# Patient Record
Sex: MALE | Race: WHITE | NOT HISPANIC OR LATINO | Employment: OTHER | URBAN - METROPOLITAN AREA
[De-identification: names, ages, dates, MRNs, and addresses within clinical notes are randomized per-mention and may not be internally consistent; named-entity substitution may affect disease eponyms.]

---

## 2017-02-08 DIAGNOSIS — I10 ESSENTIAL HYPERTENSION: ICD-10-CM

## 2017-02-08 RX ORDER — LISINOPRIL 40 MG/1
40 TABLET ORAL DAILY
Qty: 90 TABLET | Refills: 3 | Status: SHIPPED | OUTPATIENT
Start: 2017-02-08 | End: 2018-02-12 | Stop reason: SDUPTHER

## 2017-02-08 NOTE — TELEPHONE ENCOUNTER
----- Message from Mileyluizangelia Kitchen sent at 2/8/2017  8:50 AM CST -----  Contact: Self/[phone] ext:[phone] work  Type: Rx    Name of medication(s): lisinopril (PRINIVIL,ZESTRIL) 40 MG tablet    Is this a refill? New rx?refill    Who prescribed medication?    Pharmacy Name, Phone, & Location[facility]    Comments:Patient is calling to request a refill on medication above. Please call and advise.    Thank you!

## 2017-03-10 ENCOUNTER — OFFICE VISIT (OUTPATIENT)
Dept: DERMATOLOGY | Facility: CLINIC | Age: 72
End: 2017-03-10
Payer: MEDICARE

## 2017-03-10 DIAGNOSIS — L82.1 SK (SEBORRHEIC KERATOSIS): ICD-10-CM

## 2017-03-10 DIAGNOSIS — L21.9 ACUTE SEBORRHEIC DERMATITIS: ICD-10-CM

## 2017-03-10 DIAGNOSIS — L57.0 AK (ACTINIC KERATOSIS): Primary | ICD-10-CM

## 2017-03-10 DIAGNOSIS — Z12.83 SKIN CANCER SCREENING: ICD-10-CM

## 2017-03-10 PROCEDURE — 99999 PR PBB SHADOW E&M-EST. PATIENT-LVL II: CPT | Mod: PBBFAC,,, | Performed by: DERMATOLOGY

## 2017-03-10 PROCEDURE — 99213 OFFICE O/P EST LOW 20 MIN: CPT | Mod: 25,S$GLB,, | Performed by: DERMATOLOGY

## 2017-03-10 PROCEDURE — 1159F MED LIST DOCD IN RCRD: CPT | Mod: S$GLB,,, | Performed by: DERMATOLOGY

## 2017-03-10 PROCEDURE — 1160F RVW MEDS BY RX/DR IN RCRD: CPT | Mod: S$GLB,,, | Performed by: DERMATOLOGY

## 2017-03-10 PROCEDURE — 1157F ADVNC CARE PLAN IN RCRD: CPT | Mod: S$GLB,,, | Performed by: DERMATOLOGY

## 2017-03-10 PROCEDURE — 17000 DESTRUCT PREMALG LESION: CPT | Mod: S$GLB,,, | Performed by: DERMATOLOGY

## 2017-03-10 PROCEDURE — 17003 DESTRUCT PREMALG LES 2-14: CPT | Mod: S$GLB,,, | Performed by: DERMATOLOGY

## 2017-03-10 RX ORDER — KETOCONAZOLE 20 MG/ML
SHAMPOO, SUSPENSION TOPICAL
Qty: 120 ML | Refills: 5 | Status: SHIPPED | OUTPATIENT
Start: 2017-03-10 | End: 2018-02-07 | Stop reason: SDUPTHER

## 2017-03-10 NOTE — PROGRESS NOTES
Subjective:       Patient ID:  Jamison Mayes is a 72 y.o. male who presents for   Chief Complaint   Patient presents with    Skin Check     UBSE     HPI  Patient has a history of non-melanoma skin cancer and is here today for routine skin check. Has noticed some more scaly spots on scalp since last visit. Asymptomatic and no current treatment.    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   Has done PDT on scalp in 2015.    Also has a hx of seb derm and would like refills of ketoconazole shampoo.    Review of Systems   Constitutional: Negative for fever, chills, weight loss, weight gain, fatigue, night sweats and malaise.   Skin: Positive for activity-related sunscreen use. Negative for daily sunscreen use and recent sunburn.   Hematologic/Lymphatic: Bruises/bleeds easily.        Objective:    Physical Exam   Constitutional: He appears well-developed and well-nourished. No distress.   Neurological: He is alert and oriented to person, place, and time. He is not disoriented.   Psychiatric: He has a normal mood and affect.   Skin:   Areas Examined (abnormalities noted in diagram):   Scalp / Hair Palpated and Inspected  Head / Face Inspection Performed  Neck Inspection Performed  Chest / Axilla Inspection Performed  Abdomen Inspection Performed  Back Inspection Performed  RUE Inspected  LUE Inspection Performed  Nails and Digits Inspection Performed                       Diagram Legend     Erythematous scaling macule/papule c/w actinic keratosis       Vascular papule c/w angioma      Pigmented verrucoid papule/plaque c/w seborrheic keratosis      Yellow umbilicated papule c/w sebaceous hyperplasia      Irregularly shaped tan macule c/w lentigo     1-2 mm smooth white papules consistent with Milia      Movable subcutaneous cyst with punctum c/w epidermal inclusion cyst      Subcutaneous movable cyst c/w pilar cyst       Firm pink to brown papule c/w dermatofibroma      Pedunculated fleshy papule(s) c/w skin tag(s)      Evenly pigmented macule c/w junctional nevus     Mildly variegated pigmented, slightly irregular-bordered macule c/w mildly atypical nevus      Flesh colored to evenly pigmented papule c/w intradermal nevus       Pink pearly papule/plaque c/w basal cell carcinoma      Erythematous hyperkeratotic cursted plaque c/w SCC      Surgical scar with no sign of skin cancer recurrence      Open and closed comedones      Inflammatory papules and pustules      Verrucoid papule consistent consistent with wart     Erythematous eczematous patches and plaques     Dystrophic onycholytic nail with subungual debris c/w onychomycosis     Umbilicated papule    Erythematous-base heme-crusted tan verrucoid plaque consistent with inflamed seborrheic keratosis     Erythematous Silvery Scaling Plaque c/w Psoriasis     See annotation      Assessment / Plan:        AK (actinic keratosis)  Cryosurgery Procedure Note    Verbal consent from the patient is obtained and the patient is aware of the precancerous quality and need for treatment of these lesions. Liquid nitrogen cryosurgery is applied to the 11 actinic keratoses, as detailed in the physical exam, to produce a freeze injury. The patient is aware that blisters may form and is instructed on wound care with gentle cleansing and use of vaseline ointment to keep moist until healed. The patient is supplied a handout on cryosurgery and is instructed to call if lesions do not completely resolve.    Acute seborrheic dermatitis  -     ketoconazole (NIZORAL) 2 % shampoo; Wash hair with medicated shampoo at least 2x/week - let sit on scalp at least 5 minutes prior to rinsing  Dispense: 120 mL; Refill: 5    SK (seborrheic keratosis)  These are benign inherited growths without a malignant potential. Reassurance given to patient. No treatment is necessary.   Treatment of benign, asymptomatic  lesions may be considered cosmetic.    Skin cancer screening  Upper body skin examination performed today including at least 6 points as noted in physical examination. No lesions suspicious for malignancy noted.  Patient instructed in importance of daily broad spectrum sunscreen use with spf at least 30. Sun avoidance and topical protection/protective clothing discussed.      Return in about 6 months (around 9/10/2017) for skin check or sooner for any concerns.

## 2017-03-10 NOTE — MR AVS SNAPSHOT
[facility]  [address]  [facility] [address]  Phone: [phone]  Fax: [phone]                  Jamison Gruberiot   3/10/2017 1:30 PM   Office Visit    Description:  Male : 1945   Provider:  Cristin Joseph MD   Department:  [facility]           Reason for Visit     Skin Check           Diagnoses this Visit        Comments    AK (actinic keratosis)    -  Primary     Acute seborrheic dermatitis         SK (seborrheic keratosis)         Skin cancer screening                To Do List           Future Appointments        Provider Department Dept Phone    2017 8:00 AM LAB, APPOINTMENT [facility] [phone]    2017 10:00 AM MD [facility] - Internal Medicine [phone]      Goals (5 Years of Data)     None      Follow-Up and Disposition     Return in about 6 months (around 9/10/2017) for skin check or sooner for any concerns.    Follow-up and Disposition History       These Medications        Disp Refills Start End    ketoconazole (NIZORAL) 2 % shampoo 120 mL 5 3/10/2017     Wash hair with medicated shampoo at least 2x/week - let sit on scalp at least 5 minutes prior to rinsing    Pharmacy: [facility]/pharmacy #[id number] [address] AT [facility] Ph #: [phone]         [facility]     [facility] Nurse Care Line -  Assistance  Registered nurses in the [facility] provide clinical advisement, health education, appointment booking, and other advisory services.  Call for this free service at [phone].             Medications           Message regarding Medications     Verify the changes and/or additions to your medication regime listed below are the same as discussed with your clinician today.  If any of these changes or additions are incorrect, please notify your healthcare provider.             Verify that the below list of medications is an  accurate representation of the medications you are currently taking.  If none reported, the list may be blank. If incorrect, please contact your healthcare provider. Carry this list with you in case of emergency.           Current Medications     amlodipine (NORVASC) 10 MG tablet Take 1 tablet (10 mg total) by mouth once daily.    aspirin (ASPIRIN LOW DOSE) 81 MG EC tablet Take 81 mg by mouth. 1 Tablet, Delayed Release (E.C.) Oral Every day    calcium carbonate (OS-RUSLAN) 500 mg calcium (1,250 mg) chewable tablet Take 1 tablet by mouth once daily.    cyanocobalamin (VITAMIN B-12) 250 MCG tablet Take 1 tablet (250 mcg total) by mouth once daily.    fluorouracil (EFUDEX) 5 % cream AAA on scalp, forehead, and temples BID x 2-3 weeks. Stop if blistered, oozing, or bleeding. Use daily sun protection.    imiquimod (ALDARA) 5 % cream AAA 5 nights/week x 4 - 6 weeks. Wash off in am. Stop if blistering, bleeding, oozing, etc. Do not use >1 packet per night.    ketoconazole (NIZORAL) 2 % shampoo Wash hair with medicated shampoo at least 2x/week - let sit on scalp at least 5 minutes prior to rinsing    lisinopril (PRINIVIL,ZESTRIL) 40 MG tablet Take 1 tablet (40 mg total) by mouth once daily.    lovastatin (MEVACOR) 20 MG tablet TAKE 1 TABLET IN THE EVENING    lovastatin (MEVACOR) 20 MG tablet Take 1 tablet (20 mg total) by mouth every evening.    MULTIVITAMIN WITH MINERALS (ONE-A-DAY 50 PLUS) Tab Take by mouth. 1 Tablet Oral Every morning    oxycodone-acetaminophen (PERCOCET) 5-325 mg per tablet Take 1 tablet by mouth every 4 to 6 hours as needed for Pain.    phenytoin (DILANTIN EXTENDED) 100 MG ER capsule Take 2 capsules (200 mg total) by mouth 3 (three) times daily.    potassium chloride SA (K-DUR,KLOR-CON) 20 MEQ tablet Take by mouth every other day. 595 mg           Clinical Reference Information           Allergies as of 3/10/2017     No Known Allergies      Immunizations Administered on Date of Encounter - 3/10/2017      None      Instructions    CRYOSURGERY      Your doctor has used a method called cryosurgery to treat your skin condition. Cryosurgery refers to the use of very cold substances to treat a variety of skin conditions such as warts, pre-skin cancers, molluscum contagiosum, sun spots, and several benign growths. The substance we use in cryosurgery is liquid nitrogen and is so cold (-195 degrees Celsius) that is burns when administered.     Following treatment in the office, the skin may immediately burn and become red. You may find the area around the lesion is affected as well. It is sometimes necessary to treat not only the lesion, but a small area of the surrounding normal skin to achieve a good response.     A blister, and even a blood filled blister, may form after treatment.   This is a normal response. If the blister is painful, it is acceptable to sterilize a needle and with rubbing alcohol and gently pop the blister. It is important that you gently wash the area with soap and warm water as the blister fluid may contain wart virus if a wart was treated. Do no remove the roof of the blister.     The area treated can take anywhere from 1-3 weeks to heal. Healing time depends on the kind skin lesion treated, the location, and how aggressively the lesion was treated. It is recommended that the areas treated are covered with Vaseline or bacitracin ointment and a band-aid. If a band-aid is not practical, just ointment applied several times per day will do. Keeping these areas moist will speed the healing time.    Treatment with liquid nitrogen can leave a scar. In dark skin, it may be a light or dark scar, in light skin it may be a white or pink scar. These will generally fade with time, but may never go away completely.     If you have any concerns after your treatment, please feel free to call the office.       [facility] [place] [address]/ [phone] FAX/ [email or web address]    Summer  Sun Protection      The [facility] would like to remind you of the importance of sun protection all year round and particularly during the summer when the suns rays are the strongest. It has been proven that both acute and chronic sun exposure damages our cells and leads to skin cancer. Beyond skin cancer, the sun causes 90% of the symptoms of pre-mature skin aging, including wrinkles, lentigines (brown spots), and thin, easily bruised skin. Proper sun protection can help prevent these unwanted conditions.    Many patients report that the dont go in the sun. It has been shown that the average person receives 18 hours of incidental sun exposure per week during activities such as walking through parking lots, driving, or sitting next to windows. This accumulates to several bad sunburns per year!    In choosing sunscreen, you want one that protects against both UVA and UVB rays. It is recommended that you use one of SPF 30 or higher. It is important to apply the sunscreen about 20 minutes prior to sun exposure. Most sunscreens are chemical sunscreens and a reaction must take place in the skin so that they are effective. If they are applied and then you are immediately exposed to the sun or start sweating, this reaction has not had time to take place and you are therefore unprotected. Sunscreen needs to be reapplied every 2 hours if you are participating in water sports or sweating. We recommend Elta MD or Neutrogena Ultra Sheer Dry Touch SPF 55 for daily use; however there are many options and it is most important for you to find one that you will use on a consistent basis.    If you have sensitive skin, you may do best with a sunscreen that contains only physical blockers such as titanium dioxide or zinc oxide. These are typically thicker and harder to apply, however they afford very good protection. Neutrogena Sensitive Skin, Blue Lizard Sensitive Skin (pink top) or Neutrogena Pure and Free  are popular ones.     Aside from sunscreen, clothes with UV protection, wide brimmed hats, and sunglasses are other means of sun protection that we recommend.                        [facility]  [facility]  [address]  [place] [address]  Dept: [phone]  Dept Fax: [phone]                                                                                      Language Assistance Services     ATTENTION: Language assistance services are available, free of charge. Please call [phone].      ATENCIÓN: Si habla michael, tiene a landin disposición servicios gratuitos de asistencia lingüística. Llame al [phone].     CHÚ Ý: N?u b?n nói Ti?ng Vi?t, có các d?ch v? h? tr? ngôn ng? mi?n phí dành cho b?n. G?i s? [phone].         [facility] complies with applicable Federal civil rights laws and does not discriminate on the basis of race, color, national origin, age, disability, or sex.

## 2017-03-10 NOTE — PATIENT INSTRUCTIONS
CRYOSURGERY      Your doctor has used a method called cryosurgery to treat your skin condition. Cryosurgery refers to the use of very cold substances to treat a variety of skin conditions such as warts, pre-skin cancers, molluscum contagiosum, sun spots, and several benign growths. The substance we use in cryosurgery is liquid nitrogen and is so cold (-195 degrees Celsius) that is burns when administered.     Following treatment in the office, the skin may immediately burn and become red. You may find the area around the lesion is affected as well. It is sometimes necessary to treat not only the lesion, but a small area of the surrounding normal skin to achieve a good response.     A blister, and even a blood filled blister, may form after treatment.   This is a normal response. If the blister is painful, it is acceptable to sterilize a needle and with rubbing alcohol and gently pop the blister. It is important that you gently wash the area with soap and warm water as the blister fluid may contain wart virus if a wart was treated. Do no remove the roof of the blister.     The area treated can take anywhere from 1-3 weeks to heal. Healing time depends on the kind skin lesion treated, the location, and how aggressively the lesion was treated. It is recommended that the areas treated are covered with Vaseline or bacitracin ointment and a band-aid. If a band-aid is not practical, just ointment applied several times per day will do. Keeping these areas moist will speed the healing time.    Treatment with liquid nitrogen can leave a scar. In dark skin, it may be a light or dark scar, in light skin it may be a white or pink scar. These will generally fade with time, but may never go away completely.     If you have any concerns after your treatment, please feel free to call the office.       [address] [place] [address]/ [phone] FAX/ [email or web address]    Summer Sun Protection      The  [facility] would like to remind you of the importance of sun protection all year round and particularly during the summer when the suns rays are the strongest. It has been proven that both acute and chronic sun exposure damages our cells and leads to skin cancer. Beyond skin cancer, the sun causes 90% of the symptoms of pre-mature skin aging, including wrinkles, lentigines (brown spots), and thin, easily bruised skin. Proper sun protection can help prevent these unwanted conditions.    Many patients report that the dont go in the sun. It has been shown that the average person receives 18 hours of incidental sun exposure per week during activities such as walking through parking lots, driving, or sitting next to windows. This accumulates to several bad sunburns per year!    In choosing sunscreen, you want one that protects against both UVA and UVB rays. It is recommended that you use one of SPF 30 or higher. It is important to apply the sunscreen about 20 minutes prior to sun exposure. Most sunscreens are chemical sunscreens and a reaction must take place in the skin so that they are effective. If they are applied and then you are immediately exposed to the sun or start sweating, this reaction has not had time to take place and you are therefore unprotected. Sunscreen needs to be reapplied every 2 hours if you are participating in water sports or sweating. We recommend Elta MD or Neutrogena Ultra Sheer Dry Touch SPF 55 for daily use; however there are many options and it is most important for you to find one that you will use on a consistent basis.    If you have sensitive skin, you may do best with a sunscreen that contains only physical blockers such as titanium dioxide or zinc oxide. These are typically thicker and harder to apply, however they afford very good protection. Neutrogena Sensitive Skin, Blue Lizard Sensitive Skin (pink top) or Neutrogena Pure and Free are popular ones.      Aside from sunscreen, clothes with UV protection, wide brimmed hats, and sunglasses are other means of sun protection that we recommend.                        [facility]  [address]  [facility] [address]  Dept: [phone]  Dept Fax: [phone]

## 2017-04-17 ENCOUNTER — TELEPHONE (OUTPATIENT)
Dept: INTERNAL MEDICINE | Facility: CLINIC | Age: 72
End: 2017-04-17

## 2017-04-17 DIAGNOSIS — Z13.9 ENCOUNTER FOR SCREENING: Primary | ICD-10-CM

## 2017-04-17 NOTE — TELEPHONE ENCOUNTER
----- Message from Estrella Villegas sent at 4/17/2017 11:26 AM CDT -----  Contact: self/[phone] ext [phone]  Pt called in regards to getting psa and dilantin level added to his lab appointment on 4-20-17.      Please advise

## 2017-04-20 ENCOUNTER — TELEPHONE (OUTPATIENT)
Dept: NEUROLOGY | Facility: CLINIC | Age: 72
End: 2017-04-20

## 2017-04-20 ENCOUNTER — LAB VISIT (OUTPATIENT)
Dept: LAB | Facility: HOSPITAL | Age: 72
End: 2017-04-20
Attending: FAMILY MEDICINE
Payer: MEDICARE

## 2017-04-20 ENCOUNTER — TELEPHONE (OUTPATIENT)
Dept: INTERNAL MEDICINE | Facility: CLINIC | Age: 72
End: 2017-04-20

## 2017-04-20 DIAGNOSIS — E11.9 TYPE 2 DIABETES MELLITUS WITHOUT COMPLICATION: ICD-10-CM

## 2017-04-20 DIAGNOSIS — Z13.9 ENCOUNTER FOR SCREENING: ICD-10-CM

## 2017-04-20 DIAGNOSIS — Z11.59 NEED FOR HEPATITIS C SCREENING TEST: ICD-10-CM

## 2017-04-20 LAB
COMPLEXED PSA SERPL-MCNC: 1.2 NG/ML
HCV AB SERPL QL IA: NEGATIVE

## 2017-04-20 PROCEDURE — 36415 COLL VENOUS BLD VENIPUNCTURE: CPT

## 2017-04-20 PROCEDURE — 86803 HEPATITIS C AB TEST: CPT

## 2017-04-20 PROCEDURE — 83036 HEMOGLOBIN GLYCOSYLATED A1C: CPT

## 2017-04-20 PROCEDURE — 84153 ASSAY OF PSA TOTAL: CPT

## 2017-04-20 NOTE — TELEPHONE ENCOUNTER
I called Mr. Mayes back. He said a dilantin level was supposed to be ordered to be drawn w/his labs. But per Dr. Lo's note, he should be off this and was supposed to f/u one year ago. He then said 'thanks, bye'.

## 2017-04-20 NOTE — TELEPHONE ENCOUNTER
----- Message from Liban Bailey sent at 4/20/2017  8:43 AM CDT -----  Contact: Shelli in Internal Medicine Lab ext  [phone]  Caller is requesting a return call, corinne call

## 2017-04-21 LAB
ESTIMATED AVG GLUCOSE: 151 MG/DL
HBA1C MFR BLD HPLC: 6.9 %

## 2017-04-24 DIAGNOSIS — I10 ESSENTIAL HYPERTENSION: ICD-10-CM

## 2017-04-24 RX ORDER — AMLODIPINE BESYLATE 10 MG/1
10 TABLET ORAL DAILY
Qty: 90 TABLET | Refills: 3 | Status: SHIPPED | OUTPATIENT
Start: 2017-04-24 | End: 2018-04-15 | Stop reason: SDUPTHER

## 2017-04-24 RX ORDER — LOVASTATIN 20 MG/1
20 TABLET ORAL NIGHTLY
Qty: 90 TABLET | Refills: 3 | Status: ON HOLD | OUTPATIENT
Start: 2017-04-24 | End: 2018-03-08 | Stop reason: HOSPADM

## 2017-04-25 ENCOUNTER — OFFICE VISIT (OUTPATIENT)
Dept: INTERNAL MEDICINE | Facility: CLINIC | Age: 72
End: 2017-04-25
Payer: MEDICARE

## 2017-04-25 VITALS — WEIGHT: 172.19 LBS | BODY MASS INDEX: 27.02 KG/M2 | HEIGHT: 67 IN

## 2017-04-25 DIAGNOSIS — G40.909 SEIZURE DISORDER: ICD-10-CM

## 2017-04-25 DIAGNOSIS — E11.9 TYPE 2 DIABETES MELLITUS WITHOUT COMPLICATION, WITHOUT LONG-TERM CURRENT USE OF INSULIN: ICD-10-CM

## 2017-04-25 DIAGNOSIS — I10 ESSENTIAL HYPERTENSION: Primary | ICD-10-CM

## 2017-04-25 LAB
CREAT UR-MCNC: 67 MG/DL
PROT UR-MCNC: 13 MG/DL
PROT/CREAT RATIO, UR: 0.19

## 2017-04-25 PROCEDURE — 99397 PER PM REEVAL EST PAT 65+ YR: CPT | Mod: S$GLB,,, | Performed by: FAMILY MEDICINE

## 2017-04-25 PROCEDURE — 99499 UNLISTED E&M SERVICE: CPT | Mod: S$GLB,,, | Performed by: FAMILY MEDICINE

## 2017-04-25 PROCEDURE — 82570 ASSAY OF URINE CREATININE: CPT

## 2017-04-25 PROCEDURE — 99999 PR PBB SHADOW E&M-EST. PATIENT-LVL III: CPT | Mod: PBBFAC,,, | Performed by: FAMILY MEDICINE

## 2017-04-25 RX ORDER — HYDROCHLOROTHIAZIDE 25 MG/1
25 TABLET ORAL DAILY
Qty: 30 TABLET | Refills: 0 | Status: SHIPPED | OUTPATIENT
Start: 2017-04-25 | End: 2017-05-23 | Stop reason: SDUPTHER

## 2017-04-25 NOTE — MR AVS SNAPSHOT
[facility]  [address]  [place] [address]  Phone: [phone]  Fax: [phone]                  Jamison Mayes   2017 10:00 AM   Office Visit    Description:  Male : 1945   Provider:  Blaise Jorge MD   Department:  [facility]           Reason for Visit     Annual Exam           Diagnoses this Visit        Comments    Essential hypertension    -  Primary     Type 2 diabetes mellitus without complication, without long-term current use of insulin         Seizure disorder                To Do List           Goals (5 Years of Data)     None      Follow-Up and Disposition     Return in about 1 week (around 2017).       These Medications        Disp Refills Start End    hydrochlorothiazide (HYDRODIURIL) 25 MG tablet 30 tablet 0 2017    Take 1 tablet (25 mg total) by mouth once daily. - Oral    Pharmacy: [facility]/pharmacy #[id number] - [address] AT [facility] Ph #: [phone]         [facility]     [facility] On Call Nurse Care Line -  Assistance  Unless otherwise directed by your provider, please contact [facility], our nurse care line that is available for  assistance.     Registered nurses in the [facility] provide: appointment scheduling, clinical advisement, health education, and other advisory services.  Call: [phone] (toll free)               Medications           Message regarding Medications     Verify the changes and/or additions to your medication regime listed below are the same as discussed with your clinician today.  If any of these changes or additions are incorrect, please notify your healthcare provider.        START taking these NEW medications        Refills    hydrochlorothiazide (HYDRODIURIL) 25 MG tablet 0    Sig: Take 1 tablet (25 mg total) by mouth once daily.    Class: Normal    Route: Oral           Verify that the below list of  medications is an accurate representation of the medications you are currently taking.  If none reported, the list may be blank. If incorrect, please contact your healthcare provider. Carry this list with you in case of emergency.           Current Medications     amlodipine (NORVASC) 10 MG tablet Take 1 tablet (10 mg total) by mouth once daily.    aspirin (ASPIRIN LOW DOSE) 81 MG EC tablet Take 81 mg by mouth. 1 Tablet, Delayed Release (E.C.) Oral Every day    calcium carbonate (OS-RUSLAN) 500 mg calcium (1,250 mg) chewable tablet Take 1 tablet by mouth once daily.    cyanocobalamin (VITAMIN B-12) 250 MCG tablet Take 1 tablet (250 mcg total) by mouth once daily.    fluorouracil (EFUDEX) 5 % cream AAA on scalp, forehead, and temples BID x 2-3 weeks. Stop if blistered, oozing, or bleeding. Use daily sun protection.    imiquimod (ALDARA) 5 % cream AAA 5 nights/week x 4 - 6 weeks. Wash off in am. Stop if blistering, bleeding, oozing, etc. Do not use >1 packet per night.    ketoconazole (NIZORAL) 2 % shampoo Wash hair with medicated shampoo at least 2x/week - let sit on scalp at least 5 minutes prior to rinsing    lisinopril (PRINIVIL,ZESTRIL) 40 MG tablet Take 1 tablet (40 mg total) by mouth once daily.    lovastatin (MEVACOR) 20 MG tablet TAKE 1 TABLET IN THE EVENING    lovastatin (MEVACOR) 20 MG tablet Take 1 tablet (20 mg total) by mouth every evening.    MULTIVITAMIN WITH MINERALS (ONE-A-DAY 50 PLUS) Tab Take by mouth. 1 Tablet Oral Every morning    oxycodone-acetaminophen (PERCOCET) 5-325 mg per tablet Take 1 tablet by mouth every 4 to 6 hours as needed for Pain.    phenytoin (DILANTIN EXTENDED) 100 MG ER capsule Take 2 capsules (200 mg total) by mouth 3 (three) times daily.    potassium chloride SA (K-DUR,KLOR-CON) 20 MEQ tablet Take by mouth every other day. 595 mg    hydrochlorothiazide (HYDRODIURIL) 25 MG tablet Take 1 tablet (25 mg total) by mouth once daily.           Clinical Reference Information          "  Your Vitals Were     Height Weight BMI          5' 7" (1.702 m) 78.1 kg (172 lb 2.9 oz) 26.97 kg/m2        Allergies as of 4/25/2017     No Known Allergies      Immunizations Administered on Date of Encounter - 4/25/2017     None      Orders Placed During Today's Visit     Future Labs/Procedures Expected by Expires    Protein / creatinine ratio, urine  4/25/2017 7/24/2017    Diabetic Eye Screening Photo  As directed 4/25/2018      Language Assistance Services     ATTENTION: Language assistance services are available, free of charge. Please call [phone].      ATENCIÓN: Si habla español, tiene a landin disposición servicios gratuitos de asistencia lingüística. Llame al [phone].     CHÚ Ý: N?u b?n nói Ti?ng Vi?t, có các d?ch v? h? tr? ngôn ng? mi?n phí dành cho b?n. G?i s? [phone].         [facility] complies with applicable Federal civil rights laws and does not discriminate on the basis of race, color, national origin, age, disability, or sex.        "

## 2017-04-25 NOTE — PROGRESS NOTES
Subjective:       Patient ID: Jamison Mayes is a 72 y.o. male.    Chief Complaint: Annual Exam  Jamison Mayes 72 y.o. male is here for office visit to review care and physical exam, here for usual care.  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  No Systemic complaints, very active.      HPI  Review of Systems   Constitutional: Negative for activity change, appetite change, fatigue, fever and unexpected weight change.   HENT: Negative for congestion, hearing loss, postnasal drip and rhinorrhea.    Eyes: Negative for pain, discharge and visual disturbance.   Respiratory: Negative for cough, choking and shortness of breath.    Cardiovascular: Negative for chest pain, palpitations and leg swelling.   Gastrointestinal: Negative for abdominal pain, diarrhea and vomiting.   Genitourinary: Negative for dysuria, flank pain, hematuria and urgency.   Musculoskeletal: Negative for arthralgias, back pain, joint swelling and neck pain.   Skin: Negative for color change and rash.   Neurological: Negative for dizziness, tremors, syncope, weakness, numbness and headaches.   Psychiatric/Behavioral: Negative for agitation and confusion. The patient is not hyperactive.        Objective:      Physical Exam   Constitutional: He is oriented to person, place, and time. He appears well-developed and well-nourished.   HENT:   Head: Normocephalic.   Eyes: EOM are normal. Pupils are equal, round, and reactive to light.   Neck: Normal range of motion. Neck supple. No thyromegaly present.   Cardiovascular: Normal rate.  Exam reveals no gallop and no friction rub.    No murmur heard.  Pulmonary/Chest: Effort normal. No respiratory distress. He has no wheezes.   Abdominal: Soft. Bowel sounds are normal. He exhibits no mass. There is no tenderness.   Musculoskeletal: He exhibits no edema or tenderness.   Lymphadenopathy:     He has no cervical adenopathy.   Neurological: He is alert and oriented to  person, place, and time. He has normal reflexes. No cranial nerve deficit.   Skin: Skin is warm. No rash noted.   Psychiatric: He has a normal mood and affect. His behavior is normal.       Assessment:       No diagnosis found.    Plan:       Jamison was seen today for annual exam.    Diagnoses and all orders for this visit:    Essential hypertension  - rtc one week for check, BMP review, discussed getting home BP cuff  Type 2 diabetes mellitus without complication, without long-term current use of insulin  -     Protein / creatinine ratio, urine; Future  -     Diabetic Eye Screening Photo; Future    Seizure disorder    - no issues

## 2017-05-02 ENCOUNTER — LAB VISIT (OUTPATIENT)
Dept: LAB | Facility: HOSPITAL | Age: 72
End: 2017-05-02
Attending: FAMILY MEDICINE
Payer: MEDICARE

## 2017-05-02 ENCOUNTER — OFFICE VISIT (OUTPATIENT)
Dept: INTERNAL MEDICINE | Facility: CLINIC | Age: 72
End: 2017-05-02
Payer: MEDICARE

## 2017-05-02 ENCOUNTER — PROCEDURE VISIT (OUTPATIENT)
Dept: OPTOMETRY | Facility: CLINIC | Age: 72
End: 2017-05-02
Attending: FAMILY MEDICINE
Payer: MEDICARE

## 2017-05-02 VITALS
WEIGHT: 172.19 LBS | HEART RATE: 74 BPM | SYSTOLIC BLOOD PRESSURE: 136 MMHG | HEIGHT: 67 IN | DIASTOLIC BLOOD PRESSURE: 62 MMHG | BODY MASS INDEX: 27.02 KG/M2

## 2017-05-02 DIAGNOSIS — H91.90 DECREASED HEARING, UNSPECIFIED LATERALITY: Primary | ICD-10-CM

## 2017-05-02 DIAGNOSIS — I10 ESSENTIAL HYPERTENSION: ICD-10-CM

## 2017-05-02 DIAGNOSIS — H40.003 GLAUCOMA SUSPECT, BILATERAL: Primary | ICD-10-CM

## 2017-05-02 DIAGNOSIS — E11.8 DM (DIABETES MELLITUS) WITH COMPLICATIONS: ICD-10-CM

## 2017-05-02 DIAGNOSIS — E11.8 DM (DIABETES MELLITUS) WITH COMPLICATIONS: Primary | ICD-10-CM

## 2017-05-02 DIAGNOSIS — G40.909 SEIZURE DISORDER: ICD-10-CM

## 2017-05-02 LAB
CHOLEST/HDLC SERPL: 3.2 {RATIO}
HDL/CHOLESTEROL RATIO: 31.4 %
HDLC SERPL-MCNC: 140 MG/DL
HDLC SERPL-MCNC: 44 MG/DL
LDLC SERPL CALC-MCNC: 71.2 MG/DL
NONHDLC SERPL-MCNC: 96 MG/DL
TRIGL SERPL-MCNC: 124 MG/DL

## 2017-05-02 PROCEDURE — 99999 PR PBB SHADOW E&M-EST. PATIENT-LVL IV: CPT | Mod: PBBFAC,,, | Performed by: FAMILY MEDICINE

## 2017-05-02 PROCEDURE — 3075F SYST BP GE 130 - 139MM HG: CPT | Mod: S$GLB,,, | Performed by: FAMILY MEDICINE

## 2017-05-02 PROCEDURE — 99215 OFFICE O/P EST HI 40 MIN: CPT | Mod: S$GLB,,, | Performed by: FAMILY MEDICINE

## 2017-05-02 PROCEDURE — 92227 IMG RTA DETCJ/MNTR DS STAFF: CPT | Mod: S$GLB,,, | Performed by: OPHTHALMOLOGY

## 2017-05-02 PROCEDURE — 4010F ACE/ARB THERAPY RXD/TAKEN: CPT | Mod: S$GLB,,, | Performed by: FAMILY MEDICINE

## 2017-05-02 PROCEDURE — 99499 UNLISTED E&M SERVICE: CPT | Mod: S$GLB,,, | Performed by: FAMILY MEDICINE

## 2017-05-02 PROCEDURE — 1159F MED LIST DOCD IN RCRD: CPT | Mod: S$GLB,,, | Performed by: FAMILY MEDICINE

## 2017-05-02 PROCEDURE — 3044F HG A1C LEVEL LT 7.0%: CPT | Mod: S$GLB,,, | Performed by: FAMILY MEDICINE

## 2017-05-02 PROCEDURE — 3078F DIAST BP <80 MM HG: CPT | Mod: S$GLB,,, | Performed by: FAMILY MEDICINE

## 2017-05-02 PROCEDURE — 92250 FUNDUS PHOTOGRAPHY W/I&R: CPT | Mod: S$GLB,,, | Performed by: OPHTHALMOLOGY

## 2017-05-02 PROCEDURE — 1160F RVW MEDS BY RX/DR IN RCRD: CPT | Mod: S$GLB,,, | Performed by: FAMILY MEDICINE

## 2017-05-02 PROCEDURE — 1126F AMNT PAIN NOTED NONE PRSNT: CPT | Mod: S$GLB,,, | Performed by: FAMILY MEDICINE

## 2017-05-02 NOTE — PROGRESS NOTES
"Subjective:       Patient ID: Jamison Mayes is a 72 y.o. male.    Chief Complaint: Follow-up  Jamison Mayes 72 y.o. male is here for office visit to review care and physical exam, noting 4/20 Neurology RN note:  Speaking of Dilantin and Neuro f/u "per Dr. Lo's note, he should be off this and was supposed to f/u one year ago. He then said 'thanks, bye'. Note still uses Dilantin according to pt.  Last Neuro note 5/15.  Wants hearing checked, otherwise ROS unremarkable.  HPI  Review of Systems   Constitutional: Negative for activity change, appetite change, fatigue, fever and unexpected weight change.   HENT: Negative for congestion, hearing loss, postnasal drip and rhinorrhea.    Eyes: Negative for pain, discharge and visual disturbance.   Respiratory: Negative for cough, choking and shortness of breath.    Cardiovascular: Negative for chest pain, palpitations and leg swelling.   Gastrointestinal: Negative for abdominal pain, diarrhea and vomiting.   Genitourinary: Negative for dysuria, flank pain, hematuria and urgency.   Musculoskeletal: Negative for arthralgias, back pain, joint swelling and neck pain.   Skin: Negative for color change and rash.   Neurological: Negative for dizziness, tremors, syncope, weakness, numbness and headaches.   Psychiatric/Behavioral: Negative for agitation and confusion. The patient is not hyperactive.        Objective:      Physical Exam   Constitutional: He is oriented to person, place, and time. He appears well-developed and well-nourished.   HENT:   Head: Normocephalic.   Eyes: EOM are normal. Pupils are equal, round, and reactive to light.   Neck: Normal range of motion. Neck supple. No thyromegaly present.   Cardiovascular: Normal rate.  Exam reveals no gallop and no friction rub.    No murmur heard.  Pulmonary/Chest: Effort normal. No respiratory distress. He has no wheezes.   Abdominal: Soft. Bowel sounds are normal. He exhibits no mass. There is no tenderness. "   Musculoskeletal: He exhibits no edema or tenderness.   Lymphadenopathy:     He has no cervical adenopathy.   Neurological: He is alert and oriented to person, place, and time. He has normal reflexes. No cranial nerve deficit.   Skin: Skin is warm. No rash noted.   Psychiatric: He has a normal mood and affect. His behavior is normal.       Assessment:       No diagnosis found.    Plan:       Jamison was seen today for follow-up.    Diagnoses and all orders for this visit:    DM (diabetes mellitus) with complications  -     Diabetic Eye Screening Photo; Future  -     Lipid panel; Future   - has had Aqc (ggod0 and micro/cr ratio  Essential hypertension  - controled  Seizure disorder  -     Ambulatory Referral to Neurology

## 2017-05-02 NOTE — MR AVS SNAPSHOT
[facility]  [address]  [facility] [address]  Phone: [phone]  Fax: [phone]                  Jamison Mayes   2017 10:20 AM   Office Visit    Description:  Male : 1945   Provider:  Blaise Jorge MD   Department:  [facility]           Reason for Visit     Follow-up           Diagnoses this Visit        Comments    DM (diabetes mellitus) with complications    -  Primary     Essential hypertension         Seizure disorder                To Do List           Goals (5 Years of Data)     None      Follow-Up and Disposition     Return in about 6 months (around 2017), or if symptoms worsen or fail to improve.    Follow-up and Disposition History      [facility]     [facility] Nurse Care Line -  Assistance  Unless otherwise directed by your provider, please contact [facility], our nurse care line that is available for  assistance.     Registered nurses in the [facility] provide: appointment scheduling, clinical advisement, health education, and other advisory services.  Call: [phone] (toll free)               Medications           Message regarding Medications     Verify the changes and/or additions to your medication regime listed below are the same as discussed with your clinician today.  If any of these changes or additions are incorrect, please notify your healthcare provider.             Verify that the below list of medications is an accurate representation of the medications you are currently taking.  If none reported, the list may be blank. If incorrect, please contact your healthcare provider. Carry this list with you in case of emergency.           Current Medications     amlodipine (NORVASC) 10 MG tablet Take 1 tablet (10 mg total) by mouth once daily.    aspirin (ASPIRIN LOW DOSE) 81 MG EC tablet Take 81 mg by mouth. 1 Tablet, Delayed Release (E.C.) Oral Every day    calcium carbonate  "(OS-RUSLAN) 500 mg calcium (1,250 mg) chewable tablet Take 1 tablet by mouth once daily.    cyanocobalamin (VITAMIN B-12) 250 MCG tablet Take 1 tablet (250 mcg total) by mouth once daily.    fluorouracil (EFUDEX) 5 % cream AAA on scalp, forehead, and temples BID x 2-3 weeks. Stop if blistered, oozing, or bleeding. Use daily sun protection.    hydrochlorothiazide (HYDRODIURIL) 25 MG tablet Take 1 tablet (25 mg total) by mouth once daily.    imiquimod (ALDARA) 5 % cream AAA 5 nights/week x 4 - 6 weeks. Wash off in am. Stop if blistering, bleeding, oozing, etc. Do not use >1 packet per night.    ketoconazole (NIZORAL) 2 % shampoo Wash hair with medicated shampoo at least 2x/week - let sit on scalp at least 5 minutes prior to rinsing    lisinopril (PRINIVIL,ZESTRIL) 40 MG tablet Take 1 tablet (40 mg total) by mouth once daily.    lovastatin (MEVACOR) 20 MG tablet TAKE 1 TABLET IN THE EVENING    lovastatin (MEVACOR) 20 MG tablet Take 1 tablet (20 mg total) by mouth every evening.    MULTIVITAMIN WITH MINERALS (ONE-A-DAY 50 PLUS) Tab Take by mouth. 1 Tablet Oral Every morning    oxycodone-acetaminophen (PERCOCET) 5-325 mg per tablet Take 1 tablet by mouth every 4 to 6 hours as needed for Pain.    phenytoin (DILANTIN EXTENDED) 100 MG ER capsule Take 2 capsules (200 mg total) by mouth 3 (three) times daily.    potassium chloride SA (K-DUR,KLOR-CON) 20 MEQ tablet Take by mouth every other day. 595 mg           Clinical Reference Information           Your Vitals Were     BP Pulse Height Weight BMI    136/62 74 5' 7" (1.702 m) 78.1 kg (172 lb 2.9 oz) 26.97 kg/m2      Blood Pressure          Most Recent Value    BP  136/62      Allergies as of 5/2/2017     No Known Allergies      Immunizations Administered on Date of Encounter - 5/2/2017     None      Orders Placed During Today's Visit      Normal Orders This Visit    Ambulatory Referral to Neurology     Future Labs/Procedures Expected by Expires    Lipid panel  5/2/2017 5/2/2018 "    Diabetic Eye Screening Photo  As directed 5/2/2018      Language Assistance Services     ATTENTION: Language assistance services are available, free of charge. Please call [phone].      ATENCIÓN: Si marina rodriguez, tiene a landin disposición servicios gratuitos de asistencia lingüística. Llame al [phone].     CHÚ Ý: N?u b?n nói Ti?ng Vi?t, có các d?ch v? h? tr? ngôn ng? mi?n phí dành cho b?n. G?i s? [phone].         [facility] complies with applicable Federal civil rights laws and does not discriminate on the basis of race, color, national origin, age, disability, or sex.

## 2017-05-04 ENCOUNTER — CLINICAL SUPPORT (OUTPATIENT)
Dept: AUDIOLOGY | Facility: CLINIC | Age: 72
End: 2017-05-04
Payer: MEDICARE

## 2017-05-04 DIAGNOSIS — H90.3 SENSORINEURAL HEARING LOSS, BILATERAL: Primary | ICD-10-CM

## 2017-05-04 PROCEDURE — 92567 TYMPANOMETRY: CPT | Mod: S$GLB,,, | Performed by: PHYSICIAN ASSISTANT

## 2017-05-04 PROCEDURE — 92557 COMPREHENSIVE HEARING TEST: CPT | Mod: S$GLB,,, | Performed by: PHYSICIAN ASSISTANT

## 2017-05-04 NOTE — PROGRESS NOTES
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  Normal tympanograms bilaterally.  Recommend:  1.  Otologic evaluation  2.  Annual hearing evaluations  3.  Noise protection  4.  HAC

## 2017-05-04 NOTE — PROGRESS NOTES
HPI     Diabetic Eye Exam    Additional comments: photo            Comments   72 y.o. y/o here for screening for Diabetic Renopathy with non-dilated   fundus photos per Blaise Jorge MD         Last edited by Teetee Cho on 5/4/2017  8:52 AM. (History)            Assessment /Plan     For exam results, see Encounter Report.    DM (diabetes mellitus) with complications  -     Diabetic Eye Screening Photo

## 2017-05-08 ENCOUNTER — TELEPHONE (OUTPATIENT)
Dept: OPTOMETRY | Facility: CLINIC | Age: 72
End: 2017-05-08

## 2017-05-23 RX ORDER — HYDROCHLOROTHIAZIDE 25 MG/1
25 TABLET ORAL DAILY
Qty: 30 TABLET | Refills: 0 | Status: SHIPPED | OUTPATIENT
Start: 2017-05-23 | End: 2017-06-19 | Stop reason: SDUPTHER

## 2017-06-19 RX ORDER — HYDROCHLOROTHIAZIDE 25 MG/1
25 TABLET ORAL DAILY
Qty: 30 TABLET | Refills: 0 | Status: SHIPPED | OUTPATIENT
Start: 2017-06-19 | End: 2017-07-21 | Stop reason: SDUPTHER

## 2017-07-21 RX ORDER — HYDROCHLOROTHIAZIDE 25 MG/1
25 TABLET ORAL DAILY
Qty: 30 TABLET | Refills: 0 | Status: SHIPPED | OUTPATIENT
Start: 2017-07-21 | End: 2017-08-22 | Stop reason: SDUPTHER

## 2017-08-22 RX ORDER — HYDROCHLOROTHIAZIDE 25 MG/1
25 TABLET ORAL DAILY
Qty: 30 TABLET | Refills: 0 | Status: SHIPPED | OUTPATIENT
Start: 2017-08-22 | End: 2017-09-19 | Stop reason: SDUPTHER

## 2017-09-19 RX ORDER — HYDROCHLOROTHIAZIDE 25 MG/1
25 TABLET ORAL DAILY
Qty: 30 TABLET | Refills: 0 | Status: SHIPPED | OUTPATIENT
Start: 2017-09-19 | End: 2017-10-20 | Stop reason: SDUPTHER

## 2017-10-20 RX ORDER — HYDROCHLOROTHIAZIDE 25 MG/1
25 TABLET ORAL DAILY
Qty: 90 TABLET | Refills: 0 | Status: SHIPPED | OUTPATIENT
Start: 2017-10-20 | End: 2018-01-16 | Stop reason: SDUPTHER

## 2017-11-07 ENCOUNTER — OFFICE VISIT (OUTPATIENT)
Dept: INTERNAL MEDICINE | Facility: CLINIC | Age: 72
End: 2017-11-07
Payer: MEDICARE

## 2017-11-07 ENCOUNTER — LAB VISIT (OUTPATIENT)
Dept: LAB | Facility: HOSPITAL | Age: 72
End: 2017-11-07
Attending: FAMILY MEDICINE
Payer: MEDICARE

## 2017-11-07 VITALS
SYSTOLIC BLOOD PRESSURE: 138 MMHG | OXYGEN SATURATION: 98 % | BODY MASS INDEX: 28.07 KG/M2 | HEART RATE: 72 BPM | DIASTOLIC BLOOD PRESSURE: 62 MMHG | WEIGHT: 178.81 LBS | HEIGHT: 67 IN

## 2017-11-07 DIAGNOSIS — E11.8 DM (DIABETES MELLITUS) WITH COMPLICATIONS: ICD-10-CM

## 2017-11-07 DIAGNOSIS — E11.8 DM (DIABETES MELLITUS) WITH COMPLICATIONS: Primary | ICD-10-CM

## 2017-11-07 DIAGNOSIS — I10 ESSENTIAL HYPERTENSION: ICD-10-CM

## 2017-11-07 LAB
ESTIMATED AVG GLUCOSE: 157 MG/DL
HBA1C MFR BLD HPLC: 7.1 %

## 2017-11-07 PROCEDURE — 99499 UNLISTED E&M SERVICE: CPT | Mod: S$GLB,,, | Performed by: FAMILY MEDICINE

## 2017-11-07 PROCEDURE — 99999 PR PBB SHADOW E&M-EST. PATIENT-LVL IV: CPT | Mod: PBBFAC,,, | Performed by: FAMILY MEDICINE

## 2017-11-07 PROCEDURE — 83036 HEMOGLOBIN GLYCOSYLATED A1C: CPT

## 2017-11-07 PROCEDURE — 99397 PER PM REEVAL EST PAT 65+ YR: CPT | Mod: S$GLB,,, | Performed by: FAMILY MEDICINE

## 2017-11-07 PROCEDURE — 36415 COLL VENOUS BLD VENIPUNCTURE: CPT

## 2017-11-07 NOTE — PROGRESS NOTES
Subjective:       Patient ID: Jamison Mayes is a 72 y.o. male.    Chief Complaint: Diabetes  Jamison Mayes 72 y.o. male is here for office visit to review care and physical exam, here for appt discuss care.  Feels well in general, very active, working.  Wants lab in [facility].  Notes weaning off Dilantin, had good EEG not long ago, sees Neuro.      HPI  Review of Systems   Constitutional: Negative for activity change, appetite change, fatigue, fever and unexpected weight change.   HENT: Negative for congestion, hearing loss, postnasal drip and rhinorrhea.    Eyes: Negative for pain, discharge and visual disturbance.   Respiratory: Negative for cough, choking and shortness of breath.    Cardiovascular: Negative for chest pain, palpitations and leg swelling.   Gastrointestinal: Negative for abdominal pain, diarrhea and vomiting.   Genitourinary: Negative for dysuria, flank pain, hematuria and urgency.   Musculoskeletal: Negative for arthralgias, back pain, joint swelling and neck pain.   Skin: Negative for color change and rash.   Neurological: Negative for dizziness, tremors, syncope, weakness, numbness and headaches.   Psychiatric/Behavioral: Negative for agitation and confusion. The patient is not hyperactive.        Objective:      Physical Exam   Constitutional: He is oriented to person, place, and time. He appears well-developed and well-nourished.   HENT:   Head: Normocephalic.   Eyes: EOM are normal. Pupils are equal, round, and reactive to light.   Neck: Normal range of motion. Neck supple. No thyromegaly present.   Cardiovascular: Normal rate.  Exam reveals no gallop and no friction rub.    No murmur heard.  Pulmonary/Chest: Effort normal. No respiratory distress. He has no wheezes.   Abdominal: Soft. Bowel sounds are normal. He exhibits no mass. There is no tenderness.   Musculoskeletal: He exhibits no edema or tenderness.   Lymphadenopathy:     He has no cervical adenopathy.   Neurological: He  is alert and oriented to person, place, and time. He has normal reflexes. No cranial nerve deficit.   Skin: Skin is warm. No rash noted.   Psychiatric: He has a normal mood and affect. His behavior is normal.       Assessment:       No diagnosis found.    Plan:       Jamison was seen today for diabetes.    Diagnoses and all orders for this visit:    DM (diabetes mellitus) with complications  -     Hemoglobin A1c; Future    Essential hypertension    - Follow

## 2017-11-10 DIAGNOSIS — E11.9 DIABETES MELLITUS WITHOUT COMPLICATION: ICD-10-CM

## 2018-01-17 RX ORDER — HYDROCHLOROTHIAZIDE 25 MG/1
25 TABLET ORAL DAILY
Qty: 90 TABLET | Refills: 0 | Status: SHIPPED | OUTPATIENT
Start: 2018-01-17 | End: 2018-05-15 | Stop reason: SDUPTHER

## 2018-02-07 ENCOUNTER — OFFICE VISIT (OUTPATIENT)
Dept: DERMATOLOGY | Facility: CLINIC | Age: 73
End: 2018-02-07
Payer: MEDICARE

## 2018-02-07 DIAGNOSIS — L08.9 SKIN PUSTULE: Primary | ICD-10-CM

## 2018-02-07 DIAGNOSIS — L81.4 LENTIGINES: ICD-10-CM

## 2018-02-07 DIAGNOSIS — L82.1 SK (SEBORRHEIC KERATOSIS): ICD-10-CM

## 2018-02-07 DIAGNOSIS — L21.9 ACUTE SEBORRHEIC DERMATITIS: ICD-10-CM

## 2018-02-07 DIAGNOSIS — Z12.83 SKIN CANCER SCREENING: ICD-10-CM

## 2018-02-07 DIAGNOSIS — L57.0 AK (ACTINIC KERATOSIS): ICD-10-CM

## 2018-02-07 PROCEDURE — 99213 OFFICE O/P EST LOW 20 MIN: CPT | Mod: 25,S$GLB,, | Performed by: DERMATOLOGY

## 2018-02-07 PROCEDURE — 1126F AMNT PAIN NOTED NONE PRSNT: CPT | Mod: S$GLB,,, | Performed by: DERMATOLOGY

## 2018-02-07 PROCEDURE — 99999 PR PBB SHADOW E&M-EST. PATIENT-LVL II: CPT | Mod: PBBFAC,,, | Performed by: DERMATOLOGY

## 2018-02-07 PROCEDURE — 1159F MED LIST DOCD IN RCRD: CPT | Mod: S$GLB,,, | Performed by: DERMATOLOGY

## 2018-02-07 PROCEDURE — 17000 DESTRUCT PREMALG LESION: CPT | Mod: S$GLB,,, | Performed by: DERMATOLOGY

## 2018-02-07 PROCEDURE — 17003 DESTRUCT PREMALG LES 2-14: CPT | Mod: S$GLB,,, | Performed by: DERMATOLOGY

## 2018-02-07 PROCEDURE — 3008F BODY MASS INDEX DOCD: CPT | Mod: S$GLB,,, | Performed by: DERMATOLOGY

## 2018-02-07 RX ORDER — KETOCONAZOLE 20 MG/ML
SHAMPOO, SUSPENSION TOPICAL
Qty: 120 ML | Refills: 5 | Status: SHIPPED | OUTPATIENT
Start: 2018-02-07 | End: 2021-10-13 | Stop reason: SDUPTHER

## 2018-02-07 RX ORDER — MUPIROCIN 20 MG/G
OINTMENT TOPICAL
Qty: 30 G | Refills: 2 | Status: SHIPPED | OUTPATIENT
Start: 2018-02-07 | End: 2022-12-14

## 2018-02-07 NOTE — PATIENT INSTRUCTIONS
CRYOSURGERY      Your doctor has used a method called cryosurgery to treat your skin condition. Cryosurgery refers to the use of very cold substances to treat a variety of skin conditions such as warts, pre-skin cancers, molluscum contagiosum, sun spots, and several benign growths. The substance we use in cryosurgery is liquid nitrogen and is so cold (-195 degrees Celsius) that is burns when administered.     Following treatment in the office, the skin may immediately burn and become red. You may find the area around the lesion is affected as well. It is sometimes necessary to treat not only the lesion, but a small area of the surrounding normal skin to achieve a good response.     A blister, and even a blood filled blister, may form after treatment.   This is a normal response. If the blister is painful, it is acceptable to sterilize a needle and with rubbing alcohol and gently pop the blister. It is important that you gently wash the area with soap and warm water as the blister fluid may contain wart virus if a wart was treated. Do no remove the roof of the blister.     The area treated can take anywhere from 1-3 weeks to heal. Healing time depends on the kind skin lesion treated, the location, and how aggressively the lesion was treated. It is recommended that the areas treated are covered with Vaseline or bacitracin ointment and a band-aid. If a band-aid is not practical, just ointment applied several times per day will do. Keeping these areas moist will speed the healing time.    Treatment with liquid nitrogen can leave a scar. In dark skin, it may be a light or dark scar, in light skin it may be a white or pink scar. These will generally fade with time, but may never go away completely.     If you have any concerns after your treatment, please feel free to call the office.       [address] [place] [address]/ [phone] FAX/ [email or web address]    Summer Sun Protection      The  [facility] would like to remind you of the importance of sun protection all year round and particularly during the summer when the suns rays are the strongest. It has been proven that both acute and chronic sun exposure damages our cells and leads to skin cancer. Beyond skin cancer, the sun causes 90% of the symptoms of pre-mature skin aging, including wrinkles, lentigines (brown spots), and thin, easily bruised skin. Proper sun protection can help prevent these unwanted conditions.    Many patients report that the dont go in the sun. It has been shown that the average person receives 18 hours of incidental sun exposure per week during activities such as walking through parking lots, driving, or sitting next to windows. This accumulates to several bad sunburns per year!    In choosing sunscreen, you want one that protects against both UVA and UVB rays. It is recommended that you use one of SPF 30 or higher. It is important to apply the sunscreen about 20 minutes prior to sun exposure. Most sunscreens are chemical sunscreens and a reaction must take place in the skin so that they are effective. If they are applied and then you are immediately exposed to the sun or start sweating, this reaction has not had time to take place and you are therefore unprotected. Sunscreen needs to be reapplied every 2 hours if you are participating in water sports or sweating. We recommend Elta MD or Neutrogena Ultra Sheer Dry Touch SPF 55 for daily use; however there are many options and it is most important for you to find one that you will use on a consistent basis.    If you have sensitive skin, you may do best with a sunscreen that contains only physical blockers such as titanium dioxide or zinc oxide. These are typically thicker and harder to apply, however they afford very good protection. Neutrogena Sensitive Skin, Blue Lizard Sensitive Skin (pink top) or Neutrogena Pure and Free are popular ones.      Aside from sunscreen, clothes with UV protection, wide brimmed hats, and sunglasses are other means of sun protection that we recommend.                        [facility] - [facility]ER[facility]  [address]  [place] [address]  Dept: [phone]  Dept Fax: [phone]                                                                               SEBORRHEIC KERATOSES        What causes seborrheic keratoses?    Seborrheic keratoses are harmless, common skin growths that first appear during adult life.  As time goes by, more growths appear.  Some persons have a very large number of them.  Seborrheic keratoses appear on both covered and uncovered parts of the body; they are not caused by sunlight.  The tendency to develop seborrheic keratoses is inherited.    Seborrheic keratoses are harmless and never become malignant.  They begin as slightly raised, light brown spots.  Gradually they thicken and take on a rough wartlike surface.  They slowly darken and may turn black.  These color changes are harmless.  Seborrheic keratoses are superficial and look as if they were stuck on the skin.  Persons who have had several seborrheic keratoses can usually recognize this type of benign growth.  However, if you are concerned or unsure about any growth, consult me.    Treatment    Seborrheic keratoses can easily be removed in the office.  The only reason for removing a seborrheic keratosis is your wish to get rid of it.

## 2018-02-07 NOTE — PROGRESS NOTES
Subjective:       Patient ID:  Jamison Mayes is a 73 y.o. male who presents for   Chief Complaint   Patient presents with    Skin Check     UBSE     HPI  Patient has a history of non-melanoma skin cancer and is here today for routine skin check. Has noticed some more scaly spots on scalp since last visit. Asymptomatic and no current treatment.     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  Has completed several courses of Efudex in the past.   Has done PDT on scalp in 2015.     Also has a hx of seb derm and would like refill of ketoconazole shampoo.    Review of Systems   Constitutional: Negative for fever, chills, weight loss, weight gain, fatigue, night sweats and malaise.   Skin: Negative for daily sunscreen use and recent sunburn.   Hematologic/Lymphatic: Bruises/bleeds easily.        Objective:    Physical Exam   Constitutional: He appears well-developed and well-nourished. No distress.   Neurological: He is alert and oriented to person, place, and time. He is not disoriented.   Psychiatric: He has a normal mood and affect.   Skin:   Areas Examined (abnormalities noted in diagram):   Head / Face Inspection Performed  Neck Inspection Performed  Chest / Axilla Inspection Performed  Back Inspection Performed  RUE Inspected  LUE Inspection Performed                   Diagram Legend     Erythematous scaling macule/papule c/w actinic keratosis       Vascular papule c/w angioma      Pigmented verrucoid papule/plaque c/w seborrheic keratosis      Yellow umbilicated papule c/w sebaceous hyperplasia      Irregularly shaped tan macule c/w lentigo     1-2 mm smooth white papules consistent with Milia      Movable subcutaneous cyst with punctum c/w epidermal inclusion cyst      Subcutaneous movable cyst c/w pilar cyst      Firm pink to brown papule c/w dermatofibroma      Pedunculated fleshy papule(s) c/w skin tag(s)      Evenly pigmented macule c/w junctional nevus      Mildly variegated pigmented, slightly irregular-bordered macule c/w mildly atypical nevus      Flesh colored to evenly pigmented papule c/w intradermal nevus       Pink pearly papule/plaque c/w basal cell carcinoma      Erythematous hyperkeratotic cursted plaque c/w SCC      Surgical scar with no sign of skin cancer recurrence      Open and closed comedones      Inflammatory papules and pustules      Verrucoid papule consistent consistent with wart     Erythematous eczematous patches and plaques     Dystrophic onycholytic nail with subungual debris c/w onychomycosis     Umbilicated papule    Erythematous-base heme-crusted tan verrucoid plaque consistent with inflamed seborrheic keratosis     Erythematous Silvery Scaling Plaque c/w Psoriasis     See annotation      Assessment / Plan:        Skin pustule  -     mupirocin (BACTROBAN) 2 % ointment; AAA on scalp bid  Dispense: 30 g; Refill: 2    Acute seborrheic dermatitis  -     ketoconazole (NIZORAL) 2 % shampoo; Wash hair with medicated shampoo at least 2x/week - let sit on scalp at least 5 minutes prior to rinsing  Dispense: 120 mL; Refill: 5    AK (actinic keratosis)  Cryosurgery Procedure Note    Verbal consent from the patient is obtained and the patient is aware of the precancerous quality and need for treatment of these lesions. Liquid nitrogen cryosurgery is applied to the 13 actinic keratoses, as detailed in the physical exam, to produce a freeze injury. The patient is aware that blisters may form and is instructed on wound care with gentle cleansing and use of vaseline ointment to keep moist until healed. The patient is supplied a handout on cryosurgery and is instructed to call if lesions do not completely resolve.    SK (seborrheic keratosis)  These are benign inherited growths without a malignant potential. Reassurance given to patient. No treatment is necessary.   Treatment of benign, asymptomatic lesions may be considered cosmetic.  Warned about risk of  hypo- or hyperpigmentation with treatment and risk of recurrence.    Lentigines  These are benign sun spots which should be monitored for changes. Patient instructed in importance of daily broad spectrum sunscreen use with spf at least 30. Sun avoidance and topical protection/protective clothing discussed.    Skin cancer screening  Upper body skin examination performed today including at least 6 points as noted in physical examination. No lesions suspicious for malignancy noted.  Patient instructed in importance of daily broad spectrum sunscreen use with spf at least 30. Sun avoidance and topical protection/protective clothing discussed.      Follow-up in about 3 months (around 5/7/2018) for skin check or sooner for any concerns.

## 2018-02-12 DIAGNOSIS — I10 ESSENTIAL HYPERTENSION: ICD-10-CM

## 2018-02-12 RX ORDER — LISINOPRIL 40 MG/1
40 TABLET ORAL DAILY
Qty: 90 TABLET | Refills: 3 | Status: SHIPPED | OUTPATIENT
Start: 2018-02-12 | End: 2019-02-25 | Stop reason: SDUPTHER

## 2018-02-20 ENCOUNTER — PES CALL (OUTPATIENT)
Dept: ADMINISTRATIVE | Facility: CLINIC | Age: 73
End: 2018-02-20

## 2018-03-01 ENCOUNTER — HOSPITAL ENCOUNTER (INPATIENT)
Facility: HOSPITAL | Age: 73
LOS: 8 days | Discharge: HOME | DRG: 405 | End: 2018-03-09
Attending: EMERGENCY MEDICINE | Admitting: SURGERY
Payer: MEDICARE

## 2018-03-01 ENCOUNTER — HOSPITAL ENCOUNTER (EMERGENCY)
Facility: HOSPITAL | Age: 73
Discharge: ANOTHER HOSPITAL | End: 2018-03-01
Attending: EMERGENCY MEDICINE
Payer: MEDICARE

## 2018-03-01 VITALS
DIASTOLIC BLOOD PRESSURE: 82 MMHG | RESPIRATION RATE: 23 BRPM | OXYGEN SATURATION: 98 % | HEART RATE: 97 BPM | HEIGHT: 67 IN | WEIGHT: 178 LBS | SYSTOLIC BLOOD PRESSURE: 120 MMHG | BODY MASS INDEX: 27.94 KG/M2 | TEMPERATURE: 98 F

## 2018-03-01 DIAGNOSIS — Z01.810 PRE-OPERATIVE CARDIOVASCULAR EXAMINATION: ICD-10-CM

## 2018-03-01 DIAGNOSIS — K66.1 INTRAPERITONEAL HEMORRHAGE: ICD-10-CM

## 2018-03-01 DIAGNOSIS — N17.9 ACUTE RENAL FAILURE, UNSPECIFIED ACUTE RENAL FAILURE TYPE: ICD-10-CM

## 2018-03-01 DIAGNOSIS — R16.0 LIVER MASS, LEFT LOBE: ICD-10-CM

## 2018-03-01 DIAGNOSIS — R10.9 ABDOMINAL PAIN, UNSPECIFIED ABDOMINAL LOCATION: ICD-10-CM

## 2018-03-01 DIAGNOSIS — R16.0 LIVER MASS, LEFT LOBE: Primary | ICD-10-CM

## 2018-03-01 DIAGNOSIS — I51.7 CARDIOMEGALY: ICD-10-CM

## 2018-03-01 DIAGNOSIS — D64.9 NORMOCYTIC ANEMIA: ICD-10-CM

## 2018-03-01 DIAGNOSIS — K66.1 HEMOPERITONEUM: Primary | ICD-10-CM

## 2018-03-01 DIAGNOSIS — R55 NEAR SYNCOPE: ICD-10-CM

## 2018-03-01 DIAGNOSIS — R10.9 ACUTE ABDOMINAL PAIN: ICD-10-CM

## 2018-03-01 DIAGNOSIS — R00.0 TACHYCARDIA: ICD-10-CM

## 2018-03-01 DIAGNOSIS — E11.9 TYPE 2 DIABETES MELLITUS WITHOUT COMPLICATION, WITHOUT LONG-TERM CURRENT USE OF INSULIN: ICD-10-CM

## 2018-03-01 LAB
ABO + RH BLD: NORMAL
ALBUMIN SERPL BCP-MCNC: 3.8 G/DL
ALP SERPL-CCNC: 67 U/L
ALT SERPL W/O P-5'-P-CCNC: 29 U/L
AMYLASE SERPL-CCNC: 47 U/L
ANION GAP SERPL CALC-SCNC: 17 MMOL/L
AST SERPL-CCNC: 38 U/L
BACTERIA #/AREA URNS AUTO: ABNORMAL /HPF
BASOPHILS # BLD AUTO: 0.01 K/UL
BASOPHILS # BLD AUTO: 0.03 K/UL
BASOPHILS NFR BLD: 0.1 %
BASOPHILS NFR BLD: 0.2 %
BILIRUB SERPL-MCNC: 0.4 MG/DL
BILIRUB UR QL STRIP: NEGATIVE
BLD GP AB SCN CELLS X3 SERPL QL: NORMAL
BUN SERPL-MCNC: 28 MG/DL
CALCIUM SERPL-MCNC: 9.3 MG/DL
CHLORIDE SERPL-SCNC: 103 MMOL/L
CLARITY UR REFRACT.AUTO: CLEAR
CO2 SERPL-SCNC: 19 MMOL/L
COLOR UR AUTO: YELLOW
CREAT SERPL-MCNC: 1.72 MG/DL
DIFFERENTIAL METHOD: ABNORMAL
DIFFERENTIAL METHOD: ABNORMAL
EOSINOPHIL # BLD AUTO: 0 K/UL
EOSINOPHIL # BLD AUTO: 0 K/UL
EOSINOPHIL NFR BLD: 0 %
EOSINOPHIL NFR BLD: 0.2 %
ERYTHROCYTE [DISTWIDTH] IN BLOOD BY AUTOMATED COUNT: 12.7 %
ERYTHROCYTE [DISTWIDTH] IN BLOOD BY AUTOMATED COUNT: 12.8 %
EST. GFR  (AFRICAN AMERICAN): 44.9 ML/MIN/1.73 M^2
EST. GFR  (NON AFRICAN AMERICAN): 38.9 ML/MIN/1.73 M^2
GLUCOSE SERPL-MCNC: 322 MG/DL
GLUCOSE UR QL STRIP: ABNORMAL
HCT VFR BLD AUTO: 24.5 %
HCT VFR BLD AUTO: 29.4 %
HGB BLD-MCNC: 10 G/DL
HGB BLD-MCNC: 8.6 G/DL
HGB UR QL STRIP: NEGATIVE
HYALINE CASTS UR QL AUTO: 10 /LPF
IMM GRANULOCYTES # BLD AUTO: 0.02 K/UL
IMM GRANULOCYTES NFR BLD AUTO: 0.2 %
KETONES UR QL STRIP: ABNORMAL
LEUKOCYTE ESTERASE UR QL STRIP: NEGATIVE
LIPASE SERPL-CCNC: 35 U/L
LYMPHOCYTES # BLD AUTO: 0.8 K/UL
LYMPHOCYTES # BLD AUTO: 2.5 K/UL
LYMPHOCYTES NFR BLD: 18.7 %
LYMPHOCYTES NFR BLD: 8.5 %
MCH RBC QN AUTO: 30.1 PG
MCH RBC QN AUTO: 30.3 PG
MCHC RBC AUTO-ENTMCNC: 34 G/DL
MCHC RBC AUTO-ENTMCNC: 35.1 G/DL
MCV RBC AUTO: 86 FL
MCV RBC AUTO: 89 FL
MICROSCOPIC COMMENT: ABNORMAL
MONOCYTES # BLD AUTO: 0.3 K/UL
MONOCYTES # BLD AUTO: 0.9 K/UL
MONOCYTES NFR BLD: 2.7 %
MONOCYTES NFR BLD: 6.5 %
NEUTROPHILS # BLD AUTO: 8.6 K/UL
NEUTROPHILS # BLD AUTO: 9.7 K/UL
NEUTROPHILS NFR BLD: 74.2 %
NEUTROPHILS NFR BLD: 88.5 %
NITRITE UR QL STRIP: NEGATIVE
NRBC BLD-RTO: 0 /100 WBC
PH UR STRIP: 7 [PH] (ref 5–8)
PLATELET # BLD AUTO: 174 K/UL
PLATELET # BLD AUTO: 225 K/UL
PMV BLD AUTO: 10.2 FL
PMV BLD AUTO: 10.3 FL
POCT GLUCOSE: 288 MG/DL (ref 70–110)
POCT GLUCOSE: 295 MG/DL (ref 70–110)
POTASSIUM SERPL-SCNC: 3.8 MMOL/L
PROT SERPL-MCNC: 6.3 G/DL
PROT UR QL STRIP: ABNORMAL
RBC # BLD AUTO: 2.84 M/UL
RBC # BLD AUTO: 3.32 M/UL
RBC #/AREA URNS AUTO: 0 /HPF (ref 0–4)
SODIUM SERPL-SCNC: 139 MMOL/L
SP GR UR STRIP: 1.01 (ref 1–1.03)
TROPONIN I SERPL DL<=0.01 NG/ML-MCNC: 0.01 NG/ML
URN SPEC COLLECT METH UR: ABNORMAL
UROBILINOGEN UR STRIP-ACNC: NEGATIVE EU/DL
WBC # BLD AUTO: 13.13 K/UL
WBC # BLD AUTO: 9.73 K/UL
WBC #/AREA URNS AUTO: 1 /HPF (ref 0–5)
YEAST UR QL AUTO: ABNORMAL

## 2018-03-01 PROCEDURE — 84484 ASSAY OF TROPONIN QUANT: CPT

## 2018-03-01 PROCEDURE — 81000 URINALYSIS NONAUTO W/SCOPE: CPT

## 2018-03-01 PROCEDURE — 25500020 PHARM REV CODE 255: Performed by: EMERGENCY MEDICINE

## 2018-03-01 PROCEDURE — 86901 BLOOD TYPING SEROLOGIC RH(D): CPT

## 2018-03-01 PROCEDURE — 96374 THER/PROPH/DIAG INJ IV PUSH: CPT

## 2018-03-01 PROCEDURE — 93010 ELECTROCARDIOGRAM REPORT: CPT | Mod: ,,, | Performed by: INTERNAL MEDICINE

## 2018-03-01 PROCEDURE — 82150 ASSAY OF AMYLASE: CPT

## 2018-03-01 PROCEDURE — 86920 COMPATIBILITY TEST SPIN: CPT

## 2018-03-01 PROCEDURE — 82962 GLUCOSE BLOOD TEST: CPT

## 2018-03-01 PROCEDURE — 25000003 PHARM REV CODE 250: Performed by: EMERGENCY MEDICINE

## 2018-03-01 PROCEDURE — 99285 EMERGENCY DEPT VISIT HI MDM: CPT | Mod: 25

## 2018-03-01 PROCEDURE — 96375 TX/PRO/DX INJ NEW DRUG ADDON: CPT

## 2018-03-01 PROCEDURE — 96372 THER/PROPH/DIAG INJ SC/IM: CPT

## 2018-03-01 PROCEDURE — 96361 HYDRATE IV INFUSION ADD-ON: CPT

## 2018-03-01 PROCEDURE — 85025 COMPLETE CBC W/AUTO DIFF WBC: CPT | Mod: 91

## 2018-03-01 PROCEDURE — 63600175 PHARM REV CODE 636 W HCPCS: Performed by: EMERGENCY MEDICINE

## 2018-03-01 PROCEDURE — 80053 COMPREHEN METABOLIC PANEL: CPT

## 2018-03-01 PROCEDURE — 83690 ASSAY OF LIPASE: CPT

## 2018-03-01 PROCEDURE — 93005 ELECTROCARDIOGRAM TRACING: CPT

## 2018-03-01 PROCEDURE — 99285 EMERGENCY DEPT VISIT HI MDM: CPT | Mod: ,,, | Performed by: EMERGENCY MEDICINE

## 2018-03-01 PROCEDURE — 85025 COMPLETE CBC W/AUTO DIFF WBC: CPT

## 2018-03-01 PROCEDURE — 96372 THER/PROPH/DIAG INJ SC/IM: CPT | Mod: 59

## 2018-03-01 PROCEDURE — 12000002 HC ACUTE/MED SURGE SEMI-PRIVATE ROOM

## 2018-03-01 PROCEDURE — 83036 HEMOGLOBIN GLYCOSYLATED A1C: CPT

## 2018-03-01 PROCEDURE — 99285 EMERGENCY DEPT VISIT HI MDM: CPT | Mod: 25,27

## 2018-03-01 RX ORDER — PHENYTOIN SODIUM 100 MG/1
200 CAPSULE, EXTENDED RELEASE ORAL 3 TIMES DAILY
Status: DISCONTINUED | OUTPATIENT
Start: 2018-03-02 | End: 2018-03-02

## 2018-03-01 RX ORDER — MEPERIDINE HYDROCHLORIDE 50 MG/ML
25 INJECTION INTRAMUSCULAR; INTRAVENOUS; SUBCUTANEOUS
Status: COMPLETED | OUTPATIENT
Start: 2018-03-01 | End: 2018-03-01

## 2018-03-01 RX ORDER — ONDANSETRON 2 MG/ML
4 INJECTION INTRAMUSCULAR; INTRAVENOUS
Status: COMPLETED | OUTPATIENT
Start: 2018-03-01 | End: 2018-03-01

## 2018-03-01 RX ORDER — GLUCAGON 1 MG
1 KIT INJECTION
Status: DISCONTINUED | OUTPATIENT
Start: 2018-03-01 | End: 2018-03-07

## 2018-03-01 RX ORDER — NALOXONE HCL 0.4 MG/ML
0.02 VIAL (ML) INJECTION
Status: DISCONTINUED | OUTPATIENT
Start: 2018-03-01 | End: 2018-03-05

## 2018-03-01 RX ORDER — SODIUM CHLORIDE, SODIUM LACTATE, POTASSIUM CHLORIDE, CALCIUM CHLORIDE 600; 310; 30; 20 MG/100ML; MG/100ML; MG/100ML; MG/100ML
1000 INJECTION, SOLUTION INTRAVENOUS
Status: COMPLETED | OUTPATIENT
Start: 2018-03-01 | End: 2018-03-01

## 2018-03-01 RX ORDER — SODIUM CHLORIDE 9 MG/ML
1000 INJECTION, SOLUTION INTRAVENOUS
Status: COMPLETED | OUTPATIENT
Start: 2018-03-01 | End: 2018-03-01

## 2018-03-01 RX ORDER — INSULIN ASPART 100 [IU]/ML
0-5 INJECTION, SOLUTION INTRAVENOUS; SUBCUTANEOUS EVERY 6 HOURS PRN
Status: DISCONTINUED | OUTPATIENT
Start: 2018-03-01 | End: 2018-03-09 | Stop reason: HOSPADM

## 2018-03-01 RX ORDER — ONDANSETRON 2 MG/ML
4 INJECTION INTRAMUSCULAR; INTRAVENOUS
Status: DISCONTINUED | OUTPATIENT
Start: 2018-03-01 | End: 2018-03-01

## 2018-03-01 RX ORDER — ONDANSETRON 2 MG/ML
4 INJECTION INTRAMUSCULAR; INTRAVENOUS EVERY 8 HOURS PRN
Status: DISCONTINUED | OUTPATIENT
Start: 2018-03-01 | End: 2018-03-09 | Stop reason: HOSPADM

## 2018-03-01 RX ORDER — SODIUM CHLORIDE, SODIUM LACTATE, POTASSIUM CHLORIDE, CALCIUM CHLORIDE 600; 310; 30; 20 MG/100ML; MG/100ML; MG/100ML; MG/100ML
INJECTION, SOLUTION INTRAVENOUS CONTINUOUS
Status: DISCONTINUED | OUTPATIENT
Start: 2018-03-01 | End: 2018-03-03

## 2018-03-01 RX ORDER — FENTANYL CITRATE 50 UG/ML
25 INJECTION, SOLUTION INTRAMUSCULAR; INTRAVENOUS
Status: COMPLETED | OUTPATIENT
Start: 2018-03-01 | End: 2018-03-01

## 2018-03-01 RX ORDER — MORPHINE SULFATE 4 MG/ML
4 INJECTION, SOLUTION INTRAMUSCULAR; INTRAVENOUS
Status: COMPLETED | OUTPATIENT
Start: 2018-03-01 | End: 2018-03-01

## 2018-03-01 RX ADMIN — INSULIN HUMAN 6 UNITS: 100 INJECTION, SOLUTION PARENTERAL at 07:03

## 2018-03-01 RX ADMIN — SODIUM CHLORIDE, SODIUM LACTATE, POTASSIUM CHLORIDE, AND CALCIUM CHLORIDE 1000 ML: .6; .31; .03; .02 INJECTION, SOLUTION INTRAVENOUS at 05:03

## 2018-03-01 RX ADMIN — ONDANSETRON 4 MG: 2 INJECTION, SOLUTION INTRAMUSCULAR; INTRAVENOUS at 08:03

## 2018-03-01 RX ADMIN — IOHEXOL 100 ML: 350 INJECTION, SOLUTION INTRAVENOUS at 10:03

## 2018-03-01 RX ADMIN — LIDOCAINE HYDROCHLORIDE: 20 SOLUTION ORAL; TOPICAL at 03:03

## 2018-03-01 RX ADMIN — SODIUM CHLORIDE 1000 ML: 0.9 INJECTION, SOLUTION INTRAVENOUS at 09:03

## 2018-03-01 RX ADMIN — FENTANYL CITRATE 25 MCG: 50 INJECTION, SOLUTION INTRAMUSCULAR; INTRAVENOUS at 06:03

## 2018-03-01 RX ADMIN — SODIUM CHLORIDE 1000 ML: 0.9 INJECTION, SOLUTION INTRAVENOUS at 03:03

## 2018-03-01 RX ADMIN — MEPERIDINE HYDROCHLORIDE 25 MG: 50 INJECTION INTRAMUSCULAR; INTRAVENOUS; SUBCUTANEOUS at 11:03

## 2018-03-01 RX ADMIN — IOHEXOL 30 ML: 350 INJECTION, SOLUTION INTRAVENOUS at 07:03

## 2018-03-01 RX ADMIN — MORPHINE SULFATE 4 MG: 4 INJECTION INTRAVENOUS at 08:03

## 2018-03-01 NOTE — ED TRIAGE NOTES
PT reports abdominal pain 45 mins ago. PT denies N/V/D. Pt reports he was on the toilet havin a bowel movement when the pain started. PT denies chest pain or SOB

## 2018-03-01 NOTE — ED PROVIDER NOTES
Encounter Date: 3/1/2018       History     Chief Complaint   Patient presents with    Abdominal Pain     PT reports abdominal pain 45 mins ago. PT denies N/V/D. Pt reports he was on the toilet havin a bowel movement when the pain started. PT denies chest pain or SOB     This patient is a 73-year-old male.  He was feeling fine this morning, but went to the bathroom to defecate, and while he was on the toilet had the sudden onset of severe epigastric abdominal pain, associated with nausea but no vomiting.  Did not go up into his chest.  He became very lightheaded, and felt like he was about to pass out, but did not lose consciousness.  The lightheadedness has resolved, but the abdominal pain has been constant since onset about 45 minutes before coming to the emergency department.  He did become diaphoretic during the episode.    He has no previous history of coronary artery disease, CHF, has never had chest pain.  No history of ulcers, liver disease, pancreatitis, but he said he did have a similar episode of abdominal pain with near syncope a couple of years ago, was evaluated in an emergency department and diagnosed with a vasovagal episode.    Denies vomiting or diarrhea, no blood in the stool.  He said that he did defecate, it was solid, no blood.  Has not had any urinary symptoms.    No history of malignancy, DVT, PE, seizures          Review of patient's allergies indicates:  No Known Allergies  Past Medical History:   Diagnosis Date    AK (actinic keratosis) PDT scalp 2/2015 and 3/2015    s/p efudex on scalp and forehead, PDT scalp    Arthritis     Diabetes mellitus type II     Fever blister     Hypertension     Joint pain     SCC (squamous cell carcinoma) 3/2013    L scalp vertex    SCC (squamous cell carcinoma) excised     left helix, in-situ    Seizures     SQUAMOUS CELL CARCINOMA 3/2013    occipital scalp    SQUAMOUS CELL CARCINOMA 3/2013    l vertex scalp     Past Surgical History:    Procedure Laterality Date    EYE SURGERY      skin carcinoma removal       Family History   Problem Relation Age of Onset    Diabetes Father     Vision loss Sister     Melanoma Neg Hx      Social History   Substance Use Topics    Smoking status: Former Smoker     Years: 5.00     Quit date: 6/4/1966    Smokeless tobacco: Never Used    Alcohol use No     Review of Systems   Constitutional: Positive for diaphoresis. Negative for chills and fever.   HENT: Negative.         He had a URI about 2-3 weeks ago, all symptoms resolved   Respiratory: Positive for cough.         Has had a chronic cough, but has been seen by his primary care doctor, occurs mostly at night, has been attributed to ACE inhibitor cough   Cardiovascular: Negative for chest pain, palpitations and leg swelling.   Gastrointestinal: Positive for abdominal pain and nausea. Negative for blood in stool, diarrhea and vomiting.   Endocrine:        Diabetes mellitus   Genitourinary: Negative for difficulty urinating, dysuria and hematuria.   Musculoskeletal: Negative for arthralgias and myalgias.   Skin: Negative for rash.   Allergic/Immunologic: Positive for immunocompromised state (diabetic).   Neurological: Positive for weakness (Generalized) and light-headedness. Negative for syncope and numbness.   Hematological: Does not bruise/bleed easily.        No history of malignancy DVT or PE   Psychiatric/Behavioral: Negative for confusion.       Physical Exam     Initial Vitals [03/01/18 1435]   BP Pulse Resp Temp SpO2   (!) 90/55 91 20 98.6 °F (37 °C) 100 %      MAP       66.67         Physical Exam    Nursing note and vitals reviewed.  Constitutional: He appears well-developed and well-nourished.   Anxious, pale elderly male   HENT:   Head: Normocephalic.   Right Ear: External ear normal.   Left Ear: External ear normal.   Nose: Nose normal.   Mouth/Throat: Oropharynx is clear and moist.   Eyes: Conjunctivae are normal. Pupils are equal, round, and  reactive to light. No scleral icterus.   Neck: No JVD present.   Cardiovascular: Normal rate, regular rhythm, normal heart sounds and intact distal pulses.   No murmur heard.  Pulmonary/Chest: Breath sounds normal. No stridor. No respiratory distress. He has no wheezes.   Abdominal: Soft. Bowel sounds are normal. He exhibits no distension. There is tenderness (Tenderness in the midepigastrium, reproduces his pain, remainder of abdomen nontender).   Musculoskeletal: He exhibits no edema or tenderness.   Neurological: He is alert. He has normal strength. No sensory deficit.   Skin: Skin is warm and dry. No rash noted.   Psychiatric:   Anxious         ED Course   Procedures  Labs Reviewed   CBC W/ AUTO DIFFERENTIAL - Abnormal; Notable for the following:        Result Value    WBC 13.13 (*)     RBC 3.32 (*)     Hemoglobin 10.0 (*)     Hematocrit 29.4 (*)     Gran # (ANC) 9.7 (*)     Gran% 74.2 (*)     All other components within normal limits   COMPREHENSIVE METABOLIC PANEL - Abnormal; Notable for the following:     CO2 19 (*)     Glucose 322 (*)     BUN, Bld 28 (*)     Creatinine 1.72 (*)     Anion Gap 17 (*)     eGFR if  44.9 (*)     eGFR if non  38.9 (*)     All other components within normal limits   AMYLASE   LIPASE   URINALYSIS     EKG Readings: (Independently Interpreted)   I independently reviewed the EKG tracing.  It shows normal sinus rhythm, heart rate 99.  There is a incomplete right bundle branch block pattern.  Note, the computerized reading says that the axis is -50°, but it looks closer to 0°.      Results for orders placed or performed during the hospital encounter of 03/01/18   CBC auto differential   Result Value Ref Range    WBC 13.13 (H) 3.90 - 12.70 K/uL    RBC 3.32 (L) 4.60 - 6.20 M/uL    Hemoglobin 10.0 (L) 14.0 - 18.0 g/dL    Hematocrit 29.4 (L) 40.0 - 54.0 %    MCV 89 82 - 98 fL    MCH 30.1 27.0 - 31.0 pg    MCHC 34.0 32.0 - 36.0 g/dL    RDW 12.8 11.5 - 14.5 %     Platelets 225 150 - 350 K/uL    MPV 10.2 9.2 - 12.9 fL    Gran # (ANC) 9.7 (H) 1.8 - 7.7 K/uL    Lymph # 2.5 1.0 - 4.8 K/uL    Mono # 0.9 0.3 - 1.0 K/uL    Eos # 0.0 0.0 - 0.5 K/uL    Baso # 0.03 0.00 - 0.20 K/uL    Gran% 74.2 (H) 38.0 - 73.0 %    Lymph% 18.7 18.0 - 48.0 %    Mono% 6.5 4.0 - 15.0 %    Eosinophil% 0.2 0.0 - 8.0 %    Basophil% 0.2 0.0 - 1.9 %    Differential Method Automated    Comprehensive metabolic panel   Result Value Ref Range    Sodium 139 136 - 145 mmol/L    Potassium 3.8 3.5 - 5.1 mmol/L    Chloride 103 95 - 110 mmol/L    CO2 19 (L) 23 - 29 mmol/L    Glucose 322 (H) 70 - 110 mg/dL    BUN, Bld 28 (H) 2 - 20 mg/dL    Creatinine 1.72 (H) 0.50 - 1.40 mg/dL    Calcium 9.3 8.7 - 10.5 mg/dL    Total Protein 6.3 6.0 - 8.4 g/dL    Albumin 3.8 3.5 - 5.2 g/dL    Total Bilirubin 0.4 0.1 - 1.0 mg/dL    Alkaline Phosphatase 67 38 - 126 U/L    AST 38 15 - 46 U/L    ALT 29 10 - 44 U/L    Anion Gap 17 (H) 8 - 16 mmol/L    eGFR if African American 44.9 (A) >60 mL/min/1.73 m^2    eGFR if non  38.9 (A) >60 mL/min/1.73 m^2   Amylase   Result Value Ref Range    Amylase 47 30 - 110 U/L   Lipase   Result Value Ref Range    Lipase 35 23 - 300 U/L       Imaging Results    None            Medical Decision Making:   Initial Assessment:   This patient presents with acute abdominal pain, associated with near syncope.  He also had borderline hypotension, systolic blood pressure on arrival was 90.  Has responded to IV fluid bolus.  EKG shows a right bundle branch block pattern.  My suspicion for cardiac etiology is low, because the patient is tender in his abdomen, and it appears to be an abdominal problem, rather than thoracic.    His renal function is impaired, probably acute, it is been a little over a year since his last values that we have on record.  Today's creatinine is 1.7, and GFR 39, previously his GFR was greater than 60, but the last values were in September 2016.  He is also moderately anemic,  hematocrit is 29, normocytic.  This is also unchanged from his baseline values, previously his hematocrit was 38    A CT scan of the abdomen is pending.  He is receiving oral contrast, but because of low GFR, he cannot receive IV contrast.  Initially he was given a GI cocktail, did not relieve his pain.  His blood pressure remained around 90 systolic after the initial IV fluid bolus, so opiates were held until his blood pressure came up, and now he is being given fentanyl as needed.    Care is being handed over to Dr. Lancaster at shift change, for final disposition after CT                      Clinical Impression:   The primary encounter diagnosis was Near syncope. Diagnoses of Acute renal failure, unspecified acute renal failure type, Acute abdominal pain, and Normocytic anemia were also pertinent to this visit.    Disposition:   Disposition: Other  Condition: Stable                        Bradley Bustamante MD  03/01/18 7047

## 2018-03-02 ENCOUNTER — ANESTHESIA EVENT (OUTPATIENT)
Dept: SURGERY | Facility: HOSPITAL | Age: 73
DRG: 405 | End: 2018-03-02
Payer: MEDICARE

## 2018-03-02 LAB
AFP SERPL-MCNC: 2 NG/ML
ALBUMIN SERPL BCP-MCNC: 3.3 G/DL
ALP SERPL-CCNC: 56 U/L
ALT SERPL W/O P-5'-P-CCNC: 20 U/L
ANION GAP SERPL CALC-SCNC: 10 MMOL/L
AST SERPL-CCNC: 45 U/L
BASOPHILS # BLD AUTO: 0 K/UL
BASOPHILS # BLD AUTO: 0.01 K/UL
BASOPHILS # BLD AUTO: 0.02 K/UL
BASOPHILS # BLD AUTO: 0.02 K/UL
BASOPHILS NFR BLD: 0 %
BASOPHILS NFR BLD: 0.1 %
BASOPHILS NFR BLD: 0.2 %
BASOPHILS NFR BLD: 0.2 %
BILIRUB SERPL-MCNC: 0.4 MG/DL
BUN SERPL-MCNC: 25 MG/DL
BUN SERPL-MCNC: 25 MG/DL (ref 6–30)
CALCIUM SERPL-MCNC: 8.8 MG/DL
CANCER AG19-9 SERPL-ACNC: 8 U/ML
CEA SERPL-MCNC: 1.1 NG/ML
CHLORIDE SERPL-SCNC: 103 MMOL/L (ref 95–110)
CHLORIDE SERPL-SCNC: 107 MMOL/L
CO2 SERPL-SCNC: 21 MMOL/L
CREAT SERPL-MCNC: 1.3 MG/DL
CREAT SERPL-MCNC: 1.4 MG/DL (ref 0.5–1.4)
DIFFERENTIAL METHOD: ABNORMAL
EOSINOPHIL # BLD AUTO: 0 K/UL
EOSINOPHIL NFR BLD: 0 %
EOSINOPHIL NFR BLD: 0.1 %
EOSINOPHIL NFR BLD: 0.3 %
EOSINOPHIL NFR BLD: 0.4 %
EOSINOPHIL NFR BLD: 0.5 %
ERYTHROCYTE [DISTWIDTH] IN BLOOD BY AUTOMATED COUNT: 12.6 %
ERYTHROCYTE [DISTWIDTH] IN BLOOD BY AUTOMATED COUNT: 12.7 %
ERYTHROCYTE [DISTWIDTH] IN BLOOD BY AUTOMATED COUNT: 12.8 %
ERYTHROCYTE [DISTWIDTH] IN BLOOD BY AUTOMATED COUNT: 12.8 %
ERYTHROCYTE [DISTWIDTH] IN BLOOD BY AUTOMATED COUNT: 12.9 %
EST. GFR  (AFRICAN AMERICAN): >60 ML/MIN/1.73 M^2
EST. GFR  (NON AFRICAN AMERICAN): 54.5 ML/MIN/1.73 M^2
ESTIMATED AVG GLUCOSE: 177 MG/DL
GLUCOSE SERPL-MCNC: 172 MG/DL
GLUCOSE SERPL-MCNC: 307 MG/DL (ref 70–110)
HBA1C MFR BLD HPLC: 7.8 %
HCT VFR BLD AUTO: 21.7 %
HCT VFR BLD AUTO: 21.8 %
HCT VFR BLD AUTO: 22.1 %
HCT VFR BLD AUTO: 22.2 %
HCT VFR BLD AUTO: 22.2 %
HCT VFR BLD AUTO: 22.5 %
HCT VFR BLD AUTO: 22.5 %
HCT VFR BLD CALC: 24 %PCV (ref 36–54)
HGB BLD-MCNC: 7.6 G/DL
HGB BLD-MCNC: 7.7 G/DL
HGB BLD-MCNC: 7.9 G/DL
IMM GRANULOCYTES # BLD AUTO: 0.02 K/UL
IMM GRANULOCYTES # BLD AUTO: 0.03 K/UL
IMM GRANULOCYTES # BLD AUTO: 0.04 K/UL
IMM GRANULOCYTES NFR BLD AUTO: 0.2 %
IMM GRANULOCYTES NFR BLD AUTO: 0.3 %
IMM GRANULOCYTES NFR BLD AUTO: 0.3 %
IMM GRANULOCYTES NFR BLD AUTO: 0.4 %
IMM GRANULOCYTES NFR BLD AUTO: 0.4 %
INR PPP: 1
LYMPHOCYTES # BLD AUTO: 1 K/UL
LYMPHOCYTES # BLD AUTO: 1.1 K/UL
LYMPHOCYTES # BLD AUTO: 1.4 K/UL
LYMPHOCYTES # BLD AUTO: 1.5 K/UL
LYMPHOCYTES # BLD AUTO: 1.7 K/UL
LYMPHOCYTES # BLD AUTO: 1.8 K/UL
LYMPHOCYTES # BLD AUTO: 2 K/UL
LYMPHOCYTES NFR BLD: 11.5 %
LYMPHOCYTES NFR BLD: 12.2 %
LYMPHOCYTES NFR BLD: 15.7 %
LYMPHOCYTES NFR BLD: 17.1 %
LYMPHOCYTES NFR BLD: 17.8 %
LYMPHOCYTES NFR BLD: 21.1 %
LYMPHOCYTES NFR BLD: 22.2 %
MAGNESIUM SERPL-MCNC: 1.8 MG/DL
MCH RBC QN AUTO: 28.6 PG
MCH RBC QN AUTO: 29 PG
MCH RBC QN AUTO: 29.5 PG
MCH RBC QN AUTO: 29.8 PG
MCH RBC QN AUTO: 30.2 PG
MCH RBC QN AUTO: 30.4 PG
MCH RBC QN AUTO: 30.5 PG
MCHC RBC AUTO-ENTMCNC: 33.8 G/DL
MCHC RBC AUTO-ENTMCNC: 34.2 G/DL
MCHC RBC AUTO-ENTMCNC: 34.4 G/DL
MCHC RBC AUTO-ENTMCNC: 34.7 G/DL
MCHC RBC AUTO-ENTMCNC: 34.9 G/DL
MCHC RBC AUTO-ENTMCNC: 35 G/DL
MCHC RBC AUTO-ENTMCNC: 35.1 G/DL
MCV RBC AUTO: 85 FL
MCV RBC AUTO: 86 FL
MCV RBC AUTO: 87 FL
MCV RBC AUTO: 87 FL
MCV RBC AUTO: 88 FL
MONOCYTES # BLD AUTO: 0.4 K/UL
MONOCYTES # BLD AUTO: 0.4 K/UL
MONOCYTES # BLD AUTO: 0.6 K/UL
MONOCYTES # BLD AUTO: 0.6 K/UL
MONOCYTES # BLD AUTO: 0.7 K/UL
MONOCYTES NFR BLD: 4.9 %
MONOCYTES NFR BLD: 5.1 %
MONOCYTES NFR BLD: 6.5 %
MONOCYTES NFR BLD: 7.4 %
MONOCYTES NFR BLD: 7.5 %
MONOCYTES NFR BLD: 7.8 %
MONOCYTES NFR BLD: 8.2 %
NEUTROPHILS # BLD AUTO: 6.1 K/UL
NEUTROPHILS # BLD AUTO: 6.2 K/UL
NEUTROPHILS # BLD AUTO: 6.2 K/UL
NEUTROPHILS # BLD AUTO: 7.1 K/UL
NEUTROPHILS # BLD AUTO: 7.1 K/UL
NEUTROPHILS # BLD AUTO: 7.2 K/UL
NEUTROPHILS # BLD AUTO: 7.3 K/UL
NEUTROPHILS NFR BLD: 68.7 %
NEUTROPHILS NFR BLD: 70.5 %
NEUTROPHILS NFR BLD: 74.7 %
NEUTROPHILS NFR BLD: 74.9 %
NEUTROPHILS NFR BLD: 76.4 %
NEUTROPHILS NFR BLD: 82.4 %
NEUTROPHILS NFR BLD: 83.3 %
NRBC BLD-RTO: 0 /100 WBC
PHOSPHATE SERPL-MCNC: 3.4 MG/DL
PLATELET # BLD AUTO: 143 K/UL
PLATELET # BLD AUTO: 149 K/UL
PLATELET # BLD AUTO: 166 K/UL
PLATELET # BLD AUTO: 168 K/UL
PLATELET # BLD AUTO: 169 K/UL
PLATELET # BLD AUTO: 172 K/UL
PLATELET # BLD AUTO: 173 K/UL
PMV BLD AUTO: 10 FL
PMV BLD AUTO: 10.1 FL
PMV BLD AUTO: 10.4 FL
PMV BLD AUTO: 10.6 FL
PMV BLD AUTO: 9.8 FL
POC IONIZED CALCIUM: 1.08 MMOL/L (ref 1.06–1.42)
POC TCO2 (MEASURED): 19 MMOL/L (ref 23–29)
POCT GLUCOSE: 158 MG/DL (ref 70–110)
POCT GLUCOSE: 266 MG/DL (ref 70–110)
POCT GLUCOSE: 269 MG/DL (ref 70–110)
POTASSIUM BLD-SCNC: 4.3 MMOL/L (ref 3.5–5.1)
POTASSIUM SERPL-SCNC: 4.3 MMOL/L
PROT SERPL-MCNC: 6.2 G/DL
PROTHROMBIN TIME: 10.4 SEC
RBC # BLD AUTO: 2.49 M/UL
RBC # BLD AUTO: 2.52 M/UL
RBC # BLD AUTO: 2.53 M/UL
RBC # BLD AUTO: 2.58 M/UL
RBC # BLD AUTO: 2.62 M/UL
RBC # BLD AUTO: 2.65 M/UL
RBC # BLD AUTO: 2.66 M/UL
SAMPLE: ABNORMAL
SODIUM BLD-SCNC: 136 MMOL/L (ref 136–145)
SODIUM SERPL-SCNC: 138 MMOL/L
WBC # BLD AUTO: 8.31 K/UL
WBC # BLD AUTO: 8.61 K/UL
WBC # BLD AUTO: 8.74 K/UL
WBC # BLD AUTO: 8.79 K/UL
WBC # BLD AUTO: 8.81 K/UL
WBC # BLD AUTO: 9.47 K/UL
WBC # BLD AUTO: 9.54 K/UL

## 2018-03-02 PROCEDURE — 82378 CARCINOEMBRYONIC ANTIGEN: CPT

## 2018-03-02 PROCEDURE — 93306 TTE W/DOPPLER COMPLETE: CPT | Mod: 26,,, | Performed by: INTERNAL MEDICINE

## 2018-03-02 PROCEDURE — 86301 IMMUNOASSAY TUMOR CA 19-9: CPT

## 2018-03-02 PROCEDURE — 99223 1ST HOSP IP/OBS HIGH 75: CPT | Mod: GC,,, | Performed by: SURGERY

## 2018-03-02 PROCEDURE — 25000003 PHARM REV CODE 250: Performed by: STUDENT IN AN ORGANIZED HEALTH CARE EDUCATION/TRAINING PROGRAM

## 2018-03-02 PROCEDURE — 20000000 HC ICU ROOM

## 2018-03-02 PROCEDURE — 84100 ASSAY OF PHOSPHORUS: CPT

## 2018-03-02 PROCEDURE — 85025 COMPLETE CBC W/AUTO DIFF WBC: CPT | Mod: 91

## 2018-03-02 PROCEDURE — 83735 ASSAY OF MAGNESIUM: CPT

## 2018-03-02 PROCEDURE — 63600175 PHARM REV CODE 636 W HCPCS: Performed by: SURGERY

## 2018-03-02 PROCEDURE — 99223 1ST HOSP IP/OBS HIGH 75: CPT | Mod: 24,,, | Performed by: SURGERY

## 2018-03-02 PROCEDURE — 93306 TTE W/DOPPLER COMPLETE: CPT

## 2018-03-02 PROCEDURE — 25000003 PHARM REV CODE 250: Performed by: SURGERY

## 2018-03-02 PROCEDURE — 85610 PROTHROMBIN TIME: CPT

## 2018-03-02 PROCEDURE — 80053 COMPREHEN METABOLIC PANEL: CPT

## 2018-03-02 PROCEDURE — 82105 ALPHA-FETOPROTEIN SERUM: CPT

## 2018-03-02 RX ORDER — SODIUM,POTASSIUM PHOSPHATES 280-250MG
2 POWDER IN PACKET (EA) ORAL
Status: DISCONTINUED | OUTPATIENT
Start: 2018-03-02 | End: 2018-03-03

## 2018-03-02 RX ORDER — LANOLIN ALCOHOL/MO/W.PET/CERES
800 CREAM (GRAM) TOPICAL
Status: DISCONTINUED | OUTPATIENT
Start: 2018-03-02 | End: 2018-03-03

## 2018-03-02 RX ORDER — LOVASTATIN 20 MG/1
20 TABLET ORAL NIGHTLY
Status: DISCONTINUED | OUTPATIENT
Start: 2018-03-02 | End: 2018-03-09 | Stop reason: HOSPADM

## 2018-03-02 RX ORDER — POTASSIUM CHLORIDE 20 MEQ/15ML
60 SOLUTION ORAL
Status: DISCONTINUED | OUTPATIENT
Start: 2018-03-02 | End: 2018-03-03

## 2018-03-02 RX ORDER — POTASSIUM CHLORIDE 20 MEQ/15ML
40 SOLUTION ORAL
Status: DISCONTINUED | OUTPATIENT
Start: 2018-03-02 | End: 2018-03-03

## 2018-03-02 RX ADMIN — INSULIN ASPART 1 UNITS: 100 INJECTION, SOLUTION INTRAVENOUS; SUBCUTANEOUS at 12:03

## 2018-03-02 RX ADMIN — INSULIN ASPART 3 UNITS: 100 INJECTION, SOLUTION INTRAVENOUS; SUBCUTANEOUS at 07:03

## 2018-03-02 RX ADMIN — SODIUM CHLORIDE, SODIUM LACTATE, POTASSIUM CHLORIDE, AND CALCIUM CHLORIDE: .6; .31; .03; .02 INJECTION, SOLUTION INTRAVENOUS at 12:03

## 2018-03-02 RX ADMIN — LOVASTATIN 20 MG: 20 TABLET ORAL at 08:03

## 2018-03-02 NOTE — ED NOTES
Patient identifiers verified and correct for Jamison MINDY Mayes.    LOC: The patient is awake, alert and aware of environment with an appropriate affect, the patient is oriented x 3 and speaking appropriately.  APPEARANCE: Patient appears in pain and in no acute distress, patient is clean and well groomed, patient's clothing is properly fastened.  SKIN: The skin is warm and dry, color consistent with ethnicity, patient has normal skin turgor and moist mucus membranes, skin intact, no breakdown or bruising noted.  MUSCULOSKELETAL: Patient moving all extremities spontaneously, no obvious swelling or deformities noted.  RESPIRATORY: Airway is open and patent, respirations are spontaneous, patient has a normal effort and rate, no accessory muscle use noted  CARDIAC: Patient has a normal rate and regular rhythm, no periphreal edema noted, capillary refill < 3 seconds.  ABDOMEN: Soft and non tender to palpation, normoactive bowel sounds present in all four quadrants. Distention in epigastric area noted.   NEUROLOGIC: PERRL, 3mm bilaterally, eyes open spontaneously, behavior appropriate to situation, follows commands, facial expression symmetrical, bilateral hand grasp equal and even, purposeful motor response noted, normal sensation in all extremities when touched with a finger.

## 2018-03-02 NOTE — PROGRESS NOTES
Discussed with patient, patient states that he has been off his phenytoin since 11/2017.  Verified with pharmacist, that was when he last picked up a prescription of phenytoin.  He has discussed this with his PCP Dr. Jorge part of the [facility] system and a recent note on 11/7/2017 by Dr. Jorge mentions being weaned off phenytoin.  Patient sees neurology outside of [facility].  Discussed with patient, he would like to be kept off phenytoin.  Discontinued phenytoin.    Alex Thorpe MD PGY2/CA1  Anesthesiology  [facility]  Pager: [phone]

## 2018-03-02 NOTE — PLAN OF CARE
Problem: Patient Care Overview  Goal: Plan of Care Review  Outcome: Ongoing (interventions implemented as appropriate)  Patient AAOx4. VS stable on room air. Continuous gtt of LR at 125mL. Condom cath placed on pt due to voiding frequency. q4 CBCs monitored. Pt and wife verbalized understanding of NPO status. Skin assessed, no injuries noted. Wife remained at bedside, questions answered. Will continue to monitor.

## 2018-03-02 NOTE — ANESTHESIA PREPROCEDURE EVALUATION
[facility]  Anesthesia Pre-Operative Evaluation         Patient Name: Jamison Mayes  YOB: 1945  MRN: [id number]    SUBJECTIVE:     Pre-operative evaluation for Procedure(s) (LRB):  HEPATECTOMY (N/A)     03/02/2018    Jamison Mayes is a 73 y.o. male w/ a significant PMHx of HTN, DM (diet controlled) and seizure disorder (last seizure ~40 years ago) who presented from [facility] with newly diagnosed left lobe liver mass that ruptured resulting in hemoperitoneum who presents for the above procedure.    H/H has been stable since admission and has not required blood products.    LDA:   18G R AC  18 G L AC    Prev airway: None documented.     Drips:    lactated ringers 125 mL/hr at 03/02/18 1400         Patient Active Problem List   Diagnosis    HTN (hypertension)    Seizure disorder    DM (diabetes mellitus)    DM (diabetes mellitus) with complications    Localization-related (focal) (partial) epilepsy and epileptic syndromes with complex partial seizures, with intractable epilepsy    Fecal urgency    Hemoperitoneum       Review of patient's allergies indicates:  No Known Allergies    Current Inpatient Medications:      No current facility-administered medications on file prior to encounter.      Current Outpatient Prescriptions on File Prior to Encounter   Medication Sig Dispense Refill    aspirin (ASPIRIN LOW DOSE) 81 MG EC tablet Take 81 mg by mouth. 1 Tablet, Delayed Release (E.C.) Oral Every day      amlodipine (NORVASC) 10 MG tablet Take 1 tablet (10 mg total) by mouth once daily. 90 tablet 3    calcium carbonate (OS-RUSLAN) 500 mg calcium (1,250 mg) chewable tablet Take 1 tablet by mouth once daily.      cyanocobalamin (VITAMIN B-12) 250 MCG tablet Take 1 tablet (250 mcg total) by mouth once daily.      fluorouracil (EFUDEX) 5 % cream AAA on scalp, forehead, and temples BID x 2-3 weeks. Stop if blistered, oozing, or bleeding. Use daily sun protection. 40 g 1     hydroCHLOROthiazide (HYDRODIURIL) 25 MG tablet TAKE 1 TABLET (25 MG TOTAL) BY MOUTH ONCE DAILY. 90 tablet 0    imiquimod (ALDARA) 5 % cream AAA 5 nights/week x 4 - 6 weeks. Wash off in am. Stop if blistering, bleeding, oozing, etc. Do not use >1 packet per night. 24 packet 1    ketoconazole (NIZORAL) 2 % shampoo Wash hair with medicated shampoo at least 2x/week - let sit on scalp at least 5 minutes prior to rinsing 120 mL 5    lisinopril (PRINIVIL,ZESTRIL) 40 MG tablet TAKE 1 TABLET (40 MG TOTAL) BY MOUTH ONCE DAILY. 90 tablet 3    lovastatin (MEVACOR) 20 MG tablet TAKE 1 TABLET IN THE EVENING 90 tablet 3    lovastatin (MEVACOR) 20 MG tablet Take 1 tablet (20 mg total) by mouth every evening. 90 tablet 3    MULTIVITAMIN WITH MINERALS (ONE-A-DAY 50 PLUS) Tab Take by mouth. 1 Tablet Oral Every morning      mupirocin (BACTROBAN) 2 % ointment AAA on scalp bid 30 g 2    oxycodone-acetaminophen (PERCOCET) 5-325 mg per tablet Take 1 tablet by mouth every 4 to 6 hours as needed for Pain. 20 tablet 0    potassium chloride SA (K-DUR,KLOR-CON) 20 MEQ tablet Take by mouth every other day. 595 mg         Past Surgical History:   Procedure Laterality Date    EYE SURGERY      skin carcinoma removal         Social History     Social History    Marital status:      Spouse name: N/A    Number of children: N/A    Years of education: N/A     Occupational History    REtired       Social History Main Topics    Smoking status: Former Smoker     Years: 5.00     Quit date: 6/4/1966    Smokeless tobacco: Never Used    Alcohol use No    Drug use: No    Sexual activity: Not on file     Other Topics Concern    Not on file     Social History Narrative    No narrative on file       OBJECTIVE:     Vital Signs Range (Last 24H):  Temp:  [36.6 °C (97.9 °F)-37.5 °C (99.5 °F)]   Pulse:  []   Resp:  [7-40]   BP: (106-170)/(55-82)   SpO2:  [91 %-100 %]       CBC:   Recent Labs      03/02/18   0814  03/02/18   1242   WBC   9.47  8.74   RBC  2.62*  2.58*   HGB  7.6*  7.6*   HCT  22.2*  22.1*   PLT  172  173   MCV  85  86   MCH  29.0  29.5   MCHC  34.2  34.4       CMP:   Recent Labs      03/01/18   1455  03/02/18   0413   NA  139  138   K  3.8  4.3   CL  103  107   CO2  19*  21*   BUN  28*  25*   CREATININE  1.72*  1.3   GLU  322*  172*   MG   --   1.8   PHOS   --   3.4   CALCIUM  9.3  8.8   ALBUMIN  3.8  3.3*   PROT  6.3  6.2   ALKPHOS  67  56   ALT  29  20   AST  38  45*   BILITOT  0.4  0.4       INR:  Recent Labs      03/02/18   0413   INR  1.0       Diagnostic Studies: No relevant studies.    EKG:   Normal sinus rhythm  Incomplete right bundle branch block  Left anterior fascicular block  Minimal voltage criteria for LVH, may be normal variant  Septal infarct ,age undetermined  Prolonged QT  Abnormal ECG  When compared with ECG of 20-SEP-2016 13:42,  Vent. rate has increased BY  37 BPM  Septal infarct is now Present  T wave inversion no longer evident in Inferior leads  QT has lengthened    2D ECHO:  No results found for this or any previous visit.      ASSESSMENT/PLAN:         Anesthesia Evaluation    I have reviewed the Patient Summary Reports.     I have reviewed the Medications.     Review of Systems  Anesthesia Hx:  History of prior surgery of interest to airway management or planning: Denies Family Hx of Anesthesia complications.   Denies Personal Hx of Anesthesia complications.   Hematology/Oncology:         -- Anemia:   EENT/Dental:EENT/Dental Normal   Cardiovascular:   Hypertension    Pulmonary:  Pulmonary Normal    Hepatic/GI:   Hepatic mass   Musculoskeletal:  Musculoskeletal Normal    Neurological:   Seizures    Endocrine:   Diabetes        Physical Exam  General:  Well nourished    Airway/Jaw/Neck:  Airway Findings: Mouth Opening: Normal Mallampati: III     Eyes/Ears/Nose:  EYES/EARS/NOSE FINDINGS: Normal   Dental:  Dental Findings: In tact    Chest/Lungs:  Chest/Lungs Clear     Abdomen:  Abdomen Findings: Normal       Mental Status:  Mental Status Findings:  Cooperative, Alert and Oriented         Anesthesia Plan  Type of Anesthesia, risks & benefits discussed:  Anesthesia Type:  general  Patient's Preference:   Intra-op Monitoring Plan: arterial line, central line and standard ASA monitors  Intra-op Monitoring Plan Comments:   Post Op Pain Control Plan: multimodal analgesia, IV/PO Opioids PRN and per primary service following discharge from PACU  Post Op Pain Control Plan Comments:   Induction:   IV  Beta Blocker:  Patient is not currently on a Beta-Blocker (No further documentation required).       Informed Consent: Patient understands risks and agrees with Anesthesia plan.  Questions answered. Anesthesia consent signed with patient.  ASA Score: 4     Day of Surgery Review of History & Physical:  There are no significant changes.      Anesthesia Plan Notes: 2D echo ordered preop        Ready For Surgery From Anesthesia Perspective.

## 2018-03-02 NOTE — CONSULTS
[facility]  General Surgery  Consult Note    Patient Name: Jamison Mayes  MRN: [id number]  Code Status: Full Code  Admission Date: 3/1/2018  Hospital Length of Stay: 0 days  Attending Physician: Celestino Polanco MD  Primary Care Provider: Blaise Jorge MD    Patient information was obtained from patient, relative(s), past medical records and ER records.     Inpatient consult to General surgery  Consult performed by: SARAH GLOVER  Consult ordered by: LACI HOLLOWAY        Subjective:     Principal Problem: <principal problem not specified>    History of Present Illness: 74 yo M with h/o HTN, DM (not on insulin last A1c 11/17 7.1), seizure disorder presented to [facility] with pre-syncopal episode, epigastric abdominal pain. Symptoms started suddenly around 1 pm today. Had been active earlier in the day hanging drywall, denies trauma. Onset of symptoms associated with cold sweats. No nausea. CT noncon at OSH showed left lobe liver mass with associated rupture and hemoperitoneum. No contrast given 2/2 HEATHER (Cr 1.7). HDS at OSH. Denies known liver or kidney disease.     No previous abdominal surgeries.     Current Facility-Administered Medications on File Prior to Encounter   Medication    [COMPLETED] (pyxis) gi cocktail (mylanta 30 mL, lidocaine 2 % viscous 10 mL, dicyclomine 10 mL) 50 mL    [COMPLETED] 0.9%  NaCl infusion    [COMPLETED] fentaNYL injection 25 mcg    [COMPLETED] insulin regular injection 6 Units    [COMPLETED] lactated ringers infusion    [COMPLETED] morphine injection 4 mg    [COMPLETED] omnipaque 350 iohexol 30 mL    [COMPLETED] ondansetron injection 4 mg    [DISCONTINUED] ondansetron injection 4 mg     Current Outpatient Prescriptions on File Prior to Encounter   Medication Sig    aspirin (ASPIRIN LOW DOSE) 81 MG EC tablet Take 81 mg by mouth. 1 Tablet, Delayed Release (E.C.) Oral Every day    amlodipine (NORVASC) 10 MG tablet Take 1 tablet (10 mg total) by  mouth once daily.    calcium carbonate (OS-RUSLAN) 500 mg calcium (1,250 mg) chewable tablet Take 1 tablet by mouth once daily.    cyanocobalamin (VITAMIN B-12) 250 MCG tablet Take 1 tablet (250 mcg total) by mouth once daily.    fluorouracil (EFUDEX) 5 % cream AAA on scalp, forehead, and temples BID x 2-3 weeks. Stop if blistered, oozing, or bleeding. Use daily sun protection.    hydroCHLOROthiazide (HYDRODIURIL) 25 MG tablet TAKE 1 TABLET (25 MG TOTAL) BY MOUTH ONCE DAILY.    imiquimod (ALDARA) 5 % cream AAA 5 nights/week x 4 - 6 weeks. Wash off in am. Stop if blistering, bleeding, oozing, etc. Do not use >1 packet per night.    ketoconazole (NIZORAL) 2 % shampoo Wash hair with medicated shampoo at least 2x/week - let sit on scalp at least 5 minutes prior to rinsing    lisinopril (PRINIVIL,ZESTRIL) 40 MG tablet TAKE 1 TABLET (40 MG TOTAL) BY MOUTH ONCE DAILY.    lovastatin (MEVACOR) 20 MG tablet TAKE 1 TABLET IN THE EVENING    lovastatin (MEVACOR) 20 MG tablet Take 1 tablet (20 mg total) by mouth every evening.    MULTIVITAMIN WITH MINERALS (ONE-A-DAY 50 PLUS) Tab Take by mouth. 1 Tablet Oral Every morning    mupirocin (BACTROBAN) 2 % ointment AAA on scalp bid    oxycodone-acetaminophen (PERCOCET) 5-325 mg per tablet Take 1 tablet by mouth every 4 to 6 hours as needed for Pain.    phenytoin (DILANTIN EXTENDED) 100 MG ER capsule Take 2 capsules (200 mg total) by mouth 3 (three) times daily.    potassium chloride SA (K-DUR,KLOR-CON) 20 MEQ tablet Take by mouth every other day. 595 mg       Review of patient's allergies indicates:  No Known Allergies    Past Medical History:   Diagnosis Date    AK (actinic keratosis) PDT scalp 2/2015 and 3/2015    s/p efudex on scalp and forehead, PDT scalp    Arthritis     Diabetes mellitus type II     Fever blister     Hypertension     Joint pain     SCC (squamous cell carcinoma) 3/2013    L scalp vertex    SCC (squamous cell carcinoma) excised     left  helix, in-situ    Seizures     SQUAMOUS CELL CARCINOMA 3/2013    occipital scalp    SQUAMOUS CELL CARCINOMA 3/2013    l vertex scalp     Past Surgical History:   Procedure Laterality Date    EYE SURGERY      skin carcinoma removal       Family History     Problem Relation (Age of Onset)    Diabetes Father    Vision loss Sister        Social History Main Topics    Smoking status: Former Smoker     Years: 5.00     Quit date: 6/4/1966    Smokeless tobacco: Never Used    Alcohol use No    Drug use: No    Sexual activity: Not on file     Review of Systems   Constitutional: Positive for diaphoresis. Negative for fever.   Respiratory: Positive for cough and shortness of breath.    Cardiovascular: Negative for chest pain and palpitations.   Gastrointestinal: Positive for abdominal distention and abdominal pain. Negative for nausea and vomiting.   Genitourinary: Negative for dysuria.   Skin: Negative for color change and wound.   Neurological: Positive for dizziness and weakness.   Hematological: Negative for adenopathy. Does not bruise/bleed easily.     Objective:     Vital Signs (Most Recent):  Temp: 99.5 °F (37.5 °C) (03/01/18 2128)  Pulse: (!) 113 (03/01/18 2142)  Resp: 20 (03/01/18 2128)  BP: (!) 110/57 (03/01/18 2142)  SpO2: 98 % (03/01/18 2142) Vital Signs (24h Range):  Temp:  [98.2 °F (36.8 °C)-99.5 °F (37.5 °C)] 99.5 °F (37.5 °C)  Pulse:  [] 113  Resp:  [16-36] 20  SpO2:  [95 %-100 %] 98 %  BP: ()/(51-82) 110/57        There is no height or weight on file to calculate BMI.    Physical Exam   Constitutional: He is oriented to person, place, and time. No distress.   HENT:   Head: Normocephalic and atraumatic.   Cardiovascular: Regular rhythm.    Tachy to low 100s   Pulmonary/Chest: Effort normal. No respiratory distress.   Abdominal:   Epigastric abdominal pain with guarding, no lower abdominal pain, mild distention   Musculoskeletal: Normal range of motion. He exhibits no edema.   Neurological:  He is alert and oriented to person, place, and time.   Skin: Skin is warm and dry. He is not diaphoretic.   Psychiatric: He has a normal mood and affect. His behavior is normal.       Significant Labs:  CBC:   Recent Labs  Lab 03/01/18  2156   WBC 9.73   RBC 2.84*   HGB 8.6*   HCT 24.5*      MCV 86   MCH 30.3   MCHC 35.1     CMP:   Recent Labs  Lab 03/01/18  1455   *   CALCIUM 9.3   ALBUMIN 3.8   PROT 6.3      K 3.8   CO2 19*      BUN 28*   CREATININE 1.72*   ALKPHOS 67   ALT 29   AST 38   BILITOT 0.4       Significant Diagnostics:  CT Abd/pelvis 6:30 pm: There is a large 8.7 x 7.8 cm heterogeneous mass in the lateral segment left hepatic lobe.  Along the anterior superior aspect of this mass of the left hepatic lobe there is an area of what appears to be discontinuity along the margin of the liver.  There is hemorrhage within the anterior aspect of this mass and there is a moderate degree of hemoperitoneum with most of the blood around the liver and under surface of the left hepatic lobe with some hemoperitoneum extending down into the right paracolic gutter and dependent pelvis.      Assessment/Plan:     Hemoperitoneum    74 yo M with rupture of left lobe liver mass    -admit to ICU  -bolused on arrival to [facility] ED with improvement in SBP (120s on exam)  -istat Cr 1.4  -d/w radiology, plan for CTA now to eval for active bleed, if bleed will plan for IR tonight  -repeat hgb 8.6 from 10 at OSH, will cont to trend  -type and screen  -npo for now  -ivf  -am labs  -cont phenytoin for seizure disorder  -dm- gluc >300 on OSH labs, will repeat gluc, SSI  -d/w staff          VTE Risk Mitigation         Ordered     Place sequential compression device  Until discontinued      03/01/18 2211     Medium Risk of VTE  Once      03/01/18 2211          Thank you for your consult. I will follow-up with patient. Please contact us if you have any additional questions.    Mame Negro MD  General  Surgery  [facility]

## 2018-03-02 NOTE — H&P
[facility]  Critical Care - Surgery  History & Physical    Patient Name: Jamison Mayes  MRN: [id number]  Admission Date: 3/1/2018  Code Status: Full Code  Attending Physician: Celestino Polanco MD   Primary Care Provider: Blaise Jorge MD   Principal Problem: <principal problem not specified>    Subjective:     HPI:  Jamison Mayes is a 74 yo male with a PMHx of HTN, DM (diet controlled) and seizure disorder (last seizure ~40 years ago) who presented from OSH with newly diagnosed left lobe liver mass that ruptured resulting in hemoperitoneum.  Pt reports feeling abdominal pain with associated diaphoresis and lightheadedness which prompted his presentation to the emergency department.  A CT was performed which revealed the ruptured liver mass.    Hospital/ICU Course:  No notes on file         Past Medical History:   Diagnosis Date    AK (actinic keratosis) PDT scalp 2/2015 and 3/2015    s/p efudex on scalp and forehead, PDT scalp    Arthritis     Diabetes mellitus type II     Fever blister     Hypertension     Joint pain     SCC (squamous cell carcinoma) 3/2013    L scalp vertex    SCC (squamous cell carcinoma) excised     left helix, in-situ    Seizures     SQUAMOUS CELL CARCINOMA 3/2013    occipital scalp    SQUAMOUS CELL CARCINOMA 3/2013    l vertex scalp       Past Surgical History:   Procedure Laterality Date    EYE SURGERY      skin carcinoma removal         Review of patient's allergies indicates:  No Known Allergies    Family History     Problem Relation (Age of Onset)    Diabetes Father    Vision loss Sister        Social History Main Topics    Smoking status: Former Smoker     Years: 5.00     Quit date: 6/4/1966    Smokeless tobacco: Never Used    Alcohol use No    Drug use: No    Sexual activity: Not on file      Review of Systems   Constitutional: Positive for diaphoresis.   HENT: Negative.    Respiratory: Negative.    Cardiovascular: Negative.     Gastrointestinal: Positive for abdominal pain. Negative for diarrhea and nausea.   Genitourinary: Negative.    Musculoskeletal: Negative.    Skin: Negative.    Neurological: Positive for light-headedness.   Psychiatric/Behavioral: Negative.      Objective:     Vital Signs (Most Recent):  Temp: 99.5 °F (37.5 °C) (03/01/18 2128)  Pulse: 96 (03/01/18 2302)  Resp: 20 (03/01/18 2233)  BP: (!) 113/57 (03/01/18 2302)  SpO2: 95 % (03/01/18 2302) Vital Signs (24h Range):  Temp:  [98.2 °F (36.8 °C)-99.5 °F (37.5 °C)] 99.5 °F (37.5 °C)  Pulse:  [] 96  Resp:  [16-36] 20  SpO2:  [95 %-100 %] 95 %  BP: ()/(51-82) 113/57        There is no height or weight on file to calculate BMI.    No intake or output data in the 24 hours ending 03/01/18 2347    Physical Exam   Constitutional: He is oriented to person, place, and time. He appears well-developed and well-nourished. No distress.   HENT:   Head: Normocephalic and atraumatic.   Eyes: Pupils are equal, round, and reactive to light.   Neck: Normal range of motion.   Cardiovascular: Normal rate and regular rhythm.    Pulmonary/Chest: Effort normal and breath sounds normal.   Abdominal: Soft. Bowel sounds are normal. There is tenderness.   Musculoskeletal: Normal range of motion.   Neurological: He is alert and oriented to person, place, and time.   Skin: Skin is warm and dry. He is not diaphoretic.   Psychiatric: He has a normal mood and affect. His behavior is normal. Thought content normal.       Vents:       Lines/Drains/Airways     Peripheral Intravenous Line                 Peripheral IV - Single Lumen 03/01/18 1438 Left Antecubital less than 1 day         Peripheral IV - Single Lumen 03/01/18 2000 Antecubital less than 1 day                Significant Labs:    CBC/Anemia Profile:    Recent Labs  Lab 03/01/18  1455 03/01/18  2156   WBC 13.13* 9.73   HGB 10.0* 8.6*   HCT 29.4* 24.5*    174   MCV 89 86   RDW 12.8 12.7        Chemistries:    Recent Labs  Lab  03/01/18  1455      K 3.8      CO2 19*   BUN 28*   CREATININE 1.72*   CALCIUM 9.3   ALBUMIN 3.8   PROT 6.3   BILITOT 0.4   ALKPHOS 67   ALT 29   AST 38       All pertinent labs within the past 24 hours have been reviewed.    Significant Imaging: I have reviewed all pertinent imaging results/findings within the past 24 hours.    Assessment/Plan:     Hemoperitoneum    Plan    Neuro:   AAO x 3  Seizure disorder well controlled on dilantin, continue home medications    Pulmonary:   Saturating appropriately on RA    Cardiac:  HDS following 1L bolus NS  Maintenance fluids with LR 125cc/hr    Renal:   No issue currently    Infectious Disease:   No issues currently    Hematology/Oncology:  Continue to monitor h/h q4 hrs    Endocrine:  DM, normally diet controlled   on admission, will start SSI    Fluids/Electrolytes/Nutrition/GI:   Replace lytes PRN  NPO for now    Dispo:  Continue monitoring in ICU              Critical care was time spent personally by me on the following activities: development of treatment plan with patient or surrogate and bedside caregivers, discussions with consultants, evaluation of patient's response to treatment, examination of patient, ordering and performing treatments and interventions, ordering and review of laboratory studies, ordering and review of radiographic studies, pulse oximetry, re-evaluation of patient's condition.  This critical care time did not overlap with that of any other provider or involve time for any procedures.     Humza Bolaños MD  Critical Care - Surgery  [facility]

## 2018-03-02 NOTE — ASSESSMENT & PLAN NOTE
72 yo M with rupture of left lobe liver mass    -admit to ICU  -bolused on arrival to [facility] ED with improvement in SBP (120s on exam)  -istat Cr 1.4  -d/w radiology, plan for CTA now to eval for active bleed, if bleed will plan for IR tonight  -repeat hgb 8.6 from 10 at OSH, will cont to trend  -type and screen  -npo for now  -ivf  -am labs  -cont phenytoin for seizure disorder  -dm- gluc >300 on OSH labs, will repeat gluc, SSI  -d/w staff

## 2018-03-02 NOTE — SUBJECTIVE & OBJECTIVE
Past Medical History:   Diagnosis Date    AK (actinic keratosis) PDT scalp 2/2015 and 3/2015    s/p efudex on scalp and forehead, PDT scalp    Arthritis     Diabetes mellitus type II     Fever blister     Hypertension     Joint pain     SCC (squamous cell carcinoma) 3/2013    L scalp vertex    SCC (squamous cell carcinoma) excised     left helix, in-situ    Seizures     SQUAMOUS CELL CARCINOMA 3/2013    occipital scalp    SQUAMOUS CELL CARCINOMA 3/2013    l vertex scalp       Past Surgical History:   Procedure Laterality Date    EYE SURGERY      skin carcinoma removal         Review of patient's allergies indicates:  No Known Allergies    Family History     Problem Relation (Age of Onset)    Diabetes Father    Vision loss Sister        Social History Main Topics    Smoking status: Former Smoker     Years: 5.00     Quit date: 6/4/1966    Smokeless tobacco: Never Used    Alcohol use No    Drug use: No    Sexual activity: Not on file      Review of Systems   Constitutional: Positive for diaphoresis.   HENT: Negative.    Respiratory: Negative.    Cardiovascular: Negative.    Gastrointestinal: Positive for abdominal pain. Negative for diarrhea and nausea.   Genitourinary: Negative.    Musculoskeletal: Negative.    Skin: Negative.    Neurological: Positive for light-headedness.   Psychiatric/Behavioral: Negative.      Objective:     Vital Signs (Most Recent):  Temp: 99.5 °F (37.5 °C) (03/01/18 2128)  Pulse: 96 (03/01/18 2302)  Resp: 20 (03/01/18 2233)  BP: (!) 113/57 (03/01/18 2302)  SpO2: 95 % (03/01/18 2302) Vital Signs (24h Range):  Temp:  [98.2 °F (36.8 °C)-99.5 °F (37.5 °C)] 99.5 °F (37.5 °C)  Pulse:  [] 96  Resp:  [16-36] 20  SpO2:  [95 %-100 %] 95 %  BP: ()/(51-82) 113/57        There is no height or weight on file to calculate BMI.    No intake or output data in the 24 hours ending 03/01/18 2347    Physical Exam   Constitutional: He is oriented to person, place, and time.  He appears well-developed and well-nourished. No distress.   HENT:   Head: Normocephalic and atraumatic.   Eyes: Pupils are equal, round, and reactive to light.   Neck: Normal range of motion.   Cardiovascular: Normal rate and regular rhythm.    Pulmonary/Chest: Effort normal and breath sounds normal.   Abdominal: Soft. Bowel sounds are normal. There is tenderness.   Musculoskeletal: Normal range of motion.   Neurological: He is alert and oriented to person, place, and time.   Skin: Skin is warm and dry. He is not diaphoretic.   Psychiatric: He has a normal mood and affect. His behavior is normal. Thought content normal.       Vents:       Lines/Drains/Airways     Peripheral Intravenous Line                 Peripheral IV - Single Lumen 03/01/18 1438 Left Antecubital less than 1 day         Peripheral IV - Single Lumen 03/01/18 2000 Antecubital less than 1 day                Significant Labs:    CBC/Anemia Profile:    Recent Labs  Lab 03/01/18  1455 03/01/18  2156   WBC 13.13* 9.73   HGB 10.0* 8.6*   HCT 29.4* 24.5*    174   MCV 89 86   RDW 12.8 12.7        Chemistries:    Recent Labs  Lab 03/01/18  1455      K 3.8      CO2 19*   BUN 28*   CREATININE 1.72*   CALCIUM 9.3   ALBUMIN 3.8   PROT 6.3   BILITOT 0.4   ALKPHOS 67   ALT 29   AST 38       All pertinent labs within the past 24 hours have been reviewed.    Significant Imaging: I have reviewed all pertinent imaging results/findings within the past 24 hours.

## 2018-03-02 NOTE — PLAN OF CARE
Patient lives in a 1 story house w/his spouse. Spouse, daughter, & sister-in-law at . Not medically stable for discharge;Currently in ICU w/DX: ruptured L Lobe liver. Undergoing work-up. No needs determined.     [facility] given to patient after informed about it;patient verbalized their understanding.        03/02/18 1355   Discharge Assessment   Assessment Type Discharge Planning Assessment   Confirmed/corrected address and phone number on facesheet? Yes   Assessment information obtained from? Patient;Medical Record   Expected Length of Stay (days) (TBD)   Communicated expected length of stay with patient/caregiver no  (Per MD)   Prior to hospitilization cognitive status: Alert/Oriented;No Deficits   Prior to hospitalization functional status: Independent   Current cognitive status: Alert/Oriented;No Deficits   Current Functional Status: Independent;Needs Assistance   Facility Arrived From: ([facility])   Lives With spouse   Able to Return to Prior Arrangements yes   Is patient able to care for self after discharge? Yes   Who are your caregiver(s) and their phone number(s)? (GerberTricia Spouse [phone], MistiWhitney Daughter [phone]   )   Patient's perception of discharge disposition home or selfcare   Readmission Within The Last 30 Days unable to assess   Patient currently being followed by outpatient case management? No   Patient currently receives any other outside agency services? No   Equipment Currently Used at Home none   Do you have any problems affording any of your prescribed medications? No   Is the patient taking medications as prescribed? yes   Does the patient have transportation home? Yes   Transportation Available car;family or friend will provide   Dialysis Name and Scheduled days (N/A)   Does the patient receive services at the Coumadin Clinic? No   Discharge Plan A Home with family   Discharge Plan B Home with family    Patient/Family In Agreement With Plan yes

## 2018-03-02 NOTE — ASSESSMENT & PLAN NOTE
Plan    Neuro:   AAO x 3  Seizure disorder well controlled on dilantin, continue home medications    Pulmonary:   Saturating appropriately on RA    Cardiac:  HDS following 1L bolus NS  Maintenance fluids with LR 125cc/hr    Renal:   No issue currently    Infectious Disease:   No issues currently    Hematology/Oncology:  Continue to monitor h/h q4 hrs    Endocrine:  DM, normally diet controlled   on admission, will start SSI    Fluids/Electrolytes/Nutrition/GI:   Replace lytes PRN  NPO for now    Dispo:  Continue monitoring in ICU

## 2018-03-02 NOTE — ED PROVIDER NOTES
Encounter Date: 3/1/2018    SCRIBE #1 NOTE: I, Bucky Brasher, am scribing for, and in the presence of,  Dr. Polanco. I have scribed the following portions of the note - the Resident attestation and the EKG reading.       History     Chief Complaint   Patient presents with    liver mass     pt transferred from [facility] for liver mass. complains of abdomen pain 8/10     74yo M w/ PMHx s/o HTN transferred from outside facility 2/2 concern for liver mass. Pt reports having increased abdominal pain and noticing a mass on yesterday. He denies any N/V/D and states his pain was sharp and rated 10/10, but currently 8/10. He currently has no other complaints.          Review of patient's allergies indicates:  No Known Allergies  Past Medical History:   Diagnosis Date    AK (actinic keratosis) PDT scalp 2/2015 and 3/2015    s/p efudex on scalp and forehead, PDT scalp    Arthritis     Diabetes mellitus type II     Fever blister     Hypertension     Joint pain     SCC (squamous cell carcinoma) 3/2013    L scalp vertex    SCC (squamous cell carcinoma) excised     left helix, in-situ    Seizures     SQUAMOUS CELL CARCINOMA 3/2013    occipital scalp    SQUAMOUS CELL CARCINOMA 3/2013    l vertex scalp     Past Surgical History:   Procedure Laterality Date    EYE SURGERY      skin carcinoma removal       Family History   Problem Relation Age of Onset    Diabetes Father     Vision loss Sister     Melanoma Neg Hx      Social History   Substance Use Topics    Smoking status: Former Smoker     Years: 5.00     Quit date: 6/4/1966    Smokeless tobacco: Never Used    Alcohol use No     Review of Systems   Constitutional: Negative for fever.   HENT: Negative for sore throat.    Respiratory: Negative for shortness of breath.    Cardiovascular: Negative for chest pain.   Gastrointestinal: Positive for abdominal distention and abdominal pain. Negative for nausea.   Genitourinary: Negative for dysuria.    Musculoskeletal: Negative for back pain.   Skin: Negative for rash.   Neurological: Negative for weakness.   Hematological: Does not bruise/bleed easily.   All other systems reviewed and are negative.      Physical Exam     Initial Vitals [03/01/18 2128]   BP Pulse Resp Temp SpO2   (!) 106/58 105 20 99.5 °F (37.5 °C) 95 %      MAP       74         Physical Exam    Constitutional: He appears well-developed and well-nourished. He is not diaphoretic. No distress.   HENT:   Head: Normocephalic and atraumatic.   Mouth/Throat: Oropharynx is clear and moist.   Eyes: Conjunctivae and EOM are normal. Pupils are equal, round, and reactive to light.   Neck: Normal range of motion. Neck supple. No thyromegaly present. No JVD present.   Cardiovascular: Regular rhythm and normal heart sounds. Tachycardia present.  Exam reveals no gallop and no friction rub.    No murmur heard.  Pulmonary/Chest: Breath sounds normal. No respiratory distress. He has no wheezes. He exhibits no tenderness.   Abdominal: Soft. Bowel sounds are normal. He exhibits distension. There is tenderness. There is no rebound and no guarding.   Musculoskeletal: Normal range of motion. He exhibits no edema or tenderness.   Neurological: He is alert and oriented to person, place, and time. He has normal strength. No cranial nerve deficit or sensory deficit.   Skin: Skin is warm and dry. Capillary refill takes less than 2 seconds. No rash noted. No erythema.   Psychiatric: He has a normal mood and affect.         ED Course   Procedures  Labs Reviewed   CBC W/ AUTO DIFFERENTIAL - Abnormal; Notable for the following:        Result Value    RBC 2.84 (*)     Hemoglobin 8.6 (*)     Hematocrit 24.5 (*)     Gran # (ANC) 8.6 (*)     Lymph # 0.8 (*)     Gran% 88.5 (*)     Lymph% 8.5 (*)     Mono% 2.7 (*)     All other components within normal limits   POCT GLUCOSE - Abnormal; Notable for the following:     POCT Glucose 288 (*)     All other components within normal limits    POCT GLUCOSE - Abnormal; Notable for the following:     POCT Glucose 295 (*)     All other components within normal limits   HEMOGLOBIN A1C   CBC W/ AUTO DIFFERENTIAL   HEMOGLOBIN A1C   TYPE & SCREEN   ISTAT CHEM8   POCT GLUCOSE MONITORING CONTINUOUS     EKG Readings: (Independently Interpreted)   Sinus tachycardia, rate of 106, left axis deviation, bifascicular block, t wave inversion in AVL, ST segments are normal           Medical Decision Making:   Independently Interpreted Test(s):   I have ordered and independently interpreted EKG Reading(s) - see prior notes  Clinical Tests:   Medical Tests: Ordered and Reviewed       APC / Resident Notes:   HOIII MDM:  72yo M w/ PMHx s/o HTN transferred from outside facility 2/2 concern for liver mass.    Plan of Care/DDx:  Liver mass, gallstones, abscess rupture, sepsis, peritonitis, SBP are on our differential and plan to r/o with labs, images and clinical assessment. Pt noted to have mass that is concerning for rupture within the L liver lobe. Pt has become slightly hypotensive and tachycardic since his arrival, but no acute changes noted upon assessment. He is A&O x4 and in no obvious acute distress. Pt has been consulted to Surgery and CT triple liver has been ordered for better assessment. Pt also given IVF's and we will recheck his Cr function. Anticipate pt gong to IR vs OR for repair and hospital admission. Dispo pending results.    Humza Harman M.D.  Emergency Medicine PGY 3  10:00 PM 3/1/2018    [facility] MDM:  Pt has been reassessed and consulted to Gen Surgery who will admit pt. He will receive re-imaging and plan will be made once resulted. He was given IVF's and pain control and has remained stable while in the ED. Pt is aware of the plan and in agreement.    Humza Harman M.D.  Emergency Medicine PGY 3  11:09 PM 3/1/2018         Scribe Attestation:   Scribe #1: I performed the above scribed service and the documentation accurately describes the services  I performed. I attest to the accuracy of the note.    Attending Attestation:   Physician Attestation Statement for Resident:  As the supervising MD   Physician Attestation Statement: I have personally seen and examined this patient.   I agree with the above history. -: 73 y.o. male transferred for ruptured hepatic mass. Pt's blood pressure stabilized and he is mentating well. He is not in shock. General surgery consulted. We will obtain further imaging for IR and admit to surgical ICU.    As the supervising MD I agree with the above PE.    As the supervising MD I agree with the above treatment, course, plan, and disposition.          Physician Attestation for Scribe:      Comments: I, Dr. Celestino Polanco, personally performed the services described in this documentation. All medical record entries made by the scribe were at my direction and in my presence.  I have reviewed the chart and agree that the record reflects my personal performance and is accurate and complete. Celestino Polanco MD.  11:57 PM 03/01/2018                 Clinical Impression:   The primary encounter diagnosis was Hemoperitoneum. Diagnoses of Abdominal pain, unspecified abdominal location, Tachycardia, Liver mass, left lobe, and Type 2 diabetes mellitus without complication, without long-term current use of insulin were also pertinent to this visit.                           Humza Harman MD  Resident  03/01/18 7403       Celestino Polanco MD  03/01/18 3332     No complaints

## 2018-03-02 NOTE — SUBJECTIVE & OBJECTIVE
Current Facility-Administered Medications on File Prior to Encounter   Medication    [COMPLETED] (pyxis) gi cocktail (mylanta 30 mL, lidocaine 2 % viscous 10 mL, dicyclomine 10 mL) 50 mL    [COMPLETED] 0.9%  NaCl infusion    [COMPLETED] fentaNYL injection 25 mcg    [COMPLETED] insulin regular injection 6 Units    [COMPLETED] lactated ringers infusion    [COMPLETED] morphine injection 4 mg    [COMPLETED] omnipaque 350 iohexol 30 mL    [COMPLETED] ondansetron injection 4 mg    [DISCONTINUED] ondansetron injection 4 mg     Current Outpatient Prescriptions on File Prior to Encounter   Medication Sig    aspirin (ASPIRIN LOW DOSE) 81 MG EC tablet Take 81 mg by mouth. 1 Tablet, Delayed Release (E.C.) Oral Every day    amlodipine (NORVASC) 10 MG tablet Take 1 tablet (10 mg total) by mouth once daily.    calcium carbonate (OS-RUSLAN) 500 mg calcium (1,250 mg) chewable tablet Take 1 tablet by mouth once daily.    cyanocobalamin (VITAMIN B-12) 250 MCG tablet Take 1 tablet (250 mcg total) by mouth once daily.    fluorouracil (EFUDEX) 5 % cream AAA on scalp, forehead, and temples BID x 2-3 weeks. Stop if blistered, oozing, or bleeding. Use daily sun protection.    hydroCHLOROthiazide (HYDRODIURIL) 25 MG tablet TAKE 1 TABLET (25 MG TOTAL) BY MOUTH ONCE DAILY.    imiquimod (ALDARA) 5 % cream AAA 5 nights/week x 4 - 6 weeks. Wash off in am. Stop if blistering, bleeding, oozing, etc. Do not use >1 packet per night.    ketoconazole (NIZORAL) 2 % shampoo Wash hair with medicated shampoo at least 2x/week - let sit on scalp at least 5 minutes prior to rinsing    lisinopril (PRINIVIL,ZESTRIL) 40 MG tablet TAKE 1 TABLET (40 MG TOTAL) BY MOUTH ONCE DAILY.    lovastatin (MEVACOR) 20 MG tablet TAKE 1 TABLET IN THE EVENING    lovastatin (MEVACOR) 20 MG tablet Take 1 tablet (20 mg total) by mouth every evening.    MULTIVITAMIN WITH MINERALS (ONE-A-DAY 50 PLUS) Tab Take by mouth. 1 Tablet Oral Every morning    mupirocin  (BACTROBAN) 2 % ointment AAA on scalp bid    oxycodone-acetaminophen (PERCOCET) 5-325 mg per tablet Take 1 tablet by mouth every 4 to 6 hours as needed for Pain.    phenytoin (DILANTIN EXTENDED) 100 MG ER capsule Take 2 capsules (200 mg total) by mouth 3 (three) times daily.    potassium chloride SA (K-DUR,KLOR-CON) 20 MEQ tablet Take by mouth every other day. 595 mg       Review of patient's allergies indicates:  No Known Allergies    Past Medical History:   Diagnosis Date    AK (actinic keratosis) PDT scalp 2/2015 and 3/2015    s/p efudex on scalp and forehead, PDT scalp    Arthritis     Diabetes mellitus type II     Fever blister     Hypertension     Joint pain     SCC (squamous cell carcinoma) 3/2013    L scalp vertex    SCC (squamous cell carcinoma) excised     left helix, in-situ    Seizures     SQUAMOUS CELL CARCINOMA 3/2013    occipital scalp    SQUAMOUS CELL CARCINOMA 3/2013    l vertex scalp     Past Surgical History:   Procedure Laterality Date    EYE SURGERY      skin carcinoma removal       Family History     Problem Relation (Age of Onset)    Diabetes Father    Vision loss Sister        Social History Main Topics    Smoking status: Former Smoker     Years: 5.00     Quit date: 6/4/1966    Smokeless tobacco: Never Used    Alcohol use No    Drug use: No    Sexual activity: Not on file     Review of Systems   Constitutional: Positive for diaphoresis. Negative for fever.   Respiratory: Positive for cough and shortness of breath.    Cardiovascular: Negative for chest pain and palpitations.   Gastrointestinal: Positive for abdominal distention and abdominal pain. Negative for nausea and vomiting.   Genitourinary: Negative for dysuria.   Skin: Negative for color change and wound.   Neurological: Positive for dizziness and weakness.   Hematological: Negative for adenopathy. Does not bruise/bleed easily.     Objective:     Vital Signs (Most Recent):  Temp: 99.5 °F (37.5 °C) (03/01/18  2128)  Pulse: (!) 113 (03/01/18 2142)  Resp: 20 (03/01/18 2128)  BP: (!) 110/57 (03/01/18 2142)  SpO2: 98 % (03/01/18 2142) Vital Signs (24h Range):  Temp:  [98.2 °F (36.8 °C)-99.5 °F (37.5 °C)] 99.5 °F (37.5 °C)  Pulse:  [] 113  Resp:  [16-36] 20  SpO2:  [95 %-100 %] 98 %  BP: ()/(51-82) 110/57        There is no height or weight on file to calculate BMI.    Physical Exam   Constitutional: He is oriented to person, place, and time. No distress.   HENT:   Head: Normocephalic and atraumatic.   Cardiovascular: Regular rhythm.    Tachy to low 100s   Pulmonary/Chest: Effort normal. No respiratory distress.   Abdominal:   Epigastric abdominal pain with guarding, no lower abdominal pain, mild distention   Musculoskeletal: Normal range of motion. He exhibits no edema.   Neurological: He is alert and oriented to person, place, and time.   Skin: Skin is warm and dry. He is not diaphoretic.   Psychiatric: He has a normal mood and affect. His behavior is normal.       Significant Labs:  CBC:   Recent Labs  Lab 03/01/18  2156   WBC 9.73   RBC 2.84*   HGB 8.6*   HCT 24.5*      MCV 86   MCH 30.3   MCHC 35.1     CMP:   Recent Labs  Lab 03/01/18  1455   *   CALCIUM 9.3   ALBUMIN 3.8   PROT 6.3      K 3.8   CO2 19*      BUN 28*   CREATININE 1.72*   ALKPHOS 67   ALT 29   AST 38   BILITOT 0.4       Significant Diagnostics:  CT Abd/pelvis 6:30 pm: There is a large 8.7 x 7.8 cm heterogeneous mass in the lateral segment left hepatic lobe.  Along the anterior superior aspect of this mass of the left hepatic lobe there is an area of what appears to be discontinuity along the margin of the liver.  There is hemorrhage within the anterior aspect of this mass and there is a moderate degree of hemoperitoneum with most of the blood around the liver and under surface of the left hepatic lobe with some hemoperitoneum extending down into the right paracolic gutter and dependent pelvis.

## 2018-03-02 NOTE — HPI
Jamison Mayes is a 72 yo male with a PMHx of HTN, DM (diet controlled) and seizure disorder (last seizure ~40 years ago) who presented from OSH with newly diagnosed left lobe liver mass that ruptured resulting in hemoperitoneum.  Pt reports feeling abdominal pain with associated diaphoresis and lightheadedness which prompted his presentation to the emergency department.  A CT was performed which revealed the ruptured liver mass.

## 2018-03-02 NOTE — NURSING
Patient arrived to SICU 6074. PT AAOx4. VS stable. MD at bedside. Patient connected to monitor. Urinal placed at bedside. LR gtt at 125mL started. Family updated. Will continue to monitor.

## 2018-03-02 NOTE — HPI
74 yo M with h/o HTN, DM (not on insulin last A1c 11/17 7.1), seizure disorder presented to [facility] with pre-syncopal episode, epigastric abdominal pain. Symptoms started suddenly around 1 pm today. Had been active earlier in the day hanging drywall, denies trauma. Onset of symptoms associated with cold sweats. No nausea. CT noncon at OSH showed left lobe liver mass with associated rupture and hemoperitoneum. No contrast given 2/2 HEATHER (Cr 1.7). HDS at OSH. Denies known liver or kidney disease.     No previous abdominal surgeries.

## 2018-03-03 LAB
ALBUMIN SERPL BCP-MCNC: 3 G/DL
ALP SERPL-CCNC: 55 U/L
ALT SERPL W/O P-5'-P-CCNC: 25 U/L
ANION GAP SERPL CALC-SCNC: 8 MMOL/L
APTT BLDCRRT: <21 SEC
AST SERPL-CCNC: 64 U/L
BASOPHILS # BLD AUTO: 0.01 K/UL
BASOPHILS # BLD AUTO: 0.02 K/UL
BASOPHILS NFR BLD: 0.1 %
BASOPHILS NFR BLD: 0.2 %
BILIRUB SERPL-MCNC: 0.5 MG/DL
BUN SERPL-MCNC: 17 MG/DL
CALCIUM SERPL-MCNC: 8.7 MG/DL
CHLORIDE SERPL-SCNC: 105 MMOL/L
CO2 SERPL-SCNC: 25 MMOL/L
CREAT SERPL-MCNC: 1 MG/DL
DIFFERENTIAL METHOD: ABNORMAL
EOSINOPHIL # BLD AUTO: 0 K/UL
EOSINOPHIL # BLD AUTO: 0.1 K/UL
EOSINOPHIL # BLD AUTO: 0.1 K/UL
EOSINOPHIL NFR BLD: 0.3 %
EOSINOPHIL NFR BLD: 0.4 %
EOSINOPHIL NFR BLD: 0.6 %
EOSINOPHIL NFR BLD: 1 %
ERYTHROCYTE [DISTWIDTH] IN BLOOD BY AUTOMATED COUNT: 12.6 %
ERYTHROCYTE [DISTWIDTH] IN BLOOD BY AUTOMATED COUNT: 12.6 %
ERYTHROCYTE [DISTWIDTH] IN BLOOD BY AUTOMATED COUNT: 12.7 %
ERYTHROCYTE [DISTWIDTH] IN BLOOD BY AUTOMATED COUNT: 12.8 %
EST. GFR  (AFRICAN AMERICAN): >60 ML/MIN/1.73 M^2
EST. GFR  (NON AFRICAN AMERICAN): >60 ML/MIN/1.73 M^2
ESTIMATED PA SYSTOLIC PRESSURE: 37.81
GLUCOSE SERPL-MCNC: 168 MG/DL
HCT VFR BLD AUTO: 21 %
HCT VFR BLD AUTO: 21.9 %
HCT VFR BLD AUTO: 22.3 %
HCT VFR BLD AUTO: 22.7 %
HCT VFR BLD AUTO: 22.8 %
HCT VFR BLD AUTO: 23 %
HGB BLD-MCNC: 7.4 G/DL
HGB BLD-MCNC: 7.4 G/DL
HGB BLD-MCNC: 7.7 G/DL
HGB BLD-MCNC: 7.8 G/DL
HGB BLD-MCNC: 8 G/DL
HGB BLD-MCNC: 8 G/DL
IMM GRANULOCYTES # BLD AUTO: 0.02 K/UL
IMM GRANULOCYTES # BLD AUTO: 0.03 K/UL
IMM GRANULOCYTES # BLD AUTO: 0.05 K/UL
IMM GRANULOCYTES # BLD AUTO: 0.05 K/UL
IMM GRANULOCYTES NFR BLD AUTO: 0.2 %
IMM GRANULOCYTES NFR BLD AUTO: 0.3 %
IMM GRANULOCYTES NFR BLD AUTO: 0.3 %
IMM GRANULOCYTES NFR BLD AUTO: 0.4 %
INR PPP: 1
LYMPHOCYTES # BLD AUTO: 1.2 K/UL
LYMPHOCYTES # BLD AUTO: 1.6 K/UL
LYMPHOCYTES # BLD AUTO: 1.6 K/UL
LYMPHOCYTES # BLD AUTO: 1.7 K/UL
LYMPHOCYTES # BLD AUTO: 1.9 K/UL
LYMPHOCYTES # BLD AUTO: 2 K/UL
LYMPHOCYTES NFR BLD: 14.9 %
LYMPHOCYTES NFR BLD: 15 %
LYMPHOCYTES NFR BLD: 15.5 %
LYMPHOCYTES NFR BLD: 16.5 %
LYMPHOCYTES NFR BLD: 17.3 %
LYMPHOCYTES NFR BLD: 20.6 %
MAGNESIUM SERPL-MCNC: 1.5 MG/DL
MCH RBC QN AUTO: 29.4 PG
MCH RBC QN AUTO: 30 PG
MCH RBC QN AUTO: 30.1 PG
MCH RBC QN AUTO: 30.4 PG
MCH RBC QN AUTO: 30.4 PG
MCH RBC QN AUTO: 30.5 PG
MCHC RBC AUTO-ENTMCNC: 33.8 G/DL
MCHC RBC AUTO-ENTMCNC: 34.2 G/DL
MCHC RBC AUTO-ENTMCNC: 34.5 G/DL
MCHC RBC AUTO-ENTMCNC: 34.8 G/DL
MCHC RBC AUTO-ENTMCNC: 35.2 G/DL
MCHC RBC AUTO-ENTMCNC: 35.2 G/DL
MCV RBC AUTO: 86 FL
MCV RBC AUTO: 86 FL
MCV RBC AUTO: 87 FL
MCV RBC AUTO: 87 FL
MCV RBC AUTO: 88 FL
MCV RBC AUTO: 88 FL
MITRAL VALVE MOBILITY: NORMAL
MITRAL VALVE REGURGITATION: NORMAL
MONOCYTES # BLD AUTO: 0.6 K/UL
MONOCYTES # BLD AUTO: 0.7 K/UL
MONOCYTES # BLD AUTO: 0.8 K/UL
MONOCYTES # BLD AUTO: 0.9 K/UL
MONOCYTES # BLD AUTO: 1.1 K/UL
MONOCYTES # BLD AUTO: 1.2 K/UL
MONOCYTES NFR BLD: 10.3 %
MONOCYTES NFR BLD: 7.3 %
MONOCYTES NFR BLD: 8.1 %
MONOCYTES NFR BLD: 8.5 %
MONOCYTES NFR BLD: 8.8 %
MONOCYTES NFR BLD: 9.4 %
NEUTROPHILS # BLD AUTO: 6.3 K/UL
NEUTROPHILS # BLD AUTO: 6.4 K/UL
NEUTROPHILS # BLD AUTO: 7.3 K/UL
NEUTROPHILS # BLD AUTO: 7.5 K/UL
NEUTROPHILS # BLD AUTO: 8.3 K/UL
NEUTROPHILS # BLD AUTO: 8.4 K/UL
NEUTROPHILS NFR BLD: 70 %
NEUTROPHILS NFR BLD: 72.4 %
NEUTROPHILS NFR BLD: 73.7 %
NEUTROPHILS NFR BLD: 74.2 %
NEUTROPHILS NFR BLD: 75 %
NEUTROPHILS NFR BLD: 76.7 %
NRBC BLD-RTO: 0 /100 WBC
PHOSPHATE SERPL-MCNC: 1.9 MG/DL
PLATELET # BLD AUTO: 159 K/UL
PLATELET # BLD AUTO: 161 K/UL
PLATELET # BLD AUTO: 169 K/UL
PLATELET # BLD AUTO: 170 K/UL
PLATELET # BLD AUTO: 171 K/UL
PLATELET # BLD AUTO: 180 K/UL
PMV BLD AUTO: 10.2 FL
PMV BLD AUTO: 10.5 FL
PMV BLD AUTO: 9.5 FL
PMV BLD AUTO: 9.8 FL
PMV BLD AUTO: 9.9 FL
PMV BLD AUTO: 9.9 FL
POCT GLUCOSE: 162 MG/DL (ref 70–110)
POCT GLUCOSE: 183 MG/DL (ref 70–110)
POCT GLUCOSE: 197 MG/DL (ref 70–110)
POCT GLUCOSE: 215 MG/DL (ref 70–110)
POCT GLUCOSE: 301 MG/DL (ref 70–110)
POTASSIUM SERPL-SCNC: 4.1 MMOL/L
PROT SERPL-MCNC: 5.9 G/DL
PROTHROMBIN TIME: 10.2 SEC
RBC # BLD AUTO: 2.43 M/UL
RBC # BLD AUTO: 2.52 M/UL
RBC # BLD AUTO: 2.56 M/UL
RBC # BLD AUTO: 2.6 M/UL
RBC # BLD AUTO: 2.63 M/UL
RBC # BLD AUTO: 2.63 M/UL
RETIRED EF AND QEF - SEE NOTES: 55 (ref 55–65)
SODIUM SERPL-SCNC: 138 MMOL/L
TRICUSPID VALVE REGURGITATION: NORMAL
WBC # BLD AUTO: 10.05 K/UL
WBC # BLD AUTO: 11.31 K/UL
WBC # BLD AUTO: 11.56 K/UL
WBC # BLD AUTO: 8.2 K/UL
WBC # BLD AUTO: 9.08 K/UL
WBC # BLD AUTO: 9.89 K/UL

## 2018-03-03 PROCEDURE — 85610 PROTHROMBIN TIME: CPT

## 2018-03-03 PROCEDURE — 85025 COMPLETE CBC W/AUTO DIFF WBC: CPT | Mod: 91

## 2018-03-03 PROCEDURE — 99233 SBSQ HOSP IP/OBS HIGH 50: CPT | Mod: 24,,, | Performed by: SURGERY

## 2018-03-03 PROCEDURE — 25500020 PHARM REV CODE 255: Performed by: SURGERY

## 2018-03-03 PROCEDURE — 85730 THROMBOPLASTIN TIME PARTIAL: CPT

## 2018-03-03 PROCEDURE — 99232 SBSQ HOSP IP/OBS MODERATE 35: CPT | Mod: GC,,, | Performed by: SURGERY

## 2018-03-03 PROCEDURE — 25000003 PHARM REV CODE 250: Performed by: SURGERY

## 2018-03-03 PROCEDURE — 36415 COLL VENOUS BLD VENIPUNCTURE: CPT

## 2018-03-03 PROCEDURE — 83735 ASSAY OF MAGNESIUM: CPT

## 2018-03-03 PROCEDURE — 94761 N-INVAS EAR/PLS OXIMETRY MLT: CPT

## 2018-03-03 PROCEDURE — 20600001 HC STEP DOWN PRIVATE ROOM

## 2018-03-03 PROCEDURE — 25000003 PHARM REV CODE 250: Performed by: STUDENT IN AN ORGANIZED HEALTH CARE EDUCATION/TRAINING PROGRAM

## 2018-03-03 PROCEDURE — 84100 ASSAY OF PHOSPHORUS: CPT

## 2018-03-03 PROCEDURE — 80053 COMPREHEN METABOLIC PANEL: CPT

## 2018-03-03 RX ORDER — AMLODIPINE BESYLATE 10 MG/1
10 TABLET ORAL DAILY
Status: DISCONTINUED | OUTPATIENT
Start: 2018-03-03 | End: 2018-03-09 | Stop reason: HOSPADM

## 2018-03-03 RX ADMIN — INSULIN ASPART 4 UNITS: 100 INJECTION, SOLUTION INTRAVENOUS; SUBCUTANEOUS at 12:03

## 2018-03-03 RX ADMIN — INSULIN ASPART 1 UNITS: 100 INJECTION, SOLUTION INTRAVENOUS; SUBCUTANEOUS at 11:03

## 2018-03-03 RX ADMIN — IOHEXOL 75 ML: 350 INJECTION, SOLUTION INTRAVENOUS at 02:03

## 2018-03-03 RX ADMIN — AMLODIPINE BESYLATE 10 MG: 10 TABLET ORAL at 01:03

## 2018-03-03 RX ADMIN — LOVASTATIN 20 MG: 20 TABLET ORAL at 09:03

## 2018-03-03 RX ADMIN — POTASSIUM & SODIUM PHOSPHATES POWDER PACK 280-160-250 MG 2 PACKET: 280-160-250 PACK at 05:03

## 2018-03-03 RX ADMIN — SODIUM CHLORIDE 1000 ML: 0.9 INJECTION, SOLUTION INTRAVENOUS at 11:03

## 2018-03-03 RX ADMIN — MAGNESIUM OXIDE TAB 400 MG (241.3 MG ELEMENTAL MG) 800 MG: 400 (241.3 MG) TAB at 05:03

## 2018-03-03 NOTE — SUBJECTIVE & OBJECTIVE
Interval History/Significant Events: NAEON.  Afebrile, vital signs stable.  Patient doing well.  Denies headache, fever/chills, chest pain, palpitations, cough, SOB, nausea, vomiting, diarrhea, constipation.    CBC monitored and stable q4H, diabetic diet until midnight before surgery Monday, CT chest per surgical oncology primary team for staging.  0.3cm SABINO nodule, small R pleural effusion from hepatic mass.      Follow-up For: Procedure(s) (LRB):  HEPATECTOMY (N/A)    Post-Operative Day:      Objective:     Vital Signs (Most Recent):  Temp: 99.7 °F (37.6 °C) (03/02/18 2300)  Pulse: 102 (03/03/18 0400)  Resp: (!) 30 (03/03/18 0400)  BP: 134/63 (03/03/18 0400)  SpO2: 95 % (03/03/18 0400) Vital Signs (24h Range):  Temp:  [98.7 °F (37.1 °C)-99.7 °F (37.6 °C)] 99.7 °F (37.6 °C)  Pulse:  [] 102  Resp:  [9-30] 30  SpO2:  [91 %-97 %] 95 %  BP: (115-170)/(55-75) 134/63     Weight: 86.7 kg (191 lb 2.2 oz)  Body mass index is 29.94 kg/m².      Intake/Output Summary (Last 24 hours) at 03/03/18 0502  Last data filed at 03/03/18 0200   Gross per 24 hour   Intake              725 ml   Output             2425 ml   Net            -1700 ml       Physical Exam   Constitutional: He is oriented to person, place, and time. He appears well-developed and well-nourished. No distress.   HENT:   Head: Normocephalic and atraumatic.   Eyes: Pupils are equal, round, and reactive to light.   Neck: Normal range of motion.   Cardiovascular: Normal rate and regular rhythm.    Pulmonary/Chest: Effort normal and breath sounds normal.   Abdominal: Soft. Bowel sounds are normal. He exhibits no distension. There is tenderness.   Musculoskeletal: Normal range of motion.   Neurological: He is alert and oriented to person, place, and time.   Skin: Skin is warm and dry. He is not diaphoretic.   Psychiatric: He has a normal mood and affect. His behavior is normal. Thought content normal.       Vents:       Lines/Drains/Airways     Peripheral  Intravenous Line                 Peripheral IV - Single Lumen 03/01/18 1438 Left Antecubital 1 day         Peripheral IV - Single Lumen 03/01/18 2356 Right Antecubital 1 day                Significant Labs:    CBC/Anemia Profile:    Recent Labs  Lab 03/02/18  1934 03/03/18  0030 03/03/18  0422   WBC 9.54 9.08 8.20   HGB 7.7* 8.0* 7.4*   HCT 22.2* 23.0* 21.9*    170 159   MCV 88 88 87   RDW 12.8 12.8 12.7        Chemistries:    Recent Labs  Lab 03/01/18  1455 03/02/18  0413    138   K 3.8 4.3    107   CO2 19* 21*   BUN 28* 25*   CREATININE 1.72* 1.3   CALCIUM 9.3 8.8   ALBUMIN 3.8 3.3*   PROT 6.3 6.2   BILITOT 0.4 0.4   ALKPHOS 67 56   ALT 29 20   AST 38 45*   MG  --  1.8   PHOS  --  3.4       All pertinent labs within the past 24 hours have been reviewed.    Significant Imaging:  I have reviewed and interpreted all pertinent imaging results/findings within the past 24 hours.

## 2018-03-03 NOTE — PROGRESS NOTES
[facility]  Critical Care - Surgery  Progress Note    Patient Name: Jamison Mayes  MRN: [id number]  Admission Date: 3/1/2018  Hospital Length of Stay: 2 days  Code Status: Full Code  Attending Provider: Brown Cortez MD  Primary Care Provider: Blaise Jorge MD   Principal Problem: <principal problem not specified>    Subjective:     Hospital/ICU Course:  3/2 - admitted to SICU, stable, plan for hepatectomy Monday    Interval History/Significant Events: NAEON.  Afebrile, vital signs stable.  Patient doing well.  Denies headache, fever/chills, chest pain, palpitations, cough, SOB, nausea, vomiting, diarrhea, constipation.    CBC monitored and stable q4H, diabetic diet until midnight before surgery Monday, CT chest per surgical oncology primary team for staging.  0.3cm SABINO nodule, small R pleural effusion from hepatic mass.      Follow-up For: Procedure(s) (LRB):  HEPATECTOMY (N/A)    Post-Operative Day:      Objective:     Vital Signs (Most Recent):  Temp: 99.7 °F (37.6 °C) (03/02/18 2300)  Pulse: 102 (03/03/18 0400)  Resp: (!) 30 (03/03/18 0400)  BP: 134/63 (03/03/18 0400)  SpO2: 95 % (03/03/18 0400) Vital Signs (24h Range):  Temp:  [98.7 °F (37.1 °C)-99.7 °F (37.6 °C)] 99.7 °F (37.6 °C)  Pulse:  [] 102  Resp:  [9-30] 30  SpO2:  [91 %-97 %] 95 %  BP: (115-170)/(55-75) 134/63     Weight: 86.7 kg (191 lb 2.2 oz)  Body mass index is 29.94 kg/m².      Intake/Output Summary (Last 24 hours) at 03/03/18 0502  Last data filed at 03/03/18 0200   Gross per 24 hour   Intake              725 ml   Output             2425 ml   Net            -1700 ml       Physical Exam   Constitutional: He is oriented to person, place, and time. He appears well-developed and well-nourished. No distress.   HENT:   Head: Normocephalic and atraumatic.   Eyes: Pupils are equal, round, and reactive to light.   Neck: Normal range of motion.   Cardiovascular: Normal rate and regular rhythm.    Pulmonary/Chest: Effort  normal and breath sounds normal.   Abdominal: Soft. Bowel sounds are normal. He exhibits no distension. There is tenderness.   Musculoskeletal: Normal range of motion.   Neurological: He is alert and oriented to person, place, and time.   Skin: Skin is warm and dry. He is not diaphoretic.   Psychiatric: He has a normal mood and affect. His behavior is normal. Thought content normal.       Vents:       Lines/Drains/Airways     Peripheral Intravenous Line                 Peripheral IV - Single Lumen 03/01/18 1438 Left Antecubital 1 day         Peripheral IV - Single Lumen 03/01/18 2356 Right Antecubital 1 day                Significant Labs:    CBC/Anemia Profile:    Recent Labs  Lab 03/02/18  1934 03/03/18  0030 03/03/18  0422   WBC 9.54 9.08 8.20   HGB 7.7* 8.0* 7.4*   HCT 22.2* 23.0* 21.9*    170 159   MCV 88 88 87   RDW 12.8 12.8 12.7        Chemistries:    Recent Labs  Lab 03/01/18  1455 03/02/18  0413    138   K 3.8 4.3    107   CO2 19* 21*   BUN 28* 25*   CREATININE 1.72* 1.3   CALCIUM 9.3 8.8   ALBUMIN 3.8 3.3*   PROT 6.3 6.2   BILITOT 0.4 0.4   ALKPHOS 67 56   ALT 29 20   AST 38 45*   MG  --  1.8   PHOS  --  3.4       All pertinent labs within the past 24 hours have been reviewed.    Significant Imaging:  I have reviewed and interpreted all pertinent imaging results/findings within the past 24 hours.    Assessment/Plan:     Hemoperitoneum    Plan    Neuro:   -AAO x 3  -Seizure disorder, pt reports no seizure in decades, weaned off phenytoin in 11/2017, pt would like to stay off phenytoin, monitor closely for seizure activity    Pulmonary:   -Saturating appropriately on RA  -CT chest w contrast for staging performed 3/3, small R pleural effusion and 0.3cm SABINO nodule    Cardiac:  -hemodynamically stable    Renal:   -HEATHER on admit, Cr 1.7, baseline around 0.9, likely due to hypotension on presentation treated with fluids.   -Pt had CT chest with contrast on 3/3/2018 0001  -Cr 1.0 on 3/3 0400,  stop maintenance fluids, pt eating.    Infectious Disease:   -No issues currently    Hematology/Oncology:  -Continue to monitor h/h q4 hrs, stable    Endocrine:  -DM, normally diet controlled, A1C 7.8 on admit  - on admission, continue SSI    Fluids/Electrolytes/Nutrition/GI:   -Replace lytes PRN  -Diabetic diet, pt eating/drinking well, stop maintenance fluids.  -Mild transaminitis upward trend, continue to monitor    Dispo:  -Possible stepdown from SICU, plan for hepatectomy on Monday           Critical care was time spent personally by me on the following activities: development of treatment plan with patient or surrogate and bedside caregivers, discussions with consultants, evaluation of patient's response to treatment, examination of patient, ordering and performing treatments and interventions, ordering and review of laboratory studies, ordering and review of radiographic studies, pulse oximetry, re-evaluation of patient's condition.  This critical care time did not overlap with that of any other provider or involve time for any procedures.     Alex Thorpe MD  Critical Care - Surgery  [facility]

## 2018-03-03 NOTE — ASSESSMENT & PLAN NOTE
72 yo M with rupture of left lobe liver mass, now stable    Will step down from ICU  CT of chest clear  Echo pending  Tumor markers pending  Regular diet for now  NPO at midnight on Sunday  Plan for resection of liver mass on Monday  Trend H/H  Encourage out of bed and ambulation

## 2018-03-03 NOTE — SUBJECTIVE & OBJECTIVE
Interval History: no further episodes of bleeding, h/h stable, feeling well, tolerating diet    Medications:  Continuous Infusions:  Scheduled Meds:   amLODIPine  10 mg Oral Daily    lovastatin  20 mg Oral QHS     PRN Meds:dextrose 50%, glucagon (human recombinant), insulin aspart U-100, magnesium oxide, magnesium oxide, naloxone, ondansetron, potassium chloride 10%, potassium chloride 10%, potassium chloride 10%, potassium, sodium phosphates, potassium, sodium phosphates, potassium, sodium phosphates     Review of patient's allergies indicates:  No Known Allergies  Objective:     Vital Signs (Most Recent):  Temp: 99 °F (37.2 °C) (03/03/18 0400)  Pulse: 100 (03/03/18 1030)  Resp: 20 (03/03/18 1030)  BP: 132/60 (03/03/18 0600)  SpO2: 98 % (03/03/18 1030) Vital Signs (24h Range):  Temp:  [98.7 °F (37.1 °C)-99.7 °F (37.6 °C)] 99 °F (37.2 °C)  Pulse:  [] 100  Resp:  [11-30] 20  SpO2:  [91 %-98 %] 98 %  BP: (121-169)/(55-72) 132/60     Weight: 86.5 kg (190 lb 11.2 oz)  Body mass index is 29.87 kg/m².    Intake/Output - Last 3 Shifts       03/01 0700 - 03/02 0659 03/02 0700 - 03/03 0659 03/03 0700 - 03/04 0659    P.O.  120     I.V. (mL/kg) 605 (7) 2694 (31.1)     Total Intake(mL/kg) 605 (7) 2814 (32.5)     Urine (mL/kg/hr) 200 2675 (1.3) 250 (0.5)    Stool   0 (0)    Total Output 200 2675 250    Net +405 +139 -250           Urine Occurrence  2 x     Stool Occurrence   1 x          Physical Exam   Constitutional: He is oriented to person, place, and time. He appears well-developed and well-nourished. No distress.   HENT:   Head: Normocephalic and atraumatic.   Eyes: Conjunctivae are normal. Right eye exhibits no discharge. Left eye exhibits no discharge. No scleral icterus.   Neck: Normal range of motion. Neck supple. No JVD present. No tracheal deviation present.   Cardiovascular: Normal rate and regular rhythm.    Pulmonary/Chest: Effort normal. No respiratory distress.   Abdominal: Soft. He exhibits no  distension. There is no tenderness.   Neurological: He is alert and oriented to person, place, and time.   Skin: Skin is warm and dry. No rash noted. He is not diaphoretic. No erythema.       Significant Labs:  CBC:   Recent Labs  Lab 03/03/18  0821   WBC 9.89   RBC 2.60*   HGB 7.8*   HCT 22.8*      MCV 88   MCH 30.0   MCHC 34.2     BMP:   Recent Labs  Lab 03/03/18  0422   *      K 4.1      CO2 25   BUN 17   CREATININE 1.0   CALCIUM 8.7   MG 1.5*       Significant Diagnostics:  I have reviewed and interpreted all pertinent imaging results/findings within the past 24 hours.

## 2018-03-03 NOTE — PROGRESS NOTES
[facility]  General Surgery  Progress Note    Subjective:     History of Present Illness:  72 yo M with h/o HTN, DM (not on insulin last A1c 11/17 7.1), seizure disorder presented to [facility] with pre-syncopal episode, epigastric abdominal pain. Symptoms started suddenly around 1 pm today. Had been active earlier in the day hanging drywall, denies trauma. Onset of symptoms associated with cold sweats. No nausea. CT noncon at OSH showed left lobe liver mass with associated rupture and hemoperitoneum. No contrast given 2/2 HEATHER (Cr 1.7). HDS at OSH. Denies known liver or kidney disease.     No previous abdominal surgeries.     Post-Op Info:  Procedure(s) (LRB):  HEPATECTOMY (N/A)         Interval History: no further episodes of bleeding, h/h stable, feeling well, tolerating diet    Medications:  Continuous Infusions:  Scheduled Meds:   amLODIPine  10 mg Oral Daily    lovastatin  20 mg Oral QHS     PRN Meds:dextrose 50%, glucagon (human recombinant), insulin aspart U-100, magnesium oxide, magnesium oxide, naloxone, ondansetron, potassium chloride 10%, potassium chloride 10%, potassium chloride 10%, potassium, sodium phosphates, potassium, sodium phosphates, potassium, sodium phosphates     Review of patient's allergies indicates:  No Known Allergies  Objective:     Vital Signs (Most Recent):  Temp: 99 °F (37.2 °C) (03/03/18 0400)  Pulse: 100 (03/03/18 1030)  Resp: 20 (03/03/18 1030)  BP: 132/60 (03/03/18 0600)  SpO2: 98 % (03/03/18 1030) Vital Signs (24h Range):  Temp:  [98.7 °F (37.1 °C)-99.7 °F (37.6 °C)] 99 °F (37.2 °C)  Pulse:  [] 100  Resp:  [11-30] 20  SpO2:  [91 %-98 %] 98 %  BP: (121-169)/(55-72) 132/60     Weight: 86.5 kg (190 lb 11.2 oz)  Body mass index is 29.87 kg/m².    Intake/Output - Last 3 Shifts       03/01 0700 - 03/02 0659 03/02 0700 - 03/03 0659 03/03 0700 - 03/04 0659    P.O.  120     I.V. (mL/kg) 605 (7) 2694 (31.1)     Total Intake(mL/kg) 605 (7) 2814 (32.5)      Urine (mL/kg/hr) 200 2675 (1.3) 250 (0.5)    Stool   0 (0)    Total Output 200 2675 250    Net +405 +139 -250           Urine Occurrence  2 x     Stool Occurrence   1 x          Physical Exam   Constitutional: He is oriented to person, place, and time. He appears well-developed and well-nourished. No distress.   HENT:   Head: Normocephalic and atraumatic.   Eyes: Conjunctivae are normal. Right eye exhibits no discharge. Left eye exhibits no discharge. No scleral icterus.   Neck: Normal range of motion. Neck supple. No JVD present. No tracheal deviation present.   Cardiovascular: Normal rate and regular rhythm.    Pulmonary/Chest: Effort normal. No respiratory distress.   Abdominal: Soft. He exhibits no distension. There is no tenderness.   Neurological: He is alert and oriented to person, place, and time.   Skin: Skin is warm and dry. No rash noted. He is not diaphoretic. No erythema.       Significant Labs:  CBC:   Recent Labs  Lab 03/03/18  0821   WBC 9.89   RBC 2.60*   HGB 7.8*   HCT 22.8*      MCV 88   MCH 30.0   MCHC 34.2     BMP:   Recent Labs  Lab 03/03/18  0422   *      K 4.1      CO2 25   BUN 17   CREATININE 1.0   CALCIUM 8.7   MG 1.5*       Significant Diagnostics:  I have reviewed and interpreted all pertinent imaging results/findings within the past 24 hours.    Assessment/Plan:     * Hemoperitoneum    72 yo M with rupture of left lobe liver mass, now stable    Will step down from ICU  CT of chest clear  Echo pending  Tumor markers pending  Regular diet for now  NPO at midnight on Sunday  Plan for resection of liver mass on Monday  Trend H/H  Encourage out of bed and ambulation              Ricardo Harris MD  General Surgery  [facility] [address] [facility]

## 2018-03-03 NOTE — ASSESSMENT & PLAN NOTE
Plan    Neuro:   -AAO x 3  -Seizure disorder, pt reports no seizure in decades, weaned off phenytoin in 11/2017, pt would like to stay off phenytoin, monitor closely for seizure activity    Pulmonary:   -Saturating appropriately on RA  -CT chest w contrast for staging performed 3/3, small R pleural effusion and 0.3cm SABINO nodule    Cardiac:  -hemodynamically stable    Renal:   -HEATHER on admit, Cr 1.7, baseline around 0.9, likely due to hypotension on presentation treated with fluids.   -Pt had CT chest with contrast on 3/3/2018 0001  -Cr 1.0 on 3/3 0400, stop maintenance fluids, pt eating.    Infectious Disease:   -No issues currently    Hematology/Oncology:  -Continue to monitor h/h q4 hrs, stable    Endocrine:  -DM, normally diet controlled, A1C 7.8 on admit  - on admission, continue SSI    Fluids/Electrolytes/Nutrition/GI:   -Replace lytes PRN  -Diabetic diet, pt eating/drinking well, stop maintenance fluids.  -Mild transaminitis upward trend, continue to monitor    Dispo:  -Possible stepdown from SICU, plan for hepatectomy on Monday

## 2018-03-04 PROBLEM — R16.0 LIVER MASS, LEFT LOBE: Status: ACTIVE | Noted: 2018-03-04

## 2018-03-04 LAB
ALBUMIN SERPL BCP-MCNC: 2.8 G/DL
ALP SERPL-CCNC: 55 U/L
ALT SERPL W/O P-5'-P-CCNC: 22 U/L
ANION GAP SERPL CALC-SCNC: 5 MMOL/L
APTT BLDCRRT: <21 SEC
AST SERPL-CCNC: 49 U/L
BASOPHILS # BLD AUTO: 0.01 K/UL
BASOPHILS # BLD AUTO: 0.02 K/UL
BASOPHILS # BLD AUTO: 0.04 K/UL
BASOPHILS NFR BLD: 0.1 %
BASOPHILS NFR BLD: 0.2 %
BASOPHILS NFR BLD: 0.3 %
BILIRUB SERPL-MCNC: 0.6 MG/DL
BLD PROD TYP BPU: NORMAL
BLD PROD TYP BPU: NORMAL
BLOOD UNIT EXPIRATION DATE: NORMAL
BLOOD UNIT EXPIRATION DATE: NORMAL
BLOOD UNIT TYPE CODE: 6200
BLOOD UNIT TYPE CODE: 6200
BLOOD UNIT TYPE: NORMAL
BLOOD UNIT TYPE: NORMAL
BUN SERPL-MCNC: 15 MG/DL
CALCIUM SERPL-MCNC: 8.4 MG/DL
CHLORIDE SERPL-SCNC: 107 MMOL/L
CO2 SERPL-SCNC: 27 MMOL/L
CODING SYSTEM: NORMAL
CODING SYSTEM: NORMAL
CREAT SERPL-MCNC: 1 MG/DL
DIFFERENTIAL METHOD: ABNORMAL
DISPENSE STATUS: NORMAL
DISPENSE STATUS: NORMAL
EOSINOPHIL # BLD AUTO: 0.1 K/UL
EOSINOPHIL NFR BLD: 0.5 %
EOSINOPHIL NFR BLD: 0.8 %
EOSINOPHIL NFR BLD: 0.8 %
ERYTHROCYTE [DISTWIDTH] IN BLOOD BY AUTOMATED COUNT: 12.5 %
ERYTHROCYTE [DISTWIDTH] IN BLOOD BY AUTOMATED COUNT: 12.7 %
ERYTHROCYTE [DISTWIDTH] IN BLOOD BY AUTOMATED COUNT: 12.7 %
EST. GFR  (AFRICAN AMERICAN): >60 ML/MIN/1.73 M^2
EST. GFR  (NON AFRICAN AMERICAN): >60 ML/MIN/1.73 M^2
GLUCOSE SERPL-MCNC: 174 MG/DL
HCT VFR BLD AUTO: 20.3 %
HCT VFR BLD AUTO: 21 %
HCT VFR BLD AUTO: 28.9 %
HGB BLD-MCNC: 10.4 G/DL
HGB BLD-MCNC: 7 G/DL
HGB BLD-MCNC: 7.2 G/DL
IMM GRANULOCYTES # BLD AUTO: 0.03 K/UL
IMM GRANULOCYTES # BLD AUTO: 0.05 K/UL
IMM GRANULOCYTES # BLD AUTO: 0.05 K/UL
IMM GRANULOCYTES NFR BLD AUTO: 0.3 %
IMM GRANULOCYTES NFR BLD AUTO: 0.4 %
IMM GRANULOCYTES NFR BLD AUTO: 0.5 %
INR PPP: 0.9
LYMPHOCYTES # BLD AUTO: 2 K/UL
LYMPHOCYTES # BLD AUTO: 2 K/UL
LYMPHOCYTES # BLD AUTO: 2.3 K/UL
LYMPHOCYTES NFR BLD: 15 %
LYMPHOCYTES NFR BLD: 20.2 %
LYMPHOCYTES NFR BLD: 23.3 %
MAGNESIUM SERPL-MCNC: 1.6 MG/DL
MCH RBC QN AUTO: 29.9 PG
MCH RBC QN AUTO: 30.1 PG
MCH RBC QN AUTO: 30.3 PG
MCHC RBC AUTO-ENTMCNC: 34.3 G/DL
MCHC RBC AUTO-ENTMCNC: 34.5 G/DL
MCHC RBC AUTO-ENTMCNC: 36 G/DL
MCV RBC AUTO: 84 FL
MCV RBC AUTO: 87 FL
MCV RBC AUTO: 88 FL
MONOCYTES # BLD AUTO: 0.9 K/UL
MONOCYTES # BLD AUTO: 1 K/UL
MONOCYTES # BLD AUTO: 1 K/UL
MONOCYTES NFR BLD: 7.6 %
MONOCYTES NFR BLD: 8.6 %
MONOCYTES NFR BLD: 9.5 %
NEUTROPHILS # BLD AUTO: 10.1 K/UL
NEUTROPHILS # BLD AUTO: 6.6 K/UL
NEUTROPHILS # BLD AUTO: 7 K/UL
NEUTROPHILS NFR BLD: 66.7 %
NEUTROPHILS NFR BLD: 69.3 %
NEUTROPHILS NFR BLD: 75.9 %
NRBC BLD-RTO: 0 /100 WBC
PHOSPHATE SERPL-MCNC: 2.5 MG/DL
PLATELET # BLD AUTO: 156 K/UL
PLATELET # BLD AUTO: 159 K/UL
PLATELET # BLD AUTO: 159 K/UL
PMV BLD AUTO: 10 FL
PMV BLD AUTO: 10.2 FL
PMV BLD AUTO: 10.4 FL
POCT GLUCOSE: 197 MG/DL (ref 70–110)
POCT GLUCOSE: 204 MG/DL (ref 70–110)
POCT GLUCOSE: 204 MG/DL (ref 70–110)
POCT GLUCOSE: 230 MG/DL (ref 70–110)
POTASSIUM SERPL-SCNC: 4.1 MMOL/L
PROT SERPL-MCNC: 5.8 G/DL
PROTHROMBIN TIME: 9.9 SEC
RBC # BLD AUTO: 2.34 M/UL
RBC # BLD AUTO: 2.38 M/UL
RBC # BLD AUTO: 3.46 M/UL
SODIUM SERPL-SCNC: 139 MMOL/L
TRANS ERYTHROCYTES VOL PATIENT: NORMAL ML
TRANS ERYTHROCYTES VOL PATIENT: NORMAL ML
WBC # BLD AUTO: 10.09 K/UL
WBC # BLD AUTO: 13.23 K/UL
WBC # BLD AUTO: 9.86 K/UL

## 2018-03-04 PROCEDURE — 99231 SBSQ HOSP IP/OBS SF/LOW 25: CPT | Mod: GC,,, | Performed by: SURGERY

## 2018-03-04 PROCEDURE — 25000003 PHARM REV CODE 250: Performed by: SURGERY

## 2018-03-04 PROCEDURE — 36430 TRANSFUSION BLD/BLD COMPNT: CPT

## 2018-03-04 PROCEDURE — 85025 COMPLETE CBC W/AUTO DIFF WBC: CPT | Mod: 91

## 2018-03-04 PROCEDURE — 85730 THROMBOPLASTIN TIME PARTIAL: CPT

## 2018-03-04 PROCEDURE — P9021 RED BLOOD CELLS UNIT: HCPCS

## 2018-03-04 PROCEDURE — 80053 COMPREHEN METABOLIC PANEL: CPT

## 2018-03-04 PROCEDURE — 25000003 PHARM REV CODE 250: Performed by: STUDENT IN AN ORGANIZED HEALTH CARE EDUCATION/TRAINING PROGRAM

## 2018-03-04 PROCEDURE — 63600175 PHARM REV CODE 636 W HCPCS: Performed by: SURGERY

## 2018-03-04 PROCEDURE — 84100 ASSAY OF PHOSPHORUS: CPT

## 2018-03-04 PROCEDURE — 36415 COLL VENOUS BLD VENIPUNCTURE: CPT

## 2018-03-04 PROCEDURE — 83735 ASSAY OF MAGNESIUM: CPT

## 2018-03-04 PROCEDURE — 20600001 HC STEP DOWN PRIVATE ROOM

## 2018-03-04 PROCEDURE — 85610 PROTHROMBIN TIME: CPT

## 2018-03-04 RX ORDER — HYDROCODONE BITARTRATE AND ACETAMINOPHEN 500; 5 MG/1; MG/1
TABLET ORAL
Status: DISCONTINUED | OUTPATIENT
Start: 2018-03-04 | End: 2018-03-05

## 2018-03-04 RX ORDER — FUROSEMIDE 10 MG/ML
40 INJECTION INTRAMUSCULAR; INTRAVENOUS ONCE
Status: COMPLETED | OUTPATIENT
Start: 2018-03-04 | End: 2018-03-04

## 2018-03-04 RX ORDER — CEFOXITIN 2 G/1
2 INJECTION, POWDER, FOR SOLUTION INTRAVENOUS
Status: DISCONTINUED | OUTPATIENT
Start: 2018-03-04 | End: 2018-03-05

## 2018-03-04 RX ORDER — ENOXAPARIN SODIUM 100 MG/ML
40 INJECTION SUBCUTANEOUS EVERY 24 HOURS
Status: DISCONTINUED | OUTPATIENT
Start: 2018-03-05 | End: 2018-03-05

## 2018-03-04 RX ADMIN — FUROSEMIDE 40 MG: 10 INJECTION, SOLUTION INTRAMUSCULAR; INTRAVENOUS at 07:03

## 2018-03-04 RX ADMIN — INSULIN ASPART 2 UNITS: 100 INJECTION, SOLUTION INTRAVENOUS; SUBCUTANEOUS at 01:03

## 2018-03-04 RX ADMIN — INSULIN ASPART 2 UNITS: 100 INJECTION, SOLUTION INTRAVENOUS; SUBCUTANEOUS at 08:03

## 2018-03-04 RX ADMIN — INSULIN ASPART 2 UNITS: 100 INJECTION, SOLUTION INTRAVENOUS; SUBCUTANEOUS at 06:03

## 2018-03-04 RX ADMIN — LOVASTATIN 20 MG: 20 TABLET ORAL at 09:03

## 2018-03-04 RX ADMIN — AMLODIPINE BESYLATE 10 MG: 10 TABLET ORAL at 08:03

## 2018-03-04 NOTE — PLAN OF CARE
Problem: Patient Care Overview  Goal: Plan of Care Review  Outcome: Ongoing (interventions implemented as appropriate)  1 liter normal saline bolus given overnight for hypotension. Tmax 100.6. Experiences dyspnea on exertion. SpO2 >95% on room air, lung sounds clear bilaterally. Telemetry monitoring, normal sinus rhythm. H&H continues to trend downward, CBC being collected every 4 hours. Discussed results with Dr. Cain. Denies pain/nausea. Abdomen slightly distended. Tolerating diabetic diet, eating 100% of meals. Blood sugar checked every 6 hours, SSI coverage given. Patient rested well overnight. Understands and agrees with plan of care. Instructed to call for assistance as needed, call light in reach and spouse at bedside. Bed in lowest position and brake set, frequent rounds made for patient's safety. See flowsheets for detailed assessment. White board in patient's room updated accordingly. Continuing to monitor.

## 2018-03-04 NOTE — ASSESSMENT & PLAN NOTE
74 yo M with rupture of left lobe liver mass, now stable      CT of chest clear  Echo no major issues  Tumor markers negative  Regular diet for now  NPO at midnight on Sunday  Plan for resection of liver mass on Monday  Trend H/H, will transfuse 2 units now, prepare 4 for OR,  Lasix after blood, HLIV tonight for CVP control  Encourage out of bed and ambulation

## 2018-03-04 NOTE — SUBJECTIVE & OBJECTIVE
Interval History: h/h stable, no pain, feeling well, tolerating diet    Medications:  Continuous Infusions:  Scheduled Meds:   amLODIPine  10 mg Oral Daily    furosemide  40 mg Intravenous Once    lovastatin  20 mg Oral QHS     PRN Meds:sodium chloride, dextrose 50%, glucagon (human recombinant), insulin aspart U-100, naloxone, ondansetron     Review of patient's allergies indicates:  No Known Allergies  Objective:     Vital Signs (Most Recent):  Temp: 98.9 °F (37.2 °C) (03/04/18 1158)  Pulse: 95 (03/04/18 1158)  Resp: 16 (03/04/18 1158)  BP: 131/60 (03/04/18 1158)  SpO2: 95 % (03/04/18 1158) Vital Signs (24h Range):  Temp:  [97 °F (36.1 °C)-100.5 °F (38.1 °C)] 98.9 °F (37.2 °C)  Pulse:  [] 95  Resp:  [16-26] 16  SpO2:  [95 %-99 %] 95 %  BP: (106-138)/(53-67) 131/60     Weight: 86.5 kg (190 lb 11.2 oz)  Body mass index is 29.87 kg/m².    Intake/Output - Last 3 Shifts       03/02 0700 - 03/03 0659 03/03 0700 - 03/04 0659 03/04 0700 - 03/05 0659    P.O. 120 150 360    I.V. (mL/kg) 2694 (31.1)      IV Piggyback  1000     Total Intake(mL/kg) 2814 (32.5) 1150 (13.3) 360 (4.2)    Urine (mL/kg/hr) 2675 (1.3) 1600 (0.8) 250 (0.5)    Stool  0 (0)     Total Output 2675 1600 250    Net +139 -450 +110           Urine Occurrence 2 x 1 x     Stool Occurrence  2 x           Physical Exam   Constitutional: He is oriented to person, place, and time. He appears well-developed and well-nourished. No distress.   HENT:   Head: Normocephalic and atraumatic.   Eyes: Conjunctivae are normal. Right eye exhibits no discharge. Left eye exhibits no discharge. No scleral icterus.   Neck: Normal range of motion. Neck supple. No JVD present. No tracheal deviation present.   Cardiovascular: Normal rate and regular rhythm.    Pulmonary/Chest: Effort normal. No respiratory distress.   Abdominal: Soft. He exhibits no distension.   Neurological: He is alert and oriented to person, place, and time.   Skin: Skin is warm and dry. No rash noted.  He is not diaphoretic. No erythema.       Significant Labs:  CBC:   Recent Labs  Lab 03/04/18  0444   WBC 9.86   RBC 2.38*   HGB 7.2*   HCT 21.0*      MCV 88   MCH 30.3   MCHC 34.3     BMP:   Recent Labs  Lab 03/04/18  0444   *      K 4.1      CO2 27   BUN 15   CREATININE 1.0   CALCIUM 8.4*   MG 1.6       Significant Diagnostics:  I have reviewed and interpreted all pertinent imaging results/findings within the past 24 hours.

## 2018-03-04 NOTE — PROGRESS NOTES
[facility]  General Surgery  Progress Note    Subjective:     History of Present Illness:  74 yo M with h/o HTN, DM (not on insulin last A1c 11/17 7.1), seizure disorder presented to [facility] with pre-syncopal episode, epigastric abdominal pain. Symptoms started suddenly around 1 pm today. Had been active earlier in the day hanging drywall, denies trauma. Onset of symptoms associated with cold sweats. No nausea. CT noncon at OSH showed left lobe liver mass with associated rupture and hemoperitoneum. No contrast given 2/2 HEATHER (Cr 1.7). HDS at OSH. Denies known liver or kidney disease.     No previous abdominal surgeries.     Post-Op Info:  Procedure(s) (LRB):  HEPATECTOMY (N/A)         Interval History: h/h stable, no pain, feeling well, tolerating diet    Medications:  Continuous Infusions:  Scheduled Meds:   amLODIPine  10 mg Oral Daily    furosemide  40 mg Intravenous Once    lovastatin  20 mg Oral QHS     PRN Meds:sodium chloride, dextrose 50%, glucagon (human recombinant), insulin aspart U-100, naloxone, ondansetron     Review of patient's allergies indicates:  No Known Allergies  Objective:     Vital Signs (Most Recent):  Temp: 98.9 °F (37.2 °C) (03/04/18 1158)  Pulse: 95 (03/04/18 1158)  Resp: 16 (03/04/18 1158)  BP: 131/60 (03/04/18 1158)  SpO2: 95 % (03/04/18 1158) Vital Signs (24h Range):  Temp:  [97 °F (36.1 °C)-100.5 °F (38.1 °C)] 98.9 °F (37.2 °C)  Pulse:  [] 95  Resp:  [16-26] 16  SpO2:  [95 %-99 %] 95 %  BP: (106-138)/(53-67) 131/60     Weight: 86.5 kg (190 lb 11.2 oz)  Body mass index is 29.87 kg/m².    Intake/Output - Last 3 Shifts       03/02 0700 - 03/03 0659 03/03 0700 - 03/04 0659 03/04 0700 - 03/05 0659    P.O. 120 150 360    I.V. (mL/kg) 2694 (31.1)      IV Piggyback  1000     Total Intake(mL/kg) 2814 (32.5) 1150 (13.3) 360 (4.2)    Urine (mL/kg/hr) 2675 (1.3) 1600 (0.8) 250 (0.5)    Stool  0 (0)     Total Output 2675 1600 250    Net +139 -450 +110            Urine Occurrence 2 x 1 x     Stool Occurrence  2 x           Physical Exam   Constitutional: He is oriented to person, place, and time. He appears well-developed and well-nourished. No distress.   HENT:   Head: Normocephalic and atraumatic.   Eyes: Conjunctivae are normal. Right eye exhibits no discharge. Left eye exhibits no discharge. No scleral icterus.   Neck: Normal range of motion. Neck supple. No JVD present. No tracheal deviation present.   Cardiovascular: Normal rate and regular rhythm.    Pulmonary/Chest: Effort normal. No respiratory distress.   Abdominal: Soft. He exhibits no distension.   Neurological: He is alert and oriented to person, place, and time.   Skin: Skin is warm and dry. No rash noted. He is not diaphoretic. No erythema.       Significant Labs:  CBC:   Recent Labs  Lab 03/04/18  0444   WBC 9.86   RBC 2.38*   HGB 7.2*   HCT 21.0*      MCV 88   MCH 30.3   MCHC 34.3     BMP:   Recent Labs  Lab 03/04/18  0444   *      K 4.1      CO2 27   BUN 15   CREATININE 1.0   CALCIUM 8.4*   MG 1.6       Significant Diagnostics:  I have reviewed and interpreted all pertinent imaging results/findings within the past 24 hours.    Assessment/Plan:     * Hemoperitoneum    74 yo M with rupture of left lobe liver mass, now stable      CT of chest clear  Echo no major issues  Tumor markers negative  Regular diet for now  NPO at midnight on Sunday  Plan for resection of liver mass on Monday  Trend H/H, will transfuse 2 units now, prepare 4 for OR,  Lasix after blood, HLIV tonight for CVP control  Encourage out of bed and ambulation              Ricardo Harris MD  General Surgery  [facility]

## 2018-03-04 NOTE — NURSING TRANSFER
Nursing Transfer Note      3/3/2018   CINTHIA Saravia    Transfer To: 640    Transfer via wheelchair    Transfer with cardiac monitoring    Transported by KIM Quinteros RN    Medicines sent: Insulin    Chart send with patient: Yes    Notified: spouse    Patient reassessed at: 03/03/18, 1550    Upon arrival to floor: cardiac monitor applied, patient oriented to room, call bell in reach and bed in lowest position

## 2018-03-05 ENCOUNTER — SURGERY (OUTPATIENT)
Age: 73
End: 2018-03-05

## 2018-03-05 ENCOUNTER — ANESTHESIA (OUTPATIENT)
Dept: SURGERY | Facility: HOSPITAL | Age: 73
DRG: 405 | End: 2018-03-05
Payer: MEDICARE

## 2018-03-05 LAB
ABO + RH BLD: NORMAL
ALBUMIN SERPL BCP-MCNC: 2.5 G/DL
ALBUMIN SERPL BCP-MCNC: 3.2 G/DL
ALP SERPL-CCNC: 51 U/L
ALP SERPL-CCNC: 63 U/L
ALT SERPL W/O P-5'-P-CCNC: 123 U/L
ALT SERPL W/O P-5'-P-CCNC: 23 U/L
ANION GAP SERPL CALC-SCNC: 11 MMOL/L
ANION GAP SERPL CALC-SCNC: 9 MMOL/L
APTT BLDCRRT: 21.6 SEC
AST SERPL-CCNC: 274 U/L
AST SERPL-CCNC: 43 U/L
BASOPHILS # BLD AUTO: 0.03 K/UL
BASOPHILS NFR BLD: 0.2 %
BASOPHILS NFR BLD: 0.3 %
BASOPHILS NFR BLD: 0.3 %
BILIRUB SERPL-MCNC: 0.9 MG/DL
BILIRUB SERPL-MCNC: 1 MG/DL
BLD GP AB SCN CELLS X3 SERPL QL: NORMAL
BUN SERPL-MCNC: 17 MG/DL
BUN SERPL-MCNC: 20 MG/DL
CALCIUM SERPL-MCNC: 7.4 MG/DL
CALCIUM SERPL-MCNC: 9.1 MG/DL
CHLORIDE SERPL-SCNC: 103 MMOL/L
CHLORIDE SERPL-SCNC: 107 MMOL/L
CO2 SERPL-SCNC: 21 MMOL/L
CO2 SERPL-SCNC: 26 MMOL/L
CREAT SERPL-MCNC: 1.1 MG/DL
CREAT SERPL-MCNC: 1.4 MG/DL
DIFFERENTIAL METHOD: ABNORMAL
EOSINOPHIL # BLD AUTO: 0 K/UL
EOSINOPHIL # BLD AUTO: 0.2 K/UL
EOSINOPHIL # BLD AUTO: 0.2 K/UL
EOSINOPHIL NFR BLD: 0.1 %
EOSINOPHIL NFR BLD: 1.5 %
EOSINOPHIL NFR BLD: 1.7 %
ERYTHROCYTE [DISTWIDTH] IN BLOOD BY AUTOMATED COUNT: 12.8 %
ERYTHROCYTE [DISTWIDTH] IN BLOOD BY AUTOMATED COUNT: 13 %
ERYTHROCYTE [DISTWIDTH] IN BLOOD BY AUTOMATED COUNT: 13.1 %
EST. GFR  (AFRICAN AMERICAN): 57.6 ML/MIN/1.73 M^2
EST. GFR  (AFRICAN AMERICAN): >60 ML/MIN/1.73 M^2
EST. GFR  (NON AFRICAN AMERICAN): 49.8 ML/MIN/1.73 M^2
EST. GFR  (NON AFRICAN AMERICAN): >60 ML/MIN/1.73 M^2
GLUCOSE SERPL-MCNC: 175 MG/DL
GLUCOSE SERPL-MCNC: 249 MG/DL
GLUCOSE SERPL-MCNC: 277 MG/DL (ref 70–110)
HCO3 UR-SCNC: 20.2 MMOL/L (ref 24–28)
HCT VFR BLD AUTO: 28.4 %
HCT VFR BLD AUTO: 29.4 %
HCT VFR BLD AUTO: 29.6 %
HCT VFR BLD CALC: 22 %PCV (ref 36–54)
HGB BLD-MCNC: 10.1 G/DL
HGB BLD-MCNC: 10.4 G/DL
HGB BLD-MCNC: 10.5 G/DL
IMM GRANULOCYTES # BLD AUTO: 0.04 K/UL
IMM GRANULOCYTES # BLD AUTO: 0.04 K/UL
IMM GRANULOCYTES # BLD AUTO: 0.1 K/UL
IMM GRANULOCYTES NFR BLD AUTO: 0.4 %
IMM GRANULOCYTES NFR BLD AUTO: 0.4 %
IMM GRANULOCYTES NFR BLD AUTO: 0.6 %
INR PPP: 0.9
INR PPP: 1
LYMPHOCYTES # BLD AUTO: 1.2 K/UL
LYMPHOCYTES # BLD AUTO: 1.9 K/UL
LYMPHOCYTES # BLD AUTO: 1.9 K/UL
LYMPHOCYTES NFR BLD: 18.6 %
LYMPHOCYTES NFR BLD: 19.2 %
LYMPHOCYTES NFR BLD: 7.1 %
MAGNESIUM SERPL-MCNC: 1.8 MG/DL
MAGNESIUM SERPL-MCNC: 1.8 MG/DL
MAGNESIUM SERPL-MCNC: 1.9 MG/DL
MCH RBC QN AUTO: 29.9 PG
MCH RBC QN AUTO: 30.1 PG
MCH RBC QN AUTO: 30.3 PG
MCHC RBC AUTO-ENTMCNC: 35.4 G/DL
MCHC RBC AUTO-ENTMCNC: 35.5 G/DL
MCHC RBC AUTO-ENTMCNC: 35.6 G/DL
MCV RBC AUTO: 84 FL
MCV RBC AUTO: 85 FL
MCV RBC AUTO: 85 FL
MONOCYTES # BLD AUTO: 0.8 K/UL
MONOCYTES # BLD AUTO: 0.8 K/UL
MONOCYTES # BLD AUTO: 1.2 K/UL
MONOCYTES NFR BLD: 7.2 %
MONOCYTES NFR BLD: 7.8 %
MONOCYTES NFR BLD: 8.2 %
NEUTROPHILS # BLD AUTO: 14.4 K/UL
NEUTROPHILS # BLD AUTO: 7.1 K/UL
NEUTROPHILS # BLD AUTO: 7.3 K/UL
NEUTROPHILS NFR BLD: 70.8 %
NEUTROPHILS NFR BLD: 70.8 %
NEUTROPHILS NFR BLD: 84.8 %
NRBC BLD-RTO: 0 /100 WBC
PCO2 BLDA: 33.2 MMHG (ref 35–45)
PH SMN: 7.39 [PH] (ref 7.35–7.45)
PHOSPHATE SERPL-MCNC: 2.6 MG/DL
PHOSPHATE SERPL-MCNC: 2.6 MG/DL
PHOSPHATE SERPL-MCNC: 3.3 MG/DL
PLATELET # BLD AUTO: 174 K/UL
PLATELET # BLD AUTO: 174 K/UL
PLATELET # BLD AUTO: 218 K/UL
PMV BLD AUTO: 10 FL
PMV BLD AUTO: 10.3 FL
PMV BLD AUTO: 9.9 FL
PO2 BLDA: 343 MMHG (ref 80–100)
POC BE: -5 MMOL/L
POC IONIZED CALCIUM: 0.95 MMOL/L (ref 1.06–1.42)
POC SATURATED O2: 100 % (ref 95–100)
POC TCO2: 21 MMOL/L (ref 23–27)
POCT GLUCOSE: 181 MG/DL (ref 70–110)
POCT GLUCOSE: 200 MG/DL (ref 70–110)
POCT GLUCOSE: 311 MG/DL (ref 70–110)
POTASSIUM BLD-SCNC: 4 MMOL/L (ref 3.5–5.1)
POTASSIUM SERPL-SCNC: 4 MMOL/L
POTASSIUM SERPL-SCNC: 4.3 MMOL/L
PROT SERPL-MCNC: 5.1 G/DL
PROT SERPL-MCNC: 6.6 G/DL
PROTHROMBIN TIME: 10.9 SEC
PROTHROMBIN TIME: 9.6 SEC
RBC # BLD AUTO: 3.33 M/UL
RBC # BLD AUTO: 3.46 M/UL
RBC # BLD AUTO: 3.51 M/UL
SAMPLE: ABNORMAL
SODIUM BLD-SCNC: 139 MMOL/L (ref 136–145)
SODIUM SERPL-SCNC: 138 MMOL/L
SODIUM SERPL-SCNC: 139 MMOL/L
WBC # BLD AUTO: 10 K/UL
WBC # BLD AUTO: 10.25 K/UL
WBC # BLD AUTO: 17.01 K/UL

## 2018-03-05 PROCEDURE — 63600175 PHARM REV CODE 636 W HCPCS: Performed by: SURGERY

## 2018-03-05 PROCEDURE — 86920 COMPATIBILITY TEST SPIN: CPT

## 2018-03-05 PROCEDURE — 80053 COMPREHEN METABOLIC PANEL: CPT

## 2018-03-05 PROCEDURE — 85025 COMPLETE CBC W/AUTO DIFF WBC: CPT | Mod: 91

## 2018-03-05 PROCEDURE — 94761 N-INVAS EAR/PLS OXIMETRY MLT: CPT

## 2018-03-05 PROCEDURE — 12000002 HC ACUTE/MED SURGE SEMI-PRIVATE ROOM

## 2018-03-05 PROCEDURE — 36000709 HC OR TIME LEV III EA ADD 15 MIN: Performed by: SURGERY

## 2018-03-05 PROCEDURE — 25000003 PHARM REV CODE 250: Performed by: SURGERY

## 2018-03-05 PROCEDURE — 84100 ASSAY OF PHOSPHORUS: CPT | Mod: 91

## 2018-03-05 PROCEDURE — 36620 INSERTION CATHETER ARTERY: CPT | Mod: 59,,, | Performed by: ANESTHESIOLOGY

## 2018-03-05 PROCEDURE — 88304 TISSUE EXAM BY PATHOLOGIST: CPT | Mod: 26,,, | Performed by: PATHOLOGY

## 2018-03-05 PROCEDURE — 0FB20ZZ EXCISION OF LEFT LOBE LIVER, OPEN APPROACH: ICD-10-PCS | Performed by: SURGERY

## 2018-03-05 PROCEDURE — 25000003 PHARM REV CODE 250: Performed by: STUDENT IN AN ORGANIZED HEALTH CARE EDUCATION/TRAINING PROGRAM

## 2018-03-05 PROCEDURE — 85610 PROTHROMBIN TIME: CPT

## 2018-03-05 PROCEDURE — 88331 PATH CONSLTJ SURG 1 BLK 1SPC: CPT | Mod: 26,,, | Performed by: PATHOLOGY

## 2018-03-05 PROCEDURE — 82962 GLUCOSE BLOOD TEST: CPT | Performed by: SURGERY

## 2018-03-05 PROCEDURE — 83735 ASSAY OF MAGNESIUM: CPT | Mod: 91

## 2018-03-05 PROCEDURE — 37000009 HC ANESTHESIA EA ADD 15 MINS: Performed by: SURGERY

## 2018-03-05 PROCEDURE — 84100 ASSAY OF PHOSPHORUS: CPT

## 2018-03-05 PROCEDURE — 27201423 OPTIME MED/SURG SUP & DEVICES STERILE SUPPLY: Performed by: SURGERY

## 2018-03-05 PROCEDURE — 86850 RBC ANTIBODY SCREEN: CPT

## 2018-03-05 PROCEDURE — 36000708 HC OR TIME LEV III 1ST 15 MIN: Performed by: SURGERY

## 2018-03-05 PROCEDURE — 63600175 PHARM REV CODE 636 W HCPCS: Performed by: NURSE ANESTHETIST, CERTIFIED REGISTERED

## 2018-03-05 PROCEDURE — D9220A PRA ANESTHESIA: Mod: ANES,,, | Performed by: ANESTHESIOLOGY

## 2018-03-05 PROCEDURE — 80053 COMPREHEN METABOLIC PANEL: CPT | Mod: 91

## 2018-03-05 PROCEDURE — 37000008 HC ANESTHESIA 1ST 15 MINUTES: Performed by: SURGERY

## 2018-03-05 PROCEDURE — 85730 THROMBOPLASTIN TIME PARTIAL: CPT

## 2018-03-05 PROCEDURE — 88307 TISSUE EXAM BY PATHOLOGIST: CPT | Mod: 26,,, | Performed by: PATHOLOGY

## 2018-03-05 PROCEDURE — 88307 TISSUE EXAM BY PATHOLOGIST: CPT | Performed by: PATHOLOGY

## 2018-03-05 PROCEDURE — 88313 SPECIAL STAINS GROUP 2: CPT | Mod: 26,,, | Performed by: PATHOLOGY

## 2018-03-05 PROCEDURE — 83735 ASSAY OF MAGNESIUM: CPT

## 2018-03-05 PROCEDURE — C9290 INJ, BUPIVACAINE LIPOSOME: HCPCS | Performed by: SURGERY

## 2018-03-05 PROCEDURE — 47125 PARTIAL REMOVAL OF LIVER: CPT | Mod: GC,,, | Performed by: SURGERY

## 2018-03-05 PROCEDURE — 71000033 HC RECOVERY, INTIAL HOUR: Performed by: SURGERY

## 2018-03-05 PROCEDURE — D9220A PRA ANESTHESIA: Mod: CRNA,,, | Performed by: NURSE ANESTHETIST, CERTIFIED REGISTERED

## 2018-03-05 PROCEDURE — 36415 COLL VENOUS BLD VENIPUNCTURE: CPT

## 2018-03-05 PROCEDURE — 76998 US GUIDE INTRAOP: CPT | Mod: 26,GC,, | Performed by: SURGERY

## 2018-03-05 PROCEDURE — 71000039 HC RECOVERY, EACH ADD'L HOUR: Performed by: SURGERY

## 2018-03-05 PROCEDURE — 85610 PROTHROMBIN TIME: CPT | Mod: 91

## 2018-03-05 PROCEDURE — 25000003 PHARM REV CODE 250: Performed by: NURSE ANESTHETIST, CERTIFIED REGISTERED

## 2018-03-05 PROCEDURE — 0FT40ZZ RESECTION OF GALLBLADDER, OPEN APPROACH: ICD-10-PCS | Performed by: SURGERY

## 2018-03-05 PROCEDURE — 36556 INSERT NON-TUNNEL CV CATH: CPT | Mod: 59,,, | Performed by: ANESTHESIOLOGY

## 2018-03-05 RX ORDER — PANTOPRAZOLE SODIUM 40 MG/10ML
40 INJECTION, POWDER, LYOPHILIZED, FOR SOLUTION INTRAVENOUS DAILY
Status: DISCONTINUED | OUTPATIENT
Start: 2018-03-06 | End: 2018-03-07

## 2018-03-05 RX ORDER — FENTANYL CITRATE 50 UG/ML
INJECTION, SOLUTION INTRAMUSCULAR; INTRAVENOUS
Status: DISCONTINUED | OUTPATIENT
Start: 2018-03-05 | End: 2018-03-05

## 2018-03-05 RX ORDER — MIDAZOLAM HYDROCHLORIDE 1 MG/ML
INJECTION INTRAMUSCULAR; INTRAVENOUS
Status: DISCONTINUED | OUTPATIENT
Start: 2018-03-05 | End: 2018-03-05

## 2018-03-05 RX ORDER — ONDANSETRON 2 MG/ML
INJECTION INTRAMUSCULAR; INTRAVENOUS
Status: DISCONTINUED | OUTPATIENT
Start: 2018-03-05 | End: 2018-03-05

## 2018-03-05 RX ORDER — PROPOFOL 10 MG/ML
VIAL (ML) INTRAVENOUS
Status: DISCONTINUED | OUTPATIENT
Start: 2018-03-05 | End: 2018-03-05

## 2018-03-05 RX ORDER — NALOXONE HCL 0.4 MG/ML
0.02 VIAL (ML) INJECTION
Status: DISCONTINUED | OUTPATIENT
Start: 2018-03-05 | End: 2018-03-06

## 2018-03-05 RX ORDER — SODIUM CHLORIDE 9 MG/ML
INJECTION, SOLUTION INTRAVENOUS CONTINUOUS
Status: DISCONTINUED | OUTPATIENT
Start: 2018-03-05 | End: 2018-03-06

## 2018-03-05 RX ORDER — LEVALBUTEROL INHALATION SOLUTION 0.63 MG/3ML
0.63 SOLUTION RESPIRATORY (INHALATION)
Status: DISPENSED | OUTPATIENT
Start: 2018-03-06 | End: 2018-03-08

## 2018-03-05 RX ORDER — AMOXICILLIN 250 MG
2 CAPSULE ORAL NIGHTLY
Status: DISCONTINUED | OUTPATIENT
Start: 2018-03-05 | End: 2018-03-06

## 2018-03-05 RX ORDER — BUPIVACAINE HYDROCHLORIDE 2.5 MG/ML
INJECTION, SOLUTION EPIDURAL; INFILTRATION; INTRACAUDAL
Status: DISCONTINUED | OUTPATIENT
Start: 2018-03-05 | End: 2018-03-05

## 2018-03-05 RX ORDER — LIDOCAINE HCL/PF 100 MG/5ML
SYRINGE (ML) INTRAVENOUS
Status: DISCONTINUED | OUTPATIENT
Start: 2018-03-05 | End: 2018-03-05

## 2018-03-05 RX ORDER — MORPHINE SULFATE 1 MG/ML
INJECTION INTRAVENOUS CONTINUOUS
Status: DISCONTINUED | OUTPATIENT
Start: 2018-03-05 | End: 2018-03-06

## 2018-03-05 RX ORDER — SODIUM CHLORIDE 9 MG/ML
INJECTION, SOLUTION INTRAVENOUS CONTINUOUS
Status: DISCONTINUED | OUTPATIENT
Start: 2018-03-05 | End: 2018-03-05

## 2018-03-05 RX ORDER — DIPHENHYDRAMINE HCL 25 MG
25 CAPSULE ORAL EVERY 4 HOURS PRN
Status: DISCONTINUED | OUTPATIENT
Start: 2018-03-05 | End: 2018-03-06

## 2018-03-05 RX ORDER — ENOXAPARIN SODIUM 100 MG/ML
40 INJECTION SUBCUTANEOUS EVERY 24 HOURS
Status: DISCONTINUED | OUTPATIENT
Start: 2018-03-06 | End: 2018-03-09 | Stop reason: HOSPADM

## 2018-03-05 RX ORDER — PHENYLEPHRINE HYDROCHLORIDE 10 MG/ML
INJECTION INTRAVENOUS
Status: DISCONTINUED | OUTPATIENT
Start: 2018-03-05 | End: 2018-03-05

## 2018-03-05 RX ORDER — DEXAMETHASONE SODIUM PHOSPHATE 4 MG/ML
INJECTION, SOLUTION INTRA-ARTICULAR; INTRALESIONAL; INTRAMUSCULAR; INTRAVENOUS; SOFT TISSUE
Status: DISCONTINUED | OUTPATIENT
Start: 2018-03-05 | End: 2018-03-05

## 2018-03-05 RX ORDER — KETAMINE HCL IN 0.9 % NACL 50 MG/5 ML
SYRINGE (ML) INTRAVENOUS
Status: DISCONTINUED | OUTPATIENT
Start: 2018-03-05 | End: 2018-03-05

## 2018-03-05 RX ORDER — ROCURONIUM BROMIDE 10 MG/ML
INJECTION, SOLUTION INTRAVENOUS
Status: DISCONTINUED | OUTPATIENT
Start: 2018-03-05 | End: 2018-03-05

## 2018-03-05 RX ORDER — ESMOLOL HYDROCHLORIDE 10 MG/ML
INJECTION INTRAVENOUS
Status: DISCONTINUED | OUTPATIENT
Start: 2018-03-05 | End: 2018-03-05

## 2018-03-05 RX ORDER — GLYCOPYRROLATE 0.2 MG/ML
INJECTION INTRAMUSCULAR; INTRAVENOUS
Status: DISCONTINUED | OUTPATIENT
Start: 2018-03-05 | End: 2018-03-05

## 2018-03-05 RX ORDER — NEOSTIGMINE METHYLSULFATE 1 MG/ML
INJECTION, SOLUTION INTRAVENOUS
Status: DISCONTINUED | OUTPATIENT
Start: 2018-03-05 | End: 2018-03-05

## 2018-03-05 RX ADMIN — ROCURONIUM BROMIDE 10 MG: 10 INJECTION, SOLUTION INTRAVENOUS at 11:03

## 2018-03-05 RX ADMIN — CEFOXITIN 2 G: 2 INJECTION, POWDER, FOR SOLUTION INTRAVENOUS at 02:03

## 2018-03-05 RX ADMIN — FENTANYL CITRATE 50 MCG: 50 INJECTION, SOLUTION INTRAMUSCULAR; INTRAVENOUS at 03:03

## 2018-03-05 RX ADMIN — FENTANYL CITRATE 50 MCG: 50 INJECTION, SOLUTION INTRAMUSCULAR; INTRAVENOUS at 12:03

## 2018-03-05 RX ADMIN — FENTANYL CITRATE 50 MCG: 50 INJECTION, SOLUTION INTRAMUSCULAR; INTRAVENOUS at 11:03

## 2018-03-05 RX ADMIN — DEXAMETHASONE SODIUM PHOSPHATE 4 MG: 4 INJECTION, SOLUTION INTRAMUSCULAR; INTRAVENOUS at 02:03

## 2018-03-05 RX ADMIN — PROPOFOL 150 MG: 10 INJECTION, EMULSION INTRAVENOUS at 10:03

## 2018-03-05 RX ADMIN — SODIUM CHLORIDE, SODIUM GLUCONATE, SODIUM ACETATE, POTASSIUM CHLORIDE, MAGNESIUM CHLORIDE, SODIUM PHOSPHATE, DIBASIC, AND POTASSIUM PHOSPHATE: .53; .5; .37; .037; .03; .012; .00082 INJECTION, SOLUTION INTRAVENOUS at 11:03

## 2018-03-05 RX ADMIN — GLYCOPYRROLATE 0.6 MG: 0.2 INJECTION, SOLUTION INTRAMUSCULAR; INTRAVENOUS at 03:03

## 2018-03-05 RX ADMIN — SODIUM CHLORIDE: 0.9 INJECTION, SOLUTION INTRAVENOUS at 04:03

## 2018-03-05 RX ADMIN — FENTANYL CITRATE 25 MCG: 50 INJECTION, SOLUTION INTRAMUSCULAR; INTRAVENOUS at 03:03

## 2018-03-05 RX ADMIN — FENTANYL CITRATE 100 MCG: 50 INJECTION, SOLUTION INTRAMUSCULAR; INTRAVENOUS at 10:03

## 2018-03-05 RX ADMIN — PHENYLEPHRINE HYDROCHLORIDE 100 MCG: 10 INJECTION INTRAVENOUS at 01:03

## 2018-03-05 RX ADMIN — ROCURONIUM BROMIDE 10 MG: 10 INJECTION, SOLUTION INTRAVENOUS at 02:03

## 2018-03-05 RX ADMIN — SODIUM CHLORIDE, SODIUM GLUCONATE, SODIUM ACETATE, POTASSIUM CHLORIDE, MAGNESIUM CHLORIDE, SODIUM PHOSPHATE, DIBASIC, AND POTASSIUM PHOSPHATE: .53; .5; .37; .037; .03; .012; .00082 INJECTION, SOLUTION INTRAVENOUS at 03:03

## 2018-03-05 RX ADMIN — Medication: at 04:03

## 2018-03-05 RX ADMIN — PHENYLEPHRINE HYDROCHLORIDE 100 MCG: 10 INJECTION INTRAVENOUS at 02:03

## 2018-03-05 RX ADMIN — ESMOLOL HYDROCHLORIDE 10 MG: 10 INJECTION INTRAVENOUS at 01:03

## 2018-03-05 RX ADMIN — ONDANSETRON 4 MG: 2 INJECTION INTRAMUSCULAR; INTRAVENOUS at 02:03

## 2018-03-05 RX ADMIN — SODIUM CHLORIDE: 0.9 INJECTION, SOLUTION INTRAVENOUS at 10:03

## 2018-03-05 RX ADMIN — INSULIN ASPART 2 UNITS: 100 INJECTION, SOLUTION INTRAVENOUS; SUBCUTANEOUS at 10:03

## 2018-03-05 RX ADMIN — BUPIVACAINE HYDROCHLORIDE 30 ML: 2.5 INJECTION, SOLUTION EPIDURAL; INFILTRATION; INTRACAUDAL; PERINEURAL at 02:03

## 2018-03-05 RX ADMIN — ESMOLOL HYDROCHLORIDE 10 MG: 10 INJECTION INTRAVENOUS at 12:03

## 2018-03-05 RX ADMIN — PHENYLEPHRINE HYDROCHLORIDE 100 MCG: 10 INJECTION INTRAVENOUS at 12:03

## 2018-03-05 RX ADMIN — Medication 20 MG: at 12:03

## 2018-03-05 RX ADMIN — LIDOCAINE HYDROCHLORIDE 80 MG: 20 INJECTION, SOLUTION INTRAVENOUS at 10:03

## 2018-03-05 RX ADMIN — STANDARDIZED SENNA CONCENTRATE AND DOCUSATE SODIUM 2 TABLET: 8.6; 5 TABLET, FILM COATED ORAL at 10:03

## 2018-03-05 RX ADMIN — CEFOXITIN 2 G: 2 INJECTION, POWDER, FOR SOLUTION INTRAVENOUS at 11:03

## 2018-03-05 RX ADMIN — INSULIN HUMAN 3 UNITS: 100 INJECTION, SOLUTION PARENTERAL at 03:03

## 2018-03-05 RX ADMIN — MIDAZOLAM HYDROCHLORIDE 2 MG: 1 INJECTION, SOLUTION INTRAMUSCULAR; INTRAVENOUS at 10:03

## 2018-03-05 RX ADMIN — ROCURONIUM BROMIDE 10 MG: 10 INJECTION, SOLUTION INTRAVENOUS at 12:03

## 2018-03-05 RX ADMIN — ROCURONIUM BROMIDE 40 MG: 10 INJECTION, SOLUTION INTRAVENOUS at 10:03

## 2018-03-05 RX ADMIN — ESMOLOL HYDROCHLORIDE 20 MG: 10 INJECTION INTRAVENOUS at 03:03

## 2018-03-05 RX ADMIN — NEOSTIGMINE METHYLSULFATE 5 MG: 1 INJECTION INTRAVENOUS at 03:03

## 2018-03-05 RX ADMIN — FENTANYL CITRATE 100 MCG: 50 INJECTION, SOLUTION INTRAMUSCULAR; INTRAVENOUS at 01:03

## 2018-03-05 RX ADMIN — Medication 10 MG: at 01:03

## 2018-03-05 RX ADMIN — ROCURONIUM BROMIDE 10 MG: 10 INJECTION, SOLUTION INTRAVENOUS at 01:03

## 2018-03-05 RX ADMIN — LOVASTATIN 20 MG: 20 TABLET ORAL at 10:03

## 2018-03-05 RX ADMIN — BUPIVACAINE 20 ML: 13.3 INJECTION, SUSPENSION, LIPOSOMAL INFILTRATION at 02:03

## 2018-03-05 RX ADMIN — SODIUM CHLORIDE, SODIUM GLUCONATE, SODIUM ACETATE, POTASSIUM CHLORIDE, MAGNESIUM CHLORIDE, SODIUM PHOSPHATE, DIBASIC, AND POTASSIUM PHOSPHATE: .53; .5; .37; .037; .03; .012; .00082 INJECTION, SOLUTION INTRAVENOUS at 02:03

## 2018-03-05 NOTE — ANESTHESIA PROCEDURE NOTES
Arterial    Diagnosis: left lobe liver mass  Doctor requesting consult: rachel    Patient location during procedure: done in OR  Procedure start time: 3/5/2018 11:08 AM  Timeout: 3/5/2018 11:05 AM  Procedure end time: 3/5/2018 11:14 AM  Staffing  Anesthesiologist: LOGAN MORE  Resident/CRNA: RAYMOND LAY  Performed: resident/CRNA   Anesthesiologist was present at the time of the procedure.  Preanesthetic Checklist  Completed: patient identified, site marked, surgical consent, pre-op evaluation, timeout performed, IV checked, risks and benefits discussed, monitors and equipment checked and anesthesia consent givenArterial  Skin Prep: chlorhexidine gluconate  Local Infiltration: none  Orientation: left  Location: radial  Catheter Size: 20 G  Catheter placement by Anatomical landmarks. Heme positive aspiration all ports.Insertion Attempts: 1  Assessment  Dressing: secured with tape and tegaderm  Patient: Tolerated well

## 2018-03-05 NOTE — ANESTHESIA PROCEDURE NOTES
Central Line    Diagnosis: Liver mass  Patient location during procedure: done in OR  Procedure start time: 3/5/2018 11:06 AM  Timeout: 3/5/2018 11:05 AM  Procedure end time: 3/5/2018 11:15 AM  Staffing  Anesthesiologist: LOGAN MORE  Resident/CRNA: AYAZ GILLIAM  Performed: resident/CRNA   Anesthesiologist was present at the time of the procedure.  Preanesthetic Checklist  Completed: patient identified, site marked, surgical consent, pre-op evaluation, timeout performed, IV checked, risks and benefits discussed, monitors and equipment checked and anesthesia consent given  Indication  Indication: hemodynamic monitoring, vascular access, med administration     Anesthesia   general anesthesia    Central Line  Skin Prep: skin prepped with ChloraPrep, skin prep agent completely dried prior to procedure  maximum sterile barriers used during central venous catheter insertion  hand hygiene performed prior to central venous catheter insertion  Location: right internal jugular,   Catheter type: triple lumen  Catheter Size: 7 Fr  Inserted Catheter Length: 15 cm  Ultrasound: vascular probe with ultrasound  Vessel Caliber: large, patent  Vascular Doppler:  not done, compressibility normal  Needle advanced into vessel with real time Ultrasound guidance.  Guidewire confirmed in vessel.  Sterile sheath used.   Manometry: Venous cannualation confirmed by visual estimation of blood vessel pressure using manometry.  Insertion Attempts: 1   Securement:line sutured, chlorhexidine patch, blood return through all ports and sterile dressing applied     Post-Procedure  Adverse Events:none

## 2018-03-05 NOTE — PHYSICIAN QUERY
"PT Name: Jamison Mayes  MR #: [id number]    Physician Query Form - Hematology Clarification      CDS/: Kamilla Macario RN, CCDS               Contact information:  [email or web address]          This form is a permanent document in the medical record.      Query Date: March 5, 2018    By submitting this query, we are merely seeking further clarification of documentation. Please utilize your independent clinical judgment when addressing the question(s) below.    The Medical record contains the following:   Indicators  Supporting Clinical Findings Location in Medical Record    "Anemia" documented     X H & H = Lab 03/01/18  1455 03/01/18  2156   WBC 13.13* 9.73   HGB 10.0* 8.6*   HCT 29.4* 24.5*     Lab 03/02/18  1934 03/03/18  0030 03/03/18  0422   WBC 9.54 9.08 8.20   HGB 7.7* 8.0* 7.4*   HCT 22.2* 23.0* 21.9*     Lab 03/04/18  0444   WBC 9.86   RBC 2.38*   HGB 7.2*   HCT 21.0*      Critical Care Surgery H & P 03/02            Critical Care Surgery progress note 03/05 7:59 AM            General Surgery progress note 03/04      BP =                     HR=      "GI bleeding" documented      Acute bleeding (Non GI site)     X Transfusion(s) will transfuse 2 units now, General Surgery progress note 03/04    Treatment:     X Other:  Bleeding liver mass, left lobe.    Tumor markers negative  Plan for resection of liver mass on Monday  Trend H/H, will transfuse 2 units now, prepare 4 for OR General Surgery consult 03/02    General Surgery progress note 03/04       Provider, please specify diagnosis or diagnoses associated with above clinical findings.    [ x ] Acute blood loss anemia    [  ] Other Hematological Diagnosis (please specify): _________________________________    [  ] Clinically Undetermined       Please document in your progress notes daily for the duration of treatment, until resolved, and include in your discharge summary.                                                                                     "

## 2018-03-05 NOTE — ASSESSMENT & PLAN NOTE
72 yo M with rupture of left lobe liver mass, now stable H&H    - NPO for OR today  - 4u RBCs prepared for OR  - Encourage out of bed and ambulation

## 2018-03-05 NOTE — PHYSICIAN QUERY
PT Name: Jamison Mayes  MR #: [id number]  Physician Query Form - Renal Clarification     CDS/: Kamilla Macario RN, CCDS               Contact information:  [email or web address]      This form is a permanent document in the medical record.     QueryDate: March 5, 2018    By submitting this query, we are merely seeking further clarification of documentation. Please utilize your independent clinical judgment when addressing the question(s) below.    The Medical record contains the following:   Indicator Supporting Clinical Findings Location in Medical Record    Kidney (Renal) Insufficiency      Kidney (Renal) Failure / Injury      Nephrotoxic Agents     X BUN/Creatinine GFR No contrast given 2/2 HEATHER (Cr 1.7).     -HEATHER on admit, Cr 1.7, baseline around 0.9,     -Cr 1.0 on 3/3 0400    BUN  15 - 28 [range]  Creatinine  1.0 - 1.72 [range] General Surgery consult 03/02    Critical Care Surgery progress note 03/05 7:59 AM    Critical Care Surgery progress note 03/05 7:59 AM    Lab 03/01/18 - 03/05/18    Urine: Casts         Eosinophils      Dehydration      Nausea/Vomiting      Dialysis/CRRT     X Treatment: -HEATHER on admit, Cr 1.7, baseline around 0.9, likely due to hypotension on presentation treated with fluids.  Critical Care Surgery progress note 03/05 7:59 AM   X Other:  No contrast given 2/2 HEATHER (Cr 1.7).    Renal:   No issue currently    Renal:   -HEATHER on admit, Cr 1.7, baseline around 0.9, likely due to hypotension on presentation treated with fluids.   -Pt had CT chest with contrast on 3/3/2018 0001  -Cr 1.0 on 3/3 0400, stop maintenance fluids, pt eating. General Surgery consult 03/02    Critical Care Surgery H & P 03/02      Critical Care Surgery progress note 03/05 7:59 AM       Provider, please specify the diagnosis or diagnoses associated with above clinical findings.    [ ] Other Acute Kidney Failure/Injury (please specify): ____________  [ ] Unspecified Acute Kidney Failure/Injury  [ x] Acute Renal  Insufficiency, POA  [ ] Other (please specify): _______________________________  [ ] Clinically Undetermined    Please document in your progress notes daily for the duration of treatment, until resolved, and include in your discharge summary.

## 2018-03-05 NOTE — TRANSFER OF CARE
"Anesthesia Transfer of Care Note    Patient: Jamison Mayes    Procedure(s) Performed: Procedure(s) (LRB):  HEPATECTOMY (Left)  CHOLECYSTECTOMY (N/A)  EVACUATION-HEMATOMA (N/A)  ULTRASOUND (N/A)    Patient location: PACU    Anesthesia Type: general    Transport from OR: Transported from OR on 6-10 L/min O2 by face mask with adequate spontaneous ventilation    Post pain: adequate analgesia    Post assessment: no apparent anesthetic complications and tolerated procedure well    Post vital signs: stable    Level of consciousness: awake, alert and oriented    Nausea/Vomiting: no nausea/vomiting    Complications: none    Transfer of care protocol was followed      Last vitals:   Visit Vitals  /63   Pulse 90   Temp 36.9 °C (98.4 °F) (Axillary)   Resp 18   Ht 5' 7" (1.702 m)   Wt 81.6 kg (180 lb)   SpO2 98%   BMI 28.19 kg/m²     "

## 2018-03-05 NOTE — SUBJECTIVE & OBJECTIVE
Interval History: No acute events overnight. NPO since midnight in anticipation of going to OR today for left hepatectomy. Received two units pRBCs yesterday. Voiding.     Medications:  Continuous Infusions:  Scheduled Meds:   amLODIPine  10 mg Oral Daily    enoxaparin  40 mg Subcutaneous Daily    lovastatin  20 mg Oral QHS     PRN Meds:sodium chloride, ceFOXItin (MEFOXIN) IVPB, dextrose 50%, glucagon (human recombinant), insulin aspart U-100, naloxone, ondansetron     Review of patient's allergies indicates:  No Known Allergies  Objective:     Vital Signs (Most Recent):  Temp: 97.5 °F (36.4 °C) (03/05/18 0520)  Pulse: 84 (03/05/18 0520)  Resp: 18 (03/05/18 0520)  BP: 124/60 (03/05/18 0520)  SpO2: 96 % (03/05/18 0520) Vital Signs (24h Range):  Temp:  [97.5 °F (36.4 °C)-100.6 °F (38.1 °C)] 97.5 °F (36.4 °C)  Pulse:  [] 84  Resp:  [16-18] 18  SpO2:  [92 %-98 %] 96 %  BP: (121-140)/(56-66) 124/60     Weight: 86.5 kg (190 lb 11.2 oz)  Body mass index is 29.87 kg/m².    Intake/Output - Last 3 Shifts       03/03 0700 - 03/04 0659 03/04 0700 - 03/05 0659    P.O. 150 720    Blood  745.4    IV Piggyback 1000     Total Intake(mL/kg) 1150 (13.3) 1465.4 (16.9)    Urine (mL/kg/hr) 1600 (0.8) 775 (0.4)    Stool 0 (0)     Total Output 1600 775    Net -450 +690.4          Urine Occurrence 1 x     Stool Occurrence 2 x           Physical Exam   Constitutional: He is oriented to person, place, and time. He appears well-developed and well-nourished. No distress.   HENT:   Head: Normocephalic and atraumatic.   Eyes: Conjunctivae are normal. Right eye exhibits no discharge. Left eye exhibits no discharge. No scleral icterus.   Neck: Normal range of motion. Neck supple. No JVD present. No tracheal deviation present.   Cardiovascular: Normal rate and regular rhythm.    Pulmonary/Chest: Effort normal. No respiratory distress.   Abdominal: Soft. He exhibits no distension. There is tenderness (mild epigastric tenderness).    Neurological: He is alert and oriented to person, place, and time.   Skin: Skin is warm and dry. No rash noted. He is not diaphoretic. No erythema.   Nursing note and vitals reviewed.      Significant Labs:  CBC:     Recent Labs  Lab 03/04/18  2204   WBC 13.23*   RBC 3.46*   HGB 10.4*   HCT 28.9*      MCV 84   MCH 30.1   MCHC 36.0     BMP:     Recent Labs  Lab 03/04/18  0444   *      K 4.1      CO2 27   BUN 15   CREATININE 1.0   CALCIUM 8.4*   MG 1.6       Significant Diagnostics:  I have reviewed and interpreted all pertinent imaging results/findings within the past 24 hours.

## 2018-03-05 NOTE — PROGRESS NOTES
[facility]  General Surgery  Progress Note    Subjective:     History of Present Illness:  74 yo M with h/o HTN, DM (not on insulin last A1c 11/17 7.1), seizure disorder presented to [facility] with pre-syncopal episode, epigastric abdominal pain. Symptoms started suddenly around 1 pm today. Had been active earlier in the day hanging drywall, denies trauma. Onset of symptoms associated with cold sweats. No nausea. CT noncon at OSH showed left lobe liver mass with associated rupture and hemoperitoneum. No contrast given 2/2 HEATHER (Cr 1.7). HDS at OSH. Denies known liver or kidney disease.     No previous abdominal surgeries.     Post-Op Info:  Procedure(s) (LRB):  HEPATECTOMY (N/A)   Day of Surgery     Interval History: No acute events overnight. NPO since midnight in anticipation of going to OR today for left hepatectomy. Received two units pRBCs yesterday. Voiding.     Medications:  Continuous Infusions:  Scheduled Meds:   amLODIPine  10 mg Oral Daily    enoxaparin  40 mg Subcutaneous Daily    lovastatin  20 mg Oral QHS     PRN Meds:sodium chloride, ceFOXItin (MEFOXIN) IVPB, dextrose 50%, glucagon (human recombinant), insulin aspart U-100, naloxone, ondansetron     Review of patient's allergies indicates:  No Known Allergies  Objective:     Vital Signs (Most Recent):  Temp: 97.5 °F (36.4 °C) (03/05/18 0520)  Pulse: 84 (03/05/18 0520)  Resp: 18 (03/05/18 0520)  BP: 124/60 (03/05/18 0520)  SpO2: 96 % (03/05/18 0520) Vital Signs (24h Range):  Temp:  [97.5 °F (36.4 °C)-100.6 °F (38.1 °C)] 97.5 °F (36.4 °C)  Pulse:  [] 84  Resp:  [16-18] 18  SpO2:  [92 %-98 %] 96 %  BP: (121-140)/(56-66) 124/60     Weight: 86.5 kg (190 lb 11.2 oz)  Body mass index is 29.87 kg/m².    Intake/Output - Last 3 Shifts       03/03 0700 - 03/04 0659 03/04 0700 - 03/05 0659    P.O. 150 720    Blood  745.4    IV Piggyback 1000     Total Intake(mL/kg) 1150 (13.3) 1465.4 (16.9)    Urine (mL/kg/hr) 1600 (0.8) 775 (0.4)     Stool 0 (0)     Total Output 1600 775    Net -450 +690.4          Urine Occurrence 1 x     Stool Occurrence 2 x           Physical Exam   Constitutional: He is oriented to person, place, and time. He appears well-developed and well-nourished. No distress.   HENT:   Head: Normocephalic and atraumatic.   Eyes: Conjunctivae are normal. Right eye exhibits no discharge. Left eye exhibits no discharge. No scleral icterus.   Neck: Normal range of motion. Neck supple. No JVD present. No tracheal deviation present.   Cardiovascular: Normal rate and regular rhythm.    Pulmonary/Chest: Effort normal. No respiratory distress.   Abdominal: Soft. He exhibits no distension. There is tenderness (mild epigastric tenderness).   Neurological: He is alert and oriented to person, place, and time.   Skin: Skin is warm and dry. No rash noted. He is not diaphoretic. No erythema.   Nursing note and vitals reviewed.      Significant Labs:  CBC:     Recent Labs  Lab 03/04/18  2204   WBC 13.23*   RBC 3.46*   HGB 10.4*   HCT 28.9*      MCV 84   MCH 30.1   MCHC 36.0     BMP:     Recent Labs  Lab 03/04/18  0444   *      K 4.1      CO2 27   BUN 15   CREATININE 1.0   CALCIUM 8.4*   MG 1.6       Significant Diagnostics:  I have reviewed and interpreted all pertinent imaging results/findings within the past 24 hours.    Assessment/Plan:     * Hemoperitoneum    74 yo M with rupture of left lobe liver mass, now stable H&H    - NPO for OR today  - 4u RBCs prepared for OR  - Encourage out of bed and ambulation            Jonathan Schoen, MD  General Surgery  [facility]

## 2018-03-05 NOTE — BRIEF OP NOTE
[facility]  Brief Operative Note    SUMMARY     Surgery Date: 3/5/2018     Surgeon(s) and Role:     * GIOVANNA Ny Jr., MD - Resident - Assisting     * Brown Cortez MD - Primary     * Brown Cortez MD - Primary     * Jonathan Schoen, MD - Resident - Assisting        Pre-op Diagnosis:  Liver mass, left lobe [R16.0]  Abdominal pain, unspecified abdominal location [R10.9]    Post-op Diagnosis:  Post-Op Diagnosis Codes:     * Liver mass, left lobe [R16.0]     * Abdominal pain, unspecified abdominal location [R10.9]    Procedure(s) (LRB):  HEPATECTOMY (Left)  CHOLECYSTECTOMY (N/A)  EVACUATION-HEMATOMA (N/A)  ULTRASOUND (N/A)    Anesthesia: General    Description of Procedure: hemoperitoneum on entry to abdomen, segment 2 and 3 resection for a bleeding tumor apparent on the dome of the left lateral segment; open cholecystectomy    Description of the findings of the procedure: as above    Estimated Blood Loss: 600 mL         Specimens:   Specimen (12h ago through future)    Start     Ordered    03/05/18 1449  Specimen to Pathology - Surgery  Once     Comments:  1. GALLBLADDER --- PERMANENT2. LEFT LOBE LIVER -- FROZEN      03/05/18 8171

## 2018-03-05 NOTE — PLAN OF CARE
Patient transferred from [facility] 3/1/2018 with Hemoperitoneum.  Patient transfused 2 units PRBC yesterday and has 4 units on standby for OR today undergoing Left Hepatectomy, Cholecystectomy and Hematoma Evacuation.  Patient is expected to discharge home with needs to be determined +/- 3/9/2018.  Will continue to follow for needs.       03/05/18 1313   Discharge Reassessment   Assessment Type Discharge Planning Reassessment   Discharge Plan A Home with family   Discharge Plan B Home with family;Home Health

## 2018-03-06 LAB
ALBUMIN SERPL BCP-MCNC: 2.7 G/DL
ALP SERPL-CCNC: 58 U/L
ALT SERPL W/O P-5'-P-CCNC: 233 U/L
ANION GAP SERPL CALC-SCNC: 11 MMOL/L
AST SERPL-CCNC: 370 U/L
BASOPHILS # BLD AUTO: 0.02 K/UL
BASOPHILS NFR BLD: 0.1 %
BILIRUB SERPL-MCNC: 0.8 MG/DL
BUN SERPL-MCNC: 27 MG/DL
CA-I BLDV-SCNC: 1.04 MMOL/L
CALCIUM SERPL-MCNC: 7.9 MG/DL
CHLORIDE SERPL-SCNC: 109 MMOL/L
CO2 SERPL-SCNC: 19 MMOL/L
CREAT SERPL-MCNC: 1.2 MG/DL
DIFFERENTIAL METHOD: ABNORMAL
EOSINOPHIL # BLD AUTO: 0 K/UL
EOSINOPHIL NFR BLD: 0 %
ERYTHROCYTE [DISTWIDTH] IN BLOOD BY AUTOMATED COUNT: 13.2 %
EST. GFR  (AFRICAN AMERICAN): >60 ML/MIN/1.73 M^2
EST. GFR  (NON AFRICAN AMERICAN): >60 ML/MIN/1.73 M^2
GLUCOSE SERPL-MCNC: 335 MG/DL
HCT VFR BLD AUTO: 30.8 %
HGB BLD-MCNC: 10.9 G/DL
IMM GRANULOCYTES # BLD AUTO: 0.04 K/UL
IMM GRANULOCYTES NFR BLD AUTO: 0.3 %
INR PPP: 1
LYMPHOCYTES # BLD AUTO: 0.9 K/UL
LYMPHOCYTES NFR BLD: 6.1 %
MAGNESIUM SERPL-MCNC: 2 MG/DL
MCH RBC QN AUTO: 29.6 PG
MCHC RBC AUTO-ENTMCNC: 35.4 G/DL
MCV RBC AUTO: 84 FL
MONOCYTES # BLD AUTO: 1.1 K/UL
MONOCYTES NFR BLD: 7.7 %
NEUTROPHILS # BLD AUTO: 12 K/UL
NEUTROPHILS NFR BLD: 85.8 %
NRBC BLD-RTO: 0 /100 WBC
PHOSPHATE SERPL-MCNC: 4.3 MG/DL
PLATELET # BLD AUTO: 262 K/UL
PMV BLD AUTO: 9.6 FL
POCT GLUCOSE: 191 MG/DL (ref 70–110)
POCT GLUCOSE: 219 MG/DL (ref 70–110)
POCT GLUCOSE: 261 MG/DL (ref 70–110)
POCT GLUCOSE: 308 MG/DL (ref 70–110)
POCT GLUCOSE: 309 MG/DL (ref 70–110)
POCT GLUCOSE: 355 MG/DL (ref 70–110)
POCT GLUCOSE: 359 MG/DL (ref 70–110)
POCT GLUCOSE: 362 MG/DL (ref 70–110)
POCT GLUCOSE: 368 MG/DL (ref 70–110)
POCT GLUCOSE: 381 MG/DL (ref 70–110)
POCT GLUCOSE: 382 MG/DL (ref 70–110)
POTASSIUM SERPL-SCNC: 4.9 MMOL/L
PROT SERPL-MCNC: 5.8 G/DL
PROTHROMBIN TIME: 10.3 SEC
RBC # BLD AUTO: 3.68 M/UL
SODIUM SERPL-SCNC: 139 MMOL/L
WBC # BLD AUTO: 14 K/UL

## 2018-03-06 PROCEDURE — 85610 PROTHROMBIN TIME: CPT

## 2018-03-06 PROCEDURE — 84100 ASSAY OF PHOSPHORUS: CPT

## 2018-03-06 PROCEDURE — G8979 MOBILITY GOAL STATUS: HCPCS | Mod: CJ

## 2018-03-06 PROCEDURE — 99222 1ST HOSP IP/OBS MODERATE 55: CPT | Mod: ,,, | Performed by: INTERNAL MEDICINE

## 2018-03-06 PROCEDURE — 83735 ASSAY OF MAGNESIUM: CPT

## 2018-03-06 PROCEDURE — 25000003 PHARM REV CODE 250: Performed by: SURGERY

## 2018-03-06 PROCEDURE — 25000003 PHARM REV CODE 250: Performed by: INTERNAL MEDICINE

## 2018-03-06 PROCEDURE — 25000003 PHARM REV CODE 250: Performed by: STUDENT IN AN ORGANIZED HEALTH CARE EDUCATION/TRAINING PROGRAM

## 2018-03-06 PROCEDURE — 63600175 PHARM REV CODE 636 W HCPCS: Performed by: NURSE PRACTITIONER

## 2018-03-06 PROCEDURE — 20600001 HC STEP DOWN PRIVATE ROOM

## 2018-03-06 PROCEDURE — 25000242 PHARM REV CODE 250 ALT 637 W/ HCPCS: Performed by: NURSE PRACTITIONER

## 2018-03-06 PROCEDURE — 63600175 PHARM REV CODE 636 W HCPCS: Performed by: SURGERY

## 2018-03-06 PROCEDURE — 85025 COMPLETE CBC W/AUTO DIFF WBC: CPT

## 2018-03-06 PROCEDURE — 25000003 PHARM REV CODE 250

## 2018-03-06 PROCEDURE — 36415 COLL VENOUS BLD VENIPUNCTURE: CPT

## 2018-03-06 PROCEDURE — 93005 ELECTROCARDIOGRAM TRACING: CPT

## 2018-03-06 PROCEDURE — G8987 SELF CARE CURRENT STATUS: HCPCS | Mod: CK

## 2018-03-06 PROCEDURE — 94640 AIRWAY INHALATION TREATMENT: CPT

## 2018-03-06 PROCEDURE — 93010 ELECTROCARDIOGRAM REPORT: CPT | Mod: ,,, | Performed by: INTERNAL MEDICINE

## 2018-03-06 PROCEDURE — 63600175 PHARM REV CODE 636 W HCPCS: Performed by: INTERNAL MEDICINE

## 2018-03-06 PROCEDURE — 82330 ASSAY OF CALCIUM: CPT

## 2018-03-06 PROCEDURE — C9113 INJ PANTOPRAZOLE SODIUM, VIA: HCPCS | Performed by: NURSE PRACTITIONER

## 2018-03-06 PROCEDURE — G8978 MOBILITY CURRENT STATUS: HCPCS | Mod: CK

## 2018-03-06 PROCEDURE — 97161 PT EVAL LOW COMPLEX 20 MIN: CPT

## 2018-03-06 PROCEDURE — 25000003 PHARM REV CODE 250: Performed by: NURSE PRACTITIONER

## 2018-03-06 PROCEDURE — 97165 OT EVAL LOW COMPLEX 30 MIN: CPT

## 2018-03-06 PROCEDURE — 80053 COMPREHEN METABOLIC PANEL: CPT

## 2018-03-06 PROCEDURE — G8988 SELF CARE GOAL STATUS: HCPCS | Mod: CI

## 2018-03-06 RX ORDER — NALOXONE HCL 0.4 MG/ML
0.02 VIAL (ML) INJECTION
Status: DISCONTINUED | OUTPATIENT
Start: 2018-03-06 | End: 2018-03-09 | Stop reason: HOSPADM

## 2018-03-06 RX ORDER — HYDRALAZINE HYDROCHLORIDE 20 MG/ML
10 INJECTION INTRAMUSCULAR; INTRAVENOUS EVERY 8 HOURS PRN
Status: DISCONTINUED | OUTPATIENT
Start: 2018-03-06 | End: 2018-03-09 | Stop reason: HOSPADM

## 2018-03-06 RX ORDER — OXYCODONE HYDROCHLORIDE 5 MG/1
10 TABLET ORAL EVERY 4 HOURS PRN
Status: DISCONTINUED | OUTPATIENT
Start: 2018-03-06 | End: 2018-03-06

## 2018-03-06 RX ORDER — DEXTROSE MONOHYDRATE, SODIUM CHLORIDE, AND POTASSIUM CHLORIDE 50; 1.49; 4.5 G/1000ML; G/1000ML; G/1000ML
INJECTION, SOLUTION INTRAVENOUS CONTINUOUS
Status: DISCONTINUED | OUTPATIENT
Start: 2018-03-06 | End: 2018-03-08

## 2018-03-06 RX ORDER — DEXTROSE MONOHYDRATE, SODIUM CHLORIDE, AND POTASSIUM CHLORIDE 50; 1.49; 4.5 G/1000ML; G/1000ML; G/1000ML
INJECTION, SOLUTION INTRAVENOUS
Status: COMPLETED
Start: 2018-03-06 | End: 2018-03-06

## 2018-03-06 RX ORDER — OXYCODONE HYDROCHLORIDE 5 MG/1
5 TABLET ORAL EVERY 4 HOURS PRN
Status: DISCONTINUED | OUTPATIENT
Start: 2018-03-06 | End: 2018-03-06

## 2018-03-06 RX ORDER — HYDROMORPHONE HCL IN 0.9% NACL 6 MG/30 ML
PATIENT CONTROLLED ANALGESIA SYRINGE INTRAVENOUS CONTINUOUS
Status: DISCONTINUED | OUTPATIENT
Start: 2018-03-06 | End: 2018-03-08

## 2018-03-06 RX ORDER — METOPROLOL TARTRATE 25 MG/1
25 TABLET, FILM COATED ORAL 2 TIMES DAILY
Status: DISCONTINUED | OUTPATIENT
Start: 2018-03-06 | End: 2018-03-08

## 2018-03-06 RX ADMIN — SODIUM CHLORIDE 6.2 UNITS/HR: 9 INJECTION, SOLUTION INTRAVENOUS at 10:03

## 2018-03-06 RX ADMIN — INSULIN ASPART 5 UNITS: 100 INJECTION, SOLUTION INTRAVENOUS; SUBCUTANEOUS at 12:03

## 2018-03-06 RX ADMIN — DEXTROSE MONOHYDRATE, SODIUM CHLORIDE, AND POTASSIUM CHLORIDE: 50; 4.5; 1.49 INJECTION, SOLUTION INTRAVENOUS at 11:03

## 2018-03-06 RX ADMIN — SODIUM CHLORIDE 3.1 UNITS/HR: 9 INJECTION, SOLUTION INTRAVENOUS at 11:03

## 2018-03-06 RX ADMIN — DEXTROSE MONOHYDRATE, SODIUM CHLORIDE, AND POTASSIUM CHLORIDE: 50; 4.5; 1.49 INJECTION, SOLUTION INTRAVENOUS at 07:03

## 2018-03-06 RX ADMIN — LEVALBUTEROL HYDROCHLORIDE 0.63 MG: 0.63 SOLUTION RESPIRATORY (INHALATION) at 04:03

## 2018-03-06 RX ADMIN — METOPROLOL TARTRATE 25 MG: 25 TABLET ORAL at 09:03

## 2018-03-06 RX ADMIN — SODIUM CHLORIDE 2 UNITS/HR: 9 INJECTION, SOLUTION INTRAVENOUS at 02:03

## 2018-03-06 RX ADMIN — Medication: at 10:03

## 2018-03-06 RX ADMIN — LOVASTATIN 20 MG: 20 TABLET ORAL at 09:03

## 2018-03-06 RX ADMIN — DEXTROSE MONOHYDRATE, SODIUM CHLORIDE, AND POTASSIUM CHLORIDE 1000 ML: 50; 4.5; 1.49 INJECTION, SOLUTION INTRAVENOUS at 09:03

## 2018-03-06 RX ADMIN — PANTOPRAZOLE SODIUM 40 MG: 40 INJECTION, POWDER, FOR SOLUTION INTRAVENOUS at 10:03

## 2018-03-06 RX ADMIN — DEXTROSE MONOHYDRATE, SODIUM CHLORIDE, AND POTASSIUM CHLORIDE: 50; 4.5; 1.49 INJECTION, SOLUTION INTRAVENOUS at 03:03

## 2018-03-06 RX ADMIN — INSULIN ASPART 3 UNITS: 100 INJECTION, SOLUTION INTRAVENOUS; SUBCUTANEOUS at 04:03

## 2018-03-06 RX ADMIN — AMLODIPINE BESYLATE 10 MG: 10 TABLET ORAL at 10:03

## 2018-03-06 RX ADMIN — SODIUM CHLORIDE 10.2 UNITS/HR: 9 INJECTION, SOLUTION INTRAVENOUS at 07:03

## 2018-03-06 RX ADMIN — SODIUM CHLORIDE 2.6 UNITS/HR: 9 INJECTION, SOLUTION INTRAVENOUS at 03:03

## 2018-03-06 RX ADMIN — ENOXAPARIN SODIUM 40 MG: 100 INJECTION SUBCUTANEOUS at 06:03

## 2018-03-06 RX ADMIN — METOPROLOL TARTRATE 25 MG: 25 TABLET ORAL at 10:03

## 2018-03-06 NOTE — NURSING TRANSFER
Nursing Transfer Note      3/6/2018     Transfer To: [id number]    Transfer via bed    Transfer with cardiac monitoring,o2    Transported by rn and rn    Medicines sent: ivf, pca, o2    Chart send with patient: Yes    Notified: spouse has gone to the room    Patient reassessed at: 3/6/18

## 2018-03-06 NOTE — SUBJECTIVE & OBJECTIVE
Interval History: Tachycardia, hypertension.  Afebrile. James intact.  + ambulation with PT in cadet this am. Sitting in chair. Pain controlled with PCA. No c/o chest pain, shortness of breath, nausea or vomiting. Tolerating clear liquids.     Medications:  Continuous Infusions:   dextrose 5 % and 0.45 % NaCl with KCl 20 mEq 120 mL/hr at 03/06/18 0734    hydromorphone in 0.9 % NaCl 6 mg/30 ml       Scheduled Meds:   amLODIPine  10 mg Oral Daily    enoxaparin  40 mg Subcutaneous Daily    levalbuterol  0.63 mg Nebulization Q8H WA    lovastatin  20 mg Oral QHS    metoprolol tartrate  25 mg Oral BID    pantoprazole  40 mg Intravenous Daily     PRN Meds:dextrose 50%, glucagon (human recombinant), hydrALAZINE, insulin aspart U-100, naloxone, ondansetron     Review of patient's allergies indicates:  No Known Allergies  Objective:     Vital Signs (Most Recent):  Temp: 98.1 °F (36.7 °C) (03/06/18 0730)  Pulse: (!) 114 (03/06/18 1041)  Resp: 11 (03/06/18 0700)  BP: (!) 150/66 (03/06/18 1059)  SpO2: 97 % (03/06/18 0700) Vital Signs (24h Range):  Temp:  [98.1 °F (36.7 °C)-98.6 °F (37 °C)] 98.1 °F (36.7 °C)  Pulse:  [] 114  Resp:  [11-30] 11  SpO2:  [91 %-98 %] 97 %  BP: (122-174)/(59-82) 150/66  Arterial Line BP: ()/(49-78) 120/75     Weight: 81.6 kg (180 lb)  Body mass index is 28.19 kg/m².    Intake/Output - Last 3 Shifts       03/04 0700 - 03/05 0659 03/05 0700 - 03/06 0659 03/06 0700 - 03/07 0659    P.O. 720 0     I.V. (mL/kg)  5585.9 (68.5)     Blood 745.4      IV Piggyback       Total Intake(mL/kg) 1465.4 (16.9) 5585.9 (68.5)     Urine (mL/kg/hr) 3025 (1.5) 1265 (0.6) 75 (0.2)    Stool       Blood  600 (0.3)     Total Output 3025 1865 75    Net -1559.6 +3720.9 -75                 Physical Exam   Constitutional: He is oriented to person, place, and time. He appears well-developed and well-nourished.   HENT:   Head: Normocephalic.   Eyes: Pupils are equal, round, and reactive to light.   Neck: Normal  range of motion.   Cardiovascular: Normal rate, regular rhythm and normal heart sounds.    Pulmonary/Chest: Effort normal and breath sounds normal.   Decreased at bases   Abdominal: Soft. He exhibits no distension. There is tenderness.   Hypoactive bowel sounds,  Incision c/d/i   Musculoskeletal: Normal range of motion.   Neurological: He is alert and oriented to person, place, and time.   Skin: Skin is warm and dry.   Psychiatric: He has a normal mood and affect. His behavior is normal. Judgment and thought content normal.   Nursing note and vitals reviewed.      Significant Labs:  CBC:   Recent Labs  Lab 03/06/18  0432   WBC 14.00*   RBC 3.68*   HGB 10.9*   HCT 30.8*      MCV 84   MCH 29.6   MCHC 35.4     CMP:   Recent Labs  Lab 03/06/18  0432   *   CALCIUM 7.9*   ALBUMIN 2.7*   PROT 5.8*      K 4.9   CO2 19*      BUN 27*   CREATININE 1.2   ALKPHOS 58   *   *   BILITOT 0.8       Significant Diagnostics:  I have reviewed and interpreted all pertinent imaging results/findings within the past 24 hours.

## 2018-03-06 NOTE — SUBJECTIVE & OBJECTIVE
Interval HPI:   Overnight events:  AFVSS.  Patient has some dry mouth and abdominal soreness, but denies any other complaints.  He is otherwise well.    Awaiting pathology for resection.    BG elevated post resection.  Started on intensive insulin gtt.    PMH, PSH, FH, SH updated and reviewed     ROS:  Review of Systems   Constitutional: Negative for unexpected weight change.   HENT: Negative for trouble swallowing.    Eyes: Negative for visual disturbance.   Respiratory: Negative for shortness of breath.    Cardiovascular: Negative for chest pain.   Gastrointestinal: Positive for abdominal pain and constipation. Negative for diarrhea and nausea.   Genitourinary: Negative for urgency.   Musculoskeletal: Negative for arthralgias.   Skin: Negative for wound.   Neurological: Negative for headaches.   Hematological: Does not bruise/bleed easily.   Psychiatric/Behavioral: Negative for sleep disturbance.       Current Medications and/or Treatments Impacting Glycemic Control  Immunotherapy:    Immunosuppressants     None        Steroids:   Hormones     None        Pressors:    Autonomic Drugs     None        Hyperglycemia/Diabetes Medications:   Antihyperglycemics     Start     Stop Route Frequency Ordered    03/06/18 1345  insulin regular (Humulin R) 100 Units in sodium chloride 0.9% 100 mL infusion      -- IV Continuous 03/06/18 1234    03/01/18 2313  insulin aspart U-100 pen 0-5 Units      -- SubQ Every 6 hours PRN 03/01/18 2214             PHYSICAL EXAMINATION:  Vitals:    03/06/18 1626   BP:    Pulse: 82   Resp: 12   Temp:      Body mass index is 28.19 kg/m².    Physical Exam   Constitutional: He appears well-developed.   HENT:   Right Ear: External ear normal.   Left Ear: External ear normal.   Nose: Nose normal.   Hearing normal  Dentition good   Neck: No tracheal deviation present. No thyromegaly present.   Cardiovascular: Normal rate, normal heart sounds and intact distal pulses.    No murmur  heard.  Pulmonary/Chest: Effort normal and breath sounds normal.   Abdominal: Soft. There is tenderness. There is guarding (minimal). No hernia.   Musculoskeletal: He exhibits no edema.   Feet no cuts or  scratches  Shoes appropriate   Neurological: He has normal reflexes. No cranial nerve deficit.   sensation intact to vibration and monofilament   Skin: No rash noted.   No nodules   Psychiatric: He has a normal mood and affect. Judgment normal.   Vitals reviewed.

## 2018-03-06 NOTE — ASSESSMENT & PLAN NOTE
Home regimen includes ACEi.  Held on admission secondary to HEATHER, which has since resolved.  Restart per primary team.

## 2018-03-06 NOTE — PROGRESS NOTES
[facility]  General Surgery  Progress Note    Subjective:     History of Present Illness:  74 yo M with h/o HTN, DM (not on insulin last A1c 11/17 7.1), seizure disorder presented to [facility] with pre-syncopal episode, epigastric abdominal pain. Symptoms started suddenly around 1 pm today. Had been active earlier in the day hanging drywall, denies trauma. Onset of symptoms associated with cold sweats. No nausea. CT noncon at OSH showed left lobe liver mass with associated rupture and hemoperitoneum. No contrast given 2/2 HEATHER (Cr 1.7). HDS at OSH. Denies known liver or kidney disease.     No previous abdominal surgeries.     Post-Op Info:  Procedure(s) (LRB):  HEPATECTOMY (Left)  CHOLECYSTECTOMY (N/A)  EVACUATION-HEMATOMA (N/A)  ULTRASOUND (N/A)   1 Day Post-Op     Interval History: Tachycardia, hypertension.  Afebrile. James intact.  + ambulation with PT in cadet this am. Sitting in chair. Pain controlled with PCA. No c/o chest pain, shortness of breath, nausea or vomiting. Tolerating clear liquids.     Medications:  Continuous Infusions:   dextrose 5 % and 0.45 % NaCl with KCl 20 mEq 120 mL/hr at 03/06/18 0734    hydromorphone in 0.9 % NaCl 6 mg/30 ml       Scheduled Meds:   amLODIPine  10 mg Oral Daily    enoxaparin  40 mg Subcutaneous Daily    levalbuterol  0.63 mg Nebulization Q8H WA    lovastatin  20 mg Oral QHS    metoprolol tartrate  25 mg Oral BID    pantoprazole  40 mg Intravenous Daily     PRN Meds:dextrose 50%, glucagon (human recombinant), hydrALAZINE, insulin aspart U-100, naloxone, ondansetron     Review of patient's allergies indicates:  No Known Allergies  Objective:     Vital Signs (Most Recent):  Temp: 98.1 °F (36.7 °C) (03/06/18 0730)  Pulse: (!) 114 (03/06/18 1041)  Resp: 11 (03/06/18 0700)  BP: (!) 150/66 (03/06/18 1059)  SpO2: 97 % (03/06/18 0700) Vital Signs (24h Range):  Temp:  [98.1 °F (36.7 °C)-98.6 °F (37 °C)] 98.1 °F (36.7 °C)  Pulse:  [] 114  Resp:   [11-30] 11  SpO2:  [91 %-98 %] 97 %  BP: (122-174)/(59-82) 150/66  Arterial Line BP: ()/(49-78) 120/75     Weight: 81.6 kg (180 lb)  Body mass index is 28.19 kg/m².    Intake/Output - Last 3 Shifts       03/04 0700 - 03/05 0659 03/05 0700 - 03/06 0659 03/06 0700 - 03/07 0659    P.O. 720 0     I.V. (mL/kg)  5585.9 (68.5)     Blood 745.4      IV Piggyback       Total Intake(mL/kg) 1465.4 (16.9) 5585.9 (68.5)     Urine (mL/kg/hr) 3025 (1.5) 1265 (0.6) 75 (0.2)    Stool       Blood  600 (0.3)     Total Output 3025 1865 75    Net -1559.6 +3720.9 -75                 Physical Exam   Constitutional: He is oriented to person, place, and time. He appears well-developed and well-nourished.   HENT:   Head: Normocephalic.   Eyes: Pupils are equal, round, and reactive to light.   Neck: Normal range of motion.   Cardiovascular: Normal rate, regular rhythm and normal heart sounds.    Pulmonary/Chest: Effort normal and breath sounds normal.   Decreased at bases   Abdominal: Soft. He exhibits no distension. There is tenderness.   Hypoactive bowel sounds,  Incision c/d/i   Musculoskeletal: Normal range of motion.   Neurological: He is alert and oriented to person, place, and time.   Skin: Skin is warm and dry.   Psychiatric: He has a normal mood and affect. His behavior is normal. Judgment and thought content normal.   Nursing note and vitals reviewed.      Significant Labs:  CBC:   Recent Labs  Lab 03/06/18  0432   WBC 14.00*   RBC 3.68*   HGB 10.9*   HCT 30.8*      MCV 84   MCH 29.6   MCHC 35.4     CMP:   Recent Labs  Lab 03/06/18  0432   *   CALCIUM 7.9*   ALBUMIN 2.7*   PROT 5.8*      K 4.9   CO2 19*      BUN 27*   CREATININE 1.2   ALKPHOS 58   *   *   BILITOT 0.8       Significant Diagnostics:  I have reviewed and interpreted all pertinent imaging results/findings within the past 24 hours.    Assessment/Plan:     * Hemoperitoneum    74 yo M with rupture of left lobe liver mass, now  stable H&H    - NPO for OR today  - 4u RBCs prepared for OR  - Encourage out of bed and ambulation        Liver mass, left lobe    S/p Left Hepatic Lobectomy (Seg 2, 3, most of 4a/b) and cholecystectomy on 3/5/2018  Sugar free clear liquids  DVT/GI prophylaxis  Home medications  PT/OT  Pathway  Leukocytosis  Acute blood loss anemia  LFT's elevated.  .  Daily labs            DM (diabetes mellitus)    Home medications        HTN (hypertension)    Home medications            Wendy Whalen NP  General Surgery  [facility]

## 2018-03-06 NOTE — PLAN OF CARE
Problem: Physical Therapy Goal  Goal: Physical Therapy Goal  Goals to be met by: 3/20/2018     Patient will increase functional independence with mobility by performin. Supine to sit with Modified [place].  2. Rolling to Left and Right with Modified [place].  3. Sit to stand transfer with Supervision.  4. Gait  x 250 feet with Supervision without AD.  5. Ascend/descend 1 stair with no Handrail Supervision without AD.   6. Lower extremity exercise program x 20 reps per handout, with supervision.    Outcome: Ongoing (interventions implemented as appropriate)  Goals set today.

## 2018-03-06 NOTE — HPI
"Reason for Consult: Management of type 2 diabetes, Hyperglycemia     Surgical Procedure and Date: Liver resection 3/5/18     Diabetes diagnosis year:   "a few years ago"    Home Diabetes Medications:    No medications    How often checking glucose at home? n/a     Diabetes Complications include:     Hyperglycemia    Complicating diabetes co morbidities:   none      HPI:   Patient is a 73 y.o. male with a diagnosis of HTN, T2DM, and seizure disorder who presented from OSH with newly diagnosed left lobe liver mass that ruptured resulting in hemoperitoneum.  S/p liver resection.  Endocrinology consulted for elevated BG.    Patient notes that he has never required medication for DM.  A1c 7.8%.  Prior to procedure, patient was managed with low correction insulin.  Recent BG over 300.         "

## 2018-03-06 NOTE — ANESTHESIA RELEASE NOTE
"Anesthesia Release from PACU Note    Patient: Jamison Mayes    Procedure(s) Performed: Procedure(s) (LRB):  HEPATECTOMY (Left)  CHOLECYSTECTOMY (N/A)  EVACUATION-HEMATOMA (N/A)  ULTRASOUND (N/A)    Anesthesia type: general    Post pain: Adequate analgesia    Post assessment: no apparent anesthetic complications, tolerated procedure well and no evidence of recall    Last Vitals:   Visit Vitals  /64   Pulse 101   Temp 37 °C (98.6 °F) (Oral)   Resp 18   Ht 5' 7" (1.702 m)   Wt 81.6 kg (180 lb)   SpO2 97%   BMI 28.19 kg/m²       Post vital signs: stable    Level of consciousness: awake, alert  and oriented    Nausea/Vomiting: no nausea/no vomiting    Complications: none    Airway Patency: patent    Respiratory: unassisted, spontaneous ventilation, nasal cannula    Cardiovascular: stable and blood pressure at baseline    Hydration: euvolemic  "

## 2018-03-06 NOTE — ANESTHESIA POSTPROCEDURE EVALUATION
"Anesthesia Post Evaluation    Patient: Jamison Mayes    Procedure(s) Performed: Procedure(s) (LRB):  HEPATECTOMY (Left)  CHOLECYSTECTOMY (N/A)  EVACUATION-HEMATOMA (N/A)  ULTRASOUND (N/A)    Final Anesthesia Type: general  Patient location during evaluation: PACU  Patient participation: Yes- Able to Participate  Level of consciousness: awake and alert and oriented  Post-procedure vital signs: reviewed and stable  Pain management: adequate  Airway patency: patent  PONV status at discharge: No PONV  Anesthetic complications: no      Cardiovascular status: blood pressure returned to baseline and hemodynamically stable  Respiratory status: unassisted, spontaneous ventilation and nasal cannula  Hydration status: euvolemic  Follow-up not needed.        Visit Vitals  /64   Pulse 101   Temp 37 °C (98.6 °F) (Oral)   Resp 18   Ht 5' 7" (1.702 m)   Wt 81.6 kg (180 lb)   SpO2 97%   BMI 28.19 kg/m²       Pain/Esmer Score: Pain Assessment Performed: Yes (3/5/2018  4:00 PM)  Presence of Pain: non-verbal indicators absent (3/5/2018  4:00 PM)  Pain Rating Prior to Med Admin: 5 (3/5/2018  4:02 PM)  Esmer Score: 8 (3/5/2018  4:00 PM)      "

## 2018-03-06 NOTE — PROGRESS NOTES
Dr. BAILEE Camacho notified of CXR in [facility]. MD read CXR stated central line needs to be pulled back slightly.  MD stated he will come to bedside shortly.  Supplies at bedside for MD.

## 2018-03-06 NOTE — ASSESSMENT & PLAN NOTE
S/p Left Hepatic Lobectomy (Seg 2, 3, most of 4a/b) and cholecystectomy on 3/5/2018  Sugar free clear liquids  DVT/GI prophylaxis  Home medications  PT/OT  Pathway  Leukocytosis  Acute blood loss anemia  LFT's elevated.  .  Daily labs

## 2018-03-06 NOTE — PLAN OF CARE
Problem: Occupational Therapy Goal  Goal: Occupational Therapy Goal  Goals to be met by: 3/20/18     Patient will increase functional independence with ADLs by performing:    UE Dressing with Modified Ho[place].  Grooming while standing at sink with Modified [place].  Toileting from toilet with Modified Ho[place] for hygiene and clothing management.   Toilet transfer to toilet with Modified Ho[place].    Outcome: Ongoing (interventions implemented as appropriate)  Eval completed; goals established    Comments: Initiate OT DANAY Dasilva OT  3/6/2018

## 2018-03-06 NOTE — ASSESSMENT & PLAN NOTE
BG goal 140-180.   A1c 7.8%.    Patient on intensive insulin gtt.  POC q1h.  Once BG more stable, okay to space to q2h.    Patient to transition to IV transition insulin once rate and BG more stable.  Patient understands plan.    DISCHARGE PLANNING:  Regimen TBD.  Will base recommendations on inpatient requirements.  GFR will allow orals.  On statin and ACEi in outpatient setting.  Continue per primary.

## 2018-03-06 NOTE — CONSULTS
"[facility]  Endocrinology  Diabetes Consult Note    Consult Requested by: Brown Cortez MD   Reason for admit: Hemoperitoneum    HISTORY OF PRESENT ILLNESS:  Reason for Consult: Management of type 2 diabetes, Hyperglycemia     Surgical Procedure and Date: Liver resection 3/5/18     Diabetes diagnosis year:   "a few years ago"    Home Diabetes Medications:    No medications    How often checking glucose at home? n/a     Diabetes Complications include:     Hyperglycemia    Complicating diabetes co morbidities:   none      HPI:   Patient is a 73 y.o. male with a diagnosis of HTN, T2DM, and seizure disorder who presented from OSH with newly diagnosed left lobe liver mass that ruptured resulting in hemoperitoneum.  S/p liver resection.  Endocrinology consulted for elevated BG.    Patient notes that he has never required medication for DM.  A1c 7.8%.  Prior to procedure, patient was managed with low correction insulin.  Recent BG over 300.           Interval HPI:   Overnight events:  AFVSS.  Patient has some dry mouth and abdominal soreness, but denies any other complaints.  He is otherwise well.    Awaiting pathology for resection.    BG elevated post resection.  Started on intensive insulin gtt.    PMH, PSH, FH, SH updated and reviewed     ROS:  Review of Systems   Constitutional: Negative for unexpected weight change.   HENT: Negative for trouble swallowing.    Eyes: Negative for visual disturbance.   Respiratory: Negative for shortness of breath.    Cardiovascular: Negative for chest pain.   Gastrointestinal: Positive for abdominal pain and constipation. Negative for diarrhea and nausea.   Genitourinary: Negative for urgency.   Musculoskeletal: Negative for arthralgias.   Skin: Negative for wound.   Neurological: Negative for headaches.   Hematological: Does not bruise/bleed easily.   Psychiatric/Behavioral: Negative for sleep disturbance.       Current Medications and/or Treatments " Impacting Glycemic Control  Immunotherapy:    Immunosuppressants     None        Steroids:   Hormones     None        Pressors:    Autonomic Drugs     None        Hyperglycemia/Diabetes Medications:   Antihyperglycemics     Start     Stop Route Frequency Ordered    03/06/18 1345  insulin regular (Humulin R) 100 Units in sodium chloride 0.9% 100 mL infusion      -- IV Continuous 03/06/18 1234    03/01/18 2313  insulin aspart U-100 pen 0-5 Units      -- SubQ Every 6 hours PRN 03/01/18 2214             PHYSICAL EXAMINATION:  Vitals:    03/06/18 1626   BP:    Pulse: 82   Resp: 12   Temp:      Body mass index is 28.19 kg/m².    Physical Exam   Constitutional: He appears well-developed.   HENT:   Right Ear: External ear normal.   Left Ear: External ear normal.   Nose: Nose normal.   Hearing normal  Dentition good   Neck: No tracheal deviation present. No thyromegaly present.   Cardiovascular: Normal rate, normal heart sounds and intact distal pulses.    No murmur heard.  Pulmonary/Chest: Effort normal and breath sounds normal.   Abdominal: Soft. There is tenderness. There is guarding (minimal). No hernia.   Musculoskeletal: He exhibits no edema.   Feet no cuts or  scratches  Shoes appropriate   Neurological: He has normal reflexes. No cranial nerve deficit.   sensation intact to vibration and monofilament   Skin: No rash noted.   No nodules   Psychiatric: He has a normal mood and affect. Judgment normal.   Vitals reviewed.        Labs Reviewed and Include     Recent Labs  Lab 03/06/18  0432   *   CALCIUM 7.9*   ALBUMIN 2.7*   PROT 5.8*      K 4.9   CO2 19*      BUN 27*   CREATININE 1.2   ALKPHOS 58   *   *   BILITOT 0.8     Lab Results   Component Value Date    WBC 14.00 (H) 03/06/2018    HGB 10.9 (L) 03/06/2018    HCT 30.8 (L) 03/06/2018    MCV 84 03/06/2018     03/06/2018     No results for input(s): TSH, FREET4 in the last 168 hours.  Lab Results   Component Value Date    HGBA1C  7.8 (H) 03/01/2018       Nutritional status:   Body mass index is 28.19 kg/m².  Lab Results   Component Value Date    ALBUMIN 2.7 (L) 03/06/2018    ALBUMIN 2.5 (L) 03/05/2018    ALBUMIN 3.2 (L) 03/05/2018     No results found for: PREALBUMIN    Estimated Creatinine Clearance: 56.9 mL/min (based on SCr of 1.2 mg/dL).    Accu-Checks  Recent Labs      03/04/18   1346  03/04/18   1840  03/05/18   1007  03/05/18   1600  03/05/18   2233  03/06/18   0425  03/06/18   1228  03/06/18   1416  03/06/18   1508  03/06/18   1634   POCTGLUCOSE  230*  204*  181*  200*  311*  359*  362*  355*  381*  368*        ASSESSMENT and PLAN    Liver mass, left lobe    Management per primary.  S/P resection.  Surgery/Stressors can increase insulin requirement.          DM (diabetes mellitus)    BG goal 140-180.   A1c 7.8%.    Patient on intensive insulin gtt.  POC q1h.  Once BG more stable, okay to space to q2h.    Patient to transition to IV transition insulin once rate and BG more stable.  Patient understands plan.    DISCHARGE PLANNING:  Regimen TBD.  Will base recommendations on inpatient requirements.  GFR will allow orals.  On statin and ACEi in outpatient setting.  Continue per primary.        HTN (hypertension)    Home regimen includes ACEi.  Held on admission secondary to HEATHER, which has since resolved.  Restart per primary team.              Plan discussed with patient, family, and RN at bedside.     Clarice Sam MD  Endocrinology  [facility], Tova Gupta MD,  have personally taken the history and examined the patient and agree with the resident's note as stated above.

## 2018-03-06 NOTE — PT/OT/SLP EVAL
Occupational Therapy   Evaluation    Name: Jamison Mayes  MRN: [id number]  Admitting Diagnosis:  Hemoperitoneum 1 Day Post-Op    Recommendations:     Discharge Recommendations: home  Discharge Equipment Recommendations:   (TBD pending progress)  Barriers to discharge:  None    History:     Occupational Profile:  Living Environment: Pt lives with wife in [place] with 1STE and 0HR. Home has walk-in shower with bath bench and grab bars.  Previous level of function: PTA, pt reports independence with all ADL and some IADL, including driving. Pt reports use of BBQ grill and microwave for meals.   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 enjoys building bird houses  Equipment Owned:  none  Assistance upon Discharge: Wife and son-in-law able to assist as needed following d/c     Past Medical History:   Diagnosis Date    AK (actinic keratosis) PDT scalp 2/2015 and 3/2015    s/p efudex on scalp and forehead, PDT scalp    Arthritis     Diabetes mellitus type II     Fever blister     Hypertension     Joint pain     SCC (squamous cell carcinoma) 3/2013    L scalp vertex    SCC (squamous cell carcinoma) excised     left helix, in-situ    Seizures     SQUAMOUS CELL CARCINOMA 3/2013    occipital scalp    SQUAMOUS CELL CARCINOMA 3/2013    l vertex scalp       Past Surgical History:   Procedure Laterality Date    EYE SURGERY      skin carcinoma removal         Subjective     Chief Complaint: wanting to get out of bed  Patient/Family stated goals: go home   Communicated with: RN prior to session.  Pain/Comfort:  · Pain Rating 1: 1/10  · Location 1: abdomen  · Pain Addressed 1: Reposition, Distraction  · Pain Rating Post-Intervention 1: 0/10    Patients cultural, spiritual, Roman Catholic conflicts given the current situation: none reported    Objective:     Patient found with: arterial line, blood pressure cuff, PCA, castro catheter, central line, oxygen, telemetry,  pulse ox (continuous)    General Precautions: Standard, fall   Orthopedic Precautions:N/A   Braces: N/A     Occupational Performance:    Bed Mobility:    · Patient completed Scooting/Bridging to EOB with supervision  · Patient completed Supine to Sit with supervision    Functional Mobility/Transfers:  · Patient completed Sit <> Stand Transfer with contact guard assistance  with  no assistive device   · Patient completed Bed <> Chair Transfer using Step Transfer technique with contact guard assistance with rolling walker  · Functional Mobility: Pt ambulate 125' with CGA using no AD, no LOB    Activities of Daily Living:  · LB Dressing: independence to manage B socks while seated UIC    Cognitive/Visual Perceptual:  Cognitive/Psychosocial Skills:     -       Oriented to: Person, Place, Time and Situation   -       Follows Commands/attention:Follows multistep  commands  -       Communication: clear/fluent  -       Memory: No Deficits noted  -       Safety awareness/insight to disability: intact   -       Mood/Affect/Coping skills/emotional control: Appropriate to situation, Happy and Pleasant  Visual/Perceptual:      -Pt wears glasses and B hearing aids    Physical Exam:  Balance:    -       good sitting balance; good static standing balance; CGA for dynamic standing balance  Postural examination/scapula alignment:    -       No postural abnormalities identified  Skin integrity: Visible skin intact  Edema:  None noted  Sensation:    -       Intact  Dominant hand:    -       R hand  Upper Extremity Range of Motion:     -       Right Upper Extremity: WFL  -       Left Upper Extremity: WFL  Upper Extremity Strength:    -       Right Upper Extremity: WFL  -       Left Upper Extremity: WFL   Strength:    -       Right Upper Extremity: WFL  -       Left Upper Extremity: WFL  Fine Motor Coordination:    -       Intact  Gross motor coordination:   WFL    Patient left up in chair with all lines intact, call button in reach  "and wife present    [facility] 6 Click:  [facility] Total Score: 18    Treatment & Education:  Pt educated on role of OT/POC, encouraged to ambulate with supervision throughout the day  White board/communication board updated  Education:    Assessment:     Jamison Mayes is a 73 y.o. male with a medical diagnosis of Hemoperitoneum.  He presents with good motivation and participation.  Performance deficits affecting function are weakness, impaired endurance, impaired self care skills, impaired functional mobilty, gait instability, impaired balance, pain.      Rehab Prognosis:  Good; patient would benefit from acute skilled OT services to address these deficits and reach maximum level of function.         Clinical Decision Makin.  OT Low:  "Pt evaluation falls under low complexity for evaluation coding due to performance deficits noted in 1-3 areas as stated above and 0 co-morbities affecting current functional status. Data obtained from problem focused assessments. No modifications or assistance was required for completion of evaluation. Only brief occupational profile and history review completed."     Plan:     Patient to be seen 3 x/week to address the above listed problems via self-care/home management, therapeutic activities, therapeutic exercises  · Plan of Care Expires: 18  · Plan of Care Reviewed with: patient, spouse    This Plan of care has been discussed with the patient who was involved in its development and understands and is in agreement with the identified goals and treatment plan    GOALS:    Occupational Therapy Goals        Problem: Occupational Therapy Goal    Goal Priority Disciplines Outcome Interventions   Occupational Therapy Goal     OT, PT/OT Ongoing (interventions implemented as appropriate)    Description:  Goals to be met by: 3/20/18     Patient will increase functional independence with ADLs by performing:    UE Dressing with Modified [place].  Grooming while standing at sink " with Modified [place].  Toileting from toilet with Modified [place] for hygiene and clothing management.   Toilet transfer to toilet with Modified [place].                      Time Tracking:     OT Date of Treatment: 03/06/18  OT Start Time: 1007  OT Stop Time: 1030  OT Total Time (min): 23 min    Billable Minutes:Evaluation 23    Darcie Dasilva OT  3/6/2018

## 2018-03-06 NOTE — PROGRESS NOTES
2nd xray done for central line placement, Dr. Camacho reviewed and Okay to use central line. Patient visiting with wife and daughter, no complaints of pain, no nausea, has PCA to control, VSS on room air. Wctm

## 2018-03-06 NOTE — PT/OT/SLP EVAL
"Physical Therapy Evaluation    Patient Name:  Jamison Mayes   MRN:  [id number]    Recommendations:     Discharge Recommendations:  home (TBD (home with no needs pending progress))   Discharge Equipment Recommendations:  (TBD)   Barriers to discharge: None    Assessment:     Jamison Mayes is a 73 y.o. male admitted with a medical diagnosis of Hemoperitoneum.  He presents with the following impairments/functional limitations:  weakness, impaired endurance, gait instability, impaired balance, decreased safety awareness. Pt was cooperative and agreeable to treatment. Pt tolerated treatment well with no c/o of increased pain or SOB. Pt exhibited mildly decreased safety awareness and needed CGA during gait due to unsteadiness.    Rehab Prognosis:  good; patient would benefit from acute skilled PT services to address these deficits and reach maximum level of function.      Recent Surgery: Procedure(s) (LRB):  HEPATECTOMY (Left)  CHOLECYSTECTOMY (N/A)  EVACUATION-HEMATOMA (N/A)  ULTRASOUND (N/A) 1 Day Post-Op    Plan:     During this hospitalization, patient to be seen 4 x/week to address the above listed problems via gait training, therapeutic activities, therapeutic exercises  · Plan of Care Expires:  04/05/18   Plan of Care Reviewed with: patient, spouse    Subjective     Communicated with nursing prior to session.  Patient found supine in bed, with RN and wife in room, upon PT entry to room, agreeable to evaluation.      Chief Complaint: sick of being in bed (called this "bedtosis")  Patient comments/goals: to get moving and get back home  Pain/Comfort:  Pain Rating 1: 1/10 (abdominal)  Pain Addressed 1: Reposition, Distraction  Pain Rating Post-Intervention 1: 0/10    Patients cultural, spiritual, Lutheran conflicts given the current situation: none    Living Environment:  Pt lives with his wife in [facility] with 1 JAYLEEN and no handrails. Pt works part-time at [employer] as a /.  Prior to " admission, patients level of function was independent.  Patient has the following equipment: none.  DME owned (not currently used): rolling walker, single point cane and crutches.  Upon discharge, patient will have assistance from wife, son-in-law, and friends/neighbors as needed.    Objective:     Patient found with: arterial line, central line, PCA, peripheral IV, castro catheter, oxygen     General Precautions: Standard, fall   Orthopedic Precautions:N/A   Braces: N/A     Exams:  · Cognitive Exam:  Patient is oriented to Person, Place, Time and Situation and follows 100% of all commands   · RUE ROM: WFL  · RUE Strength: WFL  · LUE ROM: WFL  · LUE Strength: WFL  · RLE ROM: WFL  · RLE Strength: WFL  · LLE ROM: WFL  · LLE Strength: WFL    Functional Mobility:  · Bed Mobility:     · Supine to Sit: contact guard assistance  · Transfers:     · Sit to Stand:  contact guard assistance with no AD  · Gait: ~125' with CGA and no AD, decreased diane and shortened step length, no LOB but definite unsteadiness during gait  · Balance: good static sitting balance during line management in preparation for gait; good dynamic standing balance during gait activity    AM-PAC 6 CLICK MOBILITY  Total Score:18       Therapeutic Activities and Exercises:   PT educated pt on importance of mobility, walking with aid of nursing, LE therex, and PT POC.    Patient left up in chair with all lines intact, call button in reach and wife present.    GOALS:    Physical Therapy Goals        Problem: Physical Therapy Goal    Goal Priority Disciplines Outcome Goal Variances Interventions   Physical Therapy Goal     PT/OT, PT Ongoing (interventions implemented as appropriate)     Description:  Goals to be met by: 3/20/2018     Patient will increase functional independence with mobility by performin. Supine to sit with Modified [place].  2. Rolling to Left and Right with Modified [place].  3. Sit to stand transfer with  Supervision.  4. Gait  x 250 feet with Supervision without AD.  5. Ascend/descend 1 stair with no Handrail Supervision without AD.   6. Lower extremity exercise program x 20 reps per handout, with supervision.                      History:     Past Medical History:   Diagnosis Date    AK (actinic keratosis) PDT scalp 2/2015 and 3/2015    s/p efudex on scalp and forehead, PDT scalp    Arthritis     Diabetes mellitus type II     Fever blister     Hypertension     Joint pain     SCC (squamous cell carcinoma) 3/2013    L scalp vertex    SCC (squamous cell carcinoma) excised     left helix, in-situ    Seizures     SQUAMOUS CELL CARCINOMA 3/2013    occipital scalp    SQUAMOUS CELL CARCINOMA 3/2013    l vertex scalp       Past Surgical History:   Procedure Laterality Date    EYE SURGERY      skin carcinoma removal         Clinical Decision Making:     History  Co-morbidities and personal factors that may impact the plan of care Examination  Body Structures and Functions, activity limitations and participation restrictions that may impact the plan of care Clinical Presentation   Decision Making/ Complexity Score   Co-morbidities:   [] Time since onset of injury / illness / exacerbation  [] Status of current condition  []Patient's cognitive status and safety concerns    [] Multiple Medical Problems (see med hx)  Personal Factors:   [] Patient's age  [] Prior Level of function   [] Patient's home situation (environment and family support)  [] Patient's level of motivation  [] Expected progression of patient      HISTORY:(criteria)    [] 80269 - no personal factors/history    [] 12548 - has 1-2 personal factor/comorbidity     [] 02225 - has >3 personal factor/comorbidity     Body Regions:  [] Objective examination findings  [] Head     []  Neck  [] Trunk   [] Upper Extremity  [] Lower Extremity    Body Systems:  [] For communication ability, affect, cognition, language, and learning style: the assessment of  the ability to make needs known, consciousness, orientation (person, place, and time), expected emotional /behavioral responses, and learning preferences (eg, learning barriers, education  needs)  [] For the neuromuscular system: a general assessment of gross coordinated movement (eg, balance, gait, locomotion, transfers, and transitions) and motor function  (motor control and motor learning)  [] For the musculoskeletal system: the assessment of gross symmetry, gross range of motion, gross strength, height, and weight  [] For the integumentary system: the assessment of pliability(texture), presence of scar formation, skin color, and skin integrity  [] For cardiovascular/pulmonary system: the assessment of heart rate, respiratory rate, blood pressure, and edema     Activity limitations:    [] Patient's cognitive status and saf ety concerns          [] Status of current condition      [] Weight bearing restriction  [] Cardiopulmunary Restriction    Participation Restrictions:   [] Goals and goal agreement with the patient     [] Rehab potential (prognosis) and probable outcome      Examination of Body System: (criteria)    [] 29027 - addressing 1-2 elements    [] 24703 - addressing a total of 3 or more elements     [] 29428 -  Addressing a total of 4 or more elements         Clinical Presentation: (criteria)  Choose one     On examination of body system using standardized tests and measures patient presents with (CHOOSE ONE) elements from any of the following: body structures and functions, activity limitations, and/or participation restrictions.  Leading to a clinical presentation that is considered (CHOOSE ONE)                              Clinical Decision Making  (Eval Complexity):  Choose One     Time Tracking:     PT Received On: 03/06/18  PT Start Time: 1008     PT Stop Time: 1031  PT Total Time (min): 23 min     Billable Minutes: Evaluation 23 mins      GUSTAVO Lord  03/06/2018

## 2018-03-06 NOTE — OP NOTE
[facility]  Surgery Department  Operative Note       Date of Procedure: 3/5/2018     Surgeon(s):  Surgeon(s) and Role:     * GIOVANNA Ny Jr., MD - Resident - Assisting     * Brown Cortez MD - Primary     * Brown Cortez MD - Primary     * Jonathan Schoen, MD - Resident - Assisting      Pre-Operative Diagnosis:   1. Liver mass, left lobe [R16.0]  2. Hemoperitoneum from ruptured liver mass    Post-Operative Diagnosis:   1. same     Anesthesia: General    Operative Findings:   1. No additional gross extrahepatic or contralateral hepatic disease    Procedures:  1. Left Hepatic Lobectomy (Seg 2, 3, most of 4a/b)  2. Cholecystectomy  3. Intraoperative ultrasound    Estimated Blood Loss (EBL):  600 mL      Indications:  Jamison Mayes presents for the above procedures.  Risks and benefits were reviewed including bleeding, infection, damage to local structures, biliary leak, liver insufficiency, need for additional procedures, death, and imponderables.  He understands and gave informed consent to proceed.    Details:  The patient was transported to the operating room and satisfactory anesthesia established.  Preoperative antibiotics were administered and low CVP anesthesia was performed.  Arms were placed on armboards, and extremities were padded and protected throughout.  A castro catheter was placed.  Anesthesia placed a central line.    The abdomen was prepped and draped in sterile fashion.  A timeout was performed.  An upper midline incision was made.  The abdomen was explored.  There was a moderate volume of old blood and a ruptured mass in the left lobe of the liver.  The blood was all evacuated.  The bowel was eviscerated and run.  There was no palpable mass in the stomach, small bowel or intraperitoneal colon.  The liver contour was smooth.  The hepatoduodenal ligament was palpated and without gross disease.    The ligamentum teres was divided between ties and the faciform  divided up to the suprahepatic vena cava.  A Abreu retractor was used for exposure.      The gallbladder was identified and the peritoneum on either side of the gallbladder neck was scored with electrocautery,  the gallbladder neck from the cystic plate.  The cystic duct and cystic artery were skeletonized.  A critical view of safety was obtained, with only two structures between the gallbladder and the cystic plate.  The cystic duct and artery were then divided between ties.  A prolene suture ligature was placed on the cystic duct stump.    The ultrasound probe was used to survey the entirety of the liver.  The parenchymal echotexture was normal.  The hepatic venous confluence was of normal configuration without clot or compression.  The portal and hepatic artery inflow was of normal configuration and flow pattern.  The intrahepatic bile ducts were normal caliber.  Segments I-VIII were examined sequentially, revealing a mass involving most of 2/3.  It abutted the umbilical fissure.    I personally interpreted the images and representative images were printed/saved.    Portal dissection was performed.  The hilar plate was lowered.  The left hepatic artery was encircled and divided, confirming flow in the right hepatic artery prior.  The portal vein was exposed, but the angle of the left portal vein made extrahepatic exposure and division tough.  I decided to divide it intraparenchymally with the hilar plate later.    The liver was then mobilized from its peritoneal attachments with cautery and Ligasure.  The left triangular and coronary ligaments were divided.  The left hepatic vein was encircled.    The tumor in segment 2/3 was targeted for resection, scored out with cautery.  This required a division line well into segment IV.  The parenchyma was divided sequentially with cautery, ligasure and Aquamantys.  The hilar plate was divided intraparenchymally just to the right of the umbilical fissure.  The  left hepatic vein was also divided intraparenchymally.  The cut surface of the liver was packed and made hemostatic.    The specimen was delivered and examined by pathology, who confirmed >1 cm gross margins, favoring an epithelial tumor.    The cut surface was reexamined for hemostasis, no biliary leakage was seen.  Chris was placed.  Exparel was instilled as a TAP block.  The wound was closed over a fish retractor using looped 1-0 PDS sutures with internal retention sutures of 0 Vicryl.  Skin was closed with Monocryl.    All needle, lap, and sponge counts were reported as correct.  I communicated the intraoperative findings with the family following the procedure.     Condition: Stable    Disposition: PACU - hemodynamically stable.    Attestation: I was present and scrubbed for the key portions of the procedure.

## 2018-03-07 LAB
ALBUMIN SERPL BCP-MCNC: 2.4 G/DL
ALP SERPL-CCNC: 60 U/L
ALT SERPL W/O P-5'-P-CCNC: 139 U/L
ANION GAP SERPL CALC-SCNC: 7 MMOL/L
AST SERPL-CCNC: 110 U/L
BASOPHILS # BLD AUTO: 0.02 K/UL
BASOPHILS # BLD AUTO: 0.03 K/UL
BASOPHILS NFR BLD: 0.1 %
BASOPHILS NFR BLD: 0.1 %
BILIRUB SERPL-MCNC: 0.6 MG/DL
BUN SERPL-MCNC: 24 MG/DL
CA-I BLDV-SCNC: 1.08 MMOL/L
CALCIUM SERPL-MCNC: 8.1 MG/DL
CHLORIDE SERPL-SCNC: 107 MMOL/L
CO2 SERPL-SCNC: 22 MMOL/L
CREAT SERPL-MCNC: 1 MG/DL
DIFFERENTIAL METHOD: ABNORMAL
DIFFERENTIAL METHOD: ABNORMAL
EOSINOPHIL # BLD AUTO: 0 K/UL
EOSINOPHIL # BLD AUTO: 0 K/UL
EOSINOPHIL NFR BLD: 0.1 %
EOSINOPHIL NFR BLD: 0.1 %
ERYTHROCYTE [DISTWIDTH] IN BLOOD BY AUTOMATED COUNT: 13.3 %
ERYTHROCYTE [DISTWIDTH] IN BLOOD BY AUTOMATED COUNT: 13.3 %
EST. GFR  (AFRICAN AMERICAN): >60 ML/MIN/1.73 M^2
EST. GFR  (NON AFRICAN AMERICAN): >60 ML/MIN/1.73 M^2
GLUCOSE SERPL-MCNC: 170 MG/DL
HCT VFR BLD AUTO: 26 %
HCT VFR BLD AUTO: 28.9 %
HGB BLD-MCNC: 8.8 G/DL
HGB BLD-MCNC: 9.7 G/DL
IMM GRANULOCYTES # BLD AUTO: 0.1 K/UL
IMM GRANULOCYTES # BLD AUTO: 0.14 K/UL
IMM GRANULOCYTES NFR BLD AUTO: 0.5 %
IMM GRANULOCYTES NFR BLD AUTO: 0.6 %
INR PPP: 1
LYMPHOCYTES # BLD AUTO: 1.5 K/UL
LYMPHOCYTES # BLD AUTO: 1.7 K/UL
LYMPHOCYTES NFR BLD: 7.6 %
LYMPHOCYTES NFR BLD: 7.8 %
MAGNESIUM SERPL-MCNC: 2.1 MG/DL
MCH RBC QN AUTO: 29 PG
MCH RBC QN AUTO: 30.3 PG
MCHC RBC AUTO-ENTMCNC: 33.6 G/DL
MCHC RBC AUTO-ENTMCNC: 33.8 G/DL
MCV RBC AUTO: 86 FL
MCV RBC AUTO: 90 FL
MONOCYTES # BLD AUTO: 1.3 K/UL
MONOCYTES # BLD AUTO: 1.8 K/UL
MONOCYTES NFR BLD: 7.1 %
MONOCYTES NFR BLD: 8.2 %
NEUTROPHILS # BLD AUTO: 15.7 K/UL
NEUTROPHILS # BLD AUTO: 18.5 K/UL
NEUTROPHILS NFR BLD: 83.4 %
NEUTROPHILS NFR BLD: 84.4 %
NRBC BLD-RTO: 0 /100 WBC
NRBC BLD-RTO: 0 /100 WBC
PHOSPHATE SERPL-MCNC: 1.8 MG/DL
PLATELET # BLD AUTO: 231 K/UL
PLATELET # BLD AUTO: 241 K/UL
PMV BLD AUTO: 9.7 FL
PMV BLD AUTO: 9.8 FL
POCT GLUCOSE: 117 MG/DL (ref 70–110)
POCT GLUCOSE: 135 MG/DL (ref 70–110)
POCT GLUCOSE: 144 MG/DL (ref 70–110)
POCT GLUCOSE: 147 MG/DL (ref 70–110)
POCT GLUCOSE: 149 MG/DL (ref 70–110)
POCT GLUCOSE: 161 MG/DL (ref 70–110)
POCT GLUCOSE: 165 MG/DL (ref 70–110)
POCT GLUCOSE: 170 MG/DL (ref 70–110)
POCT GLUCOSE: 172 MG/DL (ref 70–110)
POCT GLUCOSE: 181 MG/DL (ref 70–110)
POCT GLUCOSE: 182 MG/DL (ref 70–110)
POCT GLUCOSE: 183 MG/DL (ref 70–110)
POCT GLUCOSE: 183 MG/DL (ref 70–110)
POCT GLUCOSE: 190 MG/DL (ref 70–110)
POCT GLUCOSE: 195 MG/DL (ref 70–110)
POCT GLUCOSE: 229 MG/DL (ref 70–110)
POTASSIUM SERPL-SCNC: 4.3 MMOL/L
PROT SERPL-MCNC: 5.4 G/DL
PROTHROMBIN TIME: 10.3 SEC
RBC # BLD AUTO: 3.03 M/UL
RBC # BLD AUTO: 3.2 M/UL
SODIUM SERPL-SCNC: 136 MMOL/L
WBC # BLD AUTO: 18.66 K/UL
WBC # BLD AUTO: 22.18 K/UL

## 2018-03-07 PROCEDURE — 80053 COMPREHEN METABOLIC PANEL: CPT

## 2018-03-07 PROCEDURE — 25000003 PHARM REV CODE 250: Performed by: STUDENT IN AN ORGANIZED HEALTH CARE EDUCATION/TRAINING PROGRAM

## 2018-03-07 PROCEDURE — 99232 SBSQ HOSP IP/OBS MODERATE 35: CPT | Mod: ,,, | Performed by: INTERNAL MEDICINE

## 2018-03-07 PROCEDURE — 63600175 PHARM REV CODE 636 W HCPCS: Performed by: SURGERY

## 2018-03-07 PROCEDURE — 25000242 PHARM REV CODE 250 ALT 637 W/ HCPCS: Performed by: NURSE PRACTITIONER

## 2018-03-07 PROCEDURE — C9113 INJ PANTOPRAZOLE SODIUM, VIA: HCPCS | Performed by: NURSE PRACTITIONER

## 2018-03-07 PROCEDURE — 25000003 PHARM REV CODE 250: Performed by: SURGERY

## 2018-03-07 PROCEDURE — 25000003 PHARM REV CODE 250: Performed by: INTERNAL MEDICINE

## 2018-03-07 PROCEDURE — 20600001 HC STEP DOWN PRIVATE ROOM

## 2018-03-07 PROCEDURE — 84100 ASSAY OF PHOSPHORUS: CPT

## 2018-03-07 PROCEDURE — 63600175 PHARM REV CODE 636 W HCPCS: Performed by: NURSE PRACTITIONER

## 2018-03-07 PROCEDURE — 63600175 PHARM REV CODE 636 W HCPCS: Performed by: INTERNAL MEDICINE

## 2018-03-07 PROCEDURE — 83735 ASSAY OF MAGNESIUM: CPT

## 2018-03-07 PROCEDURE — 94799 UNLISTED PULMONARY SVC/PX: CPT

## 2018-03-07 PROCEDURE — 94640 AIRWAY INHALATION TREATMENT: CPT

## 2018-03-07 PROCEDURE — 36415 COLL VENOUS BLD VENIPUNCTURE: CPT

## 2018-03-07 PROCEDURE — 82330 ASSAY OF CALCIUM: CPT

## 2018-03-07 PROCEDURE — 85610 PROTHROMBIN TIME: CPT

## 2018-03-07 PROCEDURE — 85025 COMPLETE CBC W/AUTO DIFF WBC: CPT | Mod: 91

## 2018-03-07 PROCEDURE — 25000003 PHARM REV CODE 250: Performed by: NURSE PRACTITIONER

## 2018-03-07 RX ORDER — PANTOPRAZOLE SODIUM 40 MG/1
40 TABLET, DELAYED RELEASE ORAL DAILY
Status: DISCONTINUED | OUTPATIENT
Start: 2018-03-08 | End: 2018-03-09 | Stop reason: HOSPADM

## 2018-03-07 RX ORDER — HYDROCHLOROTHIAZIDE 25 MG/1
25 TABLET ORAL DAILY
Status: DISCONTINUED | OUTPATIENT
Start: 2018-03-07 | End: 2018-03-09 | Stop reason: HOSPADM

## 2018-03-07 RX ORDER — TAMSULOSIN HYDROCHLORIDE 0.4 MG/1
0.4 CAPSULE ORAL DAILY
Status: DISCONTINUED | OUTPATIENT
Start: 2018-03-07 | End: 2018-03-09 | Stop reason: HOSPADM

## 2018-03-07 RX ORDER — IBUPROFEN 200 MG
24 TABLET ORAL
Status: DISCONTINUED | OUTPATIENT
Start: 2018-03-07 | End: 2018-03-09 | Stop reason: HOSPADM

## 2018-03-07 RX ORDER — TAMSULOSIN HYDROCHLORIDE 0.4 MG/1
0.4 CAPSULE ORAL DAILY
Status: DISCONTINUED | OUTPATIENT
Start: 2018-03-07 | End: 2018-03-07

## 2018-03-07 RX ORDER — CALCIUM CARBONATE 500(1250)
500 TABLET ORAL ONCE
Status: COMPLETED | OUTPATIENT
Start: 2018-03-07 | End: 2018-03-07

## 2018-03-07 RX ORDER — IBUPROFEN 200 MG
16 TABLET ORAL
Status: DISCONTINUED | OUTPATIENT
Start: 2018-03-07 | End: 2018-03-09 | Stop reason: HOSPADM

## 2018-03-07 RX ORDER — GLUCAGON 1 MG
1 KIT INJECTION
Status: DISCONTINUED | OUTPATIENT
Start: 2018-03-07 | End: 2018-03-09 | Stop reason: HOSPADM

## 2018-03-07 RX ORDER — INSULIN ASPART 100 [IU]/ML
3-5 INJECTION, SOLUTION INTRAVENOUS; SUBCUTANEOUS
Status: DISCONTINUED | OUTPATIENT
Start: 2018-03-07 | End: 2018-03-08

## 2018-03-07 RX ORDER — SODIUM,POTASSIUM PHOSPHATES 280-250MG
2 POWDER IN PACKET (EA) ORAL ONCE
Status: COMPLETED | OUTPATIENT
Start: 2018-03-07 | End: 2018-03-07

## 2018-03-07 RX ADMIN — CALCIUM 500 MG: 500 TABLET ORAL at 12:03

## 2018-03-07 RX ADMIN — AMLODIPINE BESYLATE 10 MG: 10 TABLET ORAL at 08:03

## 2018-03-07 RX ADMIN — POTASSIUM & SODIUM PHOSPHATES POWDER PACK 280-160-250 MG 2 PACKET: 280-160-250 PACK at 11:03

## 2018-03-07 RX ADMIN — LOVASTATIN 20 MG: 20 TABLET ORAL at 09:03

## 2018-03-07 RX ADMIN — SODIUM CHLORIDE 2.1 UNITS/HR: 9 INJECTION, SOLUTION INTRAVENOUS at 01:03

## 2018-03-07 RX ADMIN — SODIUM CHLORIDE 2.4 UNITS/HR: 9 INJECTION, SOLUTION INTRAVENOUS at 07:03

## 2018-03-07 RX ADMIN — INSULIN ASPART 5 UNITS: 100 INJECTION, SOLUTION INTRAVENOUS; SUBCUTANEOUS at 05:03

## 2018-03-07 RX ADMIN — METOPROLOL TARTRATE 25 MG: 25 TABLET ORAL at 09:03

## 2018-03-07 RX ADMIN — SODIUM CHLORIDE 3 UNITS/HR: 9 INJECTION, SOLUTION INTRAVENOUS at 08:03

## 2018-03-07 RX ADMIN — SODIUM CHLORIDE 6.5 UNITS/HR: 9 INJECTION, SOLUTION INTRAVENOUS at 11:03

## 2018-03-07 RX ADMIN — DEXTROSE MONOHYDRATE, SODIUM CHLORIDE, AND POTASSIUM CHLORIDE: 50; 4.5; 1.49 INJECTION, SOLUTION INTRAVENOUS at 09:03

## 2018-03-07 RX ADMIN — TAMSULOSIN HYDROCHLORIDE 0.4 MG: 0.4 CAPSULE ORAL at 11:03

## 2018-03-07 RX ADMIN — DEXTROSE MONOHYDRATE, SODIUM CHLORIDE, AND POTASSIUM CHLORIDE: 50; 4.5; 1.49 INJECTION, SOLUTION INTRAVENOUS at 08:03

## 2018-03-07 RX ADMIN — ENOXAPARIN SODIUM 40 MG: 100 INJECTION SUBCUTANEOUS at 05:03

## 2018-03-07 RX ADMIN — SODIUM CHLORIDE 3.5 UNITS/HR: 9 INJECTION, SOLUTION INTRAVENOUS at 09:03

## 2018-03-07 RX ADMIN — SODIUM CHLORIDE 3.3 UNITS/HR: 9 INJECTION, SOLUTION INTRAVENOUS at 12:03

## 2018-03-07 RX ADMIN — HYDROCHLOROTHIAZIDE 25 MG: 25 TABLET ORAL at 05:03

## 2018-03-07 RX ADMIN — SODIUM CHLORIDE 2.4 UNITS/HR: 9 INJECTION, SOLUTION INTRAVENOUS at 02:03

## 2018-03-07 RX ADMIN — METOPROLOL TARTRATE 25 MG: 25 TABLET ORAL at 08:03

## 2018-03-07 RX ADMIN — SODIUM CHLORIDE 1.8 UNITS/HR: 9 INJECTION, SOLUTION INTRAVENOUS at 03:03

## 2018-03-07 RX ADMIN — SODIUM CHLORIDE 5.5 UNITS/HR: 9 INJECTION, SOLUTION INTRAVENOUS at 10:03

## 2018-03-07 RX ADMIN — INSULIN ASPART 1 UNITS: 100 INJECTION, SOLUTION INTRAVENOUS; SUBCUTANEOUS at 09:03

## 2018-03-07 RX ADMIN — LEVALBUTEROL HYDROCHLORIDE 0.63 MG: 0.63 SOLUTION RESPIRATORY (INHALATION) at 07:03

## 2018-03-07 RX ADMIN — PANTOPRAZOLE SODIUM 40 MG: 40 INJECTION, POWDER, FOR SOLUTION INTRAVENOUS at 08:03

## 2018-03-07 RX ADMIN — SODIUM CHLORIDE 0.6 UNITS/HR: 9 INJECTION, SOLUTION INTRAVENOUS at 05:03

## 2018-03-07 NOTE — ASSESSMENT & PLAN NOTE
72 yo M with rupture of left lobe liver mass, now stable H&H    - NPO   - Encourage out of bed and ambulation

## 2018-03-07 NOTE — PROGRESS NOTES
[facility]  General Surgery  Progress Note    Subjective:     History of Present Illness:  74 yo M with h/o HTN, DM (not on insulin last A1c 11/17 7.1), seizure disorder presented to [facility] with pre-syncopal episode, epigastric abdominal pain. Symptoms started suddenly around 1 pm today. Had been active earlier in the day hanging drywall, denies trauma. Onset of symptoms associated with cold sweats. No nausea. CT noncon at OSH showed left lobe liver mass with associated rupture and hemoperitoneum. No contrast given 2/2 HEATHER (Cr 1.7). HDS at OSH. Denies known liver or kidney disease.     No previous abdominal surgeries.     Post-Op Info:  Procedure(s) (LRB):  HEPATECTOMY (Left)  CHOLECYSTECTOMY (N/A)  EVACUATION-HEMATOMA (N/A)  ULTRASOUND (N/A)   2 Days Post-Op     Interval History: No acute events overnight. Insulin gtt for hyperglycemia. Tolerating clear liquid diet, advance as tolerated, no nausea/vomiting. H/H dropped from 10.9/30.8 to 8.8/26.0. WBC elevated to 18.66 from 14. Pain well controlled on PCA, no nausea/vomiting. James removed this morning. On the pathway.    Medications:  Continuous Infusions:   dextrose 5 % and 0.45 % NaCl with KCl 20 mEq 120 mL/hr at 03/06/18 2307    hydromorphone in 0.9 % NaCl 6 mg/30 ml      insulin (HUMAN R) infusion (adults) 1.8 Units/hr (03/07/18 0305)     Scheduled Meds:   amLODIPine  10 mg Oral Daily    enoxaparin  40 mg Subcutaneous Daily    levalbuterol  0.63 mg Nebulization Q8H WA    lovastatin  20 mg Oral QHS    metoprolol tartrate  25 mg Oral BID    pantoprazole  40 mg Intravenous Daily     PRN Meds:dextrose 50%, dextrose 50%, glucagon (human recombinant), hydrALAZINE, insulin aspart U-100, naloxone, ondansetron     Review of patient's allergies indicates:  No Known Allergies  Objective:     Vital Signs (Most Recent):  Temp: 98.7 °F (37.1 °C) (03/07/18 0406)  Pulse: 82 (03/07/18 0406)  Resp: 16 (03/07/18 0406)  BP: (!) 118/57 (03/07/18  0406)  SpO2: 95 % (03/07/18 0406) Vital Signs (24h Range):  Temp:  [97.9 °F (36.6 °C)-98.7 °F (37.1 °C)] 98.7 °F (37.1 °C)  Pulse:  [] 82  Resp:  [11-20] 16  SpO2:  [3 %-97 %] 95 %  BP: (118-158)/(56-72) 118/57  Arterial Line BP: ()/(75) 120/75     Weight: 81.6 kg (180 lb)  Body mass index is 28.19 kg/m².    Intake/Output - Last 3 Shifts       03/05 0700 - 03/06 0659 03/06 0700 - 03/07 0659    P.O. 0 1500    I.V. (mL/kg) 5585.9 (68.5) 2644 (32.4)    Total Intake(mL/kg) 5585.9 (68.5) 4144 (50.8)    Urine (mL/kg/hr) 1265 (0.6) 1475 (0.8)    Blood 600 (0.3)     Total Output 1865 1475    Net +3720.9 +2669                Physical Exam   Constitutional: He is oriented to person, place, and time. He appears well-developed and well-nourished.   HENT:   Head: Normocephalic.   Eyes: Pupils are equal, round, and reactive to light.   Neck: Normal range of motion.   Cardiovascular: Normal rate, regular rhythm and normal heart sounds.    Pulmonary/Chest: Effort normal and breath sounds normal.   Decreased at bases   Abdominal: Soft. He exhibits no distension. There is tenderness.   Hypoactive bowel sounds,  Midline incision with dermabond, Incision c/d/i   Musculoskeletal: Normal range of motion.   Neurological: He is alert and oriented to person, place, and time.   Skin: Skin is warm and dry.   Psychiatric: He has a normal mood and affect. His behavior is normal. Judgment and thought content normal.   Nursing note and vitals reviewed.      Significant Labs:  CBC:     Recent Labs  Lab 03/06/18  0432   WBC 14.00*   RBC 3.68*   HGB 10.9*   HCT 30.8*      MCV 84   MCH 29.6   MCHC 35.4     CMP:     Recent Labs  Lab 03/06/18  0432   *   CALCIUM 7.9*   ALBUMIN 2.7*   PROT 5.8*      K 4.9   CO2 19*      BUN 27*   CREATININE 1.2   ALKPHOS 58   *   *   BILITOT 0.8       Significant Diagnostics:  I have reviewed and interpreted all pertinent imaging results/findings within the past 24  hours.    Assessment/Plan:     * Hemoperitoneum    72 yo M with rupture of left lobe liver mass, now POD#2 left hepatic lobectomy    - trend daily labs, H/H dropped this morning, continue to monitor  - pain well controlled on PCA pump  - continue home medications  - castro overnight with adequate UOP- d/c this morning, patient had urinary retention requiring straight cath, flomax ordered. Plan to place castro if patient is unable to void by later today  - replace electrolytes PRN  - ambulate twice in halls today, PT/OT on board  - insulin gtt for hyperglycemia, continue per endocrine  - tolerated clear liquid diet overnight, advance to diabetic diet today  - decrease IVF to 50ml/hr  - strict Is and Os  - DVT prophylaxis- on enoxaparin, PPI  - pulmonary toilet  - start D/C planning        Liver mass, left lobe    S/p Left Hepatic Lobectomy (Seg 2, 3, most of 4a/b) and cholecystectomy on 3/5/2018    Sugar free clear liquids  DVT/GI prophylaxis  PCA, transition to PO pain meds today  Home medications  PT/OT  On the Pathway  Daily labs  D/c castro today  Will discuss with staff            DM (diabetes mellitus)    Insulin gtt        HTN (hypertension)    Home medications            Jacqueline Edouard MD  General Surgery  [facility]

## 2018-03-07 NOTE — ASSESSMENT & PLAN NOTE
72 yo M with rupture of left lobe liver mass, now POD#2 left hepatic lobectomy    - trend daily labs, H/H dropped this morning, continue to monitor  - pain well controlled on PCA pump  - continue home medications  - castro overnight with adequate UOP- d/c this morning, patient had urinary retention requiring straight cath, flomax ordered. Plan to place castro if patient is unable to void by later today  - replace electrolytes PRN  - ambulate twice in halls today, PT/OT on board  - insulin gtt for hyperglycemia, continue per endocrine  - tolerated clear liquid diet overnight, advance to diabetic diet today  - decrease IVF to 50ml/hr  - strict Is and Os  - DVT prophylaxis- on enoxaparin, PPI  - pulmonary toilet  - start D/C planning

## 2018-03-07 NOTE — SUBJECTIVE & OBJECTIVE
Interval History: No acute events overnight. Insulin gtt for hyperglycemia. Tolerating clear liquid diet, advance as tolerated, no nausea/vomiting. H/H dropped from 10.9/30.8 to 8.8/26.0. WBC elevated to 18.66 from 14. Pain well controlled on PCA, no nausea/vomiting. James removed this morning. On the pathway.    Medications:  Continuous Infusions:   dextrose 5 % and 0.45 % NaCl with KCl 20 mEq 120 mL/hr at 03/06/18 2307    hydromorphone in 0.9 % NaCl 6 mg/30 ml      insulin (HUMAN R) infusion (adults) 1.8 Units/hr (03/07/18 0305)     Scheduled Meds:   amLODIPine  10 mg Oral Daily    enoxaparin  40 mg Subcutaneous Daily    levalbuterol  0.63 mg Nebulization Q8H WA    lovastatin  20 mg Oral QHS    metoprolol tartrate  25 mg Oral BID    pantoprazole  40 mg Intravenous Daily     PRN Meds:dextrose 50%, dextrose 50%, glucagon (human recombinant), hydrALAZINE, insulin aspart U-100, naloxone, ondansetron     Review of patient's allergies indicates:  No Known Allergies  Objective:     Vital Signs (Most Recent):  Temp: 98.7 °F (37.1 °C) (03/07/18 0406)  Pulse: 82 (03/07/18 0406)  Resp: 16 (03/07/18 0406)  BP: (!) 118/57 (03/07/18 0406)  SpO2: 95 % (03/07/18 0406) Vital Signs (24h Range):  Temp:  [97.9 °F (36.6 °C)-98.7 °F (37.1 °C)] 98.7 °F (37.1 °C)  Pulse:  [] 82  Resp:  [11-20] 16  SpO2:  [3 %-97 %] 95 %  BP: (118-158)/(56-72) 118/57  Arterial Line BP: ()/(75) 120/75     Weight: 81.6 kg (180 lb)  Body mass index is 28.19 kg/m².    Intake/Output - Last 3 Shifts       03/05 0700 - 03/06 0659 03/06 0700 - 03/07 0659    P.O. 0 1500    I.V. (mL/kg) 5585.9 (68.5) 2644 (32.4)    Total Intake(mL/kg) 5585.9 (68.5) 4144 (50.8)    Urine (mL/kg/hr) 1265 (0.6) 1475 (0.8)    Blood 600 (0.3)     Total Output 1865 1475    Net +3720.9 +2669                Physical Exam   Constitutional: He is oriented to person, place, and time. He appears well-developed and well-nourished.   HENT:   Head: Normocephalic.   Eyes:  Pupils are equal, round, and reactive to light.   Neck: Normal range of motion.   Cardiovascular: Normal rate, regular rhythm and normal heart sounds.    Pulmonary/Chest: Effort normal and breath sounds normal.   Decreased at bases   Abdominal: Soft. He exhibits no distension. There is tenderness.   Hypoactive bowel sounds,  Midline incision with dermabond, Incision c/d/i   Musculoskeletal: Normal range of motion.   Neurological: He is alert and oriented to person, place, and time.   Skin: Skin is warm and dry.   Psychiatric: He has a normal mood and affect. His behavior is normal. Judgment and thought content normal.   Nursing note and vitals reviewed.      Significant Labs:  CBC:     Recent Labs  Lab 03/06/18  0432   WBC 14.00*   RBC 3.68*   HGB 10.9*   HCT 30.8*      MCV 84   MCH 29.6   MCHC 35.4     CMP:     Recent Labs  Lab 03/06/18  0432   *   CALCIUM 7.9*   ALBUMIN 2.7*   PROT 5.8*      K 4.9   CO2 19*      BUN 27*   CREATININE 1.2   ALKPHOS 58   *   *   BILITOT 0.8       Significant Diagnostics:  I have reviewed and interpreted all pertinent imaging results/findings within the past 24 hours.

## 2018-03-07 NOTE — PLAN OF CARE
Problem: Patient Care Overview  Goal: Plan of Care Review  Outcome: Ongoing (interventions implemented as appropriate)  Pt and his spouse verbalize understanding to the POC with no further questions at this time. AAO x4 with VSS on RA. ML abdominal incision LILIAN with dermabond. No drains. IVFs infusing as ordered with Dilaudid PCA in use for pain. Pt verbalizes minimal PCA use needed due to mild pain at this time. Advanced and tolerating bariatric clear liquid diet with no c/o nausea. Q1 hr glucose monitoring with IV insulin gtt per protocol. Pt up to chair most of shift. Pt also ambulated halls and room with PT standby assist. Remains free of falls and injury throughout shift. James intact with adequate UOP noted. Continuous tele and pulse ox maintained with NSR to sinus tach with MD aware. Wife at bedside. Bed in lowest position with call light within reach. WCTM

## 2018-03-07 NOTE — PLAN OF CARE
Problem: Patient Care Overview  Goal: Plan of Care Review  Outcome: Ongoing (interventions implemented as appropriate)  No acute events overnight. Q1 accuchecks continued. Insulin drip titrating per algorithm. Pain controlled with PCA pump. James care and education complete, good urine output noted. Abdominal incision clean and intact. Tolerating sugar free clear liquids with no nausea reported. Repositions in bed independently. SCDs on while in bed. Vitals stable and within baseline since admission on room air. Normal sinus rhythm on telemetry. Deep breathing and incentive spirometer use encouraged. Ambulation in hallways during daytime hours encouraged as well. Patient and spouse understand and agree with plan of care. Alert and oriented when awake, slept well between care. Instructed to call for assistance as needed. Bed in lowest position and brake set. Frequent rounds made for patient's safety. See flowsheets for detailed assessment. White board in patient's room updated accordingly. Continuing to monitor.

## 2018-03-07 NOTE — PROGRESS NOTES
"[facility]  Endocrinology  Progress Note    Admit Date: 3/1/2018     Reason for Consult: Management of type 2 diabetes, Hyperglycemia     Surgical Procedure and Date: Liver resection 3/5/18     Diabetes diagnosis year:   "a few years ago"    Home Diabetes Medications:    No medications    How often checking glucose at home? n/a     Diabetes Complications include:     Hyperglycemia    Complicating diabetes co morbidities:   none      HPI:   Patient is a 73 y.o. male with a diagnosis of HTN, T2DM, and seizure disorder who presented from OSH with newly diagnosed left lobe liver mass that ruptured resulting in hemoperitoneum.  S/p liver resection.  Endocrinology consulted for elevated BG.    Patient notes that he has never required medication for DM.  A1c 7.8%.  Prior to procedure, patient was managed with low correction insulin.  Recent BG over 300.           Interval HPI:   Overnight events:  AFVSS  No complaints this morning.  Tolerating clear liquid diet.   No hypoglycemia or nausea.     On intensive insulin gtt, with advancement of diet.    BP (!) 157/67 (BP Location: Left arm, Patient Position: Sitting)   Pulse 79   Temp 98.3 °F (36.8 °C) (Oral)   Resp 18   Ht 5' 7" (1.702 m)   Wt 81.6 kg (180 lb)   SpO2 96%   BMI 28.19 kg/m²       Labs Reviewed and Include      Recent Labs  Lab 03/07/18  0656   *   CALCIUM 8.1*   ALBUMIN 2.4*   PROT 5.4*      K 4.3   CO2 22*      BUN 24*   CREATININE 1.0   ALKPHOS 60   *   *   BILITOT 0.6     Lab Results   Component Value Date    WBC 22.18 (H) 03/07/2018    HGB 9.7 (L) 03/07/2018    HCT 28.9 (L) 03/07/2018    MCV 90 03/07/2018     03/07/2018     No results for input(s): TSH, FREET4 in the last 168 hours.  Lab Results   Component Value Date    HGBA1C 7.8 (H) 03/01/2018       Nutritional status:   Body mass index is 28.19 kg/m².  Lab Results   Component Value Date    ALBUMIN 2.4 (L) 03/07/2018    ALBUMIN 2.7 (L) " 03/06/2018    ALBUMIN 2.5 (L) 03/05/2018     No results found for: PREALBUMIN    Estimated Creatinine Clearance: 68.3 mL/min (based on SCr of 1 mg/dL).    Accu-Checks  Recent Labs      03/07/18   0405  03/07/18   0511  03/07/18   0611  03/07/18   0714  03/07/18   0814  03/07/18   0910  03/07/18   1023  03/07/18   1130  03/07/18   1252  03/07/18   1549   POCTGLUCOSE  144*  165*  170*  183*  190*  182*  195*  183*  117*  161*       Current Medications and/or Treatments Impacting Glycemic Control  Immunotherapy:  Immunosuppressants     None        Steroids:   Hormones     None        Pressors:    Autonomic Drugs     None        Hyperglycemia/Diabetes Medications: Antihyperglycemics     Start     Stop Route Frequency Ordered    03/01/18 2313  insulin aspart U-100 pen 0-5 Units      -- SubQ Every 6 hours PRN 03/01/18 2214          ASSESSMENT and PLAN    Liver mass, left lobe    Management per primary.  S/P resection.  Surgery/Stressors can increase insulin requirement.          DM (diabetes mellitus)    BG goal 140-180.   A1c 7.8%.    Patient on intensive insulin gtt.  Diet advanced to clear liquid, with plans for continued advancement as patient tolerates.    Advance to transition based on previous total insulin requirements.  Recevied 26u total yesterday.    Rate changed to 0.6u/h with stepdown parameters of 0.4u/h and 0.2u/h  Novolog 5u AC with advancement to DM diet.    Patient understands plan.    DISCHARGE PLANNING:  Regimen TBD.  Will base recommendations on inpatient requirements.  GFR will allow orals.  On statin and ACEi in outpatient setting.  Continue per primary.        HTN (hypertension)    Home regimen includes ACEi.  Held on admission secondary to HEATHER, which has since resolved.  Restart per primary team.              Clarice Sam MD  Endocrinology  [facility], Tova Gupta MD,  have personally taken the history and examined the patient and agree with the resident's  note as stated above.

## 2018-03-07 NOTE — ASSESSMENT & PLAN NOTE
S/p Left Hepatic Lobectomy (Seg 2, 3, most of 4a/b) and cholecystectomy on 3/5/2018    Sugar free clear liquids  DVT/GI prophylaxis  PCA, transition to PO pain meds today  Home medications  PT/OT  On the Pathway  Daily labs  D/c matthew today  Will discuss with staff

## 2018-03-07 NOTE — ASSESSMENT & PLAN NOTE
BG goal 140-180.   A1c 7.8%.    Patient on intensive insulin gtt.  Diet advanced to clear liquid, with plans for continued advancement as patient tolerates.    Advance to transition based on previous total insulin requirements.  Recevied 26u total yesterday.    Rate changed to 0.6u/h with stepdown parameters of 0.4u/h and 0.2u/h  Novolog 5u AC with advancement to DM diet.    Patient understands plan.    DISCHARGE PLANNING:  Regimen TBD.  Will base recommendations on inpatient requirements.  GFR will allow orals.  On statin and ACEi in outpatient setting.  Continue per primary.

## 2018-03-07 NOTE — SUBJECTIVE & OBJECTIVE
"Interval HPI:   Overnight events:  AFVSS  No complaints this morning.  Tolerating clear liquid diet.   No hypoglycemia or nausea.     On intensive insulin gtt, with advancement of diet.    BP (!) 157/67 (BP Location: Left arm, Patient Position: Sitting)   Pulse 79   Temp 98.3 °F (36.8 °C) (Oral)   Resp 18   Ht 5' 7" (1.702 m)   Wt 81.6 kg (180 lb)   SpO2 96%   BMI 28.19 kg/m²     Labs Reviewed and Include      Recent Labs  Lab 03/07/18  0656   *   CALCIUM 8.1*   ALBUMIN 2.4*   PROT 5.4*      K 4.3   CO2 22*      BUN 24*   CREATININE 1.0   ALKPHOS 60   *   *   BILITOT 0.6     Lab Results   Component Value Date    WBC 22.18 (H) 03/07/2018    HGB 9.7 (L) 03/07/2018    HCT 28.9 (L) 03/07/2018    MCV 90 03/07/2018     03/07/2018     No results for input(s): TSH, FREET4 in the last 168 hours.  Lab Results   Component Value Date    HGBA1C 7.8 (H) 03/01/2018       Nutritional status:   Body mass index is 28.19 kg/m².  Lab Results   Component Value Date    ALBUMIN 2.4 (L) 03/07/2018    ALBUMIN 2.7 (L) 03/06/2018    ALBUMIN 2.5 (L) 03/05/2018     No results found for: PREALBUMIN    Estimated Creatinine Clearance: 68.3 mL/min (based on SCr of 1 mg/dL).    Accu-Checks  Recent Labs      03/07/18   0405  03/07/18   0511  03/07/18   0611  03/07/18   0714  03/07/18   0814  03/07/18   0910  03/07/18   1023  03/07/18   1130  03/07/18   1252  03/07/18   1549   POCTGLUCOSE  144*  165*  170*  183*  190*  182*  195*  183*  117*  161*       Current Medications and/or Treatments Impacting Glycemic Control  Immunotherapy:  Immunosuppressants     None        Steroids:   Hormones     None        Pressors:    Autonomic Drugs     None        Hyperglycemia/Diabetes Medications: Antihyperglycemics     Start     Stop Route Frequency Ordered    03/01/18 2313  insulin aspart U-100 pen 0-5 Units      -- SubQ Every 6 hours PRN 03/01/18 2214        "

## 2018-03-08 LAB
ALBUMIN SERPL BCP-MCNC: 2.4 G/DL
ALP SERPL-CCNC: 78 U/L
ALT SERPL W/O P-5'-P-CCNC: 97 U/L
ANION GAP SERPL CALC-SCNC: 7 MMOL/L
AST SERPL-CCNC: 58 U/L
BASOPHILS # BLD AUTO: 0.03 K/UL
BASOPHILS NFR BLD: 0.2 %
BILIRUB SERPL-MCNC: 0.7 MG/DL
BUN SERPL-MCNC: 18 MG/DL
CA-I BLDV-SCNC: 1.14 MMOL/L
CALCIUM SERPL-MCNC: 8.5 MG/DL
CHLORIDE SERPL-SCNC: 103 MMOL/L
CO2 SERPL-SCNC: 24 MMOL/L
CREAT SERPL-MCNC: 1 MG/DL
DIFFERENTIAL METHOD: ABNORMAL
EOSINOPHIL # BLD AUTO: 0.1 K/UL
EOSINOPHIL NFR BLD: 0.5 %
ERYTHROCYTE [DISTWIDTH] IN BLOOD BY AUTOMATED COUNT: 12.7 %
EST. GFR  (AFRICAN AMERICAN): >60 ML/MIN/1.73 M^2
EST. GFR  (NON AFRICAN AMERICAN): >60 ML/MIN/1.73 M^2
GLUCOSE SERPL-MCNC: 177 MG/DL
HCT VFR BLD AUTO: 25.4 %
HGB BLD-MCNC: 8.9 G/DL
IMM GRANULOCYTES # BLD AUTO: 0.17 K/UL
IMM GRANULOCYTES NFR BLD AUTO: 1.2 %
INR PPP: 0.9
LYMPHOCYTES # BLD AUTO: 1.5 K/UL
LYMPHOCYTES NFR BLD: 10.3 %
MAGNESIUM SERPL-MCNC: 2 MG/DL
MCH RBC QN AUTO: 30.3 PG
MCHC RBC AUTO-ENTMCNC: 35 G/DL
MCV RBC AUTO: 86 FL
MONOCYTES # BLD AUTO: 1 K/UL
MONOCYTES NFR BLD: 6.9 %
NEUTROPHILS # BLD AUTO: 11.9 K/UL
NEUTROPHILS NFR BLD: 80.9 %
NRBC BLD-RTO: 0 /100 WBC
PHOSPHATE SERPL-MCNC: 1.8 MG/DL
PLATELET # BLD AUTO: 224 K/UL
PMV BLD AUTO: 9.9 FL
POCT GLUCOSE: 200 MG/DL (ref 70–110)
POCT GLUCOSE: 201 MG/DL (ref 70–110)
POCT GLUCOSE: 202 MG/DL (ref 70–110)
POCT GLUCOSE: 216 MG/DL (ref 70–110)
POTASSIUM SERPL-SCNC: 4.1 MMOL/L
PROT SERPL-MCNC: 5.8 G/DL
PROTHROMBIN TIME: 10.1 SEC
RBC # BLD AUTO: 2.94 M/UL
SODIUM SERPL-SCNC: 134 MMOL/L
WBC # BLD AUTO: 14.71 K/UL

## 2018-03-08 PROCEDURE — 63600175 PHARM REV CODE 636 W HCPCS: Performed by: INTERNAL MEDICINE

## 2018-03-08 PROCEDURE — 82330 ASSAY OF CALCIUM: CPT

## 2018-03-08 PROCEDURE — 36415 COLL VENOUS BLD VENIPUNCTURE: CPT

## 2018-03-08 PROCEDURE — 20600001 HC STEP DOWN PRIVATE ROOM

## 2018-03-08 PROCEDURE — 25000003 PHARM REV CODE 250: Performed by: STUDENT IN AN ORGANIZED HEALTH CARE EDUCATION/TRAINING PROGRAM

## 2018-03-08 PROCEDURE — 85610 PROTHROMBIN TIME: CPT

## 2018-03-08 PROCEDURE — 63600175 PHARM REV CODE 636 W HCPCS: Performed by: SURGERY

## 2018-03-08 PROCEDURE — 25000003 PHARM REV CODE 250: Performed by: NURSE PRACTITIONER

## 2018-03-08 PROCEDURE — G8987 SELF CARE CURRENT STATUS: HCPCS | Mod: CK

## 2018-03-08 PROCEDURE — 97110 THERAPEUTIC EXERCISES: CPT

## 2018-03-08 PROCEDURE — 85025 COMPLETE CBC W/AUTO DIFF WBC: CPT

## 2018-03-08 PROCEDURE — 97535 SELF CARE MNGMENT TRAINING: CPT

## 2018-03-08 PROCEDURE — 97116 GAIT TRAINING THERAPY: CPT

## 2018-03-08 PROCEDURE — G8988 SELF CARE GOAL STATUS: HCPCS | Mod: CI

## 2018-03-08 PROCEDURE — 80053 COMPREHEN METABOLIC PANEL: CPT

## 2018-03-08 PROCEDURE — 25000003 PHARM REV CODE 250: Performed by: SURGERY

## 2018-03-08 PROCEDURE — 83735 ASSAY OF MAGNESIUM: CPT

## 2018-03-08 PROCEDURE — 84100 ASSAY OF PHOSPHORUS: CPT

## 2018-03-08 RX ORDER — TAMSULOSIN HYDROCHLORIDE 0.4 MG/1
0.4 CAPSULE ORAL DAILY
Qty: 7 CAPSULE | Refills: 0 | Status: SHIPPED | OUTPATIENT
Start: 2018-03-09 | End: 2018-07-26

## 2018-03-08 RX ORDER — OXYCODONE HYDROCHLORIDE 5 MG/1
10 TABLET ORAL EVERY 6 HOURS PRN
Status: DISCONTINUED | OUTPATIENT
Start: 2018-03-08 | End: 2018-03-09 | Stop reason: HOSPADM

## 2018-03-08 RX ORDER — LANCETS
EACH MISCELLANEOUS
Qty: 200 EACH | Refills: 5 | Status: SHIPPED | OUTPATIENT
Start: 2018-03-08 | End: 2018-05-08

## 2018-03-08 RX ORDER — ENOXAPARIN SODIUM 100 MG/ML
40 INJECTION SUBCUTANEOUS DAILY
Qty: 5.6 ML | Refills: 0 | Status: SHIPPED | OUTPATIENT
Start: 2018-03-08 | End: 2018-03-22

## 2018-03-08 RX ORDER — SODIUM,POTASSIUM PHOSPHATES 280-250MG
2 POWDER IN PACKET (EA) ORAL ONCE
Status: COMPLETED | OUTPATIENT
Start: 2018-03-08 | End: 2018-03-08

## 2018-03-08 RX ORDER — METOPROLOL TARTRATE 25 MG/1
25 TABLET, FILM COATED ORAL DAILY
Qty: 30 TABLET | Refills: 1 | Status: SHIPPED | OUTPATIENT
Start: 2018-03-08 | End: 2018-03-09 | Stop reason: HOSPADM

## 2018-03-08 RX ORDER — METOPROLOL TARTRATE 25 MG/1
25 TABLET, FILM COATED ORAL DAILY
Status: DISCONTINUED | OUTPATIENT
Start: 2018-03-09 | End: 2018-03-09 | Stop reason: HOSPADM

## 2018-03-08 RX ORDER — INSULIN ASPART 100 [IU]/ML
5 INJECTION, SOLUTION INTRAVENOUS; SUBCUTANEOUS
Status: DISCONTINUED | OUTPATIENT
Start: 2018-03-08 | End: 2018-03-09 | Stop reason: HOSPADM

## 2018-03-08 RX ORDER — OXYCODONE HYDROCHLORIDE 5 MG/1
5 TABLET ORAL EVERY 6 HOURS PRN
Status: DISCONTINUED | OUTPATIENT
Start: 2018-03-08 | End: 2018-03-09 | Stop reason: HOSPADM

## 2018-03-08 RX ORDER — SODIUM,POTASSIUM PHOSPHATES 280-250MG
POWDER IN PACKET (EA) ORAL
Status: DISPENSED
Start: 2018-03-08 | End: 2018-03-09

## 2018-03-08 RX ORDER — INSULIN PUMP SYRINGE, 3 ML
EACH MISCELLANEOUS
Qty: 1 EACH | Refills: 0 | Status: SHIPPED | OUTPATIENT
Start: 2018-03-08 | End: 2019-03-08

## 2018-03-08 RX ORDER — INSULIN ASPART 100 [IU]/ML
5 INJECTION, SOLUTION INTRAVENOUS; SUBCUTANEOUS
Qty: 5 SYRINGE | Refills: 6 | Status: SHIPPED | OUTPATIENT
Start: 2018-03-08 | End: 2018-03-14 | Stop reason: SDUPTHER

## 2018-03-08 RX ADMIN — INSULIN ASPART 2 UNITS: 100 INJECTION, SOLUTION INTRAVENOUS; SUBCUTANEOUS at 06:03

## 2018-03-08 RX ADMIN — INSULIN ASPART 5 UNITS: 100 INJECTION, SOLUTION INTRAVENOUS; SUBCUTANEOUS at 02:03

## 2018-03-08 RX ADMIN — INSULIN ASPART 5 UNITS: 100 INJECTION, SOLUTION INTRAVENOUS; SUBCUTANEOUS at 10:03

## 2018-03-08 RX ADMIN — HYDROCHLOROTHIAZIDE 25 MG: 25 TABLET ORAL at 08:03

## 2018-03-08 RX ADMIN — AMLODIPINE BESYLATE 10 MG: 10 TABLET ORAL at 08:03

## 2018-03-08 RX ADMIN — POTASSIUM & SODIUM PHOSPHATES POWDER PACK 280-160-250 MG 2 PACKET: 280-160-250 PACK at 12:03

## 2018-03-08 RX ADMIN — INSULIN ASPART 2 UNITS: 100 INJECTION, SOLUTION INTRAVENOUS; SUBCUTANEOUS at 12:03

## 2018-03-08 RX ADMIN — METOPROLOL TARTRATE 25 MG: 25 TABLET ORAL at 08:03

## 2018-03-08 RX ADMIN — INSULIN DETEMIR 15 UNITS: 100 INJECTION, SOLUTION SUBCUTANEOUS at 09:03

## 2018-03-08 RX ADMIN — PANTOPRAZOLE SODIUM 40 MG: 40 TABLET, DELAYED RELEASE ORAL at 08:03

## 2018-03-08 RX ADMIN — TAMSULOSIN HYDROCHLORIDE 0.4 MG: 0.4 CAPSULE ORAL at 08:03

## 2018-03-08 RX ADMIN — LOVASTATIN 20 MG: 20 TABLET ORAL at 09:03

## 2018-03-08 RX ADMIN — ENOXAPARIN SODIUM 40 MG: 100 INJECTION SUBCUTANEOUS at 05:03

## 2018-03-08 NOTE — PROGRESS NOTES
[facility]  General Surgery  Progress Note    Subjective:     History of Present Illness:  74 yo M with h/o HTN, DM (not on insulin last A1c 11/17 7.1), seizure disorder presented to [facility] with pre-syncopal episode, epigastric abdominal pain. Symptoms started suddenly around 1 pm today. Had been active earlier in the day hanging drywall, denies trauma. Onset of symptoms associated with cold sweats. No nausea. CT noncon at OSH showed left lobe liver mass with associated rupture and hemoperitoneum. No contrast given 2/2 HEATHER (Cr 1.7). HDS at OSH. Denies known liver or kidney disease.     No previous abdominal surgeries.     Post-Op Info:  Procedure(s) (LRB):  HEPATECTOMY (Left)  CHOLECYSTECTOMY (N/A)  EVACUATION-HEMATOMA (N/A)  ULTRASOUND (N/A)   3 Days Post-Op     Interval History: No acute events overnight. Insulin gtt for hyperglycemia. Tolerating diabetic diet, no nausea/vomiting. H/H low but stable. WBC within normal limits. Pain well controlled on PCA. James in place, UOP adequate. On the pathway.    Medications:  Continuous Infusions:   insulin (HUMAN R) infusion (adults) 0.6 Units/hr (03/07/18 1713)     Scheduled Meds:   amLODIPine  10 mg Oral Daily    enoxaparin  40 mg Subcutaneous Daily    hydroCHLOROthiazide  25 mg Oral Daily    insulin aspart U-100  3-5 Units Subcutaneous TIDWM    levalbuterol  0.63 mg Nebulization Q8H WA    lovastatin  20 mg Oral QHS    metoprolol tartrate  25 mg Oral BID    pantoprazole  40 mg Oral Daily    potassium, sodium phosphates  2 packet Oral Once    tamsulosin  0.4 mg Oral Daily     PRN Meds:dextrose 50%, dextrose 50%, glucagon (human recombinant), glucose, glucose, hydrALAZINE, insulin aspart U-100, naloxone, ondansetron, oxyCODONE, oxyCODONE     Review of patient's allergies indicates:  No Known Allergies  Objective:     Vital Signs (Most Recent):  Temp: 99.2 °F (37.3 °C) (03/08/18 0447)  Pulse: 91 (03/08/18 0700)  Resp: 18 (03/08/18  0447)  BP: 135/61 (03/08/18 0447)  SpO2: (!) 94 % (03/08/18 0447) Vital Signs (24h Range):  Temp:  [98.3 °F (36.8 °C)-99.3 °F (37.4 °C)] 99.2 °F (37.3 °C)  Pulse:  [] 91  Resp:  [18-20] 18  SpO2:  [94 %-97 %] 94 %  BP: (111-157)/(54-94) 135/61     Weight: 81.6 kg (180 lb)  Body mass index is 28.19 kg/m².    Intake/Output - Last 3 Shifts       03/06 0700 - 03/07 0659 03/07 0700 - 03/08 0659 03/08 0700 - 03/09 0659    P.O. 1600 1080     I.V. (mL/kg) 2873.4 (35.2) 656.8 (8)     Total Intake(mL/kg) 4473.4 (54.8) 1736.8 (21.3)     Urine (mL/kg/hr) 1475 (0.8) 1250 (0.6)     Blood       Total Output 1475 1250      Net +2998.4 +486.8                   Physical Exam   Constitutional: He is oriented to person, place, and time. He appears well-developed and well-nourished.   HENT:   Head: Normocephalic.   Eyes: Pupils are equal, round, and reactive to light.   Neck: Normal range of motion.   Cardiovascular: Normal rate, regular rhythm and normal heart sounds.    Pulmonary/Chest: Effort normal and breath sounds normal.   Decreased at bases   Abdominal: Soft. He exhibits no distension. There is tenderness.   Midline incision with dermabond, Incision c/d/i. MARY drain in place with serosanguinous output.   Musculoskeletal: Normal range of motion.   Neurological: He is alert and oriented to person, place, and time.   Skin: Skin is warm and dry.   Psychiatric: He has a normal mood and affect. His behavior is normal. Judgment and thought content normal.   Nursing note and vitals reviewed.      Significant Labs:  CBC:     Recent Labs  Lab 03/08/18  0525   WBC 14.71*   RBC 2.94*   HGB 8.9*   HCT 25.4*      MCV 86   MCH 30.3   MCHC 35.0     CMP:     Recent Labs  Lab 03/08/18  0524   *   CALCIUM 8.5*   ALBUMIN 2.4*   PROT 5.8*   *   K 4.1   CO2 24      BUN 18   CREATININE 1.0   ALKPHOS 78   ALT 97*   AST 58*   BILITOT 0.7       Significant Diagnostics:  I have reviewed and interpreted all pertinent imaging  results/findings within the past 24 hours.    Assessment/Plan:     * Hemoperitoneum    74 yo M with rupture of left lobe liver mass, now POD#3 left hepatic lobectomy    - trend labs every other day, H/H stable, WBC within normal limits  - d/c PCA, start PO PRN pain meds  - continue home medications  - castro replaced due to urinary retention, on flomax, UOP adequate  - replace electrolytes PRN  - ambulate three times in halls today, PT/OT on board  - insulin gtt for hyperglycemia, continue per endocrine  - tolerating diabetic diet  - d/c mIVF  - strict Is and Os  - DVT prophylaxis- on enoxaparin, PPI  - pulmonary toilet  - on the pathway  - continue D/C planning        Liver mass, left lobe                DM (diabetes mellitus)    Insulin gtt        HTN (hypertension)    Home medications            Jacqueline Edouard MD  General Surgery  [facility]

## 2018-03-08 NOTE — PT/OT/SLP PROGRESS
Occupational Therapy   Treatment    Name: Jamison Mayes  MRN: [id number]  Admitting Diagnosis:  Hemoperitoneum  3 Days Post-Op    Recommendations:     Discharge Recommendations: home  Discharge Equipment Recommendations:   (TBD)  Barriers to discharge:  None    Subjective     Communicated with: RN prior to session.  Pain/Comfort:  · Pain Rating 1: 0/10  · Pain Addressed 1: Reposition, Distraction  · Pain Rating Post-Intervention 1: 0/10    Patients cultural, spiritual, Uatsdin conflicts given the current situation: none reported    Objective:     Patient found with: castro catheter, telemetry, pulse ox (continuous), central line    General Precautions: Standard, fall   Orthopedic Precautions:N/A   Braces: N/A     Occupational Performance:    Bed Mobility:    · Patient completed Scooting/Bridging with stand by assistance  · Patient completed Supine to Sit with stand by assistance and with side rail     Functional Mobility/Transfers:  · Patient completed Sit <> Stand Transfer with stand by assistance  with  no assistive device   · Patient completed Bed <> Chair Transfer using Step Transfer technique with stand by assistance with no assistive device  · Patient completed Toilet Transfer Step Transfer technique with stand by assistance with  no AD  · Functional Mobility: Pt ambulate from bed to bathroom, bathroom to chair with SBA using no AD    Activities of Daily Living:  · Grooming: setup assistance to brush teeth and wash face while standing at sink  · Toileting: supervision for clothing management and melissa cleaning    Patient left up in chair with all lines intact, call button in reach, RN notified and wife present    [facility] 6 Click:  [facility] Total Score: 19    Treatment & Education:  *IV dislodged while pt completing toileting; IV pumps paused and pressure applied. RN notified and able to remove IV until end of OT session. RN notified when OT session complete to reconnect and restart Iv.  *Pt educated on role of  OT/POC  *Pt and wife educated on pt ability to ambulate in hallway with RN assist  *White board/communication board updated  Education:    Assessment:     Jamison Mayes is a 73 y.o. male with a medical diagnosis of Hemoperitoneum.  He presents with weakness as limiting factor in safe functional mobility and self care.  Performance deficits affecting function are weakness, impaired endurance, impaired self care skills, impaired functional mobilty, gait instability, impaired balance, pain.      Rehab Prognosis:  Good; patient would benefit from acute skilled OT services to address these deficits and reach maximum level of function.       Plan:     Patient to be seen 3 x/week to address the above listed problems via self-care/home management, therapeutic activities, therapeutic exercises  · Plan of Care Expires: 04/05/18  · Plan of Care Reviewed with: patient, spouse    This Plan of care has been discussed with the patient who was involved in its development and understands and is in agreement with the identified goals and treatment plan    GOALS:    Occupational Therapy Goals        Problem: Occupational Therapy Goal    Goal Priority Disciplines Outcome Interventions   Occupational Therapy Goal     OT, PT/OT Ongoing (interventions implemented as appropriate)    Description:  Goals to be met by: 3/20/18     Patient will increase functional independence with ADLs by performing:    UE Dressing with Modified [place].  Grooming while standing at sink with Modified [place].  Toileting from toilet with Modified [place] for hygiene and clothing management.   Toilet transfer to toilet with Modified [place].                      Time Tracking:     OT Date of Treatment: 03/08/18  OT Start Time: 0903  OT Stop Time: 0929  OT Total Time (min): 26 min    Billable Minutes:Self Care/Home Management 26    Darcie Dasilva OT  3/8/2018

## 2018-03-08 NOTE — SUBJECTIVE & OBJECTIVE
Interval History: No acute events overnight. Insulin gtt for hyperglycemia. Tolerating diabetic diet, no nausea/vomiting. H/H low but stable. WBC within normal limits. Pain well controlled on PCA. James in place, UOP adequate. On the pathway.    Medications:  Continuous Infusions:   insulin (HUMAN R) infusion (adults) 0.6 Units/hr (03/07/18 1713)     Scheduled Meds:   amLODIPine  10 mg Oral Daily    enoxaparin  40 mg Subcutaneous Daily    hydroCHLOROthiazide  25 mg Oral Daily    insulin aspart U-100  3-5 Units Subcutaneous TIDWM    levalbuterol  0.63 mg Nebulization Q8H WA    lovastatin  20 mg Oral QHS    metoprolol tartrate  25 mg Oral BID    pantoprazole  40 mg Oral Daily    potassium, sodium phosphates  2 packet Oral Once    tamsulosin  0.4 mg Oral Daily     PRN Meds:dextrose 50%, dextrose 50%, glucagon (human recombinant), glucose, glucose, hydrALAZINE, insulin aspart U-100, naloxone, ondansetron, oxyCODONE, oxyCODONE     Review of patient's allergies indicates:  No Known Allergies  Objective:     Vital Signs (Most Recent):  Temp: 99.2 °F (37.3 °C) (03/08/18 0447)  Pulse: 91 (03/08/18 0700)  Resp: 18 (03/08/18 0447)  BP: 135/61 (03/08/18 0447)  SpO2: (!) 94 % (03/08/18 0447) Vital Signs (24h Range):  Temp:  [98.3 °F (36.8 °C)-99.3 °F (37.4 °C)] 99.2 °F (37.3 °C)  Pulse:  [] 91  Resp:  [18-20] 18  SpO2:  [94 %-97 %] 94 %  BP: (111-157)/(54-94) 135/61     Weight: 81.6 kg (180 lb)  Body mass index is 28.19 kg/m².    Intake/Output - Last 3 Shifts       03/06 0700 - 03/07 0659 03/07 0700 - 03/08 0659 03/08 0700 - 03/09 0659    P.O. 1600 1080     I.V. (mL/kg) 2873.4 (35.2) 656.8 (8)     Total Intake(mL/kg) 4473.4 (54.8) 1736.8 (21.3)     Urine (mL/kg/hr) 1475 (0.8) 1250 (0.6)     Blood       Total Output 1475 1250      Net +2998.4 +486.8                   Physical Exam   Constitutional: He is oriented to person, place, and time. He appears well-developed and well-nourished.   HENT:   Head:  Normocephalic.   Eyes: Pupils are equal, round, and reactive to light.   Neck: Normal range of motion.   Cardiovascular: Normal rate, regular rhythm and normal heart sounds.    Pulmonary/Chest: Effort normal and breath sounds normal.   Decreased at bases   Abdominal: Soft. He exhibits no distension. There is tenderness.   Midline incision with dermabond, Incision c/d/i. MARY drain in place with serosanguinous output.   Musculoskeletal: Normal range of motion.   Neurological: He is alert and oriented to person, place, and time.   Skin: Skin is warm and dry.   Psychiatric: He has a normal mood and affect. His behavior is normal. Judgment and thought content normal.   Nursing note and vitals reviewed.      Significant Labs:  CBC:     Recent Labs  Lab 03/08/18  0525   WBC 14.71*   RBC 2.94*   HGB 8.9*   HCT 25.4*      MCV 86   MCH 30.3   MCHC 35.0     CMP:     Recent Labs  Lab 03/08/18  0524   *   CALCIUM 8.5*   ALBUMIN 2.4*   PROT 5.8*   *   K 4.1   CO2 24      BUN 18   CREATININE 1.0   ALKPHOS 78   ALT 97*   AST 58*   BILITOT 0.7       Significant Diagnostics:  I have reviewed and interpreted all pertinent imaging results/findings within the past 24 hours.

## 2018-03-08 NOTE — PLAN OF CARE
Problem: Patient Care Overview  Goal: Plan of Care Review  Outcome: Ongoing (interventions implemented as appropriate)  Patient A/Ox4. Care plan reviewed with patient-verbalizes understanding. VSS. Tolerating diet well. No reports of pain or nausea. Incision clean, dry, and intact. James intact and patent- Output good. James to be d/c'd at 0200 on 3/9. Patient ambulates/up with assist- up in chair throughout shift, walked in hallway today. Patient remains free of falls. Spouse at bedside. Patient will be taught how to use Lovenox as well as insulin (Levemir and Novolog) prior to discharge. Patient states no other needs at this time. WCTM.

## 2018-03-08 NOTE — PLAN OF CARE
Problem: Occupational Therapy Goal  Goal: Occupational Therapy Goal  Goals to be met by: 3/20/18     Patient will increase functional independence with ADLs by performing:    UE Dressing with Modified [place].  Grooming while standing at sink with Modified [place].  Toileting from toilet with Modified [place] for hygiene and clothing management.   Toilet transfer to toilet with Modified [place].     Outcome: Ongoing (interventions implemented as appropriate)  Goals remain appropriate    Comments: Continue OT POC    Darcie Dasilva OT  3/8/2018

## 2018-03-08 NOTE — PT/OT/SLP PROGRESS
Physical Therapy Treatment    Patient Name:  Jamison Mayes   MRN:  [id number]    Recommendations:     Discharge Recommendations:  home   Discharge Equipment Recommendations:  (TBD)   Barriers to discharge: None    Assessment:     Jamison Mayes is a 73 y.o. male admitted with a medical diagnosis of Hemoperitoneum.  He presents with the following impairments/functional limitations:  weakness, impaired endurance, impaired self care skills, gait instability, impaired functional mobilty, impaired balance, pain . Pt Progressing with PT Intervention. Pt Progressing with improving gait distance. Pt would continue to benefit from skilled PT to address overall functional mobility and goals. Goals remain appropriate.      Rehab Prognosis:  good; patient would benefit from acute skilled PT services to address these deficits and reach maximum level of function.      Recent Surgery: Procedure(s) (LRB):  HEPATECTOMY (Left)  CHOLECYSTECTOMY (N/A)  EVACUATION-HEMATOMA (N/A)  ULTRASOUND (N/A) 3 Days Post-Op    Plan:     During this hospitalization, patient to be seen 4 x/week to address the above listed problems via gait training, therapeutic activities, therapeutic exercises  · Plan of Care Expires:  04/05/18   Plan of Care Reviewed with: patient, spouse    Subjective     Communicated with RN prior to session.  Patient found Seated upon PT entry to room, agreeable to treatment.      Chief Complaint: I may inocencia to use the bathroom first    Pain/Comfort:  · Pain Rating 1: 2/10  · Location 1: abdomen  · Pain Addressed 1: Reposition, Distraction  · Pain Rating Post-Intervention 1: 0/10    Patients cultural, spiritual, Islam conflicts given the current situation: none noted    Objective:     Patient found with: castro catheter, telemetry, pulse ox (continuous), central line     General Precautions: Standard, fall   Orthopedic Precautions:N/A   Braces: N/A     Functional Mobility:  · Transfers:     · Sit to Stand:  stand by  assistance with no AD  · Toilet Transfer: stand by assistance with  no AD  using  Step Transfer  Gait: 300 ft with SBA/CGA and no AD, decreased diane and  step length, 2 episode of  LOB, pt was able to self correct  AM-PAC 6 CLICK MOBILITY  Turning over in bed (including adjusting bedclothes, sheets and blankets)?: 4  Sitting down on and standing up from a chair with arms (e.g., wheelchair, bedside commode, etc.): 3  Moving from lying on back to sitting on the side of the bed?: 3  Moving to and from a bed to a chair (including a wheelchair)?: 3  Need to walk in hospital room?: 3  Climbing 3-5 steps with a railing?: 2  Total Score: 18       Therapeutic Activities and Exercises:   Discussed/educated patient on progress, safety,  d/c, PT POC   Patient performed therex seated in bedside chair B LE AROM AP, LAQ, Hip Flexion, Hip Abd/Add   White board updated   Donned an extra gown    Pt encouraged to ambulate in hallways 3x/day with nursing or family assistance to improve endurance.   Pt safe to ambulate in hallway with RN or PCT assistance       Patient left up in chair with all lines intact, call button in reach, nsg notified and spouse present..    GOALS:    Physical Therapy Goals        Problem: Physical Therapy Goal    Goal Priority Disciplines Outcome Goal Variances Interventions   Physical Therapy Goal     PT/OT, PT Ongoing (interventions implemented as appropriate)     Description:  Goals to be met by: 3/20/2018     Patient will increase functional independence with mobility by performin. Supine to sit with Modified [place].  2. Rolling to Left and Right with Modified [place].  3. Sit to stand transfer with Supervision.  4. Gait  x 250 feet with Supervision without AD.  5. Ascend/descend 1 stair with no Handrail Supervision without AD.   6. Lower extremity exercise program x 20 reps per handout, with supervision.                      Time Tracking:     PT Received On: 18  PT Total Time  (min):   24 min  Billable Minutes: Gait Training 12 and Therapeutic Activity 12    Treatment Type: Treatment  PT/PTA: PTA     PTA Visit Number: 1     Moise Garcia PTA  03/08/2018

## 2018-03-08 NOTE — ASSESSMENT & PLAN NOTE
72 yo M with rupture of left lobe liver mass, now POD#3 left hepatic lobectomy    - trend labs every other day, H/H stable, WBC within normal limits  - d/c PCA, start PO PRN pain meds  - continue home medications  - castro replaced due to urinary retention, on flomax, UOP adequate  - replace electrolytes PRN  - ambulate three times in halls today, PT/OT on board  - insulin gtt for hyperglycemia, continue per endocrine  - tolerating diabetic diet  - d/c mIVF  - strict Is and Os  - DVT prophylaxis- on enoxaparin, PPI  - pulmonary toilet  - on the pathway  - continue D/C planning

## 2018-03-08 NOTE — PLAN OF CARE
Problem: Patient Care Overview  Goal: Plan of Care Review  Outcome: Ongoing (interventions implemented as appropriate)  Plan of care reviewed with patient. Verbal understanding received. Patient AOX4, RA VSS.  ML LILIAN with DB. clean dry and intact. Tolerating ADA 2000 darion diet with no reports of nausea. Insulin drip infusing at 0.06 units per hr. PRN pain meds controlling reports of pain, administered as per MAR. Call light in reach. Bed in low locked position. Frequent rounding made to ensure patient safety and comfort. RN will continue to monitor

## 2018-03-08 NOTE — PLAN OF CARE
Problem: Physical Therapy Goal  Goal: Physical Therapy Goal  Goals to be met by: 3/20/2018     Patient will increase functional independence with mobility by performin. Supine to sit with Modified [place].  2. Rolling to Left and Right with Modified [place].  3. Sit to stand transfer with Supervision.  4. Gait  x 250 feet with Supervision without AD.  5. Ascend/descend 1 stair with no Handrail Supervision without AD.   6. Lower extremity exercise program x 20 reps per handout, with supervision.     Pt progressing towards goals. continue with PT POC.Goals remain appropriate.  Moise Garcia PTA  3/8/2018

## 2018-03-08 NOTE — PLAN OF CARE
Problem: Patient Care Overview  Goal: Plan of Care Review  Outcome: Ongoing (interventions implemented as appropriate)  Pt and his spouse verbalize understanding to the POC with no further questions at this time. AAO x4 with VSS on RA. ML abdominal incision LILIAN with dermabond. IVFs infusing as ordered with Dilaudid PCA in use for pain. Pt verbalizes minimal PCA use needed due to mild pain at this time. Advanced and tolerating 2000 ADA diet with no c/o nausea. Glucose monitoring on Continuous IV insulin gtt @ 0.6 units/hr, scheduled dose with meals, and SS coverage AC/HS. Pt up to chair most of shift. Pt also ambulated halls and room with standby assist. Remains free of falls and injury throughout shift. Pt unable to void after d/c castro in am requiring an in-and-out straight cath. Pt still unable to void spontaneously requiring order and insertion for maintaining castro until further discontinued. Continuous tele and pulse ox maintained with NSR to sinus tach with MD aware. Wife at bedside. Bed in lowest position with call light within reach. WCTM

## 2018-03-09 VITALS
RESPIRATION RATE: 18 BRPM | TEMPERATURE: 98 F | HEIGHT: 67 IN | SYSTOLIC BLOOD PRESSURE: 130 MMHG | BODY MASS INDEX: 28.25 KG/M2 | DIASTOLIC BLOOD PRESSURE: 62 MMHG | HEART RATE: 78 BPM | OXYGEN SATURATION: 97 % | WEIGHT: 180 LBS

## 2018-03-09 LAB
BLD PROD TYP BPU: NORMAL
BLOOD UNIT EXPIRATION DATE: NORMAL
BLOOD UNIT TYPE CODE: 6200
BLOOD UNIT TYPE: NORMAL
CODING SYSTEM: NORMAL
DISPENSE STATUS: NORMAL
POCT GLUCOSE: 172 MG/DL (ref 70–110)
POCT GLUCOSE: 218 MG/DL (ref 70–110)
TRANS ERYTHROCYTES VOL PATIENT: NORMAL ML

## 2018-03-09 PROCEDURE — G8988 SELF CARE GOAL STATUS: HCPCS | Mod: CK

## 2018-03-09 PROCEDURE — G8987 SELF CARE CURRENT STATUS: HCPCS | Mod: CK

## 2018-03-09 PROCEDURE — G8989 SELF CARE D/C STATUS: HCPCS | Mod: CK

## 2018-03-09 PROCEDURE — 25000003 PHARM REV CODE 250: Performed by: STUDENT IN AN ORGANIZED HEALTH CARE EDUCATION/TRAINING PROGRAM

## 2018-03-09 PROCEDURE — 25000003 PHARM REV CODE 250: Performed by: NURSE PRACTITIONER

## 2018-03-09 PROCEDURE — 25000003 PHARM REV CODE 250: Performed by: SURGERY

## 2018-03-09 RX ORDER — OXYCODONE HYDROCHLORIDE 5 MG/1
5-10 TABLET ORAL
Qty: 45 TABLET | Refills: 0 | Status: SHIPPED | OUTPATIENT
Start: 2018-03-09 | End: 2018-03-09

## 2018-03-09 RX ORDER — OXYCODONE HYDROCHLORIDE 5 MG/1
5-10 TABLET ORAL
Qty: 45 TABLET | Refills: 0 | Status: SHIPPED | OUTPATIENT
Start: 2018-03-09 | End: 2018-07-05

## 2018-03-09 RX ADMIN — HYDROCHLOROTHIAZIDE 25 MG: 25 TABLET ORAL at 08:03

## 2018-03-09 RX ADMIN — INSULIN ASPART 5 UNITS: 100 INJECTION, SOLUTION INTRAVENOUS; SUBCUTANEOUS at 01:03

## 2018-03-09 RX ADMIN — PANTOPRAZOLE SODIUM 40 MG: 40 TABLET, DELAYED RELEASE ORAL at 08:03

## 2018-03-09 RX ADMIN — AMLODIPINE BESYLATE 10 MG: 10 TABLET ORAL at 08:03

## 2018-03-09 RX ADMIN — INSULIN ASPART 5 UNITS: 100 INJECTION, SOLUTION INTRAVENOUS; SUBCUTANEOUS at 09:03

## 2018-03-09 RX ADMIN — TAMSULOSIN HYDROCHLORIDE 0.4 MG: 0.4 CAPSULE ORAL at 08:03

## 2018-03-09 RX ADMIN — METOPROLOL TARTRATE 25 MG: 25 TABLET ORAL at 08:03

## 2018-03-09 NOTE — ASSESSMENT & PLAN NOTE
74 yo M with rupture of left lobe liver mass, now POD#4 left hepatic lobectomy    - trend labs every other day  - pain well controlled on PO PRN pain meds  - continue home medications  - castro removed yesterday, voiding  - replace electrolytes PRN  - ambulate three times in halls today, PT/OT on board  - DM control per endocrine  - tolerating diabetic diet  - strict Is and Os  - DVT prophylaxis- on enoxaparin, PPI  - pulmonary toilet  - on the pathway  - discharge today pending discussion with staff

## 2018-03-09 NOTE — PT/OT/SLP PROGRESS
Physical Therapy      Patient Name:  Jamison Mayes   MRN:  [id number]    Patient not seen today secondary to pt fatigue on first attempt and on second attempt pt awaiting hospital discharge  . Will follow-up next scheduled treatment per PT POC    Moise Garcia, PTA   3/9/2018

## 2018-03-09 NOTE — PROGRESS NOTES
[facility]  General Surgery  Progress Note    Subjective:     History of Present Illness:  72 yo M with h/o HTN, DM (not on insulin last A1c 11/17 7.1), seizure disorder presented to [facility] with pre-syncopal episode, epigastric abdominal pain. Symptoms started suddenly around 1 pm today. Had been active earlier in the day hanging drywall, denies trauma. Onset of symptoms associated with cold sweats. No nausea. CT noncon at OSH showed left lobe liver mass with associated rupture and hemoperitoneum. No contrast given 2/2 HEATHER (Cr 1.7). HDS at OSH. Denies known liver or kidney disease.     No previous abdominal surgeries.     Post-Op Info:  Procedure(s) (LRB):  HEPATECTOMY (Left)  CHOLECYSTECTOMY (N/A)  EVACUATION-HEMATOMA (N/A)  ULTRASOUND (N/A)   4 Days Post-Op     Interval History: No acute events overnight. Tolerating diabetic diet, no nausea/vomiting. Pain well controlled on PO pain meds. Voiding. On the pathway.    Medications:  Continuous Infusions:   insulin (HUMAN R) infusion (adults) Stopped (03/08/18 2254)     Scheduled Meds:   amLODIPine  10 mg Oral Daily    enoxaparin  40 mg Subcutaneous Daily    hydroCHLOROthiazide  25 mg Oral Daily    insulin aspart U-100  5 Units Subcutaneous TIDWM    insulin detemir U-100  15 Units Subcutaneous QHS    lovastatin  20 mg Oral QHS    metoprolol tartrate  25 mg Oral Daily    pantoprazole  40 mg Oral Daily    tamsulosin  0.4 mg Oral Daily     PRN Meds:dextrose 50%, dextrose 50%, glucagon (human recombinant), glucose, glucose, hydrALAZINE, insulin aspart U-100, naloxone, ondansetron, oxyCODONE, oxyCODONE     Review of patient's allergies indicates:  No Known Allergies  Objective:     Vital Signs (Most Recent):  Temp: 99.5 °F (37.5 °C) (03/09/18 0429)  Pulse: 103 (03/09/18 0455)  Resp: 18 (03/09/18 0455)  BP: (!) 130/59 (03/09/18 0429)  SpO2: (!) 94 % (03/09/18 0455) Vital Signs (24h Range):  Temp:  [97.9 °F (36.6 °C)-99.9 °F (37.7 °C)] 99.5  °F (37.5 °C)  Pulse:  [] 103  Resp:  [16-22] 18  SpO2:  [90 %-98 %] 94 %  BP: (114-136)/(55-64) 130/59     Weight: 81.6 kg (180 lb)  Body mass index is 28.19 kg/m².    Intake/Output - Last 3 Shifts       03/07 0700 - 03/08 0659 03/08 0700 - 03/09 0659    P.O. 1080 700    I.V. (mL/kg) 656.8 (8) 5 (0.1)    Total Intake(mL/kg) 1736.8 (21.3) 705 (8.6)    Urine (mL/kg/hr) 1250 (0.6) 3100 (1.6)    Total Output 1250 3100    Net +486.8 -2395                Physical Exam   Constitutional: He is oriented to person, place, and time. He appears well-developed and well-nourished.   HENT:   Head: Normocephalic.   Eyes: Pupils are equal, round, and reactive to light.   Neck: Normal range of motion.   Cardiovascular: Normal rate, regular rhythm and normal heart sounds.    Pulmonary/Chest: Effort normal and breath sounds normal.   Decreased at bases   Abdominal: Soft. He exhibits no distension. There is tenderness.   Midline incision with dermabond, Incision c/d/i. MARY drain in place with serosanguinous output.   Musculoskeletal: Normal range of motion.   Neurological: He is alert and oriented to person, place, and time.   Skin: Skin is warm and dry.   Psychiatric: He has a normal mood and affect. His behavior is normal. Judgment and thought content normal.   Nursing note and vitals reviewed.      Significant Labs:  CBC:     Recent Labs  Lab 03/08/18  0525   WBC 14.71*   RBC 2.94*   HGB 8.9*   HCT 25.4*      MCV 86   MCH 30.3   MCHC 35.0     CMP:     Recent Labs  Lab 03/08/18  0524   *   CALCIUM 8.5*   ALBUMIN 2.4*   PROT 5.8*   *   K 4.1   CO2 24      BUN 18   CREATININE 1.0   ALKPHOS 78   ALT 97*   AST 58*   BILITOT 0.7       Significant Diagnostics:  I have reviewed and interpreted all pertinent imaging results/findings within the past 24 hours.    Assessment/Plan:     * Hemoperitoneum    74 yo M with rupture of left lobe liver mass, now POD#4 left hepatic lobectomy    - trend labs every other  day  - pain well controlled on PO PRN pain meds  - continue home medications  - castro removed yesterday, voiding  - replace electrolytes PRN  - ambulate three times in halls today, PT/OT on board  - DM control per endocrine  - tolerating diabetic diet  - strict Is and Os  - DVT prophylaxis- on enoxaparin, PPI  - pulmonary toilet  - on the pathway  - discharge today pending discussion with staff        Liver mass, left lobe                DM (diabetes mellitus)    Insulin gtt        HTN (hypertension)    Home medications            Jacqueline Edouard MD  General Surgery  [facility]

## 2018-03-09 NOTE — DISCHARGE SUMMARY
[facility]  General Surgery  Discharge Summary      Patient Name: Jamison Mayes  MRN: [id number]  Admission Date: 3/1/2018  Hospital Length of Stay: 8 days  Discharge Date and Time:  03/09/2018 12:52 PM  Attending Physician: Brown Cortez MD   Discharging Provider: Wendy Whalen NP  Primary Care Provider: Blaise Jorge MD     HPI: 72 yo M with h/o HTN, DM (not on insulin last A1c 11/17 7.1), seizure disorder presented to [facility] with pre-syncopal episode, epigastric abdominal pain. Symptoms started suddenly around 1 pm today. Had been active earlier in the day hanging drywall, denies trauma. Onset of symptoms associated with cold sweats. No nausea. CT noncon at OSH showed left lobe liver mass with associated rupture and hemoperitoneum. No contrast given 2/2 HEATHER (Cr 1.7). HDS at OSH. Denies known liver or kidney disease.      No previous abdominal surgeries    Procedure(s) (LRB):  HEPATECTOMY (Left)  CHOLECYSTECTOMY (N/A)  EVACUATION-HEMATOMA (N/A)  ULTRASOUND (N/A)     Surgery Date: 3/5/2018      Surgeon(s) and Role:     * GIOVANNA Ny Jr., MD - Resident - Assisting     * Brown Cortez MD - Primary     * Brown Cortez MD - Primary     * Jonathan Schoen, MD - Resident - Assisting        Pre-op Diagnosis:  Liver mass, left lobe [R16.0]  Abdominal pain, unspecified abdominal location [R10.9]     Post-op Diagnosis:  Post-Op Diagnosis Codes:     * Liver mass, left lobe [R16.0]     * Abdominal pain, unspecified abdominal location [R10.9]     Procedure(s) (LRB):  HEPATECTOMY (Left)  CHOLECYSTECTOMY (N/A)  EVACUATION-HEMATOMA (N/A)  ULTRASOUND (N/A)     Anesthesia: General     Description of Procedure: hemoperitoneum on entry to abdomen, segment 2 and 3 resection for a bleeding tumor apparent on the dome of the left lateral segment; open cholecystectomy    Hospital Course: The patient is tolerating a diabetic diet.  He is voiding and ambulating without difficulty.   Patient with no complaints of nausea, vomiting, chest pain or shortness of breath.  His vital signs stable. He is afebrile. He is positive for flatus and positive for bowel sounds.  CV: RRR  Lungs: CTA bilaterally  Abdomen:  Soft, non-tender, non-distended, positive for bowel sounds, incision clean, dry and intact.    Physical Exam   Constitutional: He is oriented to person, place, and time. He appears well-developed and well-nourished.   HENT:   Head: Normocephalic.   Eyes: Pupils are equal, round, and reactive to light.   Neck: Normal range of motion.   Cardiovascular: Normal rate, regular rhythm and normal heart sounds.    Pulmonary/Chest: Effort normal and breath sounds normal.   Decreased at bases   Abdominal: Soft. He exhibits no distension. There is tenderness.   Midline incision with dermabond, Incision c/d/i. MARY drain in place with serosanguinous output.   Musculoskeletal: Normal range of motion.   Neurological: He is alert and oriented to person, place, and time.   Skin: Skin is warm and dry.   Psychiatric: He has a normal mood and affect. His behavior is normal. Judgment and thought content normal.     Consults:   Consults         Status Ordering Provider     Inpatient consult to Endocrinology  Once     Provider:  (Not yet assigned)    Completed HUGO JOINER     Inpatient consult to General surgery  Once     Provider:  (Not yet assigned)    Completed LACI HOLLOWAY          Significant Diagnostic Studies: Labs:   CMP   Recent Labs  Lab 03/08/18  0524   *   K 4.1      CO2 24   *   BUN 18   CREATININE 1.0   CALCIUM 8.5*   PROT 5.8*   ALBUMIN 2.4*   BILITOT 0.7   ALKPHOS 78   AST 58*   ALT 97*   ANIONGAP 7*   ESTGFRAFRICA >60.0   EGFRNONAA >60.0    and CBC   Recent Labs  Lab 03/07/18  1350 03/08/18  0525   WBC 22.18* 14.71*   HGB 9.7* 8.9*   HCT 28.9* 25.4*    224       Pending Diagnostic Studies:     None        Final Active Diagnoses:    Diagnosis Date Noted POA     PRINCIPAL PROBLEM:  Hemoperitoneum [K66.1] 03/01/2018 Yes    Liver mass, left lobe [R16.0] 03/04/2018 Yes    DM (diabetes mellitus) [E11.9] 03/07/2013 Yes    HTN (hypertension) [I10] 10/29/2012 Yes    Seizure disorder [G40.909] 10/29/2012 Yes      Problems Resolved During this Admission:    Diagnosis Date Noted Date Resolved POA      Discharged Condition: good    Disposition: Home or Self Care    Follow Up:  Follow-up Information     Brown Cortez MD In 2 weeks.    Specialties:  General Surgery, Surgical Oncology  Why:  For wound re-check  Contact information:  [address]  [facility] [address]  [phone]                 Patient Instructions:     Activity as tolerated     Lifting restrictions   Order Comments: Do not lift anything greater than 10-15 lbs for 6 weeks     Notify your health care provider if you experience any of the following:  increased confusion or weakness     Notify your health care provider if you experience any of the following:  persistent dizziness, light-headedness, or visual disturbances     Notify your health care provider if you experience any of the following:  worsening rash     Notify your health care provider if you experience any of the following:  severe persistent headache     Notify your health care provider if you experience any of the following:  difficulty breathing or increased cough     Notify your health care provider if you experience any of the following:  redness, tenderness, or signs of infection (pain, swelling, redness, odor or green/yellow discharge around incision site)     Notify your health care provider if you experience any of the following:  severe uncontrolled pain     Notify your health care provider if you experience any of the following:  persistent nausea and vomiting or diarrhea     Notify your health care provider if you experience any of the following:  temperature >100.4     No dressing needed       Medications:  Reconciled Home  Medications:   Current Discharge Medication List      START taking these medications    Details   blood sugar diagnostic Strp To check BG 4 times daily, to use with insurance preferred meter  Qty: 200 each, Refills: 4      blood-glucose meter kit To check BG 4 times daily, to use with insurance preferred meter  Qty: 1 each, Refills: 0      enoxaparin (LOVENOX) 40 mg/0.4 mL Syrg Inject 0.4 mLs (40 mg total) into the skin once daily.  Qty: 5.6 mL, Refills: 0    Comments: Please deliver to room 605a      insulin aspart U-100 (NOVOLOG) 100 unit/mL InPn pen Inject 5 Units into the skin 3 (three) times daily with meals.  Qty: 5 Syringe, Refills: 6      insulin detemir U-100 (LEVEMIR FLEXTOUCH) 100 unit/mL (3 mL) SubQ InPn pen Inject 15 Units into the skin every evening.  Qty: 1 Box, Refills: 0      lancets Misc To check BG 4 times daily, to use with insurance preferred meter  Qty: 200 each, Refills: 5      oxyCODONE (ROXICODONE) 5 MG immediate release tablet Take 1-2 tablets (5-10 mg total) by mouth every 4 to 6 hours as needed.  Qty: 45 tablet, Refills: 0      tamsulosin (FLOMAX) 0.4 mg Cp24 Take 1 capsule (0.4 mg total) by mouth once daily.  Qty: 7 capsule, Refills: 0    Comments: Please deliver to room 605a         CONTINUE these medications which have NOT CHANGED    Details   aspirin (ASPIRIN LOW DOSE) 81 MG EC tablet Take 81 mg by mouth. 1 Tablet, Delayed Release (E.C.) Oral Every day      amlodipine (NORVASC) 10 MG tablet Take 1 tablet (10 mg total) by mouth once daily.  Qty: 90 tablet, Refills: 3    Associated Diagnoses: Essential hypertension      calcium carbonate (OS-RUSLAN) 500 mg calcium (1,250 mg) chewable tablet Take 1 tablet by mouth once daily.      cyanocobalamin (VITAMIN B-12) 250 MCG tablet Take 1 tablet (250 mcg total) by mouth once daily.    Associated Diagnoses: DM (diabetes mellitus) with complications      fluorouracil (EFUDEX) 5 % cream AAA on scalp, forehead, and temples BID x 2-3 weeks. Stop if  blistered, oozing, or bleeding. Use daily sun protection.  Qty: 40 g, Refills: 1    Associated Diagnoses: Multiple actinic keratoses      hydroCHLOROthiazide (HYDRODIURIL) 25 MG tablet TAKE 1 TABLET (25 MG TOTAL) BY MOUTH ONCE DAILY.  Qty: 90 tablet, Refills: 0      imiquimod (ALDARA) 5 % cream AAA 5 nights/week x 4 - 6 weeks. Wash off in am. Stop if blistering, bleeding, oozing, etc. Do not use >1 packet per night.  Qty: 24 packet, Refills: 1    Associated Diagnoses: Multiple actinic keratoses      ketoconazole (NIZORAL) 2 % shampoo Wash hair with medicated shampoo at least 2x/week - let sit on scalp at least 5 minutes prior to rinsing  Qty: 120 mL, Refills: 5    Associated Diagnoses: Acute seborrheic dermatitis      lisinopril (PRINIVIL,ZESTRIL) 40 MG tablet TAKE 1 TABLET (40 MG TOTAL) BY MOUTH ONCE DAILY.  Qty: 90 tablet, Refills: 3    Associated Diagnoses: Essential hypertension      lovastatin (MEVACOR) 20 MG tablet TAKE 1 TABLET IN THE EVENING  Qty: 90 tablet, Refills: 3      MULTIVITAMIN WITH MINERALS (ONE-A-DAY 50 PLUS) Tab Take by mouth. 1 Tablet Oral Every morning      mupirocin (BACTROBAN) 2 % ointment AAA on scalp bid  Qty: 30 g, Refills: 2    Associated Diagnoses: Skin pustule      potassium chloride SA (K-DUR,KLOR-CON) 20 MEQ tablet Take by mouth every other day. 595 mg         STOP taking these medications       oxycodone-acetaminophen (PERCOCET) 5-325 mg per tablet Comments:   Reason for Stopping:               Wendy Whalen NP  General Surgery  [facility]

## 2018-03-09 NOTE — PLAN OF CARE
Problem: Patient Care Overview  Goal: Plan of Care Review  Outcome: Ongoing (interventions implemented as appropriate)  Discharge instructions discussed with patient. Verbalizes understanding. Paperwork and prescriptions given to patient-Lovenox and sharps container given to patient. Patient and spouse educated on Lovenox, blood glucose monitoring,and insulin administration-verbalize understanding. Medications reconciled. IV d/c'd, catheter intact. Tolerated well. Paperwork on low residue diet provided. Instructed against strenuous activity,no lifting >15lbs for 6 wks. VSS on RA. No acute distress noted. Awaiting transport.

## 2018-03-09 NOTE — SUBJECTIVE & OBJECTIVE
Interval History: No acute events overnight. Tolerating diabetic diet, no nausea/vomiting. Pain well controlled on PO pain meds. Voiding. On the pathway.    Medications:  Continuous Infusions:   insulin (HUMAN R) infusion (adults) Stopped (03/08/18 2254)     Scheduled Meds:   amLODIPine  10 mg Oral Daily    enoxaparin  40 mg Subcutaneous Daily    hydroCHLOROthiazide  25 mg Oral Daily    insulin aspart U-100  5 Units Subcutaneous TIDWM    insulin detemir U-100  15 Units Subcutaneous QHS    lovastatin  20 mg Oral QHS    metoprolol tartrate  25 mg Oral Daily    pantoprazole  40 mg Oral Daily    tamsulosin  0.4 mg Oral Daily     PRN Meds:dextrose 50%, dextrose 50%, glucagon (human recombinant), glucose, glucose, hydrALAZINE, insulin aspart U-100, naloxone, ondansetron, oxyCODONE, oxyCODONE     Review of patient's allergies indicates:  No Known Allergies  Objective:     Vital Signs (Most Recent):  Temp: 99.5 °F (37.5 °C) (03/09/18 0429)  Pulse: 103 (03/09/18 0455)  Resp: 18 (03/09/18 0455)  BP: (!) 130/59 (03/09/18 0429)  SpO2: (!) 94 % (03/09/18 0455) Vital Signs (24h Range):  Temp:  [97.9 °F (36.6 °C)-99.9 °F (37.7 °C)] 99.5 °F (37.5 °C)  Pulse:  [] 103  Resp:  [16-22] 18  SpO2:  [90 %-98 %] 94 %  BP: (114-136)/(55-64) 130/59     Weight: 81.6 kg (180 lb)  Body mass index is 28.19 kg/m².    Intake/Output - Last 3 Shifts       03/07 0700 - 03/08 0659 03/08 0700 - 03/09 0659    P.O. 1080 700    I.V. (mL/kg) 656.8 (8) 5 (0.1)    Total Intake(mL/kg) 1736.8 (21.3) 705 (8.6)    Urine (mL/kg/hr) 1250 (0.6) 3100 (1.6)    Total Output 1250 3100    Net +486.8 -2395                Physical Exam   Constitutional: He is oriented to person, place, and time. He appears well-developed and well-nourished.   HENT:   Head: Normocephalic.   Eyes: Pupils are equal, round, and reactive to light.   Neck: Normal range of motion.   Cardiovascular: Normal rate, regular rhythm and normal heart sounds.    Pulmonary/Chest: Effort  normal and breath sounds normal.   Decreased at bases   Abdominal: Soft. He exhibits no distension. There is tenderness.   Midline incision with dermabond, Incision c/d/i. MARY drain in place with serosanguinous output.   Musculoskeletal: Normal range of motion.   Neurological: He is alert and oriented to person, place, and time.   Skin: Skin is warm and dry.   Psychiatric: He has a normal mood and affect. His behavior is normal. Judgment and thought content normal.   Nursing note and vitals reviewed.      Significant Labs:  CBC:     Recent Labs  Lab 03/08/18  0525   WBC 14.71*   RBC 2.94*   HGB 8.9*   HCT 25.4*      MCV 86   MCH 30.3   MCHC 35.0     CMP:     Recent Labs  Lab 03/08/18  0524   *   CALCIUM 8.5*   ALBUMIN 2.4*   PROT 5.8*   *   K 4.1   CO2 24      BUN 18   CREATININE 1.0   ALKPHOS 78   ALT 97*   AST 58*   BILITOT 0.7       Significant Diagnostics:  I have reviewed and interpreted all pertinent imaging results/findings within the past 24 hours.

## 2018-03-09 NOTE — PLAN OF CARE
Patient discharging home today with Lovenox and Endocrinology follow.    Future Appointments  Date Time Provider Department Center   3/14/2018 11:00 AM Nahomi Russo NP [facility] ENDOCRN Trevor Rodriguez   3/15/2018 10:00 AM Brown Cortez MD [facility]   4/26/2018 1:00 PM Blaise Jorge MD [facility] Trevor Rodriguez PCW          03/09/18 1313   Final Note   Assessment Type Final Discharge Note   Discharge Disposition Home   Hospital Follow Up  Appt(s) scheduled? Yes   Right Care Referral Info   Post Acute Recommendation No Care

## 2018-03-09 NOTE — PLAN OF CARE
Problem: Patient Care Overview  Goal: Plan of Care Review  Outcome: Ongoing (interventions implemented as appropriate)  Plan of care reviewed with patient. Verbal understanding received. Patient AOX4, RA VSS.  ML LILIAN with DB. clean dry and intact. Tolerating ADA 2000 darion diet with no reports of nausea. Insulin drip discontinued at 2224 after receiving levemir. Patient has no reports of pain. James discontinued at 2:20 this am. Patient DTV at 8:20. Call light in reach. Bed in low locked position. Frequent rounding made to ensure patient safety and comfort. RN will continue to monitor

## 2018-03-12 ENCOUNTER — PATIENT OUTREACH (OUTPATIENT)
Dept: ADMINISTRATIVE | Facility: CLINIC | Age: 73
End: 2018-03-12

## 2018-03-12 NOTE — PT/OT/SLP DISCHARGE
Occupational Therapy Discharge Summary    Jamison Mayes  MRN: [id number]   Principal Problem: Hemoperitoneum      Patient Discharged from acute Occupational Therapy on 3/12/18.  Please refer to prior OT note dated 3/8/18 for functional status.    Assessment:      Patient has not met goals.    Objective:     GOALS:    Occupational Therapy Goals     Not on file          Multidisciplinary Problems (Resolved)        Problem: Occupational Therapy Goal    Goal Priority Disciplines Outcome Interventions   Occupational Therapy Goal   (Resolved)     OT, PT/OT Outcome(s) achieved    Description:  Goals to be met by: 3/20/18     Patient will increase functional independence with ADLs by performing:    UE Dressing with Modified [place]. NOT MET   Grooming while standing at sink with Modified [place]. NOT MET   Toileting from toilet with Modified [place] for hygiene and clothing management. NOT MET   Toilet transfer to toilet with Modified [place]. NOT MET                        Reasons for Discontinuation of Therapy Services  Transfer to alternate level of care.      Plan:     Patient Discharged to: Home no OT services needed    Darcie Dasilva, OT  3/12/2018

## 2018-03-12 NOTE — PLAN OF CARE
Problem: Occupational Therapy Goal  Goal: Occupational Therapy Goal  Goals to be met by: 3/20/18     Patient will increase functional independence with ADLs by performing:    UE Dressing with Modified [place]. NOT MET   Grooming while standing at sink with Modified [place]. NOT MET   Toileting from toilet with Modified [place] for hygiene and clothing management. NOT MET   Toilet transfer to toilet with Modified [place]. NOT MET      Outcome: Outcome(s) achieved Date Met: 03/12/18  Goals not met; pt d/c from hospital

## 2018-03-12 NOTE — PROGRESS NOTES
Subjective:      Patient ID: Jamison Mayes is a 73 y.o. male.    Chief Complaint:  Follow-up    History of Present Illness  Jamison Mayes presents today for evaluation & management of type 2 DM. Last seen in the hospital by Dr. Gupta. This is his first visit with me.     Per Dr. Gupta's note:  Patient is a 73 y.o. male with a diagnosis of HTN, T2DM, and seizure disorder who presented from OSH with newly diagnosed left lobe liver mass that ruptured resulting in hemoperitoneum.  S/p liver resection.  Endocrinology consulted for elevated BG. Had a Procedure(s) (LRB):  HEPATECTOMY (Left)  CHOLECYSTECTOMY (N/A)  EVACUATION-HEMATOMA (N/A)  ULTRASOUND (N/A)      Patient notes that he has never required medication for DM. A1c 7.8%.  Prior to procedure, patient was not on any diabetes medications.    Discharged on: Novolog 5 units TID AC, Levemir 15 units HS,   Results for JAMISON MAYES (MRN [id number]) as of 3/14/2018 11:06   Ref. Range 3/1/2018 21:56   Hemoglobin A1C Latest Ref Range: 4.0 - 5.6 % 7.8 (H)   Estimated Avg Glucose Latest Ref Range: 68 - 131 mg/dL 177 (H)     With regards to the diabetes:    Current regimen:  Novolog 5 units TID AC  Levemir 15 units HS    Missed doses?     4 times a day testing  Log reviewed: yes       Eats 3 meals a day.   Snacks- rarely on sugar free jello and cookies  Drinks- oj with less sugar, water, green tea diet    Exercise - tried to stay active     Hypoglycemic event? None   Knows how to correct with 15 grams of carbs- juice, coke, or a peppermint.     Last eye exam: No  03/2017.  Last podiatry exam: No  Denies numbness & tingling in the feet     Education - last visit: None    With regards to hypertension:  Tolerating ACEI     With regards to dyslipidemia:  Tolerating statin     Review of Systems   Constitutional: Negative for unexpected weight change.   Eyes: Negative for visual disturbance.   Respiratory: Negative for shortness of breath.    Cardiovascular: Negative  for chest pain.   Gastrointestinal: Negative for abdominal pain.   Endocrine: Negative for cold intolerance, heat intolerance, polydipsia, polyphagia and polyuria.   Musculoskeletal: Negative for arthralgias.   Skin: Negative for wound.   Neurological: Negative for headaches.   Hematological: Does not bruise/bleed easily.   Psychiatric/Behavioral: Negative for sleep disturbance.     Objective:   Physical Exam   Neck: No thyromegaly present.   Cardiovascular: Normal rate.    No edema present   Pulmonary/Chest: Effort normal.   Abdominal: Soft.   Vitals reviewed.  Appropriate footwear, Foot exam deferred per patient.  injection sites are ok. No lipo hypertropthy or atrophy    Body mass index is 27.62 kg/m².    Lab Review:   Lab Results   Component Value Date    HGBA1C 7.8 (H) 03/01/2018     Lab Results   Component Value Date    CHOL 140 05/02/2017    HDL 44 05/02/2017    LDLCALC 71.2 05/02/2017    TRIG 124 05/02/2017    CHOLHDL 31.4 05/02/2017     Lab Results   Component Value Date     (L) 03/08/2018    K 4.1 03/08/2018     03/08/2018    CO2 24 03/08/2018     (H) 03/08/2018    BUN 18 03/08/2018    CREATININE 1.0 03/08/2018    CALCIUM 8.5 (L) 03/08/2018    PROT 5.8 (L) 03/08/2018    ALBUMIN 2.4 (L) 03/08/2018    BILITOT 0.7 03/08/2018    ALKPHOS 78 03/08/2018    AST 58 (H) 03/08/2018    ALT 97 (H) 03/08/2018    ANIONGAP 7 (L) 03/08/2018    ESTGFRAFRICA >60.0 03/08/2018    EGFRNONAA >60.0 03/08/2018    TSH 1.734 10/29/2012       Assessment and Plan     1. DM (diabetes mellitus) with complications  Microalbumin/creatinine urine ratio    Ambulatory Referral to Diabetes Education    Comprehensive metabolic panel    Hemoglobin A1c   2. Essential hypertension     3. Mixed hyperlipidemia     4. Liver mass, left lobe       DM (diabetes mellitus) with complications  -- urine today  -- Reviewed goals of therapy are to get the best control we can without hypoglycemia  -- Refer to diabetes education  Medication  changes:   Continue levemir HS  Increase Novolog to 7-7-7 AC + correction scale 180/25  -- Reviewed patient's current insulin regimen. Clarified proper insulin dose and timing in relation to meals, etc. Insulin injection sites and proper rotation instructed.    -- Advised frequent self blood glucose monitoring.  Patient encouraged to document glucose results and bring them to every clinic visit   -- Hypoglycemia precautions discussed. Instructed on precautions before driving.    -- Call for Bg repeatedly < 90 or > 180.   -- Close adherence to lifestyle changes recommended.   -- Periodic follow ups for eye evaluations, foot care and dental care suggested.    HTN (hypertension)  -- on ACEI   -- Controlled  -- Blood pressure goals discussed with patient      Mixed hyperlipidemia  Controlled   On statin per ADA recommendations      Liver mass, left lobe  -- continue following with surgery      Follow-up in about 2 months (around 5/14/2018).  Labs prior    Case discussed with Dr. Tolliver  Recommendations were discussed with the patient in detail  The patient verbalized understanding and agrees with the plan outlined as above.

## 2018-03-12 NOTE — PATIENT INSTRUCTIONS
Wound Check After Surgery: Infection   Your surgical wound has become infected. Infection after surgery usually involves just the top layers of skin. Sometimes the infection is deeper in the wound and may involve a collection of fluid or pus. Treatment will depend on the type of infection you have.   Once antibiotic treatment is started the area of redness should not increase. Pain should start to decrease after two days of treatment.   Home Care:   Keep the wound clean and dry. Change the dressing as directed, or sooner, if the dressing becomes wet or stained with blood or fluid.   If you were told to clean the wound:   Remove the old bandage and wash the wound with soap and water.   Clean with hydrogen peroxide on a cotton tip applicator (Q-tip) to remove any crust or drainage.   Apply an antibiotic cream or ointment such as Neosporin or Bacitracin.   Reapply the bandage.  Bathe with a sponge (no shower or tub baths) for the first few days after surgery, or until there is no more drainage from you wound. Then, you may shower, but do not soak the area in water (no baths or swimming) until the sutures, staples or steri-strips are removed and any wound opening has healed and become dry.   If antibiotics have been prescribed, take them exactly as directed until they are all gone.   You may use acetaminophen (Tylenol) or ibuprofen (Motrin, Advil) to control pain, unless another medicine was prescribed. [NOTE: If you have chronic liver or kidney disease or ever had a stomach ulcer or GI bleeding, talk with your doctor before using these medicines.]  Follow Up   with your doctor as scheduled or as advised by our staff for your next wound check or suture/staple/tape removal.   Return Promptly   or contact your doctor if any of the following occurs:   Increasing pain at the site of surgery or pain not controlled by medicine prescribed   Fever of 100.4°F (38ºC) or higher, or as directed by your healthcare provider    Increasing redness around the wound   Fluid, pus or blood that continues to drain from the wound after five days of treatment   Vomiting, constipation or diarrhea   Shortness of breath or chest pain  © 5383-7605 [employer]. [address] [place] [address], [place] [address]. All rights reserved. This information is not intended as a substitute for professional medical care. Always follow your healthcare professional's instructions.

## 2018-03-14 ENCOUNTER — OFFICE VISIT (OUTPATIENT)
Dept: ENDOCRINOLOGY | Facility: CLINIC | Age: 73
End: 2018-03-14
Payer: MEDICARE

## 2018-03-14 ENCOUNTER — CLINICAL SUPPORT (OUTPATIENT)
Dept: DIABETES | Facility: CLINIC | Age: 73
End: 2018-03-14
Payer: MEDICARE

## 2018-03-14 VITALS
HEART RATE: 72 BPM | DIASTOLIC BLOOD PRESSURE: 60 MMHG | HEIGHT: 67 IN | BODY MASS INDEX: 27.68 KG/M2 | WEIGHT: 176.38 LBS | SYSTOLIC BLOOD PRESSURE: 112 MMHG

## 2018-03-14 DIAGNOSIS — I10 ESSENTIAL HYPERTENSION: ICD-10-CM

## 2018-03-14 DIAGNOSIS — R16.0 LIVER MASS, LEFT LOBE: ICD-10-CM

## 2018-03-14 DIAGNOSIS — E11.8 DM (DIABETES MELLITUS) WITH COMPLICATIONS: ICD-10-CM

## 2018-03-14 DIAGNOSIS — E11.8 DM (DIABETES MELLITUS) WITH COMPLICATIONS: Primary | ICD-10-CM

## 2018-03-14 DIAGNOSIS — E78.2 MIXED HYPERLIPIDEMIA: ICD-10-CM

## 2018-03-14 LAB
CREAT UR-MCNC: 122 MG/DL
MICROALBUMIN UR DL<=1MG/L-MCNC: 23 UG/ML
MICROALBUMIN/CREATININE RATIO: 18.9 UG/MG

## 2018-03-14 PROCEDURE — 99499 UNLISTED E&M SERVICE: CPT | Mod: S$GLB,,, | Performed by: NURSE PRACTITIONER

## 2018-03-14 PROCEDURE — G0108 DIAB MANAGE TRN  PER INDIV: HCPCS | Mod: S$GLB,,, | Performed by: INTERNAL MEDICINE

## 2018-03-14 PROCEDURE — 82043 UR ALBUMIN QUANTITATIVE: CPT

## 2018-03-14 PROCEDURE — 99999 PR PBB SHADOW E&M-EST. PATIENT-LVL III: CPT | Mod: PBBFAC,,, | Performed by: NURSE PRACTITIONER

## 2018-03-14 PROCEDURE — 3078F DIAST BP <80 MM HG: CPT | Mod: CPTII,S$GLB,, | Performed by: NURSE PRACTITIONER

## 2018-03-14 PROCEDURE — 3074F SYST BP LT 130 MM HG: CPT | Mod: CPTII,S$GLB,, | Performed by: NURSE PRACTITIONER

## 2018-03-14 PROCEDURE — 99214 OFFICE O/P EST MOD 30 MIN: CPT | Mod: S$GLB,,, | Performed by: NURSE PRACTITIONER

## 2018-03-14 PROCEDURE — 99999 PR PBB SHADOW E&M-EST. PATIENT-LVL III: CPT | Mod: PBBFAC,,,

## 2018-03-14 RX ORDER — LANCETS 28 GAUGE
1 EACH MISCELLANEOUS 4 TIMES DAILY
Qty: 200 EACH | Refills: 3 | Status: SHIPPED | OUTPATIENT
Start: 2018-03-14 | End: 2018-08-28

## 2018-03-14 RX ORDER — PEN NEEDLE, DIABETIC 30 GX3/16"
NEEDLE, DISPOSABLE MISCELLANEOUS
Qty: 100 EACH | Refills: 3 | Status: SHIPPED | OUTPATIENT
Start: 2018-03-14 | End: 2019-11-21

## 2018-03-14 RX ORDER — INSULIN ASPART 100 [IU]/ML
7 INJECTION, SOLUTION INTRAVENOUS; SUBCUTANEOUS
Qty: 5 SYRINGE | Refills: 6
Start: 2018-03-14 | End: 2018-04-12

## 2018-03-14 NOTE — ASSESSMENT & PLAN NOTE
-- urine today  -- Reviewed goals of therapy are to get the best control we can without hypoglycemia  -- Refer to diabetes education  Medication changes:   Continue levemir HS  Increase Novolog to 7-7-7 AC + correction scale 180/25  -- Reviewed patient's current insulin regimen. Clarified proper insulin dose and timing in relation to meals, etc. Insulin injection sites and proper rotation instructed.    -- Advised frequent self blood glucose monitoring.  Patient encouraged to document glucose results and bring them to every clinic visit   -- Hypoglycemia precautions discussed. Instructed on precautions before driving.    -- Call for Bg repeatedly < 90 or > 180.   -- Close adherence to lifestyle changes recommended.   -- Periodic follow ups for eye evaluations, foot care and dental care suggested.

## 2018-03-14 NOTE — PROGRESS NOTES
Diabetes Education  Author: Imelda Vernon RD, CDE  Date: 3/14/2018    Diabetes Education Visit  Diabetes Education Record Assessment/Progress: Initial    Diabetes Type  Diabetes Type : Type II    Diabetes History  Diabetes Diagnosis: 0-1 year    Nutrition  Meal Planning: 3 meals per day, snacks between meal, diet drinks, water, artificial sweeteners, eats out seldom (May occasionally have a snack)  What type of sweetener do you use?: Sweet N Low  What type of beverages do you drink?: diet soda/tea, juice, other (see comments), water ([place] 50/50; coffee w/ sw and low; gatorade sometimes)    Monitoring   Self Monitoring :  (Checks 4 times a day)  Blood Glucose Logs: No  In the last month, how often have you had a low blood sugar reaction?: never  What are your symptoms of low blood sugar?:  (Never had one)  How do you treat low blood sugar?:  (Never had one)  Can you tell when your blood sugar is too high?: no  How do you treat high blood sugar?:  (Correction scale)    Exercise   Exercise Type: none (Recent surgery)    Current Diabetes Treatment   Current Treatment: Insulin (Novolog 7 units AC plus sandie scale, Levemir 15 units)    Social History  Preferred Learning Method: Face to Face  Primary Support: Self  Smoking Status: Never a Smoker  Alcohol Use: Never    Barriers to Change  Barriers to Change: None  Learning Challenges : None    Readiness to Learn   Readiness to Learn : Acceptance    Cultural Influences  Cultural Influences: No    Diabetes Education Assessment/Progress  Diabetes Disease Process (diabetes disease process and treatment options): Instructed, Discussion, Individual Session, Written Materials Provided  Nutrition (Incorporating nutritional management into one's lifestyle): Instructed, Discussion, Individual Session, Written Materials Provided (General nutrition guidelines and plate method discussed)  Physical Activity (incorporating physical activity into one's lifestyle): Instructed,  Discussion, Individual Session, Written Materials Provided (Reviewed goals and benefits)  Medications (states correct name, dose, onset, peak, duration, side effects & timing of meds): Instructed, Discussion, Individual Session, Written Materials Provided (Reviewed medication regimen)  Monitoring (monitoring blood glucose/other parameters & using results): Instructed, Discussion, Individual Session, Written Materials Provided (Reviewed SMBG schedule and BG goals)  Acute Complications (preventing, detecting, and treating acute complications): Instructed, Discussion, Individual Session, Written Materials Provided (Reviewed s/s and treatment of hypoglycemia)  Chronic Complications (preventing, detecting, and treating chronic complications): Instructed, Discussion, Individual Session, Written Materials Provided (Reviewed standards of care)  Clinical (diabetes, other pertinent medical history, and relevant comorbidities reviewed during visit): Instructed, Discussion, Individual Session  Cognitive (knowledge of self-management skills, functional health literacy): Instructed, Discussion, Individual Session  Psychosocial (emotional response to diabetes): Instructed, Discussion, Individual Session  Diabetes Distress and Support Systems: Not Covered/Deferred (Time constraints)  Behavioral (readiness for change, lifestyle practices, self-care behaviors): Instructed, Discussion, Individual Session, Written Materials Provided    Goals  Patient has selected/evaluated goals during today's session: Yes, selected  Physical Activity: Set (Walk 10-15 min a day 5 days a week)    Diabetes Care Plan/Intervention  Education Plan/Intervention: Individual Follow-Up DSMT    Education Units of Time   Time Spent: 60 min    Health Maintenance was reviewed today with patient. Discussed with patient importance of routine eye exams, foot exams/foot care, blood work (i.e.: A1c, microalbumin, and lipid), dental visits, yearly flu vaccine, and  pneumonia vaccine as indicated by PCP. Patient verbalized understanding.     Health Maintenance Topics with due status: Not Due       Topic Last Completion Date    Colonoscopy 11/16/2011    Lipid Panel 05/02/2017    Eye Exam 07/12/2017    Hemoglobin A1c 03/01/2018    Low Dose Statin 03/14/2018     Health Maintenance Due   Topic Date Due    TETANUS VACCINE  02/06/1964    Zoster Vaccine  02/06/2006    Abdominal Aortic Aneurysm Screening  02/06/2011    Pneumococcal (65+) (2 of 2 - PCV13) 10/29/2013    Foot Exam  04/20/2017    Influenza Vaccine  08/01/2017

## 2018-03-15 ENCOUNTER — OFFICE VISIT (OUTPATIENT)
Dept: SURGERY | Facility: CLINIC | Age: 73
End: 2018-03-15
Payer: MEDICARE

## 2018-03-15 VITALS
WEIGHT: 177.5 LBS | HEART RATE: 99 BPM | TEMPERATURE: 98 F | SYSTOLIC BLOOD PRESSURE: 124 MMHG | DIASTOLIC BLOOD PRESSURE: 65 MMHG | RESPIRATION RATE: 18 BRPM | BODY MASS INDEX: 27.8 KG/M2

## 2018-03-15 DIAGNOSIS — C22.0 HEPATOCELLULAR CARCINOMA: ICD-10-CM

## 2018-03-15 PROCEDURE — 99999 PR PBB SHADOW E&M-EST. PATIENT-LVL IV: CPT | Mod: PBBFAC,,, | Performed by: SURGERY

## 2018-03-15 PROCEDURE — 99024 POSTOP FOLLOW-UP VISIT: CPT | Mod: S$GLB,,, | Performed by: SURGERY

## 2018-03-15 NOTE — PROGRESS NOTES
[facility] - [facility] Surg/Surg Onc  General Surgery  History & Physical      Subjective:     Chief Complaint: Post-operative follow up    History of Present Illness:  Patient is a 73 y.o. male with Hx of DM II, HTN, skin cancer, seizures, newly diagnosed hepatocellular carcinoma s/p left hepatectomy with cholecystectomy (3/5/2018) who presents to clinic today for post-operative follow up.     Patient is doing quite well post-op. Not in any pain today and never filled his pain prescription, states he took some tylenol at night initially but has not needed anything recently. States his appetite is good, energy levels are not quite back to baseline but are improving. He has been up and walking and is almost back to his usual activities. He is anxious for his pathology results but otherwise says he feels good.      Current Outpatient Prescriptions on File Prior to Visit   Medication Sig    amlodipine (NORVASC) 10 MG tablet Take 1 tablet (10 mg total) by mouth once daily.    aspirin (ASPIRIN LOW DOSE) 81 MG EC tablet Take 81 mg by mouth. 1 Tablet, Delayed Release (E.C.) Oral Every day    calcium carbonate (OS-RUSLAN) 500 mg calcium (1,250 mg) chewable tablet Take 1 tablet by mouth once daily.    cyanocobalamin (VITAMIN B-12) 250 MCG tablet Take 1 tablet (250 mcg total) by mouth once daily.    enoxaparin (LOVENOX) 40 mg/0.4 mL Syrg Inject 0.4 mLs (40 mg total) into the skin once daily.    fluorouracil (EFUDEX) 5 % cream AAA on scalp, forehead, and temples BID x 2-3 weeks. Stop if blistered, oozing, or bleeding. Use daily sun protection.    hydroCHLOROthiazide (HYDRODIURIL) 25 MG tablet TAKE 1 TABLET (25 MG TOTAL) BY MOUTH ONCE DAILY.    imiquimod (ALDARA) 5 % cream AAA 5 nights/week x 4 - 6 weeks. Wash off in am. Stop if blistering, bleeding, oozing, etc. Do not use >1 packet per night.    insulin aspart U-100 (NOVOLOG) 100 unit/mL InPn pen Inject 7 Units into the skin 3 (three) times daily with meals. + correction TDD 42  "   insulin detemir U-100 (LEVEMIR FLEXTOUCH) 100 unit/mL (3 mL) SubQ InPn pen Inject 15 Units into the skin every evening.    ketoconazole (NIZORAL) 2 % shampoo Wash hair with medicated shampoo at least 2x/week - let sit on scalp at least 5 minutes prior to rinsing    lisinopril (PRINIVIL,ZESTRIL) 40 MG tablet TAKE 1 TABLET (40 MG TOTAL) BY MOUTH ONCE DAILY.    lovastatin (MEVACOR) 20 MG tablet TAKE 1 TABLET IN THE EVENING    MULTIVITAMIN WITH MINERALS (ONE-A-DAY 50 PLUS) Tab Take by mouth. 1 Tablet Oral Every morning    blood sugar diagnostic Strp To check BG 4 times daily, to use with insurance preferred meter    blood-glucose meter kit To check BG 4 times daily, to use with insurance preferred meter    lancets 28 gauge Misc 1 lancet by Misc.(Non-Drug; Combo Route) route 4 (four) times daily.    lancets Misc To check BG 4 times daily, to use with insurance preferred meter    mupirocin (BACTROBAN) 2 % ointment AAA on scalp bid    oxyCODONE (ROXICODONE) 5 MG immediate release tablet Take 1-2 tablets (5-10 mg total) by mouth every 4 to 6 hours as needed.    pen needle, diabetic (BD ULTRA-FINE KRYSTIAN PEN NEEDLES) 32 gauge x 5/32" Ndle To use 4 times daily with insulin    potassium chloride SA (K-DUR,KLOR-CON) 20 MEQ tablet Take by mouth once daily. 595 mg     tamsulosin (FLOMAX) 0.4 mg Cp24 Take 1 capsule (0.4 mg total) by mouth once daily.     No current facility-administered medications on file prior to visit.        Review of patient's allergies indicates:  No Known Allergies    Past Medical History:   Diagnosis Date    AK (actinic keratosis) PDT scalp 2/2015 and 3/2015    s/p efudex on scalp and forehead, PDT scalp    Arthritis     Diabetes mellitus type II     Fever blister     Hepatocellular carcinoma 03/05/2018    s/p left hepatectomy of segments 2,3,4a/b    Hypertension     Joint pain     SCC (squamous cell carcinoma) 3/2013    L scalp vertex    SCC (squamous cell carcinoma) excised "     left helix, in-situ    Seizures     SQUAMOUS CELL CARCINOMA 3/2013    occipital scalp    SQUAMOUS CELL CARCINOMA 3/2013    l vertex scalp     Past Surgical History:   Procedure Laterality Date    CHOLECYSTECTOMY  03/05/2018    open at time of hepatic resection    EYE SURGERY      LIVER RESECTION Left 03/05/2018    segments 2,3, 4a/b    skin carcinoma removal       Family History     Problem Relation (Age of [place])    Diabetes Father    Vision loss Sister        Social History Main Topics    Smoking status: Former Smoker     Years: 5.00     Quit date: 6/4/1966    Smokeless tobacco: Never Used    Alcohol use No    Drug use: No    Sexual activity: Not on file     Review of Systems   Constitutional: Negative for chills, fatigue, fever and unexpected weight change.   Respiratory: Negative for chest tightness and shortness of breath.    Cardiovascular: Negative for chest pain.   Gastrointestinal: Negative for abdominal pain.   Genitourinary: Negative for difficulty urinating and dysuria.       Objective:     Vital Signs (Most Recent):  Temp: 97.7 °F (36.5 °C) (03/15/18 1017)  Pulse: 99 (03/15/18 1017)  Resp: 18 (03/15/18 1017)  BP: 124/65 (03/15/18 1017)     Weight: 80.5 kg (177 lb 7.5 oz)  Body mass index is 27.8 kg/m².    Physical Exam   Constitutional: He is oriented to person, place, and time. He appears well-developed and well-nourished.   HENT:   Head: Normocephalic and atraumatic.   Cardiovascular: Normal rate.    Pulmonary/Chest: Effort normal.   Abdominal: Soft. He exhibits no distension. There is no tenderness. There is no guarding.   Midline incision with dermabond in place, clean, dry and intact.   Musculoskeletal: Normal range of motion.   Neurological: He is alert and oriented to person, place, and time.   Skin: Skin is warm and dry.   Psychiatric: He has a normal mood and affect.       Pathology:  HEPATOCELLULAR CARCINOMA SYNOPTIC REPORT  -Procedure: Partial hepatectomy, left lobe,  segment 2, 3, most of 4A/B  -Tumor focality: Single focus  -Tumor site: Left lobe  -Tumor size: 7.5 cm x 7.0 cm  -Treatment effect: No known presurgical therapy  -No Satellitosis  -Histologic type: Hepatocellular carcinoma  -Histologic grade: Well-differentiated  -Tumor extension: Tumor confined to liver  -Margins:  -Margins are negative for invasive carcinoma  -Distance to closest margin of resection is 1.5 cm  -No vascular invasion  -No perineural invasion  -No regional lymph nodes  -Focal necrosis, less than 5%  -Focal hemorrhage  -Pathologic staging: pT1b NX MX  -Additional pathologic findings  -Patchy mild nonspecific portal-based lymphocytes  -Macrosteatosis, 5%  -Architectural distortion (portal tracts in close proximity, descriptive, nonspecific finding)  -Trichrome stain: Portal expansion (stage 1 out of 4)  -Iron stain: Negative (0+ out of 4+)      Assessment/Plan:   73 y.o. male s/p left hepatectomy    - referral to medical oncology for evaluation and surveillance of newly diagnosed HCC  - testing for hepatitis B and C  - Return to clinic as needed    Jacqueline Edouard MD  General Surgery Resident, PGY-1  [phone]  3/15/2018 10:17 AM

## 2018-03-16 ENCOUNTER — TELEPHONE (OUTPATIENT)
Dept: HEMATOLOGY/ONCOLOGY | Facility: CLINIC | Age: 73
End: 2018-03-16

## 2018-03-16 NOTE — TELEPHONE ENCOUNTER
----- Message from Darcie Panda RN sent at 3/16/2018  1:39 PM CDT -----  We always say earlier the better but if that is the earliest then that will work. Thank you so much for your assistance.     ----- Message -----  From: Trinh Zhong, RN  Sent: 3/16/2018  11:40 AM  To: Darcie Panda RN, CINTHIA Abel, Ladies!    I have him scheduled for 4/3 at 1pm, as this is our earliest NP appt. Will this work, or should he be seen earlier? If so, I can arrange that with DrT.     When I hear back from [facility], I will call pt to confirm.     Thank you!  Sylvia    ----- Message -----  From: Whitney Sharma RN  Sent: 3/16/2018   8:36 AM  To: Darcie Panda RN, Trinh Zhong, Wallace Lofton. The above pt would like to be seen in [place]; new dx HCC. Has managed medicare.    Thanks,  Whitney  ----- Message -----  From: Darcie Panda RN  Sent: 3/15/2018   4:29 PM  To: CINTHIA Abel,        Can you get this pt set up w/ any med/onc in [place].

## 2018-03-16 NOTE — PHYSICIAN QUERY
PT Name: Jamison Mayes  MR #: [id number]    Physician Query Form - Pathology Findings Clarification     CDS/: Kamilla Macario RN, CCDS               Contact information:  [email or web address]        This form is a permanent document in the medical record.     Query Date: March 16, 2018      By submitting this query, we are merely seeking further clarification of documentation.  Please utilize your independent clinical judgment when addressing the question(s) below.      The medical record contains the following:     Findings Supporting Clinical Information Location in Medical Record                                 FINAL PATHOLOGIC DIAGNOSIS  1. GALLBLADDER (CHOLECYSTECTOMY):  -Benign gallbladder with mild reactive changes  2. LIVER, LEFT LOBE (RESECTION):  -Hepatocellular carcinoma, see synoptic report below   Date of procedure: 3/5/2018   Pre-operative diagnosis:   1. Liver mass, left lobe [R16.0]  2. Hemoperitoneum from ruptured liver mass  Post-operative diagnosis:   1. Same  Operative findings:    1. No additional gross extrahepatic or contralateral hepatic disease  Procedures:  1. Left Hepatic Lobectomy (Seg 2, 3, most of 4a/b)  2. Cholecystectomy  3. Intraoperative ultrasound    Collection date  03/05/2018  SPECIMEN  1) Gallbladder.  2) Left lobe of liver. Op note                              Pathology report     Please document the clinical significance of the Pathologists findings of   FINAL PATHOLOGIC DIAGNOSIS  1. GALLBLADDER (CHOLECYSTECTOMY):  -Benign gallbladder with mild reactive changes  2. LIVER, LEFT LOBE (RESECTION):  -Hepatocellular carcinoma, see synoptic report below          [ x ] I agree with the Pathology Findings        [  ] I do not agree with the Pathology Findings        [  ] Clinically Insignificant        [  ] Clinically Undetermined        [  ] Other/Clarification of Findings: ______________________________________________    Please document in your progress notes daily for the  duration of treatment until resolved and include in your discharge summary.

## 2018-03-19 PROBLEM — C22.0 HEPATOCELLULAR CARCINOMA: Status: ACTIVE | Noted: 2018-03-05

## 2018-03-20 ENCOUNTER — DOCUMENTATION ONLY (OUTPATIENT)
Dept: HEMATOLOGY/ONCOLOGY | Facility: CLINIC | Age: 73
End: 2018-03-20

## 2018-03-20 DIAGNOSIS — C22.0 HCC (HEPATOCELLULAR CARCINOMA): Primary | ICD-10-CM

## 2018-03-20 DIAGNOSIS — R16.0 LIVER MASS: ICD-10-CM

## 2018-03-20 NOTE — PROGRESS NOTES
[facility]    Upper GI Tumor Board Note      Date: 03/19/2018  MRN: [id number]  Site: [facility]  Presenter: Dr. Brown Cortez    Summary: HCC    Recommendations: Serial surveillance is recommended.    Tumor:  Node(s):  Metastasis:      [facility] Treatment Guidelines reviewed and care plan is consistent with guidelines, i.e., NCCN, NCL, PDQ, ASCO, AUA, etc.    Presentation at Cancer Conference: Upper GI Multidisciplinary Tumor Board    Discussed possible entry into Clinical Trial: NO  Physician Name: Whitney Sharma RN

## 2018-03-23 ENCOUNTER — PATIENT MESSAGE (OUTPATIENT)
Dept: ENDOCRINOLOGY | Facility: CLINIC | Age: 73
End: 2018-03-23

## 2018-03-23 ENCOUNTER — TELEPHONE (OUTPATIENT)
Dept: ENDOCRINOLOGY | Facility: CLINIC | Age: 73
End: 2018-03-23

## 2018-03-23 NOTE — TELEPHONE ENCOUNTER
Spoke with the pt wife and advise her that pt BG no is not in range of low. Also advise her that if he have low 60 or 70 call back to clinic and will adjust  a regimen and if goes low in weekend call on  Nursing line and nurse will help you  adjust a regimen,  also advise that pt can drink regular coke or glucose tab if he have a low. Pt wife will call back by Monday with BG no. Pt wife verbally understand.

## 2018-03-23 NOTE — TELEPHONE ENCOUNTER
Sent a message by [facility] portal to the pt that this is not a low range BG no. Let me know if you want a  Adjust a regimen for the pt.

## 2018-03-23 NOTE — TELEPHONE ENCOUNTER
----- Message from Amy Gan sent at 3/23/2018  8:02 AM CDT -----  Contact: Wife / [phone]  Mrs. Mayes is worried the PT blood sugar is dropping. She is requesting a call at [phone] or you can respond via [facility].    3/23 107  3/22 119 112 144 166   3/21 130 149 165 171  3/20 115 [phone]     Breakfast - Lunch - Dinner - Bedtime

## 2018-04-03 ENCOUNTER — PATIENT OUTREACH (OUTPATIENT)
Dept: DIABETES | Facility: CLINIC | Age: 73
End: 2018-04-03

## 2018-04-03 ENCOUNTER — INITIAL CONSULT (OUTPATIENT)
Dept: HEMATOLOGY/ONCOLOGY | Facility: CLINIC | Age: 73
End: 2018-04-03
Payer: MEDICARE

## 2018-04-03 ENCOUNTER — LAB VISIT (OUTPATIENT)
Dept: LAB | Facility: HOSPITAL | Age: 73
End: 2018-04-03
Attending: SURGERY
Payer: MEDICARE

## 2018-04-03 VITALS
WEIGHT: 172.19 LBS | BODY MASS INDEX: 27.67 KG/M2 | SYSTOLIC BLOOD PRESSURE: 111 MMHG | HEIGHT: 66 IN | OXYGEN SATURATION: 98 % | DIASTOLIC BLOOD PRESSURE: 60 MMHG | HEART RATE: 82 BPM

## 2018-04-03 DIAGNOSIS — C22.0 HCC (HEPATOCELLULAR CARCINOMA): ICD-10-CM

## 2018-04-03 DIAGNOSIS — C22.0 HEPATOCELLULAR CARCINOMA: Primary | ICD-10-CM

## 2018-04-03 DIAGNOSIS — R16.0 LIVER MASS: ICD-10-CM

## 2018-04-03 DIAGNOSIS — R91.1 PULMONARY NODULE, LEFT: ICD-10-CM

## 2018-04-03 PROBLEM — K66.1 HEMOPERITONEUM: Status: RESOLVED | Noted: 2018-03-01 | Resolved: 2018-04-03

## 2018-04-03 PROCEDURE — 99999 PR PBB SHADOW E&M-EST. PATIENT-LVL IV: CPT | Mod: PBBFAC,,, | Performed by: INTERNAL MEDICINE

## 2018-04-03 PROCEDURE — 99205 OFFICE O/P NEW HI 60 MIN: CPT | Mod: S$GLB,,, | Performed by: INTERNAL MEDICINE

## 2018-04-03 PROCEDURE — 80074 ACUTE HEPATITIS PANEL: CPT

## 2018-04-03 PROCEDURE — 3074F SYST BP LT 130 MM HG: CPT | Mod: CPTII,S$GLB,, | Performed by: INTERNAL MEDICINE

## 2018-04-03 PROCEDURE — 3078F DIAST BP <80 MM HG: CPT | Mod: CPTII,S$GLB,, | Performed by: INTERNAL MEDICINE

## 2018-04-03 PROCEDURE — 36415 COLL VENOUS BLD VENIPUNCTURE: CPT

## 2018-04-03 NOTE — PROGRESS NOTES
Subjective:       Patient ID: Jamison Mayes is a 73 y.o. male.    Chief Complaint: No chief complaint on file.    HPI Mr. Mayes is referred by Dr. Jorge for newly diagnosed hepatocellular carcinoma.    He is otherwise healthy 73-year-old male who presented to the hospital on 03/01/2018 with sudden onset of cold sweats, nausea and abdominal discomfort and was noted to have an 8.7 cm mass in the lateral segment of the left hepatic lobe that has ruptured to the liver capsule.  He was monitored and underwent a left hepatic lobectomy with cholecystectomy on 03/05/2018.  Final pathology revealed a T1b NX 7.5 cm well-differentiated hepatocellular carcinoma without vascular invasion or perineural invasion.  The tumor appeared to be confined to the liver.    A CT chest with contrast on 03/03/2018 revealed a 0.3 cm left upper lobe pulmonary nodule.    The patient does not have a history of alcohol abuse or hepatitis.  A hepatitis serology was drawn today and the results are pending.  He has healed well from surgery.    He is accompanied to the clinic by his wife.  He lives in [facility].  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    Review of Systems   Constitutional: Negative for fever and unexpected weight change.   HENT: Negative for mouth sores and nosebleeds.    Eyes: Negative for photophobia and pain.   Respiratory: Negative for shortness of breath and wheezing.    Cardiovascular: Negative for chest pain and palpitations.   Gastrointestinal: Negative for abdominal pain and vomiting.   Genitourinary: Negative for flank pain and hematuria.   Musculoskeletal: Negative for back pain, neck pain and neck stiffness.   Skin: Negative for rash and wound.   Neurological: Negative for seizures and syncope.   Hematological: Negative for adenopathy. Does not bruise/bleed easily.   Psychiatric/Behavioral: Negative for behavioral problems and confusion.   All other systems reviewed and are negative.        Objective:      Physical  Exam   Abdominal:             Assessment:       1. Hepatocellular carcinoma    2. Pulmonary nodule, left        Plan:   Mr. Mayes has a diagnosis of T1b NX hepatocellular carcinoma that was resected.  Malignant transformation of a prior hepatic adenoma is a consideration.    I discussed the diagnosis in detail.  The left upper lobe pulmonary nodule will need to be watched closely.  No indication to perform a biopsy at this time.    No clinical indication for any adjuvant therapy.    We will schedule an MRI of the abdomen with a CT scan of the chest without contrast and see him back in the clinic for followup in three months.  All questions answered to his satisfaction.

## 2018-04-04 LAB
HAV IGM SERPL QL IA: NEGATIVE
HBV CORE IGM SERPL QL IA: NEGATIVE
HBV SURFACE AG SERPL QL IA: NEGATIVE
HCV AB SERPL QL IA: NEGATIVE

## 2018-04-05 ENCOUNTER — PATIENT OUTREACH (OUTPATIENT)
Dept: DIABETES | Facility: CLINIC | Age: 73
End: 2018-04-05

## 2018-04-05 NOTE — PROGRESS NOTES
Received pt's BG log and reviewed - fasting typically in goal and ranging mostly 110s-130s, pre-lunch, pre-dinner, and HS often above goal mostly ranging 150-200s but at times lower at 2 readings 110s. Suspect needing increase in Novolog base dose. Sending message to Nahomi Russo NP for her instructions.

## 2018-04-05 NOTE — PROGRESS NOTES
Called and spoke with pt's wife - reviewed per Nahomi Russo, NP increase his Novolog to 8 units base dose before breakfast, lunch and dinner and continue to add based on correction scale he has been using. She repeated back and appropriately confirmed proper scale use with verbalizing example of if  before meal, will take 9 units. Inquired about days when BG was 110 and 112. She could not recall if those were d/t eating a smaller meal or more active on those days. However, did report that at times they eat salad, protein, with a few crackers for a meal. She shows good understanding of sources of carbs. Explained if eating a meal low in carb such salad/protein/cracker to cut Novolog base dose in 1/2. She verbalized will only do 4 units base dose if eating light meal.

## 2018-04-05 NOTE — Clinical Note
Jr García,   We got a log for Mr. Mayes. Fasting is great but often prandial excursions. Does have a couple of pre-prandial readings in 110s. Do you want to increase Novolog base to 8 or 9? I can call him with your instructions and will try to find out what happened on days when BG in 110s - if eating very light meals those days or activity related, will instruct on cutting down on base dose.   Many thanks! Myrna

## 2018-04-06 ENCOUNTER — TELEPHONE (OUTPATIENT)
Dept: SURGERY | Facility: CLINIC | Age: 73
End: 2018-04-06

## 2018-04-06 NOTE — TELEPHONE ENCOUNTER
----- Message from Oscar Jhaveri sent at 4/6/2018  8:22 AM CDT -----  Contact: Tricia- wife  Caller said she spoke with the disability dept and they are waiting on a signature from . Please call pt regarding the status of this at [phone]

## 2018-04-09 ENCOUNTER — PATIENT MESSAGE (OUTPATIENT)
Dept: SURGERY | Facility: CLINIC | Age: 73
End: 2018-04-09

## 2018-04-11 ENCOUNTER — PATIENT MESSAGE (OUTPATIENT)
Dept: ENDOCRINOLOGY | Facility: CLINIC | Age: 73
End: 2018-04-11

## 2018-04-12 ENCOUNTER — TELEPHONE (OUTPATIENT)
Dept: ENDOCRINOLOGY | Facility: CLINIC | Age: 73
End: 2018-04-12

## 2018-04-12 RX ORDER — GLIMEPIRIDE 2 MG/1
2 TABLET ORAL
Qty: 30 TABLET | Refills: 11 | Status: SHIPPED | OUTPATIENT
Start: 2018-04-12 | End: 2018-07-17

## 2018-04-12 NOTE — TELEPHONE ENCOUNTER
Spoke with patient and his wife about insulin reactions that he is having. Stopped novolog completely and started amaryl 2 mg with breakfast only continue levemir. Instructed her to rotate sites every night.

## 2018-04-15 DIAGNOSIS — I10 ESSENTIAL HYPERTENSION: ICD-10-CM

## 2018-04-16 ENCOUNTER — PATIENT OUTREACH (OUTPATIENT)
Dept: DIABETES | Facility: CLINIC | Age: 73
End: 2018-04-16

## 2018-04-16 ENCOUNTER — TELEPHONE (OUTPATIENT)
Dept: ENDOCRINOLOGY | Facility: CLINIC | Age: 73
End: 2018-04-16

## 2018-04-16 RX ORDER — AMLODIPINE BESYLATE 10 MG/1
10 TABLET ORAL DAILY
Qty: 90 TABLET | Refills: 3 | Status: SHIPPED | OUTPATIENT
Start: 2018-04-16 | End: 2018-07-17

## 2018-04-16 NOTE — TELEPHONE ENCOUNTER
----- Message from Nahomi Russo NP sent at 4/16/2018  1:57 PM CDT -----  Can you tell him to do 9 units with dinner?    Thank you,   Nahomi     ----- Message -----  From: Suzanne Meza MA  Sent: 4/16/2018   1:44 PM  To: Nahomi Russo NP

## 2018-04-16 NOTE — TELEPHONE ENCOUNTER
Spoke with the pt wife and advise her to do 9 units Levemir at night . Pt wife  verbalized understand.

## 2018-04-17 ENCOUNTER — PES CALL (OUTPATIENT)
Dept: ADMINISTRATIVE | Facility: CLINIC | Age: 73
End: 2018-04-17

## 2018-04-26 ENCOUNTER — OFFICE VISIT (OUTPATIENT)
Dept: INTERNAL MEDICINE | Facility: CLINIC | Age: 73
End: 2018-04-26
Payer: MEDICARE

## 2018-04-26 DIAGNOSIS — I10 ESSENTIAL HYPERTENSION: Primary | ICD-10-CM

## 2018-04-26 DIAGNOSIS — E11.8 DM (DIABETES MELLITUS) WITH COMPLICATIONS: ICD-10-CM

## 2018-04-26 DIAGNOSIS — E78.2 MIXED HYPERLIPIDEMIA: ICD-10-CM

## 2018-04-26 DIAGNOSIS — C22.0 HEPATOCELLULAR CARCINOMA: ICD-10-CM

## 2018-04-26 DIAGNOSIS — Z00.00 PREVENTATIVE HEALTH CARE: ICD-10-CM

## 2018-04-26 DIAGNOSIS — K59.1 FUNCTIONAL DIARRHEA: ICD-10-CM

## 2018-04-26 DIAGNOSIS — G40.909 SEIZURE DISORDER: ICD-10-CM

## 2018-04-26 PROBLEM — R19.7 FREQUENT LOOSE STOOLS: Status: ACTIVE | Noted: 2018-04-26

## 2018-04-26 PROCEDURE — 99214 OFFICE O/P EST MOD 30 MIN: CPT | Mod: S$GLB,,, | Performed by: FAMILY MEDICINE

## 2018-04-26 PROCEDURE — 99499 UNLISTED E&M SERVICE: CPT | Mod: S$GLB,,, | Performed by: FAMILY MEDICINE

## 2018-04-26 NOTE — PROGRESS NOTES
Subjective:       Patient ID: Jamison Mayes is a 73 y.o. male.    Chief Complaint: No chief complaint on file.  Jamison Mayes 73 y.o. male is here for office visit to review care and physical exam, reports doing well, tolerates activity, back at work.  Was in hosp for HCCV resection.  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  Now feels well, only stool frequency noted.  No abd pain or nausea.  Has Hepatology f/u.  HPI  Review of Systems   Constitutional: Negative for activity change, appetite change, fatigue, fever and unexpected weight change.   HENT: Negative for congestion, hearing loss, postnasal drip and rhinorrhea.    Eyes: Negative for pain, discharge and visual disturbance.   Respiratory: Negative for cough, choking and shortness of breath.    Cardiovascular: Negative for chest pain, palpitations and leg swelling.   Gastrointestinal: Negative for abdominal pain, diarrhea and vomiting.   Genitourinary: Negative for dysuria, flank pain, hematuria and urgency.   Musculoskeletal: Negative for arthralgias, back pain, joint swelling and neck pain.   Skin: Negative for color change and rash.   Neurological: Negative for dizziness, tremors, syncope, weakness, numbness and headaches.   Psychiatric/Behavioral: Negative for agitation and confusion. The patient is not hyperactive.        Objective:      Physical Exam   Constitutional: He is oriented to person, place, and time. He appears well-developed and well-nourished.   HENT:   Head: Normocephalic.   Eyes: EOM are normal. Pupils are equal, round, and reactive to light.   Neck: Normal range of motion. Neck supple. No thyromegaly present.   Cardiovascular: Normal rate.  Exam reveals no gallop and no friction rub.    No murmur heard.  Pulmonary/Chest: Effort normal. No respiratory distress. He has no wheezes.   Abdominal: Soft. Bowel sounds are normal. He exhibits no mass. There is no tenderness.   Musculoskeletal: He exhibits no edema  or tenderness.   Lymphadenopathy:     He has no cervical adenopathy.   Neurological: He is alert and oriented to person, place, and time. He has normal reflexes. No cranial nerve deficit.   Skin: Skin is warm. No rash noted.   Psychiatric: He has a normal mood and affect. His behavior is normal.       Assessment:       1. Essential hypertension    2. DM (diabetes mellitus) with complications    3. Mixed hyperlipidemia    4. Hepatocellular carcinoma    5. Seizure disorder    6. Preventative health care    7. Functional diarrhea        Plan:       Diagnoses and all orders for this visit:    Essential hypertension  - controled  DM (diabetes mellitus) with complications  - reports good CBgs  Mixed hyperlipidemia  - follow  Hepatocellular carcinoma  - resected, has increased bile?  aske dto use otc fiber and pro-biotic, has hep f/u  Seizure disorder  - no new concerns  Preventative health care  -     PSA, Screening; Future

## 2018-04-27 ENCOUNTER — PATIENT OUTREACH (OUTPATIENT)
Dept: DIABETES | Facility: CLINIC | Age: 73
End: 2018-04-27

## 2018-04-30 ENCOUNTER — TELEPHONE (OUTPATIENT)
Dept: ENDOCRINOLOGY | Facility: CLINIC | Age: 73
End: 2018-04-30

## 2018-04-30 NOTE — TELEPHONE ENCOUNTER
Spoke with pt wife and advise him to cont 9 units levemir for now till he saw her for next visit. Pt wife verbally understand.

## 2018-04-30 NOTE — TELEPHONE ENCOUNTER
Spoke with pt wife and she stated that he is doing 9 units for Levemir ? So does Nahomi  you want him to go up to 14 units for Levemir?  cause his BG run low in the morning 89, 99. 98 105 and last night it was 120. Some time his wife dont give him insulin at all. Pt wife was asking that if he can take pills only to not to take insulin. Please advise.

## 2018-04-30 NOTE — TELEPHONE ENCOUNTER
----- Message from Nahomi Russo NP sent at 4/30/2018  7:59 AM CDT -----  Can you call Mr. echols and tell him to cut back levemir at night to 14?    Thank you,   Nahomi     ----- Message -----  From: Suzanne Meza MA  Sent: 4/27/2018   2:23 PM  To: Nahomi Russo NP    bs logs attached

## 2018-05-01 ENCOUNTER — LAB VISIT (OUTPATIENT)
Dept: LAB | Facility: HOSPITAL | Age: 73
End: 2018-05-01
Attending: NURSE PRACTITIONER
Payer: MEDICARE

## 2018-05-01 ENCOUNTER — TELEPHONE (OUTPATIENT)
Dept: INTERNAL MEDICINE | Facility: CLINIC | Age: 73
End: 2018-05-01

## 2018-05-01 DIAGNOSIS — Z13.9 ENCOUNTER FOR SCREENING: Primary | ICD-10-CM

## 2018-05-01 DIAGNOSIS — E11.8 DM (DIABETES MELLITUS) WITH COMPLICATIONS: ICD-10-CM

## 2018-05-01 DIAGNOSIS — Z13.9 ENCOUNTER FOR SCREENING: ICD-10-CM

## 2018-05-01 LAB
ALBUMIN SERPL BCP-MCNC: 4.4 G/DL
ALP SERPL-CCNC: 62 U/L
ALT SERPL W/O P-5'-P-CCNC: 23 U/L
ANION GAP SERPL CALC-SCNC: 13 MMOL/L
AST SERPL-CCNC: 36 U/L
BILIRUB SERPL-MCNC: 0.3 MG/DL
BUN SERPL-MCNC: 28 MG/DL
CALCIUM SERPL-MCNC: 10.3 MG/DL
CHLORIDE SERPL-SCNC: 105 MMOL/L
CO2 SERPL-SCNC: 27 MMOL/L
COMPLEXED PSA SERPL-MCNC: 3 NG/ML
CREAT SERPL-MCNC: 1.09 MG/DL
EST. GFR  (AFRICAN AMERICAN): >60 ML/MIN/1.73 M^2
EST. GFR  (NON AFRICAN AMERICAN): >60 ML/MIN/1.73 M^2
ESTIMATED AVG GLUCOSE: 123 MG/DL
GLUCOSE SERPL-MCNC: 102 MG/DL
HBA1C MFR BLD HPLC: 5.9 %
POTASSIUM SERPL-SCNC: 4.8 MMOL/L
PROT SERPL-MCNC: 7.7 G/DL
SODIUM SERPL-SCNC: 145 MMOL/L

## 2018-05-01 PROCEDURE — 36415 COLL VENOUS BLD VENIPUNCTURE: CPT | Mod: PO

## 2018-05-01 PROCEDURE — 83036 HEMOGLOBIN GLYCOSYLATED A1C: CPT

## 2018-05-01 PROCEDURE — 80053 COMPREHEN METABOLIC PANEL: CPT | Mod: PO

## 2018-05-01 PROCEDURE — 84153 ASSAY OF PSA TOTAL: CPT

## 2018-05-02 ENCOUNTER — PATIENT MESSAGE (OUTPATIENT)
Dept: HEMATOLOGY/ONCOLOGY | Facility: CLINIC | Age: 73
End: 2018-05-02

## 2018-05-02 ENCOUNTER — PATIENT MESSAGE (OUTPATIENT)
Dept: INTERNAL MEDICINE | Facility: CLINIC | Age: 73
End: 2018-05-02

## 2018-05-02 ENCOUNTER — TELEPHONE (OUTPATIENT)
Dept: HEMATOLOGY/ONCOLOGY | Facility: CLINIC | Age: 73
End: 2018-05-02

## 2018-05-02 DIAGNOSIS — C22.0 HEPATOCELLULAR CARCINOMA: Primary | ICD-10-CM

## 2018-05-02 DIAGNOSIS — E11.9 TYPE 2 DIABETES MELLITUS WITHOUT COMPLICATION: ICD-10-CM

## 2018-05-02 NOTE — TELEPHONE ENCOUNTER
Appts rescheduled per pt request.     ----- Message from Jacquelyn Toussaint sent at 5/2/2018  3:27 PM CDT -----  Contact: [phone]/wife  Tricia   PT wife is requesting to speak with you concerning scheduling the patient test.  Please call and advise

## 2018-05-04 NOTE — PROGRESS NOTES
Subjective:      Patient ID: Jamison Mayes is a 73 y.o. male.    Chief Complaint:  Diabetes Mellitus    History of Present Illness  Jamison Mayes presents today for follow up of type 2 DM.     Per Dr. Gupta's note from his hospital admission:  Patient is a 73 y.o. male with a diagnosis of HTN, T2DM, and seizure disorder who presented from OSH with newly diagnosed left lobe liver mass that ruptured resulting in hemoperitoneum.  S/p liver resection.  Endocrinology consulted for elevated BG. Had a Procedure(s) (LRB):  HEPATECTOMY (Left)  CHOLECYSTECTOMY (N/A)  EVACUATION-HEMATOMA (N/A)  ULTRASOUND (N/A)      Patient notes that he has never required medication for DM. A1c 7.8%.  Prior to procedure, patient was not on any diabetes medications. He was discharged on novolog and levemir. He started to have insulin site reactions so we stopped the novolog and started him on amaryl 2 mg with breakfast only.     Results for JAMISON MAYES (MRN [id number]) as of 5/8/2018 11:15   Ref. Range 5/1/2018 10:53   Hemoglobin A1C Latest Ref Range: 4.0 - 5.6 % 5.9 (H)   Estimated Avg Glucose Latest Ref Range: 68 - 131 mg/dL 123       With regards to the diabetes:    Current regimen:  Levemir 9 units HS  Amaryl 2 mg     Missed doses? No    2 times a day testing  Log reviewed: yes       Eats 3 meals a day.   Snacks- rarely on sugar free jello and cookies  Drinks- oj with less sugar, water, green tea diet    Exercise - tried to stay active     Hypoglycemic event? None   Knows how to correct with 15 grams of carbs- juice, coke, or a peppermint.     Last eye exam: No  03/2017.  Last podiatry exam: No  Denies numbness & tingling in the feet     Education - last visit: None    With regards to hypertension:  Tolerating ACEI     With regards to dyslipidemia:  Tolerating statin     Review of Systems   Constitutional: Negative for unexpected weight change.   Eyes: Negative for visual disturbance.   Respiratory: Negative for shortness of  breath.    Cardiovascular: Negative for chest pain.   Gastrointestinal: Negative for abdominal pain.   Endocrine: Negative for cold intolerance, heat intolerance, polydipsia, polyphagia and polyuria.   Musculoskeletal: Negative for arthralgias.   Skin: Negative for wound.   Neurological: Negative for headaches.   Hematological: Does not bruise/bleed easily.   Psychiatric/Behavioral: Negative for sleep disturbance.     Objective:   Physical Exam   Neck: No thyromegaly present.   Cardiovascular: Normal rate.    No edema present   Pulmonary/Chest: Effort normal.   Abdominal: Soft.   Vitals reviewed.  Appropriate footwear, Foot exam deferred per patient.  injection sites are erythematous. No lipo hypertropthy or atrophy    Body mass index is 29.17 kg/m².    Lab Review:   Lab Results   Component Value Date    HGBA1C 5.9 (H) 05/01/2018     Lab Results   Component Value Date    CHOL 140 05/02/2017    HDL 44 05/02/2017    LDLCALC 71.2 05/02/2017    TRIG 124 05/02/2017    CHOLHDL 31.4 05/02/2017     Lab Results   Component Value Date     05/01/2018    K 4.8 05/01/2018     05/01/2018    CO2 27 05/01/2018     05/01/2018    BUN 28 (H) 05/01/2018    CREATININE 1.09 05/01/2018    CALCIUM 10.3 05/01/2018    PROT 7.7 05/01/2018    ALBUMIN 4.4 05/01/2018    BILITOT 0.3 05/01/2018    ALKPHOS 62 05/01/2018    AST 36 05/01/2018    ALT 23 05/01/2018    ANIONGAP 13 05/01/2018    ESTGFRAFRICA >60.0 05/01/2018    EGFRNONAA >60.0 05/01/2018    TSH 1.734 10/29/2012       Assessment and Plan     1. DM (diabetes mellitus) with complications  Hemoglobin A1c    CBC auto differential    Comprehensive metabolic panel    Microalbumin/creatinine urine ratio   2. Essential hypertension     3. Mixed hyperlipidemia       DM (diabetes mellitus) with complications  -- Reviewed goals of therapy are to get the best control we can without hypoglycemia  Medication changes:   Stop levemir   Continue amaryl 2 mg with breakfast   -- to send  me logs in 2 weeks, may add januvia or another amaryl 2 mg with dinner.  -- Hypoglycemia precautions discussed. Instructed on precautions before driving.    -- Call for Bg repeatedly < 90 or > 180.   -- Close adherence to lifestyle changes recommended.   -- Periodic follow ups for eye evaluations, foot care and dental care suggested.    HTN (hypertension)  -- on ACEI   -- Controlled  -- Blood pressure goals discussed with patient      Mixed hyperlipidemia  Controlled   On statin per ADA recommendations      Follow-up in about 3 months (around 8/8/2018).  Labs prior

## 2018-05-05 ENCOUNTER — PATIENT MESSAGE (OUTPATIENT)
Dept: SURGERY | Facility: CLINIC | Age: 73
End: 2018-05-05

## 2018-05-07 ENCOUNTER — PATIENT MESSAGE (OUTPATIENT)
Dept: SURGERY | Facility: CLINIC | Age: 73
End: 2018-05-07

## 2018-05-08 ENCOUNTER — OFFICE VISIT (OUTPATIENT)
Dept: ENDOCRINOLOGY | Facility: CLINIC | Age: 73
End: 2018-05-08
Payer: MEDICARE

## 2018-05-08 VITALS
HEART RATE: 84 BPM | WEIGHT: 175.31 LBS | DIASTOLIC BLOOD PRESSURE: 60 MMHG | SYSTOLIC BLOOD PRESSURE: 104 MMHG | HEIGHT: 65 IN | BODY MASS INDEX: 29.21 KG/M2

## 2018-05-08 DIAGNOSIS — E11.8 DM (DIABETES MELLITUS) WITH COMPLICATIONS: Primary | ICD-10-CM

## 2018-05-08 DIAGNOSIS — E78.2 MIXED HYPERLIPIDEMIA: ICD-10-CM

## 2018-05-08 DIAGNOSIS — I10 ESSENTIAL HYPERTENSION: ICD-10-CM

## 2018-05-08 PROCEDURE — 99214 OFFICE O/P EST MOD 30 MIN: CPT | Mod: S$GLB,,, | Performed by: NURSE PRACTITIONER

## 2018-05-08 PROCEDURE — 3078F DIAST BP <80 MM HG: CPT | Mod: CPTII,S$GLB,, | Performed by: NURSE PRACTITIONER

## 2018-05-08 PROCEDURE — 3044F HG A1C LEVEL LT 7.0%: CPT | Mod: CPTII,S$GLB,, | Performed by: NURSE PRACTITIONER

## 2018-05-08 PROCEDURE — 99499 UNLISTED E&M SERVICE: CPT | Mod: S$GLB,,, | Performed by: NURSE PRACTITIONER

## 2018-05-08 PROCEDURE — 3074F SYST BP LT 130 MM HG: CPT | Mod: CPTII,S$GLB,, | Performed by: NURSE PRACTITIONER

## 2018-05-08 PROCEDURE — 99999 PR PBB SHADOW E&M-EST. PATIENT-LVL III: CPT | Mod: PBBFAC,,, | Performed by: NURSE PRACTITIONER

## 2018-05-08 NOTE — ASSESSMENT & PLAN NOTE
-- Reviewed goals of therapy are to get the best control we can without hypoglycemia  Medication changes:   Stop levemir   Continue amaryl 2 mg with breakfast   -- to send me logs in 2 weeks, may add januvia or another amaryl 2 mg with dinner.  -- Hypoglycemia precautions discussed. Instructed on precautions before driving.    -- Call for Bg repeatedly < 90 or > 180.   -- Close adherence to lifestyle changes recommended.   -- Periodic follow ups for eye evaluations, foot care and dental care suggested.

## 2018-05-16 RX ORDER — HYDROCHLOROTHIAZIDE 25 MG/1
25 TABLET ORAL DAILY
Qty: 90 TABLET | Refills: 0 | Status: SHIPPED | OUTPATIENT
Start: 2018-05-16 | End: 2018-07-17

## 2018-05-24 ENCOUNTER — TELEPHONE (OUTPATIENT)
Dept: ENDOCRINOLOGY | Facility: CLINIC | Age: 73
End: 2018-05-24

## 2018-05-24 ENCOUNTER — PATIENT OUTREACH (OUTPATIENT)
Dept: DIABETES | Facility: CLINIC | Age: 73
End: 2018-05-24

## 2018-05-24 NOTE — TELEPHONE ENCOUNTER
Please advise patient that I reviewed his glucose logs. I recommend that he continue the current medication for diabetes. Almost all of the glucoses are at or near goal and I do not think he needs additional therapy at this time.

## 2018-05-24 NOTE — TELEPHONE ENCOUNTER
Talk to pt wife I told her the dr on call reviewed his glucose labs and he should continue the same regimen of his meds

## 2018-06-11 ENCOUNTER — PATIENT OUTREACH (OUTPATIENT)
Dept: DIABETES | Facility: CLINIC | Age: 73
End: 2018-06-11

## 2018-06-11 NOTE — PROGRESS NOTES
Received BG log and reviewed with Nahomi Russo NP. 1 reading below 70 but almost all other readings within goal. Called and spoke with pt's wife. Advised to continue current regimen of Amaryl 2mg once daily with breakfast and avoid skipping meals. She inquired about goal BG readings and expressed concern that evening readings are higher than am readings. Reviewed goal BG readings. She reports HS readings are often shortly after or 1 hour after eating dinner. Encouraged timing about 2 hours after dinner and reviewed post-prandial goal.

## 2018-06-12 DIAGNOSIS — I10 ESSENTIAL HYPERTENSION: ICD-10-CM

## 2018-06-12 RX ORDER — LOVASTATIN 20 MG/1
20 TABLET ORAL NIGHTLY
Qty: 90 TABLET | Refills: 3 | Status: SHIPPED | OUTPATIENT
Start: 2018-06-12 | End: 2018-11-15 | Stop reason: SDUPTHER

## 2018-06-21 ENCOUNTER — PATIENT OUTREACH (OUTPATIENT)
Dept: DIABETES | Facility: CLINIC | Age: 73
End: 2018-06-21

## 2018-06-27 ENCOUNTER — PATIENT MESSAGE (OUTPATIENT)
Dept: ENDOCRINOLOGY | Facility: CLINIC | Age: 73
End: 2018-06-27

## 2018-06-27 ENCOUNTER — TELEPHONE (OUTPATIENT)
Dept: ENDOCRINOLOGY | Facility: CLINIC | Age: 73
End: 2018-06-27

## 2018-06-27 NOTE — TELEPHONE ENCOUNTER
----- Message from Nahomi Patrick NP sent at 6/27/2018  8:38 AM CDT -----  Looks good     Thank you,   Nahomi     ----- Message -----  From: Suzanne Meza MA  Sent: 6/21/2018   3:38 PM  To: Nahomi Patrick NP

## 2018-07-03 ENCOUNTER — HOSPITAL ENCOUNTER (OUTPATIENT)
Dept: RADIOLOGY | Facility: HOSPITAL | Age: 73
Discharge: HOME | End: 2018-07-03
Attending: INTERNAL MEDICINE
Payer: MEDICARE

## 2018-07-03 DIAGNOSIS — C22.0 HEPATOCELLULAR CARCINOMA: ICD-10-CM

## 2018-07-03 DIAGNOSIS — R91.1 PULMONARY NODULE, LEFT: ICD-10-CM

## 2018-07-03 PROCEDURE — 74183 MRI ABD W/O CNTR FLWD CNTR: CPT | Mod: 26,,, | Performed by: RADIOLOGY

## 2018-07-03 PROCEDURE — 25500020 PHARM REV CODE 255: Performed by: INTERNAL MEDICINE

## 2018-07-03 PROCEDURE — 74183 MRI ABD W/O CNTR FLWD CNTR: CPT | Mod: TC

## 2018-07-03 PROCEDURE — A9585 GADOBUTROL INJECTION: HCPCS | Performed by: INTERNAL MEDICINE

## 2018-07-03 PROCEDURE — 71250 CT THORAX DX C-: CPT | Mod: TC

## 2018-07-03 PROCEDURE — 71250 CT THORAX DX C-: CPT | Mod: 26,,, | Performed by: RADIOLOGY

## 2018-07-03 RX ORDER — GADOBUTROL 604.72 MG/ML
10 INJECTION INTRAVENOUS
Status: COMPLETED | OUTPATIENT
Start: 2018-07-03 | End: 2018-07-03

## 2018-07-03 RX ADMIN — GADOBUTROL 10 ML: 604.72 INJECTION INTRAVENOUS at 09:07

## 2018-07-05 ENCOUNTER — OFFICE VISIT (OUTPATIENT)
Dept: HEMATOLOGY/ONCOLOGY | Facility: CLINIC | Age: 73
End: 2018-07-05
Payer: MEDICARE

## 2018-07-05 VITALS
TEMPERATURE: 98 F | HEART RATE: 59 BPM | BODY MASS INDEX: 27.78 KG/M2 | SYSTOLIC BLOOD PRESSURE: 109 MMHG | WEIGHT: 177 LBS | HEIGHT: 67 IN | OXYGEN SATURATION: 98 % | DIASTOLIC BLOOD PRESSURE: 53 MMHG

## 2018-07-05 DIAGNOSIS — C22.0 HEPATOCELLULAR CARCINOMA: Primary | ICD-10-CM

## 2018-07-05 DIAGNOSIS — K86.2 PANCREATIC CYST: ICD-10-CM

## 2018-07-05 PROCEDURE — 99999 PR PBB SHADOW E&M-EST. PATIENT-LVL III: CPT | Mod: PBBFAC,,, | Performed by: INTERNAL MEDICINE

## 2018-07-05 PROCEDURE — 99499 UNLISTED E&M SERVICE: CPT | Mod: S$GLB,,, | Performed by: INTERNAL MEDICINE

## 2018-07-05 PROCEDURE — 99214 OFFICE O/P EST MOD 30 MIN: CPT | Mod: S$GLB,,, | Performed by: INTERNAL MEDICINE

## 2018-07-05 PROCEDURE — 3074F SYST BP LT 130 MM HG: CPT | Mod: CPTII,S$GLB,, | Performed by: INTERNAL MEDICINE

## 2018-07-05 PROCEDURE — 3078F DIAST BP <80 MM HG: CPT | Mod: CPTII,S$GLB,, | Performed by: INTERNAL MEDICINE

## 2018-07-05 NOTE — PROGRESS NOTES
Subjective:       Patient ID: Jamison Mayes is a 73 y.o. male.    Chief Complaint: No chief complaint on file.    HPI Mr. Mayes is here for f/u of hepatocellular carcinoma.    He presented to the hospital on 03/01/2018 with sudden onset of cold sweats, nausea and abdominal discomfort and was noted to have an 8.7 cm mass in the lateral segment of the left hepatic lobe that has ruptured to the liver capsule.  He was monitored and underwent a left hepatic lobectomy with cholecystectomy on 03/05/2018.  Final pathology revealed a T1b NX 7.5 cm well-differentiated hepatocellular carcinoma without vascular invasion or perineural invasion.  The tumor appeared to be confined to the liver. Hepatitis serology negative.    A CT chest with contrast on 03/03/2018 revealed a 0.3 cm left upper lobe pulmonary nodule.    He is accompanied to the clinic by his wife.  He lives in [facility].  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    Review of Systems   Constitutional: Negative for fever and unexpected weight change.   HENT: Negative for mouth sores and nosebleeds.    Eyes: Negative for photophobia and pain.   Respiratory: Negative for shortness of breath and wheezing.    Cardiovascular: Negative for chest pain and palpitations.   Gastrointestinal: Negative for abdominal pain and vomiting.   Genitourinary: Negative for flank pain and hematuria.   Musculoskeletal: Negative for back pain, neck pain and neck stiffness.   Skin: Negative for rash and wound.   Neurological: Negative for seizures and syncope.   Hematological: Negative for adenopathy. Does not bruise/bleed easily.   Psychiatric/Behavioral: Negative for behavioral problems and confusion.   All other systems reviewed and are negative.        Objective:      Physical Exam   Abdominal:             Assessment:       1. Hepatocellular carcinoma    2. Pancreatic cyst        Plan:   Mr. Mayes has a diagnosis of T1b NX hepatocellular carcinoma that was resected.  Malignant  transformation of a prior hepatic adenoma is a consideration.    Reviewed recent imaging in detail.  There is a 5.1 cm fluid-filled collection in the region of the operative bed.  He is asymptomatic.  He also has a pancreatic cyst.  This is unchanged.  Will continue to monitor this findings.  CT chest does not reveal the pulmonary nodule.    He has mild anemia.  That can be monitored.    In summary he is doing well from the standpoint of hepato cell carcinoma.    Will see him back for follow-up in 6 months with a repeat CBC, CMP, AFP, MRI abdomen and CT chest.    Spent 25 min face-to-face reviewing the details of the imaging findings and reassuring them.

## 2018-07-06 ENCOUNTER — TELEPHONE (OUTPATIENT)
Dept: ENDOCRINOLOGY | Facility: CLINIC | Age: 73
End: 2018-07-06

## 2018-07-06 ENCOUNTER — PATIENT OUTREACH (OUTPATIENT)
Dept: DIABETES | Facility: CLINIC | Age: 73
End: 2018-07-06

## 2018-07-06 NOTE — TELEPHONE ENCOUNTER
Spoke with the pt and advise pt wife that pt Bg looks great and no changes right now. Pt verbally understand.

## 2018-07-06 NOTE — TELEPHONE ENCOUNTER
----- Message from Nahomi Patrick NP sent at 7/6/2018  4:02 PM CDT -----  Looks great, no changes  ----- Message -----  From: Suzanne Meza MA  Sent: 7/6/2018   2:15 PM  To: Nahomi Patrick NP    Logs attached

## 2018-07-10 ENCOUNTER — PATIENT MESSAGE (OUTPATIENT)
Dept: INTERNAL MEDICINE | Facility: CLINIC | Age: 73
End: 2018-07-10

## 2018-07-17 ENCOUNTER — OFFICE VISIT (OUTPATIENT)
Dept: INTERNAL MEDICINE | Facility: CLINIC | Age: 73
End: 2018-07-17
Payer: MEDICARE

## 2018-07-17 VITALS
OXYGEN SATURATION: 97 % | SYSTOLIC BLOOD PRESSURE: 116 MMHG | WEIGHT: 179.88 LBS | HEART RATE: 79 BPM | BODY MASS INDEX: 28.91 KG/M2 | HEIGHT: 66 IN | DIASTOLIC BLOOD PRESSURE: 58 MMHG

## 2018-07-17 DIAGNOSIS — I10 ESSENTIAL HYPERTENSION: Primary | ICD-10-CM

## 2018-07-17 DIAGNOSIS — R42 LIGHT HEADED: ICD-10-CM

## 2018-07-17 DIAGNOSIS — K52.9 FREQUENT STOOLS: ICD-10-CM

## 2018-07-17 DIAGNOSIS — E08.00 DIABETES MELLITUS DUE TO UNDERLYING CONDITION WITH HYPEROSMOLARITY WITHOUT COMA, WITHOUT LONG-TERM CURRENT USE OF INSULIN: ICD-10-CM

## 2018-07-17 PROCEDURE — 99999 PR PBB SHADOW E&M-EST. PATIENT-LVL V: CPT | Mod: PBBFAC,,, | Performed by: FAMILY MEDICINE

## 2018-07-17 PROCEDURE — 3074F SYST BP LT 130 MM HG: CPT | Mod: CPTII,S$GLB,, | Performed by: FAMILY MEDICINE

## 2018-07-17 PROCEDURE — 99215 OFFICE O/P EST HI 40 MIN: CPT | Mod: S$GLB,,, | Performed by: FAMILY MEDICINE

## 2018-07-17 PROCEDURE — 99499 UNLISTED E&M SERVICE: CPT | Mod: S$GLB,,, | Performed by: FAMILY MEDICINE

## 2018-07-17 PROCEDURE — 3078F DIAST BP <80 MM HG: CPT | Mod: CPTII,S$GLB,, | Performed by: FAMILY MEDICINE

## 2018-07-17 NOTE — PROGRESS NOTES
Subjective:       Patient ID: Jamison Mayes is a 73 y.o. male.    Chief Complaint: Medication Problem  Jamison Mayes 73 y.o. male is here for office visit to review care and physical exam, reports recently having episodes of light headedness, mostly at work.  CBgs seem good, none taken durring episodes of light headedness.  Could be having low BPs as well, no palpitations noted.      HPI  Review of Systems   Constitutional: Negative for activity change, appetite change, fatigue, fever and unexpected weight change.   HENT: Negative for congestion, hearing loss, postnasal drip and rhinorrhea.    Eyes: Negative for pain, discharge and visual disturbance.   Respiratory: Negative for cough, choking and shortness of breath.    Cardiovascular: Negative for chest pain, palpitations and leg swelling.   Gastrointestinal: Negative for abdominal pain, diarrhea and vomiting.   Genitourinary: Negative for dysuria, flank pain, hematuria and urgency.   Musculoskeletal: Negative for arthralgias, back pain, joint swelling and neck pain.   Skin: Negative for color change and rash.   Neurological: Negative for dizziness, tremors, syncope, weakness, numbness and headaches.   Psychiatric/Behavioral: Negative for agitation and confusion. The patient is not hyperactive.        Objective:      Physical Exam   Constitutional: He is oriented to person, place, and time. He appears well-developed and well-nourished.   HENT:   Head: Normocephalic.   Eyes: EOM are normal. Pupils are equal, round, and reactive to light.   Neck: Normal range of motion. Neck supple. No thyromegaly present.   Cardiovascular: Normal rate.  Exam reveals no gallop and no friction rub.    No murmur heard.  Pulmonary/Chest: Effort normal. No respiratory distress. He has no wheezes.   Abdominal: Soft. Bowel sounds are normal. He exhibits no mass. There is no tenderness.   Musculoskeletal: He exhibits no edema or tenderness.   Lymphadenopathy:     He has no cervical  adenopathy.   Neurological: He is alert and oriented to person, place, and time. He has normal reflexes. No cranial nerve deficit.   Skin: Skin is warm. No rash noted.   Psychiatric: He has a normal mood and affect. His behavior is normal.       Assessment:       1. Essential hypertension    2. Diabetes mellitus due to underlying condition with hyperosmolarity without coma, without long-term current use of insulin    3. Light headed    4. Frequent stools        Plan:       Jamison was seen today for medication problem.    Diagnoses and all orders for this visit:    Essential hypertension  - follow, decreasing Amlodipne, stop HCTZ  Diabetes mellitus due to underlying condition with hyperosmolarity without coma, without long-term current use of insulin  - take CBG if not feeling well, report both CBgs abnd BG, goals discussed  Light headed  -     Ambulatory Referral to Cardiology

## 2018-07-20 ENCOUNTER — PATIENT MESSAGE (OUTPATIENT)
Dept: ENDOCRINOLOGY | Facility: CLINIC | Age: 73
End: 2018-07-20

## 2018-07-20 DIAGNOSIS — E11.8 DM (DIABETES MELLITUS) WITH COMPLICATIONS: Primary | ICD-10-CM

## 2018-07-20 RX ORDER — GLIPIZIDE 2.5 MG/1
2.5 TABLET, EXTENDED RELEASE ORAL
Qty: 30 TABLET | Refills: 11 | Status: SHIPPED | OUTPATIENT
Start: 2018-07-20 | End: 2018-11-15

## 2018-07-24 ENCOUNTER — LAB VISIT (OUTPATIENT)
Dept: LAB | Facility: HOSPITAL | Age: 73
End: 2018-07-24
Attending: FAMILY MEDICINE
Payer: MEDICARE

## 2018-07-24 DIAGNOSIS — I10 ESSENTIAL HYPERTENSION: ICD-10-CM

## 2018-07-24 LAB
ANION GAP SERPL CALC-SCNC: 6 MMOL/L
BUN SERPL-MCNC: 20 MG/DL
CALCIUM SERPL-MCNC: 9.3 MG/DL
CHLORIDE SERPL-SCNC: 108 MMOL/L
CO2 SERPL-SCNC: 28 MMOL/L
CREAT SERPL-MCNC: 0.99 MG/DL
EST. GFR  (AFRICAN AMERICAN): >60 ML/MIN/1.73 M^2
EST. GFR  (NON AFRICAN AMERICAN): >60 ML/MIN/1.73 M^2
GLUCOSE SERPL-MCNC: 106 MG/DL
POTASSIUM SERPL-SCNC: 4.4 MMOL/L
SODIUM SERPL-SCNC: 142 MMOL/L

## 2018-07-24 PROCEDURE — 36415 COLL VENOUS BLD VENIPUNCTURE: CPT | Mod: PO

## 2018-07-24 PROCEDURE — 80048 BASIC METABOLIC PNL TOTAL CA: CPT | Mod: PO

## 2018-07-26 ENCOUNTER — OFFICE VISIT (OUTPATIENT)
Dept: CARDIOLOGY | Facility: CLINIC | Age: 73
End: 2018-07-26
Payer: MEDICARE

## 2018-07-26 VITALS
SYSTOLIC BLOOD PRESSURE: 150 MMHG | DIASTOLIC BLOOD PRESSURE: 67 MMHG | WEIGHT: 178.81 LBS | BODY MASS INDEX: 28.74 KG/M2 | HEART RATE: 59 BPM | HEIGHT: 66 IN

## 2018-07-26 DIAGNOSIS — E78.2 MIXED HYPERLIPIDEMIA: ICD-10-CM

## 2018-07-26 DIAGNOSIS — E11.9 TYPE 2 DIABETES MELLITUS WITHOUT COMPLICATION, UNSPECIFIED WHETHER LONG TERM INSULIN USE: ICD-10-CM

## 2018-07-26 DIAGNOSIS — Z13.6 SCREENING FOR AAA (AORTIC ABDOMINAL ANEURYSM): ICD-10-CM

## 2018-07-26 DIAGNOSIS — R42 POSITIONAL LIGHTHEADEDNESS: Primary | ICD-10-CM

## 2018-07-26 DIAGNOSIS — I10 ESSENTIAL HYPERTENSION: ICD-10-CM

## 2018-07-26 PROCEDURE — 3077F SYST BP >= 140 MM HG: CPT | Mod: CPTII,S$GLB,, | Performed by: INTERNAL MEDICINE

## 2018-07-26 PROCEDURE — 99999 PR PBB SHADOW E&M-EST. PATIENT-LVL III: CPT | Mod: PBBFAC,,, | Performed by: INTERNAL MEDICINE

## 2018-07-26 PROCEDURE — 99203 OFFICE O/P NEW LOW 30 MIN: CPT | Mod: S$GLB,,, | Performed by: INTERNAL MEDICINE

## 2018-07-26 PROCEDURE — 3078F DIAST BP <80 MM HG: CPT | Mod: CPTII,S$GLB,, | Performed by: INTERNAL MEDICINE

## 2018-07-26 NOTE — PROGRESS NOTES
Subjective:   Patient ID:  Jamison Mayes is a 73 y.o. male who presents for evaluation of Light headed      HPI:   The patient has been referred by dr Jorge for episodes of feeling lightheaded. 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 Following that he noted that if he raise his head from a crouched position, he would feel lightheaded for several seconds. He saw dr Jorge with Bp <120/. Amlodipine and his thiazide were stopped with resolution of his sxs. BP at home has remained 120/ or less on most readings. Jamison Mayes is not exercising.Jamison Mayes has dyslipidemia  on moderate intensity statin therapy.Jamison Mayes denies chest pain, shortness of breath, palpitations, or syncope. Recent surgery for hepatocellular carcinoma and still anemic. Relatively unremarkable echo during hospitalization.  Review of Systems   Constitution: Negative for weakness, malaise/fatigue, weight gain and weight loss.   Eyes: Negative for blurred vision.   Cardiovascular: Negative for chest pain, claudication, cyanosis, dyspnea on exertion, irregular heartbeat, leg swelling, near-syncope, orthopnea, palpitations, paroxysmal nocturnal dyspnea and syncope.   Respiratory: Negative for cough, shortness of breath and wheezing.    Musculoskeletal: Negative for falls and myalgias.   Gastrointestinal: Negative for abdominal pain, heartburn, nausea and vomiting.   Genitourinary: Negative for nocturia.   Neurological: Positive for light-headedness. Negative for brief paralysis, dizziness, focal weakness, headaches, numbness and paresthesias.   Psychiatric/Behavioral: Negative for altered mental status.       Current Outpatient Prescriptions   Medication Sig    ACCU-CHEK ROSE PLUS METER [facility] USE AS DIRECTED    aspirin (ASPIRIN LOW DOSE) 81 MG EC tablet Take 81 mg by mouth. 1 Tablet, Delayed Release (E.C.) Oral Every day    blood sugar diagnostic Strp To check BG 4 times daily, to use with  "insurance preferred meter    blood-glucose meter kit To check BG 4 times daily, to use with insurance preferred meter    calcium carbonate (OS-RUSLAN) 500 mg calcium (1,250 mg) chewable tablet Take 1 tablet by mouth once daily.    cyanocobalamin (VITAMIN B-12) 250 MCG tablet Take 1 tablet (250 mcg total) by mouth once daily.    fluorouracil (EFUDEX) 5 % cream AAA on scalp, forehead, and temples BID x 2-3 weeks. Stop if blistered, oozing, or bleeding. Use daily sun protection.    glipiZIDE (GLUCOTROL) 2.5 MG TR24 Take 1 tablet (2.5 mg total) by mouth daily with breakfast.    imiquimod (ALDARA) 5 % cream AAA 5 nights/week x 4 - 6 weeks. Wash off in am. Stop if blistering, bleeding, oozing, etc. Do not use >1 packet per night.    ketoconazole (NIZORAL) 2 % shampoo Wash hair with medicated shampoo at least 2x/week - let sit on scalp at least 5 minutes prior to rinsing    lancets 28 gauge Misc 1 lancet by Misc.(Non-Drug; Combo Route) route 4 (four) times daily.    lisinopril (PRINIVIL,ZESTRIL) 40 MG tablet TAKE 1 TABLET (40 MG TOTAL) BY MOUTH ONCE DAILY.    lovastatin (MEVACOR) 20 MG tablet TAKE 1 TABLET (20 MG TOTAL) BY MOUTH EVERY EVENING.    MULTIVITAMIN WITH MINERALS (ONE-A-DAY 50 PLUS) Tab Take by mouth. 1 Tablet Oral Every morning    mupirocin (BACTROBAN) 2 % ointment AAA on scalp bid    pen needle, diabetic (BD ULTRA-FINE KRYSTIAN PEN NEEDLES) 32 gauge x 5/32" Ndle To use 4 times daily with insulin     No current facility-administered medications for this visit.      Objective:   Physical Exam   Constitutional: He is oriented to person, place, and time. He appears well-developed. No distress.   BP (!) 150/67 (BP Location: Left arm, Patient Position: Sitting, BP Method: Large (Automatic))   Pulse (!) 59   Ht 5' 6" (1.676 m)   Wt 81.1 kg (178 lb 12.7 oz)   BMI 28.86 kg/m²    HENT:   Head: Normocephalic.   Right Ear: External ear normal.   Left Ear: External ear normal.   Eyes: EOM are normal. Pupils are " equal, round, and reactive to light. No scleral icterus.   Neck: Neck supple. No JVD present. No thyromegaly present.   Cardiovascular: Normal rate, regular rhythm, normal heart sounds and intact distal pulses.  PMI is not displaced.  Exam reveals no gallop and no friction rub.    No murmur heard.  Pulmonary/Chest: Effort normal and breath sounds normal. No respiratory distress. He has no wheezes. He has no rales.   Abdominal: Soft. He exhibits no distension. There is no hepatosplenomegaly. There is no tenderness.   Musculoskeletal: He exhibits no edema or tenderness.   Gait normal   Neurological: He is alert and oriented to person, place, and time.   Skin: Skin is warm and dry. No rash noted.   Psychiatric: He has a normal mood and affect. His behavior is normal.       Lab Results   Component Value Date     07/24/2018    K 4.4 07/24/2018     07/24/2018    CO2 28 07/24/2018    BUN 20 07/24/2018    CREATININE 0.99 07/24/2018     07/24/2018    HGBA1C 5.9 (H) 05/01/2018    MG 2.0 03/08/2018    AST 30 07/03/2018    ALT 13 07/03/2018    ALBUMIN 3.9 07/03/2018    PROT 7.1 07/03/2018    BILITOT 0.4 07/03/2018    WBC 6.05 07/03/2018    HGB 10.7 (L) 07/03/2018    HCT 32.3 (L) 07/03/2018    HCT 22 (L) 03/05/2018    MCV 86 07/03/2018     07/03/2018    TSH 1.734 10/29/2012    CHOL 140 05/02/2017    HDL 44 05/02/2017    LDLCALC 71.2 05/02/2017    TRIG 124 05/02/2017       Assessment:     1. Positional lightheadedness : Resolved with adjustment of antihypertensive medications   2. Essential hypertension : Adequate control ( did not take medication as yet with elevation this visit )   3. Mixed hyperlipidemia:  on moderate intensity statin therapy.        Plan:     Jamison was seen today for light headed.    Diagnoses and all orders for this visit:    Positional lightheadedness  No further evaluation at this time-  Essential hypertension  Continue current regimen    Mixed hyperlipidemia  Continue current  regimen      Suggest ferrous gluconate daily for a month

## 2018-07-26 NOTE — LETTER
July 26, 2018      Blaise Jorge MD  [address]  [facility] [address]           [facility] - Cardiology  [address]  [facility] [address]  Phone: [phone]          Patient: Jamison Mayes   MR Number: [id number]   YOB: 1945   Date of Visit: 7/26/2018       Dear Dr. Blaise Jorge:    Thank you for referring Jamison Mayes to me for evaluation. Attached you will find relevant portions of my assessment and plan of care.    If you have questions, please do not hesitate to call me. I look forward to following Jamison Mayes along with you.    Sincerely,    Sadiq Leos MD    Enclosure  CC:  No Recipients    If you would like to receive this communication electronically, please contact [email or web address] or [phone] to request more information on [employer] Link access.    For providers and/or their staff who would like to refer a patient to [facility], please contact us through our one-stop-shop provider referral line, [facility], at [phone].    If you feel you have received this communication in error or would no longer like to receive these types of communications, please e-mail [email or web address]

## 2018-08-01 ENCOUNTER — OFFICE VISIT (OUTPATIENT)
Dept: DERMATOLOGY | Facility: CLINIC | Age: 73
End: 2018-08-01
Payer: MEDICARE

## 2018-08-01 DIAGNOSIS — L82.1 SK (SEBORRHEIC KERATOSIS): ICD-10-CM

## 2018-08-01 DIAGNOSIS — L57.0 AK (ACTINIC KERATOSIS): Primary | ICD-10-CM

## 2018-08-01 DIAGNOSIS — D22.9 MULTIPLE BENIGN NEVI: ICD-10-CM

## 2018-08-01 DIAGNOSIS — Z12.83 SKIN CANCER SCREENING: ICD-10-CM

## 2018-08-01 PROCEDURE — 17000 DESTRUCT PREMALG LESION: CPT | Mod: S$GLB,,, | Performed by: DERMATOLOGY

## 2018-08-01 PROCEDURE — 99999 PR PBB SHADOW E&M-EST. PATIENT-LVL II: CPT | Mod: PBBFAC,,, | Performed by: DERMATOLOGY

## 2018-08-01 PROCEDURE — 99213 OFFICE O/P EST LOW 20 MIN: CPT | Mod: 25,S$GLB,, | Performed by: DERMATOLOGY

## 2018-08-01 PROCEDURE — 17003 DESTRUCT PREMALG LES 2-14: CPT | Mod: S$GLB,,, | Performed by: DERMATOLOGY

## 2018-08-01 NOTE — PATIENT INSTRUCTIONS
Summer Sun Protection      The [facility] Department of Dermatology would like to remind you of the importance of sun protection all year round and particularly during the summer when the suns rays are the strongest. It has been proven that both acute and chronic sun exposure damages our cells and leads to skin cancer. Beyond skin cancer, the sun causes 90% of the symptoms of pre-mature skin aging, including wrinkles, lentigines (brown spots), and thin, easily bruised skin. Proper sun protection can help prevent these unwanted conditions.    Many patients report that the dont go in the sun. It has been shown that the average person receives 18 hours of incidental sun exposure per week during activities such as walking through parking lots, driving, or sitting next to windows. This accumulates to several bad sunburns per year!    In choosing sunscreen, you want one that protects against both UVA and UVB rays. It is recommended that you use one of SPF 30 or higher. It is important to apply the sunscreen about 20 minutes prior to sun exposure. Most sunscreens are chemical sunscreens and a reaction must take place in the skin so that they are effective. If they are applied and then you are immediately exposed to the sun or start sweating, this reaction has not had time to take place and you are therefore unprotected. Sunscreen needs to be reapplied every 2 hours if you are participating in water sports or sweating. We recommend Elta MD or Neutrogena Ultra Sheer Dry Touch SPF 55 for daily use; however there are many options and it is most important for you to find one that you will use on a consistent basis.    If you have sensitive skin, you may do best with a sunscreen that contains only physical blockers such as titanium dioxide or zinc oxide. These are typically thicker and harder to apply, however they afford very good protection. Neutrogena Sensitive Skin, Blue Lizard Sensitive Skin (pink top) or Neutrogena Pure  and Free are popular ones.     Aside from sunscreen, clothes with UV protection, wide brimmed hats, and sunglasses are other means of sun protection that we recommend.                        [facility] - DERMATOLOGY  [address]  [place] [address]  Dept: [phone]  Dept Fax: [phone]                                                                               CRYOSURGERY      Your doctor has used a method called cryosurgery to treat your skin condition. Cryosurgery refers to the use of very cold substances to treat a variety of skin conditions such as warts, pre-skin cancers, molluscum contagiosum, sun spots, and several benign growths. The substance we use in cryosurgery is liquid nitrogen and is so cold (-195 degrees Celsius) that is burns when administered.     Following treatment in the office, the skin may immediately burn and become red. You may find the area around the lesion is affected as well. It is sometimes necessary to treat not only the lesion, but a small area of the surrounding normal skin to achieve a good response.     A blister, and even a blood filled blister, may form after treatment.   This is a normal response. If the blister is painful, it is acceptable to sterilize a needle and with rubbing alcohol and gently pop the blister. It is important that you gently wash the area with soap and warm water as the blister fluid may contain wart virus if a wart was treated. Do no remove the roof of the blister.     The area treated can take anywhere from 1-3 weeks to heal. Healing time depends on the kind skin lesion treated, the location, and how aggressively the lesion was treated. It is recommended that the areas treated are covered with Vaseline or bacitracin ointment and a band-aid. If a band-aid is not practical, just ointment applied several times per day will do. Keeping these areas moist will speed the healing time.    Treatment with liquid  nitrogen can leave a scar. In dark skin, it may be a light or dark scar, in light skin it may be a white or pink scar. These will generally fade with time, but may never go away completely.     If you have any concerns after your treatment, please feel free to call the office.       [facility] [place] [address]/ [phone] FAX/ [email or web address]

## 2018-08-01 NOTE — PROGRESS NOTES
Subjective:       Patient ID:  Jamison Mayes is a 73 y.o. male who presents for   Chief Complaint   Patient presents with    Skin Check     ubse     HPI  Patient has a history of non-melanoma skin cancer and is here today for routine skin check.   Has noticed a scaly spot on L ear since last visit. Asymptomatic and no current treatment.     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  Has completed several courses of Efudex in the past.   Has done PDT on scalp in 2015.    Of note, pt was diagnosed with hepatocellular carcinoma since last visit.    Review of Systems   Constitutional: Negative for fever, chills, weight loss, weight gain, fatigue, night sweats and malaise.   Skin: Positive for daily sunscreen use and activity-related sunscreen use. Negative for recent sunburn.   Hematologic/Lymphatic: Bruises/bleeds easily.        Objective:    Physical Exam   Constitutional: He appears well-developed and well-nourished. No distress.   Neurological: He is alert and oriented to person, place, and time. He is not disoriented.   Psychiatric: He has a normal mood and affect.   Skin:   Areas Examined (abnormalities noted in diagram):   Head / Face Inspection Performed  Neck Inspection Performed  Chest / Axilla Inspection Performed  Abdomen Inspection Performed  Back Inspection Performed  RUE Inspected  LUE Inspection Performed                   Diagram Legend     Erythematous scaling macule/papule c/w actinic keratosis       Vascular papule c/w angioma      Pigmented verrucoid papule/plaque c/w seborrheic keratosis      Yellow umbilicated papule c/w sebaceous hyperplasia      Irregularly shaped tan macule c/w lentigo     1-2 mm smooth white papules consistent with Milia      Movable subcutaneous cyst with punctum c/w epidermal inclusion cyst      Subcutaneous movable cyst c/w pilar cyst      Firm pink to brown papule c/w dermatofibroma      Pedunculated fleshy papule(s)  c/w skin tag(s)      Evenly pigmented macule c/w junctional nevus     Mildly variegated pigmented, slightly irregular-bordered macule c/w mildly atypical nevus      Flesh colored to evenly pigmented papule c/w intradermal nevus       Pink pearly papule/plaque c/w basal cell carcinoma      Erythematous hyperkeratotic cursted plaque c/w SCC      Surgical scar with no sign of skin cancer recurrence      Open and closed comedones      Inflammatory papules and pustules      Verrucoid papule consistent consistent with wart     Erythematous eczematous patches and plaques     Dystrophic onycholytic nail with subungual debris c/w onychomycosis     Umbilicated papule    Erythematous-base heme-crusted tan verrucoid plaque consistent with inflamed seborrheic keratosis     Erythematous Silvery Scaling Plaque c/w Psoriasis     See annotation      Assessment / Plan:        AK (actinic keratosis)  Cryosurgery Procedure Note    Verbal consent from the patient is obtained including, but not limited to, risk of hypopigmentation/hyperpigmentation, scar, recurrence of lesion. The patient is aware of the precancerous quality and need for treatment of these lesions. Liquid nitrogen cryosurgery is applied to the 10 actinic keratoses, as detailed in the physical exam, to produce a freeze injury. The patient is aware that blisters may form and is instructed on wound care with gentle cleansing and use of vaseline ointment to keep moist until healed. The patient is supplied a handout on cryosurgery and is instructed to call if lesions do not completely resolve.    SK (seborrheic keratosis)  These are benign inherited growths without a malignant potential. Reassurance given to patient. No treatment is necessary.   Treatment of benign, asymptomatic lesions may be considered cosmetic.  Warned about risk of hypo- or hyperpigmentation with treatment and risk of recurrence.    Multiple benign nevi  Benign-appearing on exam today. Counseled pt to  monitor mole(s) and return to clinic if any changes noted or symptoms (bleeding, itching, pain, etc) noted. Brochure provided.    Skin cancer screening  Upper body skin examination performed today including at least 6 points as noted in physical examination. No lesions suspicious for malignancy noted.  Patient instructed in importance of daily broad spectrum sunscreen use with spf at least 30. Sun avoidance and topical protection/protective clothing discussed.      Follow-up in about 6 months (around 2/1/2019) for skin check or sooner for any concerns.

## 2018-08-03 ENCOUNTER — PATIENT OUTREACH (OUTPATIENT)
Dept: DIABETES | Facility: CLINIC | Age: 73
End: 2018-08-03

## 2018-08-06 ENCOUNTER — TELEPHONE (OUTPATIENT)
Dept: ENDOCRINOLOGY | Facility: CLINIC | Age: 73
End: 2018-08-06

## 2018-08-06 NOTE — TELEPHONE ENCOUNTER
----- Message from Suzanne Meza MA sent at 8/6/2018  8:55 AM CDT -----  Nahomi Lui says this pt's logs look good, I forwarded this to you just in case you need to let the pt know.     Suzanne     ----- Message -----  From: Nahomi Patrick NP  Sent: 8/6/2018   7:35 AM  To: Suzanne Meza MA    Looks good  ----- Message -----  From: Suzanne Meza MA  Sent: 8/3/2018   2:33 PM  To: Nahomi Patrick NP    Logs attached

## 2018-08-06 NOTE — TELEPHONE ENCOUNTER
Spoke with pt wife and advise her that pt dont need any changes for right now . Pt wife verbalized understand.

## 2018-08-13 RX ORDER — HYDROCHLOROTHIAZIDE 25 MG/1
25 TABLET ORAL DAILY
Qty: 90 TABLET | Refills: 3 | OUTPATIENT
Start: 2018-08-13 | End: 2019-08-13

## 2018-08-14 RX ORDER — HYDROCHLOROTHIAZIDE 25 MG/1
25 TABLET ORAL DAILY
Qty: 90 TABLET | Refills: 0 | OUTPATIENT
Start: 2018-08-14 | End: 2018-09-13

## 2018-08-21 ENCOUNTER — OFFICE VISIT (OUTPATIENT)
Dept: INTERNAL MEDICINE | Facility: CLINIC | Age: 73
End: 2018-08-21
Payer: MEDICARE

## 2018-08-21 ENCOUNTER — LAB VISIT (OUTPATIENT)
Dept: LAB | Facility: HOSPITAL | Age: 73
End: 2018-08-21
Payer: MEDICARE

## 2018-08-21 VITALS
WEIGHT: 176.38 LBS | SYSTOLIC BLOOD PRESSURE: 122 MMHG | OXYGEN SATURATION: 99 % | HEIGHT: 66 IN | DIASTOLIC BLOOD PRESSURE: 68 MMHG | HEART RATE: 59 BPM | BODY MASS INDEX: 28.34 KG/M2

## 2018-08-21 DIAGNOSIS — E11.8 DM (DIABETES MELLITUS) WITH COMPLICATIONS: ICD-10-CM

## 2018-08-21 DIAGNOSIS — I10 ESSENTIAL HYPERTENSION: Primary | ICD-10-CM

## 2018-08-21 DIAGNOSIS — E11.00 TYPE 2 DIABETES MELLITUS WITH HYPEROSMOLARITY WITHOUT COMA, WITHOUT LONG-TERM CURRENT USE OF INSULIN: ICD-10-CM

## 2018-08-21 DIAGNOSIS — R42 POSITIONAL LIGHTHEADEDNESS: ICD-10-CM

## 2018-08-21 PROBLEM — E11.9 TYPE 2 DIABETES MELLITUS, WITHOUT LONG-TERM CURRENT USE OF INSULIN: Status: ACTIVE | Noted: 2018-08-21

## 2018-08-21 LAB
ALBUMIN SERPL BCP-MCNC: 3.8 G/DL
ALP SERPL-CCNC: 66 U/L
ALT SERPL W/O P-5'-P-CCNC: 17 U/L
ANION GAP SERPL CALC-SCNC: 7 MMOL/L
AST SERPL-CCNC: 30 U/L
BASOPHILS # BLD AUTO: 0.02 K/UL
BASOPHILS NFR BLD: 0.3 %
BILIRUB SERPL-MCNC: 0.6 MG/DL
BUN SERPL-MCNC: 20 MG/DL
CALCIUM SERPL-MCNC: 9.9 MG/DL
CHLORIDE SERPL-SCNC: 106 MMOL/L
CO2 SERPL-SCNC: 26 MMOL/L
CREAT SERPL-MCNC: 1.1 MG/DL
DIFFERENTIAL METHOD: ABNORMAL
EOSINOPHIL # BLD AUTO: 0.2 K/UL
EOSINOPHIL NFR BLD: 2.7 %
ERYTHROCYTE [DISTWIDTH] IN BLOOD BY AUTOMATED COUNT: 14 %
EST. GFR  (AFRICAN AMERICAN): >60 ML/MIN/1.73 M^2
EST. GFR  (NON AFRICAN AMERICAN): >60 ML/MIN/1.73 M^2
ESTIMATED AVG GLUCOSE: 103 MG/DL
GLUCOSE SERPL-MCNC: 177 MG/DL
HBA1C MFR BLD HPLC: 5.2 %
HCT VFR BLD AUTO: 38 %
HGB BLD-MCNC: 12.7 G/DL
LYMPHOCYTES # BLD AUTO: 2.2 K/UL
LYMPHOCYTES NFR BLD: 34.8 %
MCH RBC QN AUTO: 30 PG
MCHC RBC AUTO-ENTMCNC: 33.4 G/DL
MCV RBC AUTO: 90 FL
MONOCYTES # BLD AUTO: 0.7 K/UL
MONOCYTES NFR BLD: 10.5 %
NEUTROPHILS # BLD AUTO: 3.3 K/UL
NEUTROPHILS NFR BLD: 51.7 %
PLATELET # BLD AUTO: 173 K/UL
PMV BLD AUTO: 10.1 FL
POTASSIUM SERPL-SCNC: 4.3 MMOL/L
PROT SERPL-MCNC: 7 G/DL
RBC # BLD AUTO: 4.24 M/UL
SODIUM SERPL-SCNC: 139 MMOL/L
WBC # BLD AUTO: 6.38 K/UL

## 2018-08-21 PROCEDURE — 99999 PR PBB SHADOW E&M-EST. PATIENT-LVL IV: CPT | Mod: PBBFAC,,, | Performed by: FAMILY MEDICINE

## 2018-08-21 PROCEDURE — 3044F HG A1C LEVEL LT 7.0%: CPT | Mod: CPTII,S$GLB,, | Performed by: FAMILY MEDICINE

## 2018-08-21 PROCEDURE — 3078F DIAST BP <80 MM HG: CPT | Mod: CPTII,S$GLB,, | Performed by: FAMILY MEDICINE

## 2018-08-21 PROCEDURE — 85025 COMPLETE CBC W/AUTO DIFF WBC: CPT

## 2018-08-21 PROCEDURE — 99397 PER PM REEVAL EST PAT 65+ YR: CPT | Mod: S$GLB,,, | Performed by: FAMILY MEDICINE

## 2018-08-21 PROCEDURE — 99499 UNLISTED E&M SERVICE: CPT | Mod: S$GLB,,, | Performed by: FAMILY MEDICINE

## 2018-08-21 PROCEDURE — 36415 COLL VENOUS BLD VENIPUNCTURE: CPT

## 2018-08-21 PROCEDURE — 83036 HEMOGLOBIN GLYCOSYLATED A1C: CPT

## 2018-08-21 PROCEDURE — 3074F SYST BP LT 130 MM HG: CPT | Mod: CPTII,S$GLB,, | Performed by: FAMILY MEDICINE

## 2018-08-21 PROCEDURE — 80053 COMPREHEN METABOLIC PANEL: CPT

## 2018-08-21 NOTE — PROGRESS NOTES
Subjective:       Patient ID: Jamison Mayes is a 73 y.o. male.    Chief Complaint: Follow-up  Jamison Mayes 73 y.o. male is here for office visit to review care and physical exam, reports doing well in general.      HPI  Review of Systems   Constitutional: Negative for activity change, appetite change, fatigue, fever and unexpected weight change.   HENT: Negative for congestion, hearing loss, postnasal drip and rhinorrhea.    Eyes: Negative for pain, discharge and visual disturbance.   Respiratory: Negative for cough, choking and shortness of breath.    Cardiovascular: Negative for chest pain, palpitations and leg swelling.   Gastrointestinal: Negative for abdominal pain, diarrhea and vomiting.   Genitourinary: Negative for dysuria, flank pain, hematuria and urgency.   Musculoskeletal: Negative for arthralgias, back pain, joint swelling and neck pain.   Skin: Negative for color change and rash.   Neurological: Negative for dizziness, tremors, syncope, weakness, numbness and headaches.   Psychiatric/Behavioral: Negative for agitation and confusion. The patient is not hyperactive.        Objective:      Physical Exam   Constitutional: He is oriented to person, place, and time. He appears well-developed and well-nourished.   HENT:   Head: Normocephalic.   Eyes: EOM are normal. Pupils are equal, round, and reactive to light.   Neck: Normal range of motion. Neck supple. No thyromegaly present.   Cardiovascular: Normal rate. Exam reveals no gallop and no friction rub.   No murmur heard.  Pulmonary/Chest: Effort normal. No respiratory distress. He has no wheezes.   Abdominal: Soft. Bowel sounds are normal. He exhibits no mass. There is no tenderness.   Musculoskeletal: He exhibits no edema or tenderness.   Lymphadenopathy:     He has no cervical adenopathy.   Neurological: He is alert and oriented to person, place, and time. He has normal reflexes. No cranial nerve deficit.   Skin: Skin is warm. No rash noted.    Psychiatric: He has a normal mood and affect. His behavior is normal.       Assessment:       1. Essential hypertension    2. Type 2 diabetes mellitus with hyperosmolarity without coma, without long-term current use of insulin    3. DM (diabetes mellitus) with complications    4. Positional lightheadedness        Plan:       Jamison was seen today for follow-up.    Diagnoses and all orders for this visit:    Type 2 diabetes mellitus with hyperosmolarity without coma, without long-term current use of insulin    Jamison was seen today for follow-up.    Diagnoses and all orders for this visit:    Essential hypertension  - follow  Type 2 diabetes mellitus with hyperosmolarity without coma, without long-term current use of insulin  - controled  DM (diabetes mellitus) with complications  - reviewed  Positional lightheadedness  - discussed, seen by Cards, no further concern

## 2018-08-23 NOTE — PROGRESS NOTES
Subjective:      Patient ID: Jamison Mayes is a 73 y.o. male.    Chief Complaint:  Diabetes Mellitus    History of Present Illness  Jamison Mayes presents today for follow up of type 2 DM.     Per Dr. Gupta's note from his hospital admission:  Patient is a 73 y.o. male with a diagnosis of HTN, T2DM, and seizure disorder who presented from OSH with newly diagnosed left lobe liver mass that ruptured resulting in hemoperitoneum.  S/p liver resection.  Endocrinology consulted for elevated BG. Had a Procedure(s) (LRB):  HEPATECTOMY (Left)  CHOLECYSTECTOMY (N/A)  EVACUATION-HEMATOMA (N/A)  ULTRASOUND (N/A)      Patient notes that he has never required medication for DM. A1c 7.8%.  Prior to procedure, patient was not on any diabetes medications. He was discharged on novolog and levemir. He started to have insulin site reactions so we stopped the novolog and started him on amaryl 2 mg with breakfast only.     Results for JAMISON MAYES (MRN [id number]) as of 5/8/2018 11:15   Ref. Range 5/1/2018 10:53   Hemoglobin A1C Latest Ref Range: 4.0 - 5.6 % 5.9 (H)   Estimated Avg Glucose Latest Ref Range: 68 - 131 mg/dL 123       With regards to the diabetes:    Current regimen:  Levemir 9 units HS  Amaryl 2 mg     Missed doses? No    2 times a day testing  Log reviewed: yes         Eats 3 meals a day. Has cut back on carbs and sweets Doing better with portion control.   Snacks- rarely on sugar free jello and cookies  Drinks- oj with less sugar, water, green tea diet    Exercise - tried to stay active      Hypoglycemic event? None   Knows how to correct with 15 grams of carbs- juice, coke, or a peppermint.     Denies numbness & tingling in the feet     Education - last visit: None    Diabetes Management Status    Statin: Taking  ACE/ARB: Taking    Screening or Prevention Patient's value Goal Complete/Controlled?   HgA1C Testing and Control   Lab Results   Component Value Date    HGBA1C 5.2 08/21/2018      Annually/Less than  8% Yes   Lipid profile : 05/02/2017 Annually No   LDL control Lab Results   Component Value Date    LDLCALC 71.2 05/02/2017    Annually/Less than 100 mg/dl  No   Nephropathy screening Lab Results   Component Value Date    LABMICR 7.0 08/21/2018     Lab Results   Component Value Date    PROTEINUA 1+ (A) 03/01/2018    Annually Yes   Blood pressure BP Readings from Last 1 Encounters:   08/28/18 (!) 144/62    Less than 140/90 No   Dilated retinal exam : 07/12/2017 Annually Yes   Foot exam   : 04/20/2016 Annually No       Review of Systems   Constitutional: Negative for unexpected weight change.   Eyes: Negative for visual disturbance.   Respiratory: Negative for shortness of breath.    Cardiovascular: Negative for chest pain.   Gastrointestinal: Negative for abdominal pain.   Endocrine: Negative for cold intolerance, heat intolerance, polydipsia, polyphagia and polyuria.   Musculoskeletal: Negative for arthralgias.   Skin: Negative for wound.   Neurological: Negative for headaches.   Hematological: Does not bruise/bleed easily.   Psychiatric/Behavioral: Negative for sleep disturbance.     Objective:   Physical Exam   Neck: No thyromegaly present.   Cardiovascular: Normal rate.   No edema present   Pulmonary/Chest: Effort normal.   Abdominal: Soft.   Vitals reviewed.  Appropriate footwear, Foot exam deferred per patient.  injection sites are erythematous. No lipo hypertropthy or atrophy    Body mass index is 28.63 kg/m².    Lab Review:   Lab Results   Component Value Date    HGBA1C 5.2 08/21/2018     Lab Results   Component Value Date    CHOL 140 05/02/2017    HDL 44 05/02/2017    LDLCALC 71.2 05/02/2017    TRIG 124 05/02/2017    CHOLHDL 31.4 05/02/2017     Lab Results   Component Value Date     08/21/2018    K 4.3 08/21/2018     08/21/2018    CO2 26 08/21/2018     (H) 08/21/2018    BUN 20 08/21/2018    CREATININE 1.1 08/21/2018    CALCIUM 9.9 08/21/2018    PROT 7.0 08/21/2018    ALBUMIN 3.8 08/21/2018     BILITOT 0.6 08/21/2018    ALKPHOS 66 08/21/2018    AST 30 08/21/2018    ALT 17 08/21/2018    ANIONGAP 7 (L) 08/21/2018    ESTGFRAFRICA >60 08/21/2018    EGFRNONAA >60 08/21/2018    TSH 1.734 10/29/2012       Assessment and Plan     1. Type 2 diabetes mellitus with hyperosmolarity without coma, without long-term current use of insulin  Hemoglobin A1c    Lipid panel    lancets 28 gauge Misc    blood sugar diagnostic Strp   2. Diabetes mellitus due to underlying condition with hyperosmolarity without coma, without long-term current use of insulin     3. DM (diabetes mellitus) with complications     4. Mixed hyperlipidemia     5. Essential hypertension       DM (diabetes mellitus) with complications  -- Reviewed goals of therapy are to get the best control we can without hypoglycemia  Stop amaryl.  -- monitor BG once daily and alert me if >160 consistently.   -- Close adherence to lifestyle changes recommended.   -- Periodic follow ups for eye evaluations, foot care and dental care suggested.    HTN (hypertension)  -- on ACEI   -- Controlled  -- Blood pressure goals discussed with patient      Mixed hyperlipidemia  Controlled   On statin per ADA recommendations      Follow-up if symptoms worsen or fail to improve.  A1c& LP in 6 months

## 2018-08-28 ENCOUNTER — OFFICE VISIT (OUTPATIENT)
Dept: ENDOCRINOLOGY | Facility: CLINIC | Age: 73
End: 2018-08-28
Payer: MEDICARE

## 2018-08-28 VITALS
BODY MASS INDEX: 28.51 KG/M2 | HEART RATE: 74 BPM | DIASTOLIC BLOOD PRESSURE: 62 MMHG | WEIGHT: 177.38 LBS | SYSTOLIC BLOOD PRESSURE: 144 MMHG | HEIGHT: 66 IN

## 2018-08-28 DIAGNOSIS — I10 ESSENTIAL HYPERTENSION: ICD-10-CM

## 2018-08-28 DIAGNOSIS — E11.8 DM (DIABETES MELLITUS) WITH COMPLICATIONS: ICD-10-CM

## 2018-08-28 DIAGNOSIS — E78.2 MIXED HYPERLIPIDEMIA: ICD-10-CM

## 2018-08-28 DIAGNOSIS — E08.00 DIABETES MELLITUS DUE TO UNDERLYING CONDITION WITH HYPEROSMOLARITY WITHOUT COMA, WITHOUT LONG-TERM CURRENT USE OF INSULIN: ICD-10-CM

## 2018-08-28 DIAGNOSIS — E11.00 TYPE 2 DIABETES MELLITUS WITH HYPEROSMOLARITY WITHOUT COMA, WITHOUT LONG-TERM CURRENT USE OF INSULIN: Primary | ICD-10-CM

## 2018-08-28 PROCEDURE — 3077F SYST BP >= 140 MM HG: CPT | Mod: CPTII,S$GLB,, | Performed by: NURSE PRACTITIONER

## 2018-08-28 PROCEDURE — 99999 PR PBB SHADOW E&M-EST. PATIENT-LVL III: CPT | Mod: PBBFAC,,, | Performed by: NURSE PRACTITIONER

## 2018-08-28 PROCEDURE — 3078F DIAST BP <80 MM HG: CPT | Mod: CPTII,S$GLB,, | Performed by: NURSE PRACTITIONER

## 2018-08-28 PROCEDURE — 99214 OFFICE O/P EST MOD 30 MIN: CPT | Mod: S$GLB,,, | Performed by: NURSE PRACTITIONER

## 2018-08-28 PROCEDURE — 99499 UNLISTED E&M SERVICE: CPT | Mod: S$GLB,,, | Performed by: NURSE PRACTITIONER

## 2018-08-28 PROCEDURE — 3044F HG A1C LEVEL LT 7.0%: CPT | Mod: CPTII,S$GLB,, | Performed by: NURSE PRACTITIONER

## 2018-08-28 RX ORDER — LANCETS 28 GAUGE
1 EACH MISCELLANEOUS 2 TIMES DAILY
Qty: 100 EACH | Refills: 11 | Status: SHIPPED | OUTPATIENT
Start: 2018-08-28 | End: 2020-04-04 | Stop reason: SDUPTHER

## 2018-08-28 NOTE — ASSESSMENT & PLAN NOTE
-- Reviewed goals of therapy are to get the best control we can without hypoglycemia  Stop amaryl.  -- monitor BG once daily and alert me if >160 consistently.   -- Close adherence to lifestyle changes recommended.   -- Periodic follow ups for eye evaluations, foot care and dental care suggested.

## 2018-09-28 ENCOUNTER — PATIENT OUTREACH (OUTPATIENT)
Dept: DIABETES | Facility: CLINIC | Age: 73
End: 2018-09-28

## 2018-10-01 ENCOUNTER — TELEPHONE (OUTPATIENT)
Dept: ENDOCRINOLOGY | Facility: CLINIC | Age: 73
End: 2018-10-01

## 2018-10-01 NOTE — TELEPHONE ENCOUNTER
Spoke with pt and gave a recommendations for  Dr. Deirdre Lucero.. Contact no provided to pt for set up PCP appt.

## 2018-10-01 NOTE — TELEPHONE ENCOUNTER
Spoke with pt wife and advise that pt dont need any changes right now. Pt need new PCP and he like to go at [place] if they have good PCP that you can recommend pt. Pt like male  Please advise.

## 2018-10-01 NOTE — TELEPHONE ENCOUNTER
----- Message from Nahomi Patrick NP sent at 10/1/2018  8:49 AM CDT -----  No changes    ----- Message -----  From: Suzanne Meza MA  Sent: 9/28/2018   3:56 PM  To: Nahomi Patrick NP    logs

## 2018-10-16 ENCOUNTER — PATIENT MESSAGE (OUTPATIENT)
Dept: HEMATOLOGY/ONCOLOGY | Facility: CLINIC | Age: 73
End: 2018-10-16

## 2018-10-29 ENCOUNTER — PES CALL (OUTPATIENT)
Dept: ADMINISTRATIVE | Facility: CLINIC | Age: 73
End: 2018-10-29

## 2018-10-29 ENCOUNTER — PATIENT OUTREACH (OUTPATIENT)
Dept: DIABETES | Facility: CLINIC | Age: 73
End: 2018-10-29

## 2018-11-15 ENCOUNTER — OFFICE VISIT (OUTPATIENT)
Dept: INTERNAL MEDICINE | Facility: CLINIC | Age: 73
End: 2018-11-15
Payer: MEDICARE

## 2018-11-15 VITALS
TEMPERATURE: 99 F | WEIGHT: 171.94 LBS | HEART RATE: 64 BPM | DIASTOLIC BLOOD PRESSURE: 58 MMHG | SYSTOLIC BLOOD PRESSURE: 138 MMHG | BODY MASS INDEX: 27.63 KG/M2 | RESPIRATION RATE: 16 BRPM | HEIGHT: 66 IN

## 2018-11-15 DIAGNOSIS — Z00.00 ANNUAL PHYSICAL EXAM: Primary | ICD-10-CM

## 2018-11-15 DIAGNOSIS — E11.59 HYPERTENSION ASSOCIATED WITH DIABETES: ICD-10-CM

## 2018-11-15 DIAGNOSIS — E11.69 HYPERLIPIDEMIA ASSOCIATED WITH TYPE 2 DIABETES MELLITUS: ICD-10-CM

## 2018-11-15 DIAGNOSIS — Z87.891 HISTORY OF TOBACCO USE: ICD-10-CM

## 2018-11-15 DIAGNOSIS — I15.2 HYPERTENSION ASSOCIATED WITH DIABETES: ICD-10-CM

## 2018-11-15 DIAGNOSIS — E11.00 TYPE 2 DIABETES MELLITUS WITH HYPEROSMOLARITY WITHOUT COMA, WITHOUT LONG-TERM CURRENT USE OF INSULIN: ICD-10-CM

## 2018-11-15 DIAGNOSIS — E78.5 HYPERLIPIDEMIA ASSOCIATED WITH TYPE 2 DIABETES MELLITUS: ICD-10-CM

## 2018-11-15 DIAGNOSIS — M89.9 DISORDER OF BONE: ICD-10-CM

## 2018-11-15 DIAGNOSIS — I10 ESSENTIAL HYPERTENSION: ICD-10-CM

## 2018-11-15 DIAGNOSIS — G40.909 SEIZURE DISORDER: ICD-10-CM

## 2018-11-15 PROBLEM — E78.2 MIXED HYPERLIPIDEMIA: Status: RESOLVED | Noted: 2018-03-14 | Resolved: 2018-11-15

## 2018-11-15 PROCEDURE — 99499 UNLISTED E&M SERVICE: CPT | Mod: HCNC,S$GLB,, | Performed by: INTERNAL MEDICINE

## 2018-11-15 PROCEDURE — 99999 PR PBB SHADOW E&M-EST. PATIENT-LVL III: CPT | Mod: PBBFAC,,, | Performed by: INTERNAL MEDICINE

## 2018-11-15 PROCEDURE — 3075F SYST BP GE 130 - 139MM HG: CPT | Mod: CPTII,HCNC,S$GLB, | Performed by: INTERNAL MEDICINE

## 2018-11-15 PROCEDURE — G0009 ADMIN PNEUMOCOCCAL VACCINE: HCPCS | Mod: HCNC,59,S$GLB, | Performed by: INTERNAL MEDICINE

## 2018-11-15 PROCEDURE — 90471 IMMUNIZATION ADMIN: CPT | Mod: HCNC,59,S$GLB, | Performed by: INTERNAL MEDICINE

## 2018-11-15 PROCEDURE — 90670 PCV13 VACCINE IM: CPT | Mod: HCNC,S$GLB,, | Performed by: INTERNAL MEDICINE

## 2018-11-15 PROCEDURE — 3078F DIAST BP <80 MM HG: CPT | Mod: CPTII,HCNC,S$GLB, | Performed by: INTERNAL MEDICINE

## 2018-11-15 PROCEDURE — 99397 PER PM REEVAL EST PAT 65+ YR: CPT | Mod: HCNC,25,S$GLB, | Performed by: INTERNAL MEDICINE

## 2018-11-15 PROCEDURE — 90714 TD VACC NO PRESV 7 YRS+ IM: CPT | Mod: HCNC,S$GLB,, | Performed by: INTERNAL MEDICINE

## 2018-11-15 PROCEDURE — 3044F HG A1C LEVEL LT 7.0%: CPT | Mod: CPTII,HCNC,S$GLB, | Performed by: INTERNAL MEDICINE

## 2018-11-15 RX ORDER — LOVASTATIN 20 MG/1
20 TABLET ORAL NIGHTLY
Qty: 90 TABLET | Refills: 3 | Status: SHIPPED | OUTPATIENT
Start: 2018-11-15 | End: 2019-12-13 | Stop reason: SDUPTHER

## 2018-11-15 NOTE — PROGRESS NOTES
Subjective:       Patient ID: Jamison Mayes is a 73 y.o. male.    Chief Complaint: get established (use to see dr perez [facility].  0 pain today.)    HPI   73 y.o. Male here for annual exam.     Vaccines: Influenza (2018); Tetanus (2018); Pneumovax (2012); Prevnar (2018); Zoster (will get)  Eye exam: 2017  Colonoscopy: 2011- repeat in 10 yrs  DEXA: needs    Exercise: no  Diet: regular    Past Medical History:  PDT scalp 2/2015 and 3/2015: AK (actinic keratosis)      Comment:  s/p efudex on scalp and forehead, PDT scalp  No date: Arthritis  No date: Diabetes mellitus type II  No date: Fever blister  03/05/2018: Hepatocellular carcinoma      Comment:  s/p left hepatectomy of segments 2,3,4a/b  No date: Hypertension  No date: Joint pain  3/2013: SCC (squamous cell carcinoma)      Comment:  L scalp vertex  excised : SCC (squamous cell carcinoma)      Comment:  left helix, in-situ  No date: Seizures  3/2013: Squamous Cell Carcinoma      Comment:  occipital scalp  3/2013: Squamous Cell Carcinoma      Comment:  l vertex scalp  Past Surgical History:  03/05/2018: CHOLECYSTECTOMY      Comment:  open at time of hepatic resection  3/5/2018: CHOLECYSTECTOMY; N/A      Comment:  Performed by Brown Cortez MD at [facility] OR [address] [place] [address]  3/5/2018: EVACUATION-HEMATOMA; N/A      Comment:  Performed by Brown Cortez MD at [facility] OR [address] [place] [address]  No date: EYE SURGERY  3/5/2018: HEPATECTOMY; Left      Comment:  Performed by Brown Cortez MD at [facility] OR [address] [place] [address]  03/05/2018: LIVER RESECTION; Left      Comment:  segments 2,3, 4a/b  No date: skin carcinoma removal  3/5/2018: ULTRASOUND; N/A      Comment:  Performed by Brown Cortez MD at [facility] OR [address] [place] [address]  Social History    Socioeconomic History      Marital status:       Spouse name: Not on file      Number of children: Not on file      Years of education: Not on file      Highest education level:  Not on file    Social Needs      Financial resource strain: Not on file      Food insecurity - worry: Not on file      Food insecurity - inability: Not on file      Transportation needs - medical: Not on file      Transportation needs - non-medical: Not on file    Occupational History      Occupation: REtired     Tobacco Use      Smoking status: Former Smoker        Years: 5.00        Quit date: 1966        Years since quittin.4      Smokeless tobacco: Never Used    Substance and Sexual Activity      Alcohol use: No      Drug use: No      Sexual activity: Not on file    Review of patient's allergies indicates:  No Known Allergies  Jamison Mayes had no medications administered during this visit.    Review of Systems   Constitutional: Negative for activity change, appetite change, chills, diaphoresis, fatigue, fever and unexpected weight change.   HENT: Negative for congestion, mouth sores, postnasal drip, rhinorrhea, sinus pressure, sneezing, sore throat, trouble swallowing and voice change.    Eyes: Negative for discharge, itching and visual disturbance.   Respiratory: Negative for cough, chest tightness, shortness of breath and wheezing.    Cardiovascular: Negative for chest pain, palpitations and leg swelling.   Gastrointestinal: Negative for abdominal pain, blood in stool, constipation, diarrhea, nausea and vomiting.   Endocrine: Negative for cold intolerance and heat intolerance.   Genitourinary: Negative for difficulty urinating, dysuria, flank pain, hematuria and urgency.   Musculoskeletal: Negative for arthralgias, back pain, myalgias and neck pain.   Skin: Negative for rash and wound.   Allergic/Immunologic: Negative for environmental allergies and food allergies.   Neurological: Negative for dizziness, tremors, seizures, syncope, weakness and headaches.   Hematological: Negative for adenopathy. Does not bruise/bleed easily.   Psychiatric/Behavioral: Negative for confusion, sleep disturbance and  suicidal ideas. The patient is not nervous/anxious.        Objective:      Physical Exam   Constitutional: He is oriented to person, place, and time. He appears well-developed and well-nourished. No distress.   HENT:   Head: Normocephalic and atraumatic.   Right Ear: External ear normal.   Left Ear: External ear normal.   Nose: Nose normal.   Mouth/Throat: Oropharynx is clear and moist. No oropharyngeal exudate.   Eyes: Conjunctivae and EOM are normal. Pupils are equal, round, and reactive to light. Right eye exhibits no discharge. Left eye exhibits no discharge. No scleral icterus.   Neck: Normal range of motion. Neck supple. No JVD present. No thyromegaly present.   Cardiovascular: Normal rate, regular rhythm, normal heart sounds and intact distal pulses.   No murmur heard.  Pulses:       Dorsalis pedis pulses are 2+ on the right side, and 2+ on the left side.        Posterior tibial pulses are 2+ on the right side, and 2+ on the left side.   Pulmonary/Chest: Effort normal and breath sounds normal. No respiratory distress. He has no wheezes. He has no rales.   Abdominal: Soft. Bowel sounds are normal. He exhibits no distension. There is no tenderness. There is no guarding.   Musculoskeletal: He exhibits no edema.   Feet:   Right Foot:   Protective Sensation: 4 sites tested. 4 sites sensed.   Skin Integrity: Negative for ulcer, blister or skin breakdown.   Left Foot:   Protective Sensation: 4 sites tested. 4 sites sensed.   Skin Integrity: Negative for ulcer, blister or skin breakdown.   Lymphadenopathy:     He has no cervical adenopathy.   Neurological: He is alert and oriented to person, place, and time. No cranial nerve deficit. Coordination normal.   Skin: Skin is warm and dry. No rash noted. He is not diaphoretic. No pallor.   Psychiatric: He has a normal mood and affect. Judgment normal.       Assessment:       1. Annual physical exam    2. Type 2 diabetes mellitus with hyperosmolarity without coma, without  long-term current use of insulin    3. Hypertension associated with diabetes    4. Hyperlipidemia associated with type 2 diabetes mellitus    5. Seizure disorder    6. Disorder of bone    7. History of tobacco use    8. Essential hypertension        Plan:    1. Blood work ordered       Vaccines: Influenza (2018); Tetanus (2018); Pneumovax (2012); Prevnar (2018); Zoster (will get)       Eye exam: 2017       Colonoscopy: 2011- repeat in 10 yrs       DEXA: needs   2. T2DM- last A1C of 5.2(8/18)<--5.9(5/18)       Controlled with diet   3. HTN- stable on Lisinopril 40 mg daily   4. Seizure d/o- stable of meds       Pt had seen Neurology    5. HCC- stable, s/p left hepatic lobectomy with cholecystectomy on 03/05/2018       No recurrence, followed by Oncology   6. DEXA ordered   7. Screen for AAA   8. F/u in 6 months

## 2018-11-29 ENCOUNTER — HOSPITAL ENCOUNTER (OUTPATIENT)
Dept: RADIOLOGY | Facility: HOSPITAL | Age: 73
Discharge: HOME | End: 2018-11-29
Attending: INTERNAL MEDICINE
Payer: MEDICARE

## 2018-11-29 DIAGNOSIS — E11.00 TYPE 2 DIABETES MELLITUS WITH HYPEROSMOLARITY WITHOUT COMA, WITHOUT LONG-TERM CURRENT USE OF INSULIN: ICD-10-CM

## 2018-11-29 DIAGNOSIS — M89.9 DISORDER OF BONE: ICD-10-CM

## 2018-11-29 DIAGNOSIS — Z00.00 ANNUAL PHYSICAL EXAM: ICD-10-CM

## 2018-11-29 DIAGNOSIS — G40.909 SEIZURE DISORDER: ICD-10-CM

## 2018-11-29 DIAGNOSIS — I15.2 HYPERTENSION ASSOCIATED WITH DIABETES: ICD-10-CM

## 2018-11-29 DIAGNOSIS — Z87.891 HISTORY OF TOBACCO USE: ICD-10-CM

## 2018-11-29 DIAGNOSIS — E78.5 HYPERLIPIDEMIA ASSOCIATED WITH TYPE 2 DIABETES MELLITUS: ICD-10-CM

## 2018-11-29 DIAGNOSIS — E11.69 HYPERLIPIDEMIA ASSOCIATED WITH TYPE 2 DIABETES MELLITUS: ICD-10-CM

## 2018-11-29 DIAGNOSIS — E11.59 HYPERTENSION ASSOCIATED WITH DIABETES: ICD-10-CM

## 2018-11-29 PROCEDURE — 76775 US EXAM ABDO BACK WALL LIM: CPT | Mod: TC,HCNC,PO

## 2018-11-29 PROCEDURE — 77080 DXA BONE DENSITY AXIAL: CPT | Mod: TC,HCNC,PO

## 2018-11-30 ENCOUNTER — LAB VISIT (OUTPATIENT)
Dept: LAB | Facility: HOSPITAL | Age: 73
End: 2018-11-30
Attending: INTERNAL MEDICINE
Payer: MEDICARE

## 2018-11-30 DIAGNOSIS — E11.69 HYPERLIPIDEMIA ASSOCIATED WITH TYPE 2 DIABETES MELLITUS: ICD-10-CM

## 2018-11-30 DIAGNOSIS — G40.909 SEIZURE DISORDER: ICD-10-CM

## 2018-11-30 DIAGNOSIS — E11.59 HYPERTENSION ASSOCIATED WITH DIABETES: ICD-10-CM

## 2018-11-30 DIAGNOSIS — E78.5 HYPERLIPIDEMIA ASSOCIATED WITH TYPE 2 DIABETES MELLITUS: ICD-10-CM

## 2018-11-30 DIAGNOSIS — E11.00 TYPE 2 DIABETES MELLITUS WITH HYPEROSMOLARITY WITHOUT COMA, WITHOUT LONG-TERM CURRENT USE OF INSULIN: ICD-10-CM

## 2018-11-30 DIAGNOSIS — I15.2 HYPERTENSION ASSOCIATED WITH DIABETES: ICD-10-CM

## 2018-11-30 DIAGNOSIS — Z00.00 ANNUAL PHYSICAL EXAM: ICD-10-CM

## 2018-11-30 LAB
ALBUMIN SERPL BCP-MCNC: 4.2 G/DL
ALP SERPL-CCNC: 73 U/L
ALT SERPL W/O P-5'-P-CCNC: 19 U/L
ANION GAP SERPL CALC-SCNC: 7 MMOL/L
AST SERPL-CCNC: 33 U/L
BASOPHILS # BLD AUTO: 0.02 K/UL
BASOPHILS NFR BLD: 0.3 %
BILIRUB SERPL-MCNC: 0.6 MG/DL
BUN SERPL-MCNC: 14 MG/DL
CALCIUM SERPL-MCNC: 9.8 MG/DL
CHLORIDE SERPL-SCNC: 102 MMOL/L
CHOLEST SERPL-MCNC: 123 MG/DL
CHOLEST/HDLC SERPL: 2.6 {RATIO}
CO2 SERPL-SCNC: 30 MMOL/L
CREAT SERPL-MCNC: 0.91 MG/DL
DIFFERENTIAL METHOD: ABNORMAL
EOSINOPHIL # BLD AUTO: 0.3 K/UL
EOSINOPHIL NFR BLD: 3.7 %
ERYTHROCYTE [DISTWIDTH] IN BLOOD BY AUTOMATED COUNT: 13.6 %
EST. GFR  (AFRICAN AMERICAN): >60 ML/MIN/1.73 M^2
EST. GFR  (NON AFRICAN AMERICAN): >60 ML/MIN/1.73 M^2
ESTIMATED AVG GLUCOSE: 157 MG/DL
GLUCOSE SERPL-MCNC: 184 MG/DL
HBA1C MFR BLD HPLC: 7.1 %
HCT VFR BLD AUTO: 40.4 %
HDLC SERPL-MCNC: 48 MG/DL
HDLC SERPL: 39 %
HGB BLD-MCNC: 13.2 G/DL
LDLC SERPL CALC-MCNC: 59.2 MG/DL
LYMPHOCYTES # BLD AUTO: 2.5 K/UL
LYMPHOCYTES NFR BLD: 34.3 %
MCH RBC QN AUTO: 28.9 PG
MCHC RBC AUTO-ENTMCNC: 32.7 G/DL
MCV RBC AUTO: 89 FL
MONOCYTES # BLD AUTO: 0.6 K/UL
MONOCYTES NFR BLD: 8.9 %
NEUTROPHILS # BLD AUTO: 3.8 K/UL
NEUTROPHILS NFR BLD: 52.7 %
NONHDLC SERPL-MCNC: 75 MG/DL
PLATELET # BLD AUTO: 182 K/UL
PMV BLD AUTO: 10.3 FL
POTASSIUM SERPL-SCNC: 4.8 MMOL/L
PROT SERPL-MCNC: 7.5 G/DL
RBC # BLD AUTO: 4.56 M/UL
SODIUM SERPL-SCNC: 139 MMOL/L
TRIGL SERPL-MCNC: 79 MG/DL
TSH SERPL DL<=0.005 MIU/L-ACNC: 2.12 UIU/ML
WBC # BLD AUTO: 7.22 K/UL

## 2018-11-30 PROCEDURE — 36415 COLL VENOUS BLD VENIPUNCTURE: CPT | Mod: HCNC,PO

## 2018-11-30 PROCEDURE — 85025 COMPLETE CBC W/AUTO DIFF WBC: CPT | Mod: HCNC,PO

## 2018-11-30 PROCEDURE — 84443 ASSAY THYROID STIM HORMONE: CPT | Mod: HCNC,PO

## 2018-11-30 PROCEDURE — 80053 COMPREHEN METABOLIC PANEL: CPT | Mod: HCNC,PO

## 2018-11-30 PROCEDURE — 80061 LIPID PANEL: CPT | Mod: HCNC

## 2018-11-30 PROCEDURE — 83036 HEMOGLOBIN GLYCOSYLATED A1C: CPT | Mod: HCNC

## 2018-12-04 ENCOUNTER — PATIENT MESSAGE (OUTPATIENT)
Dept: INTERNAL MEDICINE | Facility: CLINIC | Age: 73
End: 2018-12-04

## 2018-12-16 ENCOUNTER — PATIENT MESSAGE (OUTPATIENT)
Dept: INTERNAL MEDICINE | Facility: CLINIC | Age: 73
End: 2018-12-16

## 2019-01-08 ENCOUNTER — HOSPITAL ENCOUNTER (OUTPATIENT)
Dept: RADIOLOGY | Facility: HOSPITAL | Age: 74
Discharge: HOME | End: 2019-01-08
Attending: INTERNAL MEDICINE
Payer: MEDICARE

## 2019-01-08 DIAGNOSIS — C22.0 HEPATOCELLULAR CARCINOMA: ICD-10-CM

## 2019-01-08 PROCEDURE — 74183 MRI ABD W/O CNTR FLWD CNTR: CPT | Mod: TC,HCNC

## 2019-01-08 PROCEDURE — A9585 GADOBUTROL INJECTION: HCPCS | Mod: HCNC | Performed by: INTERNAL MEDICINE

## 2019-01-08 PROCEDURE — 71250 CT THORAX DX C-: CPT | Mod: TC,HCNC

## 2019-01-08 PROCEDURE — 71250 CT THORAX DX C-: CPT | Mod: 26,HCNC,, | Performed by: RADIOLOGY

## 2019-01-08 PROCEDURE — 74183 MRI ABDOMEN W WO CONTRAST: ICD-10-PCS | Mod: 26,HCNC,, | Performed by: RADIOLOGY

## 2019-01-08 PROCEDURE — 71250 CT CHEST WITHOUT CONTRAST: ICD-10-PCS | Mod: 26,HCNC,, | Performed by: RADIOLOGY

## 2019-01-08 PROCEDURE — 74183 MRI ABD W/O CNTR FLWD CNTR: CPT | Mod: 26,HCNC,, | Performed by: RADIOLOGY

## 2019-01-08 PROCEDURE — 25500020 PHARM REV CODE 255: Mod: HCNC | Performed by: INTERNAL MEDICINE

## 2019-01-08 RX ORDER — GADOBUTROL 604.72 MG/ML
10 INJECTION INTRAVENOUS
Status: COMPLETED | OUTPATIENT
Start: 2019-01-08 | End: 2019-01-08

## 2019-01-08 RX ADMIN — GADOBUTROL 10 ML: 604.72 INJECTION INTRAVENOUS at 11:01

## 2019-01-10 ENCOUNTER — OFFICE VISIT (OUTPATIENT)
Dept: HEMATOLOGY/ONCOLOGY | Facility: CLINIC | Age: 74
End: 2019-01-10
Payer: MEDICARE

## 2019-01-10 VITALS
DIASTOLIC BLOOD PRESSURE: 70 MMHG | BODY MASS INDEX: 28.15 KG/M2 | SYSTOLIC BLOOD PRESSURE: 151 MMHG | WEIGHT: 174.38 LBS | HEART RATE: 61 BPM | TEMPERATURE: 98 F | OXYGEN SATURATION: 97 % | RESPIRATION RATE: 16 BRPM

## 2019-01-10 DIAGNOSIS — C22.0 HEPATOCELLULAR CARCINOMA: Primary | ICD-10-CM

## 2019-01-10 PROCEDURE — 99999 PR PBB SHADOW E&M-EST. PATIENT-LVL III: CPT | Mod: PBBFAC,HCNC,, | Performed by: INTERNAL MEDICINE

## 2019-01-10 PROCEDURE — 99214 PR OFFICE/OUTPT VISIT, EST, LEVL IV, 30-39 MIN: ICD-10-PCS | Mod: HCNC,S$GLB,, | Performed by: INTERNAL MEDICINE

## 2019-01-10 PROCEDURE — 3078F PR MOST RECENT DIASTOLIC BLOOD PRESSURE < 80 MM HG: ICD-10-PCS | Mod: CPTII,HCNC,S$GLB, | Performed by: INTERNAL MEDICINE

## 2019-01-10 PROCEDURE — 3077F SYST BP >= 140 MM HG: CPT | Mod: CPTII,HCNC,S$GLB, | Performed by: INTERNAL MEDICINE

## 2019-01-10 PROCEDURE — 99214 OFFICE O/P EST MOD 30 MIN: CPT | Mod: HCNC,S$GLB,, | Performed by: INTERNAL MEDICINE

## 2019-01-10 PROCEDURE — 3077F PR MOST RECENT SYSTOLIC BLOOD PRESSURE >= 140 MM HG: ICD-10-PCS | Mod: CPTII,HCNC,S$GLB, | Performed by: INTERNAL MEDICINE

## 2019-01-10 PROCEDURE — 3078F DIAST BP <80 MM HG: CPT | Mod: CPTII,HCNC,S$GLB, | Performed by: INTERNAL MEDICINE

## 2019-01-10 PROCEDURE — 1101F PT FALLS ASSESS-DOCD LE1/YR: CPT | Mod: CPTII,HCNC,S$GLB, | Performed by: INTERNAL MEDICINE

## 2019-01-10 PROCEDURE — 99999 PR PBB SHADOW E&M-EST. PATIENT-LVL III: ICD-10-PCS | Mod: PBBFAC,HCNC,, | Performed by: INTERNAL MEDICINE

## 2019-01-10 PROCEDURE — 1101F PR PT FALLS ASSESS DOC 0-1 FALLS W/OUT INJ PAST YR: ICD-10-PCS | Mod: CPTII,HCNC,S$GLB, | Performed by: INTERNAL MEDICINE

## 2019-01-10 NOTE — PROGRESS NOTES
Subjective:       Patient ID: Jamison Mayes is a 73 y.o. male.    Chief Complaint: HCC    HPI Mr. Mayes is here for f/u of hepatocellular carcinoma.    He presented to the hospital on 03/01/2018 with sudden onset of cold sweats, nausea and abdominal discomfort and was noted to have an 8.7 cm mass in the lateral segment of the left hepatic lobe that has ruptured to the liver capsule.  He was monitored and underwent a left hepatic lobectomy with cholecystectomy on 03/05/2018.  Final pathology revealed a T1b NX 7.5 cm well-differentiated hepatocellular carcinoma without vascular invasion or perineural invasion.  The tumor appeared to be confined to the liver. Hepatitis serology negative.    A CT chest with contrast on 03/03/2018 revealed a 0.3 cm left upper lobe pulmonary nodule.    He is accompanied to the clinic by his wife.  He lives in [facility].  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    Review of Systems   Constitutional: Negative for fever and unexpected weight change.   HENT: Negative for mouth sores and nosebleeds.    Eyes: Negative for photophobia and pain.   Respiratory: Negative for shortness of breath and wheezing.    Cardiovascular: Negative for chest pain and palpitations.   Gastrointestinal: Negative for abdominal pain and vomiting.   Genitourinary: Negative for flank pain and hematuria.   Musculoskeletal: Negative for back pain, neck pain and neck stiffness.   Skin: Negative for rash and wound.   Neurological: Negative for seizures and syncope.   Hematological: Negative for adenopathy. Does not bruise/bleed easily.   Psychiatric/Behavioral: Negative for behavioral problems and confusion.   All other systems reviewed and are negative.        Objective:      Physical Exam   Abdominal:             Assessment:       1. Hepatocellular carcinoma        Plan:   Mr. Mayes has a diagnosis of T1b NX hepatocellular carcinoma that was resected.  Malignant transformation of a prior hepatic adenoma is a  consideration.    MRI Abd 1/8/19 - Along the superior aspect of the hepatic resection margin there is a T2 hyperintense, T1 hypointense oval lesion measuring 2.8 x 1.8 cm (image 9, series 6) with associated diffusion restriction and without definite arterial enhancement.      Discussed imaging findings with the patient and his wife.  Will plan to repeat MRI abdomen in 3 months along with labs.    Pulmonary nodule noted on CT.  Plan repeat CT chest in 6 months.

## 2019-02-22 ENCOUNTER — OFFICE VISIT (OUTPATIENT)
Dept: DERMATOLOGY | Facility: CLINIC | Age: 74
End: 2019-02-22
Payer: MEDICARE

## 2019-02-22 DIAGNOSIS — L82.1 SK (SEBORRHEIC KERATOSIS): ICD-10-CM

## 2019-02-22 DIAGNOSIS — D48.5 NEOPLASM OF UNCERTAIN BEHAVIOR OF SKIN: Primary | ICD-10-CM

## 2019-02-22 DIAGNOSIS — L57.0 AK (ACTINIC KERATOSIS): ICD-10-CM

## 2019-02-22 DIAGNOSIS — Z12.83 SKIN CANCER SCREENING: ICD-10-CM

## 2019-02-22 PROCEDURE — 99213 OFFICE O/P EST LOW 20 MIN: CPT | Mod: 25,HCNC,S$GLB, | Performed by: DERMATOLOGY

## 2019-02-22 PROCEDURE — 99213 PR OFFICE/OUTPT VISIT, EST, LEVL III, 20-29 MIN: ICD-10-PCS | Mod: 25,HCNC,S$GLB, | Performed by: DERMATOLOGY

## 2019-02-22 PROCEDURE — 88305 TISSUE EXAM BY PATHOLOGIST: CPT | Mod: HCNC | Performed by: PATHOLOGY

## 2019-02-22 PROCEDURE — 17000 DESTRUCT PREMALG LESION: CPT | Mod: 59,HCNC,S$GLB, | Performed by: DERMATOLOGY

## 2019-02-22 PROCEDURE — 1101F PT FALLS ASSESS-DOCD LE1/YR: CPT | Mod: HCNC,CPTII,S$GLB, | Performed by: DERMATOLOGY

## 2019-02-22 PROCEDURE — 11103 TANGNTL BX SKIN EA SEP/ADDL: CPT | Mod: HCNC,S$GLB,, | Performed by: DERMATOLOGY

## 2019-02-22 PROCEDURE — 99999 PR PBB SHADOW E&M-EST. PATIENT-LVL III: ICD-10-PCS | Mod: PBBFAC,HCNC,, | Performed by: DERMATOLOGY

## 2019-02-22 PROCEDURE — 11103 PR TANGENTIAL BIOPSY, SKIN, EA ADDTL LESION: ICD-10-PCS | Mod: HCNC,S$GLB,, | Performed by: DERMATOLOGY

## 2019-02-22 PROCEDURE — 1101F PR PT FALLS ASSESS DOC 0-1 FALLS W/OUT INJ PAST YR: ICD-10-PCS | Mod: HCNC,CPTII,S$GLB, | Performed by: DERMATOLOGY

## 2019-02-22 PROCEDURE — 88305 TISSUE SPECIMEN TO PATHOLOGY, DERMATOLOGY: ICD-10-PCS | Mod: 26,HCNC,, | Performed by: PATHOLOGY

## 2019-02-22 PROCEDURE — 11102 PR TANGENTIAL BIOPSY, SKIN, SINGLE LESION: ICD-10-PCS | Mod: HCNC,S$GLB,, | Performed by: DERMATOLOGY

## 2019-02-22 PROCEDURE — 99999 PR PBB SHADOW E&M-EST. PATIENT-LVL III: CPT | Mod: PBBFAC,HCNC,, | Performed by: DERMATOLOGY

## 2019-02-22 PROCEDURE — 11102 TANGNTL BX SKIN SINGLE LES: CPT | Mod: HCNC,S$GLB,, | Performed by: DERMATOLOGY

## 2019-02-22 PROCEDURE — 17003 DESTRUCTION, PREMALIGNANT LESIONS; SECOND THROUGH 14 LESIONS: ICD-10-PCS | Mod: HCNC,S$GLB,, | Performed by: DERMATOLOGY

## 2019-02-22 PROCEDURE — 17003 DESTRUCT PREMALG LES 2-14: CPT | Mod: HCNC,S$GLB,, | Performed by: DERMATOLOGY

## 2019-02-22 PROCEDURE — 88305 TISSUE EXAM BY PATHOLOGIST: CPT | Mod: 26,HCNC,, | Performed by: PATHOLOGY

## 2019-02-22 PROCEDURE — 17000 PR DESTRUCTION(LASER SURGERY,CRYOSURGERY,CHEMOSURGERY),PREMALIGNANT LESIONS,FIRST LESION: ICD-10-PCS | Mod: 59,HCNC,S$GLB, | Performed by: DERMATOLOGY

## 2019-02-22 NOTE — PROGRESS NOTES
Subjective:       Patient ID:  Jamison Mayes is a 74 y.o. male who presents for   Chief Complaint   Patient presents with    Skin Check     Pt here today for multiple spots on face and scalp.     HPI  Patient has a history of non-melanoma skin cancer and is here today for routine skin check.      Has noticed a hard lesion on scalp x few months. Asymptomatic and no prior treatment.    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  Has completed several courses of Efudex in the past.   Has done PDT on scalp in 2015.     Of note, pt was diagnosed with hepatocellular carcinoma recently, but doing well.    Review of Systems   Constitutional: Negative for fever, chills, weight loss, weight gain, fatigue, night sweats and malaise.   Skin: Positive for activity-related sunscreen use. Negative for daily sunscreen use.        Objective:    Physical Exam   Constitutional: He appears well-developed and well-nourished. No distress.   Neurological: He is alert and oriented to person, place, and time. He is not disoriented.   Psychiatric: He has a normal mood and affect.   Skin:   Areas Examined (abnormalities noted in diagram):   Scalp / Hair Palpated and Inspected  Head / Face Inspection Performed  Neck Inspection Performed  Chest / Axilla Inspection Performed  Abdomen Inspection Performed  Back Inspection Performed  RUE Inspected  LUE Inspection Performed                           Diagram Legend     Erythematous scaling macule/papule c/w actinic keratosis       Vascular papule c/w angioma      Pigmented verrucoid papule/plaque c/w seborrheic keratosis      Yellow umbilicated papule c/w sebaceous hyperplasia      Irregularly shaped tan macule c/w lentigo     1-2 mm smooth white papules consistent with Milia      Movable subcutaneous cyst with punctum c/w epidermal inclusion cyst      Subcutaneous movable cyst c/w pilar cyst      Firm pink to brown papule c/w dermatofibroma       Pedunculated fleshy papule(s) c/w skin tag(s)      Evenly pigmented macule c/w junctional nevus     Mildly variegated pigmented, slightly irregular-bordered macule c/w mildly atypical nevus      Flesh colored to evenly pigmented papule c/w intradermal nevus       Pink pearly papule/plaque c/w basal cell carcinoma      Erythematous hyperkeratotic cursted plaque c/w SCC      Surgical scar with no sign of skin cancer recurrence      Open and closed comedones      Inflammatory papules and pustules      Verrucoid papule consistent consistent with wart     Erythematous eczematous patches and plaques     Dystrophic onycholytic nail with subungual debris c/w onychomycosis     Umbilicated papule    Erythematous-base heme-crusted tan verrucoid plaque consistent with inflamed seborrheic keratosis     Erythematous Silvery Scaling Plaque c/w Psoriasis     See annotation      Assessment / Plan:      Neoplasm of uncertain behavior of skin  Shave biopsy procedure note:    Shave biopsy performed after verbal consent including risk of infection, scar, recurrence, need for additional treatment of site. Area prepped with alcohol, anesthetized with approximately 1.0cc of 1% lidocaine with epinephrine. Lesional tissue shaved with razor blade. Hemostasis achieved with application of aluminum chloride followed by hyfrecation. No complications. Dressing applied. Wound care explained.  -     Tissue Specimen To Pathology, Dermatology  -     Tissue Specimen To Pathology, Dermatology  Pathology Orders:     Normal Orders This Visit    Tissue Specimen To Pathology, Dermatology     Questions:    Directional Terms:  Other(comment)    Clinical Information:  r/o HAK vs. SCC vs. other Comment - shave    Specific Site:  frontal scalp    Tissue Specimen To Pathology, Dermatology     Questions:    Directional Terms:  Other(comment)    Clinical Information:  r/o HAK vs. SCC vs. other Comment - shave    Specific Site:  mid top of scalp          AK (actinic  keratosis)  Cryosurgery Procedure Note    Verbal consent from the patient is obtained including, but not limited to, risk of hypopigmentation/hyperpigmentation, scar, recurrence of lesion. The patient is aware of the precancerous quality and need for treatment of these lesions. Liquid nitrogen cryosurgery is applied to the 13 actinic keratoses, as detailed in the physical exam, to produce a freeze injury. The patient is aware that blisters may form and is instructed on wound care with gentle cleansing and use of vaseline ointment to keep moist until healed. The patient is supplied a handout on cryosurgery and is instructed to call if lesions do not completely resolve.    SK (seborrheic keratosis)  These are benign inherited growths without a malignant potential. Reassurance given to patient. No treatment is necessary.   Treatment of benign, asymptomatic lesions may be considered cosmetic.  Warned about risk of hypo- or hyperpigmentation with treatment and risk of recurrence.    Skin cancer screening  Upper body skin examination performed today including at least 6 points as noted in physical examination. Suspicious lesions noted above.  Patient instructed in importance of daily broad spectrum sunscreen use with spf at least 30. Sun avoidance and topical protection/protective clothing discussed.    rtc 4 months for skin check or sooner pending bx results

## 2019-02-22 NOTE — PATIENT INSTRUCTIONS
Summer Sun Protection      The [facility] would like to remind you of the importance of sun protection all year round and particularly during the summer when the suns rays are the strongest. It has been proven that both acute and chronic sun exposure damages our cells and leads to skin cancer. Beyond skin cancer, the sun causes 90% of the symptoms of pre-mature skin aging, including wrinkles, lentigines (brown spots), and thin, easily bruised skin. Proper sun protection can help prevent these unwanted conditions.    Many patients report that the dont go in the sun. It has been shown that the average person receives 18 hours of incidental sun exposure per week during activities such as walking through parking lots, driving, or sitting next to windows. This accumulates to several bad sunburns per year!    In choosing sunscreen, you want one that protects against both UVA and UVB rays. It is recommended that you use one of SPF 30 or higher. It is important to apply the sunscreen about 20 minutes prior to sun exposure. Most sunscreens are chemical sunscreens and a reaction must take place in the skin so that they are effective. If they are applied and then you are immediately exposed to the sun or start sweating, this reaction has not had time to take place and you are therefore unprotected. Sunscreen needs to be reapplied every 2 hours if you are participating in water sports or sweating. We recommend Elta MD or Neutrogena Ultra Sheer Dry Touch SPF 55 for daily use; however there are many options and it is most important for you to find one that you will use on a consistent basis.    If you have sensitive skin, you may do best with a sunscreen that contains only physical blockers such as titanium dioxide or zinc oxide. These are typically thicker and harder to apply, however they afford very good protection. Neutrogena Sensitive Skin, Blue Lizard Sensitive Skin (pink top) or Neutrogena Pure  and Free are popular ones.     Aside from sunscreen, clothes with UV protection, wide brimmed hats, and sunglasses are other means of sun protection that we recommend.                        [facility] - DERMATOLOGY  [address]  [place] [address]  Dept: [phone]  Dept Fax: [phone]                                                                               CRYOSURGERY      Your doctor has used a method called cryosurgery to treat your skin condition. Cryosurgery refers to the use of very cold substances to treat a variety of skin conditions such as warts, pre-skin cancers, molluscum contagiosum, sun spots, and several benign growths. The substance we use in cryosurgery is liquid nitrogen and is so cold (-195 degrees Celsius) that is burns when administered.     Following treatment in the office, the skin may immediately burn and become red. You may find the area around the lesion is affected as well. It is sometimes necessary to treat not only the lesion, but a small area of the surrounding normal skin to achieve a good response.     A blister, and even a blood filled blister, may form after treatment.   This is a normal response. If the blister is painful, it is acceptable to sterilize a needle and with rubbing alcohol and gently pop the blister. It is important that you gently wash the area with soap and warm water as the blister fluid may contain wart virus if a wart was treated. Do no remove the roof of the blister.     The area treated can take anywhere from 1-3 weeks to heal. Healing time depends on the kind skin lesion treated, the location, and how aggressively the lesion was treated. It is recommended that the areas treated are covered with Vaseline or bacitracin ointment and a band-aid. If a band-aid is not practical, just ointment applied several times per day will do. Keeping these areas moist will speed the healing time.    Treatment with liquid  "nitrogen can leave a scar. In dark skin, it may be a light or dark scar, in light skin it may be a white or pink scar. These will generally fade with time, but may never go away completely.     If you have any concerns after your treatment, please feel free to call the office.       [address] [place] [address]/ [phone] FAX/ [email or web address]     Shave Biopsy Wound Care    Your doctor has performed a shave biopsy today.  A band aid and vaseline ointment has been placed over the site.  This should remain in place for 24 hours.  It is recommended that you keep the area dry for the first 24 hours.  After 24 hours, you may remove the band aid and wash the area with warm soap and water and apply Vaseline jelly.  Many patients prefer to use Neosporin or Bacitracin ointment.  This is acceptable; however, know that you can develop an allergy to this medication even if you have used it safely for years.  It is important to keep the area moist.  Letting it dry out and get air slows healing time, and will worsen the scar.  Band aid is optional after first 24 hours.      If you notice increasing redness, tenderness, pain, or yellow drainage at the biopsy site, please notify your doctor.  These are signs of an infection.    If your biopsy site is bleeding, apply firm pressure for 15 minutes straight.  Repeat for another 15 minutes, if it is still bleeding.   If the surgical site continues to bleed, then please contact your doctor.      For [facility] users:   You will receive a [facility] notification after the pathologist has finished reviewing your biopsy specimen. Pathology results, however, will not be released online so you will see a "no content" message. Once your dermatologist reviews and clinically correlates your biopsy results, you will either receive a letter in the mail with the results of a phone call from your doctor's office if further explanation or treatment is warranted. "       [address] [place] [address]/ [phone] FAX/ [email or web address]      SEBORRHEIC KERATOSES        What causes seborrheic keratoses?    Seborrheic keratoses are harmless, common skin growths that first appear during adult life.  As time goes by, more growths appear.  Some persons have a very large number of them.  Seborrheic keratoses appear on both covered and uncovered parts of the body; they are not caused by sunlight.  The tendency to develop seborrheic keratoses is inherited.    Seborrheic keratoses are harmless and never become malignant.  They begin as slightly raised, light brown spots.  Gradually they thicken and take on a rough wartlike surface.  They slowly darken and may turn black.  These color changes are harmless.  Seborrheic keratoses are superficial and look as if they were stuck on the skin.  Persons who have had several seborrheic keratoses can usually recognize this type of benign growth.  However, if you are concerned or unsure about any growth, consult me.    Treatment    Seborrheic keratoses can easily be removed in the office.  The only reason for removing a seborrheic keratosis is your wish to get rid of it.

## 2019-02-25 DIAGNOSIS — I10 ESSENTIAL HYPERTENSION: ICD-10-CM

## 2019-02-25 RX ORDER — LISINOPRIL 40 MG/1
40 TABLET ORAL DAILY
Qty: 90 TABLET | Refills: 3 | Status: SHIPPED | OUTPATIENT
Start: 2019-02-25 | End: 2019-11-21

## 2019-03-09 ENCOUNTER — PATIENT MESSAGE (OUTPATIENT)
Dept: DERMATOLOGY | Facility: CLINIC | Age: 74
End: 2019-03-09

## 2019-03-11 DIAGNOSIS — L57.0 MULTIPLE ACTINIC KERATOSES: ICD-10-CM

## 2019-03-13 ENCOUNTER — TELEPHONE (OUTPATIENT)
Dept: INTERNAL MEDICINE | Facility: CLINIC | Age: 74
End: 2019-03-13

## 2019-03-13 DIAGNOSIS — E78.5 HYPERLIPIDEMIA, UNSPECIFIED HYPERLIPIDEMIA TYPE: Primary | ICD-10-CM

## 2019-03-13 RX ORDER — FLUOROURACIL 50 MG/G
CREAM TOPICAL
Qty: 40 G | Refills: 1 | Status: SHIPPED | OUTPATIENT
Start: 2019-03-13 | End: 2022-12-04

## 2019-03-13 NOTE — TELEPHONE ENCOUNTER
----- Message from Melia Brito sent at 3/13/2019  9:42 AM CDT -----  Contact: wife/[phone]  Type: Orders Request    What orders/ testing are being requested? 6 Month F/U labs     Is there a future appointment scheduled for the patient with PCP? Yes     When? 5/16/19    Would you prefer a response via [email or web address]? No     Comments: Please advise.        Thank You

## 2019-03-14 ENCOUNTER — CLINICAL SUPPORT (OUTPATIENT)
Dept: OTOLARYNGOLOGY | Facility: CLINIC | Age: 74
End: 2019-03-14
Payer: MEDICARE

## 2019-03-14 ENCOUNTER — OFFICE VISIT (OUTPATIENT)
Dept: OTOLARYNGOLOGY | Facility: CLINIC | Age: 74
End: 2019-03-14
Payer: MEDICARE

## 2019-03-14 VITALS
HEART RATE: 58 BPM | SYSTOLIC BLOOD PRESSURE: 148 MMHG | WEIGHT: 174.19 LBS | HEIGHT: 66 IN | BODY MASS INDEX: 27.99 KG/M2 | DIASTOLIC BLOOD PRESSURE: 65 MMHG

## 2019-03-14 DIAGNOSIS — H69.93 ETD (EUSTACHIAN TUBE DYSFUNCTION), BILATERAL: ICD-10-CM

## 2019-03-14 DIAGNOSIS — J30.89 NON-SEASONAL ALLERGIC RHINITIS, UNSPECIFIED TRIGGER: ICD-10-CM

## 2019-03-14 DIAGNOSIS — H93.12 TINNITUS, LEFT: ICD-10-CM

## 2019-03-14 DIAGNOSIS — H93.12 TINNITUS AURIUM, LEFT: ICD-10-CM

## 2019-03-14 DIAGNOSIS — H90.3 SENSORINEURAL HEARING LOSS, BILATERAL: Primary | ICD-10-CM

## 2019-03-14 DIAGNOSIS — H90.3 SENSORINEURAL HEARING LOSS (SNHL), BILATERAL: Primary | ICD-10-CM

## 2019-03-14 PROCEDURE — 3078F DIAST BP <80 MM HG: CPT | Mod: CPTII,S$GLB,, | Performed by: OTOLARYNGOLOGY

## 2019-03-14 PROCEDURE — 92567 TYMPANOMETRY: CPT | Mod: S$GLB,,, | Performed by: AUDIOLOGIST-HEARING AID FITTER

## 2019-03-14 PROCEDURE — 99204 OFFICE O/P NEW MOD 45 MIN: CPT | Mod: S$GLB,,, | Performed by: OTOLARYNGOLOGY

## 2019-03-14 PROCEDURE — 3077F PR MOST RECENT SYSTOLIC BLOOD PRESSURE >= 140 MM HG: ICD-10-PCS | Mod: CPTII,S$GLB,, | Performed by: OTOLARYNGOLOGY

## 2019-03-14 PROCEDURE — 99999 PR PBB SHADOW E&M-EST. PATIENT-LVL I: CPT | Mod: PBBFAC,HCNC,, | Performed by: AUDIOLOGIST-HEARING AID FITTER

## 2019-03-14 PROCEDURE — 99999 PR PBB SHADOW E&M-EST. PATIENT-LVL IV: CPT | Mod: PBBFAC,HCNC,, | Performed by: OTOLARYNGOLOGY

## 2019-03-14 PROCEDURE — 92557 COMPREHENSIVE HEARING TEST: CPT | Mod: S$GLB,,, | Performed by: AUDIOLOGIST-HEARING AID FITTER

## 2019-03-14 PROCEDURE — 92567 PR TYMPA2METRY: ICD-10-PCS | Mod: S$GLB,,, | Performed by: AUDIOLOGIST-HEARING AID FITTER

## 2019-03-14 PROCEDURE — 99999 PR PBB SHADOW E&M-EST. PATIENT-LVL IV: ICD-10-PCS | Mod: PBBFAC,HCNC,, | Performed by: OTOLARYNGOLOGY

## 2019-03-14 PROCEDURE — 3078F PR MOST RECENT DIASTOLIC BLOOD PRESSURE < 80 MM HG: ICD-10-PCS | Mod: CPTII,S$GLB,, | Performed by: OTOLARYNGOLOGY

## 2019-03-14 PROCEDURE — 99999 PR PBB SHADOW E&M-EST. PATIENT-LVL I: ICD-10-PCS | Mod: PBBFAC,HCNC,, | Performed by: AUDIOLOGIST-HEARING AID FITTER

## 2019-03-14 PROCEDURE — 1101F PR PT FALLS ASSESS DOC 0-1 FALLS W/OUT INJ PAST YR: ICD-10-PCS | Mod: CPTII,S$GLB,, | Performed by: OTOLARYNGOLOGY

## 2019-03-14 PROCEDURE — 3077F SYST BP >= 140 MM HG: CPT | Mod: CPTII,S$GLB,, | Performed by: OTOLARYNGOLOGY

## 2019-03-14 PROCEDURE — 99204 PR OFFICE/OUTPT VISIT, NEW, LEVL IV, 45-59 MIN: ICD-10-PCS | Mod: S$GLB,,, | Performed by: OTOLARYNGOLOGY

## 2019-03-14 PROCEDURE — 1101F PT FALLS ASSESS-DOCD LE1/YR: CPT | Mod: CPTII,S$GLB,, | Performed by: OTOLARYNGOLOGY

## 2019-03-14 PROCEDURE — 92557 PR COMPREHENSIVE HEARING TEST: ICD-10-PCS | Mod: S$GLB,,, | Performed by: AUDIOLOGIST-HEARING AID FITTER

## 2019-03-14 RX ORDER — FLUTICASONE PROPIONATE 50 MCG
2 SPRAY, SUSPENSION (ML) NASAL DAILY
Qty: 1 BOTTLE | Refills: 12 | Status: SHIPPED | OUTPATIENT
Start: 2019-03-14

## 2019-03-14 RX ORDER — LEVOCETIRIZINE DIHYDROCHLORIDE 5 MG/1
5 TABLET, FILM COATED ORAL NIGHTLY
Qty: 30 TABLET | Refills: 11 | Status: SHIPPED | OUTPATIENT
Start: 2019-03-14 | End: 2020-03-13

## 2019-03-14 NOTE — PROGRESS NOTES
Emeka Mendiola, CCC-A  [facility] [address]  [address]  [place] [address]      Patient: Jamison Mayes   MRN: [id number]  : 1945  ARMSTRONG: 2019       AUDIOLOGICAL EVALUATION    CASE HISTORY:    Jamison Mayes, 74 y.o., was seen on the above date for an audiological evaluation. Patient reported decreased hearing recently. He currently wears binaural hearing aids he purchased from [employer] in [facility]. His last audiogram was completed in 2017 at [facility]. He also reported tinnitus in his left ear. No further history significant to hearing loss was reported.    TEST RESULTS:    Otoscopy was unremarkable for both ears.   Imittance testing revealed excessive negative middle ear pressure (Type C) in his right ear and normal middle ear compliance (Type A) in his left ear.  Speech reception thresholds were obtained at 40 dB HL and 45 dB HL, in the right and left ears, respectively, which was consistent with pure tone results.   A word recognition score of 68% was obtained in the right ear using a presentation level of 80 dBHL. A left ear word recognition score of 64% correct was obtained using a presentation level of 85 dBHL.     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    RECOMMENDATIONS:   It is recommended that he:  Continue to receive audiological monitoring annually.  [facility].  Follow up medically with a physician to assess bilateral middle ear pathology, or ETD.  Incorporate communication strategies.  Wear hearing protection when around loud noises.     If you should have any questions or concerns regarding the above information, please do not hesitate to contact me at [phone].      _______________________________  Maira Mendiola, CCC-A  Clinical Audiologist    enclosure: audiogram

## 2019-04-08 ENCOUNTER — LAB VISIT (OUTPATIENT)
Dept: LAB | Facility: HOSPITAL | Age: 74
End: 2019-04-08
Attending: INTERNAL MEDICINE
Payer: MEDICARE

## 2019-04-08 DIAGNOSIS — C22.0 HEPATOCELLULAR CARCINOMA: ICD-10-CM

## 2019-04-08 LAB
AFP SERPL-MCNC: 2.6 NG/ML (ref 0–8.4)
ALBUMIN SERPL BCP-MCNC: 4.3 G/DL (ref 3.5–5.2)
ALP SERPL-CCNC: 68 U/L (ref 38–126)
ALT SERPL W/O P-5'-P-CCNC: 18 U/L (ref 10–44)
ANION GAP SERPL CALC-SCNC: 7 MMOL/L (ref 8–16)
AST SERPL-CCNC: 38 U/L (ref 15–46)
BASOPHILS # BLD AUTO: 0.04 K/UL (ref 0–0.2)
BASOPHILS NFR BLD: 0.5 % (ref 0–1.9)
BILIRUB SERPL-MCNC: 0.6 MG/DL (ref 0.1–1)
BUN SERPL-MCNC: 18 MG/DL (ref 2–20)
CALCIUM SERPL-MCNC: 10.3 MG/DL (ref 8.7–10.5)
CHLORIDE SERPL-SCNC: 103 MMOL/L (ref 95–110)
CO2 SERPL-SCNC: 29 MMOL/L (ref 23–29)
CREAT SERPL-MCNC: 0.95 MG/DL (ref 0.5–1.4)
DIFFERENTIAL METHOD: ABNORMAL
EOSINOPHIL # BLD AUTO: 0.1 K/UL (ref 0–0.5)
EOSINOPHIL NFR BLD: 1.9 % (ref 0–8)
ERYTHROCYTE [DISTWIDTH] IN BLOOD BY AUTOMATED COUNT: 13.1 % (ref 11.5–14.5)
EST. GFR  (AFRICAN AMERICAN): >60 ML/MIN/1.73 M^2
EST. GFR  (NON AFRICAN AMERICAN): >60 ML/MIN/1.73 M^2
GLUCOSE SERPL-MCNC: 173 MG/DL (ref 70–110)
HCT VFR BLD AUTO: 38.8 % (ref 40–54)
HGB BLD-MCNC: 12.9 G/DL (ref 14–18)
LYMPHOCYTES # BLD AUTO: 2.3 K/UL (ref 1–4.8)
LYMPHOCYTES NFR BLD: 31.6 % (ref 18–48)
MCH RBC QN AUTO: 29.7 PG (ref 27–31)
MCHC RBC AUTO-ENTMCNC: 33.2 G/DL (ref 32–36)
MCV RBC AUTO: 89 FL (ref 82–98)
MONOCYTES # BLD AUTO: 0.6 K/UL (ref 0.3–1)
MONOCYTES NFR BLD: 8.6 % (ref 4–15)
NEUTROPHILS # BLD AUTO: 4.2 K/UL (ref 1.8–7.7)
NEUTROPHILS NFR BLD: 57.3 % (ref 38–73)
PLATELET # BLD AUTO: 191 K/UL (ref 150–350)
PMV BLD AUTO: 10.9 FL (ref 9.2–12.9)
POTASSIUM SERPL-SCNC: 5.2 MMOL/L (ref 3.5–5.1)
PROT SERPL-MCNC: 7.4 G/DL (ref 6–8.4)
RBC # BLD AUTO: 4.35 M/UL (ref 4.6–6.2)
SODIUM SERPL-SCNC: 139 MMOL/L (ref 136–145)
WBC # BLD AUTO: 7.35 K/UL (ref 3.9–12.7)

## 2019-04-08 PROCEDURE — 80053 COMPREHEN METABOLIC PANEL: CPT | Mod: HCNC,PO

## 2019-04-08 PROCEDURE — 85025 COMPLETE CBC W/AUTO DIFF WBC: CPT | Mod: HCNC,PO

## 2019-04-08 PROCEDURE — 82105 ALPHA-FETOPROTEIN SERUM: CPT | Mod: HCNC,PO

## 2019-04-08 PROCEDURE — 36415 COLL VENOUS BLD VENIPUNCTURE: CPT | Mod: HCNC,PO

## 2019-04-09 ENCOUNTER — HOSPITAL ENCOUNTER (OUTPATIENT)
Dept: RADIOLOGY | Facility: HOSPITAL | Age: 74
Discharge: HOME | End: 2019-04-09
Attending: INTERNAL MEDICINE
Payer: MEDICARE

## 2019-04-09 DIAGNOSIS — C22.0 HEPATOCELLULAR CARCINOMA: ICD-10-CM

## 2019-04-09 PROCEDURE — A9585 GADOBUTROL INJECTION: HCPCS | Mod: HCNC | Performed by: INTERNAL MEDICINE

## 2019-04-09 PROCEDURE — 74183 MRI ABDOMEN W WO CONTRAST: ICD-10-PCS | Mod: 26,HCNC,, | Performed by: RADIOLOGY

## 2019-04-09 PROCEDURE — 25500020 PHARM REV CODE 255: Mod: HCNC | Performed by: INTERNAL MEDICINE

## 2019-04-09 PROCEDURE — 74183 MRI ABD W/O CNTR FLWD CNTR: CPT | Mod: 26,HCNC,, | Performed by: RADIOLOGY

## 2019-04-09 PROCEDURE — 74183 MRI ABD W/O CNTR FLWD CNTR: CPT | Mod: TC,HCNC

## 2019-04-09 RX ORDER — GADOBUTROL 604.72 MG/ML
10 INJECTION INTRAVENOUS
Status: COMPLETED | OUTPATIENT
Start: 2019-04-09 | End: 2019-04-09

## 2019-04-09 RX ADMIN — GADOBUTROL 10 ML: 604.72 INJECTION INTRAVENOUS at 09:04

## 2019-04-11 ENCOUNTER — OFFICE VISIT (OUTPATIENT)
Dept: HEMATOLOGY/ONCOLOGY | Facility: CLINIC | Age: 74
End: 2019-04-11
Payer: MEDICARE

## 2019-04-11 VITALS
TEMPERATURE: 98 F | RESPIRATION RATE: 16 BRPM | DIASTOLIC BLOOD PRESSURE: 66 MMHG | WEIGHT: 172.81 LBS | BODY MASS INDEX: 27.9 KG/M2 | HEART RATE: 64 BPM | SYSTOLIC BLOOD PRESSURE: 153 MMHG | OXYGEN SATURATION: 98 %

## 2019-04-11 DIAGNOSIS — K86.2 PANCREATIC CYST: ICD-10-CM

## 2019-04-11 DIAGNOSIS — R91.1 PULMONARY NODULE, LEFT: ICD-10-CM

## 2019-04-11 DIAGNOSIS — C22.0 HEPATOCELLULAR CARCINOMA: Primary | ICD-10-CM

## 2019-04-11 PROCEDURE — 3077F SYST BP >= 140 MM HG: CPT | Mod: HCNC,CPTII,S$GLB, | Performed by: INTERNAL MEDICINE

## 2019-04-11 PROCEDURE — 3078F PR MOST RECENT DIASTOLIC BLOOD PRESSURE < 80 MM HG: ICD-10-PCS | Mod: HCNC,CPTII,S$GLB, | Performed by: INTERNAL MEDICINE

## 2019-04-11 PROCEDURE — 99499 RISK ADDL DX/OHS AUDIT: ICD-10-PCS | Mod: S$GLB,,, | Performed by: INTERNAL MEDICINE

## 2019-04-11 PROCEDURE — 99999 PR PBB SHADOW E&M-EST. PATIENT-LVL III: ICD-10-PCS | Mod: PBBFAC,HCNC,, | Performed by: INTERNAL MEDICINE

## 2019-04-11 PROCEDURE — 3077F PR MOST RECENT SYSTOLIC BLOOD PRESSURE >= 140 MM HG: ICD-10-PCS | Mod: HCNC,CPTII,S$GLB, | Performed by: INTERNAL MEDICINE

## 2019-04-11 PROCEDURE — 1101F PT FALLS ASSESS-DOCD LE1/YR: CPT | Mod: HCNC,CPTII,S$GLB, | Performed by: INTERNAL MEDICINE

## 2019-04-11 PROCEDURE — 99999 PR PBB SHADOW E&M-EST. PATIENT-LVL III: CPT | Mod: PBBFAC,HCNC,, | Performed by: INTERNAL MEDICINE

## 2019-04-11 PROCEDURE — 99499 UNLISTED E&M SERVICE: CPT | Mod: S$GLB,,, | Performed by: INTERNAL MEDICINE

## 2019-04-11 PROCEDURE — 1101F PR PT FALLS ASSESS DOC 0-1 FALLS W/OUT INJ PAST YR: ICD-10-PCS | Mod: HCNC,CPTII,S$GLB, | Performed by: INTERNAL MEDICINE

## 2019-04-11 PROCEDURE — 3078F DIAST BP <80 MM HG: CPT | Mod: HCNC,CPTII,S$GLB, | Performed by: INTERNAL MEDICINE

## 2019-04-11 PROCEDURE — 99214 PR OFFICE/OUTPT VISIT, EST, LEVL IV, 30-39 MIN: ICD-10-PCS | Mod: HCNC,S$GLB,, | Performed by: INTERNAL MEDICINE

## 2019-04-11 PROCEDURE — 99214 OFFICE O/P EST MOD 30 MIN: CPT | Mod: HCNC,S$GLB,, | Performed by: INTERNAL MEDICINE

## 2019-04-11 NOTE — PROGRESS NOTES
Subjective:       Patient ID: Jamison Mayes is a 74 y.o. male.    Chief Complaint: HCC    HPI Mr. Mayes is here for f/u of hepatocellular carcinoma.    He presented to the hospital on 03/01/2018 with sudden onset of cold sweats, nausea and abdominal discomfort and was noted to have an 8.7 cm mass in the lateral segment of the left hepatic lobe that has ruptured to the liver capsule.  He was monitored and underwent a left hepatic lobectomy with cholecystectomy on 03/05/2018.  Final pathology revealed a T1b NX 7.5 cm well-differentiated hepatocellular carcinoma without vascular invasion or perineural invasion.  The tumor appeared to be confined to the liver. Hepatitis serology negative.    A CT chest with contrast on 03/03/2018 revealed a 0.3 cm left upper lobe pulmonary nodule.    He is accompanied to the clinic by his wife.  He lives in [place] [facility].  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    Review of Systems   Constitutional: Negative for fever and unexpected weight change.   HENT: Negative for mouth sores and nosebleeds.    Eyes: Negative for photophobia and pain.   Respiratory: Negative for shortness of breath and wheezing.    Cardiovascular: Negative for chest pain and palpitations.   Gastrointestinal: Negative for abdominal pain and vomiting.   Genitourinary: Negative for flank pain and hematuria.   Musculoskeletal: Negative for back pain, neck pain and neck stiffness.   Skin: Negative for rash and wound.   Neurological: Negative for seizures and syncope.   Hematological: Negative for adenopathy. Does not bruise/bleed easily.   Psychiatric/Behavioral: Negative for behavioral problems and confusion.   All other systems reviewed and are negative.        Objective:      Physical Exam   Abdominal:             Assessment:       1. Hepatocellular carcinoma    2. Pulmonary nodule, left    3. Pancreatic cyst        Plan:   Mr. Mayes has a diagnosis of T1b NX hepatocellular carcinoma that was resected.  Malignant  transformation of a prior hepatic adenoma is a consideration.    MRI reviewed    Discussed imaging findings with the patient and his wife.  Will plan to repeat MRI abdomen in 3 months along with labs.    Pulmonary nodule noted on CT.  Plan repeat CT chest in 3 months.

## 2019-04-12 DIAGNOSIS — I15.2 HYPERTENSION ASSOCIATED WITH DIABETES: ICD-10-CM

## 2019-04-12 DIAGNOSIS — E11.59 HYPERTENSION ASSOCIATED WITH DIABETES: ICD-10-CM

## 2019-04-16 ENCOUNTER — OFFICE VISIT (OUTPATIENT)
Dept: DERMATOLOGY | Facility: CLINIC | Age: 74
End: 2019-04-16
Payer: MEDICARE

## 2019-04-16 DIAGNOSIS — L57.0 MULTIPLE ACTINIC KERATOSES: Primary | ICD-10-CM

## 2019-04-16 PROCEDURE — 99999 PR PBB SHADOW E&M-EST. PATIENT-LVL III: ICD-10-PCS | Mod: PBBFAC,HCNC,, | Performed by: PATHOLOGY

## 2019-04-16 PROCEDURE — 99212 PR OFFICE/OUTPT VISIT, EST, LEVL II, 10-19 MIN: ICD-10-PCS | Mod: HCNC,S$GLB,, | Performed by: PATHOLOGY

## 2019-04-16 PROCEDURE — 1101F PT FALLS ASSESS-DOCD LE1/YR: CPT | Mod: HCNC,CPTII,S$GLB, | Performed by: PATHOLOGY

## 2019-04-16 PROCEDURE — 99999 PR PBB SHADOW E&M-EST. PATIENT-LVL III: CPT | Mod: PBBFAC,HCNC,, | Performed by: PATHOLOGY

## 2019-04-16 PROCEDURE — 99212 OFFICE O/P EST SF 10 MIN: CPT | Mod: HCNC,S$GLB,, | Performed by: PATHOLOGY

## 2019-04-16 PROCEDURE — 1101F PR PT FALLS ASSESS DOC 0-1 FALLS W/OUT INJ PAST YR: ICD-10-PCS | Mod: HCNC,CPTII,S$GLB, | Performed by: PATHOLOGY

## 2019-04-16 NOTE — PROGRESS NOTES
Subjective:       Patient ID:  Jamison Mayes is a 74 y.o. male who presents for   Chief Complaint   Patient presents with    Follow-up     Pt here today for Efudex f/u, scalp,  redness.     Patient has a history of non-melanoma skin cancer and is here today for routine skin check.      He is normally a pt of Dr. adrian and was last seen 2/22/19 at which time he had two HAKs on the scalp biopsied. He was also started on Efudex and is currently on his 3rd week today in clinic. He has done this beofre and notes that he is seeing good results. Tolerating the irritation well.      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  Has completed several courses of Efudex in the past.  Has done PDT on scalp in 2015.     Of note, pt was diagnosed with hepatocellular carcinoma recently, but doing well. Pt will return for MRI in 3 months recheck a spot on the lung.      Review of Systems   Constitutional: Negative for fever, chills, weight loss, weight gain, fatigue, night sweats and malaise.   Gastrointestinal: Negative for nausea and vomiting.   Skin: Positive for rash and activity-related sunscreen use. Negative for itching, daily sunscreen use and recent sunburn.   Hematologic/Lymphatic: Does not bruise/bleed easily.        Objective:    Physical Exam   Constitutional: He appears well-developed and well-nourished. No distress.   Neurological: He is alert and oriented to person, place, and time. He is not disoriented.   Psychiatric: He has a normal mood and affect.   Skin:   Areas Examined (abnormalities noted in diagram):   Scalp / Hair Palpated and Inspected  Head / Face Inspection Performed              Diagram Legend     Erythematous scaling macule/papule c/w actinic keratosis       Vascular papule c/w angioma      Pigmented verrucoid papule/plaque c/w seborrheic keratosis      Yellow umbilicated papule c/w sebaceous hyperplasia      Irregularly shaped  tan macule c/w lentigo     1-2 mm smooth white papules consistent with Milia      Movable subcutaneous cyst with punctum c/w epidermal inclusion cyst      Subcutaneous movable cyst c/w pilar cyst      Firm pink to brown papule c/w dermatofibroma      Pedunculated fleshy papule(s) c/w skin tag(s)      Evenly pigmented macule c/w junctional nevus     Mildly variegated pigmented, slightly irregular-bordered macule c/w mildly atypical nevus      Flesh colored to evenly pigmented papule c/w intradermal nevus       Pink pearly papule/plaque c/w basal cell carcinoma      Erythematous hyperkeratotic cursted plaque c/w SCC      Surgical scar with no sign of skin cancer recurrence      Open and closed comedones      Inflammatory papules and pustules      Verrucoid papule consistent consistent with wart     Erythematous eczematous patches and plaques     Dystrophic onycholytic nail with subungual debris c/w onychomycosis     Umbilicated papule    Erythematous-base heme-crusted tan verrucoid plaque consistent with inflamed seborrheic keratosis     Erythematous Silvery Scaling Plaque c/w Psoriasis     See annotation      Assessment / Plan:        Multiple actinic keratoses - currently in week 3 of bid Efudex application to scalp, including 2 biopsy-proved HAKs.  Having excellent response.  No areas of ulceration.  Pt to continue Efudex for an additional week and then d/c.  He will f/u with Dr. Jsoeph in 3-4 months.    Discussed importance of continued sun protection.  Will wear a hat with all outdoor exposure.             No follow-ups on file.

## 2019-05-16 ENCOUNTER — OFFICE VISIT (OUTPATIENT)
Dept: INTERNAL MEDICINE | Facility: CLINIC | Age: 74
End: 2019-05-16
Payer: MEDICARE

## 2019-05-16 VITALS
RESPIRATION RATE: 18 BRPM | HEART RATE: 58 BPM | DIASTOLIC BLOOD PRESSURE: 80 MMHG | SYSTOLIC BLOOD PRESSURE: 128 MMHG | BODY MASS INDEX: 28.16 KG/M2 | HEIGHT: 66 IN | TEMPERATURE: 98 F | WEIGHT: 175.25 LBS

## 2019-05-16 DIAGNOSIS — I15.2 HYPERTENSION ASSOCIATED WITH DIABETES: ICD-10-CM

## 2019-05-16 DIAGNOSIS — E11.69 HYPERLIPIDEMIA ASSOCIATED WITH TYPE 2 DIABETES MELLITUS: ICD-10-CM

## 2019-05-16 DIAGNOSIS — E78.5 HYPERLIPIDEMIA ASSOCIATED WITH TYPE 2 DIABETES MELLITUS: ICD-10-CM

## 2019-05-16 DIAGNOSIS — C22.0 HEPATOCELLULAR CARCINOMA: ICD-10-CM

## 2019-05-16 DIAGNOSIS — G40.909 SEIZURE DISORDER: ICD-10-CM

## 2019-05-16 DIAGNOSIS — E11.59 HYPERTENSION ASSOCIATED WITH DIABETES: ICD-10-CM

## 2019-05-16 DIAGNOSIS — E11.00 TYPE 2 DIABETES MELLITUS WITH HYPEROSMOLARITY WITHOUT COMA, WITHOUT LONG-TERM CURRENT USE OF INSULIN: Primary | ICD-10-CM

## 2019-05-16 PROCEDURE — 99999 PR PBB SHADOW E&M-EST. PATIENT-LVL III: CPT | Mod: PBBFAC,HCNC,, | Performed by: INTERNAL MEDICINE

## 2019-05-16 PROCEDURE — 99214 OFFICE O/P EST MOD 30 MIN: CPT | Mod: HCNC,S$GLB,, | Performed by: INTERNAL MEDICINE

## 2019-05-16 PROCEDURE — 3074F PR MOST RECENT SYSTOLIC BLOOD PRESSURE < 130 MM HG: ICD-10-PCS | Mod: HCNC,CPTII,S$GLB, | Performed by: INTERNAL MEDICINE

## 2019-05-16 PROCEDURE — 3045F PR MOST RECENT HEMOGLOBIN A1C LEVEL 7.0-9.0%: ICD-10-PCS | Mod: HCNC,CPTII,S$GLB, | Performed by: INTERNAL MEDICINE

## 2019-05-16 PROCEDURE — 99499 UNLISTED E&M SERVICE: CPT | Mod: HCNC,S$GLB,, | Performed by: INTERNAL MEDICINE

## 2019-05-16 PROCEDURE — 1101F PT FALLS ASSESS-DOCD LE1/YR: CPT | Mod: HCNC,CPTII,S$GLB, | Performed by: INTERNAL MEDICINE

## 2019-05-16 PROCEDURE — 3079F DIAST BP 80-89 MM HG: CPT | Mod: HCNC,CPTII,S$GLB, | Performed by: INTERNAL MEDICINE

## 2019-05-16 PROCEDURE — 3074F SYST BP LT 130 MM HG: CPT | Mod: HCNC,CPTII,S$GLB, | Performed by: INTERNAL MEDICINE

## 2019-05-16 PROCEDURE — 99214 PR OFFICE/OUTPT VISIT, EST, LEVL IV, 30-39 MIN: ICD-10-PCS | Mod: HCNC,S$GLB,, | Performed by: INTERNAL MEDICINE

## 2019-05-16 PROCEDURE — 99999 PR PBB SHADOW E&M-EST. PATIENT-LVL III: ICD-10-PCS | Mod: PBBFAC,HCNC,, | Performed by: INTERNAL MEDICINE

## 2019-05-16 PROCEDURE — 3079F PR MOST RECENT DIASTOLIC BLOOD PRESSURE 80-89 MM HG: ICD-10-PCS | Mod: HCNC,CPTII,S$GLB, | Performed by: INTERNAL MEDICINE

## 2019-05-16 PROCEDURE — 99499 RISK ADDL DX/OHS AUDIT: ICD-10-PCS | Mod: HCNC,S$GLB,, | Performed by: INTERNAL MEDICINE

## 2019-05-16 PROCEDURE — 3045F PR MOST RECENT HEMOGLOBIN A1C LEVEL 7.0-9.0%: CPT | Mod: HCNC,CPTII,S$GLB, | Performed by: INTERNAL MEDICINE

## 2019-05-16 PROCEDURE — 1101F PR PT FALLS ASSESS DOC 0-1 FALLS W/OUT INJ PAST YR: ICD-10-PCS | Mod: HCNC,CPTII,S$GLB, | Performed by: INTERNAL MEDICINE

## 2019-05-16 NOTE — PROGRESS NOTES
Subjective:       Patient ID: Jamison Mayes is a 74 y.o. male.    Chief Complaint: Follow-up (6 month)    HPI   Pt with T2DM, HTN, Seizure d/o, HCC, HLD is here for 6 month f/u. No acute complaints today.   Review of Systems   Constitutional: Negative for activity change, appetite change, chills, diaphoresis, fatigue, fever and unexpected weight change.   HENT: Negative for postnasal drip, rhinorrhea, sinus pressure, sneezing, sore throat, trouble swallowing and voice change.    Respiratory: Negative for cough, shortness of breath and wheezing.    Cardiovascular: Negative for chest pain, palpitations and leg swelling.   Gastrointestinal: Negative for abdominal pain, blood in stool, constipation, diarrhea, nausea and vomiting.   Genitourinary: Negative for dysuria.   Musculoskeletal: Negative for arthralgias and myalgias.   Skin: Negative for rash and wound.   Allergic/Immunologic: Negative for environmental allergies and food allergies.   Hematological: Negative for adenopathy. Does not bruise/bleed easily.       Objective:      Physical Exam   Constitutional: He is oriented to person, place, and time. He appears well-developed and well-nourished. No distress.   HENT:   Head: Normocephalic and atraumatic.   Eyes: Pupils are equal, round, and reactive to light. Conjunctivae and EOM are normal. Right eye exhibits no discharge. Left eye exhibits no discharge. No scleral icterus.   Neck: Normal range of motion. Neck supple. No JVD present.   Cardiovascular: Normal rate, regular rhythm and normal heart sounds.   No murmur heard.  Pulmonary/Chest: Effort normal and breath sounds normal. No respiratory distress. He has no wheezes. He has no rales.   Musculoskeletal: He exhibits no edema.   Lymphadenopathy:     He has no cervical adenopathy.   Neurological: He is alert and oriented to person, place, and time.   Skin: Skin is warm and dry. No rash noted. He is not diaphoretic. No pallor.       Assessment:       1. Type 2  diabetes mellitus with hyperosmolarity without coma, without long-term current use of insulin    2. Hypertension associated with diabetes    3. Seizure disorder    4. Hepatocellular carcinoma    5. Hyperlipidemia associated with type 2 diabetes mellitus        Plan:    1. T2DM- last A1C of 5.2(8/18)<--5.9(5/18)       Controlled with diet   2. HTN- stable on Lisinopril 40 mg daily   3. Seizure d/o- stable of meds       Pt had seen Neurology    4. HCC- stable, s/p left hepatic lobectomy with cholecystectomy on 03/05/2018       No recurrence, followed by Oncology   5. HLD- stable on Lovastatin 20 mg daily   6. F/u in 6 months for annual exam

## 2019-06-19 ENCOUNTER — OFFICE VISIT (OUTPATIENT)
Dept: DERMATOLOGY | Facility: CLINIC | Age: 74
End: 2019-06-19
Payer: MEDICARE

## 2019-06-19 DIAGNOSIS — L82.1 SK (SEBORRHEIC KERATOSIS): ICD-10-CM

## 2019-06-19 DIAGNOSIS — L57.0 AK (ACTINIC KERATOSIS): ICD-10-CM

## 2019-06-19 DIAGNOSIS — L57.0 MULTIPLE ACTINIC KERATOSES: Primary | ICD-10-CM

## 2019-06-19 PROCEDURE — 99213 PR OFFICE/OUTPT VISIT, EST, LEVL III, 20-29 MIN: ICD-10-PCS | Mod: 25,HCNC,S$GLB, | Performed by: DERMATOLOGY

## 2019-06-19 PROCEDURE — 99999 PR PBB SHADOW E&M-EST. PATIENT-LVL II: CPT | Mod: PBBFAC,HCNC,, | Performed by: DERMATOLOGY

## 2019-06-19 PROCEDURE — 1101F PR PT FALLS ASSESS DOC 0-1 FALLS W/OUT INJ PAST YR: ICD-10-PCS | Mod: HCNC,CPTII,S$GLB, | Performed by: DERMATOLOGY

## 2019-06-19 PROCEDURE — 17003 DESTRUCTION, PREMALIGNANT LESIONS; SECOND THROUGH 14 LESIONS: ICD-10-PCS | Mod: HCNC,S$GLB,, | Performed by: DERMATOLOGY

## 2019-06-19 PROCEDURE — 99999 PR PBB SHADOW E&M-EST. PATIENT-LVL II: ICD-10-PCS | Mod: PBBFAC,HCNC,, | Performed by: DERMATOLOGY

## 2019-06-19 PROCEDURE — 17000 DESTRUCT PREMALG LESION: CPT | Mod: HCNC,S$GLB,, | Performed by: DERMATOLOGY

## 2019-06-19 PROCEDURE — 17003 DESTRUCT PREMALG LES 2-14: CPT | Mod: HCNC,S$GLB,, | Performed by: DERMATOLOGY

## 2019-06-19 PROCEDURE — 99213 OFFICE O/P EST LOW 20 MIN: CPT | Mod: 25,HCNC,S$GLB, | Performed by: DERMATOLOGY

## 2019-06-19 PROCEDURE — 17000 PR DESTRUCTION(LASER SURGERY,CRYOSURGERY,CHEMOSURGERY),PREMALIGNANT LESIONS,FIRST LESION: ICD-10-PCS | Mod: HCNC,S$GLB,, | Performed by: DERMATOLOGY

## 2019-06-19 PROCEDURE — 1101F PT FALLS ASSESS-DOCD LE1/YR: CPT | Mod: HCNC,CPTII,S$GLB, | Performed by: DERMATOLOGY

## 2019-06-19 NOTE — PROGRESS NOTES
Subjective:       Patient ID:  Jamison Mayes is a 74 y.o. male who presents for   Chief Complaint   Patient presents with    Follow-up     Pt here today for Efudex f/u, scalp, better. Tx. none     HPI  Pt here today for f/u on Efudex on scalp to treat 2 bx-proven HAK's. Used it for over 4 weeks with good response.  Also interested in UBSE.  Has a hx of NMSC and AK's treated with cryo, Efudex, and PDT.    Review of Systems   Constitutional: Negative for fever, chills, weight loss, weight gain, fatigue, night sweats and malaise.   Skin: Positive for daily sunscreen use and activity-related sunscreen use. Negative for recent sunburn.   Hematologic/Lymphatic: Does not bruise/bleed easily.        Objective:    Physical Exam   Constitutional: He appears well-developed and well-nourished. No distress.   Neurological: He is alert and oriented to person, place, and time. He is not disoriented.   Psychiatric: He has a normal mood and affect.   Skin:   Areas Examined (abnormalities noted in diagram):   Scalp / Hair Palpated and Inspected  Head / Face Inspection Performed  Neck Inspection Performed  Chest / Axilla Inspection Performed  Back Inspection Performed  RUE Inspected  LUE Inspection Performed                   Diagram Legend     Erythematous scaling macule/papule c/w actinic keratosis       Vascular papule c/w angioma      Pigmented verrucoid papule/plaque c/w seborrheic keratosis      Yellow umbilicated papule c/w sebaceous hyperplasia      Irregularly shaped tan macule c/w lentigo     1-2 mm smooth white papules consistent with Milia      Movable subcutaneous cyst with punctum c/w epidermal inclusion cyst      Subcutaneous movable cyst c/w pilar cyst      Firm pink to brown papule c/w dermatofibroma      Pedunculated fleshy papule(s) c/w skin tag(s)      Evenly pigmented macule c/w junctional nevus     Mildly variegated pigmented, slightly irregular-bordered macule c/w mildly atypical nevus      Flesh colored  to evenly pigmented papule c/w intradermal nevus       Pink pearly papule/plaque c/w basal cell carcinoma      Erythematous hyperkeratotic cursted plaque c/w SCC      Surgical scar with no sign of skin cancer recurrence      Open and closed comedones      Inflammatory papules and pustules      Verrucoid papule consistent consistent with wart     Erythematous eczematous patches and plaques     Dystrophic onycholytic nail with subungual debris c/w onychomycosis     Umbilicated papule    Erythematous-base heme-crusted tan verrucoid plaque consistent with inflamed seborrheic keratosis     Erythematous Silvery Scaling Plaque c/w Psoriasis     See annotation      Assessment / Plan:        Multiple actinic keratoses  Pt will use Efudex BID x 2-3 wks on the face as prescribed.  Counseled extensively regarding the proper use and side effects of efudex. Pt will discontinue use if areas begin to ulcerate, bleed, blister, etc.    AK (actinic keratosis)  Cryosurgery Procedure Note    Verbal consent from the patient is obtained including, but not limited to, risk of hypopigmentation/hyperpigmentation, scar, recurrence of lesion. The patient is aware of the precancerous quality and need for treatment of these lesions. Liquid nitrogen cryosurgery is applied to the 6 actinic keratoses, as detailed in the physical exam, to produce a freeze injury. The patient is aware that blisters may form and is instructed on wound care with gentle cleansing and use of vaseline ointment to keep moist until healed. The patient is supplied a handout on cryosurgery and is instructed to call if lesions do not completely resolve.    SK (seborrheic keratosis)  These are benign inherited growths without a malignant potential. Reassurance given to patient. No treatment is necessary.   Treatment of benign, asymptomatic lesions may be considered cosmetic.  Warned about risk of hypo- or hyperpigmentation with treatment and risk of recurrence.    Follow up in  about 4 months (around 10/19/2019) for skin check or sooner for any concerns.

## 2019-06-19 NOTE — PATIENT INSTRUCTIONS
Summer Sun Protection      The [facility] would like to remind you of the importance of sun protection all year round and particularly during the summer when the suns rays are the strongest. It has been proven that both acute and chronic sun exposure damages our cells and leads to skin cancer. Beyond skin cancer, the sun causes 90% of the symptoms of pre-mature skin aging, including wrinkles, lentigines (brown spots), and thin, easily bruised skin. Proper sun protection can help prevent these unwanted conditions.    Many patients report that the dont go in the sun. It has been shown that the average person receives 18 hours of incidental sun exposure per week during activities such as walking through parking lots, driving, or sitting next to windows. This accumulates to several bad sunburns per year!    In choosing sunscreen, you want one that protects against both UVA and UVB rays. It is recommended that you use one of SPF 30 or higher. It is important to apply the sunscreen about 20 minutes prior to sun exposure. Most sunscreens are chemical sunscreens and a reaction must take place in the skin so that they are effective. If they are applied and then you are immediately exposed to the sun or start sweating, this reaction has not had time to take place and you are therefore unprotected. Sunscreen needs to be reapplied every 2 hours if you are participating in water sports or sweating. We recommend Elta MD or Neutrogena Ultra Sheer Dry Touch SPF 55 for daily use; however there are many options and it is most important for you to find one that you will use on a consistent basis.    If you have sensitive skin, you may do best with a sunscreen that contains only physical blockers such as titanium dioxide or zinc oxide. These are typically thicker and harder to apply, however they afford very good protection. Neutrogena Sensitive Skin, Blue Lizard Sensitive Skin (pink top) or Neutrogena Pure  and Free are popular ones.     Aside from sunscreen, clothes with UV protection, wide brimmed hats, and sunglasses are other means of sun protection that we recommend.                        [facility] - DERMATOLOGY  [address]  [place] [address]  Dept: [phone]  Dept Fax: [phone]                                                                               CRYOSURGERY      Your doctor has used a method called cryosurgery to treat your skin condition. Cryosurgery refers to the use of very cold substances to treat a variety of skin conditions such as warts, pre-skin cancers, molluscum contagiosum, sun spots, and several benign growths. The substance we use in cryosurgery is liquid nitrogen and is so cold (-195 degrees Celsius) that is burns when administered.     Following treatment in the office, the skin may immediately burn and become red. You may find the area around the lesion is affected as well. It is sometimes necessary to treat not only the lesion, but a small area of the surrounding normal skin to achieve a good response.     A blister, and even a blood filled blister, may form after treatment.   This is a normal response. If the blister is painful, it is acceptable to sterilize a needle and with rubbing alcohol and gently pop the blister. It is important that you gently wash the area with soap and warm water as the blister fluid may contain wart virus if a wart was treated. Do no remove the roof of the blister.     The area treated can take anywhere from 1-3 weeks to heal. Healing time depends on the kind skin lesion treated, the location, and how aggressively the lesion was treated. It is recommended that the areas treated are covered with Vaseline or bacitracin ointment and a band-aid. If a band-aid is not practical, just ointment applied several times per day will do. Keeping these areas moist will speed the healing time.    Treatment with liquid  nitrogen can leave a scar. In dark skin, it may be a light or dark scar, in light skin it may be a white or pink scar. These will generally fade with time, but may never go away completely.     If you have any concerns after your treatment, please feel free to call the office.       [facility] [place] [address]/ [phone] FAX/ [email or web address]

## 2019-07-12 ENCOUNTER — PATIENT OUTREACH (OUTPATIENT)
Dept: ADMINISTRATIVE | Facility: OTHER | Age: 74
End: 2019-07-12

## 2019-07-16 ENCOUNTER — OFFICE VISIT (OUTPATIENT)
Dept: OTOLARYNGOLOGY | Facility: CLINIC | Age: 74
End: 2019-07-16
Payer: MEDICARE

## 2019-07-16 VITALS
TEMPERATURE: 97 F | WEIGHT: 176.94 LBS | BODY MASS INDEX: 28.44 KG/M2 | SYSTOLIC BLOOD PRESSURE: 142 MMHG | HEIGHT: 66 IN | HEART RATE: 61 BPM | DIASTOLIC BLOOD PRESSURE: 70 MMHG

## 2019-07-16 DIAGNOSIS — H93.12 TINNITUS AURIUM, LEFT: ICD-10-CM

## 2019-07-16 DIAGNOSIS — H90.3 SENSORINEURAL HEARING LOSS (SNHL), BILATERAL: Primary | ICD-10-CM

## 2019-07-16 DIAGNOSIS — H69.93 ETD (EUSTACHIAN TUBE DYSFUNCTION), BILATERAL: ICD-10-CM

## 2019-07-16 DIAGNOSIS — J30.89 NON-SEASONAL ALLERGIC RHINITIS, UNSPECIFIED TRIGGER: ICD-10-CM

## 2019-07-16 PROCEDURE — 3077F SYST BP >= 140 MM HG: CPT | Mod: HCNC,CPTII,S$GLB, | Performed by: OTOLARYNGOLOGY

## 2019-07-16 PROCEDURE — 99999 PR PBB SHADOW E&M-EST. PATIENT-LVL IV: ICD-10-PCS | Mod: PBBFAC,HCNC,, | Performed by: OTOLARYNGOLOGY

## 2019-07-16 PROCEDURE — 3078F DIAST BP <80 MM HG: CPT | Mod: HCNC,CPTII,S$GLB, | Performed by: OTOLARYNGOLOGY

## 2019-07-16 PROCEDURE — 3077F PR MOST RECENT SYSTOLIC BLOOD PRESSURE >= 140 MM HG: ICD-10-PCS | Mod: HCNC,CPTII,S$GLB, | Performed by: OTOLARYNGOLOGY

## 2019-07-16 PROCEDURE — 99213 PR OFFICE/OUTPT VISIT, EST, LEVL III, 20-29 MIN: ICD-10-PCS | Mod: HCNC,S$GLB,, | Performed by: OTOLARYNGOLOGY

## 2019-07-16 PROCEDURE — 1101F PT FALLS ASSESS-DOCD LE1/YR: CPT | Mod: HCNC,CPTII,S$GLB, | Performed by: OTOLARYNGOLOGY

## 2019-07-16 PROCEDURE — 99999 PR PBB SHADOW E&M-EST. PATIENT-LVL IV: CPT | Mod: PBBFAC,HCNC,, | Performed by: OTOLARYNGOLOGY

## 2019-07-16 PROCEDURE — 1101F PR PT FALLS ASSESS DOC 0-1 FALLS W/OUT INJ PAST YR: ICD-10-PCS | Mod: HCNC,CPTII,S$GLB, | Performed by: OTOLARYNGOLOGY

## 2019-07-16 PROCEDURE — 3078F PR MOST RECENT DIASTOLIC BLOOD PRESSURE < 80 MM HG: ICD-10-PCS | Mod: HCNC,CPTII,S$GLB, | Performed by: OTOLARYNGOLOGY

## 2019-07-16 PROCEDURE — 99213 OFFICE O/P EST LOW 20 MIN: CPT | Mod: HCNC,S$GLB,, | Performed by: OTOLARYNGOLOGY

## 2019-07-16 NOTE — PROGRESS NOTES
Chief Complaint   Patient presents with    Other     Patient states still has a buzzing sound in left ear.   .     HPI:  Jamison Mayes is a very pleasant 74 y.o. male here to see me today for the first time for evaluation of hearing loss.  He reports hearing loss that has been gradually progressing over the last 5 years. He had audiogram in 2017 showing SNHL and obtained binaural hearing aids from [employer] at that time. He notes that his hearing has been worse over the last 6 months.  He has noted any difference in hearing between the ears, with the left ear being the worse hearing ear.  He has noted any tinnitus in the left ear.  He has not had any recent issues with ear pain or ear drainage.  He denies a family history of hearing loss, and has not had any previous otologic surgery.  He admits to any history of significant loud noise exposure.He denies issues with dizziness.    Interval HPI 7/16/2019:   Mr. Mayes follows up today for tinnitus, ETD, and AR.  He reports that he feels his symptoms of nasal congestion, post-nasal drip, and sneezing are improved. He reports that he feels the pressure sensation in his ears is improved. He feels his hearing is stable and unchanged. He still notes buzzing sensation in left ear.       Past Medical History:   Diagnosis Date    AK (actinic keratosis) PDT scalp 2/2015 and 3/2015    s/p efudex on scalp and forehead, PDT scalp    Arthritis     Diabetes mellitus type II     Fever blister     Hepatocellular carcinoma 03/05/2018    s/p left hepatectomy of segments 2,3,4a/b    Hypertension     Joint pain     SCC (squamous cell carcinoma) 3/2013    L scalp vertex    SCC (squamous cell carcinoma) excised     left helix, in-situ    Seizures     Squamous Cell Carcinoma 3/2013    occipital scalp    Squamous Cell Carcinoma 3/2013    l vertex scalp     Social History     Socioeconomic History    Marital status:      Spouse name: Not on file    Number of  children: Not on file    Years of education: Not on file    Highest education level: Not on file   Occupational History    Occupation: REtired    Social Needs    Financial resource strain: Not on file    Food insecurity:     Worry: Not on file     Inability: Not on file    Transportation needs:     Medical: Not on file     Non-medical: Not on file   Tobacco Use    Smoking status: Former Smoker     Years: 5.00     Last attempt to quit: 1966     Years since quittin.1    Smokeless tobacco: Never Used   Substance and Sexual Activity    Alcohol use: No    Drug use: No    Sexual activity: Not on file   Lifestyle    Physical activity:     Days per week: Not on file     Minutes per session: Not on file    Stress: Not on file   Relationships    Social connections:     Talks on phone: Not on file     Gets together: Not on file     Attends [employer] service: Not on file     Active member of club or organization: Not on file     Attends meetings of clubs or organizations: Not on file     Relationship status: Not on file   Other Topics Concern    Not on file   Social History Narrative    Not on file     Past Surgical History:   Procedure Laterality Date    CHOLECYSTECTOMY  2018    open at time of hepatic resection    CHOLECYSTECTOMY N/A 3/5/2018    Performed by [facility] at [facility] OR [address] [place] [address]    EVACUATION-HEMATOMA N/A 3/5/2018    Performed by Brwon Cortez [facility] at [facility] OR [address] [place] [address]    EYE SURGERY      HEPATECTOMY Left 3/5/2018    Performed by [facility] at [facility] OR [address] [place] [address]    LIVER RESECTION Left 2018    segments 2,3, 4a/b    skin carcinoma removal      ULTRASOUND N/A 3/5/2018    Performed by Brown [facility]ez [facility] at [facility] OR [address] [place] [address]     Family History   Problem Relation Age of Onset    Diabetes Father     Hypertension Mother     Vision loss Sister     Melanoma Neg Hx     Heart attack Neg Hx     Heart disease Neg Hx          Review of  Systems  General: negative for chills, fever or weight loss  Psychological: negative for mood changes or depression  Ophthalmic: negative for blurry vision, photophobia or eye pain  ENT: see HPI  Respiratory: no cough, shortness of breath, or wheezing  Cardiovascular: no chest pain or dyspnea on exertion  Gastrointestinal: no abdominal pain, change in bowel habits, or black/ bloody stools  Musculoskeletal: negative for gait disturbance or muscular weakness  Neurological: no syncope or seizures; no ataxia  Dermatological: negative for puritis,  rash and jaundice  Hematologic/lymphatic: no easy bruising, no new lumps or bumps      Physical Exam:    Vitals:    07/16/19 0952   BP: (!) 142/70   Pulse: 61   Temp: 97.1 °F (36.2 °C)       Constitutional: Well appearing / communicating without difficutly.  NAD.  Eyes: EOM I Bilaterally  Head/Face: Normocephalic.  Negative paranasal sinus pressure/tenderness.  Salivary glands WNL.  House Brackmann I Bilaterally.    Right Ear: Auricle normal appearance. External Auditory Canal within normal limits no lesions or masses,TM w/o masses/lesions/perforations. TM mobility noted.   Left Ear: Auricle normal appearance. External Auditory Canal within normal limits no lesions or masses,TM w/o masses/lesions/perforations. TM mobility noted.  Rinne Air conduction >bone conduction bilaterally, Bailey midline.   Nose: No gross nasal septal deviation. Inferior Turbinates 3+ bilaterally. No septal perforation. No masses/lesions. External nasal skin appears normal without masses/lesions.  Oral Cavity: Gingiva/lips within normal limits.  Dentition/gingiva healthy appearing. Mucus membranes moist. Floor of mouth soft, no masses palpated. Oral Tongue mobile. Hard Palate appears normal.    Oropharynx: Base of tongue appears normal. No masses/lesions noted. Tonsillar fossa/pharyngeal wall without lesions. Posterior oropharynx WNL.  Soft palate without masses. Midline uvula.   Neck/Lymphatic: No LAD  I-VI bilaterally.  No thyromegaly.  No masses noted on exam.    Mirror laryngoscopy/nasopharyngoscopy: Active gag reflex.  Unable to perform.    Neuro/Psychiatric: AOx3.  Normal mood and affect.   Cardiovascular: Normal carotid pulses bilaterally, no increasing jugular venous distention noted at cervical region bilaterally.    Respiratory: Normal respiratory effort, no stridor, no retractions noted.      Impedence testing reveals type A tymps bilaterally.           Assessment:    ICD-10-CM ICD-9-CM    1. Sensorineural hearing loss (SNHL), bilateral H90.3 389.18    2. Tinnitus aurium, left H93.12 388.31    3. ETD (Eustachian tube dysfunction), bilateral H69.83 381.81    4. Non-seasonal allergic rhinitis, unspecified trigger J30.89 477.8      The primary encounter diagnosis was Sensorineural hearing loss (SNHL), bilateral. Diagnoses of Tinnitus aurium, left, ETD (Eustachian tube dysfunction), bilateral, and Non-seasonal allergic rhinitis, unspecified trigger were also pertinent to this visit.      Plan:  No orders of the defined types were placed in this encounter.    Continue Flonase and Xyzal.  We also discussed that tinnitus is most often caused by a hearing loss, and that as the hair cells are damaged, either genetic or as a result of loud noise exposure, they then cause tinnitus.  Some patients find that restricting the salt or caffeine in their diet helps.  Tinnitus tends to be louder in times of stress and fatigue, and may decrease with time.  Sound machines may also be an effective masking technique if needed at night.     Recommend annual audiogram to monitor SNHL. Hearing protection when exposed to loud sounds.     Thank you kindly for allowing me to participate in the patient's care.       Abbie King MD

## 2019-07-23 ENCOUNTER — HOSPITAL ENCOUNTER (OUTPATIENT)
Dept: RADIOLOGY | Facility: HOSPITAL | Age: 74
Discharge: HOME | End: 2019-07-23
Attending: INTERNAL MEDICINE
Payer: MEDICARE

## 2019-07-23 DIAGNOSIS — C22.0 HEPATOCELLULAR CARCINOMA: ICD-10-CM

## 2019-07-23 DIAGNOSIS — R91.1 PULMONARY NODULE, LEFT: ICD-10-CM

## 2019-07-23 PROCEDURE — A9585 GADOBUTROL INJECTION: HCPCS | Mod: HCNC | Performed by: INTERNAL MEDICINE

## 2019-07-23 PROCEDURE — 25500020 PHARM REV CODE 255: Mod: HCNC | Performed by: INTERNAL MEDICINE

## 2019-07-23 PROCEDURE — 71250 CT THORAX DX C-: CPT | Mod: TC,HCNC

## 2019-07-23 PROCEDURE — 74183 MRI ABD W/O CNTR FLWD CNTR: CPT | Mod: 26,HCNC,, | Performed by: RADIOLOGY

## 2019-07-23 PROCEDURE — 71250 CT THORAX DX C-: CPT | Mod: 26,HCNC,, | Performed by: RADIOLOGY

## 2019-07-23 PROCEDURE — 71250 CT CHEST WITHOUT CONTRAST: ICD-10-PCS | Mod: 26,HCNC,, | Performed by: RADIOLOGY

## 2019-07-23 PROCEDURE — 74183 MRI ABD W/O CNTR FLWD CNTR: CPT | Mod: TC,HCNC

## 2019-07-23 PROCEDURE — 74183 MRI ABDOMEN W WO CONTRAST: ICD-10-PCS | Mod: 26,HCNC,, | Performed by: RADIOLOGY

## 2019-07-23 RX ORDER — GADOBUTROL 604.72 MG/ML
10 INJECTION INTRAVENOUS
Status: COMPLETED | OUTPATIENT
Start: 2019-07-23 | End: 2019-07-23

## 2019-07-23 RX ADMIN — GADOBUTROL 10 ML: 604.72 INJECTION INTRAVENOUS at 10:07

## 2019-07-25 ENCOUNTER — OFFICE VISIT (OUTPATIENT)
Dept: HEMATOLOGY/ONCOLOGY | Facility: CLINIC | Age: 74
End: 2019-07-25
Payer: MEDICARE

## 2019-07-25 VITALS
OXYGEN SATURATION: 99 % | TEMPERATURE: 98 F | BODY MASS INDEX: 28.5 KG/M2 | HEART RATE: 63 BPM | RESPIRATION RATE: 16 BRPM | SYSTOLIC BLOOD PRESSURE: 140 MMHG | DIASTOLIC BLOOD PRESSURE: 60 MMHG | WEIGHT: 176.63 LBS

## 2019-07-25 DIAGNOSIS — C22.0 HEPATOCELLULAR CARCINOMA: Primary | ICD-10-CM

## 2019-07-25 PROCEDURE — 3077F SYST BP >= 140 MM HG: CPT | Mod: HCNC,CPTII,S$GLB, | Performed by: INTERNAL MEDICINE

## 2019-07-25 PROCEDURE — 1101F PT FALLS ASSESS-DOCD LE1/YR: CPT | Mod: HCNC,CPTII,S$GLB, | Performed by: INTERNAL MEDICINE

## 2019-07-25 PROCEDURE — 99214 OFFICE O/P EST MOD 30 MIN: CPT | Mod: HCNC,S$GLB,, | Performed by: INTERNAL MEDICINE

## 2019-07-25 PROCEDURE — 3078F PR MOST RECENT DIASTOLIC BLOOD PRESSURE < 80 MM HG: ICD-10-PCS | Mod: HCNC,CPTII,S$GLB, | Performed by: INTERNAL MEDICINE

## 2019-07-25 PROCEDURE — 3077F PR MOST RECENT SYSTOLIC BLOOD PRESSURE >= 140 MM HG: ICD-10-PCS | Mod: HCNC,CPTII,S$GLB, | Performed by: INTERNAL MEDICINE

## 2019-07-25 PROCEDURE — 1101F PR PT FALLS ASSESS DOC 0-1 FALLS W/OUT INJ PAST YR: ICD-10-PCS | Mod: HCNC,CPTII,S$GLB, | Performed by: INTERNAL MEDICINE

## 2019-07-25 PROCEDURE — 3078F DIAST BP <80 MM HG: CPT | Mod: HCNC,CPTII,S$GLB, | Performed by: INTERNAL MEDICINE

## 2019-07-25 PROCEDURE — 99999 PR PBB SHADOW E&M-EST. PATIENT-LVL III: CPT | Mod: PBBFAC,HCNC,, | Performed by: INTERNAL MEDICINE

## 2019-07-25 PROCEDURE — 99214 PR OFFICE/OUTPT VISIT, EST, LEVL IV, 30-39 MIN: ICD-10-PCS | Mod: HCNC,S$GLB,, | Performed by: INTERNAL MEDICINE

## 2019-07-25 PROCEDURE — 99999 PR PBB SHADOW E&M-EST. PATIENT-LVL III: ICD-10-PCS | Mod: PBBFAC,HCNC,, | Performed by: INTERNAL MEDICINE

## 2019-07-25 NOTE — PROGRESS NOTES
Subjective:       Patient ID: Jamison Mayes is a 74 y.o. male.    Chief Complaint: HCC    HPI Mr. Mayes is here for f/u of hepatocellular carcinoma.    He presented to the hospital on 03/01/2018 with sudden onset of cold sweats, nausea and abdominal discomfort and was noted to have an 8.7 cm mass in the lateral segment of the left hepatic lobe that has ruptured to the liver capsule.  He was monitored and underwent a left hepatic lobectomy with cholecystectomy on 03/05/2018.  Final pathology revealed a T1b NX 7.5 cm well-differentiated hepatocellular carcinoma without vascular invasion or perineural invasion.  The tumor appeared to be confined to the liver. Hepatitis serology negative.    A CT chest with contrast on 03/03/2018 revealed a 0.3 cm left upper lobe pulmonary nodule.    He is accompanied to the clinic by his wife.  He lives in [facility].  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    Review of Systems   Constitutional: Negative for fever and unexpected weight change.   HENT: Negative for mouth sores and nosebleeds.    Eyes: Negative for photophobia and pain.   Respiratory: Negative for shortness of breath and wheezing.    Cardiovascular: Negative for chest pain and palpitations.   Gastrointestinal: Negative for abdominal pain and vomiting.   Genitourinary: Negative for flank pain and hematuria.   Musculoskeletal: Negative for back pain, neck pain and neck stiffness.   Skin: Negative for rash and wound.   Neurological: Negative for seizures and syncope.   Hematological: Negative for adenopathy. Does not bruise/bleed easily.   Psychiatric/Behavioral: Negative for behavioral problems and confusion.   All other systems reviewed and are negative.        Objective:      Physical Exam   Abdominal:             Assessment:       1. Hepatocellular carcinoma        Plan:   Mr. Mayes has a diagnosis of T1b NX hepatocellular carcinoma that was resected.  Malignant transformation of a prior hepatic adenoma is a  consideration.    MRI reviewed    Discussed imaging findings with the patient and his wife.  Will plan to repeat MRI abdomen in 4 months along with labs.    Pulmonary nodule noted on CT and stable - no need further surveillance    After 2 years of f/u will plan to check MRi every 6 mo for total 5 years

## 2019-07-29 ENCOUNTER — PES CALL (OUTPATIENT)
Dept: ADMINISTRATIVE | Facility: CLINIC | Age: 74
End: 2019-07-29

## 2019-08-30 DIAGNOSIS — E11.9 TYPE 2 DIABETES MELLITUS WITHOUT COMPLICATION: ICD-10-CM

## 2019-09-20 ENCOUNTER — PES CALL (OUTPATIENT)
Dept: ADMINISTRATIVE | Facility: CLINIC | Age: 74
End: 2019-09-20

## 2019-09-24 ENCOUNTER — TELEPHONE (OUTPATIENT)
Dept: INTERNAL MEDICINE | Facility: CLINIC | Age: 74
End: 2019-09-24

## 2019-09-24 DIAGNOSIS — D51.8 OTHER VITAMIN B12 DEFICIENCY ANEMIAS: ICD-10-CM

## 2019-09-24 DIAGNOSIS — E78.5 HYPERLIPIDEMIA ASSOCIATED WITH TYPE 2 DIABETES MELLITUS: ICD-10-CM

## 2019-09-24 DIAGNOSIS — E11.69 HYPERLIPIDEMIA ASSOCIATED WITH TYPE 2 DIABETES MELLITUS: ICD-10-CM

## 2019-09-24 DIAGNOSIS — Z12.5 ENCOUNTER FOR SCREENING FOR MALIGNANT NEOPLASM OF PROSTATE: ICD-10-CM

## 2019-09-24 DIAGNOSIS — E11.59 HYPERTENSION ASSOCIATED WITH DIABETES: ICD-10-CM

## 2019-09-24 DIAGNOSIS — E11.00 TYPE 2 DIABETES MELLITUS WITH HYPEROSMOLARITY WITHOUT COMA, WITHOUT LONG-TERM CURRENT USE OF INSULIN: Primary | ICD-10-CM

## 2019-09-24 DIAGNOSIS — I15.2 HYPERTENSION ASSOCIATED WITH DIABETES: ICD-10-CM

## 2019-09-24 NOTE — TELEPHONE ENCOUNTER
----- Message from Redd Barrera sent at 9/24/2019 10:08 AM CDT -----  Doctor appointment and lab have been scheduled.  Please link lab orders to the lab appointment.  Date of doctor appointment:  11/21  Date of lab appointment:  11/14  Physical or EP: Physical  Comments:

## 2019-09-30 ENCOUNTER — PATIENT MESSAGE (OUTPATIENT)
Dept: INTERNAL MEDICINE | Facility: CLINIC | Age: 74
End: 2019-09-30

## 2019-10-03 ENCOUNTER — PES CALL (OUTPATIENT)
Dept: ADMINISTRATIVE | Facility: CLINIC | Age: 74
End: 2019-10-03

## 2019-10-09 ENCOUNTER — IMMUNIZATION (OUTPATIENT)
Dept: PHARMACY | Facility: CLINIC | Age: 74
End: 2019-10-09
Payer: MEDICARE

## 2019-10-22 ENCOUNTER — PATIENT MESSAGE (OUTPATIENT)
Dept: INTERNAL MEDICINE | Facility: CLINIC | Age: 74
End: 2019-10-22

## 2019-10-22 DIAGNOSIS — M25.561 RIGHT KNEE PAIN, UNSPECIFIED CHRONICITY: Primary | ICD-10-CM

## 2019-10-29 ENCOUNTER — HOSPITAL ENCOUNTER (OUTPATIENT)
Dept: RADIOLOGY | Facility: HOSPITAL | Age: 74
Discharge: HOME | End: 2019-10-29
Attending: INTERNAL MEDICINE
Payer: MEDICARE

## 2019-10-29 DIAGNOSIS — M25.561 RIGHT KNEE PAIN, UNSPECIFIED CHRONICITY: ICD-10-CM

## 2019-10-29 PROCEDURE — 73560 X-RAY EXAM OF KNEE 1 OR 2: CPT | Mod: TC,HCNC,FY,PO,LT,59

## 2019-10-29 PROCEDURE — 73562 X-RAY EXAM OF KNEE 3: CPT | Mod: TC,HCNC,FY,PO,RT

## 2019-11-11 ENCOUNTER — PATIENT OUTREACH (OUTPATIENT)
Dept: ADMINISTRATIVE | Facility: OTHER | Age: 74
End: 2019-11-11

## 2019-11-12 ENCOUNTER — OFFICE VISIT (OUTPATIENT)
Dept: ORTHOPEDICS | Facility: CLINIC | Age: 74
End: 2019-11-12
Payer: MEDICARE

## 2019-11-12 ENCOUNTER — TELEPHONE (OUTPATIENT)
Dept: ORTHOPEDICS | Facility: CLINIC | Age: 74
End: 2019-11-12

## 2019-11-12 VITALS — WEIGHT: 178.88 LBS | BODY MASS INDEX: 28.75 KG/M2 | HEIGHT: 66 IN

## 2019-11-12 DIAGNOSIS — M17.11 PRIMARY OSTEOARTHRITIS OF RIGHT KNEE: Primary | ICD-10-CM

## 2019-11-12 DIAGNOSIS — M25.561 RIGHT KNEE PAIN, UNSPECIFIED CHRONICITY: ICD-10-CM

## 2019-11-12 PROCEDURE — 1101F PT FALLS ASSESS-DOCD LE1/YR: CPT | Mod: HCNC,CPTII,S$GLB, | Performed by: ORTHOPAEDIC SURGERY

## 2019-11-12 PROCEDURE — 99204 OFFICE O/P NEW MOD 45 MIN: CPT | Mod: 25,HCNC,S$GLB, | Performed by: ORTHOPAEDIC SURGERY

## 2019-11-12 PROCEDURE — 99999 PR PBB SHADOW E&M-EST. PATIENT-LVL III: CPT | Mod: PBBFAC,HCNC,, | Performed by: ORTHOPAEDIC SURGERY

## 2019-11-12 PROCEDURE — 1101F PR PT FALLS ASSESS DOC 0-1 FALLS W/OUT INJ PAST YR: ICD-10-PCS | Mod: HCNC,CPTII,S$GLB, | Performed by: ORTHOPAEDIC SURGERY

## 2019-11-12 PROCEDURE — 99204 PR OFFICE/OUTPT VISIT, NEW, LEVL IV, 45-59 MIN: ICD-10-PCS | Mod: 25,HCNC,S$GLB, | Performed by: ORTHOPAEDIC SURGERY

## 2019-11-12 PROCEDURE — 20610 LARGE JOINT ASPIRATION/INJECTION: R KNEE: ICD-10-PCS | Mod: HCNC,RT,S$GLB, | Performed by: ORTHOPAEDIC SURGERY

## 2019-11-12 PROCEDURE — 99999 PR PBB SHADOW E&M-EST. PATIENT-LVL III: ICD-10-PCS | Mod: PBBFAC,HCNC,, | Performed by: ORTHOPAEDIC SURGERY

## 2019-11-12 PROCEDURE — 20610 DRAIN/INJ JOINT/BURSA W/O US: CPT | Mod: HCNC,RT,S$GLB, | Performed by: ORTHOPAEDIC SURGERY

## 2019-11-12 RX ORDER — TRIAMCINOLONE ACETONIDE 40 MG/ML
60 INJECTION, SUSPENSION INTRA-ARTICULAR; INTRAMUSCULAR
Status: DISCONTINUED | OUTPATIENT
Start: 2019-11-12 | End: 2019-11-12 | Stop reason: HOSPADM

## 2019-11-12 RX ADMIN — TRIAMCINOLONE ACETONIDE 60 MG: 40 INJECTION, SUSPENSION INTRA-ARTICULAR; INTRAMUSCULAR at 02:11

## 2019-11-12 NOTE — PROGRESS NOTES
Subjective:      Patient ID: Jamison Mayes is a 74 y.o. male.    Chief Complaint: No chief complaint on file.    HPI     Jamison Mayes is a 74 y.o. male with a hx of T2DM who presents to clinic for evaluation of right knee pain and stiffness. Pt reports 1 month history of progressively worsening medial and posterior right knee pain. Denies injury/fall. + mechanical symptoms and swelling. No instability. Pain is stiffness severe after sitting for prolonged periods of time. Pt is very active, works at [employer] as a . He has taken NSAIDs with mild relief, He has not had CSI.     Past Medical History:   Diagnosis Date    AK (actinic keratosis) PDT scalp 2/2015 and 3/2015    s/p efudex on scalp and forehead, PDT scalp    Arthritis     Diabetes mellitus type II     Fever blister     Hepatocellular carcinoma 03/05/2018    s/p left hepatectomy of segments 2,3,4a/b    Hypertension     Joint pain     SCC (squamous cell carcinoma) 3/2013    L scalp vertex    SCC (squamous cell carcinoma) excised     left helix, in-situ    Seizures     Squamous Cell Carcinoma 3/2013    occipital scalp    Squamous Cell Carcinoma 3/2013    l vertex scalp     Past Surgical History:   Procedure Laterality Date    CHOLECYSTECTOMY  03/05/2018    open at time of hepatic resection    EYE SURGERY      LIVER RESECTION Left 03/05/2018    segments 2,3, 4a/b    skin carcinoma removal       Family History   Problem Relation Age of Onset    Diabetes Father     Hypertension Mother     Vision loss Sister     Melanoma Neg Hx     Heart attack Neg Hx     Heart disease Neg Hx      Social History     Socioeconomic History    Marital status:      Spouse name: Not on file    Number of children: Not on file    Years of education: Not on file    Highest education level: Not on file   Occupational History    Occupation: REtired    Social Needs    Financial resource strain: Not on file    Food insecurity:      Worry: Not on file     Inability: Not on file    Transportation needs:     Medical: Not on file     Non-medical: Not on file   Tobacco Use    Smoking status: Former Smoker     Years: 5.00     Last attempt to quit: 1966     Years since quittin.4    Smokeless tobacco: Never Used   Substance and Sexual Activity    Alcohol use: No    Drug use: No    Sexual activity: Not on file   Lifestyle    Physical activity:     Days per week: Not on file     Minutes per session: Not on file    Stress: Not on file   Relationships    Social connections:     Talks on phone: Not on file     Gets together: Not on file     Attends Caodaism service: Not on file     Active member of club or organization: Not on file     Attends meetings of clubs or organizations: Not on file     Relationship status: Not on file   Other Topics Concern    Not on file   Social History Narrative    Not on file     Current Outpatient Medications on File Prior to Visit   Medication Sig Dispense Refill    ACCU-CHEK ROSE PLUS METER Misc USE AS DIRECTED  0    aspirin (ASPIRIN LOW DOSE) 81 MG EC tablet Take 81 mg by mouth. 1 Tablet, Delayed Release (E.C.) Oral Every day      blood sugar diagnostic Strp To check BG 2 times daily, to use with accu check guide 100 each 11    calcium carbonate (OS-RUSLAN) 500 mg calcium (1,250 mg) chewable tablet Take 1 tablet by mouth once daily.      cyanocobalamin (VITAMIN B-12) 250 MCG tablet Take 1 tablet (250 mcg total) by mouth once daily.      flu vacc zw2319-47,65yr up,PF (FLUZONE HIGH-DOSE -, PF,) 180 mcg/0.5 mL Syrg INJECT INTO THE MUSCLE. 0.5 mL 0    fluorouracil (EFUDEX) 5 % cream AAA on scalp BID x 2-3 weeks. Stop if blistered, oozing, or bleeding. Use daily sun protection. 40 g 1    fluticasone (FLONASE) 50 mcg/actuation nasal spray 2 sprays (100 mcg total) by Each Nare route once daily. 1 Bottle 12    imiquimod (ALDARA) 5 % cream AAA 5 nights/week x 4 - 6 weeks. Wash off in am. Stop if  "blistering, bleeding, oozing, etc. Do not use >1 packet per night. 24 packet 1    ketoconazole (NIZORAL) 2 % shampoo Wash hair with medicated shampoo at least 2x/week - let sit on scalp at least 5 minutes prior to rinsing 120 mL 5    lancets 28 gauge Misc 1 lancet by Misc.(Non-Drug; Combo Route) route 2 (two) times daily. 100 each 11    levocetirizine (XYZAL) 5 MG tablet Take 1 tablet (5 mg total) by mouth every evening. 30 tablet 11    lisinopril (PRINIVIL,ZESTRIL) 40 MG tablet TAKE 1 TABLET (40 MG TOTAL) BY MOUTH ONCE DAILY. 90 tablet 3    lovastatin (MEVACOR) 20 MG tablet Take 1 tablet (20 mg total) by mouth every evening. 90 tablet 3    MULTIVITAMIN WITH MINERALS (ONE-A-DAY 50 PLUS) Tab Take by mouth. 1 Tablet Oral Every morning      mupirocin (BACTROBAN) 2 % ointment AAA on scalp bid 30 g 2    pen needle, diabetic (BD ULTRA-FINE KRYSTIAN PEN NEEDLES) 32 gauge x 5/32" Ndle To use 4 times daily with insulin 100 each 3     No current facility-administered medications on file prior to visit.      Review of patient's allergies indicates:  No Known Allergies    ROS      Objective:      There is no height or weight on file to calculate BMI.  There were no vitals filed for this visit.      General    Constitutional: He is oriented to person, place, and time. He appears well-developed and well-nourished.   HENT:   Head: Normocephalic and atraumatic.   Eyes: EOM are normal.   Cardiovascular: Normal rate.    Pulmonary/Chest: Effort normal.   Neurological: He is alert and oriented to person, place, and time.   Psychiatric: He has a normal mood and affect. His behavior is normal.     General Musculoskeletal Exam   Gait: normal       Right Knee Exam     Inspection   Erythema: absent  Scars: absent  Swelling: absent  Effusion: present  Deformity: absent  Bruising: absent    Tenderness   The patient is tender to palpation of the medial joint line.    Range of Motion   Extension: 10   Flexion: 100     Tests   Meniscus "   Radu:  Medial - positive   Ligament Examination Lachman: normal (-1 to 2mm)   MCL - Valgus: normal (0 to 2mm)  LCL - Varus: normal  Patella   Passive Patellar Tilt: neutral    Other   Popliteal (Baker's) Cyst: present  Sensation: normal    Left Knee Exam     Inspection   Erythema: absent  Scars: absent  Swelling: absent  Effusion: absent  Deformity: absent  Bruising: absent    Tenderness   The patient is experiencing no tenderness.     Range of Motion   Extension: 0   Flexion: 130     Tests   Stability Lachman: normal (-1 to 2mm)   MCL - Valgus: normal (0 to 2mm)  LCL - Varus: normal (0 to 2mm)  Patella   Passive Patellar Tilt: neutral    Other   Sensation: normal    Muscle Strength   Right Lower Extremity   Hip Abduction: 5/5   Quadriceps:  5/5   Hamstrin/5   Left Lower Extremity   Hip Abduction: 5/5   Quadriceps:  5/5   Hamstrin/5     Reflexes     Left Side  Quadriceps:  2+    Right Side   Quadriceps:  2+    Vascular Exam     Right Pulses  Dorsalis Pedis:      2+          Left Pulses  Dorsalis Pedis:      2+          Edema  Right Lower Leg: absent  Left Lower Leg: absent              Assessment:       Encounter Diagnosis   Name Primary?    Right knee pain, unspecified chronicity           Plan:       Diagnoses and all orders for this visit:    Right knee pain, unspecified chronicity  -     Ambulatory referral/consult to Orthopedics        - R knee arthrocentesis and intrarticular CSI today in clinic  - counseled on avoiding high impact activities  - RTC PRN

## 2019-11-12 NOTE — PROCEDURES
Large Joint Aspiration/Injection: R knee  Date/Time: 11/12/2019 2:00 PM  Performed by: [facility] MD  Authorized by: [facility] MD     Consent Done?:  Yes (Verbal)  Indications:  Pain  Procedure site marked: Yes    Timeout: Prior to procedure the correct patient, procedure, and site was verified      Location:  Knee  Site:  R knee  Prep: Patient was prepped and draped in usual sterile fashion    Needle size:  18 G  Ultrasonic Guidance for needle placement: No  Approach:  Anterolateral  Medications:  60 mg triamcinolone acetonide 40 mg/mL  Aspirate amount (ml):  24  Aspirate:  Yellow  Patient tolerance:  Patient tolerated the procedure well with no immediate complications

## 2019-11-12 NOTE — LETTER
November 12, 2019      Yossi Peck, DO  [address] [facility] [address]           [facility] - Orthopedics  [address], [address]  [facility] [address]  Phone: [phone]          Patient: Jamison Mayes   MR Number: [id number]   YOB: 1945   Date of Visit: 11/12/2019       Dear Dr. Yossi Peck:    Thank you for referring Jamison Mayes to me for evaluation. Attached you will find relevant portions of my assessment and plan of care.    If you have questions, please do not hesitate to call me. I look forward to following Jamison Mayes along with you.    Sincerely,    [facility] MD    Enclosure  CC:  No Recipients    If you would like to receive this communication electronically, please contact [email or web address] or [phone] to request more information on User[employer] Link access.    For providers and/or their staff who would like to refer a patient to [facility], please contact us through our one-stop-shop provider referral line, [facility] [place], at [phone].    If you feel you have received this communication in error or would no longer like to receive these types of communications, please e-mail [email or web address]

## 2019-11-14 ENCOUNTER — LAB VISIT (OUTPATIENT)
Dept: LAB | Facility: HOSPITAL | Age: 74
End: 2019-11-14
Attending: INTERNAL MEDICINE
Payer: MEDICARE

## 2019-11-14 DIAGNOSIS — E11.00 TYPE 2 DIABETES MELLITUS WITH HYPEROSMOLARITY WITHOUT COMA, WITHOUT LONG-TERM CURRENT USE OF INSULIN: ICD-10-CM

## 2019-11-14 DIAGNOSIS — D51.8 OTHER VITAMIN B12 DEFICIENCY ANEMIAS: ICD-10-CM

## 2019-11-14 DIAGNOSIS — E11.69 HYPERLIPIDEMIA ASSOCIATED WITH TYPE 2 DIABETES MELLITUS: ICD-10-CM

## 2019-11-14 DIAGNOSIS — Z12.5 ENCOUNTER FOR SCREENING FOR MALIGNANT NEOPLASM OF PROSTATE: ICD-10-CM

## 2019-11-14 DIAGNOSIS — I15.2 HYPERTENSION ASSOCIATED WITH DIABETES: ICD-10-CM

## 2019-11-14 DIAGNOSIS — E11.59 HYPERTENSION ASSOCIATED WITH DIABETES: ICD-10-CM

## 2019-11-14 DIAGNOSIS — E78.5 HYPERLIPIDEMIA ASSOCIATED WITH TYPE 2 DIABETES MELLITUS: ICD-10-CM

## 2019-11-14 LAB
ALBUMIN SERPL BCP-MCNC: 4.5 G/DL (ref 3.5–5.2)
ALP SERPL-CCNC: 71 U/L (ref 38–126)
ALT SERPL W/O P-5'-P-CCNC: 18 U/L (ref 10–44)
ANION GAP SERPL CALC-SCNC: 9 MMOL/L (ref 8–16)
AST SERPL-CCNC: 34 U/L (ref 15–46)
BASOPHILS # BLD AUTO: 0.02 K/UL (ref 0–0.2)
BASOPHILS NFR BLD: 0.2 % (ref 0–1.9)
BILIRUB SERPL-MCNC: 0.5 MG/DL (ref 0.1–1)
BUN SERPL-MCNC: 25 MG/DL (ref 2–20)
CALCIUM SERPL-MCNC: 10.2 MG/DL (ref 8.7–10.5)
CHLORIDE SERPL-SCNC: 103 MMOL/L (ref 95–110)
CHOLEST SERPL-MCNC: 132 MG/DL (ref 120–199)
CHOLEST/HDLC SERPL: 2.8 {RATIO} (ref 2–5)
CO2 SERPL-SCNC: 28 MMOL/L (ref 23–29)
COMPLEXED PSA SERPL-MCNC: 1.5 NG/ML (ref 0–4)
CREAT SERPL-MCNC: 1.07 MG/DL (ref 0.5–1.4)
DIFFERENTIAL METHOD: ABNORMAL
EOSINOPHIL # BLD AUTO: 0.2 K/UL (ref 0–0.5)
EOSINOPHIL NFR BLD: 1.9 % (ref 0–8)
ERYTHROCYTE [DISTWIDTH] IN BLOOD BY AUTOMATED COUNT: 13.2 % (ref 11.5–14.5)
EST. GFR  (AFRICAN AMERICAN): >60 ML/MIN/1.73 M^2
EST. GFR  (NON AFRICAN AMERICAN): >60 ML/MIN/1.73 M^2
ESTIMATED AVG GLUCOSE: 186 MG/DL (ref 68–131)
GLUCOSE SERPL-MCNC: 192 MG/DL (ref 70–110)
HBA1C MFR BLD HPLC: 8.1 % (ref 4–5.6)
HCT VFR BLD AUTO: 40.3 % (ref 40–54)
HDLC SERPL-MCNC: 47 MG/DL (ref 40–75)
HDLC SERPL: 35.6 % (ref 20–50)
HGB BLD-MCNC: 13.3 G/DL (ref 14–18)
LDLC SERPL CALC-MCNC: 62.2 MG/DL (ref 63–159)
LYMPHOCYTES # BLD AUTO: 2.6 K/UL (ref 1–4.8)
LYMPHOCYTES NFR BLD: 23.6 % (ref 18–48)
MCH RBC QN AUTO: 29.8 PG (ref 27–31)
MCHC RBC AUTO-ENTMCNC: 33 G/DL (ref 32–36)
MCV RBC AUTO: 90 FL (ref 82–98)
MONOCYTES # BLD AUTO: 0.7 K/UL (ref 0.3–1)
MONOCYTES NFR BLD: 6.5 % (ref 4–15)
NEUTROPHILS # BLD AUTO: 7.3 K/UL (ref 1.8–7.7)
NEUTROPHILS NFR BLD: 67.8 % (ref 38–73)
NONHDLC SERPL-MCNC: 85 MG/DL
PLATELET # BLD AUTO: 232 K/UL (ref 150–350)
PMV BLD AUTO: 10.6 FL (ref 9.2–12.9)
POTASSIUM SERPL-SCNC: 4.7 MMOL/L (ref 3.5–5.1)
PROT SERPL-MCNC: 7.5 G/DL (ref 6–8.4)
RBC # BLD AUTO: 4.47 M/UL (ref 4.6–6.2)
SODIUM SERPL-SCNC: 140 MMOL/L (ref 136–145)
TRIGL SERPL-MCNC: 114 MG/DL (ref 30–150)
TSH SERPL DL<=0.005 MIU/L-ACNC: 1.76 UIU/ML (ref 0.4–4)
VIT B12 SERPL-MCNC: >2000 PG/ML (ref 210–950)
WBC # BLD AUTO: 10.81 K/UL (ref 3.9–12.7)

## 2019-11-14 PROCEDURE — 36415 COLL VENOUS BLD VENIPUNCTURE: CPT | Mod: HCNC,PO

## 2019-11-14 PROCEDURE — 80053 COMPREHEN METABOLIC PANEL: CPT | Mod: HCNC,PO

## 2019-11-14 PROCEDURE — 82607 VITAMIN B-12: CPT | Mod: HCNC,PO

## 2019-11-14 PROCEDURE — 85025 COMPLETE CBC W/AUTO DIFF WBC: CPT | Mod: HCNC,PO

## 2019-11-14 PROCEDURE — 83036 HEMOGLOBIN GLYCOSYLATED A1C: CPT | Mod: HCNC

## 2019-11-14 PROCEDURE — 84443 ASSAY THYROID STIM HORMONE: CPT | Mod: HCNC,PO

## 2019-11-14 PROCEDURE — 80061 LIPID PANEL: CPT | Mod: HCNC

## 2019-11-14 PROCEDURE — 84153 ASSAY OF PSA TOTAL: CPT | Mod: HCNC

## 2019-11-21 ENCOUNTER — OFFICE VISIT (OUTPATIENT)
Dept: INTERNAL MEDICINE | Facility: CLINIC | Age: 74
End: 2019-11-21
Payer: MEDICARE

## 2019-11-21 VITALS
BODY MASS INDEX: 27.42 KG/M2 | WEIGHT: 170.63 LBS | TEMPERATURE: 99 F | DIASTOLIC BLOOD PRESSURE: 60 MMHG | HEIGHT: 66 IN | SYSTOLIC BLOOD PRESSURE: 138 MMHG | RESPIRATION RATE: 18 BRPM | HEART RATE: 64 BPM

## 2019-11-21 DIAGNOSIS — I15.2 HYPERTENSION ASSOCIATED WITH DIABETES: ICD-10-CM

## 2019-11-21 DIAGNOSIS — Z00.00 ANNUAL PHYSICAL EXAM: Primary | ICD-10-CM

## 2019-11-21 DIAGNOSIS — C22.0 HEPATOCELLULAR CARCINOMA: ICD-10-CM

## 2019-11-21 DIAGNOSIS — G40.909 SEIZURE DISORDER: ICD-10-CM

## 2019-11-21 DIAGNOSIS — E11.00 TYPE 2 DIABETES MELLITUS WITH HYPEROSMOLARITY WITHOUT COMA, WITHOUT LONG-TERM CURRENT USE OF INSULIN: ICD-10-CM

## 2019-11-21 DIAGNOSIS — E11.69 HYPERLIPIDEMIA ASSOCIATED WITH TYPE 2 DIABETES MELLITUS: ICD-10-CM

## 2019-11-21 DIAGNOSIS — E78.5 HYPERLIPIDEMIA ASSOCIATED WITH TYPE 2 DIABETES MELLITUS: ICD-10-CM

## 2019-11-21 DIAGNOSIS — E11.59 HYPERTENSION ASSOCIATED WITH DIABETES: ICD-10-CM

## 2019-11-21 PROCEDURE — 99499 UNLISTED E&M SERVICE: CPT | Mod: HCNC,S$GLB,, | Performed by: INTERNAL MEDICINE

## 2019-11-21 PROCEDURE — 99999 PR PBB SHADOW E&M-EST. PATIENT-LVL IV: ICD-10-PCS | Mod: PBBFAC,HCNC,, | Performed by: INTERNAL MEDICINE

## 2019-11-21 PROCEDURE — 3075F SYST BP GE 130 - 139MM HG: CPT | Mod: HCNC,CPTII,S$GLB, | Performed by: INTERNAL MEDICINE

## 2019-11-21 PROCEDURE — 99499 RISK ADDL DX/OHS AUDIT: ICD-10-PCS | Mod: HCNC,S$GLB,, | Performed by: INTERNAL MEDICINE

## 2019-11-21 PROCEDURE — 3078F DIAST BP <80 MM HG: CPT | Mod: HCNC,CPTII,S$GLB, | Performed by: INTERNAL MEDICINE

## 2019-11-21 PROCEDURE — 3075F PR MOST RECENT SYSTOLIC BLOOD PRESS GE 130-139MM HG: ICD-10-PCS | Mod: HCNC,CPTII,S$GLB, | Performed by: INTERNAL MEDICINE

## 2019-11-21 PROCEDURE — 3078F PR MOST RECENT DIASTOLIC BLOOD PRESSURE < 80 MM HG: ICD-10-PCS | Mod: HCNC,CPTII,S$GLB, | Performed by: INTERNAL MEDICINE

## 2019-11-21 PROCEDURE — 99999 PR PBB SHADOW E&M-EST. PATIENT-LVL IV: CPT | Mod: PBBFAC,HCNC,, | Performed by: INTERNAL MEDICINE

## 2019-11-21 PROCEDURE — 99397 PR PREVENTIVE VISIT,EST,65 & OVER: ICD-10-PCS | Mod: HCNC,S$GLB,, | Performed by: INTERNAL MEDICINE

## 2019-11-21 PROCEDURE — 99397 PER PM REEVAL EST PAT 65+ YR: CPT | Mod: HCNC,S$GLB,, | Performed by: INTERNAL MEDICINE

## 2019-11-21 RX ORDER — METFORMIN HYDROCHLORIDE 500 MG/1
500 TABLET, EXTENDED RELEASE ORAL 2 TIMES DAILY WITH MEALS
Qty: 180 TABLET | Refills: 3 | Status: SHIPPED | OUTPATIENT
Start: 2019-11-21 | End: 2020-03-05 | Stop reason: SDUPTHER

## 2019-11-21 RX ORDER — IRBESARTAN 300 MG/1
300 TABLET ORAL NIGHTLY
Qty: 90 TABLET | Refills: 3 | Status: SHIPPED | OUTPATIENT
Start: 2019-11-21 | End: 2020-11-24

## 2019-11-21 NOTE — PROGRESS NOTES
Subjective:       Patient ID: Jamison Mayes is a 74 y.o. male.    Chief Complaint: Annual Exam    HPI   74 y.o. Male here for annual exam.      Vaccines: Influenza (2019); Tetanus (2018); PNA (done); Shingrix (will get)  Eye exam: 8/19  Colonoscopy: 2011- repeat in 10 yrs  DEXA: 11/18     Exercise: no  Diet: regular     Past Medical History:  PDT scalp 2/2015 and 3/2015: AK (actinic keratosis)      Comment:  s/p efudex on scalp and forehead, PDT scalp  No date: Arthritis  No date: Diabetes mellitus type II  No date: Fever blister  03/05/2018: Hepatocellular carcinoma      Comment:  s/p left hepatectomy of segments 2,3,4a/b  No date: Hypertension  No date: Joint pain  3/2013: SCC (squamous cell carcinoma)      Comment:  L scalp vertex  excised : SCC (squamous cell carcinoma)      Comment:  left helix, in-situ  No date: Seizures  3/2013: Squamous Cell Carcinoma      Comment:  occipital scalp  3/2013: Squamous Cell Carcinoma      Comment:  l vertex scalp  Past Surgical History:  03/05/2018: CHOLECYSTECTOMY      Comment:  open at time of hepatic resection  3/5/2018: CHOLECYSTECTOMY; N/A      Comment:  Performed by Brown Cortez MD at [facility] OR [address] [place] [address]  3/5/2018: EVACUATION-HEMATOMA; N/A      Comment:  Performed by Brown Cortez MD at [facility] OR [address] [place] [address]  No date: EYE SURGERY  3/5/2018: HEPATECTOMY; Left      Comment:  Performed by Brown Cortez MD at [facility] OR [address] [place] [address]  03/05/2018: LIVER RESECTION; Left      Comment:  segments 2,3, 4a/b  No date: skin carcinoma removal  3/5/2018: ULTRASOUND; N/A      Comment:  Performed by Brown Cortez MD at [facility] OR [address] [place] [address]  Social History    Socioeconomic History      Marital status:       Spouse name: Not on file      Number of children: Not on file      Years of education: Not on file      Highest education level: Not on file    Social Needs      Financial resource strain: Not on  file      Food insecurity - worry: Not on file      Food insecurity - inability: Not on file      Transportation needs - medical: Not on file      Transportation needs - non-medical: Not on file    Occupational History      Occupation: REtired     Tobacco Use      Smoking status: Former Smoker        Years: 5.00        Quit date: 1966        Years since quittin.4      Smokeless tobacco: Never Used    Substance and Sexual Activity      Alcohol use: No      Drug use: No      Sexual activity: Not on file     Review of patient's allergies indicates:  No Known Allergies  Review of Systems   Constitutional: Negative for activity change, appetite change, chills, diaphoresis, fatigue, fever and unexpected weight change.   HENT: Negative for congestion, mouth sores, postnasal drip, rhinorrhea, sinus pressure, sneezing, sore throat, trouble swallowing and voice change.    Eyes: Negative for discharge, itching and visual disturbance.   Respiratory: Negative for cough, chest tightness, shortness of breath and wheezing.    Cardiovascular: Negative for chest pain, palpitations and leg swelling.   Gastrointestinal: Negative for abdominal pain, blood in stool, constipation, diarrhea, nausea and vomiting.   Endocrine: Negative for cold intolerance and heat intolerance.   Genitourinary: Negative for difficulty urinating, dysuria, flank pain, hematuria and urgency.   Musculoskeletal: Negative for arthralgias, back pain, myalgias and neck pain.   Skin: Negative for rash and wound.   Allergic/Immunologic: Negative for environmental allergies and food allergies.   Neurological: Positive for seizures. Negative for dizziness, tremors, syncope, weakness and headaches.   Hematological: Negative for adenopathy. Does not bruise/bleed easily.   Psychiatric/Behavioral: Negative for confusion, sleep disturbance and suicidal ideas. The patient is not nervous/anxious.        Objective:      Physical Exam   Constitutional: He is oriented to  person, place, and time. He appears well-developed and well-nourished. No distress.   HENT:   Head: Normocephalic and atraumatic.   Right Ear: External ear normal.   Left Ear: External ear normal.   Nose: Nose normal.   Mouth/Throat: Oropharynx is clear and moist. No oropharyngeal exudate.   Eyes: Pupils are equal, round, and reactive to light. Conjunctivae and EOM are normal. Right eye exhibits no discharge. Left eye exhibits no discharge. No scleral icterus.   Neck: Normal range of motion. Neck supple. No JVD present. No thyromegaly present.   Cardiovascular: Normal rate, regular rhythm, normal heart sounds and intact distal pulses.   No murmur heard.  Pulses:       Dorsalis pedis pulses are 2+ on the right side, and 2+ on the left side.        Posterior tibial pulses are 2+ on the right side, and 2+ on the left side.   Pulmonary/Chest: Effort normal and breath sounds normal. No respiratory distress. He has no wheezes. He has no rales.   Abdominal: Soft. Bowel sounds are normal. He exhibits no distension. There is no tenderness. There is no guarding.   Musculoskeletal: He exhibits no edema.   Feet:   Right Foot:   Protective Sensation: 4 sites tested. 4 sites sensed.   Skin Integrity: Negative for ulcer, blister or skin breakdown.   Left Foot:   Protective Sensation: 4 sites tested. 4 sites sensed.   Skin Integrity: Negative for ulcer, blister or skin breakdown.   Lymphadenopathy:     He has no cervical adenopathy.   Neurological: He is alert and oriented to person, place, and time. No cranial nerve deficit. Coordination normal.   Skin: Skin is warm and dry. No rash noted. He is not diaphoretic. No pallor.   Psychiatric: He has a normal mood and affect. Judgment normal.       Assessment:       1. Annual physical exam    2. Type 2 diabetes mellitus with hyperosmolarity without coma, without long-term current use of insulin    3. Hypertension associated with diabetes    4. Seizure disorder    5. Hepatocellular  carcinoma    6. Hyperlipidemia associated with type 2 diabetes mellitus        Plan:    Blood work reviewed with pt   Vaccines: Influenza (2019); Tetanus (2018); PNA (done); Shingrix (will get)   Eye exam: 8/19   Colonoscopy: 2011- repeat in 10 yrs   DEXA: 11/18      1. T2DM- last A1C of 8.1(11/19)<--7.1(11/18)<--5.2(8/18)<--5.9(5/18)       Rx Metformin  mg BID WM   2. HTN- change Lisinopril to Avapro 300 mg daily    3. Seizure d/o- stable of meds       Pt had seen Neurology    4. HCC- stable, s/p left hepatic lobectomy with cholecystectomy on 03/05/2018       No recurrence, followed by Oncology   5. HLD- stable on Lovastatin 20 mg daily   6. F/u in 6 months

## 2019-12-03 ENCOUNTER — HOSPITAL ENCOUNTER (OUTPATIENT)
Dept: RADIOLOGY | Facility: HOSPITAL | Age: 74
Discharge: HOME | End: 2019-12-03
Attending: INTERNAL MEDICINE
Payer: MEDICARE

## 2019-12-03 DIAGNOSIS — C22.0 HEPATOCELLULAR CARCINOMA: ICD-10-CM

## 2019-12-03 PROCEDURE — 25500020 PHARM REV CODE 255: Mod: HCNC | Performed by: INTERNAL MEDICINE

## 2019-12-03 PROCEDURE — 74183 MRI ABDOMEN W WO CONTRAST: ICD-10-PCS | Mod: 26,HCNC,, | Performed by: RADIOLOGY

## 2019-12-03 PROCEDURE — A9585 GADOBUTROL INJECTION: HCPCS | Mod: HCNC | Performed by: INTERNAL MEDICINE

## 2019-12-03 PROCEDURE — 74183 MRI ABD W/O CNTR FLWD CNTR: CPT | Mod: TC,HCNC

## 2019-12-03 PROCEDURE — 74183 MRI ABD W/O CNTR FLWD CNTR: CPT | Mod: 26,HCNC,, | Performed by: RADIOLOGY

## 2019-12-03 RX ORDER — GADOBUTROL 604.72 MG/ML
10 INJECTION INTRAVENOUS
Status: COMPLETED | OUTPATIENT
Start: 2019-12-03 | End: 2019-12-03

## 2019-12-03 RX ADMIN — GADOBUTROL 10 ML: 604.72 INJECTION INTRAVENOUS at 08:12

## 2019-12-05 ENCOUNTER — OFFICE VISIT (OUTPATIENT)
Dept: HEMATOLOGY/ONCOLOGY | Facility: CLINIC | Age: 74
End: 2019-12-05
Payer: MEDICARE

## 2019-12-05 VITALS
BODY MASS INDEX: 27.79 KG/M2 | WEIGHT: 172.19 LBS | TEMPERATURE: 98 F | RESPIRATION RATE: 16 BRPM | HEART RATE: 87 BPM | DIASTOLIC BLOOD PRESSURE: 66 MMHG | OXYGEN SATURATION: 98 % | SYSTOLIC BLOOD PRESSURE: 144 MMHG

## 2019-12-05 DIAGNOSIS — C22.0 HEPATOCELLULAR CARCINOMA: Primary | ICD-10-CM

## 2019-12-05 DIAGNOSIS — K86.2 PANCREATIC CYST: ICD-10-CM

## 2019-12-05 DIAGNOSIS — I15.2 HYPERTENSION ASSOCIATED WITH DIABETES: ICD-10-CM

## 2019-12-05 DIAGNOSIS — E11.59 HYPERTENSION ASSOCIATED WITH DIABETES: ICD-10-CM

## 2019-12-05 PROCEDURE — 1126F PR PAIN SEVERITY QUANTIFIED, NO PAIN PRESENT: ICD-10-PCS | Mod: HCNC,S$GLB,, | Performed by: INTERNAL MEDICINE

## 2019-12-05 PROCEDURE — 1101F PR PT FALLS ASSESS DOC 0-1 FALLS W/OUT INJ PAST YR: ICD-10-PCS | Mod: HCNC,CPTII,S$GLB, | Performed by: INTERNAL MEDICINE

## 2019-12-05 PROCEDURE — 1159F PR MEDICATION LIST DOCUMENTED IN MEDICAL RECORD: ICD-10-PCS | Mod: HCNC,S$GLB,, | Performed by: INTERNAL MEDICINE

## 2019-12-05 PROCEDURE — 3078F DIAST BP <80 MM HG: CPT | Mod: HCNC,CPTII,S$GLB, | Performed by: INTERNAL MEDICINE

## 2019-12-05 PROCEDURE — 99499 RISK ADDL DX/OHS AUDIT: ICD-10-PCS | Mod: HCNC,S$GLB,, | Performed by: INTERNAL MEDICINE

## 2019-12-05 PROCEDURE — 99213 PR OFFICE/OUTPT VISIT, EST, LEVL III, 20-29 MIN: ICD-10-PCS | Mod: HCNC,S$GLB,, | Performed by: INTERNAL MEDICINE

## 2019-12-05 PROCEDURE — 1126F AMNT PAIN NOTED NONE PRSNT: CPT | Mod: HCNC,S$GLB,, | Performed by: INTERNAL MEDICINE

## 2019-12-05 PROCEDURE — 1101F PT FALLS ASSESS-DOCD LE1/YR: CPT | Mod: HCNC,CPTII,S$GLB, | Performed by: INTERNAL MEDICINE

## 2019-12-05 PROCEDURE — 3077F PR MOST RECENT SYSTOLIC BLOOD PRESSURE >= 140 MM HG: ICD-10-PCS | Mod: HCNC,CPTII,S$GLB, | Performed by: INTERNAL MEDICINE

## 2019-12-05 PROCEDURE — 99499 UNLISTED E&M SERVICE: CPT | Mod: HCNC,S$GLB,, | Performed by: INTERNAL MEDICINE

## 2019-12-05 PROCEDURE — 99999 PR PBB SHADOW E&M-EST. PATIENT-LVL III: CPT | Mod: PBBFAC,HCNC,, | Performed by: INTERNAL MEDICINE

## 2019-12-05 PROCEDURE — 3077F SYST BP >= 140 MM HG: CPT | Mod: HCNC,CPTII,S$GLB, | Performed by: INTERNAL MEDICINE

## 2019-12-05 PROCEDURE — 99999 PR PBB SHADOW E&M-EST. PATIENT-LVL III: ICD-10-PCS | Mod: PBBFAC,HCNC,, | Performed by: INTERNAL MEDICINE

## 2019-12-05 PROCEDURE — 99213 OFFICE O/P EST LOW 20 MIN: CPT | Mod: HCNC,S$GLB,, | Performed by: INTERNAL MEDICINE

## 2019-12-05 PROCEDURE — 1159F MED LIST DOCD IN RCRD: CPT | Mod: HCNC,S$GLB,, | Performed by: INTERNAL MEDICINE

## 2019-12-05 PROCEDURE — 3078F PR MOST RECENT DIASTOLIC BLOOD PRESSURE < 80 MM HG: ICD-10-PCS | Mod: HCNC,CPTII,S$GLB, | Performed by: INTERNAL MEDICINE

## 2019-12-05 NOTE — PROGRESS NOTES
Subjective:       Patient ID: Jamison Mayes is a 74 y.o. male.    Chief Complaint: HCC    HPI Mr. Mayes is here for f/u of hepatocellular carcinoma.    He presented to the hospital on 03/01/2018 with sudden onset of cold sweats, nausea and abdominal discomfort and was noted to have an 8.7 cm mass in the lateral segment of the left hepatic lobe that has ruptured to the liver capsule.  He was monitored and underwent a left hepatic lobectomy with cholecystectomy on 03/05/2018.      Final pathology revealed a T1b NX 7.5 cm well-differentiated hepatocellular carcinoma without vascular invasion or perineural invasion.  The tumor appeared to be confined to the liver. Hepatitis serology negative.    A CT chest with contrast on 03/03/2018 revealed a 0.3 cm left upper lobe pulmonary nodule.    He is accompanied to the clinic by his wife.  He lives in [facility].  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    Review of Systems   Constitutional: Negative for fever and unexpected weight change.   HENT: Negative for mouth sores and nosebleeds.    Eyes: Negative for photophobia and pain.   Respiratory: Negative for shortness of breath and wheezing.    Cardiovascular: Negative for chest pain and palpitations.   Gastrointestinal: Negative for abdominal pain and vomiting.   Genitourinary: Negative for flank pain and hematuria.   Musculoskeletal: Negative for back pain, neck pain and neck stiffness.   Skin: Negative for rash and wound.   Neurological: Negative for seizures and syncope.   Hematological: Negative for adenopathy. Does not bruise/bleed easily.   Psychiatric/Behavioral: Negative for behavioral problems and confusion.   All other systems reviewed and are negative.        Objective:      Physical Exam   Abdominal:             Assessment:       1. Hepatocellular carcinoma    2. Pancreatic cyst    3. Hypertension associated with diabetes        Plan:   Mr. Mayes has a diagnosis of T1b NX hepatocellular carcinoma that was  resected.  Malignant transformation of a prior hepatic adenoma is a consideration.    MRI reviewed    Discussed imaging findings with the patient and his wife.  Will plan to repeat MRI abdomen in 6 months along with labs.    Pulmonary nodule noted on CT and stable - no need further surveillance

## 2019-12-11 ENCOUNTER — OFFICE VISIT (OUTPATIENT)
Dept: DERMATOLOGY | Facility: CLINIC | Age: 74
End: 2019-12-11
Payer: MEDICARE

## 2019-12-11 DIAGNOSIS — L21.9 ACUTE SEBORRHEIC DERMATITIS: ICD-10-CM

## 2019-12-11 DIAGNOSIS — L82.1 SK (SEBORRHEIC KERATOSIS): ICD-10-CM

## 2019-12-11 DIAGNOSIS — D48.5 NEOPLASM OF UNCERTAIN BEHAVIOR OF SKIN: Primary | ICD-10-CM

## 2019-12-11 DIAGNOSIS — Z12.83 SKIN CANCER SCREENING: ICD-10-CM

## 2019-12-11 DIAGNOSIS — L57.0 AK (ACTINIC KERATOSIS): ICD-10-CM

## 2019-12-11 PROCEDURE — 17000 DESTRUCT PREMALG LESION: CPT | Mod: HCNC,59,S$GLB, | Performed by: DERMATOLOGY

## 2019-12-11 PROCEDURE — 11103 TANGNTL BX SKIN EA SEP/ADDL: CPT | Mod: HCNC,S$GLB,, | Performed by: DERMATOLOGY

## 2019-12-11 PROCEDURE — 88305 TISSUE EXAM BY PATHOLOGIST: CPT | Mod: 59,HCNC | Performed by: PATHOLOGY

## 2019-12-11 PROCEDURE — 17003 DESTRUCT PREMALG LES 2-14: CPT | Mod: HCNC,S$GLB,, | Performed by: DERMATOLOGY

## 2019-12-11 PROCEDURE — 1126F PR PAIN SEVERITY QUANTIFIED, NO PAIN PRESENT: ICD-10-PCS | Mod: HCNC,S$GLB,, | Performed by: DERMATOLOGY

## 2019-12-11 PROCEDURE — 1159F PR MEDICATION LIST DOCUMENTED IN MEDICAL RECORD: ICD-10-PCS | Mod: HCNC,S$GLB,, | Performed by: DERMATOLOGY

## 2019-12-11 PROCEDURE — 1126F AMNT PAIN NOTED NONE PRSNT: CPT | Mod: HCNC,S$GLB,, | Performed by: DERMATOLOGY

## 2019-12-11 PROCEDURE — 1101F PR PT FALLS ASSESS DOC 0-1 FALLS W/OUT INJ PAST YR: ICD-10-PCS | Mod: HCNC,CPTII,S$GLB, | Performed by: DERMATOLOGY

## 2019-12-11 PROCEDURE — 99213 OFFICE O/P EST LOW 20 MIN: CPT | Mod: 25,HCNC,S$GLB, | Performed by: DERMATOLOGY

## 2019-12-11 PROCEDURE — 1159F MED LIST DOCD IN RCRD: CPT | Mod: HCNC,S$GLB,, | Performed by: DERMATOLOGY

## 2019-12-11 PROCEDURE — 11103 PR TANGENTIAL BIOPSY, SKIN, EA ADDTL LESION: ICD-10-PCS | Mod: HCNC,S$GLB,, | Performed by: DERMATOLOGY

## 2019-12-11 PROCEDURE — 99999 PR PBB SHADOW E&M-EST. PATIENT-LVL III: ICD-10-PCS | Mod: PBBFAC,HCNC,, | Performed by: DERMATOLOGY

## 2019-12-11 PROCEDURE — 88305 TISSUE EXAM BY PATHOLOGIST: ICD-10-PCS | Mod: 26,HCNC,, | Performed by: PATHOLOGY

## 2019-12-11 PROCEDURE — 11102 TANGNTL BX SKIN SINGLE LES: CPT | Mod: HCNC,S$GLB,, | Performed by: DERMATOLOGY

## 2019-12-11 PROCEDURE — 1101F PT FALLS ASSESS-DOCD LE1/YR: CPT | Mod: HCNC,CPTII,S$GLB, | Performed by: DERMATOLOGY

## 2019-12-11 PROCEDURE — 17003 DESTRUCTION, PREMALIGNANT LESIONS; SECOND THROUGH 14 LESIONS: ICD-10-PCS | Mod: HCNC,S$GLB,, | Performed by: DERMATOLOGY

## 2019-12-11 PROCEDURE — 11102 PR TANGENTIAL BIOPSY, SKIN, SINGLE LESION: ICD-10-PCS | Mod: HCNC,S$GLB,, | Performed by: DERMATOLOGY

## 2019-12-11 PROCEDURE — 88305 TISSUE EXAM BY PATHOLOGIST: CPT | Mod: 26,HCNC,, | Performed by: PATHOLOGY

## 2019-12-11 PROCEDURE — 17000 PR DESTRUCTION(LASER SURGERY,CRYOSURGERY,CHEMOSURGERY),PREMALIGNANT LESIONS,FIRST LESION: ICD-10-PCS | Mod: HCNC,59,S$GLB, | Performed by: DERMATOLOGY

## 2019-12-11 PROCEDURE — 99213 PR OFFICE/OUTPT VISIT, EST, LEVL III, 20-29 MIN: ICD-10-PCS | Mod: 25,HCNC,S$GLB, | Performed by: DERMATOLOGY

## 2019-12-11 PROCEDURE — 99999 PR PBB SHADOW E&M-EST. PATIENT-LVL III: CPT | Mod: PBBFAC,HCNC,, | Performed by: DERMATOLOGY

## 2019-12-11 RX ORDER — KETOCONAZOLE 20 MG/G
CREAM TOPICAL
Qty: 60 G | Refills: 3 | Status: SHIPPED | OUTPATIENT
Start: 2019-12-11 | End: 2021-10-13 | Stop reason: SDUPTHER

## 2019-12-11 NOTE — PATIENT INSTRUCTIONS
Summer Sun Protection      The [facility] Department of Dermatology would like to remind you of the importance of sun protection all year round and particularly during the summer when the suns rays are the strongest. It has been proven that both acute and chronic sun exposure damages our cells and leads to skin cancer. Beyond skin cancer, the sun causes 90% of the symptoms of pre-mature skin aging, including wrinkles, lentigines (brown spots), and thin, easily bruised skin. Proper sun protection can help prevent these unwanted conditions.    Many patients report that the dont go in the sun. It has been shown that the average person receives 18 hours of incidental sun exposure per week during activities such as walking through parking lots, driving, or sitting next to windows. This accumulates to several bad sunburns per year!    In choosing sunscreen, you want one that protects against both UVA and UVB rays. It is recommended that you use one of SPF 30 or higher. It is important to apply the sunscreen about 20 minutes prior to sun exposure. Most sunscreens are chemical sunscreens and a reaction must take place in the skin so that they are effective. If they are applied and then you are immediately exposed to the sun or start sweating, this reaction has not had time to take place and you are therefore unprotected. Sunscreen needs to be reapplied every 2 hours if you are participating in water sports or sweating. We recommend Elta MD or Neutrogena Ultra Sheer Dry Touch SPF 55 for daily use; however there are many options and it is most important for you to find one that you will use on a consistent basis.    If you have sensitive skin, you may do best with a sunscreen that contains only physical blockers such as titanium dioxide or zinc oxide. These are typically thicker and harder to apply, however they afford very good protection. Neutrogena Sensitive Skin, Blue Lizard Sensitive Skin (pink top) or Neutrogena Pure  and Free are popular ones.     Aside from sunscreen, clothes with UV protection, wide brimmed hats, and sunglasses are other means of sun protection that we recommend.                        [facility] - DERMATOLOGY  [address]  [place] [address]  Dept: [phone]  Dept Fax: [phone]                                                                               CRYOSURGERY      Your doctor has used a method called cryosurgery to treat your skin condition. Cryosurgery refers to the use of very cold substances to treat a variety of skin conditions such as warts, pre-skin cancers, molluscum contagiosum, sun spots, and several benign growths. The substance we use in cryosurgery is liquid nitrogen and is so cold (-195 degrees Celsius) that is burns when administered.     Following treatment in the office, the skin may immediately burn and become red. You may find the area around the lesion is affected as well. It is sometimes necessary to treat not only the lesion, but a small area of the surrounding normal skin to achieve a good response.     A blister, and even a blood filled blister, may form after treatment.   This is a normal response. If the blister is painful, it is acceptable to sterilize a needle and with rubbing alcohol and gently pop the blister. It is important that you gently wash the area with soap and warm water as the blister fluid may contain wart virus if a wart was treated. Do no remove the roof of the blister.     The area treated can take anywhere from 1-3 weeks to heal. Healing time depends on the kind skin lesion treated, the location, and how aggressively the lesion was treated. It is recommended that the areas treated are covered with Vaseline or bacitracin ointment and a band-aid. If a band-aid is not practical, just ointment applied several times per day will do. Keeping these areas moist will speed the healing time.    Treatment with liquid  "nitrogen can leave a scar. In dark skin, it may be a light or dark scar, in light skin it may be a white or pink scar. These will generally fade with time, but may never go away completely.     If you have any concerns after your treatment, please feel free to call the office.       [address] [place] [address]/ [phone] FAX/ [email or web address]     Shave Biopsy Wound Care    Your doctor has performed a shave biopsy today.  A band aid and vaseline ointment has been placed over the site.  This should remain in place for 24 hours.  It is recommended that you keep the area dry for the first 24 hours.  After 24 hours, you may remove the band aid and wash the area with warm soap and water and apply Vaseline jelly.  Many patients prefer to use Neosporin or Bacitracin ointment.  This is acceptable; however, know that you can develop an allergy to this medication even if you have used it safely for years.  It is important to keep the area moist.  Letting it dry out and get air slows healing time, and will worsen the scar.  Band aid is optional after first 24 hours.      If you notice increasing redness, tenderness, pain, or yellow drainage at the biopsy site, please notify your doctor.  These are signs of an infection.    If your biopsy site is bleeding, apply firm pressure for 15 minutes straight.  Repeat for another 15 minutes, if it is still bleeding.   If the surgical site continues to bleed, then please contact your doctor.      For [facility] users:   You will receive a [facility] notification after the pathologist has finished reviewing your biopsy specimen. Pathology results, however, will not be released online so you will see a "no content" message. Once your dermatologist reviews and clinically correlates your biopsy results, you will either receive a letter in the mail with the results of a phone call from your doctor's office if further explanation or treatment is warranted. "       [address] [place] [address]/ [phone] FAX/ [email or web address]

## 2019-12-11 NOTE — PROGRESS NOTES
"  Subjective:       Patient ID:  Jamison Mayes is a 74 y.o. male who presents for   Chief Complaint   Patient presents with    Skin Check     UBSE     HPI   Patient is here today for a "mole" check.   Pt has a history of  moderate sun exposure in the past.   Pt recalls several blistering sunburns in the past- Yes  Pt has history of tanning bed use- No  Pt has  had moles removed in the past- Yes  Pt has history of melanoma in first degree relatives-  Not that pt is aware of    Pt presents today for UBSE, h/o NMSC and AKs treated with cryo, Efudex, and PDT.  Pt c/o multiple scaly spots on head and ears, redness, x 6 months, asymptomatic, Tx none  After last visit, pt used Efudex cream on face with improvement.    Review of Systems   Constitutional: Negative for fever, chills, weight loss, weight gain, fatigue, night sweats and malaise.   Skin: Positive for activity-related sunscreen use and wears hat. Negative for daily sunscreen use.   Hematologic/Lymphatic: Bruises/bleeds easily.        Objective:    Physical Exam   Constitutional: He appears well-developed and well-nourished. No distress.   Neurological: He is alert and oriented to person, place, and time. He is not disoriented.   Psychiatric: He has a normal mood and affect.   Skin:   Areas Examined (abnormalities noted in diagram):   Scalp / Hair Palpated and Inspected  Head / Face Inspection Performed  Neck Inspection Performed  Chest / Axilla Inspection Performed  Abdomen Inspection Performed  Back Inspection Performed  RUE Inspected  LUE Inspection Performed                           Diagram Legend     Erythematous scaling macule/papule c/w actinic keratosis       Vascular papule c/w angioma      Pigmented verrucoid papule/plaque c/w seborrheic keratosis      Yellow umbilicated papule c/w sebaceous hyperplasia      Irregularly shaped tan macule c/w lentigo     1-2 mm smooth white papules consistent with Milia      Movable subcutaneous cyst with punctum c/w " epidermal inclusion cyst      Subcutaneous movable cyst c/w pilar cyst      Firm pink to brown papule c/w dermatofibroma      Pedunculated fleshy papule(s) c/w skin tag(s)      Evenly pigmented macule c/w junctional nevus     Mildly variegated pigmented, slightly irregular-bordered macule c/w mildly atypical nevus      Flesh colored to evenly pigmented papule c/w intradermal nevus       Pink pearly papule/plaque c/w basal cell carcinoma      Erythematous hyperkeratotic cursted plaque c/w SCC      Surgical scar with no sign of skin cancer recurrence      Open and closed comedones      Inflammatory papules and pustules      Verrucoid papule consistent consistent with wart     Erythematous eczematous patches and plaques     Dystrophic onycholytic nail with subungual debris c/w onychomycosis     Umbilicated papule    Erythematous-base heme-crusted tan verrucoid plaque consistent with inflamed seborrheic keratosis     Erythematous Silvery Scaling Plaque c/w Psoriasis     See annotation      Assessment / Plan:      Pathology Orders:     Normal Orders This Visit    Specimen to Pathology, Dermatology     Comments:    Number of Specimens:->2  ------------------------->-------------------------  Spec 1 Procedure:->Biopsy  Spec 1 Clinical Impression:->r/o HAK vs. SCC vs. other  Spec 1 Source:->L top of scalp  ------------------------->-------------------------  Spec 2 Procedure:->Biopsy  Spec 2 Clinical Impression:->r/o HAK vs. SCC vs. other  Spec 2 Source:->top of L helix    Questions:    Procedure Type:  Dermatology and skin neoplasms    Number of Specimens:  2    ------------------------:  -------------------------    Spec 1 Procedure:  Biopsy    Spec 1 Clinical Impression:  r/o HAK vs. SCC vs. other    Spec 1 Source:  L top of scalp    ------------------------:  -------------------------    Spec 2 Procedure:  Biopsy    Spec 2 Clinical Impression:  r/o HAK vs. SCC vs. other    Spec 2 Source:  top of L helix        Neoplasm  of uncertain behavior of skin  Shave biopsy procedure note:    Shave biopsy performed after verbal consent including risk of infection, scar, recurrence, need for additional treatment of site. Area prepped with alcohol, anesthetized with approximately 1.0cc of 1% lidocaine with epinephrine. Lesional tissue shaved with razor blade. Hemostasis achieved with application of aluminum chloride followed by hyfrecation. No complications. Dressing applied. Wound care explained.    -     Specimen to Pathology, Dermatology    AK (actinic keratosis)  Cryosurgery Procedure Note    Verbal consent from the patient is obtained including, but not limited to, risk of hypopigmentation/hyperpigmentation, scar, recurrence of lesion. The patient is aware of the precancerous quality and need for treatment of these lesions. Liquid nitrogen cryosurgery is applied to the 14 actinic keratoses, as detailed in the physical exam, to produce a freeze injury. The patient is aware that blisters may form and is instructed on wound care with gentle cleansing and use of vaseline ointment to keep moist until healed. The patient is supplied a handout on cryosurgery and is instructed to call if lesions do not completely resolve.    Acute seborrheic dermatitis  -     ketoconazole (NIZORAL) 2 % cream; AAA BID  Dispense: 60 g; Refill: 3    SK (seborrheic keratosis)  These are benign inherited growths without a malignant potential. Reassurance given to patient. No treatment is necessary.   Treatment of benign, asymptomatic lesions may be considered cosmetic.  Warned about risk of hypo- or hyperpigmentation with treatment and risk of recurrence.    Skin cancer screening  Upper body skin examination performed today including at least 6 points as noted in physical examination. Suspicious lesions noted above.  Patient instructed in importance of daily broad spectrum sunscreen use with spf at least 30. Sun avoidance and topical protection/protective clothing  discussed.    Follow up in about 4 months (around 4/11/2020) for skin check or sooner pending biopsy results.

## 2019-12-13 DIAGNOSIS — I10 ESSENTIAL HYPERTENSION: ICD-10-CM

## 2019-12-13 RX ORDER — LOVASTATIN 20 MG/1
TABLET ORAL
Qty: 90 TABLET | Refills: 3 | Status: SHIPPED | OUTPATIENT
Start: 2019-12-13 | End: 2020-11-24

## 2019-12-19 LAB
FINAL PATHOLOGIC DIAGNOSIS: NORMAL
GROSS: NORMAL

## 2020-01-21 ENCOUNTER — PROCEDURE VISIT (OUTPATIENT)
Dept: DERMATOLOGY | Facility: CLINIC | Age: 75
End: 2020-01-21
Payer: MEDICARE

## 2020-01-21 VITALS
BODY MASS INDEX: 27.64 KG/M2 | HEART RATE: 70 BPM | HEIGHT: 66 IN | SYSTOLIC BLOOD PRESSURE: 158 MMHG | WEIGHT: 172 LBS | DIASTOLIC BLOOD PRESSURE: 73 MMHG

## 2020-01-21 DIAGNOSIS — C44.42 SQUAMOUS CELL CARCINOMA, SCALP/NECK: Primary | ICD-10-CM

## 2020-01-21 PROCEDURE — 13121 PR RECMPL WND SCALP,EXTR 2.6-7.5 CM: ICD-10-PCS | Mod: HCNC,S$GLB,, | Performed by: DERMATOLOGY

## 2020-01-21 PROCEDURE — 17311 MOHS 1 STAGE H/N/HF/G: CPT | Mod: 51,HCNC,S$GLB, | Performed by: DERMATOLOGY

## 2020-01-21 PROCEDURE — 17311: ICD-10-PCS | Mod: 51,HCNC,S$GLB, | Performed by: DERMATOLOGY

## 2020-01-21 PROCEDURE — 13121 CMPLX RPR S/A/L 2.6-7.5 CM: CPT | Mod: HCNC,S$GLB,, | Performed by: DERMATOLOGY

## 2020-01-21 PROCEDURE — 99499 NO LOS: ICD-10-PCS | Mod: HCNC,S$GLB,, | Performed by: DERMATOLOGY

## 2020-01-21 PROCEDURE — 99499 UNLISTED E&M SERVICE: CPT | Mod: HCNC,S$GLB,, | Performed by: DERMATOLOGY

## 2020-01-21 NOTE — PROGRESS NOTES
PROCEDURE: Mohs' Micrographic Surgery    INDICATION: Biopsy-proven skin cancer of cosmetically and functionally important areas, including head, neck, genital, hand, foot, or areas known for having difficulty in healing, such as the lower anterior legs. Tumor with ill-defined borders.    REFERRING MD: Cristin Joseph M.D.    CASE NUMBER:     ANESTHETIC: 4.5 cc 0.5% Lidocaine with Epi 1:200,000 mixed 1:1 with 0.5% Bupivacaine    SURGICAL PREP: Hibiclens    SURGEON: Antonette Peterson MD    ASSISTANTS: Christa Arceo MA    PREOPERATIVE DIAGNOSIS: squamous cell carcinoma    POSTOPERATIVE DIAGNOSIS: squamous cell carcinoma    PATHOLOGIC DIAGNOSIS: squamous cell carcinoma- invasive    HISTOLOGY OF SPECIMENS IN FIRST STAGE:   Tumor Type: No tumor seen.    STAGES OF MOHS' SURGERY PERFORMED: 1    TUMOR-FREE PLANE ACHIEVED: Yes    HEMOSTASIS: electrocoagulation     SPECIMENS: 2    LOCATION: left top of scalp. Patient verified location by looking at photo taken prior to procedure.     INITIAL LESION SIZE: 0.6 x 0.6 cm    FINAL DEFECT SIZE: 0.8 x 0.9 cm    WOUND REPAIR/DISPOSITION: The patient tolerated Mohs' Micrographic Surgery for a squamous cell carcinoma very well. When the tumor was completely removed, a repair of the surgical defect was undertaken.      PROCEDURE: Complex Linear Repair    INDICATION: Status post Mohs' Micrographic Surgery for squamous cell carcinoma.    CASE NUMBER:     SURGEON: Antonette Peterson MD    ASSISTANTS: Miley Alvarez PA-C and Christa Arceo MA    ANESTHETIC: 3 cc 1% Lidocaine with Epinephrine 1:100,000    SURGICAL PREP: Hibiclens, prepped by Christa Arceo MA    LOCATION: left top of scalp    DEFECT SIZE: 0.8 x 0.9 cm    WOUND REPAIR/DISPOSITION:  After the patient's carcinoma had been completely removed with Mohs' Micrographic Surgery, a repair of the surgical defect was undertaken. The patient was returned to the operating suite where the area of left top of scalp was prepped, draped,  "and anesthetized in the usual sterile fashion. The wound was widely undermined in all directions. Then, electrocoagulation was used to obtain meticulous hemostasis. 3-0 Vicryl buried vertical mattress sutures were placed into the subcutaneous and dermal plane to close the wound and cordell the cutaneous wound edge. Bilateral dog ears were identified and were removed by a standard Burow's triangle technique. The cutaneous wound edges were closed using interrupted 3-0 Prolene suture.    The patient tolerated the procedure well.    The area was cleaned and dressed appropriately and the patient was given wound care instructions, as well as appointment for follow-up evaluation.    LENGTH OF REPAIR: 2.7 cm    Vitals:    01/21/20 0742 01/21/20 0929   BP: (!) 173/70 (!) 158/73   BP Location: Left arm    Patient Position: Sitting    BP Method: Small (Automatic)    Pulse: 82 70   Weight: 78 kg (172 lb)    Height: 5' 6" (1.676 m)          "

## 2020-02-04 ENCOUNTER — OFFICE VISIT (OUTPATIENT)
Dept: DERMATOLOGY | Facility: CLINIC | Age: 75
End: 2020-02-04
Payer: MEDICARE

## 2020-02-04 DIAGNOSIS — Z09 POSTOP CHECK: Primary | ICD-10-CM

## 2020-02-04 PROCEDURE — 99024 POSTOP FOLLOW-UP VISIT: CPT | Mod: HCNC,S$GLB,, | Performed by: DERMATOLOGY

## 2020-02-04 PROCEDURE — 99999 PR PBB SHADOW E&M-EST. PATIENT-LVL III: CPT | Mod: PBBFAC,HCNC,, | Performed by: DERMATOLOGY

## 2020-02-04 PROCEDURE — 99024 PR POST-OP FOLLOW-UP VISIT: ICD-10-PCS | Mod: HCNC,S$GLB,, | Performed by: DERMATOLOGY

## 2020-02-04 PROCEDURE — 99999 PR PBB SHADOW E&M-EST. PATIENT-LVL III: ICD-10-PCS | Mod: PBBFAC,HCNC,, | Performed by: DERMATOLOGY

## 2020-02-04 NOTE — PROGRESS NOTES
74 y.o. male patient is here for suture removal following Mohs' surgery.    Patient reports no problems.    WOUND PE:  The L top of scalp sutures intact. Wound healing well. Good skin edges. No signs or symptoms of infection.    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    PLAN:  Sutures removed today. Steri-strips applied.  Continue wound care.  Keep moist with Aquaphor.    RTC:  In 3-6 months with Cristin Joseph M.D. for skin check or sooner if new concern arises.

## 2020-02-13 ENCOUNTER — PATIENT MESSAGE (OUTPATIENT)
Dept: HEMATOLOGY/ONCOLOGY | Facility: CLINIC | Age: 75
End: 2020-02-13

## 2020-02-28 ENCOUNTER — PATIENT MESSAGE (OUTPATIENT)
Dept: INTERNAL MEDICINE | Facility: CLINIC | Age: 75
End: 2020-02-28

## 2020-02-28 NOTE — TELEPHONE ENCOUNTER
----- Message from Natalee Reyes sent at 2/28/2020 10:52 AM CST -----  Contact: Travis/Tricia [phone]  Wanted you to know that the jadyn is scheduled for his A1C on 03/03/2020.    Thank you

## 2020-03-03 ENCOUNTER — LAB VISIT (OUTPATIENT)
Dept: LAB | Facility: HOSPITAL | Age: 75
End: 2020-03-03
Attending: INTERNAL MEDICINE
Payer: MEDICARE

## 2020-03-03 DIAGNOSIS — E11.9 TYPE 2 DIABETES MELLITUS WITHOUT COMPLICATION: ICD-10-CM

## 2020-03-03 LAB
ESTIMATED AVG GLUCOSE: 189 MG/DL (ref 68–131)
HBA1C MFR BLD HPLC: 8.2 % (ref 4–5.6)

## 2020-03-03 PROCEDURE — 83036 HEMOGLOBIN GLYCOSYLATED A1C: CPT | Mod: HCNC

## 2020-03-03 PROCEDURE — 36415 COLL VENOUS BLD VENIPUNCTURE: CPT | Mod: HCNC,PO

## 2020-03-05 ENCOUNTER — TELEPHONE (OUTPATIENT)
Dept: INTERNAL MEDICINE | Facility: CLINIC | Age: 75
End: 2020-03-05

## 2020-03-05 RX ORDER — METFORMIN HYDROCHLORIDE 500 MG/1
1000 TABLET, EXTENDED RELEASE ORAL 2 TIMES DAILY WITH MEALS
Qty: 360 TABLET | Refills: 3 | Status: SHIPPED | OUTPATIENT
Start: 2020-03-05 | End: 2020-12-22

## 2020-03-05 NOTE — TELEPHONE ENCOUNTER
----- Message from Yossi Peck DO sent at 3/5/2020  6:56 AM CST -----  Poor control of diabetes- I will increase your dose of Metformin

## 2020-04-03 DIAGNOSIS — E11.00 TYPE 2 DIABETES MELLITUS WITH HYPEROSMOLARITY WITHOUT COMA, WITHOUT LONG-TERM CURRENT USE OF INSULIN: ICD-10-CM

## 2020-04-04 ENCOUNTER — PATIENT MESSAGE (OUTPATIENT)
Dept: INTERNAL MEDICINE | Facility: CLINIC | Age: 75
End: 2020-04-04

## 2020-04-06 RX ORDER — LANCETS 28 GAUGE
1 EACH MISCELLANEOUS 2 TIMES DAILY
Qty: 100 EACH | Refills: 11 | Status: SHIPPED | OUTPATIENT
Start: 2020-04-06

## 2020-04-14 ENCOUNTER — PATIENT MESSAGE (OUTPATIENT)
Dept: INTERNAL MEDICINE | Facility: CLINIC | Age: 75
End: 2020-04-14

## 2020-04-14 ENCOUNTER — TELEPHONE (OUTPATIENT)
Dept: INTERNAL MEDICINE | Facility: CLINIC | Age: 75
End: 2020-04-14

## 2020-04-14 RX ORDER — GLIMEPIRIDE 4 MG/1
4 TABLET ORAL
Qty: 90 TABLET | Refills: 3 | Status: SHIPPED | OUTPATIENT
Start: 2020-04-14 | End: 2021-04-14

## 2020-04-14 NOTE — TELEPHONE ENCOUNTER
Please advise. Patient states that medication is to high.   This visual field clearly demonstrated a minimum of 47% loss of upper field of vision OU, with upper lid skin in repose and elevated by taping of the lid to demonstrate potential correction. This field shows that taping the lids significantly improved this patient's superior field of vision by approximately 44%, OU.

## 2020-04-14 NOTE — TELEPHONE ENCOUNTER
Patient was advised that medication was sent to pharmacy and also to keep taking his metformin twicw daily.. Pt also was advised to let us know if the medication is to expensive.

## 2020-04-23 ENCOUNTER — PATIENT MESSAGE (OUTPATIENT)
Dept: INTERNAL MEDICINE | Facility: CLINIC | Age: 75
End: 2020-04-23

## 2020-04-23 DIAGNOSIS — E11.9 TYPE 2 DIABETES MELLITUS WITHOUT COMPLICATION, WITH LONG-TERM CURRENT USE OF INSULIN: Primary | ICD-10-CM

## 2020-04-23 DIAGNOSIS — Z79.4 TYPE 2 DIABETES MELLITUS WITHOUT COMPLICATION, WITH LONG-TERM CURRENT USE OF INSULIN: Primary | ICD-10-CM

## 2020-04-23 NOTE — TELEPHONE ENCOUNTER
Numbers look good, continue to monitor and let me know if they start to drop below the 80s consistently  Repeat HA1C in 3 months

## 2020-04-29 NOTE — PLAN OF CARE
Problem: Patient Care Overview  Goal: Plan of Care Review  Outcome: Ongoing (interventions implemented as appropriate)  Pt had no falls or injuries this shift.  AAOx4, VSS on Ra, minimal discomfort noted.  Condom cath removed at pt request due to difficulty to use, urinal used until order for activity as tolerated was placed.  Q4 CBC, Q6 Bg.  Continuous gtt of LR @ 125mL, Pt tolerates well.  UTC for an hour, diet advanced to diabetic until 0001 03/05, tolerates well.  Wife and Pt verbalize understanding of plan to go to CT tonight and return to OR on 03/05.  Wife to remain at bedside, all questions and concerns were addressed.  Will continue to monitor         Simple / Intermediate / Complex Repair - Final Wound Length In Cm: 7.7

## 2020-05-15 DIAGNOSIS — E11.9 TYPE 2 DIABETES MELLITUS WITHOUT COMPLICATION, WITH LONG-TERM CURRENT USE OF INSULIN: ICD-10-CM

## 2020-05-15 DIAGNOSIS — Z79.4 TYPE 2 DIABETES MELLITUS WITHOUT COMPLICATION, WITH LONG-TERM CURRENT USE OF INSULIN: ICD-10-CM

## 2020-06-04 ENCOUNTER — LAB VISIT (OUTPATIENT)
Dept: LAB | Facility: HOSPITAL | Age: 75
End: 2020-06-04
Attending: INTERNAL MEDICINE
Payer: MEDICARE

## 2020-06-04 ENCOUNTER — OFFICE VISIT (OUTPATIENT)
Dept: INTERNAL MEDICINE | Facility: CLINIC | Age: 75
End: 2020-06-04
Payer: MEDICARE

## 2020-06-04 VITALS
BODY MASS INDEX: 27.63 KG/M2 | HEIGHT: 66 IN | RESPIRATION RATE: 16 BRPM | HEART RATE: 73 BPM | WEIGHT: 171.94 LBS | DIASTOLIC BLOOD PRESSURE: 62 MMHG | TEMPERATURE: 98 F | SYSTOLIC BLOOD PRESSURE: 138 MMHG

## 2020-06-04 DIAGNOSIS — I15.2 HYPERTENSION ASSOCIATED WITH DIABETES: ICD-10-CM

## 2020-06-04 DIAGNOSIS — E11.00 TYPE 2 DIABETES MELLITUS WITH HYPEROSMOLARITY WITHOUT COMA, WITHOUT LONG-TERM CURRENT USE OF INSULIN: ICD-10-CM

## 2020-06-04 DIAGNOSIS — C22.0 HEPATOCELLULAR CARCINOMA: ICD-10-CM

## 2020-06-04 DIAGNOSIS — E11.59 HYPERTENSION ASSOCIATED WITH DIABETES: ICD-10-CM

## 2020-06-04 DIAGNOSIS — G40.909 SEIZURE DISORDER: ICD-10-CM

## 2020-06-04 DIAGNOSIS — E11.69 HYPERLIPIDEMIA ASSOCIATED WITH TYPE 2 DIABETES MELLITUS: ICD-10-CM

## 2020-06-04 DIAGNOSIS — E11.00 TYPE 2 DIABETES MELLITUS WITH HYPEROSMOLARITY WITHOUT COMA, WITHOUT LONG-TERM CURRENT USE OF INSULIN: Primary | ICD-10-CM

## 2020-06-04 DIAGNOSIS — E11.9 TYPE 2 DIABETES MELLITUS WITHOUT COMPLICATION, WITH LONG-TERM CURRENT USE OF INSULIN: ICD-10-CM

## 2020-06-04 DIAGNOSIS — E78.5 HYPERLIPIDEMIA ASSOCIATED WITH TYPE 2 DIABETES MELLITUS: ICD-10-CM

## 2020-06-04 DIAGNOSIS — Z79.4 TYPE 2 DIABETES MELLITUS WITHOUT COMPLICATION, WITH LONG-TERM CURRENT USE OF INSULIN: ICD-10-CM

## 2020-06-04 LAB
ALBUMIN SERPL BCP-MCNC: 3.9 G/DL (ref 3.5–5.2)
ALP SERPL-CCNC: 60 U/L (ref 55–135)
ALT SERPL W/O P-5'-P-CCNC: 15 U/L (ref 10–44)
ANION GAP SERPL CALC-SCNC: 6 MMOL/L (ref 8–16)
AST SERPL-CCNC: 29 U/L (ref 10–40)
BASOPHILS # BLD AUTO: 0.03 K/UL (ref 0–0.2)
BASOPHILS NFR BLD: 0.4 % (ref 0–1.9)
BILIRUB SERPL-MCNC: 0.6 MG/DL (ref 0.1–1)
BUN SERPL-MCNC: 19 MG/DL (ref 8–23)
CALCIUM SERPL-MCNC: 9.6 MG/DL (ref 8.7–10.5)
CHLORIDE SERPL-SCNC: 106 MMOL/L (ref 95–110)
CO2 SERPL-SCNC: 28 MMOL/L (ref 23–29)
CREAT SERPL-MCNC: 0.9 MG/DL (ref 0.5–1.4)
DIFFERENTIAL METHOD: ABNORMAL
EOSINOPHIL # BLD AUTO: 0.2 K/UL (ref 0–0.5)
EOSINOPHIL NFR BLD: 3.4 % (ref 0–8)
ERYTHROCYTE [DISTWIDTH] IN BLOOD BY AUTOMATED COUNT: 13.3 % (ref 11.5–14.5)
EST. GFR  (AFRICAN AMERICAN): >60 ML/MIN/1.73 M^2
EST. GFR  (NON AFRICAN AMERICAN): >60 ML/MIN/1.73 M^2
ESTIMATED AVG GLUCOSE: 126 MG/DL (ref 68–131)
GLUCOSE SERPL-MCNC: 155 MG/DL (ref 70–110)
HBA1C MFR BLD HPLC: 6 % (ref 4–5.6)
HCT VFR BLD AUTO: 38.6 % (ref 40–54)
HGB BLD-MCNC: 12.3 G/DL (ref 14–18)
IMM GRANULOCYTES # BLD AUTO: 0.02 K/UL (ref 0–0.04)
IMM GRANULOCYTES NFR BLD AUTO: 0.3 % (ref 0–0.5)
LYMPHOCYTES # BLD AUTO: 2.4 K/UL (ref 1–4.8)
LYMPHOCYTES NFR BLD: 33 % (ref 18–48)
MCH RBC QN AUTO: 30.2 PG (ref 27–31)
MCHC RBC AUTO-ENTMCNC: 31.9 G/DL (ref 32–36)
MCV RBC AUTO: 95 FL (ref 82–98)
MONOCYTES # BLD AUTO: 0.8 K/UL (ref 0.3–1)
MONOCYTES NFR BLD: 11.3 % (ref 4–15)
NEUTROPHILS # BLD AUTO: 3.7 K/UL (ref 1.8–7.7)
NEUTROPHILS NFR BLD: 51.6 % (ref 38–73)
NRBC BLD-RTO: 0 /100 WBC
PLATELET # BLD AUTO: 189 K/UL (ref 150–350)
PMV BLD AUTO: 10.8 FL (ref 9.2–12.9)
POTASSIUM SERPL-SCNC: 4.8 MMOL/L (ref 3.5–5.1)
PROT SERPL-MCNC: 7 G/DL (ref 6–8.4)
RBC # BLD AUTO: 4.07 M/UL (ref 4.6–6.2)
SODIUM SERPL-SCNC: 140 MMOL/L (ref 136–145)
WBC # BLD AUTO: 7.15 K/UL (ref 3.9–12.7)

## 2020-06-04 PROCEDURE — 3077F SYST BP >= 140 MM HG: CPT | Mod: HCNC,CPTII,S$GLB, | Performed by: INTERNAL MEDICINE

## 2020-06-04 PROCEDURE — 3052F HG A1C>EQUAL 8.0%<EQUAL 9.0%: CPT | Mod: HCNC,CPTII,S$GLB, | Performed by: INTERNAL MEDICINE

## 2020-06-04 PROCEDURE — 99499 RISK ADDL DX/OHS AUDIT: ICD-10-PCS | Mod: HCNC,S$GLB,, | Performed by: INTERNAL MEDICINE

## 2020-06-04 PROCEDURE — 1159F MED LIST DOCD IN RCRD: CPT | Mod: HCNC,S$GLB,, | Performed by: INTERNAL MEDICINE

## 2020-06-04 PROCEDURE — 80053 COMPREHEN METABOLIC PANEL: CPT | Mod: HCNC

## 2020-06-04 PROCEDURE — 1101F PR PT FALLS ASSESS DOC 0-1 FALLS W/OUT INJ PAST YR: ICD-10-PCS | Mod: HCNC,CPTII,S$GLB, | Performed by: INTERNAL MEDICINE

## 2020-06-04 PROCEDURE — 3052F PR MOST RECENT HEMOGLOBIN A1C LEVEL 8.0 - < 9.0%: ICD-10-PCS | Mod: HCNC,CPTII,S$GLB, | Performed by: INTERNAL MEDICINE

## 2020-06-04 PROCEDURE — 1126F PR PAIN SEVERITY QUANTIFIED, NO PAIN PRESENT: ICD-10-PCS | Mod: HCNC,S$GLB,, | Performed by: INTERNAL MEDICINE

## 2020-06-04 PROCEDURE — 99214 OFFICE O/P EST MOD 30 MIN: CPT | Mod: HCNC,S$GLB,, | Performed by: INTERNAL MEDICINE

## 2020-06-04 PROCEDURE — 36415 COLL VENOUS BLD VENIPUNCTURE: CPT | Mod: HCNC,PO

## 2020-06-04 PROCEDURE — 3077F PR MOST RECENT SYSTOLIC BLOOD PRESSURE >= 140 MM HG: ICD-10-PCS | Mod: HCNC,CPTII,S$GLB, | Performed by: INTERNAL MEDICINE

## 2020-06-04 PROCEDURE — 99499 UNLISTED E&M SERVICE: CPT | Mod: HCNC,S$GLB,, | Performed by: INTERNAL MEDICINE

## 2020-06-04 PROCEDURE — 99214 PR OFFICE/OUTPT VISIT, EST, LEVL IV, 30-39 MIN: ICD-10-PCS | Mod: HCNC,S$GLB,, | Performed by: INTERNAL MEDICINE

## 2020-06-04 PROCEDURE — 1126F AMNT PAIN NOTED NONE PRSNT: CPT | Mod: HCNC,S$GLB,, | Performed by: INTERNAL MEDICINE

## 2020-06-04 PROCEDURE — 1101F PT FALLS ASSESS-DOCD LE1/YR: CPT | Mod: HCNC,CPTII,S$GLB, | Performed by: INTERNAL MEDICINE

## 2020-06-04 PROCEDURE — 99999 PR PBB SHADOW E&M-EST. PATIENT-LVL III: CPT | Mod: PBBFAC,HCNC,, | Performed by: INTERNAL MEDICINE

## 2020-06-04 PROCEDURE — 1159F PR MEDICATION LIST DOCUMENTED IN MEDICAL RECORD: ICD-10-PCS | Mod: HCNC,S$GLB,, | Performed by: INTERNAL MEDICINE

## 2020-06-04 PROCEDURE — 3078F PR MOST RECENT DIASTOLIC BLOOD PRESSURE < 80 MM HG: ICD-10-PCS | Mod: HCNC,CPTII,S$GLB, | Performed by: INTERNAL MEDICINE

## 2020-06-04 PROCEDURE — 99999 PR PBB SHADOW E&M-EST. PATIENT-LVL III: ICD-10-PCS | Mod: PBBFAC,HCNC,, | Performed by: INTERNAL MEDICINE

## 2020-06-04 PROCEDURE — 85025 COMPLETE CBC W/AUTO DIFF WBC: CPT | Mod: HCNC

## 2020-06-04 PROCEDURE — 83036 HEMOGLOBIN GLYCOSYLATED A1C: CPT | Mod: HCNC

## 2020-06-04 PROCEDURE — 3078F DIAST BP <80 MM HG: CPT | Mod: HCNC,CPTII,S$GLB, | Performed by: INTERNAL MEDICINE

## 2020-06-04 NOTE — PROGRESS NOTES
Subjective:       Patient ID: Jamison Mayes is a 75 y.o. male.    Chief Complaint: Follow-up    HPI   Pt with T2DM, HTN, Seizure d/o, HCC, HLD is here for 6 month f/u. No acute complaints today.   Review of Systems   Constitutional: Negative for activity change, appetite change, chills, diaphoresis, fatigue, fever and unexpected weight change.   HENT: Negative for postnasal drip, rhinorrhea, sinus pressure, sneezing, sore throat, trouble swallowing and voice change.    Respiratory: Negative for cough, shortness of breath and wheezing.    Cardiovascular: Negative for chest pain, palpitations and leg swelling.   Gastrointestinal: Negative for abdominal pain, blood in stool, constipation, diarrhea, nausea and vomiting.   Genitourinary: Negative for dysuria.   Musculoskeletal: Negative for arthralgias and myalgias.   Skin: Negative for rash and wound.   Allergic/Immunologic: Negative for environmental allergies and food allergies.   Hematological: Negative for adenopathy. Does not bruise/bleed easily.       Objective:      Physical Exam   Constitutional: He is oriented to person, place, and time. He appears well-developed and well-nourished. No distress.   HENT:   Head: Normocephalic and atraumatic.   Eyes: Pupils are equal, round, and reactive to light. Conjunctivae and EOM are normal. Right eye exhibits no discharge. Left eye exhibits no discharge. No scleral icterus.   Neck: Normal range of motion. Neck supple. No JVD present.   Cardiovascular: Normal rate, regular rhythm and normal heart sounds.   No murmur heard.  Pulmonary/Chest: Effort normal and breath sounds normal. No respiratory distress. He has no wheezes. He has no rales.   Musculoskeletal: He exhibits no edema.   Lymphadenopathy:     He has no cervical adenopathy.   Neurological: He is alert and oriented to person, place, and time.   Skin: Skin is warm and dry. No rash noted. He is not diaphoretic. No pallor.       Assessment:       1. Type 2 diabetes  mellitus with hyperosmolarity without coma, without long-term current use of insulin    2. Hypertension associated with diabetes    3. Seizure disorder    4. Hepatocellular carcinoma    5. Hyperlipidemia associated with type 2 diabetes mellitus        Plan:    1. T2DM- last A1C of 8.2(3/20)<--8.1(11/19)<--7.1(11/18)<--5.2(8/18)       Continue Metformin XR 1 gm BID WM and Amaryl 4 mg qam   2. HTN- stable on Avapro 300 mg daily    3. Seizure d/o- stable of meds       Pt had seen Neurology    4. HCC- stable, s/p left hepatic lobectomy with cholecystectomy on 03/05/2018       No recurrence, followed by Oncology   5. HLD- stable on Lovastatin 20 mg daily   6. F/u in 6 months for annual exam

## 2020-06-09 ENCOUNTER — HOSPITAL ENCOUNTER (OUTPATIENT)
Dept: RADIOLOGY | Facility: HOSPITAL | Age: 75
Discharge: HOME | End: 2020-06-09
Attending: INTERNAL MEDICINE
Payer: MEDICARE

## 2020-06-09 DIAGNOSIS — C22.0 HEPATOCELLULAR CARCINOMA: ICD-10-CM

## 2020-06-09 PROCEDURE — 74183 MRI ABD W/O CNTR FLWD CNTR: CPT | Mod: TC,HCNC,PO

## 2020-06-09 PROCEDURE — A9585 GADOBUTROL INJECTION: HCPCS | Mod: HCNC,PO | Performed by: INTERNAL MEDICINE

## 2020-06-09 PROCEDURE — 25500020 PHARM REV CODE 255: Mod: HCNC,PO | Performed by: INTERNAL MEDICINE

## 2020-06-09 RX ORDER — GADOBUTROL 604.72 MG/ML
10 INJECTION INTRAVENOUS
Status: COMPLETED | OUTPATIENT
Start: 2020-06-09 | End: 2020-06-09

## 2020-06-09 RX ADMIN — GADOBUTROL 10 ML: 604.72 INJECTION INTRAVENOUS at 10:06

## 2020-06-10 ENCOUNTER — OFFICE VISIT (OUTPATIENT)
Dept: HEMATOLOGY/ONCOLOGY | Facility: CLINIC | Age: 75
End: 2020-06-10
Payer: MEDICARE

## 2020-06-10 DIAGNOSIS — K86.2 PANCREATIC CYST: ICD-10-CM

## 2020-06-10 DIAGNOSIS — E11.59 HYPERTENSION ASSOCIATED WITH DIABETES: ICD-10-CM

## 2020-06-10 DIAGNOSIS — R91.1 PULMONARY NODULE, LEFT: ICD-10-CM

## 2020-06-10 DIAGNOSIS — C22.0 HEPATOCELLULAR CARCINOMA: Primary | ICD-10-CM

## 2020-06-10 DIAGNOSIS — I15.2 HYPERTENSION ASSOCIATED WITH DIABETES: ICD-10-CM

## 2020-06-10 PROCEDURE — 99213 OFFICE O/P EST LOW 20 MIN: CPT | Mod: HCNC,95,, | Performed by: INTERNAL MEDICINE

## 2020-06-10 PROCEDURE — 1101F PT FALLS ASSESS-DOCD LE1/YR: CPT | Mod: HCNC,CPTII,95, | Performed by: INTERNAL MEDICINE

## 2020-06-10 PROCEDURE — 3044F PR MOST RECENT HEMOGLOBIN A1C LEVEL <7.0%: ICD-10-PCS | Mod: HCNC,CPTII,95, | Performed by: INTERNAL MEDICINE

## 2020-06-10 PROCEDURE — 1159F MED LIST DOCD IN RCRD: CPT | Mod: HCNC,95,, | Performed by: INTERNAL MEDICINE

## 2020-06-10 PROCEDURE — 3044F HG A1C LEVEL LT 7.0%: CPT | Mod: HCNC,CPTII,95, | Performed by: INTERNAL MEDICINE

## 2020-06-10 PROCEDURE — 1101F PR PT FALLS ASSESS DOC 0-1 FALLS W/OUT INJ PAST YR: ICD-10-PCS | Mod: HCNC,CPTII,95, | Performed by: INTERNAL MEDICINE

## 2020-06-10 PROCEDURE — 1159F PR MEDICATION LIST DOCUMENTED IN MEDICAL RECORD: ICD-10-PCS | Mod: HCNC,95,, | Performed by: INTERNAL MEDICINE

## 2020-06-10 PROCEDURE — 99213 PR OFFICE/OUTPT VISIT, EST, LEVL III, 20-29 MIN: ICD-10-PCS | Mod: HCNC,95,, | Performed by: INTERNAL MEDICINE

## 2020-06-10 NOTE — PROGRESS NOTES
Subjective:       Patient ID: Jamison Mayes is a 75 y.o. male.    Chief Complaint: HCC    The patient location is: home  The chief complaint leading to consultation is:  Liver cancer    Visit type: audio only    Face to Face time with patient:  10 min  Fifteen minutes of total time spent on the encounter, which includes face to face time and non-face to face time preparing to see the patient (eg, review of tests), Obtaining and/or reviewing separately obtained history, Documenting clinical information in the electronic or other health record, Independently interpreting results (not separately reported) and communicating results to the patient/family/caregiver, or Care coordination (not separately reported).         Each patient to whom he or she provides medical services by telemedicine is:  (1) informed of the relationship between the physician and patient and the respective role of any other health care provider with respect to management of the patient; and (2) notified that he or she may decline to receive medical services by telemedicine and may withdraw from such care at any time.    Notes:       HPI Mr. Mayes is here for f/u of hepatocellular carcinoma.    He presented to the hospital on 03/01/2018 with sudden onset of cold sweats, nausea and abdominal discomfort and was noted to have an 8.7 cm mass in the lateral segment of the left hepatic lobe that has ruptured to the liver capsule.  He was monitored and underwent a left hepatic lobectomy with cholecystectomy on 03/05/2018.      Final pathology revealed a T1b NX 7.5 cm well-differentiated hepatocellular carcinoma without vascular invasion or perineural invasion.  The tumor appeared to be confined to the liver. Hepatitis serology negative.    A CT chest with contrast on 03/03/2018 revealed a 0.3 cm left upper lobe pulmonary nodule.    He lives in [facility].  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    Review of Systems   Constitutional: Negative for  fever and unexpected weight change.   HENT: Negative for mouth sores and nosebleeds.    Eyes: Negative for photophobia and pain.   Respiratory: Negative for shortness of breath and wheezing.    Cardiovascular: Negative for chest pain and palpitations.   Gastrointestinal: Negative for abdominal pain and vomiting.   Genitourinary: Negative for flank pain and hematuria.   Musculoskeletal: Negative for back pain, neck pain and neck stiffness.   Skin: Negative for rash and wound.   Neurological: Negative for seizures and syncope.   Hematological: Negative for adenopathy. Does not bruise/bleed easily.   Psychiatric/Behavioral: Negative for behavioral problems and confusion.   All other systems reviewed and are negative.        Objective:      No physical examination was done as this is a virtual visit       Assessment:       1. Hepatocellular carcinoma    2. Pancreatic cyst    3. Hypertension associated with diabetes    4. Pulmonary nodule, left        Plan:   Mr. Mayes has a diagnosis of T1b NX hepatocellular carcinoma that was resected.  Malignant transformation of a prior hepatic adenoma is a consideration.    MRI reviewed    Discussed imaging findings over the telephone during the audio visit.    Will plan to repeat MRI abdomen in 6 months along with labs.

## 2020-06-16 ENCOUNTER — PATIENT OUTREACH (OUTPATIENT)
Dept: ADMINISTRATIVE | Facility: OTHER | Age: 75
End: 2020-06-16

## 2020-06-17 ENCOUNTER — OFFICE VISIT (OUTPATIENT)
Dept: DERMATOLOGY | Facility: CLINIC | Age: 75
End: 2020-06-17
Payer: MEDICARE

## 2020-06-17 DIAGNOSIS — L57.0 AK (ACTINIC KERATOSIS): Primary | ICD-10-CM

## 2020-06-17 DIAGNOSIS — Z12.83 SKIN CANCER SCREENING: ICD-10-CM

## 2020-06-17 DIAGNOSIS — Z85.828 HISTORY OF NONMELANOMA SKIN CANCER: ICD-10-CM

## 2020-06-17 DIAGNOSIS — L82.1 SK (SEBORRHEIC KERATOSIS): ICD-10-CM

## 2020-06-17 PROCEDURE — 99213 PR OFFICE/OUTPT VISIT, EST, LEVL III, 20-29 MIN: ICD-10-PCS | Mod: 25,HCNC,S$GLB, | Performed by: DERMATOLOGY

## 2020-06-17 PROCEDURE — 17003 DESTRUCTION, PREMALIGNANT LESIONS; SECOND THROUGH 14 LESIONS: ICD-10-PCS | Mod: HCNC,S$GLB,, | Performed by: DERMATOLOGY

## 2020-06-17 PROCEDURE — 1126F AMNT PAIN NOTED NONE PRSNT: CPT | Mod: HCNC,S$GLB,, | Performed by: DERMATOLOGY

## 2020-06-17 PROCEDURE — 1159F MED LIST DOCD IN RCRD: CPT | Mod: HCNC,S$GLB,, | Performed by: DERMATOLOGY

## 2020-06-17 PROCEDURE — 17000 PR DESTRUCTION(LASER SURGERY,CRYOSURGERY,CHEMOSURGERY),PREMALIGNANT LESIONS,FIRST LESION: ICD-10-PCS | Mod: HCNC,S$GLB,, | Performed by: DERMATOLOGY

## 2020-06-17 PROCEDURE — 17003 DESTRUCT PREMALG LES 2-14: CPT | Mod: HCNC,S$GLB,, | Performed by: DERMATOLOGY

## 2020-06-17 PROCEDURE — 17000 DESTRUCT PREMALG LESION: CPT | Mod: HCNC,S$GLB,, | Performed by: DERMATOLOGY

## 2020-06-17 PROCEDURE — 1126F PR PAIN SEVERITY QUANTIFIED, NO PAIN PRESENT: ICD-10-PCS | Mod: HCNC,S$GLB,, | Performed by: DERMATOLOGY

## 2020-06-17 PROCEDURE — 99213 OFFICE O/P EST LOW 20 MIN: CPT | Mod: 25,HCNC,S$GLB, | Performed by: DERMATOLOGY

## 2020-06-17 PROCEDURE — 1101F PT FALLS ASSESS-DOCD LE1/YR: CPT | Mod: HCNC,CPTII,S$GLB, | Performed by: DERMATOLOGY

## 2020-06-17 PROCEDURE — 1101F PR PT FALLS ASSESS DOC 0-1 FALLS W/OUT INJ PAST YR: ICD-10-PCS | Mod: HCNC,CPTII,S$GLB, | Performed by: DERMATOLOGY

## 2020-06-17 PROCEDURE — 99999 PR PBB SHADOW E&M-EST. PATIENT-LVL III: CPT | Mod: PBBFAC,HCNC,, | Performed by: DERMATOLOGY

## 2020-06-17 PROCEDURE — 99999 PR PBB SHADOW E&M-EST. PATIENT-LVL III: ICD-10-PCS | Mod: PBBFAC,HCNC,, | Performed by: DERMATOLOGY

## 2020-06-17 PROCEDURE — 1159F PR MEDICATION LIST DOCUMENTED IN MEDICAL RECORD: ICD-10-PCS | Mod: HCNC,S$GLB,, | Performed by: DERMATOLOGY

## 2020-06-17 NOTE — PROGRESS NOTES
"  Subjective:       Patient ID:  Jamison Mayes is a 75 y.o. male who presents for   Chief Complaint   Patient presents with    Skin Check     UBSE     HPI   Patient is here today for a "mole" check.   Pt has a history of  moderate sun exposure in the past.   Pt recalls several blistering sunburns in the past- Yes  Pt has history of tanning bed use- No  Pt has had moles removed in the past- Yes  Pt has history of melanoma in first degree relatives-  Not that pt is aware of     Pt presents today for UBSE, h/o NMSC and AKs treated with cryo, Efudex, and PDT.   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 Peterson excised a SCC on L top of scalp.  Denies any new, changing, or symptomatic lesions on the skin.      Review of Systems   Constitutional: Negative for fever, chills, weight loss, weight gain, fatigue, night sweats and malaise.   Skin: Positive for activity-related sunscreen use. Negative for daily sunscreen use and recent sunburn.   Hematologic/Lymphatic: Bruises/bleeds easily.        Objective:    Physical Exam   Constitutional: He appears well-developed and well-nourished. No distress.   Neurological: He is alert and oriented to person, place, and time. He is not disoriented.   Psychiatric: He has a normal mood and affect.   Skin:   Areas Examined (abnormalities noted in diagram):   Scalp / Hair Palpated and Inspected  Head / Face Inspection Performed  Neck Inspection Performed  Chest / Axilla Inspection Performed  Abdomen Inspection Performed  Back Inspection Performed  RUE Inspected  LUE Inspection Performed                   Diagram Legend     Erythematous scaling macule/papule c/w actinic keratosis       Vascular papule c/w angioma      Pigmented verrucoid papule/plaque c/w seborrheic keratosis      Yellow umbilicated papule c/w sebaceous hyperplasia      Irregularly shaped tan macule c/w lentigo     1-2 mm smooth white papules consistent with Milia      " Movable subcutaneous cyst with punctum c/w epidermal inclusion cyst      Subcutaneous movable cyst c/w pilar cyst      Firm pink to brown papule c/w dermatofibroma      Pedunculated fleshy papule(s) c/w skin tag(s)      Evenly pigmented macule c/w junctional nevus     Mildly variegated pigmented, slightly irregular-bordered macule c/w mildly atypical nevus      Flesh colored to evenly pigmented papule c/w intradermal nevus       Pink pearly papule/plaque c/w basal cell carcinoma      Erythematous hyperkeratotic cursted plaque c/w SCC      Surgical scar with no sign of skin cancer recurrence      Open and closed comedones      Inflammatory papules and pustules      Verrucoid papule consistent consistent with wart     Erythematous eczematous patches and plaques     Dystrophic onycholytic nail with subungual debris c/w onychomycosis     Umbilicated papule    Erythematous-base heme-crusted tan verrucoid plaque consistent with inflamed seborrheic keratosis     Erythematous Silvery Scaling Plaque c/w Psoriasis     See annotation      Assessment / Plan:        AK (actinic keratosis)  Cryosurgery Procedure Note    Verbal consent from the patient is obtained including, but not limited to, risk of hypopigmentation/hyperpigmentation, scar, recurrence of lesion. The patient is aware of the precancerous quality and need for treatment of these lesions. Liquid nitrogen cryosurgery is applied to the 13 actinic keratoses, as detailed in the physical exam, to produce a freeze injury. The patient is aware that blisters may form and is instructed on wound care with gentle cleansing and use of vaseline ointment to keep moist until healed. The patient is supplied a handout on cryosurgery and is instructed to call if lesions do not completely resolve.    SK (seborrheic keratosis)  These are benign inherited growths without a malignant potential. Reassurance given to patient. No treatment is necessary.   Treatment of benign, asymptomatic  lesions may be considered cosmetic.  Warned about risk of hypo- or hyperpigmentation with treatment and risk of recurrence.    History of nonmelanoma skin cancer  Skin cancer screening  Upper body skin examination performed today including at least 6 points as noted in physical examination. No lesions suspicious for malignancy noted.  Patient instructed in importance of daily broad spectrum sunscreen use with spf at least 30. Sun avoidance and topical protection/protective clothing discussed.      Follow up in about 4 months (around 10/17/2020) for skin check or sooner for any concerns.

## 2020-06-17 NOTE — PATIENT INSTRUCTIONS
Summer Sun Protection      The [facility] would like to remind you of the importance of sun protection all year round and particularly during the summer when the suns rays are the strongest. It has been proven that both acute and chronic sun exposure damages our cells and leads to skin cancer. Beyond skin cancer, the sun causes 90% of the symptoms of pre-mature skin aging, including wrinkles, lentigines (brown spots), and thin, easily bruised skin. Proper sun protection can help prevent these unwanted conditions.    Many patients report that the dont go in the sun. It has been shown that the average person receives 18 hours of incidental sun exposure per week during activities such as walking through parking lots, driving, or sitting next to windows. This accumulates to several bad sunburns per year!    In choosing sunscreen, you want one that protects against both UVA and UVB rays. It is recommended that you use one of SPF 30 or higher. It is important to apply the sunscreen about 20 minutes prior to sun exposure. Most sunscreens are chemical sunscreens and a reaction must take place in the skin so that they are effective. If they are applied and then you are immediately exposed to the sun or start sweating, this reaction has not had time to take place and you are therefore unprotected. Sunscreen needs to be reapplied every 2 hours if you are participating in water sports or sweating. We recommend Elta MD or Neutrogena Ultra Sheer Dry Touch SPF 55 for daily use; however there are many options and it is most important for you to find one that you will use on a consistent basis.    If you have sensitive skin, you may do best with a sunscreen that contains only physical blockers such as titanium dioxide or zinc oxide. These are typically thicker and harder to apply, however they afford very good protection. Neutrogena Sensitive Skin, Blue Lizard Sensitive Skin (pink top) or Neutrogena Pure  and Free are popular ones.     Aside from sunscreen, clothes with UV protection, wide brimmed hats, and sunglasses are other means of sun protection that we recommend.                        [facility] - DERMATOLOGY  [address]  [place] [address]  Dept: [phone]  Dept Fax: [phone]                                                                               CRYOSURGERY      Your doctor has used a method called cryosurgery to treat your skin condition. Cryosurgery refers to the use of very cold substances to treat a variety of skin conditions such as warts, pre-skin cancers, molluscum contagiosum, sun spots, and several benign growths. The substance we use in cryosurgery is liquid nitrogen and is so cold (-195 degrees Celsius) that is burns when administered.     Following treatment in the office, the skin may immediately burn and become red. You may find the area around the lesion is affected as well. It is sometimes necessary to treat not only the lesion, but a small area of the surrounding normal skin to achieve a good response.     A blister, and even a blood filled blister, may form after treatment.   This is a normal response. If the blister is painful, it is acceptable to sterilize a needle and with rubbing alcohol and gently pop the blister. It is important that you gently wash the area with soap and warm water as the blister fluid may contain wart virus if a wart was treated. Do no remove the roof of the blister.     The area treated can take anywhere from 1-3 weeks to heal. Healing time depends on the kind skin lesion treated, the location, and how aggressively the lesion was treated. It is recommended that the areas treated are covered with Vaseline or bacitracin ointment and a band-aid. If a band-aid is not practical, just ointment applied several times per day will do. Keeping these areas moist will speed the healing time.    Treatment with liquid  nitrogen can leave a scar. In dark skin, it may be a light or dark scar, in light skin it may be a white or pink scar. These will generally fade with time, but may never go away completely.     If you have any concerns after your treatment, please feel free to call the office.       [facility] [place] [address]/ [phone] FAX/ [email or web address]

## 2020-07-21 ENCOUNTER — OFFICE VISIT (OUTPATIENT)
Dept: OTOLARYNGOLOGY | Facility: CLINIC | Age: 75
End: 2020-07-21
Payer: MEDICARE

## 2020-07-21 ENCOUNTER — CLINICAL SUPPORT (OUTPATIENT)
Dept: OTOLARYNGOLOGY | Facility: CLINIC | Age: 75
End: 2020-07-21
Payer: MEDICARE

## 2020-07-21 VITALS
HEIGHT: 66 IN | HEART RATE: 69 BPM | SYSTOLIC BLOOD PRESSURE: 147 MMHG | DIASTOLIC BLOOD PRESSURE: 66 MMHG | BODY MASS INDEX: 28.61 KG/M2 | WEIGHT: 178 LBS

## 2020-07-21 DIAGNOSIS — H90.3 SENSORINEURAL HEARING LOSS (SNHL), BILATERAL: Primary | ICD-10-CM

## 2020-07-21 DIAGNOSIS — H69.93 ETD (EUSTACHIAN TUBE DYSFUNCTION), BILATERAL: ICD-10-CM

## 2020-07-21 DIAGNOSIS — H93.12 TINNITUS AURIUM, LEFT: ICD-10-CM

## 2020-07-21 DIAGNOSIS — J30.89 NON-SEASONAL ALLERGIC RHINITIS, UNSPECIFIED TRIGGER: ICD-10-CM

## 2020-07-21 PROCEDURE — 1159F MED LIST DOCD IN RCRD: CPT | Mod: HCNC,S$GLB,, | Performed by: OTOLARYNGOLOGY

## 2020-07-21 PROCEDURE — 1126F PR PAIN SEVERITY QUANTIFIED, NO PAIN PRESENT: ICD-10-PCS | Mod: HCNC,S$GLB,, | Performed by: OTOLARYNGOLOGY

## 2020-07-21 PROCEDURE — 3008F PR BODY MASS INDEX (BMI) DOCUMENTED: ICD-10-PCS | Mod: HCNC,CPTII,S$GLB, | Performed by: OTOLARYNGOLOGY

## 2020-07-21 PROCEDURE — 1126F AMNT PAIN NOTED NONE PRSNT: CPT | Mod: HCNC,S$GLB,, | Performed by: OTOLARYNGOLOGY

## 2020-07-21 PROCEDURE — 92557 COMPREHENSIVE HEARING TEST: CPT | Mod: HCNC,S$GLB,, | Performed by: AUDIOLOGIST-HEARING AID FITTER

## 2020-07-21 PROCEDURE — 1159F PR MEDICATION LIST DOCUMENTED IN MEDICAL RECORD: ICD-10-PCS | Mod: HCNC,S$GLB,, | Performed by: OTOLARYNGOLOGY

## 2020-07-21 PROCEDURE — 1101F PR PT FALLS ASSESS DOC 0-1 FALLS W/OUT INJ PAST YR: ICD-10-PCS | Mod: HCNC,CPTII,S$GLB, | Performed by: OTOLARYNGOLOGY

## 2020-07-21 PROCEDURE — 1101F PT FALLS ASSESS-DOCD LE1/YR: CPT | Mod: HCNC,CPTII,S$GLB, | Performed by: OTOLARYNGOLOGY

## 2020-07-21 PROCEDURE — 92567 PR TYMPA2METRY: ICD-10-PCS | Mod: HCNC,S$GLB,, | Performed by: AUDIOLOGIST-HEARING AID FITTER

## 2020-07-21 PROCEDURE — 3077F SYST BP >= 140 MM HG: CPT | Mod: HCNC,CPTII,S$GLB, | Performed by: OTOLARYNGOLOGY

## 2020-07-21 PROCEDURE — 99999 PR PBB SHADOW E&M-EST. PATIENT-LVL I: CPT | Mod: PBBFAC,HCNC,, | Performed by: AUDIOLOGIST-HEARING AID FITTER

## 2020-07-21 PROCEDURE — 3008F BODY MASS INDEX DOCD: CPT | Mod: HCNC,CPTII,S$GLB, | Performed by: OTOLARYNGOLOGY

## 2020-07-21 PROCEDURE — 99999 PR PBB SHADOW E&M-EST. PATIENT-LVL I: ICD-10-PCS | Mod: PBBFAC,HCNC,, | Performed by: AUDIOLOGIST-HEARING AID FITTER

## 2020-07-21 PROCEDURE — 3078F DIAST BP <80 MM HG: CPT | Mod: HCNC,CPTII,S$GLB, | Performed by: OTOLARYNGOLOGY

## 2020-07-21 PROCEDURE — 92567 TYMPANOMETRY: CPT | Mod: HCNC,S$GLB,, | Performed by: AUDIOLOGIST-HEARING AID FITTER

## 2020-07-21 PROCEDURE — 3077F PR MOST RECENT SYSTOLIC BLOOD PRESSURE >= 140 MM HG: ICD-10-PCS | Mod: HCNC,CPTII,S$GLB, | Performed by: OTOLARYNGOLOGY

## 2020-07-21 PROCEDURE — 3078F PR MOST RECENT DIASTOLIC BLOOD PRESSURE < 80 MM HG: ICD-10-PCS | Mod: HCNC,CPTII,S$GLB, | Performed by: OTOLARYNGOLOGY

## 2020-07-21 PROCEDURE — 99214 OFFICE O/P EST MOD 30 MIN: CPT | Mod: HCNC,S$GLB,, | Performed by: OTOLARYNGOLOGY

## 2020-07-21 PROCEDURE — 92557 PR COMPREHENSIVE HEARING TEST: ICD-10-PCS | Mod: HCNC,S$GLB,, | Performed by: AUDIOLOGIST-HEARING AID FITTER

## 2020-07-21 PROCEDURE — 99999 PR PBB SHADOW E&M-EST. PATIENT-LVL IV: ICD-10-PCS | Mod: PBBFAC,HCNC,, | Performed by: OTOLARYNGOLOGY

## 2020-07-21 PROCEDURE — 99999 PR PBB SHADOW E&M-EST. PATIENT-LVL IV: CPT | Mod: PBBFAC,HCNC,, | Performed by: OTOLARYNGOLOGY

## 2020-07-21 PROCEDURE — 99214 PR OFFICE/OUTPT VISIT, EST, LEVL IV, 30-39 MIN: ICD-10-PCS | Mod: HCNC,S$GLB,, | Performed by: OTOLARYNGOLOGY

## 2020-07-21 RX ORDER — LEVOCETIRIZINE DIHYDROCHLORIDE 5 MG/1
5 TABLET, FILM COATED ORAL NIGHTLY
Qty: 30 TABLET | Refills: 11 | Status: SHIPPED | OUTPATIENT
Start: 2020-07-21 | End: 2024-01-18

## 2020-07-21 NOTE — PATIENT INSTRUCTIONS
Rec annual audiogram (hearing test) and return to [facility] to adjust hearing aids to latest thresholds.

## 2020-07-21 NOTE — PROGRESS NOTES
Chief Complaint   Patient presents with    Hearing Loss     bilateral, worse in the left ear    Tinnitus   .     HPI:  Jamison Mayes is a very pleasant 75 y.o. male here to see me today for the first time for evaluation of hearing loss.  He reports hearing loss that has been gradually progressing over the last 5 years. He had audiogram in 2017 showing SNHL and obtained binaural hearing aids from [employer] at that time. He notes that his hearing has been worse over the last 6 months.  He has noted any difference in hearing between the ears, with the left ear being the worse hearing ear.  He has noted any tinnitus in the left ear.  He has not had any recent issues with ear pain or ear drainage.  He denies a family history of hearing loss, and has not had any previous otologic surgery.  He admits to any history of significant loud noise exposure.He denies issues with dizziness.    Interval HPI 7/16/2019:   Mr. Mayes follows up today for tinnitus, ETD, and AR.  He reports that he feels his symptoms of nasal congestion, post-nasal drip, and sneezing are improved. He reports that he feels the pressure sensation in his ears is improved. He feels his hearing is stable and unchanged. He still notes buzzing sensation in left ear.       Past Medical History:   Diagnosis Date    AK (actinic keratosis) PDT scalp 2/2015 and 3/2015    s/p efudex on scalp and forehead, PDT scalp    Arthritis     Diabetes mellitus type II     Fever blister     Hepatocellular carcinoma 03/05/2018    s/p left hepatectomy of segments 2,3,4a/b    Hypertension     Joint pain     SCC (squamous cell carcinoma) 3/2013    L scalp vertex    SCC (squamous cell carcinoma) excised     left helix, in-situ    Seizures     Squamous Cell Carcinoma 3/2013    occipital scalp    Squamous Cell Carcinoma 3/2013    l vertex scalp     Social History     Socioeconomic History    Marital status:      Spouse name: Not on file    Number of  children: Not on file    Years of education: Not on file    Highest education level: Not on file   Occupational History    Occupation: REtired    Social Needs    Financial resource strain: Not on file    Food insecurity     Worry: Not on file     Inability: Not on file    Transportation needs     Medical: Not on file     Non-medical: Not on file   Tobacco Use    Smoking status: Former Smoker     Years: 5.00     Quit date: 1966     Years since quittin.1    Smokeless tobacco: Never Used   Substance and Sexual Activity    Alcohol use: No    Drug use: No    Sexual activity: Not on file   Lifestyle    Physical activity     Days per week: Not on file     Minutes per session: Not on file    Stress: Not on file   Relationships    Social connections     Talks on phone: Not on file     Gets together: Not on file     Attends Judaism service: Not on file     Active member of club or organization: Not on file     Attends meetings of clubs or organizations: Not on file     Relationship status: Not on file   Other Topics Concern    Not on file   Social History Narrative    Not on file     Past Surgical History:   Procedure Laterality Date    CHOLECYSTECTOMY  2018    open at time of hepatic resection    EYE SURGERY      LIVER RESECTION Left 2018    segments 2,3, 4a/b    skin carcinoma removal       Family History   Problem Relation Age of Onset    Diabetes Father     Hypertension Mother     Vision loss Sister     Melanoma Neg Hx     Heart attack Neg Hx     Heart disease Neg Hx          Review of Systems  General: negative for chills, fever or weight loss  Psychological: negative for mood changes or depression  Ophthalmic: negative for blurry vision, photophobia or eye pain  ENT: see HPI  Respiratory: no cough, shortness of breath, or wheezing  Cardiovascular: no chest pain or dyspnea on exertion  Gastrointestinal: no abdominal pain, change in bowel habits, or black/ bloody  stools  Musculoskeletal: negative for gait disturbance or muscular weakness  Neurological: no syncope or seizures; no ataxia  Dermatological: negative for puritis,  rash and jaundice  Hematologic/lymphatic: no easy bruising, no new lumps or bumps      Physical Exam:    Vitals:    07/21/20 1047   BP: (!) 147/66   Pulse: 69       Constitutional: Well appearing / communicating without difficutly.  NAD.  Eyes: EOM I Bilaterally  Head/Face: Normocephalic.  Negative paranasal sinus pressure/tenderness.  Salivary glands WNL.  House Brackmann I Bilaterally.    Right Ear: Auricle normal appearance. External Auditory Canal within normal limits no lesions or masses,TM w/o masses/lesions/perforations. TM mobility noted.   Left Ear: Auricle normal appearance. External Auditory Canal within normal limits no lesions or masses,TM w/o masses/lesions/perforations. TM mobility noted.  Rinne Air conduction >bone conduction bilaterally, Bailey midline.   Nose: No gross nasal septal deviation. Inferior Turbinates 3+ bilaterally. No septal perforation. No masses/lesions. External nasal skin appears normal without masses/lesions.  Oral Cavity: Gingiva/lips within normal limits.  Dentition/gingiva healthy appearing. Mucus membranes moist. Floor of mouth soft, no masses palpated. Oral Tongue mobile. Hard Palate appears normal.    Oropharynx: Base of tongue appears normal. No masses/lesions noted. Tonsillar fossa/pharyngeal wall without lesions. Posterior oropharynx WNL.  Soft palate without masses. Midline uvula.   Neck/Lymphatic: No LAD I-VI bilaterally.  No thyromegaly.  No masses noted on exam.    Mirror laryngoscopy/nasopharyngoscopy: Active gag reflex.  Unable to perform.    Neuro/Psychiatric: AOx3.  Normal mood and affect.   Cardiovascular: Normal carotid pulses bilaterally, no increasing jugular venous distention noted at cervical region bilaterally.    Respiratory: Normal respiratory effort, no stridor, no retractions  noted.        Diagnostic studies:   Audiogram reviewed personally by myself and in detail with the patient today.                 Assessment:    ICD-10-CM ICD-9-CM    1. Sensorineural hearing loss (SNHL), bilateral  H90.3 389.18    2. Tinnitus aurium, left  H93.12 388.31    3. ETD (Eustachian tube dysfunction), bilateral  H69.83 381.81    4. Non-seasonal allergic rhinitis, unspecified trigger  J30.89 477.8      The primary encounter diagnosis was Sensorineural hearing loss (SNHL), bilateral. Diagnoses of Tinnitus aurium, left, ETD (Eustachian tube dysfunction), bilateral, and Non-seasonal allergic rhinitis, unspecified trigger were also pertinent to this visit.      Plan:  No orders of the defined types were placed in this encounter.    We had a long discussion regarding the anatomy and function of the eustachian tube.  We discussed that the eustachian tube acts as a pump to keep the appropriate amount of pressure behind the ear drum. Discussed autoinsufflation exercises.   Continue Flonase and Xyzal.  Tinnitus management strategies discussed. Some patients find that restricting the salt or caffeine in their diet helps.  Tinnitus tends to be louder in times of stress and fatigue, and may decrease with time.  Sound machines may also be an effective masking technique if needed at night.     Recommend annual audiogram to monitor SNHL. Hearing protection when exposed to loud sounds. Continue binaural amplification.     Thank you kindly for allowing me to participate in the patient's care.       Abbie King MD

## 2020-07-21 NOTE — PATIENT INSTRUCTIONS
WHAT IS TINNITUS?  The perception of sound heard in one or both sides of the head. Some refer to it as a ringing, noise, buzzing, roaring, clicking, wooshing, crickets, heartbeat, music, or other noises. There are varying levels of severity. The tinnitus may be constant or periodic. Common complaints include difficulty sleeping, struggling to understand other's speech, depression, and problems focusing.  Tinnitus is a symptom, not a disease. It affects 10-15% of adults in the [place].    WHAT CAUSES TINNITUS?  There are 2 types of tinnitus: Primary and Secondary. Primary tinnitus has an unknown cause. There may be a relationship to primary tinnitus and hearing loss. Secondary tinnitus has a cause which could be impacted cerumen (wax), or diseases or pressure behind the eardrum. It could also be related to Meniere's disease.   There are several likely sources, all of which are known to trigger or worsen tinnitus such as noise exposure, head/neck trauma, ototoxic drugs ([email or web address]), tumors, blood vessel problems, cardiovascular disease or jaw misalignment.     WHAT CAN YOU DO?  You should seek medical care if you suspect you have tinnitus and tell your physician if your symptoms are affecting your daily life. Tinnitus may cause anxiety, depression, insomnia, negative emotions, and withdrawal. It's okay to seek help as your symptoms may be managed, and common.    HOW IS TINNITUS DIAGNOSED?  A doctor can diagnose tinnitus after a physical examination and interview. The examination may rule out conditions causing the tinnitus such as wax impaction or fluid behind the eardrum. Tinnitus may also be related to hearing loss, especially hearing loss from frequent noise exposure. A hearing test may be performed if you are experiencing tinnitus.    TREATMENT FOR TINNITUS?  Treatment may improve on its own but if it doesn't there are several ways to manage your symptoms although there is no cure. Possible treatment  options include amplification (hearing aids), sound therapy (maskers,white noise,etc.), TMJ treatment, cognitive behavioral therapy, relaxation exercises, and managing your medications.  There is not enough evidence to suggest that over the counter products help patients with tinnitus. Acupuncture can neither be recommended or discouraged due to lack of evidence as well.    Source: American Academy of Otolaryngology-Head And Neck Surgery

## 2020-09-29 ENCOUNTER — PATIENT MESSAGE (OUTPATIENT)
Dept: OTHER | Facility: OTHER | Age: 75
End: 2020-09-29

## 2020-10-07 ENCOUNTER — PATIENT OUTREACH (OUTPATIENT)
Dept: ADMINISTRATIVE | Facility: OTHER | Age: 75
End: 2020-10-07

## 2020-10-07 ENCOUNTER — TELEPHONE (OUTPATIENT)
Dept: OTOLARYNGOLOGY | Facility: CLINIC | Age: 75
End: 2020-10-07

## 2020-10-07 ENCOUNTER — OFFICE VISIT (OUTPATIENT)
Dept: OTOLARYNGOLOGY | Facility: CLINIC | Age: 75
End: 2020-10-07
Payer: MEDICARE

## 2020-10-07 VITALS
BODY MASS INDEX: 28.45 KG/M2 | DIASTOLIC BLOOD PRESSURE: 68 MMHG | WEIGHT: 177 LBS | HEIGHT: 66 IN | SYSTOLIC BLOOD PRESSURE: 156 MMHG | HEART RATE: 78 BPM

## 2020-10-07 DIAGNOSIS — J30.89 NON-SEASONAL ALLERGIC RHINITIS, UNSPECIFIED TRIGGER: ICD-10-CM

## 2020-10-07 DIAGNOSIS — H69.93 ETD (EUSTACHIAN TUBE DYSFUNCTION), BILATERAL: ICD-10-CM

## 2020-10-07 DIAGNOSIS — H90.6 MIXED CONDUCTIVE AND SENSORINEURAL HEARING LOSS OF BOTH EARS: Primary | ICD-10-CM

## 2020-10-07 PROCEDURE — 3008F BODY MASS INDEX DOCD: CPT | Mod: HCNC,CPTII,S$GLB, | Performed by: OTOLARYNGOLOGY

## 2020-10-07 PROCEDURE — 3078F PR MOST RECENT DIASTOLIC BLOOD PRESSURE < 80 MM HG: ICD-10-PCS | Mod: HCNC,CPTII,S$GLB, | Performed by: OTOLARYNGOLOGY

## 2020-10-07 PROCEDURE — 99214 PR OFFICE/OUTPT VISIT, EST, LEVL IV, 30-39 MIN: ICD-10-PCS | Mod: HCNC,S$GLB,, | Performed by: OTOLARYNGOLOGY

## 2020-10-07 PROCEDURE — 1159F PR MEDICATION LIST DOCUMENTED IN MEDICAL RECORD: ICD-10-PCS | Mod: HCNC,S$GLB,, | Performed by: OTOLARYNGOLOGY

## 2020-10-07 PROCEDURE — 99214 OFFICE O/P EST MOD 30 MIN: CPT | Mod: HCNC,S$GLB,, | Performed by: OTOLARYNGOLOGY

## 2020-10-07 PROCEDURE — 1126F PR PAIN SEVERITY QUANTIFIED, NO PAIN PRESENT: ICD-10-PCS | Mod: HCNC,S$GLB,, | Performed by: OTOLARYNGOLOGY

## 2020-10-07 PROCEDURE — 1101F PR PT FALLS ASSESS DOC 0-1 FALLS W/OUT INJ PAST YR: ICD-10-PCS | Mod: HCNC,CPTII,S$GLB, | Performed by: OTOLARYNGOLOGY

## 2020-10-07 PROCEDURE — 1101F PT FALLS ASSESS-DOCD LE1/YR: CPT | Mod: HCNC,CPTII,S$GLB, | Performed by: OTOLARYNGOLOGY

## 2020-10-07 PROCEDURE — 99999 PR PBB SHADOW E&M-EST. PATIENT-LVL IV: CPT | Mod: PBBFAC,HCNC,, | Performed by: OTOLARYNGOLOGY

## 2020-10-07 PROCEDURE — 3008F PR BODY MASS INDEX (BMI) DOCUMENTED: ICD-10-PCS | Mod: HCNC,CPTII,S$GLB, | Performed by: OTOLARYNGOLOGY

## 2020-10-07 PROCEDURE — 3077F SYST BP >= 140 MM HG: CPT | Mod: HCNC,CPTII,S$GLB, | Performed by: OTOLARYNGOLOGY

## 2020-10-07 PROCEDURE — 3078F DIAST BP <80 MM HG: CPT | Mod: HCNC,CPTII,S$GLB, | Performed by: OTOLARYNGOLOGY

## 2020-10-07 PROCEDURE — 3077F PR MOST RECENT SYSTOLIC BLOOD PRESSURE >= 140 MM HG: ICD-10-PCS | Mod: HCNC,CPTII,S$GLB, | Performed by: OTOLARYNGOLOGY

## 2020-10-07 PROCEDURE — 99999 PR PBB SHADOW E&M-EST. PATIENT-LVL IV: ICD-10-PCS | Mod: PBBFAC,HCNC,, | Performed by: OTOLARYNGOLOGY

## 2020-10-07 PROCEDURE — 1126F AMNT PAIN NOTED NONE PRSNT: CPT | Mod: HCNC,S$GLB,, | Performed by: OTOLARYNGOLOGY

## 2020-10-07 PROCEDURE — 1159F MED LIST DOCD IN RCRD: CPT | Mod: HCNC,S$GLB,, | Performed by: OTOLARYNGOLOGY

## 2020-10-07 RX ORDER — PREDNISONE 20 MG/1
TABLET ORAL
Qty: 21 TABLET | Refills: 0 | Status: SHIPPED | OUTPATIENT
Start: 2020-10-07 | End: 2022-07-21

## 2020-10-07 NOTE — PROGRESS NOTES
Chief Complaint   Patient presents with    Hearing Loss     bilateral   .     HPI:  Jamison Mayes is a very pleasant 75 y.o. male here to see me today for the first time for evaluation of hearing loss.  He reports hearing loss that has been gradually progressing over the last 5 years. He had audiogram in 2017 showing SNHL and obtained binaural hearing aids from [facility] at that time. He notes that his hearing has been worse over the last 6 months.  He has noted any difference in hearing between the ears, with the left ear being the worse hearing ear.  He has noted any tinnitus in the left ear.  He has not had any recent issues with ear pain or ear drainage.  He denies a family history of hearing loss, and has not had any previous otologic surgery.  He admits to any history of significant loud noise exposure.He denies issues with dizziness.    Interval HPI 10/7/2020:  Mr. Maeys follows up today for hearing loss, tinnitus, ETD, and AR.  He reports that he feels his symptoms seem worse than his last visit 3 months ago.  He states that in early August he had an upper respiratory infection where upon he saw urgent care on 08/18 2020.  He was prescribed cefdinir, Bromfed, steroid IM injection.  He noticed muffling in his ears at that point which continued until he saw urgent care on September 30, 2020.  He was prescribed Allahist- D for 6 days.  He states that none of the aforementioned treatments have helped his symptoms.  Hearing is muffled and decreased bilaterally.  He feels the sensation of aural fullness. He denies otalgia, otorrhea, post-auricular pain, or vertigo.  He does admit to nasal congestion, post-nasal drip, and sneezing are improved. He notes bilateral tinnitus which is stable.       Past Medical History:   Diagnosis Date    AK (actinic keratosis) PDT scalp 2/2015 and 3/2015    s/p efudex on scalp and forehead, PDT scalp    Arthritis     Diabetes mellitus type II     Fever blister      Hepatocellular carcinoma 2018    s/p left hepatectomy of segments 2,3,4a/b    Hypertension     Joint pain     SCC (squamous cell carcinoma) 3/2013    L scalp vertex    SCC (squamous cell carcinoma) excised     left helix, in-situ    Seizures     Squamous Cell Carcinoma 3/2013    occipital scalp    Squamous Cell Carcinoma 3/2013    l vertex scalp     Social History     Socioeconomic History    Marital status:      Spouse name: Not on file    Number of children: Not on file    Years of education: Not on file    Highest education level: Not on file   Occupational History    Occupation: REtired    Social Needs    Financial resource strain: Not on file    Food insecurity     Worry: Not on file     Inability: Not on file    Transportation needs     Medical: Not on file     Non-medical: Not on file   Tobacco Use    Smoking status: Former Smoker     Years: 5.00     Quit date: 1966     Years since quittin.3    Smokeless tobacco: Never Used   Substance and Sexual Activity    Alcohol use: No    Drug use: No    Sexual activity: Not on file   Lifestyle    Physical activity     Days per week: Not on file     Minutes per session: Not on file    Stress: Not on file   Relationships    Social connections     Talks on phone: Not on file     Gets together: Not on file     Attends Catholic service: Not on file     Active member of club or organization: Not on file     Attends meetings of clubs or organizations: Not on file     Relationship status: Not on file   Other Topics Concern    Not on file   Social History Narrative    Not on file     Past Surgical History:   Procedure Laterality Date    CHOLECYSTECTOMY  2018    open at time of hepatic resection    EYE SURGERY      LIVER RESECTION Left 2018    segments 2,3, 4a/b    skin carcinoma removal       Family History   Problem Relation Age of Onset    Diabetes Father     Hypertension Mother     Vision loss Sister      Melanoma Neg Hx     Heart attack Neg Hx     Heart disease Neg Hx          Review of Systems  General: negative for chills, fever or weight loss  Psychological: negative for mood changes or depression  Ophthalmic: negative for blurry vision, photophobia or eye pain  ENT: see HPI  Respiratory: no cough, shortness of breath, or wheezing  Cardiovascular: no chest pain or dyspnea on exertion  Gastrointestinal: no abdominal pain, change in bowel habits, or black/ bloody stools  Musculoskeletal: negative for gait disturbance or muscular weakness  Neurological: no syncope or seizures; no ataxia  Dermatological: negative for puritis,  rash and jaundice  Hematologic/lymphatic: no easy bruising, no new lumps or bumps      Physical Exam:    Vitals:    10/07/20 1533   BP: (!) 156/68   Pulse: 78       Constitutional: Well appearing / communicating without difficutly.  NAD.  Eyes: EOM I Bilaterally  Head/Face: Normocephalic.  Negative paranasal sinus pressure/tenderness.  Salivary glands WNL.  House Brackmann I Bilaterally.    Right Ear: Auricle normal appearance. External Auditory Canal within normal limits no lesions or masses,TM w/o masses/lesions/perforations. TM mobility noted.   Left Ear: Auricle normal appearance. External Auditory Canal within normal limits no lesions or masses,TM w/o masses/lesions/perforations. TM mobility noted.  Rinne Air conduction >bone conduction bilaterally, Bailey midline.   Nose: No gross nasal septal deviation. Inferior Turbinates 3+ bilaterally. No septal perforation. No masses/lesions. External nasal skin appears normal without masses/lesions.  Oral Cavity: Gingiva/lips within normal limits.  Dentition/gingiva healthy appearing. Mucus membranes moist. Floor of mouth soft, no masses palpated. Oral Tongue mobile. Hard Palate appears normal.    Oropharynx: Base of tongue appears normal. No masses/lesions noted. Tonsillar fossa/pharyngeal wall without lesions. Posterior oropharynx WNL.  Soft  palate without masses. Midline uvula.   Neck/Lymphatic: No LAD I-VI bilaterally.  No thyromegaly.  No masses noted on exam.    Mirror laryngoscopy/nasopharyngoscopy: Active gag reflex.  Unable to perform.    Neuro/Psychiatric: AOx3.  Normal mood and affect.   Cardiovascular: Normal carotid pulses bilaterally, no increasing jugular venous distention noted at cervical region bilaterally.    Respiratory: Normal respiratory effort, no stridor, no retractions noted.        Diagnostic studies:   Audiogram reviewed personally by myself and in detail with the patient today.                 Assessment:    ICD-10-CM ICD-9-CM    1. Mixed conductive and sensorineural hearing loss of both ears  H90.6 389.22    2. ETD (Eustachian tube dysfunction), bilateral  H69.83 381.81    3. Non-seasonal allergic rhinitis, unspecified trigger  J30.89 477.8      The primary encounter diagnosis was Mixed conductive and sensorineural hearing loss of both ears. Diagnoses of ETD (Eustachian tube dysfunction), bilateral and Non-seasonal allergic rhinitis, unspecified trigger were also pertinent to this visit.      Plan:  No orders of the defined types were placed in this encounter.    We had a long discussion regarding the anatomy and function of the eustachian tube.  We discussed that the eustachian tube acts as a pump to keep the appropriate amount of pressure behind the ear drum. Continue  autoinsufflation exercises.   Continue Flonase and Xyzal.  Start Prednisone taper.  Follow up in 2 weeks.   Discussed option of in-office myringotomy +/-  PET placement at next visit if not improved.   Tinnitus management strategies discussed. Some patients find that restricting the salt or caffeine in their diet helps.  Tinnitus tends to be louder in times of stress and fatigue, and may decrease with time.  Sound machines may also be an effective masking technique if needed at night.     Recommend annual audiogram to monitor SNHL. Hearing protection when  exposed to loud sounds. Continue binaural amplification.     Thank you kindly for allowing me to participate in the patient's care.       Abbie King MD

## 2020-10-07 NOTE — PROGRESS NOTES
Health Maintenance Due   Topic Date Due    Shingles Vaccine (1 of 2) 02/06/1996    Influenza Vaccine (1) 08/01/2020    Foot Exam  11/21/2020     Updates were requested from care everywhere.  Chart was reviewed for overdue Proactive [facility] Encounters ([facility]) topics (CRS, Breast Cancer Screening, Eye exam)  Health Maintenance has been updated.  [facility] immunization registry triggered.  Immunizations were reconciled.

## 2020-10-07 NOTE — TELEPHONE ENCOUNTER
Patient originally scheduled for Monday the 12th without an audio. Called patient to see if he could come in sooner, but patient was at work so I spoke to his wife. She said he is miserable and is sure he could come in today and will leave work early. Made an appointment for 3 and 340. Patient wife thanked me for calling.

## 2020-10-20 ENCOUNTER — OFFICE VISIT (OUTPATIENT)
Dept: OTOLARYNGOLOGY | Facility: CLINIC | Age: 75
End: 2020-10-20
Payer: MEDICARE

## 2020-10-20 ENCOUNTER — CLINICAL SUPPORT (OUTPATIENT)
Dept: OTOLARYNGOLOGY | Facility: CLINIC | Age: 75
End: 2020-10-20
Payer: MEDICARE

## 2020-10-20 VITALS
HEIGHT: 66 IN | WEIGHT: 179.69 LBS | BODY MASS INDEX: 28.88 KG/M2 | TEMPERATURE: 98 F | SYSTOLIC BLOOD PRESSURE: 158 MMHG | HEART RATE: 84 BPM | DIASTOLIC BLOOD PRESSURE: 71 MMHG

## 2020-10-20 DIAGNOSIS — H90.6 MIXED CONDUCTIVE AND SENSORINEURAL HEARING LOSS OF BOTH EARS: Primary | ICD-10-CM

## 2020-10-20 DIAGNOSIS — H65.92 OME (OTITIS MEDIA WITH EFFUSION), LEFT: ICD-10-CM

## 2020-10-20 DIAGNOSIS — H93.12 TINNITUS AURIUM, LEFT: ICD-10-CM

## 2020-10-20 DIAGNOSIS — H93.12 TINNITUS, LEFT: ICD-10-CM

## 2020-10-20 DIAGNOSIS — H69.93 ETD (EUSTACHIAN TUBE DYSFUNCTION), BILATERAL: ICD-10-CM

## 2020-10-20 DIAGNOSIS — J30.89 NON-SEASONAL ALLERGIC RHINITIS, UNSPECIFIED TRIGGER: ICD-10-CM

## 2020-10-20 PROCEDURE — 92567 TYMPANOMETRY: CPT | Mod: HCNC,S$GLB,, | Performed by: AUDIOLOGIST-HEARING AID FITTER

## 2020-10-20 PROCEDURE — 99999 PR PBB SHADOW E&M-EST. PATIENT-LVL V: ICD-10-PCS | Mod: PBBFAC,HCNC,, | Performed by: OTOLARYNGOLOGY

## 2020-10-20 PROCEDURE — 3078F PR MOST RECENT DIASTOLIC BLOOD PRESSURE < 80 MM HG: ICD-10-PCS | Mod: HCNC,CPTII,S$GLB, | Performed by: OTOLARYNGOLOGY

## 2020-10-20 PROCEDURE — 69433 PR CREATE EARDRUM OPENING,LOCAL ANESTH: ICD-10-PCS | Mod: HCNC,LT,S$GLB, | Performed by: OTOLARYNGOLOGY

## 2020-10-20 PROCEDURE — 1126F AMNT PAIN NOTED NONE PRSNT: CPT | Mod: HCNC,S$GLB,, | Performed by: OTOLARYNGOLOGY

## 2020-10-20 PROCEDURE — 3008F BODY MASS INDEX DOCD: CPT | Mod: HCNC,CPTII,S$GLB, | Performed by: OTOLARYNGOLOGY

## 2020-10-20 PROCEDURE — 3078F DIAST BP <80 MM HG: CPT | Mod: HCNC,CPTII,S$GLB, | Performed by: OTOLARYNGOLOGY

## 2020-10-20 PROCEDURE — 1126F PR PAIN SEVERITY QUANTIFIED, NO PAIN PRESENT: ICD-10-PCS | Mod: HCNC,S$GLB,, | Performed by: OTOLARYNGOLOGY

## 2020-10-20 PROCEDURE — 1101F PT FALLS ASSESS-DOCD LE1/YR: CPT | Mod: HCNC,CPTII,S$GLB, | Performed by: OTOLARYNGOLOGY

## 2020-10-20 PROCEDURE — 92557 PR COMPREHENSIVE HEARING TEST: ICD-10-PCS | Mod: HCNC,S$GLB,, | Performed by: AUDIOLOGIST-HEARING AID FITTER

## 2020-10-20 PROCEDURE — 99214 PR OFFICE/OUTPT VISIT, EST, LEVL IV, 30-39 MIN: ICD-10-PCS | Mod: 25,HCNC,S$GLB, | Performed by: OTOLARYNGOLOGY

## 2020-10-20 PROCEDURE — 92567 PR TYMPA2METRY: ICD-10-PCS | Mod: HCNC,S$GLB,, | Performed by: AUDIOLOGIST-HEARING AID FITTER

## 2020-10-20 PROCEDURE — 1101F PR PT FALLS ASSESS DOC 0-1 FALLS W/OUT INJ PAST YR: ICD-10-PCS | Mod: HCNC,CPTII,S$GLB, | Performed by: OTOLARYNGOLOGY

## 2020-10-20 PROCEDURE — 1159F MED LIST DOCD IN RCRD: CPT | Mod: HCNC,S$GLB,, | Performed by: OTOLARYNGOLOGY

## 2020-10-20 PROCEDURE — 99999 PR PBB SHADOW E&M-EST. PATIENT-LVL I: ICD-10-PCS | Mod: PBBFAC,HCNC,, | Performed by: AUDIOLOGIST-HEARING AID FITTER

## 2020-10-20 PROCEDURE — 99999 PR PBB SHADOW E&M-EST. PATIENT-LVL I: CPT | Mod: PBBFAC,HCNC,, | Performed by: AUDIOLOGIST-HEARING AID FITTER

## 2020-10-20 PROCEDURE — 3008F PR BODY MASS INDEX (BMI) DOCUMENTED: ICD-10-PCS | Mod: HCNC,CPTII,S$GLB, | Performed by: OTOLARYNGOLOGY

## 2020-10-20 PROCEDURE — 1159F PR MEDICATION LIST DOCUMENTED IN MEDICAL RECORD: ICD-10-PCS | Mod: HCNC,S$GLB,, | Performed by: OTOLARYNGOLOGY

## 2020-10-20 PROCEDURE — 3077F SYST BP >= 140 MM HG: CPT | Mod: HCNC,CPTII,S$GLB, | Performed by: OTOLARYNGOLOGY

## 2020-10-20 PROCEDURE — 99999 PR PBB SHADOW E&M-EST. PATIENT-LVL V: CPT | Mod: PBBFAC,HCNC,, | Performed by: OTOLARYNGOLOGY

## 2020-10-20 PROCEDURE — 3077F PR MOST RECENT SYSTOLIC BLOOD PRESSURE >= 140 MM HG: ICD-10-PCS | Mod: HCNC,CPTII,S$GLB, | Performed by: OTOLARYNGOLOGY

## 2020-10-20 PROCEDURE — 99214 OFFICE O/P EST MOD 30 MIN: CPT | Mod: 25,HCNC,S$GLB, | Performed by: OTOLARYNGOLOGY

## 2020-10-20 PROCEDURE — 69433 CREATE EARDRUM OPENING: CPT | Mod: HCNC,LT,S$GLB, | Performed by: OTOLARYNGOLOGY

## 2020-10-20 PROCEDURE — 92557 COMPREHENSIVE HEARING TEST: CPT | Mod: HCNC,S$GLB,, | Performed by: AUDIOLOGIST-HEARING AID FITTER

## 2020-10-20 RX ORDER — OFLOXACIN 3 MG/ML
3 SOLUTION AURICULAR (OTIC) 2 TIMES DAILY
Qty: 5 ML | Refills: 0 | Status: SHIPPED | OUTPATIENT
Start: 2020-10-20 | End: 2020-10-30

## 2020-10-20 NOTE — PROGRESS NOTES
Chief Complaint   Patient presents with    Ear Fullness     right ear has improved, left ear pt states no changes   .     HPI:  Jamison Mayes is a very pleasant 75 y.o. male here to see me today for the first time for evaluation of hearing loss.  He reports hearing loss that has been gradually progressing over the last 5 years. He had audiogram in 2017 showing SNHL and obtained binaural hearing aids from [facility] at that time. He notes that his hearing has been worse over the last 6 months.  He has noted any difference in hearing between the ears, with the left ear being the worse hearing ear.  He has noted any tinnitus in the left ear.  He has not had any recent issues with ear pain or ear drainage.  He denies a family history of hearing loss, and has not had any previous otologic surgery.  He admits to any history of significant loud noise exposure.He denies issues with dizziness.    Interval HPI 10/7/2020:  Mr. Mayes follows up today for hearing loss, tinnitus, ETD, and AR.  He reports that he feels his symptoms seem worse than his last visit 3 months ago.  He states that in early August he had an upper respiratory infection where upon he saw urgent care on 08/18 2020.  He was prescribed cefdinir, Bromfed, steroid IM injection.  He noticed muffling in his ears at that point which continued until he saw urgent care on September 30, 2020.  He was prescribed Allahist- D for 6 days.  He states that none of the aforementioned treatments have helped his symptoms.  Hearing is muffled and decreased bilaterally.  He feels the sensation of aural fullness. He denies otalgia, otorrhea, post-auricular pain, or vertigo.  He does admit to nasal congestion, post-nasal drip, and sneezing are improved. He notes bilateral tinnitus which is stable.     Interval HPI 10/20/2020:  Follow up ETD, OME, mixed hearing loss. Reports that right ear seems improved. Left ear still with hearing difficulty. Seems muffled and notes  pressure in the right ear. Denies ear pain, otorrhea, post-auricular pain, or vertigo.  He has not had any  Improvement with medical management in the left ear.       Past Medical History:   Diagnosis Date    AK (actinic keratosis) PDT scalp 2015 and 3/2015    s/p efudex on scalp and forehead, PDT scalp    Arthritis     Diabetes mellitus type II     Fever blister     Hepatocellular carcinoma 2018    s/p left hepatectomy of segments 2,3,4a/b    Hypertension     Joint pain     SCC (squamous cell carcinoma) 3/2013    L scalp vertex    SCC (squamous cell carcinoma) excised     left helix, in-situ    Seizures     Squamous Cell Carcinoma 3/2013    occipital scalp    Squamous Cell Carcinoma 3/2013    l vertex scalp     Social History     Socioeconomic History    Marital status:      Spouse name: Not on file    Number of children: Not on file    Years of education: Not on file    Highest education level: Not on file   Occupational History    Occupation: REtired    Social Needs    Financial resource strain: Not on file    Food insecurity     Worry: Not on file     Inability: Not on file    Transportation needs     Medical: Not on file     Non-medical: Not on file   Tobacco Use    Smoking status: Former Smoker     Years: 5.00     Quit date: 1966     Years since quittin.4    Smokeless tobacco: Never Used   Substance and Sexual Activity    Alcohol use: No    Drug use: No    Sexual activity: Not on file   Lifestyle    Physical activity     Days per week: Not on file     Minutes per session: Not on file    Stress: Not on file   Relationships    Social connections     Talks on phone: Not on file     Gets together: Not on file     Attends Latter-day service: Not on file     Active member of club or organization: Not on file     Attends meetings of clubs or organizations: Not on file     Relationship status: Not on file   Other Topics Concern    Not on file   Social History  Narrative    Not on file     Past Surgical History:   Procedure Laterality Date    CHOLECYSTECTOMY  03/05/2018    open at time of hepatic resection    EYE SURGERY      LIVER RESECTION Left 03/05/2018    segments 2,3, 4a/b    skin carcinoma removal       Family History   Problem Relation Age of Onset    Diabetes Father     Hypertension Mother     Vision loss Sister     Melanoma Neg Hx     Heart attack Neg Hx     Heart disease Neg Hx          Review of Systems  General: negative for chills, fever or weight loss  Psychological: negative for mood changes or depression  Ophthalmic: negative for blurry vision, photophobia or eye pain  ENT: see HPI  Respiratory: no cough, shortness of breath, or wheezing  Cardiovascular: no chest pain or dyspnea on exertion  Gastrointestinal: no abdominal pain, change in bowel habits, or black/ bloody stools  Musculoskeletal: negative for gait disturbance or muscular weakness  Neurological: no syncope or seizures; no ataxia  Dermatological: negative for puritis,  rash and jaundice  Hematologic/lymphatic: no easy bruising, no new lumps or bumps      Physical Exam:    Vitals:    10/20/20 1051   BP: (!) 158/71   Pulse: 84   Temp: 98.3 °F (36.8 °C)       Constitutional: Well appearing / communicating without difficutly.  NAD.  Eyes: EOM I Bilaterally  Head/Face: Normocephalic.  Negative paranasal sinus pressure/tenderness.  Salivary glands WNL.  House Brackmann I Bilaterally.    Right Ear: Auricle normal appearance. External Auditory Canal within normal limits no lesions or masses,TM w/o masses/lesions/perforations. TM mobility noted.   Left Ear: Auricle normal appearance. External Auditory Canal within normal limits no lesions or masses,TM w/o masses/lesions/perforations. TM mobility noted.  Rinne Air conduction >bone conduction bilaterally, Bailey midline.   Nose: No gross nasal septal deviation. Inferior Turbinates 3+ bilaterally. No septal perforation. No masses/lesions. External  nasal skin appears normal without masses/lesions.  Oral Cavity: Gingiva/lips within normal limits.  Dentition/gingiva healthy appearing. Mucus membranes moist. Floor of mouth soft, no masses palpated. Oral Tongue mobile. Hard Palate appears normal.    Oropharynx: Base of tongue appears normal. No masses/lesions noted. Tonsillar fossa/pharyngeal wall without lesions. Posterior oropharynx WNL.  Soft palate without masses. Midline uvula.   Neck/Lymphatic: No LAD I-VI bilaterally.  No thyromegaly.  No masses noted on exam.    Mirror laryngoscopy/nasopharyngoscopy: Active gag reflex.  Unable to perform.    Neuro/Psychiatric: AOx3.  Normal mood and affect.   Cardiovascular: Normal carotid pulses bilaterally, no increasing jugular venous distention noted at cervical region bilaterally.    Respiratory: Normal respiratory effort, no stridor, no retractions noted.        Diagnostic studies:   Audiogram reviewed personally by myself and in detail with the patient today.                   Assessment:    ICD-10-CM ICD-9-CM    1. Mixed conductive and sensorineural hearing loss of both ears  H90.6 389.22    2. ETD (Eustachian tube dysfunction), bilateral  H69.83 381.81    3. OME (otitis media with effusion), left  H65.92 381.4    4. Non-seasonal allergic rhinitis, unspecified trigger  J30.89 477.8    5. Tinnitus, left  H93.12 388.30      The primary encounter diagnosis was Mixed conductive and sensorineural hearing loss of both ears. Diagnoses of ETD (Eustachian tube dysfunction), bilateral, OME (otitis media with effusion), left, Non-seasonal allergic rhinitis, unspecified trigger, and Tinnitus, left were also pertinent to this visit.      Plan:  No orders of the defined types were placed in this encounter.    We had a long discussion regarding the anatomy and function of the eustachian tube.  We discussed that the eustachian tube acts as a pump to keep the appropriate amount of pressure behind the ear drum. Continue   autoinsufflation exercises.   Continue Flonase and Xyzal.    Discussed option of in-office myringotomy +/-  PET placement today. Risks, benefits, and alternatives were discussed in detail with the patient.  Informed consent was obtained.     Recommend annual audiogram to monitor SNHL. Hearing protection when exposed to loud sounds. Continue binaural amplification.     Thank you kindly for allowing me to participate in the patient's care.       Abbie King MD

## 2020-10-21 ENCOUNTER — OFFICE VISIT (OUTPATIENT)
Dept: DERMATOLOGY | Facility: CLINIC | Age: 75
End: 2020-10-21
Payer: MEDICARE

## 2020-10-21 DIAGNOSIS — Z85.828 HISTORY OF NONMELANOMA SKIN CANCER: ICD-10-CM

## 2020-10-21 DIAGNOSIS — Z12.83 SKIN CANCER SCREENING: ICD-10-CM

## 2020-10-21 DIAGNOSIS — L57.0 AK (ACTINIC KERATOSIS): Primary | ICD-10-CM

## 2020-10-21 DIAGNOSIS — L82.1 SK (SEBORRHEIC KERATOSIS): ICD-10-CM

## 2020-10-21 PROCEDURE — 1126F AMNT PAIN NOTED NONE PRSNT: CPT | Mod: HCNC,S$GLB,, | Performed by: DERMATOLOGY

## 2020-10-21 PROCEDURE — 1101F PT FALLS ASSESS-DOCD LE1/YR: CPT | Mod: HCNC,CPTII,S$GLB, | Performed by: DERMATOLOGY

## 2020-10-21 PROCEDURE — 17003 DESTRUCT PREMALG LES 2-14: CPT | Mod: HCNC,S$GLB,, | Performed by: DERMATOLOGY

## 2020-10-21 PROCEDURE — 17000 PR DESTRUCTION(LASER SURGERY,CRYOSURGERY,CHEMOSURGERY),PREMALIGNANT LESIONS,FIRST LESION: ICD-10-PCS | Mod: HCNC,S$GLB,, | Performed by: DERMATOLOGY

## 2020-10-21 PROCEDURE — 99999 PR PBB SHADOW E&M-EST. PATIENT-LVL III: ICD-10-PCS | Mod: PBBFAC,HCNC,, | Performed by: DERMATOLOGY

## 2020-10-21 PROCEDURE — 99999 PR PBB SHADOW E&M-EST. PATIENT-LVL III: CPT | Mod: PBBFAC,HCNC,, | Performed by: DERMATOLOGY

## 2020-10-21 PROCEDURE — 99213 OFFICE O/P EST LOW 20 MIN: CPT | Mod: 25,HCNC,S$GLB, | Performed by: DERMATOLOGY

## 2020-10-21 PROCEDURE — 1126F PR PAIN SEVERITY QUANTIFIED, NO PAIN PRESENT: ICD-10-PCS | Mod: HCNC,S$GLB,, | Performed by: DERMATOLOGY

## 2020-10-21 PROCEDURE — 17003 DESTRUCTION, PREMALIGNANT LESIONS; SECOND THROUGH 14 LESIONS: ICD-10-PCS | Mod: HCNC,S$GLB,, | Performed by: DERMATOLOGY

## 2020-10-21 PROCEDURE — 1101F PR PT FALLS ASSESS DOC 0-1 FALLS W/OUT INJ PAST YR: ICD-10-PCS | Mod: HCNC,CPTII,S$GLB, | Performed by: DERMATOLOGY

## 2020-10-21 PROCEDURE — 1159F PR MEDICATION LIST DOCUMENTED IN MEDICAL RECORD: ICD-10-PCS | Mod: HCNC,S$GLB,, | Performed by: DERMATOLOGY

## 2020-10-21 PROCEDURE — 1159F MED LIST DOCD IN RCRD: CPT | Mod: HCNC,S$GLB,, | Performed by: DERMATOLOGY

## 2020-10-21 PROCEDURE — 17000 DESTRUCT PREMALG LESION: CPT | Mod: HCNC,S$GLB,, | Performed by: DERMATOLOGY

## 2020-10-21 PROCEDURE — 99213 PR OFFICE/OUTPT VISIT, EST, LEVL III, 20-29 MIN: ICD-10-PCS | Mod: 25,HCNC,S$GLB, | Performed by: DERMATOLOGY

## 2020-10-21 NOTE — PATIENT INSTRUCTIONS
Summer Sun Protection      The [facility]at[facility] would like to remind you of the importance of sun protection all year round and particularly during the summer when the suns rays are the strongest. It has been proven that both acute and chronic sun exposure damages our cells and leads to skin cancer. Beyond skin cancer, the sun causes 90% of the symptoms of pre-mature skin aging, including wrinkles, lentigines (brown spots), and thin, easily bruised skin. Proper sun protection can help prevent these unwanted conditions.    Many patients report that the dont go in the sun. It has been shown that the average person receives 18 hours of incidental sun exposure per week during activities such as walking through parking lots, driving, or sitting next to windows. This accumulates to several bad sunburns per year!    In choosing sunscreen, you want one that protects against both UVA and UVB rays. It is recommended that you use one of SPF 30 or higher. It is important to apply the sunscreen about 20 minutes prior to sun exposure. Most sunscreens are chemical sunscreens and a reaction must take place in the skin so that they are effective. If they are applied and then you are immediately exposed to the sun or start sweating, this reaction has not had time to take place and you are therefore unprotected. Sunscreen needs to be reapplied every 2 hours if you are participating in water sports or sweating. We recommend Elta MD or Neutrogena Ultra Sheer Dry Touch SPF 55 for daily use; however there are many options and it is most important for you to find one that you will use on a consistent basis.    If you have sensitive skin, you may do best with a sunscreen that contains only physical blockers such as titanium dioxide or zinc oxide. These are typically thicker and harder to apply, however they afford very good protection. Neutrogena Sensitive Skin, Blue Lizard Sensitive Skin (pink top) or Neutrogena Pure  and Free are popular ones.     Aside from sunscreen, clothes with UV protection, wide brimmed hats, and sunglasses are other means of sun protection that we recommend.                        [facility] - DERMATOLOGY [address]  [place] [address]  Dept: [phone]  Dept Fax: [phone]                                                                               CRYOSURGERY      Your doctor has used a method called cryosurgery to treat your skin condition. Cryosurgery refers to the use of very cold substances to treat a variety of skin conditions such as warts, pre-skin cancers, molluscum contagiosum, sun spots, and several benign growths. The substance we use in cryosurgery is liquid nitrogen and is so cold (-195 degrees Celsius) that is burns when administered.     Following treatment in the office, the skin may immediately burn and become red. You may find the area around the lesion is affected as well. It is sometimes necessary to treat not only the lesion, but a small area of the surrounding normal skin to achieve a good response.     A blister, and even a blood filled blister, may form after treatment.   This is a normal response. If the blister is painful, it is acceptable to sterilize a needle and with rubbing alcohol and gently pop the blister. It is important that you gently wash the area with soap and warm water as the blister fluid may contain wart virus if a wart was treated. Do no remove the roof of the blister.     The area treated can take anywhere from 1-3 weeks to heal. Healing time depends on the kind skin lesion treated, the location, and how aggressively the lesion was treated. It is recommended that the areas treated are covered with Vaseline or bacitracin ointment and a band-aid. If a band-aid is not practical, just ointment applied several times per day will do. Keeping these areas moist will speed the healing time.    Treatment with liquid  nitrogen can leave a scar. In dark skin, it may be a light or dark scar, in light skin it may be a white or pink scar. These will generally fade with time, but may never go away completely.     If you have any concerns after your treatment, please feel free to call the office.       [facility] [place] [address]/ [phone] FAX/ [email or web address]

## 2020-10-21 NOTE — PROGRESS NOTES
Subjective:       Patient ID:  Jamison Mayes is a 75 y.o. male who presents for   Chief Complaint   Patient presents with    Spot     Pt presents today for Right ear, x 5 days, scaly, Tx. none     HPI  Patient has a history of non-melanoma skin cancer and is here today for routine skin check.   Has previously used cryo, efudex, and PDT for AKs.  Today, he c/o a scaly bump on his R ear x 5 days. Asymptomatic and no prior treatment.      Review of Systems   Constitutional: Negative for fever, chills, weight loss, weight gain, fatigue, night sweats and malaise.   Skin: Positive for wears hat. Negative for daily sunscreen use, activity-related sunscreen use and recent sunburn.   Hematologic/Lymphatic: Bruises/bleeds easily.        Objective:    Physical Exam   Constitutional: He appears well-developed and well-nourished. No distress.   Neurological: He is alert and oriented to person, place, and time. He is not disoriented.   Psychiatric: He has a normal mood and affect.   Skin:   Areas Examined (abnormalities noted in diagram):   Scalp / Hair Palpated and Inspected  Head / Face Inspection Performed  Neck Inspection Performed  Chest / Axilla Inspection Performed  Back Inspection Performed  RUE Inspected  LUE Inspection Performed                   Diagram Legend     Erythematous scaling macule/papule c/w actinic keratosis       Vascular papule c/w angioma      Pigmented verrucoid papule/plaque c/w seborrheic keratosis      Yellow umbilicated papule c/w sebaceous hyperplasia      Irregularly shaped tan macule c/w lentigo     1-2 mm smooth white papules consistent with Milia      Movable subcutaneous cyst with punctum c/w epidermal inclusion cyst      Subcutaneous movable cyst c/w pilar cyst      Firm pink to brown papule c/w dermatofibroma      Pedunculated fleshy papule(s) c/w skin tag(s)      Evenly pigmented macule c/w junctional nevus     Mildly variegated pigmented, slightly irregular-bordered macule c/w mildly  atypical nevus      Flesh colored to evenly pigmented papule c/w intradermal nevus       Pink pearly papule/plaque c/w basal cell carcinoma      Erythematous hyperkeratotic cursted plaque c/w SCC      Surgical scar with no sign of skin cancer recurrence      Open and closed comedones      Inflammatory papules and pustules      Verrucoid papule consistent consistent with wart     Erythematous eczematous patches and plaques     Dystrophic onycholytic nail with subungual debris c/w onychomycosis     Umbilicated papule    Erythematous-base heme-crusted tan verrucoid plaque consistent with inflamed seborrheic keratosis     Erythematous Silvery Scaling Plaque c/w Psoriasis     See annotation      Assessment / Plan:        AK (actinic keratosis)  Cryosurgery Procedure Note    Verbal consent from the patient is obtained including, but not limited to, risk of hypopigmentation/hyperpigmentation, scar, recurrence of lesion. The patient is aware of the precancerous quality and need for treatment of these lesions. Liquid nitrogen cryosurgery is applied to the 13 actinic keratoses, as detailed in the physical exam, to produce a freeze injury. The patient is aware that blisters may form and is instructed on wound care with gentle cleansing and use of vaseline ointment to keep moist until healed. The patient is supplied a handout on cryosurgery and is instructed to call if lesions do not completely resolve.    SK (seborrheic keratosis)  These are benign inherited growths without a malignant potential. Reassurance given to patient. No treatment is necessary.   Treatment of benign, asymptomatic lesions may be considered cosmetic.  Warned about risk of hypo- or hyperpigmentation with treatment and risk of recurrence.    Skin cancer screening  History of nonmelanoma skin cancer  Upper body skin examination performed today including at least 6 points as noted in physical examination. No lesions suspicious for malignancy noted.  Patient  instructed in importance of daily broad spectrum sunscreen use with spf at least 30. Sun avoidance and topical protection/protective clothing discussed.    Follow up in about 4 months (around 2/21/2021) for skin check or sooner for any concerns.

## 2020-10-22 ENCOUNTER — OFFICE VISIT (OUTPATIENT)
Dept: INTERNAL MEDICINE | Facility: CLINIC | Age: 75
End: 2020-10-22
Payer: MEDICARE

## 2020-10-22 VITALS
RESPIRATION RATE: 15 BRPM | OXYGEN SATURATION: 99 % | HEIGHT: 66 IN | BODY MASS INDEX: 28.61 KG/M2 | SYSTOLIC BLOOD PRESSURE: 138 MMHG | WEIGHT: 178 LBS | HEART RATE: 71 BPM | DIASTOLIC BLOOD PRESSURE: 80 MMHG

## 2020-10-22 DIAGNOSIS — E11.59 HYPERTENSION ASSOCIATED WITH DIABETES: ICD-10-CM

## 2020-10-22 DIAGNOSIS — R19.7 DIARRHEA, UNSPECIFIED TYPE: ICD-10-CM

## 2020-10-22 DIAGNOSIS — Z00.00 ENCOUNTER FOR PREVENTIVE HEALTH EXAMINATION: ICD-10-CM

## 2020-10-22 DIAGNOSIS — G40.219 PARTIAL EPILEPSY WITH IMPAIRMENT OF CONSCIOUSNESS, INTRACTABLE: ICD-10-CM

## 2020-10-22 DIAGNOSIS — Z96.22 PATENT PRESSURE EQUALIZATION (PE) TUBE ON LEFT SIDE: ICD-10-CM

## 2020-10-22 DIAGNOSIS — E11.69 HYPERLIPIDEMIA ASSOCIATED WITH TYPE 2 DIABETES MELLITUS: ICD-10-CM

## 2020-10-22 DIAGNOSIS — E11.9 DM TYPE 2 WITHOUT RETINOPATHY: ICD-10-CM

## 2020-10-22 DIAGNOSIS — R42 POSITIONAL LIGHTHEADEDNESS: ICD-10-CM

## 2020-10-22 DIAGNOSIS — H91.93 DECREASED HEARING OF BOTH EARS: ICD-10-CM

## 2020-10-22 DIAGNOSIS — E11.8 DM (DIABETES MELLITUS) WITH COMPLICATIONS: ICD-10-CM

## 2020-10-22 DIAGNOSIS — G40.909 SEIZURE DISORDER: Primary | ICD-10-CM

## 2020-10-22 DIAGNOSIS — R15.2 FECAL URGENCY: ICD-10-CM

## 2020-10-22 DIAGNOSIS — I15.2 HYPERTENSION ASSOCIATED WITH DIABETES: ICD-10-CM

## 2020-10-22 DIAGNOSIS — E11.65 TYPE 2 DIABETES MELLITUS WITH HYPERGLYCEMIA, WITHOUT LONG-TERM CURRENT USE OF INSULIN: ICD-10-CM

## 2020-10-22 DIAGNOSIS — C22.0 HEPATOCELLULAR CARCINOMA: ICD-10-CM

## 2020-10-22 DIAGNOSIS — E78.5 HYPERLIPIDEMIA ASSOCIATED WITH TYPE 2 DIABETES MELLITUS: ICD-10-CM

## 2020-10-22 DIAGNOSIS — K86.2 PANCREATIC CYST: ICD-10-CM

## 2020-10-22 PROCEDURE — 3079F DIAST BP 80-89 MM HG: CPT | Mod: HCNC,CPTII,S$GLB, | Performed by: NURSE PRACTITIONER

## 2020-10-22 PROCEDURE — 99999 PR PBB SHADOW E&M-EST. PATIENT-LVL V: ICD-10-PCS | Mod: PBBFAC,HCNC,, | Performed by: NURSE PRACTITIONER

## 2020-10-22 PROCEDURE — 99499 UNLISTED E&M SERVICE: CPT | Mod: S$GLB,,, | Performed by: NURSE PRACTITIONER

## 2020-10-22 PROCEDURE — 3044F HG A1C LEVEL LT 7.0%: CPT | Mod: HCNC,CPTII,S$GLB, | Performed by: NURSE PRACTITIONER

## 2020-10-22 PROCEDURE — 3044F PR MOST RECENT HEMOGLOBIN A1C LEVEL <7.0%: ICD-10-PCS | Mod: HCNC,CPTII,S$GLB, | Performed by: NURSE PRACTITIONER

## 2020-10-22 PROCEDURE — G0439 PR MEDICARE ANNUAL WELLNESS SUBSEQUENT VISIT: ICD-10-PCS | Mod: HCNC,S$GLB,, | Performed by: NURSE PRACTITIONER

## 2020-10-22 PROCEDURE — G0439 PPPS, SUBSEQ VISIT: HCPCS | Mod: HCNC,S$GLB,, | Performed by: NURSE PRACTITIONER

## 2020-10-22 PROCEDURE — 3079F PR MOST RECENT DIASTOLIC BLOOD PRESSURE 80-89 MM HG: ICD-10-PCS | Mod: HCNC,CPTII,S$GLB, | Performed by: NURSE PRACTITIONER

## 2020-10-22 PROCEDURE — 3075F PR MOST RECENT SYSTOLIC BLOOD PRESS GE 130-139MM HG: ICD-10-PCS | Mod: HCNC,CPTII,S$GLB, | Performed by: NURSE PRACTITIONER

## 2020-10-22 PROCEDURE — 3075F SYST BP GE 130 - 139MM HG: CPT | Mod: HCNC,CPTII,S$GLB, | Performed by: NURSE PRACTITIONER

## 2020-10-22 PROCEDURE — 99499 RISK ADDL DX/OHS AUDIT: ICD-10-PCS | Mod: S$GLB,,, | Performed by: NURSE PRACTITIONER

## 2020-10-22 PROCEDURE — 99999 PR PBB SHADOW E&M-EST. PATIENT-LVL V: CPT | Mod: PBBFAC,HCNC,, | Performed by: NURSE PRACTITIONER

## 2020-10-22 NOTE — Clinical Note
Annual appt scheduled 12/23- needs annual labs etc ordered 7 scheduled- pt wants to get done in [place]

## 2020-10-22 NOTE — PATIENT INSTRUCTIONS
Counseling and Referral of Other Preventative  (Italic type indicates deductible and co-insurance are waived)    Patient Name: Jamison Mayes  Today's Date: 10/22/2020    Health Maintenance       Date Due Completion Date    Foot Exam 11/21/2020 11/21/2019    Shingles Vaccine (1 of 2) Deferred- ear infection & covid 19 pandemic   ---    Lipid Panel 11/14/2020 11/14/2019    Hemoglobin A1c 12/04/2020 6/4/2020    Eye Exam 01/30/2021 1/30/2020        Low Dose Statin 10/22/2021   10/22/2020    Colorectal Cancer Screening Due-discuss w PCP at annual visit   11/16/2011    TETANUS VACCINE 11/15/2028   11/15/2018        No orders of the defined types were placed in this encounter.    The following information is provided to all patients.  This information is to help you find resources for any of the problems found today that may be affecting your health:                Living healthy guide: www.[facility].[place].[facility]      Understanding Diabetes: www.diabetes.org      Eating healthy: www.cdc.gov/healthyweight      [facility] home safety checklist: www.cdc.gov/steadi/patient.html      Agency on Aging: www.goea.[place].[facility]      Alcoholics anonymous (AA): www.aa.org      Physical Activity: www.tylor.nih.gov/ny5kjgs      Tobacco use: www.quitwithusla.org     Diet: Diabetes  Food is an important tool that you can use to control diabetes and stay healthy. Eating well-balanced meals in the correct amounts will help you control your blood glucose levels and prevent low blood sugar reactions. It will also help you reduce the health risks of diabetes. There is no one specific diet that is right for everyone with diabetes. But there are general guidelines to follow. A registered dietitian (RD) will create a tailored diet approach thats just right for you. He or she will also help you plan healthy meals and snacks. If you have any questions, call your dietitian for advice.     Guidelines for success  Talk with your healthcare provider  before starting a diabetes diet or weight loss program. If you haven't talked with a dietitian yet, ask your provider for a referral. The following guidelines can help you succeed:  · Select foods from the 6 food groups below. Your dietitian will help you find food choices within each group. He or she will also show you serving sizes and how many servings you can have at each meal.  ¨ Grains, beans, and starchy vegetables  ¨ Vegetables  ¨ Fruit  ¨ Milk or yogurt  ¨ Meat, poultry, fish, or tofu  ¨ Healthy fats  · Check your blood sugar levels as directed by your provider. Take any medicine as prescribed by your provider.  · Learn to read food labels and pick the right portion sizes.  · Eat only the amount of food in your meal plan. Eat about the same amount of food at regular times each day. Dont skip meals. Eat meals 4 to 5 hours apart, with snacks in between.  · Limit alcohol. It raises blood sugar levels. Drink water or calorie-free diet drinks that use safe sweeteners.  · Eat less fat to help lower your risk of heart disease. Use nonfat or low-fat dairy products and lean meats. Avoid fried foods. Use cooking oils that are unsaturated, such as olive, canola, or peanut oil.  · Talk with your dietitian about safe sugar substitutes.  · Avoid added salt. It can contribute to high blood pressure, which can cause heart disease. People with diabetes already have a risk of high blood pressure and heart disease.  · Stay at a healthy weight. If you need to lose weight, cut down on your portion sizes. But dont skip meals. Exercise is an important part of any weight management program. Talk with your provider about an exercise program thats right for you.  · For more information about the best diet plan for you, talk with a registered dietitian (RD). To find an RD in your area, contact:  ¨ Academy of Nutrition and Dietetics www.eatright.org  ¨ The American Diabetes Association [phone] www.diabetes.org  Date Last  Reviewed: 8/1/2016  © 2309-1223 [employer]. [address] [place] [address], [place] [address]. All rights reserved. This information is not intended as a substitute for professional medical care. Always follow your healthcare professional's instructions.

## 2020-10-22 NOTE — PROGRESS NOTES
"Jamison Mayes presented for a  Medicare AWV and comprehensive Health Risk Assessment today. 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  · Allergies and Current Medications  · Health Risk Assessment  · Health Maintenance  · Care Team external opth-cangelosi- current    ** See Completed Assessments for Annual Wellness Visit within the encounter summary.**       The following assessments were completed:  · Living Situation-   · CAGE  · Depression Screening  · Timed Get Up and Go  · Whisper Test- cotton in ana maría ear-infection & PE tube placed this am- abn screen- followed by ENT  · Cognitive Function Screening  · Nutrition Screening  · ADL Screening  · PAQ Screening- volunteers at [employer] [email or web address]    Vitals:    10/22/20 1014   BP: 138/80   Pulse: 71   Resp: 15   SpO2: 99%   Weight: 80.7 kg (178 lb)   Height: 5' 6" (1.676 m)     Body mass index is 28.73 kg/m².  Physical Exam  Constitutional:       Appearance: He is well-developed.      Comments: Younger in appearance than age   HENT:      Head: Normocephalic and atraumatic.      Ears:      Comments: Cotton ana maría ears- wearing mask     Mouth/Throat:      Pharynx: No oropharyngeal exudate.   Eyes:      General: No scleral icterus.  Neck:      Vascular: No carotid bruit.   Cardiovascular:      Rate and Rhythm: Normal rate and regular rhythm.   Pulmonary:      Effort: Pulmonary effort is normal. No respiratory distress.      Breath sounds: Normal breath sounds.   Abdominal:      General: Bowel sounds are normal. There is no distension.      Palpations: Abdomen is soft.      Comments: protrudent   Musculoskeletal: Normal range of motion.   Skin:     General: Skin is warm and dry.      Findings: No rash.   Neurological:      General: No focal deficit present.      Mental Status: He is alert and oriented to person, place, and time.      " Cranial Nerves: No cranial nerve deficit.      Motor: No abnormal muscle tone.      Coordination: Coordination normal.      Deep Tendon Reflexes: Reflexes are normal and symmetric. Reflexes normal.   Psychiatric:         Mood and Affect: Mood normal.         Behavior: Behavior normal.         Thought Content: Thought content normal.         Judgment: Judgment normal.           Lab Results   Component Value Date    HGBA1C 6.0 (H) 06/04/2020    CHOL 132 11/14/2019    HDL 47 11/14/2019    LDLCALC 62.2 (L) 11/14/2019    TRIG 114 11/14/2019    CHOLHDL 35.6 11/14/2019      Lab Results   Component Value Date    PSA 1.5 11/14/2019       Diagnoses and health risks identified today and associated recommendations/orders:    1. Seizure disorder  Stable- followed by PCP    2. DM (diabetes mellitus) with complications  Stable- followed by PCP    3. Localization-related (focal) (partial) epilepsy and epileptic syndromes with complex partial seizures, with intractable epilepsy  Stable- followed by PCP- denies seizure activity    4. Fecal urgency  Stable- followed by PCP    5. Hepatocellular carcinoma  Stable- followed by PCP, oncology    6. Diarrhea, unspecified type  Stable- followed by PCP    7. Hyperlipidemia associated with type 2 diabetes mellitus  Stable- followed by PCP    8. Hypertension associated with diabetes  Stable- followed by PCP    9. Pancreatic cyst  Stable- followed by PCP, oncology    10. Positional lightheadedness  Stable- followed by PCP    11. Type 2 diabetes mellitus with hyperglycemia, without long-term current use of insulin  Stable- followed by PCP    12. Patent pressure equalization (PE) tube on left side  Stable- followed by PCP, ENT    13. Decreased hearing of both ears  Stable- followed by PCP, ent    14. DM type 2 without retinopathy  Stable- followed by external eye    15. Encounter for preventive health examination  Here for Health Risk Assessment/Annual Wellness Visit.  Health maintenance reviewed  and updated. Follow up in one year.         Provided Jamison with a 5-10 year written screening schedule and personal prevention plan. Recommendations were developed using the USPSTF age appropriate recommendations. Education, counseling, and referrals were provided as needed. After Visit Summary printed and given to patient which includes a list of additional screenings\tests needed.Self adjusted metformin dose from 500mg 2 BID to 500 mg BID - had episodes of hypoglycemia. Reading from home fasting AM range 89- 120 range 10/2/2020-10/22/2020    Follow up in about 1 year (around 10/22/2021) for HRA.    LASHONDA Ortiz offered to discuss end of life issues, including information on how to make advance directives that the patient could use to name someone who would make medical decisions on their behalf if they became too ill to make themselves.    ___Patient declined  _X_Patient is interested, I provided paper work and offered to discuss.

## 2020-10-22 NOTE — PROCEDURES
Procedures     Preprocedure Diagnosis:  Chronic left otitis media with effusion  Postoprocedure Diagnosis:  Same  Procedure:  Left ear tube placement    Findings:  Left ear tympanic membrane serous effusion     Procedure in detail:  After appropriate consents were obtained, the patient was placed on the exam chair in a supine position.  The binocular operating microscope was then brought into the field.    His left EAC was found to have a small amount of cerumen that was carefully cleaned with a curette.  The tympanic membrane was then visualized, and was found to be serous effusion.  A small drop of phenol was then applied to the tympanic membrane in the area of anticipated myringotomy.   A radial myringotomy was then made in the anterior-inferior quadrant of the tympanic membrane, and a #5 Munroe tip suction was used to clear the middle ear.  With an alligator forceps, an Ferguson beveled grommet tube was then placed into the myringotomy site without difficulty.     The patient tolerated the procedure without difficulty, and there were no complications.

## 2020-11-03 ENCOUNTER — OFFICE VISIT (OUTPATIENT)
Dept: OTOLARYNGOLOGY | Facility: CLINIC | Age: 75
End: 2020-11-03
Payer: MEDICARE

## 2020-11-03 VITALS
HEART RATE: 77 BPM | BODY MASS INDEX: 29.18 KG/M2 | DIASTOLIC BLOOD PRESSURE: 68 MMHG | HEIGHT: 66 IN | SYSTOLIC BLOOD PRESSURE: 155 MMHG | WEIGHT: 181.56 LBS

## 2020-11-03 DIAGNOSIS — Z96.22 S/P MYRINGOTOMY WITH INSERTION OF TUBE: ICD-10-CM

## 2020-11-03 DIAGNOSIS — H65.92 OME (OTITIS MEDIA WITH EFFUSION), LEFT: ICD-10-CM

## 2020-11-03 DIAGNOSIS — H69.93 ETD (EUSTACHIAN TUBE DYSFUNCTION), BILATERAL: Primary | ICD-10-CM

## 2020-11-03 PROCEDURE — 99999 PR PBB SHADOW E&M-EST. PATIENT-LVL IV: ICD-10-PCS | Mod: PBBFAC,HCNC,, | Performed by: OTOLARYNGOLOGY

## 2020-11-03 PROCEDURE — 99999 PR PBB SHADOW E&M-EST. PATIENT-LVL IV: CPT | Mod: PBBFAC,HCNC,, | Performed by: OTOLARYNGOLOGY

## 2020-11-03 PROCEDURE — 99024 PR POST-OP FOLLOW-UP VISIT: ICD-10-PCS | Mod: HCNC,S$GLB,, | Performed by: OTOLARYNGOLOGY

## 2020-11-03 PROCEDURE — 99024 POSTOP FOLLOW-UP VISIT: CPT | Mod: HCNC,S$GLB,, | Performed by: OTOLARYNGOLOGY

## 2020-11-03 NOTE — PROGRESS NOTES
Subjective:    Here to followup after placement of ear tubes    Patient ID: Jamison Mayes is a 75 y.o. male.    Chief Complaint:  Recent placement of ear tubes     Jamison Mayes is a 75 y.o. male here to see me today after a recent placement of left  PET in the office   Following the procedure he has done very well.  He has not had any drainage from either ear, and he used the drops for the appropriate amount of time. He feels his hearing is improved. He notes that the muffled sensation is better.     Review of Systems   Constitutional: Negative for fever, activity change, appetite change and irritability.   HENT: Negative for congestion, ear discharge and rhinorrhea.    Respiratory: Negative for cough.      Objective:     Physical Exam   Constitutional: He appears well-developed and well-nourished.   HENT:   Right Ear: External ear, pinna and canal normal. No drainage. TM within normal limits; no perforation, effusion or masses. Normal mobility.   Left Ear: External ear, pinna and canal normal. No drainage. A PE tube is seen.   Nose: Nose normal. No rhinorrhea, nasal discharge or congestion.   Lymphadenopathy:     He has no cervical adenopathy.   Neurological: He is alert.            Assessment:     1. ETD (Eustachian tube dysfunction), bilateral    2. OME (otitis media with effusion), left    3. S/P myringotomy with insertion of tube        Plan:     1.    1. ETD (Eustachian tube dysfunction), bilateral    2. OME (otitis media with effusion), left    3. S/P myringotomy with insertion of tube    :  Patient is doing very well after recent placement of ear tubes in the operating room.  We reviewed again that on average tubes stay in the ear for six months to one year.    Follow up in 4 months for recheck with audiogram.    Abbie King MD       Patient is an 84 year old woman with history of CHF, AFIB CHADSVASC= 5 on Xarelto, PPM, HTN, admitted with increasing shortness of breath.    Renal Insufficiency   scr 1.75 in july 2018, Patient follow up with [facility] [id number].   Scr 1.8 on admission, SCr slightly increased, some fluctuation is likely sec to CHF  Pt likely has underlying CKD sec to longstanding HTN  UA with minimal protein , 3-5RBC  lasix reduced 40mg daily today  Monitor BMP  Avoid further nephrotoxics, NSAIDs RCA    Hyperkalemia   Hemolyzed specimen  Repeat serum K WNL  Monitor serum K    SOB  left pl. effusion   pending CT chest   echo with normal LV function  on lasix 40 daily  Monitor daily weights  Low salt diet   possible pulm eval    HTN  BP stable  COntinue current meds  Monitor BP    Hx of GOut   Per family pt has been on Uloric for 10 years,   Recently diagnosed with CHF 2-3 weeks ago--   There is no better alternative to Uloric , would continue for now

## 2020-11-03 NOTE — LETTER
November 3, 2020      Yossi Peck, DO  [address]  [place] [address]           [place] - Otorhinolaryngology  [address] [address]  Phone: [phone]  Fax: [phone]          Patient: Jamison Mayes   MR Number: [id number]   YOB: 1945   Date of Visit: 11/3/2020       Dear Dr. Yossi Peck:    Thank you for referring Jamison Mayes to me for evaluation. Attached you will find relevant portions of my assessment and plan of care.    If you have questions, please do not hesitate to call me. I look forward to following Jamison Mayes along with you.    Sincerely,    Abbie King MD    Enclosure  CC:  No Recipients    If you would like to receive this communication electronically, please contact [email or web address] or [phone] to request more information on [employer] access.    For providers and/or their staff who would like to refer a patient to [facility], please contact us through our one-stop-shop provider referral line, [facility], at [phone].    If you feel you have received this communication in error or would no longer like to receive these types of communications, please e-mail [email or web address]

## 2020-11-20 DIAGNOSIS — E11.9 TYPE 2 DIABETES MELLITUS WITHOUT COMPLICATION: ICD-10-CM

## 2020-12-11 ENCOUNTER — PATIENT MESSAGE (OUTPATIENT)
Dept: OTHER | Facility: OTHER | Age: 75
End: 2020-12-11

## 2020-12-15 ENCOUNTER — PATIENT MESSAGE (OUTPATIENT)
Dept: HEMATOLOGY/ONCOLOGY | Facility: CLINIC | Age: 75
End: 2020-12-15

## 2020-12-15 ENCOUNTER — HOSPITAL ENCOUNTER (OUTPATIENT)
Dept: RADIOLOGY | Facility: HOSPITAL | Age: 75
Discharge: HOME | End: 2020-12-15
Attending: INTERNAL MEDICINE
Payer: MEDICARE

## 2020-12-15 DIAGNOSIS — C22.0 HEPATOCELLULAR CARCINOMA: ICD-10-CM

## 2020-12-15 PROCEDURE — A9585 GADOBUTROL INJECTION: HCPCS | Mod: HCNC,PO | Performed by: INTERNAL MEDICINE

## 2020-12-15 PROCEDURE — 25500020 PHARM REV CODE 255: Mod: HCNC,PO | Performed by: INTERNAL MEDICINE

## 2020-12-15 PROCEDURE — 74183 MRI ABD W/O CNTR FLWD CNTR: CPT | Mod: TC,HCNC,PO

## 2020-12-15 RX ORDER — GADOBUTROL 604.72 MG/ML
10 INJECTION INTRAVENOUS
Status: COMPLETED | OUTPATIENT
Start: 2020-12-15 | End: 2020-12-15

## 2020-12-15 RX ADMIN — GADOBUTROL 10 ML: 604.72 INJECTION INTRAVENOUS at 09:12

## 2020-12-17 ENCOUNTER — OFFICE VISIT (OUTPATIENT)
Dept: HEMATOLOGY/ONCOLOGY | Facility: CLINIC | Age: 75
End: 2020-12-17
Payer: MEDICARE

## 2020-12-17 DIAGNOSIS — C22.0 HEPATOCELLULAR CARCINOMA: Primary | ICD-10-CM

## 2020-12-17 DIAGNOSIS — K86.2 PANCREATIC CYST: ICD-10-CM

## 2020-12-17 PROCEDURE — 99213 OFFICE O/P EST LOW 20 MIN: CPT | Mod: HCNC,95,, | Performed by: INTERNAL MEDICINE

## 2020-12-17 PROCEDURE — 1159F PR MEDICATION LIST DOCUMENTED IN MEDICAL RECORD: ICD-10-PCS | Mod: HCNC,95,, | Performed by: INTERNAL MEDICINE

## 2020-12-17 PROCEDURE — 99213 PR OFFICE/OUTPT VISIT, EST, LEVL III, 20-29 MIN: ICD-10-PCS | Mod: HCNC,95,, | Performed by: INTERNAL MEDICINE

## 2020-12-17 PROCEDURE — 1159F MED LIST DOCD IN RCRD: CPT | Mod: HCNC,95,, | Performed by: INTERNAL MEDICINE

## 2020-12-17 NOTE — PROGRESS NOTES
Subjective:       Patient ID: Jamison Mayes is a 75 y.o. male.    Chief Complaint: HCC    The patient location is: home  The chief complaint leading to consultation is:  Liver cancer    Visit type: audio only    Face to Face time with patient:  10 min  Fifteen minutes of total time spent on the encounter, which includes face to face time and non-face to face time preparing to see the patient (eg, review of tests), Obtaining and/or reviewing separately obtained history, Documenting clinical information in the electronic or other health record, Independently interpreting results (not separately reported) and communicating results to the patient/family/caregiver, or Care coordination (not separately reported).         Each patient to whom he or she provides medical services by telemedicine is:  (1) informed of the relationship between the physician and patient and the respective role of any other health care provider with respect to management of the patient; and (2) notified that he or she may decline to receive medical services by telemedicine and may withdraw from such care at any time.    Notes:       HPI Mr. Mayes is here for f/u of hepatocellular carcinoma.    He presented to the hospital on 03/01/2018 with sudden onset of cold sweats, nausea and abdominal discomfort and was noted to have an 8.7 cm mass in the lateral segment of the left hepatic lobe that has ruptured to the liver capsule.  He was monitored and underwent a left hepatic lobectomy with cholecystectomy on 03/05/2018.      Final pathology revealed a T1b NX 7.5 cm well-differentiated hepatocellular carcinoma without vascular invasion or perineural invasion.  The tumor appeared to be confined to the liver. Hepatitis serology negative.    A CT chest with contrast on 03/03/2018 revealed a 0.3 cm left upper lobe pulmonary nodule.    He lives in [facility].  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    Review of Systems   Constitutional: Negative for  fever and unexpected weight change.   HENT: Negative for mouth sores and nosebleeds.    Eyes: Negative for photophobia and pain.   Respiratory: Negative for shortness of breath and wheezing.    Cardiovascular: Negative for chest pain and palpitations.   Gastrointestinal: Negative for abdominal pain and vomiting.   Genitourinary: Negative for flank pain and hematuria.   Musculoskeletal: Negative for back pain, neck pain and neck stiffness.   Skin: Negative for rash and wound.   Neurological: Negative for seizures and syncope.   Hematological: Negative for adenopathy. Does not bruise/bleed easily.   Psychiatric/Behavioral: Negative for behavioral problems and confusion.   All other systems reviewed and are negative.        Objective:      No physical examination was done as this is a virtual visit       Assessment:       1. Hepatocellular carcinoma    2. Pancreatic cyst        Plan:   Mr. Mayes has a diagnosis of T1b NX hepatocellular carcinoma that was resected.  Malignant transformation of a prior hepatic adenoma is a consideration.    MRI reviewed    Discussed imaging findings over the telephone during the audio visit.    Will plan to repeat MRI abdomen in 12 months along with labs.

## 2020-12-19 DIAGNOSIS — E11.8 DM (DIABETES MELLITUS) WITH COMPLICATIONS: Primary | ICD-10-CM

## 2020-12-19 NOTE — TELEPHONE ENCOUNTER
Care Due:                  Date            Visit Type   Department     Provider  --------------------------------------------------------------------------------                                ESTABLISHED   [facility] INTERNAL  Last Visit: 06-      PATIENT      MEDICINE       Yossi Peck                                           [facility] INTERNAL  Next Visit: 12-      None         MEDICINE       Yossi Peck                                                            Last  Test          Frequency    Reason                     Performed    Due Date  --------------------------------------------------------------------------------    HBA1C.......  6 months...  glimepiride..............  06- 12-    Lipid Panel.  12 months..  lovastatin...............  11- 11-    Powered by [employer]. Reference number: [id number]. 12/19/2020 10:02:30 AM   CST

## 2020-12-22 RX ORDER — METFORMIN HYDROCHLORIDE 500 MG/1
TABLET, EXTENDED RELEASE ORAL
Qty: 180 TABLET | Refills: 1 | Status: SHIPPED | OUTPATIENT
Start: 2020-12-22 | End: 2021-06-29 | Stop reason: SDUPTHER

## 2020-12-22 NOTE — PROGRESS NOTES
Refill Routing Note   Medication(s) are not appropriate for processing by [facility] for the following reason(s):     - Drug-Disease Interaction (metFORMIN and Hepatocellular carcinoma)  [facility] action(s):  Defer  Medication-related problems identified:   Drug-disease interaction  Requires labs  Medication Therapy Plan: CDMR: Lab (Lipid/A1C); FOVS  Medication reconciliation completed: No   Automatic Epic Generated Protocol Data:        Requested Prescriptions   Pending Prescriptions Disp Refills    metFORMIN (GLUCOPHAGE-XR) 500 MG ER 24hr tablet [Pharmacy Med Name: METFORMIN HCL  MG TABLET] 180 tablet 0     Sig: TAKE 1 TABLET BY MOUTH TWICE A DAY WITH MEALS       Endocrinology:  Diabetes - Biguanides Failed - 12/19/2020 10:01 AM        Failed - HBA1C is 8 or below and within 180 days     Hemoglobin A1C   Date Value Ref Range Status   06/04/2020 6.0 (H) 4.0 - 5.6 % Final     Comment:     ADA Screening Guidelines:  5.7-6.4%  Consistent with prediabetes  >or=6.5%  Consistent with diabetes  High levels of fetal hemoglobin interfere with the HbA1C  assay. Heterozygous hemoglobin variants (HbS, HgC, etc)do  not significantly interfere with this assay.   However, presence of multiple variants may affect accuracy.     03/03/2020 8.2 (H) 4.0 - 5.6 % Final     Comment:     ADA Screening Guidelines:  5.7-6.4%  Consistent with prediabetes  >or=6.5%  Consistent with diabetes  High levels of fetal hemoglobin interfere with the HbA1C  assay. Heterozygous hemoglobin variants (HbS, HgC, etc)do  not significantly interfere with this assay.   However, presence of multiple variants may affect accuracy.     11/14/2019 8.1 (H) 4.0 - 5.6 % Final     Comment:     ADA Screening Guidelines:  5.7-6.4%  Consistent with prediabetes  >or=6.5%  Consistent with diabetes  High levels of fetal hemoglobin interfere with the HbA1C  assay. Heterozygous hemoglobin variants (HbS, HgC, etc)do  not significantly interfere with this assay.    However, presence of multiple variants may affect accuracy.                Passed - Patient is at least 18 years old        Passed - Office visit in past 12 months or future 90 days     Recent Outpatient Visits            5 days ago Hepatocellular carcinoma    [facility] Derick Haney MD    1 month ago ETD (Eustachian tube dysfunction), bilateral    [facility] - Otorhinolaryngology Abbie King MD    2 months ago Seizure disorder    [facility] Sophia Crawley, KULWINDER    2 months ago AK (actinic keratosis)    [facility] - Dermatology [address] Cristin Joseph MD    2 months ago Mixed conductive and sensorineural hearing loss of both ears    [facility] - Otorhinol[facility]ology Abbie King MD          Future Appointments              In 2 days Yossi Peck DO [facility], [place]    In 1 month MD [facility] - Dermatology [address] Fl, [place] [facility]    In 2 months FREDDIE Kirkpatrick [facility]    In 2 months Abbie King MD [facility]                Passed - Cr is 1.4 or below and within 360 days     Creatinine   Date Value Ref Range Status   12/15/2020 0.95 0.50 - 1.40 mg/dL Final   06/04/2020 0.9 0.5 - 1.4 mg/dL Final   12/03/2019 1.2 0.5 - 1.4 mg/dL Final     POC Creatinine   Date Value Ref Range Status   03/01/2018 1.4 0.5 - 1.4 mg/dL Final              Passed - eGFR is 30 or above and within 360 days     eGFR if non    Date Value Ref Range Status   12/15/2020 >60.0 >60 mL/min/1.73 m^2 Final     Comment:     Calculation used to obtain the estimated glomerular filtration  rate (eGFR) is the CKD-EPI equation.      06/04/2020 >60.0 >60 mL/min/1.73 m^2 Final     Comment:     Calculation used to obtain the estimated glomerular filtration  rate (eGFR) is the CKD-EPI equation.      12/03/2019 60 >60 mL/min/1.73 m^2 Final     Comment:      Calculation used to obtain the estimated glomerular filtration  rate (eGFR) is the CKD-EPI equation.        eGFR if    Date Value Ref Range Status   12/15/2020 >60.0 >60 mL/min/1.73 m^2 Final   06/04/2020 >60.0 >60 mL/min/1.73 m^2 Final   12/03/2019 >60 >60 mL/min/1.73 m^2 Final                    Appointments  past 12m or future 3m with PCP    Date Provider   Last Visit   6/4/2020 Yossi Peck, DO   Next Visit   12/24/2020 Yossi Peck,    ED visits in past 90 days: 0        Note composed:10:49 AM 12/22/2020

## 2020-12-24 ENCOUNTER — OFFICE VISIT (OUTPATIENT)
Dept: INTERNAL MEDICINE | Facility: CLINIC | Age: 75
End: 2020-12-24
Payer: MEDICARE

## 2020-12-24 VITALS
HEIGHT: 66 IN | BODY MASS INDEX: 28.98 KG/M2 | TEMPERATURE: 97 F | RESPIRATION RATE: 16 BRPM | DIASTOLIC BLOOD PRESSURE: 78 MMHG | OXYGEN SATURATION: 98 % | WEIGHT: 180.31 LBS | SYSTOLIC BLOOD PRESSURE: 136 MMHG | HEART RATE: 98 BPM

## 2020-12-24 DIAGNOSIS — E11.65 CONTROLLED TYPE 2 DIABETES MELLITUS WITH HYPERGLYCEMIA, WITHOUT LONG-TERM CURRENT USE OF INSULIN: ICD-10-CM

## 2020-12-24 DIAGNOSIS — E11.69 HYPERLIPIDEMIA ASSOCIATED WITH TYPE 2 DIABETES MELLITUS: ICD-10-CM

## 2020-12-24 DIAGNOSIS — E11.59 HYPERTENSION ASSOCIATED WITH DIABETES: ICD-10-CM

## 2020-12-24 DIAGNOSIS — G40.909 SEIZURE DISORDER: ICD-10-CM

## 2020-12-24 DIAGNOSIS — E78.5 HYPERLIPIDEMIA ASSOCIATED WITH TYPE 2 DIABETES MELLITUS: ICD-10-CM

## 2020-12-24 DIAGNOSIS — Z12.5 ENCOUNTER FOR SCREENING FOR MALIGNANT NEOPLASM OF PROSTATE: ICD-10-CM

## 2020-12-24 DIAGNOSIS — I15.2 HYPERTENSION ASSOCIATED WITH DIABETES: ICD-10-CM

## 2020-12-24 DIAGNOSIS — C22.0 HEPATOCELLULAR CARCINOMA: ICD-10-CM

## 2020-12-24 DIAGNOSIS — Z00.00 ANNUAL PHYSICAL EXAM: Primary | ICD-10-CM

## 2020-12-24 PROCEDURE — 99499 UNLISTED E&M SERVICE: CPT | Mod: S$GLB,,, | Performed by: INTERNAL MEDICINE

## 2020-12-24 PROCEDURE — 99999 PR PBB SHADOW E&M-EST. PATIENT-LVL IV: ICD-10-PCS | Mod: PBBFAC,HCNC,, | Performed by: INTERNAL MEDICINE

## 2020-12-24 PROCEDURE — 3288F FALL RISK ASSESSMENT DOCD: CPT | Mod: HCNC,CPTII,S$GLB, | Performed by: INTERNAL MEDICINE

## 2020-12-24 PROCEDURE — 3288F PR FALLS RISK ASSESSMENT DOCUMENTED: ICD-10-PCS | Mod: HCNC,CPTII,S$GLB, | Performed by: INTERNAL MEDICINE

## 2020-12-24 PROCEDURE — 99499 RISK ADDL DX/OHS AUDIT: ICD-10-PCS | Mod: S$GLB,,, | Performed by: INTERNAL MEDICINE

## 2020-12-24 PROCEDURE — 99397 PER PM REEVAL EST PAT 65+ YR: CPT | Mod: HCNC,S$GLB,, | Performed by: INTERNAL MEDICINE

## 2020-12-24 PROCEDURE — 3078F DIAST BP <80 MM HG: CPT | Mod: HCNC,CPTII,S$GLB, | Performed by: INTERNAL MEDICINE

## 2020-12-24 PROCEDURE — 3044F PR MOST RECENT HEMOGLOBIN A1C LEVEL <7.0%: ICD-10-PCS | Mod: HCNC,CPTII,S$GLB, | Performed by: INTERNAL MEDICINE

## 2020-12-24 PROCEDURE — 3075F SYST BP GE 130 - 139MM HG: CPT | Mod: HCNC,CPTII,S$GLB, | Performed by: INTERNAL MEDICINE

## 2020-12-24 PROCEDURE — 1101F PR PT FALLS ASSESS DOC 0-1 FALLS W/OUT INJ PAST YR: ICD-10-PCS | Mod: HCNC,CPTII,S$GLB, | Performed by: INTERNAL MEDICINE

## 2020-12-24 PROCEDURE — 99397 PR PREVENTIVE VISIT,EST,65 & OVER: ICD-10-PCS | Mod: HCNC,S$GLB,, | Performed by: INTERNAL MEDICINE

## 2020-12-24 PROCEDURE — 3008F PR BODY MASS INDEX (BMI) DOCUMENTED: ICD-10-PCS | Mod: HCNC,CPTII,S$GLB, | Performed by: INTERNAL MEDICINE

## 2020-12-24 PROCEDURE — 1101F PT FALLS ASSESS-DOCD LE1/YR: CPT | Mod: HCNC,CPTII,S$GLB, | Performed by: INTERNAL MEDICINE

## 2020-12-24 PROCEDURE — 3008F BODY MASS INDEX DOCD: CPT | Mod: HCNC,CPTII,S$GLB, | Performed by: INTERNAL MEDICINE

## 2020-12-24 PROCEDURE — 1126F AMNT PAIN NOTED NONE PRSNT: CPT | Mod: HCNC,S$GLB,, | Performed by: INTERNAL MEDICINE

## 2020-12-24 PROCEDURE — 3044F HG A1C LEVEL LT 7.0%: CPT | Mod: HCNC,CPTII,S$GLB, | Performed by: INTERNAL MEDICINE

## 2020-12-24 PROCEDURE — 1126F PR PAIN SEVERITY QUANTIFIED, NO PAIN PRESENT: ICD-10-PCS | Mod: HCNC,S$GLB,, | Performed by: INTERNAL MEDICINE

## 2020-12-24 PROCEDURE — 3075F PR MOST RECENT SYSTOLIC BLOOD PRESS GE 130-139MM HG: ICD-10-PCS | Mod: HCNC,CPTII,S$GLB, | Performed by: INTERNAL MEDICINE

## 2020-12-24 PROCEDURE — 99999 PR PBB SHADOW E&M-EST. PATIENT-LVL IV: CPT | Mod: PBBFAC,HCNC,, | Performed by: INTERNAL MEDICINE

## 2020-12-24 PROCEDURE — 3078F PR MOST RECENT DIASTOLIC BLOOD PRESSURE < 80 MM HG: ICD-10-PCS | Mod: HCNC,CPTII,S$GLB, | Performed by: INTERNAL MEDICINE

## 2020-12-24 NOTE — PROGRESS NOTES
Subjective:       Patient ID: Jamison Mayes is a 75 y.o. male.    Chief Complaint: Annual Exam    HPI   75 y.o. Male here for annual exam.      Vaccines: Influenza (2020); Tetanus (2018); PNA (done); Shingrix (will get)  Eye exam: 1/20  Colonoscopy: 2011- repeat in 10 yrs  DEXA: 11/18     Exercise: no  Diet: regular     Past Medical History:  PDT scalp 2/2015 and 3/2015: AK (actinic keratosis)      Comment:  s/p efudex on scalp and forehead, PDT scalp  No date: Arthritis  No date: Diabetes mellitus type II  No date: Fever blister  03/05/2018: Hepatocellular carcinoma      Comment:  s/p left hepatectomy of segments 2,3,4a/b  No date: Hypertension  No date: Joint pain  3/2013: SCC (squamous cell carcinoma)      Comment:  L scalp vertex  excised : SCC (squamous cell carcinoma)      Comment:  left helix, in-situ  No date: Seizures  3/2013: Squamous Cell Carcinoma      Comment:  occipital scalp  3/2013: Squamous Cell Carcinoma      Comment:  l vertex scalp  Past Surgical History:  03/05/2018: CHOLECYSTECTOMY      Comment:  open at time of hepatic resection  3/5/2018: CHOLECYSTECTOMY; N/A      Comment:  Performed by Brown Cortez MD at [facility] OR [address] [place] [address]  3/5/2018: EVACUATION-HEMATOMA; N/A      Comment:  Performed by Brown Cortez MD at [facility] OR [address] [place] [address]  No date: EYE SURGERY  3/5/2018: HEPATECTOMY; Left      Comment:  Performed by Brown Cortez MD at [facility] OR [address] [place] [address]  03/05/2018: LIVER RESECTION; Left      Comment:  segments 2,3, 4a/b  No date: skin carcinoma removal  3/5/2018: ULTRASOUND; N/A      Comment:  Performed by Brown Cortez MD at [facility] OR [address] [place] [address]  Social History    Socioeconomic History      Marital status:       Spouse name: Not on file      Number of children: Not on file      Years of education: Not on file      Highest education level: Not on file    Social Needs      Financial resource strain: Not on  file      Food insecurity - worry: Not on file      Food insecurity - inability: Not on file      Transportation needs - medical: Not on file      Transportation needs - non-medical: Not on file    Occupational History      Occupation: REtired     Tobacco Use      Smoking status: Former Smoker        Years: 5.00        Quit date: 1966        Years since quittin.4      Smokeless tobacco: Never Used    Substance and Sexual Activity      Alcohol use: No      Drug use: No      Sexual activity: Not on file     Review of patient's allergies indicates:  No Known Allergies  Review of Systems   Constitutional: Negative for activity change, appetite change, chills, diaphoresis, fatigue, fever and unexpected weight change.   HENT: Negative for nasal congestion, mouth sores, postnasal drip, rhinorrhea, sinus pressure/congestion, sneezing, sore throat, trouble swallowing and voice change.    Eyes: Negative for discharge, itching and visual disturbance.   Respiratory: Negative for cough, chest tightness, shortness of breath and wheezing.    Cardiovascular: Negative for chest pain, palpitations and leg swelling.   Gastrointestinal: Negative for abdominal pain, blood in stool, constipation, diarrhea, nausea and vomiting.   Endocrine: Negative for cold intolerance and heat intolerance.   Genitourinary: Negative for difficulty urinating, dysuria, flank pain, hematuria and urgency.   Musculoskeletal: Negative for arthralgias, back pain, myalgias and neck pain.   Integumentary:  Negative for rash and wound.   Allergic/Immunologic: Negative for environmental allergies and food allergies.   Neurological: Negative for dizziness, tremors, seizures, syncope, weakness and headaches.   Hematological: Negative for adenopathy. Does not bruise/bleed easily.   Psychiatric/Behavioral: Negative for confusion, sleep disturbance and suicidal ideas. The patient is not nervous/anxious.          Objective:      Physical Exam  Constitutional:        General: He is not in acute distress.     Appearance: He is well-developed. He is not diaphoretic.   HENT:      Head: Normocephalic and atraumatic.      Right Ear: Tympanic membrane, ear canal and external ear normal.      Left Ear: Tympanic membrane, ear canal and external ear normal.      Nose: Nose normal.      Mouth/Throat:      Pharynx: No oropharyngeal exudate.   Eyes:      General: No scleral icterus.        Right eye: No discharge.         Left eye: No discharge.      Conjunctiva/sclera: Conjunctivae normal.      Pupils: Pupils are equal, round, and reactive to light.   Neck:      Musculoskeletal: Normal range of motion and neck supple.      Thyroid: No thyromegaly.      Vascular: No JVD.   Cardiovascular:      Rate and Rhythm: Normal rate and regular rhythm.      Heart sounds: Normal heart sounds. No murmur.   Pulmonary:      Effort: Pulmonary effort is normal. No respiratory distress.      Breath sounds: Normal breath sounds. No wheezing or rales.   Abdominal:      General: Bowel sounds are normal. There is no distension.      Palpations: Abdomen is soft.      Tenderness: There is no abdominal tenderness. There is no guarding.   Lymphadenopathy:      Cervical: No cervical adenopathy.   Skin:     General: Skin is warm and dry.      Coloration: Skin is not pale.      Findings: No rash.   Neurological:      Mental Status: He is alert and oriented to person, place, and time.   Psychiatric:         Judgment: Judgment normal.         Assessment:       1. Annual physical exam    2. Controlled type 2 diabetes mellitus with hyperglycemia, without long-term current use of insulin    3. Hypertension associated with diabetes    4. Hepatocellular carcinoma    5. Hyperlipidemia associated with type 2 diabetes mellitus    6. Seizure disorder    7. Encounter for screening for malignant neoplasm of prostate         Plan:    Blood work ordered   Vaccines: Influenza (2020); Tetanus (2018); PNA (done); Shingrix (will  get)   Eye exam: 1/20   Colonoscopy: 2011- repeat in 10 yrs   DEXA: 11/18      T2DM- last A1C of 6.0(6/20)<--8.1(11/19)<--7.1(11/18)<--5.2(8/18)      -continue Metformin  mg BID WM and Amaryl 4 mg qam     HTN- stable on Avapro 300 mg daily       Seizure d/o- stable of meds      -Pt had seen Neurology      HCC- stable, s/p left hepatic lobectomy with cholecystectomy on 03/05/2018       No recurrence, followed by Oncology        HLD- stable on Lovastatin 20 mg daily      F/u in 6 months

## 2020-12-26 NOTE — TELEPHONE ENCOUNTER
Provider Staff:  Action is required for this patient.   Please schedule patient for the following      Lab(s):  - A1c  - LIPID     Thanks!  [facility]      Appointments past 12m or future 3m with pcp    Date Provider   Last Visit   6/4/2020 Yossi Peck DO   Next Visit   Visit date not found Yossi Peck DO     Note composed: 12/26/2020 2:08 PM

## 2020-12-30 ENCOUNTER — LAB VISIT (OUTPATIENT)
Dept: LAB | Facility: HOSPITAL | Age: 75
End: 2020-12-30
Attending: INTERNAL MEDICINE
Payer: MEDICARE

## 2020-12-30 DIAGNOSIS — G40.909 SEIZURE DISORDER: ICD-10-CM

## 2020-12-30 DIAGNOSIS — Z12.5 ENCOUNTER FOR SCREENING FOR MALIGNANT NEOPLASM OF PROSTATE: ICD-10-CM

## 2020-12-30 DIAGNOSIS — E11.59 HYPERTENSION ASSOCIATED WITH DIABETES: ICD-10-CM

## 2020-12-30 DIAGNOSIS — E11.65 CONTROLLED TYPE 2 DIABETES MELLITUS WITH HYPERGLYCEMIA, WITHOUT LONG-TERM CURRENT USE OF INSULIN: ICD-10-CM

## 2020-12-30 DIAGNOSIS — E11.69 HYPERLIPIDEMIA ASSOCIATED WITH TYPE 2 DIABETES MELLITUS: ICD-10-CM

## 2020-12-30 DIAGNOSIS — E78.5 HYPERLIPIDEMIA ASSOCIATED WITH TYPE 2 DIABETES MELLITUS: ICD-10-CM

## 2020-12-30 DIAGNOSIS — I15.2 HYPERTENSION ASSOCIATED WITH DIABETES: ICD-10-CM

## 2020-12-30 DIAGNOSIS — Z00.00 ANNUAL PHYSICAL EXAM: ICD-10-CM

## 2020-12-30 DIAGNOSIS — C22.0 HEPATOCELLULAR CARCINOMA: ICD-10-CM

## 2020-12-30 LAB
CHOLEST SERPL-MCNC: 131 MG/DL (ref 120–199)
CHOLEST/HDLC SERPL: 3 {RATIO} (ref 2–5)
COMPLEXED PSA SERPL-MCNC: 1.4 NG/ML (ref 0–4)
ESTIMATED AVG GLUCOSE: 137 MG/DL (ref 68–131)
HBA1C MFR BLD HPLC: 6.4 % (ref 4–5.6)
HDLC SERPL-MCNC: 43 MG/DL (ref 40–75)
HDLC SERPL: 32.8 % (ref 20–50)
LDLC SERPL CALC-MCNC: 65.2 MG/DL (ref 63–159)
NONHDLC SERPL-MCNC: 88 MG/DL
TRIGL SERPL-MCNC: 114 MG/DL (ref 30–150)

## 2020-12-30 PROCEDURE — 84153 ASSAY OF PSA TOTAL: CPT | Mod: HCNC

## 2020-12-30 PROCEDURE — 36415 COLL VENOUS BLD VENIPUNCTURE: CPT | Mod: HCNC,PO

## 2020-12-30 PROCEDURE — 80061 LIPID PANEL: CPT | Mod: HCNC

## 2020-12-30 PROCEDURE — 83036 HEMOGLOBIN GLYCOSYLATED A1C: CPT | Mod: HCNC

## 2021-01-07 ENCOUNTER — OFFICE VISIT (OUTPATIENT)
Dept: DERMATOLOGY | Facility: CLINIC | Age: 76
End: 2021-01-07
Payer: MEDICARE

## 2021-01-07 DIAGNOSIS — D48.5 NEOPLASM OF UNCERTAIN BEHAVIOR OF SKIN: Primary | ICD-10-CM

## 2021-01-07 PROCEDURE — 1126F PR PAIN SEVERITY QUANTIFIED, NO PAIN PRESENT: ICD-10-PCS | Mod: S$GLB,,, | Performed by: DERMATOLOGY

## 2021-01-07 PROCEDURE — 99999 PR PBB SHADOW E&M-EST. PATIENT-LVL III: CPT | Mod: PBBFAC,,, | Performed by: DERMATOLOGY

## 2021-01-07 PROCEDURE — 99499 UNLISTED E&M SERVICE: CPT | Mod: S$GLB,,, | Performed by: DERMATOLOGY

## 2021-01-07 PROCEDURE — 88305 TISSUE EXAM BY PATHOLOGIST: CPT | Mod: 26,,, | Performed by: PATHOLOGY

## 2021-01-07 PROCEDURE — 99999 PR PBB SHADOW E&M-EST. PATIENT-LVL III: ICD-10-PCS | Mod: PBBFAC,,, | Performed by: DERMATOLOGY

## 2021-01-07 PROCEDURE — 11102 PR TANGENTIAL BIOPSY, SKIN, SINGLE LESION: ICD-10-PCS | Mod: S$GLB,,, | Performed by: DERMATOLOGY

## 2021-01-07 PROCEDURE — 99499 NO LOS: ICD-10-PCS | Mod: S$GLB,,, | Performed by: DERMATOLOGY

## 2021-01-07 PROCEDURE — 88305 TISSUE EXAM BY PATHOLOGIST: CPT | Performed by: PATHOLOGY

## 2021-01-07 PROCEDURE — 1126F AMNT PAIN NOTED NONE PRSNT: CPT | Mod: S$GLB,,, | Performed by: DERMATOLOGY

## 2021-01-07 PROCEDURE — 11102 TANGNTL BX SKIN SINGLE LES: CPT | Mod: S$GLB,,, | Performed by: DERMATOLOGY

## 2021-01-07 PROCEDURE — 88305 TISSUE EXAM BY PATHOLOGIST: ICD-10-PCS | Mod: 26,,, | Performed by: PATHOLOGY

## 2021-01-11 LAB
FINAL PATHOLOGIC DIAGNOSIS: NORMAL
GROSS: NORMAL
MICROSCOPIC EXAM: NORMAL

## 2021-01-16 ENCOUNTER — IMMUNIZATION (OUTPATIENT)
Dept: INTERNAL MEDICINE | Facility: CLINIC | Age: 76
End: 2021-01-16
Payer: MEDICARE

## 2021-01-16 DIAGNOSIS — Z23 NEED FOR VACCINATION: Primary | ICD-10-CM

## 2021-01-16 PROCEDURE — 91300 COVID-19, MRNA, LNP-S, PF, 30 MCG/0.3 ML DOSE VACCINE: CPT | Mod: PBBFAC | Performed by: FAMILY MEDICINE

## 2021-01-20 ENCOUNTER — OFFICE VISIT (OUTPATIENT)
Dept: DERMATOLOGY | Facility: CLINIC | Age: 76
End: 2021-01-20
Payer: MEDICARE

## 2021-01-20 DIAGNOSIS — D48.5 NEOPLASM OF UNCERTAIN BEHAVIOR OF SKIN: Primary | ICD-10-CM

## 2021-01-20 PROCEDURE — 1101F PR PT FALLS ASSESS DOC 0-1 FALLS W/OUT INJ PAST YR: ICD-10-PCS | Mod: CPTII,S$GLB,, | Performed by: DERMATOLOGY

## 2021-01-20 PROCEDURE — 99499 NO LOS: ICD-10-PCS | Mod: S$GLB,,, | Performed by: DERMATOLOGY

## 2021-01-20 PROCEDURE — 3288F FALL RISK ASSESSMENT DOCD: CPT | Mod: CPTII,S$GLB,, | Performed by: DERMATOLOGY

## 2021-01-20 PROCEDURE — 1126F AMNT PAIN NOTED NONE PRSNT: CPT | Mod: S$GLB,,, | Performed by: DERMATOLOGY

## 2021-01-20 PROCEDURE — 99999 PR PBB SHADOW E&M-EST. PATIENT-LVL III: CPT | Mod: PBBFAC,,, | Performed by: DERMATOLOGY

## 2021-01-20 PROCEDURE — 88305 TISSUE EXAM BY PATHOLOGIST: ICD-10-PCS | Mod: 26,,, | Performed by: DERMATOLOGY

## 2021-01-20 PROCEDURE — 88305 TISSUE EXAM BY PATHOLOGIST: CPT | Mod: 26,,, | Performed by: DERMATOLOGY

## 2021-01-20 PROCEDURE — 88305 TISSUE EXAM BY PATHOLOGIST: CPT | Performed by: DERMATOLOGY

## 2021-01-20 PROCEDURE — 1101F PT FALLS ASSESS-DOCD LE1/YR: CPT | Mod: CPTII,S$GLB,, | Performed by: DERMATOLOGY

## 2021-01-20 PROCEDURE — 11104 PUNCH BX SKIN SINGLE LESION: CPT | Mod: S$GLB,,, | Performed by: DERMATOLOGY

## 2021-01-20 PROCEDURE — 99999 PR PBB SHADOW E&M-EST. PATIENT-LVL III: ICD-10-PCS | Mod: PBBFAC,,, | Performed by: DERMATOLOGY

## 2021-01-20 PROCEDURE — 1126F PR PAIN SEVERITY QUANTIFIED, NO PAIN PRESENT: ICD-10-PCS | Mod: S$GLB,,, | Performed by: DERMATOLOGY

## 2021-01-20 PROCEDURE — 11104 PR PUNCH BIOPSY, SKIN, SINGLE LESION: ICD-10-PCS | Mod: S$GLB,,, | Performed by: DERMATOLOGY

## 2021-01-20 PROCEDURE — 3288F PR FALLS RISK ASSESSMENT DOCUMENTED: ICD-10-PCS | Mod: CPTII,S$GLB,, | Performed by: DERMATOLOGY

## 2021-01-20 PROCEDURE — 99499 UNLISTED E&M SERVICE: CPT | Mod: S$GLB,,, | Performed by: DERMATOLOGY

## 2021-01-29 ENCOUNTER — PATIENT MESSAGE (OUTPATIENT)
Dept: DERMATOLOGY | Facility: CLINIC | Age: 76
End: 2021-01-29

## 2021-01-29 LAB
FINAL PATHOLOGIC DIAGNOSIS: NORMAL
GROSS: NORMAL
MICROSCOPIC EXAM: NORMAL

## 2021-02-02 ENCOUNTER — PROCEDURE VISIT (OUTPATIENT)
Dept: DERMATOLOGY | Facility: CLINIC | Age: 76
End: 2021-02-02
Payer: MEDICARE

## 2021-02-02 ENCOUNTER — TELEPHONE (OUTPATIENT)
Dept: DERMATOLOGY | Facility: CLINIC | Age: 76
End: 2021-02-02

## 2021-02-02 VITALS
HEIGHT: 66 IN | DIASTOLIC BLOOD PRESSURE: 84 MMHG | SYSTOLIC BLOOD PRESSURE: 182 MMHG | BODY MASS INDEX: 28.93 KG/M2 | WEIGHT: 180 LBS | HEART RATE: 67 BPM

## 2021-02-02 DIAGNOSIS — C44.42 SQUAMOUS CELL CARCINOMA, SCALP/NECK: Primary | ICD-10-CM

## 2021-02-02 PROCEDURE — 17311 MOHS 1 STAGE H/N/HF/G: CPT | Mod: 51,S$GLB,, | Performed by: DERMATOLOGY

## 2021-02-02 PROCEDURE — 99499 NO LOS: ICD-10-PCS | Mod: S$GLB,,, | Performed by: DERMATOLOGY

## 2021-02-02 PROCEDURE — 13121 PR RECMPL WND SCALP,EXTR 2.6-7.5 CM: ICD-10-PCS | Mod: S$GLB,,, | Performed by: DERMATOLOGY

## 2021-02-02 PROCEDURE — 13121 CMPLX RPR S/A/L 2.6-7.5 CM: CPT | Mod: S$GLB,,, | Performed by: DERMATOLOGY

## 2021-02-02 PROCEDURE — 17311: ICD-10-PCS | Mod: 51,S$GLB,, | Performed by: DERMATOLOGY

## 2021-02-02 PROCEDURE — 99499 UNLISTED E&M SERVICE: CPT | Mod: S$GLB,,, | Performed by: DERMATOLOGY

## 2021-02-04 LAB
LEFT EYE DM RETINOPATHY: NEGATIVE
RIGHT EYE DM RETINOPATHY: NEGATIVE

## 2021-02-05 ENCOUNTER — PATIENT OUTREACH (OUTPATIENT)
Dept: ADMINISTRATIVE | Facility: HOSPITAL | Age: 76
End: 2021-02-05

## 2021-02-06 ENCOUNTER — IMMUNIZATION (OUTPATIENT)
Dept: INTERNAL MEDICINE | Facility: CLINIC | Age: 76
End: 2021-02-06
Payer: MEDICARE

## 2021-02-06 DIAGNOSIS — Z23 NEED FOR VACCINATION: Primary | ICD-10-CM

## 2021-02-06 PROCEDURE — 0002A COVID-19, MRNA, LNP-S, PF, 30 MCG/0.3 ML DOSE VACCINE: CPT | Mod: PBBFAC | Performed by: NURSE ANESTHETIST, CERTIFIED REGISTERED

## 2021-02-06 PROCEDURE — 91300 COVID-19, MRNA, LNP-S, PF, 30 MCG/0.3 ML DOSE VACCINE: CPT | Mod: PBBFAC | Performed by: NURSE ANESTHETIST, CERTIFIED REGISTERED

## 2021-02-09 ENCOUNTER — TELEPHONE (OUTPATIENT)
Dept: DERMATOLOGY | Facility: CLINIC | Age: 76
End: 2021-02-09

## 2021-02-10 ENCOUNTER — OFFICE VISIT (OUTPATIENT)
Dept: DERMATOLOGY | Facility: CLINIC | Age: 76
End: 2021-02-10
Payer: MEDICARE

## 2021-02-10 DIAGNOSIS — C44.42 SQUAMOUS CELL CARCINOMA, SCALP/NECK: Primary | ICD-10-CM

## 2021-02-10 DIAGNOSIS — Z09 POSTOP CHECK: ICD-10-CM

## 2021-02-10 PROCEDURE — 3288F FALL RISK ASSESSMENT DOCD: CPT | Mod: CPTII,S$GLB,, | Performed by: DERMATOLOGY

## 2021-02-10 PROCEDURE — 99999 PR PBB SHADOW E&M-EST. PATIENT-LVL III: CPT | Mod: PBBFAC,,, | Performed by: DERMATOLOGY

## 2021-02-10 PROCEDURE — 1126F PR PAIN SEVERITY QUANTIFIED, NO PAIN PRESENT: ICD-10-PCS | Mod: S$GLB,,, | Performed by: DERMATOLOGY

## 2021-02-10 PROCEDURE — 3288F PR FALLS RISK ASSESSMENT DOCUMENTED: ICD-10-PCS | Mod: CPTII,S$GLB,, | Performed by: DERMATOLOGY

## 2021-02-10 PROCEDURE — 1101F PT FALLS ASSESS-DOCD LE1/YR: CPT | Mod: CPTII,S$GLB,, | Performed by: DERMATOLOGY

## 2021-02-10 PROCEDURE — 99024 POSTOP FOLLOW-UP VISIT: CPT | Mod: S$GLB,,, | Performed by: DERMATOLOGY

## 2021-02-10 PROCEDURE — 1126F AMNT PAIN NOTED NONE PRSNT: CPT | Mod: S$GLB,,, | Performed by: DERMATOLOGY

## 2021-02-10 PROCEDURE — 87070 CULTURE OTHR SPECIMN AEROBIC: CPT

## 2021-02-10 PROCEDURE — 1101F PR PT FALLS ASSESS DOC 0-1 FALLS W/OUT INJ PAST YR: ICD-10-PCS | Mod: CPTII,S$GLB,, | Performed by: DERMATOLOGY

## 2021-02-10 PROCEDURE — 99024 PR POST-OP FOLLOW-UP VISIT: ICD-10-PCS | Mod: S$GLB,,, | Performed by: DERMATOLOGY

## 2021-02-10 PROCEDURE — 99999 PR PBB SHADOW E&M-EST. PATIENT-LVL III: ICD-10-PCS | Mod: PBBFAC,,, | Performed by: DERMATOLOGY

## 2021-02-12 ENCOUNTER — TELEPHONE (OUTPATIENT)
Dept: DERMATOLOGY | Facility: CLINIC | Age: 76
End: 2021-02-12

## 2021-02-12 LAB — BACTERIA SPEC AEROBE CULT: NORMAL

## 2021-02-15 ENCOUNTER — PATIENT MESSAGE (OUTPATIENT)
Dept: DERMATOLOGY | Facility: CLINIC | Age: 76
End: 2021-02-15

## 2021-02-17 ENCOUNTER — PATIENT MESSAGE (OUTPATIENT)
Dept: DERMATOLOGY | Facility: CLINIC | Age: 76
End: 2021-02-17

## 2021-02-28 DIAGNOSIS — E11.65 CONTROLLED TYPE 2 DIABETES MELLITUS WITH HYPERGLYCEMIA, WITHOUT LONG-TERM CURRENT USE OF INSULIN: Primary | ICD-10-CM

## 2021-03-04 ENCOUNTER — OFFICE VISIT (OUTPATIENT)
Dept: DERMATOLOGY | Facility: CLINIC | Age: 76
End: 2021-03-04
Payer: MEDICARE

## 2021-03-04 DIAGNOSIS — C44.42 SQUAMOUS CELL CARCINOMA, SCALP/NECK: Primary | ICD-10-CM

## 2021-03-04 PROCEDURE — 99212 OFFICE O/P EST SF 10 MIN: CPT | Mod: S$GLB,,, | Performed by: DERMATOLOGY

## 2021-03-04 PROCEDURE — 1126F PR PAIN SEVERITY QUANTIFIED, NO PAIN PRESENT: ICD-10-PCS | Mod: S$GLB,,, | Performed by: DERMATOLOGY

## 2021-03-04 PROCEDURE — 1159F MED LIST DOCD IN RCRD: CPT | Mod: S$GLB,,, | Performed by: DERMATOLOGY

## 2021-03-04 PROCEDURE — 1159F PR MEDICATION LIST DOCUMENTED IN MEDICAL RECORD: ICD-10-PCS | Mod: S$GLB,,, | Performed by: DERMATOLOGY

## 2021-03-04 PROCEDURE — 99212 PR OFFICE/OUTPT VISIT, EST, LEVL II, 10-19 MIN: ICD-10-PCS | Mod: S$GLB,,, | Performed by: DERMATOLOGY

## 2021-03-04 PROCEDURE — 1126F AMNT PAIN NOTED NONE PRSNT: CPT | Mod: S$GLB,,, | Performed by: DERMATOLOGY

## 2021-03-09 ENCOUNTER — CLINICAL SUPPORT (OUTPATIENT)
Dept: OTOLARYNGOLOGY | Facility: CLINIC | Age: 76
End: 2021-03-09
Payer: MEDICARE

## 2021-03-09 ENCOUNTER — OFFICE VISIT (OUTPATIENT)
Dept: OTOLARYNGOLOGY | Facility: CLINIC | Age: 76
End: 2021-03-09
Payer: MEDICARE

## 2021-03-09 VITALS
SYSTOLIC BLOOD PRESSURE: 171 MMHG | WEIGHT: 182.75 LBS | HEART RATE: 67 BPM | DIASTOLIC BLOOD PRESSURE: 78 MMHG | HEIGHT: 66 IN | BODY MASS INDEX: 29.37 KG/M2

## 2021-03-09 DIAGNOSIS — H90.A32 MIXED CONDUCTIVE AND SENSORINEURAL HEARING LOSS OF LEFT EAR WITH RESTRICTED HEARING OF RIGHT EAR: ICD-10-CM

## 2021-03-09 DIAGNOSIS — H90.6 MIXED CONDUCTIVE AND SENSORINEURAL HEARING LOSS OF BOTH EARS: Primary | ICD-10-CM

## 2021-03-09 DIAGNOSIS — H90.A21 SENSORINEURAL HEARING LOSS (SNHL) OF RIGHT EAR WITH RESTRICTED HEARING OF LEFT EAR: Chronic | ICD-10-CM

## 2021-03-09 DIAGNOSIS — H69.93 ETD (EUSTACHIAN TUBE DYSFUNCTION), BILATERAL: Primary | Chronic | ICD-10-CM

## 2021-03-09 DIAGNOSIS — H93.13 TINNITUS AURIUM, BILATERAL: Chronic | ICD-10-CM

## 2021-03-09 DIAGNOSIS — J30.89 NON-SEASONAL ALLERGIC RHINITIS, UNSPECIFIED TRIGGER: Chronic | ICD-10-CM

## 2021-03-09 PROCEDURE — 1126F AMNT PAIN NOTED NONE PRSNT: CPT | Mod: S$GLB,,, | Performed by: OTOLARYNGOLOGY

## 2021-03-09 PROCEDURE — 3077F PR MOST RECENT SYSTOLIC BLOOD PRESSURE >= 140 MM HG: ICD-10-PCS | Mod: CPTII,S$GLB,, | Performed by: OTOLARYNGOLOGY

## 2021-03-09 PROCEDURE — 92567 TYMPANOMETRY: CPT | Mod: S$GLB,,, | Performed by: AUDIOLOGIST-HEARING AID FITTER

## 2021-03-09 PROCEDURE — 1159F MED LIST DOCD IN RCRD: CPT | Mod: S$GLB,,, | Performed by: OTOLARYNGOLOGY

## 2021-03-09 PROCEDURE — 1101F PR PT FALLS ASSESS DOC 0-1 FALLS W/OUT INJ PAST YR: ICD-10-PCS | Mod: CPTII,S$GLB,, | Performed by: OTOLARYNGOLOGY

## 2021-03-09 PROCEDURE — 3078F PR MOST RECENT DIASTOLIC BLOOD PRESSURE < 80 MM HG: ICD-10-PCS | Mod: CPTII,S$GLB,, | Performed by: OTOLARYNGOLOGY

## 2021-03-09 PROCEDURE — 99214 PR OFFICE/OUTPT VISIT, EST, LEVL IV, 30-39 MIN: ICD-10-PCS | Mod: S$GLB,,, | Performed by: OTOLARYNGOLOGY

## 2021-03-09 PROCEDURE — 3288F PR FALLS RISK ASSESSMENT DOCUMENTED: ICD-10-PCS | Mod: CPTII,S$GLB,, | Performed by: OTOLARYNGOLOGY

## 2021-03-09 PROCEDURE — 1126F PR PAIN SEVERITY QUANTIFIED, NO PAIN PRESENT: ICD-10-PCS | Mod: S$GLB,,, | Performed by: OTOLARYNGOLOGY

## 2021-03-09 PROCEDURE — 92567 PR TYMPA2METRY: ICD-10-PCS | Mod: S$GLB,,, | Performed by: AUDIOLOGIST-HEARING AID FITTER

## 2021-03-09 PROCEDURE — 92553 AUDIOMETRY AIR & BONE: CPT | Mod: S$GLB,,, | Performed by: AUDIOLOGIST-HEARING AID FITTER

## 2021-03-09 PROCEDURE — 99999 PR PBB SHADOW E&M-EST. PATIENT-LVL IV: ICD-10-PCS | Mod: PBBFAC,,, | Performed by: OTOLARYNGOLOGY

## 2021-03-09 PROCEDURE — 99999 PR PBB SHADOW E&M-EST. PATIENT-LVL IV: CPT | Mod: PBBFAC,,, | Performed by: OTOLARYNGOLOGY

## 2021-03-09 PROCEDURE — 92553 PR AUDIOMETRY, AIR & BONE: ICD-10-PCS | Mod: S$GLB,,, | Performed by: AUDIOLOGIST-HEARING AID FITTER

## 2021-03-09 PROCEDURE — 99214 OFFICE O/P EST MOD 30 MIN: CPT | Mod: S$GLB,,, | Performed by: OTOLARYNGOLOGY

## 2021-03-09 PROCEDURE — 1159F PR MEDICATION LIST DOCUMENTED IN MEDICAL RECORD: ICD-10-PCS | Mod: S$GLB,,, | Performed by: OTOLARYNGOLOGY

## 2021-03-09 PROCEDURE — 3077F SYST BP >= 140 MM HG: CPT | Mod: CPTII,S$GLB,, | Performed by: OTOLARYNGOLOGY

## 2021-03-09 PROCEDURE — 3078F DIAST BP <80 MM HG: CPT | Mod: CPTII,S$GLB,, | Performed by: OTOLARYNGOLOGY

## 2021-03-09 PROCEDURE — 1101F PT FALLS ASSESS-DOCD LE1/YR: CPT | Mod: CPTII,S$GLB,, | Performed by: OTOLARYNGOLOGY

## 2021-03-09 PROCEDURE — 3288F FALL RISK ASSESSMENT DOCD: CPT | Mod: CPTII,S$GLB,, | Performed by: OTOLARYNGOLOGY

## 2021-04-12 DIAGNOSIS — E11.65 CONTROLLED TYPE 2 DIABETES MELLITUS WITH HYPERGLYCEMIA, WITHOUT LONG-TERM CURRENT USE OF INSULIN: Primary | ICD-10-CM

## 2021-04-14 RX ORDER — GLIMEPIRIDE 4 MG/1
4 TABLET ORAL
Qty: 90 TABLET | Refills: 0 | Status: SHIPPED | OUTPATIENT
Start: 2021-04-14 | End: 2021-04-23 | Stop reason: SDUPTHER

## 2021-04-22 ENCOUNTER — PATIENT MESSAGE (OUTPATIENT)
Dept: INTERNAL MEDICINE | Facility: CLINIC | Age: 76
End: 2021-04-22

## 2021-04-23 ENCOUNTER — PATIENT MESSAGE (OUTPATIENT)
Dept: INTERNAL MEDICINE | Facility: CLINIC | Age: 76
End: 2021-04-23

## 2021-04-23 ENCOUNTER — TELEPHONE (OUTPATIENT)
Dept: INTERNAL MEDICINE | Facility: CLINIC | Age: 76
End: 2021-04-23

## 2021-04-23 RX ORDER — GLIMEPIRIDE 4 MG/1
4 TABLET ORAL
Qty: 90 TABLET | Refills: 1 | Status: SHIPPED | OUTPATIENT
Start: 2021-04-23 | End: 2021-06-29 | Stop reason: SDUPTHER

## 2021-04-23 RX ORDER — GLIMEPIRIDE 4 MG/1
4 TABLET ORAL
Qty: 90 TABLET | Refills: 1 | Status: SHIPPED | OUTPATIENT
Start: 2021-04-23 | End: 2021-04-23 | Stop reason: SDUPTHER

## 2021-05-21 ENCOUNTER — OFFICE VISIT (OUTPATIENT)
Dept: DERMATOLOGY | Facility: CLINIC | Age: 76
End: 2021-05-21
Payer: MEDICARE

## 2021-05-21 DIAGNOSIS — Z85.828 HISTORY OF NONMELANOMA SKIN CANCER: ICD-10-CM

## 2021-05-21 DIAGNOSIS — D48.5 NEOPLASM OF UNCERTAIN BEHAVIOR OF SKIN: Primary | ICD-10-CM

## 2021-05-21 DIAGNOSIS — L57.0 AK (ACTINIC KERATOSIS): ICD-10-CM

## 2021-05-21 DIAGNOSIS — L82.1 SK (SEBORRHEIC KERATOSIS): ICD-10-CM

## 2021-05-21 DIAGNOSIS — Z12.83 SKIN CANCER SCREENING: ICD-10-CM

## 2021-05-21 PROCEDURE — 1126F PR PAIN SEVERITY QUANTIFIED, NO PAIN PRESENT: ICD-10-PCS | Mod: S$GLB,,, | Performed by: DERMATOLOGY

## 2021-05-21 PROCEDURE — 3288F PR FALLS RISK ASSESSMENT DOCUMENTED: ICD-10-PCS | Mod: CPTII,S$GLB,, | Performed by: DERMATOLOGY

## 2021-05-21 PROCEDURE — 99213 PR OFFICE/OUTPT VISIT, EST, LEVL III, 20-29 MIN: ICD-10-PCS | Mod: 25,S$GLB,, | Performed by: DERMATOLOGY

## 2021-05-21 PROCEDURE — 1159F MED LIST DOCD IN RCRD: CPT | Mod: S$GLB,,, | Performed by: DERMATOLOGY

## 2021-05-21 PROCEDURE — 1126F AMNT PAIN NOTED NONE PRSNT: CPT | Mod: S$GLB,,, | Performed by: DERMATOLOGY

## 2021-05-21 PROCEDURE — 1159F PR MEDICATION LIST DOCUMENTED IN MEDICAL RECORD: ICD-10-PCS | Mod: S$GLB,,, | Performed by: DERMATOLOGY

## 2021-05-21 PROCEDURE — 17004 PR DESTRUCTION, PREMALIGNANT LESIONS; 15 OR MORE LESIONS: ICD-10-PCS | Mod: S$GLB,,, | Performed by: DERMATOLOGY

## 2021-05-21 PROCEDURE — 1101F PT FALLS ASSESS-DOCD LE1/YR: CPT | Mod: CPTII,S$GLB,, | Performed by: DERMATOLOGY

## 2021-05-21 PROCEDURE — 11102 PR TANGENTIAL BIOPSY, SKIN, SINGLE LESION: ICD-10-PCS | Mod: 59,S$GLB,, | Performed by: DERMATOLOGY

## 2021-05-21 PROCEDURE — 99999 PR PBB SHADOW E&M-EST. PATIENT-LVL III: CPT | Mod: PBBFAC,,, | Performed by: DERMATOLOGY

## 2021-05-21 PROCEDURE — 88305 TISSUE EXAM BY PATHOLOGIST: CPT | Mod: 26,,, | Performed by: PATHOLOGY

## 2021-05-21 PROCEDURE — 99213 OFFICE O/P EST LOW 20 MIN: CPT | Mod: 25,S$GLB,, | Performed by: DERMATOLOGY

## 2021-05-21 PROCEDURE — 17004 DESTROY PREMAL LESIONS 15/>: CPT | Mod: S$GLB,,, | Performed by: DERMATOLOGY

## 2021-05-21 PROCEDURE — 3288F FALL RISK ASSESSMENT DOCD: CPT | Mod: CPTII,S$GLB,, | Performed by: DERMATOLOGY

## 2021-05-21 PROCEDURE — 99999 PR PBB SHADOW E&M-EST. PATIENT-LVL III: ICD-10-PCS | Mod: PBBFAC,,, | Performed by: DERMATOLOGY

## 2021-05-21 PROCEDURE — 1101F PR PT FALLS ASSESS DOC 0-1 FALLS W/OUT INJ PAST YR: ICD-10-PCS | Mod: CPTII,S$GLB,, | Performed by: DERMATOLOGY

## 2021-05-21 PROCEDURE — 88305 TISSUE EXAM BY PATHOLOGIST: ICD-10-PCS | Mod: 26,,, | Performed by: PATHOLOGY

## 2021-05-21 PROCEDURE — 88305 TISSUE EXAM BY PATHOLOGIST: CPT | Performed by: PATHOLOGY

## 2021-05-21 PROCEDURE — 11102 TANGNTL BX SKIN SINGLE LES: CPT | Mod: 59,S$GLB,, | Performed by: DERMATOLOGY

## 2021-05-26 LAB
FINAL PATHOLOGIC DIAGNOSIS: NORMAL
GROSS: NORMAL
Lab: NORMAL
MICROSCOPIC EXAM: NORMAL

## 2021-06-14 ENCOUNTER — TELEPHONE (OUTPATIENT)
Dept: DERMATOLOGY | Facility: CLINIC | Age: 76
End: 2021-06-14

## 2021-06-15 ENCOUNTER — PROCEDURE VISIT (OUTPATIENT)
Dept: DERMATOLOGY | Facility: CLINIC | Age: 76
End: 2021-06-15
Payer: MEDICARE

## 2021-06-15 VITALS
HEART RATE: 60 BPM | SYSTOLIC BLOOD PRESSURE: 154 MMHG | HEIGHT: 66 IN | WEIGHT: 182 LBS | BODY MASS INDEX: 29.25 KG/M2 | DIASTOLIC BLOOD PRESSURE: 73 MMHG

## 2021-06-15 DIAGNOSIS — C44.319 BASAL CELL CARCINOMA, FOREHEAD: Primary | ICD-10-CM

## 2021-06-15 PROCEDURE — 17311: ICD-10-PCS | Mod: 51,S$GLB,, | Performed by: DERMATOLOGY

## 2021-06-15 PROCEDURE — 17311 MOHS 1 STAGE H/N/HF/G: CPT | Mod: 51,S$GLB,, | Performed by: DERMATOLOGY

## 2021-06-15 PROCEDURE — 13132 CMPLX RPR F/C/C/M/N/AX/G/H/F: CPT | Mod: S$GLB,,, | Performed by: DERMATOLOGY

## 2021-06-15 PROCEDURE — 13132 PR RECMPL WND HEAD,FAC,HAND 2.6-7.5 CM: ICD-10-PCS | Mod: S$GLB,,, | Performed by: DERMATOLOGY

## 2021-06-15 PROCEDURE — 99499 NO LOS: ICD-10-PCS | Mod: S$GLB,,, | Performed by: DERMATOLOGY

## 2021-06-15 PROCEDURE — 17312: ICD-10-PCS | Mod: S$GLB,,, | Performed by: DERMATOLOGY

## 2021-06-15 PROCEDURE — 99499 UNLISTED E&M SERVICE: CPT | Mod: S$GLB,,, | Performed by: DERMATOLOGY

## 2021-06-15 PROCEDURE — 17312 MOHS ADDL STAGE: CPT | Mod: S$GLB,,, | Performed by: DERMATOLOGY

## 2021-06-17 ENCOUNTER — PES CALL (OUTPATIENT)
Dept: ADMINISTRATIVE | Facility: CLINIC | Age: 76
End: 2021-06-17

## 2021-06-22 ENCOUNTER — OFFICE VISIT (OUTPATIENT)
Dept: DERMATOLOGY | Facility: CLINIC | Age: 76
End: 2021-06-22
Payer: MEDICARE

## 2021-06-22 DIAGNOSIS — Z09 POSTOP CHECK: Primary | ICD-10-CM

## 2021-06-22 PROCEDURE — 3288F PR FALLS RISK ASSESSMENT DOCUMENTED: ICD-10-PCS | Mod: CPTII,S$GLB,, | Performed by: DERMATOLOGY

## 2021-06-22 PROCEDURE — 3288F FALL RISK ASSESSMENT DOCD: CPT | Mod: CPTII,S$GLB,, | Performed by: DERMATOLOGY

## 2021-06-22 PROCEDURE — 99999 PR PBB SHADOW E&M-EST. PATIENT-LVL I: CPT | Mod: PBBFAC,,, | Performed by: DERMATOLOGY

## 2021-06-22 PROCEDURE — 1126F PR PAIN SEVERITY QUANTIFIED, NO PAIN PRESENT: ICD-10-PCS | Mod: S$GLB,,, | Performed by: DERMATOLOGY

## 2021-06-22 PROCEDURE — 99024 PR POST-OP FOLLOW-UP VISIT: ICD-10-PCS | Mod: S$GLB,,, | Performed by: DERMATOLOGY

## 2021-06-22 PROCEDURE — 99024 POSTOP FOLLOW-UP VISIT: CPT | Mod: S$GLB,,, | Performed by: DERMATOLOGY

## 2021-06-22 PROCEDURE — 1101F PT FALLS ASSESS-DOCD LE1/YR: CPT | Mod: CPTII,S$GLB,, | Performed by: DERMATOLOGY

## 2021-06-22 PROCEDURE — 99999 PR PBB SHADOW E&M-EST. PATIENT-LVL I: ICD-10-PCS | Mod: PBBFAC,,, | Performed by: DERMATOLOGY

## 2021-06-22 PROCEDURE — 1126F AMNT PAIN NOTED NONE PRSNT: CPT | Mod: S$GLB,,, | Performed by: DERMATOLOGY

## 2021-06-22 PROCEDURE — 1101F PR PT FALLS ASSESS DOC 0-1 FALLS W/OUT INJ PAST YR: ICD-10-PCS | Mod: CPTII,S$GLB,, | Performed by: DERMATOLOGY

## 2021-06-29 ENCOUNTER — LAB VISIT (OUTPATIENT)
Dept: LAB | Facility: HOSPITAL | Age: 76
End: 2021-06-29
Attending: INTERNAL MEDICINE
Payer: MEDICARE

## 2021-06-29 ENCOUNTER — OFFICE VISIT (OUTPATIENT)
Dept: INTERNAL MEDICINE | Facility: CLINIC | Age: 76
End: 2021-06-29
Payer: MEDICARE

## 2021-06-29 ENCOUNTER — TELEPHONE (OUTPATIENT)
Dept: INTERNAL MEDICINE | Facility: CLINIC | Age: 76
End: 2021-06-29

## 2021-06-29 VITALS
SYSTOLIC BLOOD PRESSURE: 124 MMHG | RESPIRATION RATE: 15 BRPM | DIASTOLIC BLOOD PRESSURE: 60 MMHG | BODY MASS INDEX: 29.27 KG/M2 | WEIGHT: 182.13 LBS | OXYGEN SATURATION: 98 % | HEART RATE: 60 BPM | HEIGHT: 66 IN | TEMPERATURE: 98 F

## 2021-06-29 DIAGNOSIS — I15.2 HYPERTENSION ASSOCIATED WITH DIABETES: ICD-10-CM

## 2021-06-29 DIAGNOSIS — E78.5 HYPERLIPIDEMIA ASSOCIATED WITH TYPE 2 DIABETES MELLITUS: ICD-10-CM

## 2021-06-29 DIAGNOSIS — E11.69 HYPERLIPIDEMIA ASSOCIATED WITH TYPE 2 DIABETES MELLITUS: ICD-10-CM

## 2021-06-29 DIAGNOSIS — E11.65 CONTROLLED TYPE 2 DIABETES MELLITUS WITH HYPERGLYCEMIA, WITHOUT LONG-TERM CURRENT USE OF INSULIN: Primary | ICD-10-CM

## 2021-06-29 DIAGNOSIS — E11.65 CONTROLLED TYPE 2 DIABETES MELLITUS WITH HYPERGLYCEMIA, WITHOUT LONG-TERM CURRENT USE OF INSULIN: ICD-10-CM

## 2021-06-29 DIAGNOSIS — I15.2 HYPERTENSION ASSOCIATED WITH DIABETES: Primary | ICD-10-CM

## 2021-06-29 DIAGNOSIS — I10 ESSENTIAL HYPERTENSION: ICD-10-CM

## 2021-06-29 DIAGNOSIS — G40.219 PARTIAL EPILEPSY WITH IMPAIRMENT OF CONSCIOUSNESS, INTRACTABLE: ICD-10-CM

## 2021-06-29 DIAGNOSIS — R35.1 NOCTURIA: ICD-10-CM

## 2021-06-29 DIAGNOSIS — C22.0 HEPATOCELLULAR CARCINOMA: ICD-10-CM

## 2021-06-29 DIAGNOSIS — E11.59 HYPERTENSION ASSOCIATED WITH DIABETES: ICD-10-CM

## 2021-06-29 DIAGNOSIS — E11.59 HYPERTENSION ASSOCIATED WITH DIABETES: Primary | ICD-10-CM

## 2021-06-29 DIAGNOSIS — E11.8 DM (DIABETES MELLITUS) WITH COMPLICATIONS: ICD-10-CM

## 2021-06-29 LAB
ALBUMIN SERPL BCP-MCNC: 4 G/DL (ref 3.5–5.2)
ALP SERPL-CCNC: 63 U/L (ref 55–135)
ALT SERPL W/O P-5'-P-CCNC: 17 U/L (ref 10–44)
ANION GAP SERPL CALC-SCNC: 10 MMOL/L (ref 8–16)
AST SERPL-CCNC: 34 U/L (ref 10–40)
BASOPHILS # BLD AUTO: 0.04 K/UL (ref 0–0.2)
BASOPHILS NFR BLD: 0.5 % (ref 0–1.9)
BILIRUB SERPL-MCNC: 0.7 MG/DL (ref 0.1–1)
BUN SERPL-MCNC: 19 MG/DL (ref 8–23)
CALCIUM SERPL-MCNC: 9.9 MG/DL (ref 8.7–10.5)
CHLORIDE SERPL-SCNC: 108 MMOL/L (ref 95–110)
CO2 SERPL-SCNC: 24 MMOL/L (ref 23–29)
CREAT SERPL-MCNC: 1.1 MG/DL (ref 0.5–1.4)
DIFFERENTIAL METHOD: ABNORMAL
EOSINOPHIL # BLD AUTO: 0.3 K/UL (ref 0–0.5)
EOSINOPHIL NFR BLD: 3.6 % (ref 0–8)
ERYTHROCYTE [DISTWIDTH] IN BLOOD BY AUTOMATED COUNT: 13 % (ref 11.5–14.5)
EST. GFR  (AFRICAN AMERICAN): >60 ML/MIN/1.73 M^2
EST. GFR  (NON AFRICAN AMERICAN): >60 ML/MIN/1.73 M^2
ESTIMATED AVG GLUCOSE: 137 MG/DL (ref 68–131)
GLUCOSE SERPL-MCNC: 128 MG/DL (ref 70–110)
HBA1C MFR BLD: 6.4 % (ref 4–5.6)
HCT VFR BLD AUTO: 38.5 % (ref 40–54)
HGB BLD-MCNC: 13.1 G/DL (ref 14–18)
IMM GRANULOCYTES # BLD AUTO: 0.01 K/UL (ref 0–0.04)
IMM GRANULOCYTES NFR BLD AUTO: 0.1 % (ref 0–0.5)
LYMPHOCYTES # BLD AUTO: 2.6 K/UL (ref 1–4.8)
LYMPHOCYTES NFR BLD: 32.3 % (ref 18–48)
MCH RBC QN AUTO: 30.5 PG (ref 27–31)
MCHC RBC AUTO-ENTMCNC: 34 G/DL (ref 32–36)
MCV RBC AUTO: 90 FL (ref 82–98)
MONOCYTES # BLD AUTO: 0.8 K/UL (ref 0.3–1)
MONOCYTES NFR BLD: 10.5 % (ref 4–15)
NEUTROPHILS # BLD AUTO: 4.2 K/UL (ref 1.8–7.7)
NEUTROPHILS NFR BLD: 53 % (ref 38–73)
NRBC BLD-RTO: 0 /100 WBC
PLATELET # BLD AUTO: 170 K/UL (ref 150–450)
PMV BLD AUTO: 10.4 FL (ref 9.2–12.9)
POTASSIUM SERPL-SCNC: 4.7 MMOL/L (ref 3.5–5.1)
PROT SERPL-MCNC: 7.1 G/DL (ref 6–8.4)
RBC # BLD AUTO: 4.29 M/UL (ref 4.6–6.2)
SODIUM SERPL-SCNC: 142 MMOL/L (ref 136–145)
WBC # BLD AUTO: 7.98 K/UL (ref 3.9–12.7)

## 2021-06-29 PROCEDURE — 1126F AMNT PAIN NOTED NONE PRSNT: CPT | Mod: S$GLB,,, | Performed by: INTERNAL MEDICINE

## 2021-06-29 PROCEDURE — 3288F PR FALLS RISK ASSESSMENT DOCUMENTED: ICD-10-PCS | Mod: CPTII,S$GLB,, | Performed by: INTERNAL MEDICINE

## 2021-06-29 PROCEDURE — 1101F PR PT FALLS ASSESS DOC 0-1 FALLS W/OUT INJ PAST YR: ICD-10-PCS | Mod: CPTII,S$GLB,, | Performed by: INTERNAL MEDICINE

## 2021-06-29 PROCEDURE — 99499 UNLISTED E&M SERVICE: CPT | Mod: S$GLB,,, | Performed by: INTERNAL MEDICINE

## 2021-06-29 PROCEDURE — 99214 PR OFFICE/OUTPT VISIT, EST, LEVL IV, 30-39 MIN: ICD-10-PCS | Mod: S$GLB,,, | Performed by: INTERNAL MEDICINE

## 2021-06-29 PROCEDURE — 1159F MED LIST DOCD IN RCRD: CPT | Mod: S$GLB,,, | Performed by: INTERNAL MEDICINE

## 2021-06-29 PROCEDURE — 99214 OFFICE O/P EST MOD 30 MIN: CPT | Mod: S$GLB,,, | Performed by: INTERNAL MEDICINE

## 2021-06-29 PROCEDURE — 80053 COMPREHEN METABOLIC PANEL: CPT | Performed by: INTERNAL MEDICINE

## 2021-06-29 PROCEDURE — 85025 COMPLETE CBC W/AUTO DIFF WBC: CPT | Performed by: INTERNAL MEDICINE

## 2021-06-29 PROCEDURE — 83036 HEMOGLOBIN GLYCOSYLATED A1C: CPT | Performed by: INTERNAL MEDICINE

## 2021-06-29 PROCEDURE — 1159F PR MEDICATION LIST DOCUMENTED IN MEDICAL RECORD: ICD-10-PCS | Mod: S$GLB,,, | Performed by: INTERNAL MEDICINE

## 2021-06-29 PROCEDURE — 36415 COLL VENOUS BLD VENIPUNCTURE: CPT | Mod: PO | Performed by: INTERNAL MEDICINE

## 2021-06-29 PROCEDURE — 99999 PR PBB SHADOW E&M-EST. PATIENT-LVL IV: CPT | Mod: PBBFAC,,, | Performed by: INTERNAL MEDICINE

## 2021-06-29 PROCEDURE — 1126F PR PAIN SEVERITY QUANTIFIED, NO PAIN PRESENT: ICD-10-PCS | Mod: S$GLB,,, | Performed by: INTERNAL MEDICINE

## 2021-06-29 PROCEDURE — 3288F FALL RISK ASSESSMENT DOCD: CPT | Mod: CPTII,S$GLB,, | Performed by: INTERNAL MEDICINE

## 2021-06-29 PROCEDURE — 99499 RISK ADDL DX/OHS AUDIT: ICD-10-PCS | Mod: S$GLB,,, | Performed by: INTERNAL MEDICINE

## 2021-06-29 PROCEDURE — 1101F PT FALLS ASSESS-DOCD LE1/YR: CPT | Mod: CPTII,S$GLB,, | Performed by: INTERNAL MEDICINE

## 2021-06-29 PROCEDURE — 99999 PR PBB SHADOW E&M-EST. PATIENT-LVL IV: ICD-10-PCS | Mod: PBBFAC,,, | Performed by: INTERNAL MEDICINE

## 2021-06-29 RX ORDER — IRBESARTAN 300 MG/1
300 TABLET ORAL NIGHTLY
Qty: 90 TABLET | Refills: 3 | Status: SHIPPED | OUTPATIENT
Start: 2021-06-29 | End: 2021-10-04 | Stop reason: SDUPTHER

## 2021-06-29 RX ORDER — LOVASTATIN 20 MG/1
20 TABLET ORAL NIGHTLY
Qty: 90 TABLET | Refills: 3 | Status: SHIPPED | OUTPATIENT
Start: 2021-06-29 | End: 2021-10-04 | Stop reason: SDUPTHER

## 2021-06-29 RX ORDER — GLIMEPIRIDE 4 MG/1
4 TABLET ORAL
Qty: 90 TABLET | Refills: 1 | Status: SHIPPED | OUTPATIENT
Start: 2021-06-29 | End: 2021-10-04 | Stop reason: SDUPTHER

## 2021-06-29 RX ORDER — METFORMIN HYDROCHLORIDE 500 MG/1
500 TABLET, EXTENDED RELEASE ORAL 2 TIMES DAILY WITH MEALS
Qty: 180 TABLET | Refills: 1 | Status: SHIPPED | OUTPATIENT
Start: 2021-06-29 | End: 2021-10-04 | Stop reason: SDUPTHER

## 2021-06-30 ENCOUNTER — PATIENT MESSAGE (OUTPATIENT)
Dept: INTERNAL MEDICINE | Facility: CLINIC | Age: 76
End: 2021-06-30

## 2021-07-06 ENCOUNTER — OFFICE VISIT (OUTPATIENT)
Dept: OTOLARYNGOLOGY | Facility: CLINIC | Age: 76
End: 2021-07-06
Payer: MEDICARE

## 2021-07-06 VITALS
DIASTOLIC BLOOD PRESSURE: 74 MMHG | BODY MASS INDEX: 29.69 KG/M2 | HEIGHT: 66 IN | SYSTOLIC BLOOD PRESSURE: 175 MMHG | WEIGHT: 184.75 LBS | HEART RATE: 61 BPM

## 2021-07-06 DIAGNOSIS — H69.93 ETD (EUSTACHIAN TUBE DYSFUNCTION), BILATERAL: Primary | Chronic | ICD-10-CM

## 2021-07-06 DIAGNOSIS — H90.A21 SENSORINEURAL HEARING LOSS (SNHL) OF RIGHT EAR WITH RESTRICTED HEARING OF LEFT EAR: ICD-10-CM

## 2021-07-06 DIAGNOSIS — H61.22 IMPACTED CERUMEN OF LEFT EAR: ICD-10-CM

## 2021-07-06 DIAGNOSIS — H93.13 TINNITUS AURIUM, BILATERAL: Chronic | ICD-10-CM

## 2021-07-06 PROCEDURE — 3288F FALL RISK ASSESSMENT DOCD: CPT | Mod: CPTII,S$GLB,, | Performed by: OTOLARYNGOLOGY

## 2021-07-06 PROCEDURE — 69210 REMOVE IMPACTED EAR WAX UNI: CPT | Mod: S$GLB,,, | Performed by: OTOLARYNGOLOGY

## 2021-07-06 PROCEDURE — 1159F PR MEDICATION LIST DOCUMENTED IN MEDICAL RECORD: ICD-10-PCS | Mod: S$GLB,,, | Performed by: OTOLARYNGOLOGY

## 2021-07-06 PROCEDURE — 1101F PT FALLS ASSESS-DOCD LE1/YR: CPT | Mod: CPTII,S$GLB,, | Performed by: OTOLARYNGOLOGY

## 2021-07-06 PROCEDURE — 69210 PR REMOVAL IMPACTED CERUMEN REQUIRING INSTRUMENTATION, UNILATERAL: ICD-10-PCS | Mod: S$GLB,,, | Performed by: OTOLARYNGOLOGY

## 2021-07-06 PROCEDURE — 99214 PR OFFICE/OUTPT VISIT, EST, LEVL IV, 30-39 MIN: ICD-10-PCS | Mod: 25,S$GLB,, | Performed by: OTOLARYNGOLOGY

## 2021-07-06 PROCEDURE — 1126F AMNT PAIN NOTED NONE PRSNT: CPT | Mod: S$GLB,,, | Performed by: OTOLARYNGOLOGY

## 2021-07-06 PROCEDURE — 99999 PR PBB SHADOW E&M-EST. PATIENT-LVL IV: ICD-10-PCS | Mod: PBBFAC,,, | Performed by: OTOLARYNGOLOGY

## 2021-07-06 PROCEDURE — 99999 PR PBB SHADOW E&M-EST. PATIENT-LVL IV: CPT | Mod: PBBFAC,,, | Performed by: OTOLARYNGOLOGY

## 2021-07-06 PROCEDURE — 3288F PR FALLS RISK ASSESSMENT DOCUMENTED: ICD-10-PCS | Mod: CPTII,S$GLB,, | Performed by: OTOLARYNGOLOGY

## 2021-07-06 PROCEDURE — 1126F PR PAIN SEVERITY QUANTIFIED, NO PAIN PRESENT: ICD-10-PCS | Mod: S$GLB,,, | Performed by: OTOLARYNGOLOGY

## 2021-07-06 PROCEDURE — 99214 OFFICE O/P EST MOD 30 MIN: CPT | Mod: 25,S$GLB,, | Performed by: OTOLARYNGOLOGY

## 2021-07-06 PROCEDURE — 1159F MED LIST DOCD IN RCRD: CPT | Mod: S$GLB,,, | Performed by: OTOLARYNGOLOGY

## 2021-07-06 PROCEDURE — 1101F PR PT FALLS ASSESS DOC 0-1 FALLS W/OUT INJ PAST YR: ICD-10-PCS | Mod: CPTII,S$GLB,, | Performed by: OTOLARYNGOLOGY

## 2021-07-30 ENCOUNTER — PES CALL (OUTPATIENT)
Dept: ADMINISTRATIVE | Facility: CLINIC | Age: 76
End: 2021-07-30

## 2021-10-04 ENCOUNTER — PATIENT MESSAGE (OUTPATIENT)
Dept: INTERNAL MEDICINE | Facility: CLINIC | Age: 76
End: 2021-10-04

## 2021-10-04 DIAGNOSIS — I10 ESSENTIAL HYPERTENSION: ICD-10-CM

## 2021-10-04 DIAGNOSIS — E11.8 DM (DIABETES MELLITUS) WITH COMPLICATIONS: ICD-10-CM

## 2021-10-05 RX ORDER — GLIMEPIRIDE 4 MG/1
4 TABLET ORAL
Qty: 90 TABLET | Refills: 0 | Status: SHIPPED | OUTPATIENT
Start: 2021-10-05 | End: 2022-01-04 | Stop reason: SDUPTHER

## 2021-10-06 RX ORDER — METFORMIN HYDROCHLORIDE 500 MG/1
500 TABLET, EXTENDED RELEASE ORAL 2 TIMES DAILY WITH MEALS
Qty: 180 TABLET | Refills: 1 | Status: SHIPPED | OUTPATIENT
Start: 2021-10-06 | End: 2022-01-04 | Stop reason: SDUPTHER

## 2021-10-06 RX ORDER — IRBESARTAN 300 MG/1
300 TABLET ORAL NIGHTLY
Qty: 90 TABLET | Refills: 2 | Status: SHIPPED | OUTPATIENT
Start: 2021-10-06 | End: 2022-01-04 | Stop reason: SDUPTHER

## 2021-10-06 RX ORDER — LOVASTATIN 20 MG/1
20 TABLET ORAL NIGHTLY
Qty: 90 TABLET | Refills: 0 | Status: SHIPPED | OUTPATIENT
Start: 2021-10-06 | End: 2022-01-04 | Stop reason: SDUPTHER

## 2021-10-07 ENCOUNTER — PATIENT MESSAGE (OUTPATIENT)
Dept: INTERNAL MEDICINE | Facility: CLINIC | Age: 76
End: 2021-10-07

## 2021-10-13 ENCOUNTER — IMMUNIZATION (OUTPATIENT)
Dept: INTERNAL MEDICINE | Facility: CLINIC | Age: 76
End: 2021-10-13
Payer: MEDICARE

## 2021-10-13 ENCOUNTER — OFFICE VISIT (OUTPATIENT)
Dept: DERMATOLOGY | Facility: CLINIC | Age: 76
End: 2021-10-13
Payer: MEDICARE

## 2021-10-13 DIAGNOSIS — Z23 NEED FOR VACCINATION: Primary | ICD-10-CM

## 2021-10-13 DIAGNOSIS — L57.0 AK (ACTINIC KERATOSIS): Primary | ICD-10-CM

## 2021-10-13 DIAGNOSIS — L82.1 SK (SEBORRHEIC KERATOSIS): ICD-10-CM

## 2021-10-13 DIAGNOSIS — Z12.83 SKIN CANCER SCREENING: ICD-10-CM

## 2021-10-13 DIAGNOSIS — L21.9 ACUTE SEBORRHEIC DERMATITIS: ICD-10-CM

## 2021-10-13 PROCEDURE — 1101F PT FALLS ASSESS-DOCD LE1/YR: CPT | Mod: HCNC,CPTII,S$GLB, | Performed by: DERMATOLOGY

## 2021-10-13 PROCEDURE — 91300 COVID-19, MRNA, LNP-S, PF, 30 MCG/0.3 ML DOSE VACCINE: CPT | Mod: HCNC,PBBFAC | Performed by: INTERNAL MEDICINE

## 2021-10-13 PROCEDURE — 1159F MED LIST DOCD IN RCRD: CPT | Mod: HCNC,CPTII,S$GLB, | Performed by: DERMATOLOGY

## 2021-10-13 PROCEDURE — 99999 PR PBB SHADOW E&M-EST. PATIENT-LVL III: CPT | Mod: PBBFAC,HCNC,, | Performed by: DERMATOLOGY

## 2021-10-13 PROCEDURE — 3288F FALL RISK ASSESSMENT DOCD: CPT | Mod: HCNC,CPTII,S$GLB, | Performed by: DERMATOLOGY

## 2021-10-13 PROCEDURE — 3288F PR FALLS RISK ASSESSMENT DOCUMENTED: ICD-10-PCS | Mod: HCNC,CPTII,S$GLB, | Performed by: DERMATOLOGY

## 2021-10-13 PROCEDURE — 99213 OFFICE O/P EST LOW 20 MIN: CPT | Mod: 25,HCNC,S$GLB, | Performed by: DERMATOLOGY

## 2021-10-13 PROCEDURE — 3044F PR MOST RECENT HEMOGLOBIN A1C LEVEL <7.0%: ICD-10-PCS | Mod: HCNC,CPTII,S$GLB, | Performed by: DERMATOLOGY

## 2021-10-13 PROCEDURE — 1101F PR PT FALLS ASSESS DOC 0-1 FALLS W/OUT INJ PAST YR: ICD-10-PCS | Mod: HCNC,CPTII,S$GLB, | Performed by: DERMATOLOGY

## 2021-10-13 PROCEDURE — 1160F RVW MEDS BY RX/DR IN RCRD: CPT | Mod: HCNC,CPTII,S$GLB, | Performed by: DERMATOLOGY

## 2021-10-13 PROCEDURE — 1126F PR PAIN SEVERITY QUANTIFIED, NO PAIN PRESENT: ICD-10-PCS | Mod: HCNC,CPTII,S$GLB, | Performed by: DERMATOLOGY

## 2021-10-13 PROCEDURE — 4010F PR ACE/ARB THEARPY RXD/TAKEN: ICD-10-PCS | Mod: HCNC,CPTII,S$GLB, | Performed by: DERMATOLOGY

## 2021-10-13 PROCEDURE — 17004 PR DESTRUCTION, PREMALIGNANT LESIONS; 15 OR MORE LESIONS: ICD-10-PCS | Mod: HCNC,S$GLB,, | Performed by: DERMATOLOGY

## 2021-10-13 PROCEDURE — 1159F PR MEDICATION LIST DOCUMENTED IN MEDICAL RECORD: ICD-10-PCS | Mod: HCNC,CPTII,S$GLB, | Performed by: DERMATOLOGY

## 2021-10-13 PROCEDURE — 17004 DESTROY PREMAL LESIONS 15/>: CPT | Mod: HCNC,S$GLB,, | Performed by: DERMATOLOGY

## 2021-10-13 PROCEDURE — 1126F AMNT PAIN NOTED NONE PRSNT: CPT | Mod: HCNC,CPTII,S$GLB, | Performed by: DERMATOLOGY

## 2021-10-13 PROCEDURE — 0003A COVID-19, MRNA, LNP-S, PF, 30 MCG/0.3 ML DOSE VACCINE: CPT | Mod: HCNC,CV19,PBBFAC | Performed by: INTERNAL MEDICINE

## 2021-10-13 PROCEDURE — 3044F HG A1C LEVEL LT 7.0%: CPT | Mod: HCNC,CPTII,S$GLB, | Performed by: DERMATOLOGY

## 2021-10-13 PROCEDURE — 4010F ACE/ARB THERAPY RXD/TAKEN: CPT | Mod: HCNC,CPTII,S$GLB, | Performed by: DERMATOLOGY

## 2021-10-13 PROCEDURE — 99999 PR PBB SHADOW E&M-EST. PATIENT-LVL III: ICD-10-PCS | Mod: PBBFAC,HCNC,, | Performed by: DERMATOLOGY

## 2021-10-13 PROCEDURE — 1160F PR REVIEW ALL MEDS BY PRESCRIBER/CLIN PHARMACIST DOCUMENTED: ICD-10-PCS | Mod: HCNC,CPTII,S$GLB, | Performed by: DERMATOLOGY

## 2021-10-13 PROCEDURE — 99213 PR OFFICE/OUTPT VISIT, EST, LEVL III, 20-29 MIN: ICD-10-PCS | Mod: 25,HCNC,S$GLB, | Performed by: DERMATOLOGY

## 2021-10-13 RX ORDER — KETOCONAZOLE 20 MG/G
CREAM TOPICAL
Qty: 60 G | Refills: 3 | Status: SHIPPED | OUTPATIENT
Start: 2021-10-13

## 2021-10-13 RX ORDER — KETOCONAZOLE 20 MG/ML
SHAMPOO, SUSPENSION TOPICAL
Qty: 120 ML | Refills: 5 | Status: SHIPPED | OUTPATIENT
Start: 2021-10-13

## 2021-10-29 ENCOUNTER — PATIENT OUTREACH (OUTPATIENT)
Dept: ADMINISTRATIVE | Facility: OTHER | Age: 76
End: 2021-10-29
Payer: MEDICARE

## 2021-11-02 ENCOUNTER — CLINICAL SUPPORT (OUTPATIENT)
Dept: OTOLARYNGOLOGY | Facility: CLINIC | Age: 76
End: 2021-11-02
Payer: MEDICARE

## 2021-11-02 ENCOUNTER — OFFICE VISIT (OUTPATIENT)
Dept: OTOLARYNGOLOGY | Facility: CLINIC | Age: 76
End: 2021-11-02
Payer: MEDICARE

## 2021-11-02 VITALS
HEART RATE: 66 BPM | SYSTOLIC BLOOD PRESSURE: 158 MMHG | DIASTOLIC BLOOD PRESSURE: 65 MMHG | HEIGHT: 66 IN | BODY MASS INDEX: 28.77 KG/M2 | WEIGHT: 179 LBS

## 2021-11-02 DIAGNOSIS — H90.3 SENSORINEURAL HEARING LOSS, BILATERAL: Primary | ICD-10-CM

## 2021-11-02 DIAGNOSIS — H69.93 ETD (EUSTACHIAN TUBE DYSFUNCTION), BILATERAL: Chronic | ICD-10-CM

## 2021-11-02 DIAGNOSIS — H90.3 SENSORINEURAL HEARING LOSS, BILATERAL: ICD-10-CM

## 2021-11-02 DIAGNOSIS — H90.3 ASYMMETRIC SNHL (SENSORINEURAL HEARING LOSS): Primary | ICD-10-CM

## 2021-11-02 DIAGNOSIS — H93.13 TINNITUS OF BOTH EARS: ICD-10-CM

## 2021-11-02 DIAGNOSIS — H93.13 TINNITUS OF BOTH EARS: Chronic | ICD-10-CM

## 2021-11-02 PROCEDURE — 3077F PR MOST RECENT SYSTOLIC BLOOD PRESSURE >= 140 MM HG: ICD-10-PCS | Mod: HCNC,CPTII,S$GLB, | Performed by: OTOLARYNGOLOGY

## 2021-11-02 PROCEDURE — 4010F PR ACE/ARB THEARPY RXD/TAKEN: ICD-10-PCS | Mod: HCNC,CPTII,S$GLB, | Performed by: OTOLARYNGOLOGY

## 2021-11-02 PROCEDURE — 1101F PT FALLS ASSESS-DOCD LE1/YR: CPT | Mod: HCNC,CPTII,S$GLB, | Performed by: OTOLARYNGOLOGY

## 2021-11-02 PROCEDURE — 1160F RVW MEDS BY RX/DR IN RCRD: CPT | Mod: HCNC,CPTII,S$GLB, | Performed by: OTOLARYNGOLOGY

## 2021-11-02 PROCEDURE — 99999 PR PBB SHADOW E&M-EST. PATIENT-LVL IV: CPT | Mod: PBBFAC,HCNC,, | Performed by: OTOLARYNGOLOGY

## 2021-11-02 PROCEDURE — 1126F AMNT PAIN NOTED NONE PRSNT: CPT | Mod: HCNC,CPTII,S$GLB, | Performed by: OTOLARYNGOLOGY

## 2021-11-02 PROCEDURE — 99214 OFFICE O/P EST MOD 30 MIN: CPT | Mod: HCNC,S$GLB,, | Performed by: OTOLARYNGOLOGY

## 2021-11-02 PROCEDURE — 3044F HG A1C LEVEL LT 7.0%: CPT | Mod: HCNC,CPTII,S$GLB, | Performed by: OTOLARYNGOLOGY

## 2021-11-02 PROCEDURE — 99214 PR OFFICE/OUTPT VISIT, EST, LEVL IV, 30-39 MIN: ICD-10-PCS | Mod: HCNC,S$GLB,, | Performed by: OTOLARYNGOLOGY

## 2021-11-02 PROCEDURE — 1126F PR PAIN SEVERITY QUANTIFIED, NO PAIN PRESENT: ICD-10-PCS | Mod: HCNC,CPTII,S$GLB, | Performed by: OTOLARYNGOLOGY

## 2021-11-02 PROCEDURE — 1159F PR MEDICATION LIST DOCUMENTED IN MEDICAL RECORD: ICD-10-PCS | Mod: HCNC,CPTII,S$GLB, | Performed by: OTOLARYNGOLOGY

## 2021-11-02 PROCEDURE — 4010F ACE/ARB THERAPY RXD/TAKEN: CPT | Mod: HCNC,CPTII,S$GLB, | Performed by: OTOLARYNGOLOGY

## 2021-11-02 PROCEDURE — 92557 COMPREHENSIVE HEARING TEST: CPT | Mod: HCNC,S$GLB,, | Performed by: AUDIOLOGIST

## 2021-11-02 PROCEDURE — 1101F PR PT FALLS ASSESS DOC 0-1 FALLS W/OUT INJ PAST YR: ICD-10-PCS | Mod: HCNC,CPTII,S$GLB, | Performed by: OTOLARYNGOLOGY

## 2021-11-02 PROCEDURE — 99999 PR PBB SHADOW E&M-EST. PATIENT-LVL IV: ICD-10-PCS | Mod: PBBFAC,HCNC,, | Performed by: OTOLARYNGOLOGY

## 2021-11-02 PROCEDURE — 92557 PR COMPREHENSIVE HEARING TEST: ICD-10-PCS | Mod: HCNC,S$GLB,, | Performed by: AUDIOLOGIST

## 2021-11-02 PROCEDURE — 92567 PR TYMPA2METRY: ICD-10-PCS | Mod: HCNC,S$GLB,, | Performed by: AUDIOLOGIST

## 2021-11-02 PROCEDURE — 1160F PR REVIEW ALL MEDS BY PRESCRIBER/CLIN PHARMACIST DOCUMENTED: ICD-10-PCS | Mod: HCNC,CPTII,S$GLB, | Performed by: OTOLARYNGOLOGY

## 2021-11-02 PROCEDURE — 3077F SYST BP >= 140 MM HG: CPT | Mod: HCNC,CPTII,S$GLB, | Performed by: OTOLARYNGOLOGY

## 2021-11-02 PROCEDURE — 3078F DIAST BP <80 MM HG: CPT | Mod: HCNC,CPTII,S$GLB, | Performed by: OTOLARYNGOLOGY

## 2021-11-02 PROCEDURE — 3288F PR FALLS RISK ASSESSMENT DOCUMENTED: ICD-10-PCS | Mod: HCNC,CPTII,S$GLB, | Performed by: OTOLARYNGOLOGY

## 2021-11-02 PROCEDURE — 3078F PR MOST RECENT DIASTOLIC BLOOD PRESSURE < 80 MM HG: ICD-10-PCS | Mod: HCNC,CPTII,S$GLB, | Performed by: OTOLARYNGOLOGY

## 2021-11-02 PROCEDURE — 1159F MED LIST DOCD IN RCRD: CPT | Mod: HCNC,CPTII,S$GLB, | Performed by: OTOLARYNGOLOGY

## 2021-11-02 PROCEDURE — 3288F FALL RISK ASSESSMENT DOCD: CPT | Mod: HCNC,CPTII,S$GLB, | Performed by: OTOLARYNGOLOGY

## 2021-11-02 PROCEDURE — 92567 TYMPANOMETRY: CPT | Mod: HCNC,S$GLB,, | Performed by: AUDIOLOGIST

## 2021-11-02 PROCEDURE — 3044F PR MOST RECENT HEMOGLOBIN A1C LEVEL <7.0%: ICD-10-PCS | Mod: HCNC,CPTII,S$GLB, | Performed by: OTOLARYNGOLOGY

## 2021-11-18 ENCOUNTER — HOSPITAL ENCOUNTER (OUTPATIENT)
Dept: RADIOLOGY | Facility: HOSPITAL | Age: 76
Discharge: HOME | End: 2021-11-18
Attending: OTOLARYNGOLOGY
Payer: MEDICARE

## 2021-11-18 ENCOUNTER — TELEPHONE (OUTPATIENT)
Dept: OTOLARYNGOLOGY | Facility: CLINIC | Age: 76
End: 2021-11-18
Payer: MEDICARE

## 2021-11-18 DIAGNOSIS — H90.3 ASYMMETRIC SNHL (SENSORINEURAL HEARING LOSS): ICD-10-CM

## 2021-11-18 DIAGNOSIS — E11.9 DM TYPE 2 WITHOUT RETINOPATHY: Primary | ICD-10-CM

## 2021-11-18 LAB
CREAT SERPL-MCNC: 1 MG/DL (ref 0.5–1.4)
SAMPLE: NORMAL
SITE: NORMAL

## 2021-11-18 PROCEDURE — 70553 MRI IAC/TEMPORAL BONES W W/O CONTRAST: ICD-10-PCS | Mod: 26,HCNC,, | Performed by: RADIOLOGY

## 2021-11-18 PROCEDURE — 70553 MRI BRAIN STEM W/O & W/DYE: CPT | Mod: TC,HCNC

## 2021-11-18 PROCEDURE — 70553 MRI BRAIN STEM W/O & W/DYE: CPT | Mod: 26,HCNC,, | Performed by: RADIOLOGY

## 2021-11-18 PROCEDURE — 25500020 PHARM REV CODE 255: Mod: HCNC | Performed by: OTOLARYNGOLOGY

## 2021-11-18 PROCEDURE — A9585 GADOBUTROL INJECTION: HCPCS | Mod: HCNC | Performed by: OTOLARYNGOLOGY

## 2021-11-18 RX ORDER — GADOBUTROL 604.72 MG/ML
8 INJECTION INTRAVENOUS
Status: COMPLETED | OUTPATIENT
Start: 2021-11-18 | End: 2021-11-18

## 2021-11-18 RX ADMIN — GADOBUTROL 8 ML: 604.72 INJECTION INTRAVENOUS at 10:11

## 2021-11-19 ENCOUNTER — PATIENT MESSAGE (OUTPATIENT)
Dept: OTOLARYNGOLOGY | Facility: CLINIC | Age: 76
End: 2021-11-19
Payer: MEDICARE

## 2021-11-19 ENCOUNTER — TELEPHONE (OUTPATIENT)
Dept: OTOLARYNGOLOGY | Facility: CLINIC | Age: 76
End: 2021-11-19
Payer: MEDICARE

## 2021-11-23 ENCOUNTER — TELEPHONE (OUTPATIENT)
Dept: HEMATOLOGY/ONCOLOGY | Facility: CLINIC | Age: 76
End: 2021-11-23
Payer: MEDICARE

## 2021-12-14 ENCOUNTER — HOSPITAL ENCOUNTER (OUTPATIENT)
Dept: RADIOLOGY | Facility: HOSPITAL | Age: 76
Discharge: HOME | End: 2021-12-14
Attending: INTERNAL MEDICINE
Payer: MEDICARE

## 2021-12-14 DIAGNOSIS — C22.0 HEPATOCELLULAR CARCINOMA: ICD-10-CM

## 2021-12-14 PROCEDURE — 74183 MRI ABD W/O CNTR FLWD CNTR: CPT | Mod: TC,HCNC,PO

## 2021-12-14 PROCEDURE — A9585 GADOBUTROL INJECTION: HCPCS

## 2021-12-14 PROCEDURE — 25500020 PHARM REV CODE 255

## 2021-12-16 ENCOUNTER — OFFICE VISIT (OUTPATIENT)
Dept: HEMATOLOGY/ONCOLOGY | Facility: CLINIC | Age: 76
End: 2021-12-16
Payer: MEDICARE

## 2021-12-16 VITALS
HEART RATE: 80 BPM | HEIGHT: 65 IN | RESPIRATION RATE: 18 BRPM | OXYGEN SATURATION: 97 % | SYSTOLIC BLOOD PRESSURE: 151 MMHG | WEIGHT: 183.19 LBS | BODY MASS INDEX: 30.52 KG/M2 | TEMPERATURE: 99 F | DIASTOLIC BLOOD PRESSURE: 66 MMHG

## 2021-12-16 DIAGNOSIS — C22.0 HEPATOCELLULAR CARCINOMA: Primary | ICD-10-CM

## 2021-12-16 PROCEDURE — 4010F ACE/ARB THERAPY RXD/TAKEN: CPT | Mod: HCNC,CPTII,S$GLB, | Performed by: INTERNAL MEDICINE

## 2021-12-16 PROCEDURE — 99999 PR PBB SHADOW E&M-EST. PATIENT-LVL V: ICD-10-PCS | Mod: PBBFAC,HCNC,, | Performed by: INTERNAL MEDICINE

## 2021-12-16 PROCEDURE — 99999 PR PBB SHADOW E&M-EST. PATIENT-LVL V: CPT | Mod: PBBFAC,HCNC,, | Performed by: INTERNAL MEDICINE

## 2021-12-16 PROCEDURE — 99213 OFFICE O/P EST LOW 20 MIN: CPT | Mod: HCNC,S$GLB,, | Performed by: INTERNAL MEDICINE

## 2021-12-16 PROCEDURE — 99213 PR OFFICE/OUTPT VISIT, EST, LEVL III, 20-29 MIN: ICD-10-PCS | Mod: HCNC,S$GLB,, | Performed by: INTERNAL MEDICINE

## 2021-12-16 PROCEDURE — 4010F PR ACE/ARB THEARPY RXD/TAKEN: ICD-10-PCS | Mod: HCNC,CPTII,S$GLB, | Performed by: INTERNAL MEDICINE

## 2021-12-21 ENCOUNTER — LAB VISIT (OUTPATIENT)
Dept: LAB | Facility: HOSPITAL | Age: 76
End: 2021-12-21
Attending: INTERNAL MEDICINE
Payer: MEDICARE

## 2021-12-21 DIAGNOSIS — I15.2 HYPERTENSION ASSOCIATED WITH DIABETES: ICD-10-CM

## 2021-12-21 DIAGNOSIS — E11.65 CONTROLLED TYPE 2 DIABETES MELLITUS WITH HYPERGLYCEMIA, WITHOUT LONG-TERM CURRENT USE OF INSULIN: ICD-10-CM

## 2021-12-21 DIAGNOSIS — E78.5 HYPERLIPIDEMIA ASSOCIATED WITH TYPE 2 DIABETES MELLITUS: ICD-10-CM

## 2021-12-21 DIAGNOSIS — R35.1 NOCTURIA: ICD-10-CM

## 2021-12-21 DIAGNOSIS — E11.8 DM (DIABETES MELLITUS) WITH COMPLICATIONS: ICD-10-CM

## 2021-12-21 DIAGNOSIS — E11.69 HYPERLIPIDEMIA ASSOCIATED WITH TYPE 2 DIABETES MELLITUS: ICD-10-CM

## 2021-12-21 DIAGNOSIS — E11.59 HYPERTENSION ASSOCIATED WITH DIABETES: ICD-10-CM

## 2021-12-21 LAB
ALBUMIN SERPL BCP-MCNC: 4.3 G/DL (ref 3.5–5.2)
ALP SERPL-CCNC: 71 U/L (ref 38–126)
ALT SERPL W/O P-5'-P-CCNC: 17 U/L (ref 10–44)
ANION GAP SERPL CALC-SCNC: 9 MMOL/L (ref 8–16)
AST SERPL-CCNC: 33 U/L (ref 15–46)
BASOPHILS # BLD AUTO: 0.04 K/UL (ref 0–0.2)
BASOPHILS NFR BLD: 0.6 % (ref 0–1.9)
BILIRUB SERPL-MCNC: 0.8 MG/DL (ref 0.1–1)
CALCIUM SERPL-MCNC: 9.4 MG/DL (ref 8.7–10.5)
CHLORIDE SERPL-SCNC: 103 MMOL/L (ref 95–110)
CHOLEST SERPL-MCNC: 117 MG/DL (ref 120–199)
CHOLEST/HDLC SERPL: 2.7 {RATIO} (ref 2–5)
CO2 SERPL-SCNC: 29 MMOL/L (ref 23–29)
COMPLEXED PSA SERPL-MCNC: 1.6 NG/ML (ref 0–4)
CREAT SERPL-MCNC: 0.86 MG/DL (ref 0.5–1.4)
DIFFERENTIAL METHOD: ABNORMAL
EOSINOPHIL # BLD AUTO: 0.2 K/UL (ref 0–0.5)
EOSINOPHIL NFR BLD: 3.2 % (ref 0–8)
ERYTHROCYTE [DISTWIDTH] IN BLOOD BY AUTOMATED COUNT: 12.4 % (ref 11.5–14.5)
EST. GFR  (AFRICAN AMERICAN): >60 ML/MIN/1.73 M^2
EST. GFR  (NON AFRICAN AMERICAN): >60 ML/MIN/1.73 M^2
ESTIMATED AVG GLUCOSE: 146 MG/DL (ref 68–131)
GLUCOSE SERPL-MCNC: 160 MG/DL (ref 70–110)
HBA1C MFR BLD: 6.7 % (ref 4–5.6)
HCT VFR BLD AUTO: 38.8 % (ref 40–54)
HDLC SERPL-MCNC: 43 MG/DL (ref 40–75)
HDLC SERPL: 36.8 % (ref 20–50)
HGB BLD-MCNC: 12.9 G/DL (ref 14–18)
IMM GRANULOCYTES # BLD AUTO: 0.02 K/UL (ref 0–0.04)
IMM GRANULOCYTES NFR BLD AUTO: 0.3 % (ref 0–0.5)
LDLC SERPL CALC-MCNC: 52.8 MG/DL (ref 63–159)
LYMPHOCYTES # BLD AUTO: 2.3 K/UL (ref 1–4.8)
LYMPHOCYTES NFR BLD: 32.2 % (ref 18–48)
MCH RBC QN AUTO: 30 PG (ref 27–31)
MCHC RBC AUTO-ENTMCNC: 33.2 G/DL (ref 32–36)
MCV RBC AUTO: 90 FL (ref 82–98)
MONOCYTES # BLD AUTO: 0.5 K/UL (ref 0.3–1)
MONOCYTES NFR BLD: 7.2 % (ref 4–15)
NEUTROPHILS # BLD AUTO: 4.1 K/UL (ref 1.8–7.7)
NEUTROPHILS NFR BLD: 56.5 % (ref 38–73)
NONHDLC SERPL-MCNC: 74 MG/DL
NRBC BLD-RTO: 0 /100 WBC
PLATELET # BLD AUTO: 191 K/UL (ref 150–450)
PMV BLD AUTO: 10.2 FL (ref 9.2–12.9)
POTASSIUM SERPL-SCNC: 4.6 MMOL/L (ref 3.5–5.1)
PROT SERPL-MCNC: 7.4 G/DL (ref 6–8.4)
RBC # BLD AUTO: 4.3 M/UL (ref 4.6–6.2)
SODIUM SERPL-SCNC: 141 MMOL/L (ref 136–145)
T4 FREE SERPL-MCNC: 1.01 NG/DL (ref 0.71–1.51)
TRIGL SERPL-MCNC: 106 MG/DL (ref 30–150)
TSH SERPL DL<=0.005 MIU/L-ACNC: 2.97 UIU/ML (ref 0.4–4)
UUN UR-MCNC: 16 MG/DL (ref 2–20)
WBC # BLD AUTO: 7.18 K/UL (ref 3.9–12.7)

## 2021-12-21 PROCEDURE — 84153 ASSAY OF PSA TOTAL: CPT | Mod: HCNC | Performed by: INTERNAL MEDICINE

## 2021-12-21 PROCEDURE — 84439 ASSAY OF FREE THYROXINE: CPT | Mod: HCNC | Performed by: INTERNAL MEDICINE

## 2021-12-21 PROCEDURE — 85025 COMPLETE CBC W/AUTO DIFF WBC: CPT | Mod: HCNC,PO | Performed by: INTERNAL MEDICINE

## 2021-12-21 PROCEDURE — 80061 LIPID PANEL: CPT | Mod: HCNC | Performed by: INTERNAL MEDICINE

## 2021-12-21 PROCEDURE — 84443 ASSAY THYROID STIM HORMONE: CPT | Mod: HCNC,PO | Performed by: INTERNAL MEDICINE

## 2021-12-21 PROCEDURE — 83036 HEMOGLOBIN GLYCOSYLATED A1C: CPT | Mod: HCNC | Performed by: INTERNAL MEDICINE

## 2021-12-21 PROCEDURE — 36415 COLL VENOUS BLD VENIPUNCTURE: CPT | Mod: HCNC,PO | Performed by: INTERNAL MEDICINE

## 2021-12-21 PROCEDURE — 80053 COMPREHEN METABOLIC PANEL: CPT | Mod: HCNC,PO | Performed by: INTERNAL MEDICINE

## 2022-01-04 ENCOUNTER — OFFICE VISIT (OUTPATIENT)
Dept: INTERNAL MEDICINE | Facility: CLINIC | Age: 77
End: 2022-01-04
Payer: MEDICARE

## 2022-01-04 VITALS
DIASTOLIC BLOOD PRESSURE: 82 MMHG | SYSTOLIC BLOOD PRESSURE: 138 MMHG | BODY MASS INDEX: 30.93 KG/M2 | TEMPERATURE: 98 F | RESPIRATION RATE: 18 BRPM | WEIGHT: 185.63 LBS | HEART RATE: 74 BPM | HEIGHT: 65 IN | OXYGEN SATURATION: 99 %

## 2022-01-04 DIAGNOSIS — E78.5 HYPERLIPIDEMIA ASSOCIATED WITH TYPE 2 DIABETES MELLITUS: ICD-10-CM

## 2022-01-04 DIAGNOSIS — Z12.11 COLON CANCER SCREENING: ICD-10-CM

## 2022-01-04 DIAGNOSIS — E11.69 HYPERLIPIDEMIA ASSOCIATED WITH TYPE 2 DIABETES MELLITUS: ICD-10-CM

## 2022-01-04 DIAGNOSIS — I15.2 HYPERTENSION ASSOCIATED WITH DIABETES: ICD-10-CM

## 2022-01-04 DIAGNOSIS — E11.65 CONTROLLED TYPE 2 DIABETES MELLITUS WITH HYPERGLYCEMIA, WITHOUT LONG-TERM CURRENT USE OF INSULIN: Primary | ICD-10-CM

## 2022-01-04 DIAGNOSIS — I10 ESSENTIAL HYPERTENSION: ICD-10-CM

## 2022-01-04 DIAGNOSIS — C22.0 HEPATOCELLULAR CARCINOMA: ICD-10-CM

## 2022-01-04 DIAGNOSIS — R11.0 NAUSEA: ICD-10-CM

## 2022-01-04 DIAGNOSIS — E11.59 HYPERTENSION ASSOCIATED WITH DIABETES: ICD-10-CM

## 2022-01-04 DIAGNOSIS — G40.909 SEIZURE DISORDER: ICD-10-CM

## 2022-01-04 DIAGNOSIS — E11.8 DM (DIABETES MELLITUS) WITH COMPLICATIONS: ICD-10-CM

## 2022-01-04 PROCEDURE — 3075F SYST BP GE 130 - 139MM HG: CPT | Mod: HCNC,CPTII,S$GLB, | Performed by: INTERNAL MEDICINE

## 2022-01-04 PROCEDURE — 99999 PR PBB SHADOW E&M-EST. PATIENT-LVL IV: CPT | Mod: PBBFAC,HCNC,, | Performed by: INTERNAL MEDICINE

## 2022-01-04 PROCEDURE — 3079F DIAST BP 80-89 MM HG: CPT | Mod: HCNC,CPTII,S$GLB, | Performed by: INTERNAL MEDICINE

## 2022-01-04 PROCEDURE — 99499 RISK ADDL DX/OHS AUDIT: ICD-10-PCS | Mod: S$GLB,,, | Performed by: INTERNAL MEDICINE

## 2022-01-04 PROCEDURE — 99397 PR PREVENTIVE VISIT,EST,65 & OVER: ICD-10-PCS | Mod: HCNC,S$GLB,, | Performed by: INTERNAL MEDICINE

## 2022-01-04 PROCEDURE — 99999 PR PBB SHADOW E&M-EST. PATIENT-LVL IV: ICD-10-PCS | Mod: PBBFAC,HCNC,, | Performed by: INTERNAL MEDICINE

## 2022-01-04 PROCEDURE — 3288F FALL RISK ASSESSMENT DOCD: CPT | Mod: HCNC,CPTII,S$GLB, | Performed by: INTERNAL MEDICINE

## 2022-01-04 PROCEDURE — 3075F PR MOST RECENT SYSTOLIC BLOOD PRESS GE 130-139MM HG: ICD-10-PCS | Mod: HCNC,CPTII,S$GLB, | Performed by: INTERNAL MEDICINE

## 2022-01-04 PROCEDURE — 1101F PR PT FALLS ASSESS DOC 0-1 FALLS W/OUT INJ PAST YR: ICD-10-PCS | Mod: HCNC,CPTII,S$GLB, | Performed by: INTERNAL MEDICINE

## 2022-01-04 PROCEDURE — 1159F PR MEDICATION LIST DOCUMENTED IN MEDICAL RECORD: ICD-10-PCS | Mod: HCNC,CPTII,S$GLB, | Performed by: INTERNAL MEDICINE

## 2022-01-04 PROCEDURE — 1101F PT FALLS ASSESS-DOCD LE1/YR: CPT | Mod: HCNC,CPTII,S$GLB, | Performed by: INTERNAL MEDICINE

## 2022-01-04 PROCEDURE — 1126F PR PAIN SEVERITY QUANTIFIED, NO PAIN PRESENT: ICD-10-PCS | Mod: HCNC,CPTII,S$GLB, | Performed by: INTERNAL MEDICINE

## 2022-01-04 PROCEDURE — 3079F PR MOST RECENT DIASTOLIC BLOOD PRESSURE 80-89 MM HG: ICD-10-PCS | Mod: HCNC,CPTII,S$GLB, | Performed by: INTERNAL MEDICINE

## 2022-01-04 PROCEDURE — 99499 UNLISTED E&M SERVICE: CPT | Mod: S$GLB,,, | Performed by: INTERNAL MEDICINE

## 2022-01-04 PROCEDURE — 99397 PER PM REEVAL EST PAT 65+ YR: CPT | Mod: HCNC,S$GLB,, | Performed by: INTERNAL MEDICINE

## 2022-01-04 PROCEDURE — 1159F MED LIST DOCD IN RCRD: CPT | Mod: HCNC,CPTII,S$GLB, | Performed by: INTERNAL MEDICINE

## 2022-01-04 PROCEDURE — 1126F AMNT PAIN NOTED NONE PRSNT: CPT | Mod: HCNC,CPTII,S$GLB, | Performed by: INTERNAL MEDICINE

## 2022-01-04 PROCEDURE — 3288F PR FALLS RISK ASSESSMENT DOCUMENTED: ICD-10-PCS | Mod: HCNC,CPTII,S$GLB, | Performed by: INTERNAL MEDICINE

## 2022-01-04 RX ORDER — METFORMIN HYDROCHLORIDE 500 MG/1
500 TABLET, EXTENDED RELEASE ORAL 2 TIMES DAILY WITH MEALS
Qty: 180 TABLET | Refills: 3 | Status: SHIPPED | OUTPATIENT
Start: 2022-01-04 | End: 2023-01-27

## 2022-01-04 RX ORDER — LOVASTATIN 20 MG/1
20 TABLET ORAL NIGHTLY
Qty: 90 TABLET | Refills: 3 | Status: SHIPPED | OUTPATIENT
Start: 2022-01-04 | End: 2022-07-21 | Stop reason: SDUPTHER

## 2022-01-04 RX ORDER — GLIMEPIRIDE 4 MG/1
4 TABLET ORAL
Qty: 90 TABLET | Refills: 3 | Status: SHIPPED | OUTPATIENT
Start: 2022-01-04 | End: 2023-01-26

## 2022-01-04 RX ORDER — IRBESARTAN 300 MG/1
300 TABLET ORAL NIGHTLY
Qty: 90 TABLET | Refills: 3 | Status: SHIPPED | OUTPATIENT
Start: 2022-01-04 | End: 2022-07-21 | Stop reason: SDUPTHER

## 2022-01-04 NOTE — PROGRESS NOTES
Subjective:       Patient ID: Jamison Mayes is a 76 y.o. male.    Chief Complaint: Annual Exam and Medication Refill (Pt says needs all med refill.)    HPI   76 y.o. Male here for annual exam.      Vaccines: Influenza (2021); Tetanus (2018); PNA (current); Shingrix (will consider)  Eye exam: 2/21  Colonoscopy: 2011- repeat in 10 yrs  DEXA: 11/18     Exercise: no  Diet: regular     Past Medical History:  PDT scalp 2/2015 and 3/2015: AK (actinic keratosis)      Comment:  s/p efudex on scalp and forehead, PDT scalp  No date: Arthritis  No date: Diabetes mellitus type II  No date: Fever blister  03/05/2018: Hepatocellular carcinoma      Comment:  s/p left hepatectomy of segments 2,3,4a/b  No date: Hypertension  No date: Joint pain  3/2013: SCC (squamous cell carcinoma)      Comment:  L scalp vertex  excised : SCC (squamous cell carcinoma)      Comment:  left helix, in-situ  No date: Seizures  3/2013: Squamous Cell Carcinoma      Comment:  occipital scalp  3/2013: Squamous Cell Carcinoma      Comment:  l vertex scalp  Past Surgical History:  03/05/2018: CHOLECYSTECTOMY      Comment:  open at time of hepatic resection  3/5/2018: CHOLECYSTECTOMY; N/A      Comment:  Performed by Brown Cortez MD at [facility] OR [address] [place] [address]  3/5/2018: EVACUATION-HEMATOMA; N/A      Comment:  Performed by Brown Cortez MD at [facility] OR [address] [place] [address]  No date: EYE SURGERY  3/5/2018: HEPATECTOMY; Left      Comment:  Performed by Brown Cortez MD at [facility] OR [address] [place] [address]  03/05/2018: LIVER RESECTION; Left      Comment:  segments 2,3, 4a/b  No date: skin carcinoma removal  3/5/2018: ULTRASOUND; N/A      Comment:  Performed by Brown Cortez MD at [facility] OR [address] [place] [address]  Social History    Socioeconomic History      Marital status:       Spouse name: Not on file      Number of children: Not on file      Years of education: Not on file      Highest education level: Not  on file    Social Needs      Financial resource strain: Not on file      Food insecurity - worry: Not on file      Food insecurity - inability: Not on file      Transportation needs - medical: Not on file      Transportation needs - non-medical: Not on file    Occupational History      Occupation: REtired     Tobacco Use      Smoking status: Former Smoker        Years: 5.00        Quit date: 1966        Years since quittin.4      Smokeless tobacco: Never Used    Substance and Sexual Activity      Alcohol use: No      Drug use: No      Sexual activity: Not on file     Review of patient's allergies indicates:  No Known Allergies  Review of Systems   Constitutional: Negative for activity change, appetite change, chills, diaphoresis, fatigue, fever and unexpected weight change.   HENT: Negative for nasal congestion, mouth sores, postnasal drip, rhinorrhea, sinus pressure/congestion, sneezing, sore throat, trouble swallowing and voice change.    Eyes: Negative for discharge, itching and visual disturbance.   Respiratory: Negative for cough, chest tightness, shortness of breath and wheezing.    Cardiovascular: Negative for chest pain, palpitations and leg swelling.   Gastrointestinal: Positive for diarrhea and nausea. Negative for abdominal pain, blood in stool, constipation and vomiting.   Endocrine: Negative for cold intolerance and heat intolerance.   Genitourinary: Negative for difficulty urinating, dysuria, flank pain, hematuria and urgency.   Musculoskeletal: Negative for arthralgias, back pain, myalgias and neck pain.   Integumentary:  Negative for rash and wound.   Allergic/Immunologic: Negative for environmental allergies and food allergies.   Neurological: Negative for dizziness, tremors, seizures, syncope, weakness and headaches.   Hematological: Negative for adenopathy. Does not bruise/bleed easily.   Psychiatric/Behavioral: Negative for confusion, sleep disturbance and suicidal ideas. The patient is  not nervous/anxious.          Objective:      Physical Exam  Constitutional:       General: He is not in acute distress.     Appearance: He is well-developed and well-nourished. He is not diaphoretic.   HENT:      Head: Normocephalic and atraumatic.      Right Ear: Tympanic membrane, ear canal and external ear normal.      Left Ear: Tympanic membrane, ear canal and external ear normal.      Nose: Nose normal.      Mouth/Throat:      Mouth: Oropharynx is clear and moist.      Pharynx: No oropharyngeal exudate.   Eyes:      General: No scleral icterus.        Right eye: No discharge.         Left eye: No discharge.      Extraocular Movements: EOM normal.      Conjunctiva/sclera: Conjunctivae normal.      Pupils: Pupils are equal, round, and reactive to light.   Neck:      Thyroid: No thyromegaly.      Vascular: No JVD.   Cardiovascular:      Rate and Rhythm: Normal rate and regular rhythm.      Pulses: Intact distal pulses.      Heart sounds: Normal heart sounds. No murmur heard.      Pulmonary:      Effort: Pulmonary effort is normal. No respiratory distress.      Breath sounds: Normal breath sounds. No wheezing or rales.   Abdominal:      General: Bowel sounds are normal. There is no distension.      Palpations: Abdomen is soft.      Tenderness: There is no abdominal tenderness. There is no guarding.   Musculoskeletal:         General: No edema.      Cervical back: Normal range of motion and neck supple.   Lymphadenopathy:      Cervical: No cervical adenopathy.   Skin:     General: Skin is warm and dry.      Coloration: Skin is not pale.      Findings: No rash.   Neurological:      Mental Status: He is alert and oriented to person, place, and time.   Psychiatric:         Mood and Affect: Mood and affect normal.         Judgment: Judgment normal.         Assessment:       Problem List Items Addressed This Visit        Neuro    Seizure disorder       Cardiac/Vascular    Hypertension associated with diabetes     Relevant Medications    glimepiride (AMARYL) 4 MG tablet    metFORMIN (GLUCOPHAGE-XR) 500 MG ER 24hr tablet    Hyperlipidemia associated with type 2 diabetes mellitus    Relevant Medications    glimepiride (AMARYL) 4 MG tablet    metFORMIN (GLUCOPHAGE-XR) 500 MG ER 24hr tablet       Oncology    Hepatocellular carcinoma       Endocrine    DM (diabetes mellitus) with complications    Relevant Medications    glimepiride (AMARYL) 4 MG tablet    metFORMIN (GLUCOPHAGE-XR) 500 MG ER 24hr tablet    Controlled type 2 diabetes mellitus, without long-term current use of insulin - Primary    Relevant Medications    glimepiride (AMARYL) 4 MG tablet    metFORMIN (GLUCOPHAGE-XR) 500 MG ER 24hr tablet      Other Visit Diagnoses     Colon cancer screening        Relevant Orders    Case Request Endoscopy: COLONOSCOPY (Completed)    Essential hypertension        Relevant Medications    lovastatin (MEVACOR) 20 MG tablet    Nausea        Relevant Orders    Ambulatory referral/consult to Gastroenterology          Plan:    T2DM- last A1C of 6.7(12/21)<--6.4(12/20)<--6.0(6/20)<--8.1(11/19)<--7.1(11/18)<--5.2(8/18)      -continue Metformin  mg BID WM and Amaryl 4 mg qam      HTN- stable on Avapro 300 mg daily       Seizure d/o- stable of meds      -Pt had seen Neurology       HCC- stable, s/p left hepatic lobectomy with cholecystectomy on 03/05/2018       No recurrence, followed by Oncology        HLD- stable on Lovastatin 20 mg daily     F/u in 6 months

## 2022-01-05 ENCOUNTER — TELEPHONE (OUTPATIENT)
Dept: INTERNAL MEDICINE | Facility: CLINIC | Age: 77
End: 2022-01-05
Payer: MEDICARE

## 2022-01-05 DIAGNOSIS — R35.1 NOCTURIA: ICD-10-CM

## 2022-01-05 DIAGNOSIS — E78.5 HYPERLIPIDEMIA ASSOCIATED WITH TYPE 2 DIABETES MELLITUS: ICD-10-CM

## 2022-01-05 DIAGNOSIS — I10 ESSENTIAL HYPERTENSION: Primary | ICD-10-CM

## 2022-01-05 DIAGNOSIS — E11.59 HYPERTENSION ASSOCIATED WITH DIABETES: ICD-10-CM

## 2022-01-05 DIAGNOSIS — E11.69 HYPERLIPIDEMIA ASSOCIATED WITH TYPE 2 DIABETES MELLITUS: ICD-10-CM

## 2022-01-05 DIAGNOSIS — I15.2 HYPERTENSION ASSOCIATED WITH DIABETES: ICD-10-CM

## 2022-01-07 ENCOUNTER — OFFICE VISIT (OUTPATIENT)
Dept: DERMATOLOGY | Facility: CLINIC | Age: 77
End: 2022-01-07
Payer: MEDICARE

## 2022-01-07 DIAGNOSIS — Z85.828 HISTORY OF NONMELANOMA SKIN CANCER: ICD-10-CM

## 2022-01-07 DIAGNOSIS — L57.0 AK (ACTINIC KERATOSIS): ICD-10-CM

## 2022-01-07 DIAGNOSIS — L82.1 SK (SEBORRHEIC KERATOSIS): ICD-10-CM

## 2022-01-07 DIAGNOSIS — Z12.83 SKIN CANCER SCREENING: Primary | ICD-10-CM

## 2022-01-07 DIAGNOSIS — D48.5 NEOPLASM OF UNCERTAIN BEHAVIOR OF SKIN: ICD-10-CM

## 2022-01-07 PROCEDURE — 88342 IMHCHEM/IMCYTCHM 1ST ANTB: CPT | Mod: 26,HCNC,, | Performed by: PATHOLOGY

## 2022-01-07 PROCEDURE — 11103 PR TANGENTIAL BIOPSY, SKIN, EA ADDTL LESION: ICD-10-PCS | Mod: HCNC,S$GLB,, | Performed by: DERMATOLOGY

## 2022-01-07 PROCEDURE — 88305 TISSUE EXAM BY PATHOLOGIST: CPT | Mod: 26,HCNC,, | Performed by: PATHOLOGY

## 2022-01-07 PROCEDURE — 1126F AMNT PAIN NOTED NONE PRSNT: CPT | Mod: HCNC,CPTII,S$GLB, | Performed by: DERMATOLOGY

## 2022-01-07 PROCEDURE — 99999 PR PBB SHADOW E&M-EST. PATIENT-LVL III: CPT | Mod: PBBFAC,HCNC,, | Performed by: DERMATOLOGY

## 2022-01-07 PROCEDURE — 17000 DESTRUCT PREMALG LESION: CPT | Mod: 59,HCNC,S$GLB, | Performed by: DERMATOLOGY

## 2022-01-07 PROCEDURE — 17000 PR DESTRUCTION(LASER SURGERY,CRYOSURGERY,CHEMOSURGERY),PREMALIGNANT LESIONS,FIRST LESION: ICD-10-PCS | Mod: 59,HCNC,S$GLB, | Performed by: DERMATOLOGY

## 2022-01-07 PROCEDURE — 99213 PR OFFICE/OUTPT VISIT, EST, LEVL III, 20-29 MIN: ICD-10-PCS | Mod: 25,HCNC,S$GLB, | Performed by: DERMATOLOGY

## 2022-01-07 PROCEDURE — 1160F RVW MEDS BY RX/DR IN RCRD: CPT | Mod: HCNC,CPTII,S$GLB, | Performed by: DERMATOLOGY

## 2022-01-07 PROCEDURE — 1160F PR REVIEW ALL MEDS BY PRESCRIBER/CLIN PHARMACIST DOCUMENTED: ICD-10-PCS | Mod: HCNC,CPTII,S$GLB, | Performed by: DERMATOLOGY

## 2022-01-07 PROCEDURE — 99999 PR PBB SHADOW E&M-EST. PATIENT-LVL III: ICD-10-PCS | Mod: PBBFAC,HCNC,, | Performed by: DERMATOLOGY

## 2022-01-07 PROCEDURE — 88305 TISSUE EXAM BY PATHOLOGIST: ICD-10-PCS | Mod: 26,HCNC,, | Performed by: PATHOLOGY

## 2022-01-07 PROCEDURE — 88342 CHG IMMUNOCYTOCHEMISTRY: ICD-10-PCS | Mod: 26,HCNC,, | Performed by: PATHOLOGY

## 2022-01-07 PROCEDURE — 1126F PR PAIN SEVERITY QUANTIFIED, NO PAIN PRESENT: ICD-10-PCS | Mod: HCNC,CPTII,S$GLB, | Performed by: DERMATOLOGY

## 2022-01-07 PROCEDURE — 11102 PR TANGENTIAL BIOPSY, SKIN, SINGLE LESION: ICD-10-PCS | Mod: HCNC,S$GLB,, | Performed by: DERMATOLOGY

## 2022-01-07 PROCEDURE — 88305 TISSUE EXAM BY PATHOLOGIST: CPT | Mod: HCNC | Performed by: PATHOLOGY

## 2022-01-07 PROCEDURE — 11103 TANGNTL BX SKIN EA SEP/ADDL: CPT | Mod: HCNC,S$GLB,, | Performed by: DERMATOLOGY

## 2022-01-07 PROCEDURE — 1159F PR MEDICATION LIST DOCUMENTED IN MEDICAL RECORD: ICD-10-PCS | Mod: HCNC,CPTII,S$GLB, | Performed by: DERMATOLOGY

## 2022-01-07 PROCEDURE — 11102 TANGNTL BX SKIN SINGLE LES: CPT | Mod: HCNC,S$GLB,, | Performed by: DERMATOLOGY

## 2022-01-07 PROCEDURE — 1159F MED LIST DOCD IN RCRD: CPT | Mod: HCNC,CPTII,S$GLB, | Performed by: DERMATOLOGY

## 2022-01-07 PROCEDURE — 99213 OFFICE O/P EST LOW 20 MIN: CPT | Mod: 25,HCNC,S$GLB, | Performed by: DERMATOLOGY

## 2022-01-07 PROCEDURE — 88342 IMHCHEM/IMCYTCHM 1ST ANTB: CPT | Mod: HCNC | Performed by: PATHOLOGY

## 2022-01-07 PROCEDURE — 17003 DESTRUCT PREMALG LES 2-14: CPT | Mod: 59,HCNC,S$GLB, | Performed by: DERMATOLOGY

## 2022-01-07 PROCEDURE — 17003 DESTRUCTION, PREMALIGNANT LESIONS; SECOND THROUGH 14 LESIONS: ICD-10-PCS | Mod: 59,HCNC,S$GLB, | Performed by: DERMATOLOGY

## 2022-01-07 NOTE — PATIENT INSTRUCTIONS
Sunscreen Recommendations:    Recommend daily sun protection/avoidance and use of at least SPF 30, broad spectrum sunscreen.     Based on a recent study (2021) and out of an abundance of caution, we are recommending that you AVOID the followin. Spray and gel sunscreens  2. Any [facility] or [facility] brands as well as Max Block and TopCare brands   3. Neutrogena Ultra Sheer Dry-touch Water Resistant Suncscreen LOTION SPF 70   4. Neutrogena Sheer Zinc Dry-touch Face Sunscreen LOTION SPF 50   5.   Aveeno Baby Continuous Protection Sensitive Skin Sunscreen LOTION - Broad Spectrum SPF 50    Please note that Benzene is not an ingredient or the degradation product of any ingredient in any sunscreen. This study suggested that the findings are a result of contamination in the manufacturing process. At this point, we don't know how effectively Benzene gets through the skin, if it gets absorbed systemically, and what effects that may have.     We do know that ultraviolet radiation is a well-established carcinogen. Please use daily sun protection/avoidance and use of at least SPF 30, broad-spectrum sunscreen not listed above.          Shave Biopsy Wound Care    Your doctor has performed a shave biopsy today.  A band aid and vaseline ointment has been placed over the site.  This should remain in place for NO LONGER THAN 48 hours.  It is fine to remove the bandaid after 24 hours, if the area is no longer bleeding. It is recommended that you keep the area dry (do not wet)) for the first 24 hours.  After 24 hours, wash the area with warm soap and water and apply Vaseline jelly.  Many patients prefer to use Neosporin or Bacitracin ointment.  This is acceptable; however, know that you can develop an allergy to this medication even if you have used it safely for years.  It is important to keep the area moist.  Letting it dry out and get air slows healing time, and will worsen the scar.        If you notice increasing redness,  tenderness, pain, or yellow drainage at the biopsy site, please notify your doctor.  These are signs of an infection.    If your biopsy site is bleeding, apply firm pressure for 15 minutes straight.  Repeat for another 15 minutes, if it is still bleeding.   If the surgical site continues to bleed, then please contact your doctor.      For [facility] users:   You will receive your biopsy results in [facility] as soon as they are available. Please be assured that your physician/provider will review your results and will then determine what further treatment, evaluation, or planning is required. You should be contacted by your physician's/provider's office within 5 business days of receiving your results; If not, please reach out to directly. This is one more way [facility] is putting you first.     [facility] [place] [address]/ [phone] FAX/ [email or web address]      CRYOSURGERY      Your doctor has used a method called cryosurgery to treat your skin condition. Cryosurgery refers to the use of very cold substances to treat a variety of skin conditions such as warts, pre-skin cancers, molluscum contagiosum, sun spots, and several benign growths. The substance we use in cryosurgery is liquid nitrogen and is so cold (-195 degrees Celsius) that is burns when administered.     Following treatment in the office, the skin may immediately burn and become red. You may find the area around the lesion is affected as well. It is sometimes necessary to treat not only the lesion, but a small area of the surrounding normal skin to achieve a good response.     A blister, and even a blood filled blister, may form after treatment.   This is a normal response. If the blister is painful, it is acceptable to sterilize a needle and with rubbing alcohol and gently pop the blister. It is important that you gently wash the area with soap and warm water as the blister fluid may contain wart virus if a wart was  treated. Do no remove the roof of the blister.     The area treated can take anywhere from 1-3 weeks to heal. Healing time depends on the kind skin lesion treated, the location, and how aggressively the lesion was treated. It is recommended that the areas treated are covered with Vaseline or bacitracin ointment and a band-aid. If a band-aid is not practical, just ointment applied several times per day will do. Keeping these areas moist will speed the healing time.    Treatment with liquid nitrogen can leave a scar. In dark skin, it may be a light or dark scar, in light skin it may be a white or pink scar. These will generally fade with time, but may never go away completely.     If you have any concerns after your treatment, please feel free to call the office.       [address] [facility] [place] [address]/ [phone] FAX/ [email or web address]

## 2022-01-07 NOTE — PROGRESS NOTES
"  Subjective:       Patient ID:  Jamison Mayes is a 76 y.o. male who presents for   Chief Complaint   Patient presents with    Skin Check     UBSE     HPI  Patient here for UBSE   Patient is here today for a "mole" check.   Pt has a history of severe sun exposure in the past.   Pt recalls several blistering sunburns in the past- yes  Pt has history of tanning bed use- no  Pt has  had moles removed in the past- yes  Pt has history of melanoma in first degree relatives-  no     Patient has a history of non-melanoma skin cancer and is here today for routine skin check.   Pt c/o scaly spots on scalp and R ear since last visit. One on scalp is tender to touch. No current tx.  Has previously used cryo, efudex, and PDT for AKs.    Review of Systems   Constitutional: Negative for fever, chills, weight loss, weight gain, fatigue, night sweats and malaise.   Skin: Negative for daily sunscreen use, activity-related sunscreen use and recent sunburn.   Hematologic/Lymphatic: Bruises/bleeds easily.        Objective:    Physical Exam   Constitutional: He appears well-developed and well-nourished. No distress.   Neurological: He is alert and oriented to person, place, and time. He is not disoriented.   Psychiatric: He has a normal mood and affect.   Skin:   Areas Examined (abnormalities noted in diagram):   Scalp / Hair Palpated and Inspected  Head / Face Inspection Performed  Neck Inspection Performed  Chest / Axilla Inspection Performed  Abdomen Inspection Performed  Back Inspection Performed  RUE Inspected  LUE Inspection Performed                           Diagram Legend     Erythematous scaling macule/papule c/w actinic keratosis       Vascular papule c/w angioma      Pigmented verrucoid papule/plaque c/w seborrheic keratosis      Yellow umbilicated papule c/w sebaceous hyperplasia      Irregularly shaped tan macule c/w lentigo     1-2 mm smooth white papules consistent with Milia      Movable subcutaneous cyst with " punctum c/w epidermal inclusion cyst      Subcutaneous movable cyst c/w pilar cyst      Firm pink to brown papule c/w dermatofibroma      Pedunculated fleshy papule(s) c/w skin tag(s)      Evenly pigmented macule c/w junctional nevus     Mildly variegated pigmented, slightly irregular-bordered macule c/w mildly atypical nevus      Flesh colored to evenly pigmented papule c/w intradermal nevus       Pink pearly papule/plaque c/w basal cell carcinoma      Erythematous hyperkeratotic cursted plaque c/w SCC      Surgical scar with no sign of skin cancer recurrence      Open and closed comedones      Inflammatory papules and pustules      Verrucoid papule consistent consistent with wart     Erythematous eczematous patches and plaques     Dystrophic onycholytic nail with subungual debris c/w onychomycosis     Umbilicated papule    Erythematous-base heme-crusted tan verrucoid plaque consistent with inflamed seborrheic keratosis     Erythematous Silvery Scaling Plaque c/w Psoriasis     See annotation      Assessment / Plan:    Neoplasm of uncertain behavior of skin  -     Specimen to Pathology, Dermatology  Shave biopsy procedure note:    Shave biopsy performed after verbal consent including risk of infection, scar, recurrence, need for additional treatment of site. Area prepped with alcohol, anesthetized with approximately 1.0cc of 1% lidocaine with epinephrine. Lesional tissue shaved with razor blade. Hemostasis achieved with application of aluminum chloride followed by hyfrecation. No complications. Dressing applied. Wound care explained.    Pathology Orders:     Normal Orders This Visit    Specimen to Pathology, Dermatology     Comments:    Number of Specimens:->2  ------------------------->-------------------------  Spec 1 Procedure:->Biopsy  Spec 1 Clinical Impression:->r/o HAK vs SCC vs other  Spec 1 Source:->L frontal scalp  ------------------------->-------------------------  Spec 2 Procedure:->Biopsy  Spec 2  Clinical Impression:->r/o HAK vs SCC vs other  Spec 2 Source:->R helix    Questions:    Procedure Type: Dermatology and skin neoplasms    Number of Specimens: 2    ------------------------: -------------------------    Spec 1 Procedure: Biopsy    Spec 1 Clinical Impression: r/o HAK vs SCC vs other    Spec 1 Source: L frontal scalp    ------------------------: -------------------------    Spec 2 Procedure: Biopsy    Spec 2 Clinical Impression: r/o HAK vs SCC vs other    Spec 2 Source: R helix    Release to patient:         Skin cancer screening  History of nonmelanoma skin cancer  Upper body skin examination performed today including at least 6 points as noted in physical examination. Suspicious lesions noted above.  Patient instructed in importance of daily broad spectrum sunscreen use with spf at least 30. Sun avoidance and topical protection/protective clothing discussed.      AK (actinic keratosis)  Cryosurgery Procedure Note    Verbal consent from the patient is obtained including, but not limited to, risk of hypopigmentation/hyperpigmentation, scar, recurrence of lesion. The patient is aware of the precancerous quality and need for treatment of these lesions. Liquid nitrogen cryosurgery is applied to the 14 actinic keratoses, as detailed in the physical exam, to produce a freeze injury. The patient is aware that blisters may form and is instructed on wound care with gentle cleansing and use of vaseline ointment to keep moist until healed. The patient is supplied a handout on cryosurgery and is instructed to call if lesions do not completely resolve.    SK (seborrheic keratosis)  These are benign inherited growths without a malignant potential. Reassurance given to patient. No treatment is necessary.   Treatment of benign, asymptomatic lesions may be considered cosmetic.  Warned about risk of hypo- or hyperpigmentation with treatment and risk of recurrence.    Follow up in about 3 months (around 4/7/2022) for  skin check or sooner pending biopsy results.

## 2022-01-14 LAB
FINAL PATHOLOGIC DIAGNOSIS: NORMAL
GROSS: NORMAL
Lab: NORMAL
MICROSCOPIC EXAM: NORMAL

## 2022-01-21 ENCOUNTER — TELEPHONE (OUTPATIENT)
Dept: DERMATOLOGY | Facility: CLINIC | Age: 77
End: 2022-01-21
Payer: MEDICARE

## 2022-02-04 ENCOUNTER — OFFICE VISIT (OUTPATIENT)
Dept: DERMATOLOGY | Facility: CLINIC | Age: 77
End: 2022-02-04
Payer: MEDICARE

## 2022-02-04 DIAGNOSIS — D48.5 NEOPLASM OF UNCERTAIN BEHAVIOR OF SKIN: Primary | ICD-10-CM

## 2022-02-04 PROCEDURE — 1126F PR PAIN SEVERITY QUANTIFIED, NO PAIN PRESENT: ICD-10-PCS | Mod: HCNC,CPTII,S$GLB, | Performed by: DERMATOLOGY

## 2022-02-04 PROCEDURE — 1159F MED LIST DOCD IN RCRD: CPT | Mod: HCNC,CPTII,S$GLB, | Performed by: DERMATOLOGY

## 2022-02-04 PROCEDURE — 1159F PR MEDICATION LIST DOCUMENTED IN MEDICAL RECORD: ICD-10-PCS | Mod: HCNC,CPTII,S$GLB, | Performed by: DERMATOLOGY

## 2022-02-04 PROCEDURE — 11104 PR PUNCH BIOPSY, SKIN, SINGLE LESION: ICD-10-PCS | Mod: HCNC,S$GLB,, | Performed by: DERMATOLOGY

## 2022-02-04 PROCEDURE — 88305 TISSUE EXAM BY PATHOLOGIST: CPT | Mod: HCNC | Performed by: PATHOLOGY

## 2022-02-04 PROCEDURE — 99499 NO LOS: ICD-10-PCS | Mod: HCNC,S$GLB,, | Performed by: DERMATOLOGY

## 2022-02-04 PROCEDURE — 88305 TISSUE EXAM BY PATHOLOGIST: ICD-10-PCS | Mod: 26,HCNC,, | Performed by: PATHOLOGY

## 2022-02-04 PROCEDURE — 99999 PR PBB SHADOW E&M-EST. PATIENT-LVL III: CPT | Mod: PBBFAC,HCNC,, | Performed by: DERMATOLOGY

## 2022-02-04 PROCEDURE — 99499 UNLISTED E&M SERVICE: CPT | Mod: HCNC,S$GLB,, | Performed by: DERMATOLOGY

## 2022-02-04 PROCEDURE — 99999 PR PBB SHADOW E&M-EST. PATIENT-LVL III: ICD-10-PCS | Mod: PBBFAC,HCNC,, | Performed by: DERMATOLOGY

## 2022-02-04 PROCEDURE — 11104 PUNCH BX SKIN SINGLE LESION: CPT | Mod: HCNC,S$GLB,, | Performed by: DERMATOLOGY

## 2022-02-04 PROCEDURE — 88305 TISSUE EXAM BY PATHOLOGIST: CPT | Mod: 26,HCNC,, | Performed by: PATHOLOGY

## 2022-02-04 PROCEDURE — 1126F AMNT PAIN NOTED NONE PRSNT: CPT | Mod: HCNC,CPTII,S$GLB, | Performed by: DERMATOLOGY

## 2022-02-04 RX ORDER — GADOBUTROL 604.72 MG/ML
INJECTION INTRAVENOUS
COMMUNITY
Start: 2021-11-18 | End: 2023-01-17 | Stop reason: ALTCHOICE

## 2022-02-04 RX ORDER — INFLUENZA A VIRUS A/VICTORIA/2570/2019 IVR-215 (H1N1) ANTIGEN (FORMALDEHYDE INACTIVATED), INFLUENZA A VIRUS A/TASMANIA/503/2020 IVR-221 (H3N2) ANTIGEN (FORMALDEHYDE INACTIVATED), INFLUENZA B VIRUS B/PHUKET/3073/2013 ANTIGEN (FORMALDEHYDE INACTIVATED), AND INFLUENZA B VIRUS B/WASHINGTON/02/2019 ANTIGEN (FORMALDEHYDE INACTIVATED) 60; 60; 60; 60 UG/.7ML; UG/.7ML; UG/.7ML; UG/.7ML
INJECTION, SUSPENSION INTRAMUSCULAR
COMMUNITY
Start: 2021-11-23 | End: 2024-01-18

## 2022-02-04 NOTE — PROGRESS NOTES
Subjective:       Patient ID:  Jamison Mayes is a 77 y.o. male who presents for   Chief Complaint   Patient presents with    Biopsy     Punch biopsy      HPI  Pt here today for punch excision of the following:    Final Pathologic Diagnosis 1. Skin, left frontal scalp, shave biopsy:   - ACANTHOLYTIC ACTINIC KERATOSIS WITH FOLLICULAR INVOLVEMENT AND FOCAL   LICHENOID INFLAMMATION.        Review of Systems   Constitutional: Negative for fever, chills, weight loss, weight gain, fatigue, night sweats and malaise.   Skin: Negative for daily sunscreen use, activity-related sunscreen use and recent sunburn.   Hematologic/Lymphatic: Bruises/bleeds easily.        Objective:    Physical Exam   Constitutional: He appears well-developed and well-nourished. No distress.   Neurological: He is alert and oriented to person, place, and time. He is not disoriented.   Psychiatric: He has a normal mood and affect.   Skin:   Areas Examined (abnormalities noted in diagram):   Scalp / Hair Palpated and Inspected              Diagram Legend     Erythematous scaling macule/papule c/w actinic keratosis       Vascular papule c/w angioma      Pigmented verrucoid papule/plaque c/w seborrheic keratosis      Yellow umbilicated papule c/w sebaceous hyperplasia      Irregularly shaped tan macule c/w lentigo     1-2 mm smooth white papules consistent with Milia      Movable subcutaneous cyst with punctum c/w epidermal inclusion cyst      Subcutaneous movable cyst c/w pilar cyst      Firm pink to brown papule c/w dermatofibroma      Pedunculated fleshy papule(s) c/w skin tag(s)      Evenly pigmented macule c/w junctional nevus     Mildly variegated pigmented, slightly irregular-bordered macule c/w mildly atypical nevus      Flesh colored to evenly pigmented papule c/w intradermal nevus       Pink pearly papule/plaque c/w basal cell carcinoma      Erythematous hyperkeratotic cursted plaque c/w SCC      Surgical scar with no sign of skin cancer  recurrence      Open and closed comedones      Inflammatory papules and pustules      Verrucoid papule consistent consistent with wart     Erythematous eczematous patches and plaques     Dystrophic onycholytic nail with subungual debris c/w onychomycosis     Umbilicated papule    Erythematous-base heme-crusted tan verrucoid plaque consistent with inflamed seborrheic keratosis     Erythematous Silvery Scaling Plaque c/w Psoriasis     See annotation      Assessment / Plan:    Neoplasm of uncertain behavior of skin  Punch biopsy procedure note:  Punch biopsy performed after verbal consent obtained. Area marked and prepped with alcohol. Approximately 1cc of 1% lidocaine with epinephrine injected. 8 mm disposable punch used to remove lesion. Hemostasis obtained and biopsy site closed with3 Prolene sutures. Wound care instructions reviewed with patient and handout given.  -     Specimen to Pathology, Dermatology    Pathology Orders:     Normal Orders This Visit    Specimen to Pathology, Dermatology     Comments:    Number of Specimens:->1  ------------------------->-------------------------  Spec 1 Procedure:->Biopsy  Spec 1 Clinical Impression:->punch excision of AK with  follicular involvement, r/o underlying SCC  Spec 1 Source:->L frontal scalp    Questions:    Procedure Type: Dermatology and skin neoplasms    Number of Specimens: 1    ------------------------: -------------------------    Spec 1 Procedure: Biopsy    Spec 1 Clinical Impression: punch excision of AK with follicular involvement, r/o underlying SCC    Spec 1 Source: L frontal scalp    Release to patient:           Follow up in about 2 weeks (around 2/18/2022) for suture removal.

## 2022-02-04 NOTE — PATIENT INSTRUCTIONS

## 2022-02-10 LAB
FINAL PATHOLOGIC DIAGNOSIS: NORMAL
GROSS: NORMAL
Lab: NORMAL
MICROSCOPIC EXAM: NORMAL

## 2022-02-13 ENCOUNTER — PATIENT MESSAGE (OUTPATIENT)
Dept: DERMATOLOGY | Facility: CLINIC | Age: 77
End: 2022-02-13
Payer: MEDICARE

## 2022-02-18 ENCOUNTER — CLINICAL SUPPORT (OUTPATIENT)
Dept: DERMATOLOGY | Facility: CLINIC | Age: 77
End: 2022-02-18
Payer: MEDICARE

## 2022-02-18 DIAGNOSIS — Z48.02 VISIT FOR SUTURE REMOVAL: Primary | ICD-10-CM

## 2022-02-18 PROCEDURE — 99499 UNLISTED E&M SERVICE: CPT | Mod: HCNC,S$GLB,, | Performed by: DERMATOLOGY

## 2022-02-18 PROCEDURE — 99499 NO LOS: ICD-10-PCS | Mod: HCNC,S$GLB,, | Performed by: DERMATOLOGY

## 2022-02-18 PROCEDURE — 99999 PR PBB SHADOW E&M-EST. PATIENT-LVL III: ICD-10-PCS | Mod: PBBFAC,HCNC,,

## 2022-02-18 PROCEDURE — 99999 PR PBB SHADOW E&M-EST. PATIENT-LVL III: CPT | Mod: PBBFAC,HCNC,,

## 2022-02-18 NOTE — PROGRESS NOTES
"Suture Removal note:  CC: 77 y.o. male patient is here for suture removal.         HPI: Patient is s/p punch biopsy of punch excision of AK with  follicular involvement, r/o underlying SCC from the left frontal scalp on 02/07/2022.  Patient reports no problems.    WOUND PE:  Sutures intact.  Wound healing well.  Good approximation of skin edges.  No signs or symptoms of infection.    IMPRESSION:Skin, left frontal scalp, punch re-biopsy:  -RESIDUAL ACTINIC KERATOSIS, NEGATIVE FOR SQUAMOUS CELL CARCINOMA  -SCAR (POST-SURGICAL)  Pathology ID/Patient ID: [id number]  The specimen is received in formalin labeled "left frontal scalp". The specimen consists of one punch  biopsy of tan-yellow skin measuring 0.6 x 0.6 cm and excised to a depth of 0.4 cm . The specimen is  bisected, inked blue at the resection margin and submitted entirely in cassette [id number] BOWEN  Sections show a postsurgical scar with partially detached epithelium. The adjacent attached epithelium  shows mild actinic changes with underlying solar elastosis and patchy chronic inflammation. Negative for  invasive squamous cell carcinoma.  NIKOLAS MIMS M.D.   - margins clear.    PLAN:  Sutures removed today.  Continue wound care.    RTC: PRN        "

## 2022-02-21 ENCOUNTER — PROCEDURE VISIT (OUTPATIENT)
Dept: DERMATOLOGY | Facility: CLINIC | Age: 77
End: 2022-02-21
Payer: MEDICARE

## 2022-02-21 VITALS
HEART RATE: 72 BPM | WEIGHT: 185 LBS | SYSTOLIC BLOOD PRESSURE: 163 MMHG | DIASTOLIC BLOOD PRESSURE: 74 MMHG | BODY MASS INDEX: 30.82 KG/M2 | HEIGHT: 65 IN

## 2022-02-21 DIAGNOSIS — C44.222 SQUAMOUS CELL CARCINOMA OF SKIN OF HELIX OF RIGHT EAR: Primary | ICD-10-CM

## 2022-02-21 PROCEDURE — 17311: ICD-10-PCS | Mod: HCNC,S$GLB,, | Performed by: DERMATOLOGY

## 2022-02-21 PROCEDURE — 99499 NO LOS: ICD-10-PCS | Mod: HCNC,S$GLB,, | Performed by: DERMATOLOGY

## 2022-02-21 PROCEDURE — 13152 PR RECMPL WND LID,NOS,EAR 2.6-7.5 CM: ICD-10-PCS | Mod: HCNC,51,S$GLB, | Performed by: DERMATOLOGY

## 2022-02-21 PROCEDURE — 17312 MOHS ADDL STAGE: CPT | Mod: HCNC,S$GLB,, | Performed by: DERMATOLOGY

## 2022-02-21 PROCEDURE — 17312: ICD-10-PCS | Mod: HCNC,S$GLB,, | Performed by: DERMATOLOGY

## 2022-02-21 PROCEDURE — 99499 UNLISTED E&M SERVICE: CPT | Mod: HCNC,S$GLB,, | Performed by: DERMATOLOGY

## 2022-02-21 PROCEDURE — 13152 CMPLX RPR E/N/E/L 2.6-7.5 CM: CPT | Mod: HCNC,51,S$GLB, | Performed by: DERMATOLOGY

## 2022-02-21 PROCEDURE — 17311 MOHS 1 STAGE H/N/HF/G: CPT | Mod: HCNC,S$GLB,, | Performed by: DERMATOLOGY

## 2022-02-21 RX ORDER — CEPHALEXIN 500 MG/1
500 CAPSULE ORAL 3 TIMES DAILY
Qty: 21 CAPSULE | Refills: 0 | Status: SHIPPED | OUTPATIENT
Start: 2022-02-21 | End: 2022-02-28

## 2022-02-21 NOTE — PROGRESS NOTES
PROCEDURE: Mohs' Micrographic Surgery    INDICATION: Location in mask areas of face including central face, nose, eyelids, eyebrows, lips, chin, preauricular, temple, and ear. Biopsy-proven skin cancer of cosmetically and functionally important areas, including head, neck, genital, hand, foot, or areas known for having difficulty in healing, such as the lower anterior legs. Tumor with ill-defined borders.    REFERRING PROVIDER: Cristin Joseph M.D.    CASE NUMBER:     ANESTHETIC: 2 cc 0.5% Lidocaine with Epi 1:200,000 mixed 1:1 with 0.5% Bupivacaine    SURGICAL PREP: Betadine    SURGEON: Antonette Peterson MD    ASSISTANTS: Miley Alvarez PA-C and Roxanne Jones, Surg Tech    PREOPERATIVE DIAGNOSIS: squamous cell carcinoma- invasive, well differentiated    POSTOPERATIVE DIAGNOSIS: squamous cell carcinoma    PATHOLOGIC DIAGNOSIS: squamous cell carcinoma- invasive, well differentiated, superficial; nodular, infiltrating BCC    HISTOLOGY OF SPECIMENS IN FIRST STAGE:   Tumor Type: Tumor seen. Superficial squamous cell carcinoma: Proliferation of squamous cells exhibiting atypia and infiltrating within the superficial papillary dermis.   Depth of Invasion: epidermis, dermis and subcutaneous tissue  Perineural Invasion: No    HISTOLOGY OF SPECIMENS IN SUBSEQUENT STAGES:  Tumor Type: Tumor seen. Nodular basal cell carcinoma: Nodular tumor in dermis composed of basaloid cells exhibiting peripheral palisading and retraction artifact.  Infiltrative basal cell carcinoma: Basaloid tumor in dermis characterized by thin elongated strands of basaloid cells infiltrating between dermal collagen bundles.   Depth of Invasion: epidermis and dermis  Perineural Invasion: No    STAGES OF MOHS' SURGERY PERFORMED: 3    TUMOR-FREE PLANE ACHIEVED: Yes    HEMOSTASIS: electrocoagulation     SPECIMENS: 5 (2 in stage A, 2 in stage B and 1 in stage C)    LOCATION: right helix. Location verified with Dr. Joseph's clinical photograph. Patient also  verified location with hand held mirror.    INITIAL LESION SIZE: 0.4 x 0.4 cm    FINAL DEFECT SIZE: 0.7 x 1.5 cm    WOUND REPAIR/DISPOSITION: The patient tolerated Mohs' Micrographic Surgery for a squamous cell carcinoma very well. When the tumor was completely removed, a repair of the surgical defect was undertaken.       PROCEDURE: Complex Linear Repair    INDICATION: Status post Mohs' Micrographic Surgery for squamous cell carcinoma.    CASE NUMBER:     SURGEON: Antonette Peterson MD    ASSISTANTS: Miley Alvarez PA-C and Christa Arceo MA    ANESTHETIC: 1 cc 1% Lidocaine with Epinephrine 1:100,000    SURGICAL PREP: Betadine, prepped by Natalie Arriaga, Surg Tech    LOCATION: right helix    DEFECT SIZE: 0.7 x 1.5 cm    WOUND REPAIR/DISPOSITION:  After the patient's carcinoma had been completely removed with Mohs' Micrographic Surgery, a repair of the surgical defect was undertaken. The patient was returned to the operating suite where the area of right helix was prepped, draped, and anesthetized in the usual sterile fashion. The wound was widely undermined in all directions. The wound was undermined to a distance at least the maximum width of the defect as measured perpendicular to the closure line along at least one entire edge of the defect, in this case 1.5 cm. Then, electrocoagulation was used to obtain meticulous hemostasis. 4-0 Vicryl buried vertical mattress sutures were placed into the subcutaneous and dermal plane to close the wound and cordell the cutaneous wound edge. Bilateral dog ears were identified and were removed by a standard Burow's triangle technique. The cutaneous wound edges were closed using interrupted 5-0 Prolene suture.    The patient tolerated the procedure well.    The area was cleaned and dressed appropriately and the patient was given wound care instructions, as well as appointment for follow-up evaluation and suture removal in 7 days.    LENGTH OF REPAIR: 2.7 cm    Vitals:    02/21/22  "0710   BP: (!) 163/74   BP Location: Left arm   Patient Position: Sitting   BP Method: Small (Automatic)   Pulse: 72   Weight: 83.9 kg (185 lb)   Height: 5' 5" (1.651 m)       "

## 2022-02-28 ENCOUNTER — CLINICAL SUPPORT (OUTPATIENT)
Dept: DERMATOLOGY | Facility: CLINIC | Age: 77
End: 2022-02-28
Payer: MEDICARE

## 2022-02-28 NOTE — PROGRESS NOTES
77 y.o. male patient is here for suture removal following Mohs' surgery.    Patient reports no problems r helix   WOUND PE:  The r helix  sutures intact. Wound healing well. Good skin edges. No signs or symptoms of infection.      IMPRESSION:  Healing operative site from Mohs' surgery SCC r helix s/p Mohs with CLC postop day # 7    PLAN:  Sutures removed today by Roxanne Jones Surg Tech. Steri-strips applied.  Discontinue wound care.  Keep moist with Aquaphor.    RTC:  In 3-6 months with Cristin Joseph M.D. for skin check or sooner if new concern arises.

## 2022-03-03 LAB
LEFT EYE DM RETINOPATHY: NEGATIVE
RIGHT EYE DM RETINOPATHY: NEGATIVE

## 2022-06-14 ENCOUNTER — LAB VISIT (OUTPATIENT)
Dept: LAB | Facility: HOSPITAL | Age: 77
End: 2022-06-14
Attending: INTERNAL MEDICINE
Payer: MEDICARE

## 2022-06-14 DIAGNOSIS — C22.0 HEPATOCELLULAR CARCINOMA: ICD-10-CM

## 2022-06-14 LAB
AFP SERPL-MCNC: <2 NG/ML (ref 0–8.4)
ALBUMIN SERPL BCP-MCNC: 4.4 G/DL (ref 3.5–5.2)
ALP SERPL-CCNC: 59 U/L (ref 38–126)
ALT SERPL W/O P-5'-P-CCNC: 15 U/L (ref 10–44)
ANION GAP SERPL CALC-SCNC: 10 MMOL/L (ref 8–16)
AST SERPL-CCNC: 31 U/L (ref 15–46)
BASOPHILS # BLD AUTO: 0.04 K/UL (ref 0–0.2)
BASOPHILS NFR BLD: 0.5 % (ref 0–1.9)
BILIRUB SERPL-MCNC: 0.5 MG/DL (ref 0.1–1)
CALCIUM SERPL-MCNC: 9.7 MG/DL (ref 8.7–10.5)
CHLORIDE SERPL-SCNC: 105 MMOL/L (ref 95–110)
CO2 SERPL-SCNC: 27 MMOL/L (ref 23–29)
CREAT SERPL-MCNC: 1.07 MG/DL (ref 0.5–1.4)
DIFFERENTIAL METHOD: ABNORMAL
EOSINOPHIL # BLD AUTO: 0.4 K/UL (ref 0–0.5)
EOSINOPHIL NFR BLD: 4.7 % (ref 0–8)
ERYTHROCYTE [DISTWIDTH] IN BLOOD BY AUTOMATED COUNT: 12.8 % (ref 11.5–14.5)
EST. GFR  (AFRICAN AMERICAN): >60 ML/MIN/1.73 M^2
EST. GFR  (NON AFRICAN AMERICAN): >60 ML/MIN/1.73 M^2
GLUCOSE SERPL-MCNC: 117 MG/DL (ref 70–110)
HCT VFR BLD AUTO: 37.4 % (ref 40–54)
HGB BLD-MCNC: 12.6 G/DL (ref 14–18)
IMM GRANULOCYTES # BLD AUTO: 0.01 K/UL (ref 0–0.04)
IMM GRANULOCYTES NFR BLD AUTO: 0.1 % (ref 0–0.5)
LYMPHOCYTES # BLD AUTO: 2.9 K/UL (ref 1–4.8)
LYMPHOCYTES NFR BLD: 37.6 % (ref 18–48)
MCH RBC QN AUTO: 30.4 PG (ref 27–31)
MCHC RBC AUTO-ENTMCNC: 33.7 G/DL (ref 32–36)
MCV RBC AUTO: 90 FL (ref 82–98)
MONOCYTES # BLD AUTO: 0.6 K/UL (ref 0.3–1)
MONOCYTES NFR BLD: 7.9 % (ref 4–15)
NEUTROPHILS # BLD AUTO: 3.8 K/UL (ref 1.8–7.7)
NEUTROPHILS NFR BLD: 49.2 % (ref 38–73)
NRBC BLD-RTO: 0 /100 WBC
PLATELET # BLD AUTO: 177 K/UL (ref 150–450)
PMV BLD AUTO: 10.7 FL (ref 9.2–12.9)
POTASSIUM SERPL-SCNC: 5.1 MMOL/L (ref 3.5–5.1)
PROT SERPL-MCNC: 7.1 G/DL (ref 6–8.4)
RBC # BLD AUTO: 4.14 M/UL (ref 4.6–6.2)
SODIUM SERPL-SCNC: 142 MMOL/L (ref 136–145)
UUN UR-MCNC: 22 MG/DL (ref 2–20)
WBC # BLD AUTO: 7.64 K/UL (ref 3.9–12.7)

## 2022-06-14 PROCEDURE — 36415 COLL VENOUS BLD VENIPUNCTURE: CPT | Mod: PO | Performed by: INTERNAL MEDICINE

## 2022-06-14 PROCEDURE — 85025 COMPLETE CBC W/AUTO DIFF WBC: CPT | Mod: PO | Performed by: INTERNAL MEDICINE

## 2022-06-14 PROCEDURE — 82105 ALPHA-FETOPROTEIN SERUM: CPT | Mod: PO | Performed by: INTERNAL MEDICINE

## 2022-06-14 PROCEDURE — 80053 COMPREHEN METABOLIC PANEL: CPT | Mod: PO | Performed by: INTERNAL MEDICINE

## 2022-06-15 ENCOUNTER — OFFICE VISIT (OUTPATIENT)
Dept: HEMATOLOGY/ONCOLOGY | Facility: CLINIC | Age: 77
End: 2022-06-15
Payer: MEDICARE

## 2022-06-15 DIAGNOSIS — C22.0 HEPATOCELLULAR CARCINOMA: Primary | ICD-10-CM

## 2022-06-15 DIAGNOSIS — K86.2 PANCREATIC CYST: ICD-10-CM

## 2022-06-15 PROCEDURE — 1159F PR MEDICATION LIST DOCUMENTED IN MEDICAL RECORD: ICD-10-PCS | Mod: CPTII,95,, | Performed by: INTERNAL MEDICINE

## 2022-06-15 PROCEDURE — 1159F MED LIST DOCD IN RCRD: CPT | Mod: CPTII,95,, | Performed by: INTERNAL MEDICINE

## 2022-06-15 PROCEDURE — 99213 PR OFFICE/OUTPT VISIT, EST, LEVL III, 20-29 MIN: ICD-10-PCS | Mod: 95,,, | Performed by: INTERNAL MEDICINE

## 2022-06-15 PROCEDURE — 1160F RVW MEDS BY RX/DR IN RCRD: CPT | Mod: CPTII,95,, | Performed by: INTERNAL MEDICINE

## 2022-06-15 PROCEDURE — 99213 OFFICE O/P EST LOW 20 MIN: CPT | Mod: 95,,, | Performed by: INTERNAL MEDICINE

## 2022-06-15 PROCEDURE — 1160F PR REVIEW ALL MEDS BY PRESCRIBER/CLIN PHARMACIST DOCUMENTED: ICD-10-PCS | Mod: CPTII,95,, | Performed by: INTERNAL MEDICINE

## 2022-06-15 NOTE — PROGRESS NOTES
Subjective:       Patient ID: Jamison Mayes is a 77 y.o. male.    Chief Complaint: HCC    HPI Mr. Mayes is here for f/u of hepatocellular carcinoma.    He presented to the hospital on 03/01/2018 with sudden onset of cold sweats, nausea and abdominal discomfort and was noted to have an 8.7 cm mass in the lateral segment of the left hepatic lobe that has ruptured to the liver capsule.  He was monitored and underwent a left hepatic lobectomy with cholecystectomy on 03/05/2018.      Final pathology revealed a T1b NX 7.5 cm well-differentiated hepatocellular carcinoma without vascular invasion or perineural invasion.  The tumor appeared to be confined to the liver. Hepatitis serology negative.    A CT chest with contrast on 03/03/2018 revealed a 0.3 cm left upper lobe pulmonary nodule.    He lives in [facility].  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         Objective:      There were no vitals filed for this visit.    BMI: There is no height or weight on file to calculate BMI.    Physical Exam     No physical examination was done as this is a virtual visit    Assessment:       1. Hepatocellular carcinoma    2. Pancreatic cyst        Plan:   Hepatocellular carcinoma  -he had a T1b NX hepatocellular carcinoma that was resected March 2018  -reviewed CBC, CMP  -reviewed last MRI of the abdomen from December 2021  -she is doing well from a standpoint of hepatocellular carcinoma  -will check CBC, CMP, AFP and MRI of the abdomen in 6 months  -if that comes back negative, then I do not think he needs further regular surveillance MRIs

## 2022-06-21 ENCOUNTER — PATIENT MESSAGE (OUTPATIENT)
Dept: DERMATOLOGY | Facility: CLINIC | Age: 77
End: 2022-06-21
Payer: MEDICARE

## 2022-06-28 ENCOUNTER — LAB VISIT (OUTPATIENT)
Dept: LAB | Facility: HOSPITAL | Age: 77
End: 2022-06-28
Attending: INTERNAL MEDICINE
Payer: MEDICARE

## 2022-06-28 DIAGNOSIS — I10 ESSENTIAL HYPERTENSION: ICD-10-CM

## 2022-06-28 DIAGNOSIS — R35.1 NOCTURIA: ICD-10-CM

## 2022-06-28 DIAGNOSIS — E11.59 HYPERTENSION ASSOCIATED WITH DIABETES: ICD-10-CM

## 2022-06-28 DIAGNOSIS — E78.5 HYPERLIPIDEMIA ASSOCIATED WITH TYPE 2 DIABETES MELLITUS: ICD-10-CM

## 2022-06-28 DIAGNOSIS — I15.2 HYPERTENSION ASSOCIATED WITH DIABETES: ICD-10-CM

## 2022-06-28 DIAGNOSIS — E11.69 HYPERLIPIDEMIA ASSOCIATED WITH TYPE 2 DIABETES MELLITUS: ICD-10-CM

## 2022-06-28 LAB
ALBUMIN SERPL BCP-MCNC: 4.3 G/DL (ref 3.5–5.2)
ALP SERPL-CCNC: 69 U/L (ref 38–126)
ALT SERPL W/O P-5'-P-CCNC: 17 U/L (ref 10–44)
ANION GAP SERPL CALC-SCNC: 8 MMOL/L (ref 8–16)
AST SERPL-CCNC: 32 U/L (ref 15–46)
BASOPHILS # BLD AUTO: 0.04 K/UL (ref 0–0.2)
BASOPHILS NFR BLD: 0.4 % (ref 0–1.9)
BILIRUB SERPL-MCNC: 0.5 MG/DL (ref 0.1–1)
CALCIUM SERPL-MCNC: 9.5 MG/DL (ref 8.7–10.5)
CHLORIDE SERPL-SCNC: 105 MMOL/L (ref 95–110)
CO2 SERPL-SCNC: 27 MMOL/L (ref 23–29)
CREAT SERPL-MCNC: 0.97 MG/DL (ref 0.5–1.4)
DIFFERENTIAL METHOD: ABNORMAL
EOSINOPHIL # BLD AUTO: 0.4 K/UL (ref 0–0.5)
EOSINOPHIL NFR BLD: 3.6 % (ref 0–8)
ERYTHROCYTE [DISTWIDTH] IN BLOOD BY AUTOMATED COUNT: 12.3 % (ref 11.5–14.5)
EST. GFR  (AFRICAN AMERICAN): >60 ML/MIN/1.73 M^2
EST. GFR  (NON AFRICAN AMERICAN): >60 ML/MIN/1.73 M^2
ESTIMATED AVG GLUCOSE: 177 MG/DL (ref 68–131)
GLUCOSE SERPL-MCNC: 128 MG/DL (ref 70–110)
HBA1C MFR BLD: 7.8 % (ref 4–5.6)
HCT VFR BLD AUTO: 37.5 % (ref 40–54)
HGB BLD-MCNC: 12.5 G/DL (ref 14–18)
IMM GRANULOCYTES # BLD AUTO: 0.03 K/UL (ref 0–0.04)
IMM GRANULOCYTES NFR BLD AUTO: 0.3 % (ref 0–0.5)
LYMPHOCYTES # BLD AUTO: 3.3 K/UL (ref 1–4.8)
LYMPHOCYTES NFR BLD: 34.2 % (ref 18–48)
MCH RBC QN AUTO: 30.1 PG (ref 27–31)
MCHC RBC AUTO-ENTMCNC: 33.3 G/DL (ref 32–36)
MCV RBC AUTO: 90 FL (ref 82–98)
MONOCYTES # BLD AUTO: 0.7 K/UL (ref 0.3–1)
MONOCYTES NFR BLD: 7.5 % (ref 4–15)
NEUTROPHILS # BLD AUTO: 5.2 K/UL (ref 1.8–7.7)
NEUTROPHILS NFR BLD: 54 % (ref 38–73)
NRBC BLD-RTO: 0 /100 WBC
PLATELET # BLD AUTO: 247 K/UL (ref 150–450)
PMV BLD AUTO: 9.9 FL (ref 9.2–12.9)
POTASSIUM SERPL-SCNC: 4.4 MMOL/L (ref 3.5–5.1)
PROT SERPL-MCNC: 7.2 G/DL (ref 6–8.4)
RBC # BLD AUTO: 4.15 M/UL (ref 4.6–6.2)
SODIUM SERPL-SCNC: 140 MMOL/L (ref 136–145)
UUN UR-MCNC: 17 MG/DL (ref 2–20)
WBC # BLD AUTO: 9.63 K/UL (ref 3.9–12.7)

## 2022-06-28 PROCEDURE — 85025 COMPLETE CBC W/AUTO DIFF WBC: CPT | Mod: PO | Performed by: INTERNAL MEDICINE

## 2022-06-28 PROCEDURE — 80053 COMPREHEN METABOLIC PANEL: CPT | Mod: PO | Performed by: INTERNAL MEDICINE

## 2022-06-28 PROCEDURE — 83036 HEMOGLOBIN GLYCOSYLATED A1C: CPT | Performed by: INTERNAL MEDICINE

## 2022-06-28 PROCEDURE — 36415 COLL VENOUS BLD VENIPUNCTURE: CPT | Mod: PO | Performed by: INTERNAL MEDICINE

## 2022-07-01 ENCOUNTER — PATIENT OUTREACH (OUTPATIENT)
Dept: ADMINISTRATIVE | Facility: HOSPITAL | Age: 77
End: 2022-07-01
Payer: MEDICARE

## 2022-07-01 NOTE — LETTER
AUTHORIZATION FOR RELEASE OF   CONFIDENTIAL INFORMATION    Dear Dr. Hdez,    We are seeing Jamison Mayes, date of birth 1945, in the clinic at [facility]. Yossi Peck DO is the patient's PCP. Jamison Mayes has an outstanding lab/procedure at the time we reviewed his chart. In order to help keep his health information updated, he has authorized us to request the following medical record(s):        (  )  MAMMOGRAM                                      (  )  COLONOSCOPY      (  )  PAP SMEAR                                          (  )  OUTSIDE LAB RESULTS     (  )  DEXA SCAN                                          ( x )  EYE EXAM            (  )  FOOT EXAM                                          (  )  ENTIRE RECORD     (  )  OUTSIDE IMMUNIZATIONS                 (  )  _______________         Please fax records to [facility], Brian M Helmstetter, DO,[phone]     If you have any questions, please contact SHANTANU Acevedo at [phone]          Patient Name: Jamison Mayes  : 1945  Patient Phone #: [phone]

## 2022-07-03 ENCOUNTER — PATIENT MESSAGE (OUTPATIENT)
Dept: DERMATOLOGY | Facility: CLINIC | Age: 77
End: 2022-07-03
Payer: MEDICARE

## 2022-07-03 ENCOUNTER — PATIENT MESSAGE (OUTPATIENT)
Dept: OTOLARYNGOLOGY | Facility: CLINIC | Age: 77
End: 2022-07-03
Payer: MEDICARE

## 2022-07-08 ENCOUNTER — PATIENT MESSAGE (OUTPATIENT)
Dept: ENDOSCOPY | Facility: HOSPITAL | Age: 77
End: 2022-07-08
Payer: MEDICARE

## 2022-07-19 ENCOUNTER — OFFICE VISIT (OUTPATIENT)
Dept: OTOLARYNGOLOGY | Facility: CLINIC | Age: 77
End: 2022-07-19
Payer: MEDICARE

## 2022-07-19 ENCOUNTER — CLINICAL SUPPORT (OUTPATIENT)
Dept: OTOLARYNGOLOGY | Facility: CLINIC | Age: 77
End: 2022-07-19
Payer: MEDICARE

## 2022-07-19 VITALS
WEIGHT: 182.31 LBS | DIASTOLIC BLOOD PRESSURE: 85 MMHG | HEART RATE: 79 BPM | BODY MASS INDEX: 30.37 KG/M2 | HEIGHT: 65 IN | SYSTOLIC BLOOD PRESSURE: 163 MMHG

## 2022-07-19 DIAGNOSIS — H90.3 SENSORINEURAL HEARING LOSS, BILATERAL: ICD-10-CM

## 2022-07-19 DIAGNOSIS — H72.92 TYMPANIC MEMBRANE PERFORATION, LEFT: Primary | ICD-10-CM

## 2022-07-19 DIAGNOSIS — H69.93 ETD (EUSTACHIAN TUBE DYSFUNCTION), BILATERAL: ICD-10-CM

## 2022-07-19 DIAGNOSIS — H90.3 SENSORINEURAL HEARING LOSS, BILATERAL: Primary | ICD-10-CM

## 2022-07-19 DIAGNOSIS — H93.13 TINNITUS OF BOTH EARS: ICD-10-CM

## 2022-07-19 PROCEDURE — 92567 PR TYMPA2METRY: ICD-10-PCS | Mod: S$GLB,,, | Performed by: AUDIOLOGIST

## 2022-07-19 PROCEDURE — 92557 PR COMPREHENSIVE HEARING TEST: ICD-10-PCS | Mod: S$GLB,,, | Performed by: AUDIOLOGIST

## 2022-07-19 PROCEDURE — 1159F MED LIST DOCD IN RCRD: CPT | Mod: CPTII,S$GLB,, | Performed by: OTOLARYNGOLOGY

## 2022-07-19 PROCEDURE — 3079F DIAST BP 80-89 MM HG: CPT | Mod: CPTII,S$GLB,, | Performed by: OTOLARYNGOLOGY

## 2022-07-19 PROCEDURE — 92567 TYMPANOMETRY: CPT | Mod: S$GLB,,, | Performed by: AUDIOLOGIST

## 2022-07-19 PROCEDURE — 92557 COMPREHENSIVE HEARING TEST: CPT | Mod: S$GLB,,, | Performed by: AUDIOLOGIST

## 2022-07-19 PROCEDURE — 1160F PR REVIEW ALL MEDS BY PRESCRIBER/CLIN PHARMACIST DOCUMENTED: ICD-10-PCS | Mod: CPTII,S$GLB,, | Performed by: OTOLARYNGOLOGY

## 2022-07-19 PROCEDURE — 1126F PR PAIN SEVERITY QUANTIFIED, NO PAIN PRESENT: ICD-10-PCS | Mod: CPTII,S$GLB,, | Performed by: OTOLARYNGOLOGY

## 2022-07-19 PROCEDURE — 99999 PR PBB SHADOW E&M-EST. PATIENT-LVL IV: ICD-10-PCS | Mod: PBBFAC,,, | Performed by: OTOLARYNGOLOGY

## 2022-07-19 PROCEDURE — 99999 PR PBB SHADOW E&M-EST. PATIENT-LVL IV: CPT | Mod: PBBFAC,,, | Performed by: OTOLARYNGOLOGY

## 2022-07-19 PROCEDURE — 99214 OFFICE O/P EST MOD 30 MIN: CPT | Mod: S$GLB,,, | Performed by: OTOLARYNGOLOGY

## 2022-07-19 PROCEDURE — 1101F PT FALLS ASSESS-DOCD LE1/YR: CPT | Mod: CPTII,S$GLB,, | Performed by: OTOLARYNGOLOGY

## 2022-07-19 PROCEDURE — 3077F SYST BP >= 140 MM HG: CPT | Mod: CPTII,S$GLB,, | Performed by: OTOLARYNGOLOGY

## 2022-07-19 PROCEDURE — 3077F PR MOST RECENT SYSTOLIC BLOOD PRESSURE >= 140 MM HG: ICD-10-PCS | Mod: CPTII,S$GLB,, | Performed by: OTOLARYNGOLOGY

## 2022-07-19 PROCEDURE — 99214 PR OFFICE/OUTPT VISIT, EST, LEVL IV, 30-39 MIN: ICD-10-PCS | Mod: S$GLB,,, | Performed by: OTOLARYNGOLOGY

## 2022-07-19 PROCEDURE — 3288F FALL RISK ASSESSMENT DOCD: CPT | Mod: CPTII,S$GLB,, | Performed by: OTOLARYNGOLOGY

## 2022-07-19 PROCEDURE — 1160F RVW MEDS BY RX/DR IN RCRD: CPT | Mod: CPTII,S$GLB,, | Performed by: OTOLARYNGOLOGY

## 2022-07-19 PROCEDURE — 1101F PR PT FALLS ASSESS DOC 0-1 FALLS W/OUT INJ PAST YR: ICD-10-PCS | Mod: CPTII,S$GLB,, | Performed by: OTOLARYNGOLOGY

## 2022-07-19 PROCEDURE — 1126F AMNT PAIN NOTED NONE PRSNT: CPT | Mod: CPTII,S$GLB,, | Performed by: OTOLARYNGOLOGY

## 2022-07-19 PROCEDURE — 3079F PR MOST RECENT DIASTOLIC BLOOD PRESSURE 80-89 MM HG: ICD-10-PCS | Mod: CPTII,S$GLB,, | Performed by: OTOLARYNGOLOGY

## 2022-07-19 PROCEDURE — 3288F PR FALLS RISK ASSESSMENT DOCUMENTED: ICD-10-PCS | Mod: CPTII,S$GLB,, | Performed by: OTOLARYNGOLOGY

## 2022-07-19 PROCEDURE — 1159F PR MEDICATION LIST DOCUMENTED IN MEDICAL RECORD: ICD-10-PCS | Mod: CPTII,S$GLB,, | Performed by: OTOLARYNGOLOGY

## 2022-07-19 RX ORDER — OFLOXACIN 3 MG/ML
3 SOLUTION AURICULAR (OTIC) 2 TIMES DAILY
Qty: 5 ML | Refills: 0 | Status: SHIPPED | OUTPATIENT
Start: 2022-07-19 | End: 2022-07-29

## 2022-07-19 NOTE — PROGRESS NOTES
Jamison Mayes was seen today in the clinic for an audiologic evaluation.  His main complaint was difficulty hearing.  He reported a history of SNHL in both ears and wears hearing aids from [employer] that are approximately 4 years old.  He reported having a recent URI and once it resolved he noticed a decrease in his hearing sensitivity.    Tympanometry revealed a Type A tympanogram for the right ear and could not be completed for the left ear because a seal could not be obtained.  He has a PE tube in the left ear.  Audiogram results revealed a moderate to severe sensorineural hearing loss bilaterally.  Speech reception thresholds were obtained at 45dB for the right ear and 50dB for the left ear.  Speech discrimination scores were 29% for the right ear and 24% for the left ear.    Recommendations:  1. Otologic evaluation  2. Annual evaluaiton  3. Continue use of hearing aids  4. Hearing protection in noise

## 2022-07-19 NOTE — PROGRESS NOTES
Subjective:    Here to followup after placement of ear tubes    Patient ID: Jamison Mayes is a 77 y.o. male.    Chief Complaint: Popping left ear     Jamison Mayes is a 77 y.o. male here for popping sensation in left ear.   He notes aural fullness.  Symptoms started after upper respiratory infection. He went to urgent care and was treated.   He has not had any drainage from either ear.   He feels his hearing is stable but does not that he is having a harder time discriminating certain words. He wears  bilateral hearing aids.       Review of Systems   Constitutional: Negative for fever, activity change, appetite change and irritability.   HENT: Negative for congestion, ear discharge and rhinorrhea.    Respiratory: Negative for cough.      Objective:     Physical Exam   Constitutional: He appears well-developed and well-nourished.   HENT:   Right Ear: External ear, pinna and canal normal. No drainage. TM within normal limits; no perforation, effusion or masses. Normal mobility.   Left Ear: External ear, pinna and canal normal. No drainage. A PE tube is extruded and removed under microscopy.   Nose: Nose normal. No rhinorrhea, nasal discharge or congestion.   Lymphadenopathy:     He has no cervical adenopathy.   Neurological: He is alert.            Diagnostic studies:     Audiogram interpreted personally by me and discussed in detail with the patient today.   Tympanometry revealed a Type A tympanogram for the right ear and could not be completed for the left ear because a seal could not be obtained.  He has a PE tube in the left ear.  Audiogram results revealed a moderate to severe sensorineural hearing loss bilaterally.  Speech reception thresholds were obtained at 45dB for the right ear and 50dB for the left ear.  Speech discrimination scores were 29% for the right ear and 24% for the left ear.                          Assessment:     1. Tympanic membrane perforation, left    2. ETD (Eustachian tube  dysfunction), bilateral    3. Sensorineural hearing loss, bilateral    4. Tinnitus of both ears        Ofloxacin drops to left ear BID for 14 days  Keep left ear dry.   Bilateral SNHL/Tinnitus: continue binaural hearing aids.   Follow up in 4 months for recheck with tympanometry.     Abbie King MD

## 2022-07-21 ENCOUNTER — OFFICE VISIT (OUTPATIENT)
Dept: INTERNAL MEDICINE | Facility: CLINIC | Age: 77
End: 2022-07-21
Payer: MEDICARE

## 2022-07-21 VITALS
WEIGHT: 180.88 LBS | OXYGEN SATURATION: 97 % | HEART RATE: 64 BPM | BODY MASS INDEX: 27.41 KG/M2 | TEMPERATURE: 98 F | DIASTOLIC BLOOD PRESSURE: 78 MMHG | SYSTOLIC BLOOD PRESSURE: 140 MMHG | HEIGHT: 68 IN

## 2022-07-21 DIAGNOSIS — I10 ESSENTIAL HYPERTENSION: ICD-10-CM

## 2022-07-21 DIAGNOSIS — E11.59 HYPERTENSION ASSOCIATED WITH DIABETES: ICD-10-CM

## 2022-07-21 DIAGNOSIS — E78.5 HYPERLIPIDEMIA ASSOCIATED WITH TYPE 2 DIABETES MELLITUS: ICD-10-CM

## 2022-07-21 DIAGNOSIS — E11.8 DM (DIABETES MELLITUS) WITH COMPLICATIONS: ICD-10-CM

## 2022-07-21 DIAGNOSIS — C22.0 HEPATOCELLULAR CARCINOMA: Primary | ICD-10-CM

## 2022-07-21 DIAGNOSIS — E11.69 HYPERLIPIDEMIA ASSOCIATED WITH TYPE 2 DIABETES MELLITUS: ICD-10-CM

## 2022-07-21 DIAGNOSIS — E11.65 CONTROLLED TYPE 2 DIABETES MELLITUS WITH HYPERGLYCEMIA, WITHOUT LONG-TERM CURRENT USE OF INSULIN: ICD-10-CM

## 2022-07-21 DIAGNOSIS — I15.2 HYPERTENSION ASSOCIATED WITH DIABETES: ICD-10-CM

## 2022-07-21 PROCEDURE — 3077F SYST BP >= 140 MM HG: CPT | Mod: CPTII,S$GLB,, | Performed by: INTERNAL MEDICINE

## 2022-07-21 PROCEDURE — 1126F PR PAIN SEVERITY QUANTIFIED, NO PAIN PRESENT: ICD-10-PCS | Mod: CPTII,S$GLB,, | Performed by: INTERNAL MEDICINE

## 2022-07-21 PROCEDURE — 3077F PR MOST RECENT SYSTOLIC BLOOD PRESSURE >= 140 MM HG: ICD-10-PCS | Mod: CPTII,S$GLB,, | Performed by: INTERNAL MEDICINE

## 2022-07-21 PROCEDURE — 99999 PR PBB SHADOW E&M-EST. PATIENT-LVL IV: CPT | Mod: PBBFAC,,, | Performed by: INTERNAL MEDICINE

## 2022-07-21 PROCEDURE — 1159F PR MEDICATION LIST DOCUMENTED IN MEDICAL RECORD: ICD-10-PCS | Mod: CPTII,S$GLB,, | Performed by: INTERNAL MEDICINE

## 2022-07-21 PROCEDURE — 3078F DIAST BP <80 MM HG: CPT | Mod: CPTII,S$GLB,, | Performed by: INTERNAL MEDICINE

## 2022-07-21 PROCEDURE — 1160F PR REVIEW ALL MEDS BY PRESCRIBER/CLIN PHARMACIST DOCUMENTED: ICD-10-PCS | Mod: CPTII,S$GLB,, | Performed by: INTERNAL MEDICINE

## 2022-07-21 PROCEDURE — 3288F FALL RISK ASSESSMENT DOCD: CPT | Mod: CPTII,S$GLB,, | Performed by: INTERNAL MEDICINE

## 2022-07-21 PROCEDURE — 1159F MED LIST DOCD IN RCRD: CPT | Mod: CPTII,S$GLB,, | Performed by: INTERNAL MEDICINE

## 2022-07-21 PROCEDURE — 99999 PR PBB SHADOW E&M-EST. PATIENT-LVL IV: ICD-10-PCS | Mod: PBBFAC,,, | Performed by: INTERNAL MEDICINE

## 2022-07-21 PROCEDURE — 99214 OFFICE O/P EST MOD 30 MIN: CPT | Mod: S$GLB,,, | Performed by: INTERNAL MEDICINE

## 2022-07-21 PROCEDURE — 99214 PR OFFICE/OUTPT VISIT, EST, LEVL IV, 30-39 MIN: ICD-10-PCS | Mod: S$GLB,,, | Performed by: INTERNAL MEDICINE

## 2022-07-21 PROCEDURE — 1160F RVW MEDS BY RX/DR IN RCRD: CPT | Mod: CPTII,S$GLB,, | Performed by: INTERNAL MEDICINE

## 2022-07-21 PROCEDURE — 1101F PR PT FALLS ASSESS DOC 0-1 FALLS W/OUT INJ PAST YR: ICD-10-PCS | Mod: CPTII,S$GLB,, | Performed by: INTERNAL MEDICINE

## 2022-07-21 PROCEDURE — 3288F PR FALLS RISK ASSESSMENT DOCUMENTED: ICD-10-PCS | Mod: CPTII,S$GLB,, | Performed by: INTERNAL MEDICINE

## 2022-07-21 PROCEDURE — 1126F AMNT PAIN NOTED NONE PRSNT: CPT | Mod: CPTII,S$GLB,, | Performed by: INTERNAL MEDICINE

## 2022-07-21 PROCEDURE — 3078F PR MOST RECENT DIASTOLIC BLOOD PRESSURE < 80 MM HG: ICD-10-PCS | Mod: CPTII,S$GLB,, | Performed by: INTERNAL MEDICINE

## 2022-07-21 PROCEDURE — 1101F PT FALLS ASSESS-DOCD LE1/YR: CPT | Mod: CPTII,S$GLB,, | Performed by: INTERNAL MEDICINE

## 2022-07-21 RX ORDER — IRBESARTAN 300 MG/1
300 TABLET ORAL NIGHTLY
Qty: 90 TABLET | Refills: 3 | Status: SHIPPED | OUTPATIENT
Start: 2022-07-21 | End: 2023-07-18 | Stop reason: SDUPTHER

## 2022-07-21 RX ORDER — LOVASTATIN 20 MG/1
20 TABLET ORAL NIGHTLY
Qty: 90 TABLET | Refills: 3 | Status: SHIPPED | OUTPATIENT
Start: 2022-07-21 | End: 2023-07-18 | Stop reason: SDUPTHER

## 2022-07-21 NOTE — PROGRESS NOTES
Subjective:       Patient ID: Jamison Mayes is a 77 y.o. male.    Chief Complaint: Follow-up (6 month f/u)    HPI   Pt with T2DM, HTN, Seizure d/o, HCC, HLD is here for 6 month f/u. No acute complaints today.   Review of Systems   Constitutional: Negative for activity change, appetite change, chills, diaphoresis, fatigue, fever and unexpected weight change.   HENT: Negative for postnasal drip, rhinorrhea, sinus pressure/congestion, sneezing, sore throat, trouble swallowing and voice change.    Respiratory: Negative for cough, shortness of breath and wheezing.    Cardiovascular: Negative for chest pain, palpitations and leg swelling.   Gastrointestinal: Negative for abdominal pain, blood in stool, constipation, diarrhea, nausea and vomiting.   Genitourinary: Negative for dysuria.   Musculoskeletal: Negative for arthralgias and myalgias.   Integumentary:  Negative for rash and wound.   Allergic/Immunologic: Negative for environmental allergies and food allergies.   Hematological: Negative for adenopathy. Does not bruise/bleed easily.         Objective:      Physical Exam  Constitutional:       General: He is not in acute distress.     Appearance: He is well-developed. He is not diaphoretic.   HENT:      Head: Normocephalic and atraumatic.      Right Ear: External ear normal.      Left Ear: External ear normal.      Nose: Nose normal.      Mouth/Throat:      Pharynx: No oropharyngeal exudate.   Eyes:      General: No scleral icterus.        Right eye: No discharge.         Left eye: No discharge.      Conjunctiva/sclera: Conjunctivae normal.      Pupils: Pupils are equal, round, and reactive to light.   Neck:      Vascular: No JVD.   Cardiovascular:      Rate and Rhythm: Normal rate and regular rhythm.      Heart sounds: Normal heart sounds. No murmur heard.  Pulmonary:      Effort: Pulmonary effort is normal. No respiratory distress.      Breath sounds: Normal breath sounds. No wheezing or rales.   Musculoskeletal:       Cervical back: Normal range of motion and neck supple.   Lymphadenopathy:      Cervical: No cervical adenopathy.   Skin:     General: Skin is warm and dry.      Coloration: Skin is not pale.      Findings: No rash.   Neurological:      Mental Status: He is alert and oriented to person, place, and time.         Assessment:       Problem List Items Addressed This Visit        Cardiac/Vascular    Hypertension associated with diabetes    Hyperlipidemia associated with type 2 diabetes mellitus       Oncology    Hepatocellular carcinoma - Primary       Endocrine    DM (diabetes mellitus) with complications    Controlled type 2 diabetes mellitus, without long-term current use of insulin      Other Visit Diagnoses     Essential hypertension        Relevant Medications    lovastatin (MEVACOR) 20 MG tablet          Plan:    T2DM- last A1C of 7.8(6/22)<--6.7(12/21)<--6.4(12/20)<--6.0(6/20)<--8.1(11/19)<--7.1(11/18)      -continue Metformin  mg BID WM and Amaryl 4 mg qam      HTN- stable on Avapro 300 mg daily       Seizure d/o- stable of meds      -Pt had seen Neurology       HCC- stable, s/p left hepatic lobectomy with cholecystectomy on 03/05/2018       No recurrence, followed by Oncology        HLD- stable on Lovastatin 20 mg daily      F/u in 6 months for annual exam

## 2022-07-25 ENCOUNTER — PATIENT OUTREACH (OUTPATIENT)
Dept: ADMINISTRATIVE | Facility: HOSPITAL | Age: 77
End: 2022-07-25
Payer: MEDICARE

## 2022-07-25 ENCOUNTER — OFFICE VISIT (OUTPATIENT)
Dept: DERMATOLOGY | Facility: CLINIC | Age: 77
End: 2022-07-25
Payer: MEDICARE

## 2022-07-25 DIAGNOSIS — I15.2 HYPERTENSION ASSOCIATED WITH DIABETES: Primary | ICD-10-CM

## 2022-07-25 DIAGNOSIS — Z00.00 ANNUAL PHYSICAL EXAM: ICD-10-CM

## 2022-07-25 DIAGNOSIS — Z85.828 HISTORY OF NONMELANOMA SKIN CANCER: ICD-10-CM

## 2022-07-25 DIAGNOSIS — E11.69 HYPERLIPIDEMIA ASSOCIATED WITH TYPE 2 DIABETES MELLITUS: ICD-10-CM

## 2022-07-25 DIAGNOSIS — L82.1 SK (SEBORRHEIC KERATOSIS): Primary | ICD-10-CM

## 2022-07-25 DIAGNOSIS — D18.01 CHERRY ANGIOMA: ICD-10-CM

## 2022-07-25 DIAGNOSIS — Z12.83 SKIN CANCER SCREENING: ICD-10-CM

## 2022-07-25 DIAGNOSIS — E78.5 HYPERLIPIDEMIA ASSOCIATED WITH TYPE 2 DIABETES MELLITUS: ICD-10-CM

## 2022-07-25 DIAGNOSIS — Z12.5 ENCOUNTER FOR SCREENING FOR MALIGNANT NEOPLASM OF PROSTATE: ICD-10-CM

## 2022-07-25 DIAGNOSIS — E11.59 HYPERTENSION ASSOCIATED WITH DIABETES: Primary | ICD-10-CM

## 2022-07-25 DIAGNOSIS — E11.8 DM (DIABETES MELLITUS) WITH COMPLICATIONS: ICD-10-CM

## 2022-07-25 DIAGNOSIS — L57.0 AK (ACTINIC KERATOSIS): ICD-10-CM

## 2022-07-25 PROCEDURE — 17000 DESTRUCT PREMALG LESION: CPT | Mod: S$GLB,,, | Performed by: DERMATOLOGY

## 2022-07-25 PROCEDURE — 17000 PR DESTRUCTION(LASER SURGERY,CRYOSURGERY,CHEMOSURGERY),PREMALIGNANT LESIONS,FIRST LESION: ICD-10-PCS | Mod: S$GLB,,, | Performed by: DERMATOLOGY

## 2022-07-25 PROCEDURE — 1101F PR PT FALLS ASSESS DOC 0-1 FALLS W/OUT INJ PAST YR: ICD-10-PCS | Mod: CPTII,S$GLB,, | Performed by: DERMATOLOGY

## 2022-07-25 PROCEDURE — 1159F PR MEDICATION LIST DOCUMENTED IN MEDICAL RECORD: ICD-10-PCS | Mod: CPTII,S$GLB,, | Performed by: DERMATOLOGY

## 2022-07-25 PROCEDURE — 17003 DESTRUCTION, PREMALIGNANT LESIONS; SECOND THROUGH 14 LESIONS: ICD-10-PCS | Mod: S$GLB,,, | Performed by: DERMATOLOGY

## 2022-07-25 PROCEDURE — 99999 PR PBB SHADOW E&M-EST. PATIENT-LVL III: CPT | Mod: PBBFAC,,, | Performed by: DERMATOLOGY

## 2022-07-25 PROCEDURE — 99213 PR OFFICE/OUTPT VISIT, EST, LEVL III, 20-29 MIN: ICD-10-PCS | Mod: 25,S$GLB,, | Performed by: DERMATOLOGY

## 2022-07-25 PROCEDURE — 99999 PR PBB SHADOW E&M-EST. PATIENT-LVL III: ICD-10-PCS | Mod: PBBFAC,,, | Performed by: DERMATOLOGY

## 2022-07-25 PROCEDURE — 1126F PR PAIN SEVERITY QUANTIFIED, NO PAIN PRESENT: ICD-10-PCS | Mod: CPTII,S$GLB,, | Performed by: DERMATOLOGY

## 2022-07-25 PROCEDURE — 1160F RVW MEDS BY RX/DR IN RCRD: CPT | Mod: CPTII,S$GLB,, | Performed by: DERMATOLOGY

## 2022-07-25 PROCEDURE — 3288F PR FALLS RISK ASSESSMENT DOCUMENTED: ICD-10-PCS | Mod: CPTII,S$GLB,, | Performed by: DERMATOLOGY

## 2022-07-25 PROCEDURE — 17003 DESTRUCT PREMALG LES 2-14: CPT | Mod: S$GLB,,, | Performed by: DERMATOLOGY

## 2022-07-25 PROCEDURE — 99213 OFFICE O/P EST LOW 20 MIN: CPT | Mod: 25,S$GLB,, | Performed by: DERMATOLOGY

## 2022-07-25 PROCEDURE — 1159F MED LIST DOCD IN RCRD: CPT | Mod: CPTII,S$GLB,, | Performed by: DERMATOLOGY

## 2022-07-25 PROCEDURE — 1101F PT FALLS ASSESS-DOCD LE1/YR: CPT | Mod: CPTII,S$GLB,, | Performed by: DERMATOLOGY

## 2022-07-25 PROCEDURE — 1126F AMNT PAIN NOTED NONE PRSNT: CPT | Mod: CPTII,S$GLB,, | Performed by: DERMATOLOGY

## 2022-07-25 PROCEDURE — 3288F FALL RISK ASSESSMENT DOCD: CPT | Mod: CPTII,S$GLB,, | Performed by: DERMATOLOGY

## 2022-07-25 PROCEDURE — 1160F PR REVIEW ALL MEDS BY PRESCRIBER/CLIN PHARMACIST DOCUMENTED: ICD-10-PCS | Mod: CPTII,S$GLB,, | Performed by: DERMATOLOGY

## 2022-07-25 NOTE — PROGRESS NOTES
"  Subjective:       Patient ID:  Jamison Mayes is a 77 y.o. male who presents for   Chief Complaint   Patient presents with    Skin Check     HPI    Patient is here today for a "mole" check.   Pt has a history of severe sun exposure in the past.   Pt recalls several blistering sunburns in the past- yes  Pt has history of tanning bed use- no  Pt has  had moles removed in the past- yes  Pt has history of melanoma in first degree relatives-  no     Patient has a history of non-melanoma skin cancer and is here today for routine skin check.   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    Has previously used cryo, efudex, and PDT for AKs.    Review of Systems   Constitutional: Negative for fever, chills, weight loss, weight gain, fatigue, night sweats and malaise.   Skin: Positive for activity-related sunscreen use. Negative for daily sunscreen use and recent sunburn.   Hematologic/Lymphatic: Bruises/bleeds easily.        Objective:    Physical Exam   Constitutional: He appears well-developed and well-nourished. No distress.   Neurological: He is alert and oriented to person, place, and time. He is not disoriented.   Psychiatric: He has a normal mood and affect.   Skin:   Areas Examined (abnormalities noted in diagram):   Scalp / Hair Palpated and Inspected  Head / Face Inspection Performed  Neck Inspection Performed  Chest / Axilla Inspection Performed  Abdomen Inspection Performed  Back Inspection Performed  RUE Inspected  LUE Inspection Performed                   Diagram Legend     Erythematous scaling macule/papule c/w actinic keratosis       Vascular papule c/w angioma      Pigmented verrucoid papule/plaque c/w seborrheic keratosis      Yellow umbilicated papule c/w sebaceous hyperplasia      Irregularly shaped tan macule c/w lentigo     1-2 mm smooth white papules consistent with Milia      Movable subcutaneous cyst with punctum c/w epidermal inclusion cyst      Subcutaneous movable " cyst c/w pilar cyst      Firm pink to brown papule c/w dermatofibroma      Pedunculated fleshy papule(s) c/w skin tag(s)      Evenly pigmented macule c/w junctional nevus     Mildly variegated pigmented, slightly irregular-bordered macule c/w mildly atypical nevus      Flesh colored to evenly pigmented papule c/w intradermal nevus       Pink pearly papule/plaque c/w basal cell carcinoma      Erythematous hyperkeratotic cursted plaque c/w SCC      Surgical scar with no sign of skin cancer recurrence      Open and closed comedones      Inflammatory papules and pustules      Verrucoid papule consistent consistent with wart     Erythematous eczematous patches and plaques     Dystrophic onycholytic nail with subungual debris c/w onychomycosis     Umbilicated papule    Erythematous-base heme-crusted tan verrucoid plaque consistent with inflamed seborrheic keratosis     Erythematous Silvery Scaling Plaque c/w Psoriasis     See annotation      Assessment / Plan:        SK (seborrheic keratosis)  These are benign inherited growths without a malignant potential. Reassurance given to patient. No treatment is necessary.   Treatment of benign, asymptomatic lesions may be considered cosmetic.  Warned about risk of hypo- or hyperpigmentation with treatment and risk of recurrence.    AK (actinic keratosis)  Cryosurgery Procedure Note    Verbal consent from the patient is obtained including, but not limited to, risk of hypopigmentation/hyperpigmentation, scar, recurrence of lesion. The patient is aware of the precancerous quality and need for treatment of these lesions. Liquid nitrogen cryosurgery is applied to the 13 actinic keratoses, as detailed in the physical exam, to produce a freeze injury. The patient is aware that blisters may form and is instructed on wound care with gentle cleansing and use of vaseline ointment to keep moist until healed. The patient is supplied a handout on cryosurgery and is instructed to call if  lesions do not completely resolve.    Cherry angioma  This is a benign vascular lesion. Reassurance given. No treatment required. Treatment of benign, asymptomatic lesions may be considered cosmetic.    History of nonmelanoma skin cancer  Skin cancer screening  Upper body skin examination performed today including at least 6 points as noted in physical examination. No lesions suspicious for malignancy noted.  Patient instructed in importance of daily broad spectrum sunscreen use with spf at least 30. Sun avoidance and topical protection/protective clothing discussed.      Follow up in about 4 months (around 11/25/2022) for skin check or sooner pending biopsy results.

## 2022-07-27 ENCOUNTER — PES CALL (OUTPATIENT)
Dept: ADMINISTRATIVE | Facility: OTHER | Age: 77
End: 2022-07-27
Payer: MEDICARE

## 2022-07-30 NOTE — PATIENT INSTRUCTIONS
CRYOSURGERY      Your doctor has used a method called cryosurgery to treat your skin condition. Cryosurgery refers to the use of very cold substances to treat a variety of skin conditions such as warts, pre-skin cancers, molluscum contagiosum, sun spots, and several benign growths. The substance we use in cryosurgery is liquid nitrogen and is so cold (-195 degrees Celsius) that is burns when administered.     Following treatment in the office, the skin may immediately burn and become red. You may find the area around the lesion is affected as well. It is sometimes necessary to treat not only the lesion, but a small area of the surrounding normal skin to achieve a good response.     A blister, and even a blood filled blister, may form after treatment.   This is a normal response. If the blister is painful, it is acceptable to sterilize a needle and with rubbing alcohol and gently pop the blister. It is important that you gently wash the area with soap and warm water as the blister fluid may contain wart virus if a wart was treated. Do no remove the roof of the blister.     The area treated can take anywhere from 1-3 weeks to heal. Healing time depends on the kind skin lesion treated, the location, and how aggressively the lesion was treated. It is recommended that the areas treated are covered with Vaseline or bacitracin ointment and a band-aid. If a band-aid is not practical, just ointment applied several times per day will do. Keeping these areas moist will speed the healing time.    Treatment with liquid nitrogen can leave a scar. In dark skin, it may be a light or dark scar, in light skin it may be a white or pink scar. These will generally fade with time, but may never go away completely.     If you have any concerns after your treatment, please feel free to call the office.       [address] [place] [address]/ [phone] FAX/ [email or web address] Sun Protection      The [facility]  [facility] would like to remind you of the importance of sun protection all year round and particularly during the summer when the suns rays are the strongest. It has been proven that both acute and chronic sun exposure damages our cells and leads to skin cancer. Beyond skin cancer, the sun causes 90% of the symptoms of premature skin aging, including wrinkles, lentigines (brown spots), and thin, easily bruised skin. Proper sun protection can help prevent these unwanted conditions.    Many patients report that they dont go in the sun. It has been shown that the average person receives 18 hours of incidental sun exposure per week during activities such as walking through parking lots, driving, or sitting next to windows. This accumulates to several bad sunburns per year!    In choosing sunscreen, you want one that protects against both UVA and UVB rays (broad spectrum). It is recommended that you use one of SPF 30 or higher. It is important to apply the sunscreen about 20 minutes prior to sun exposure. Most sunscreens are chemical sunscreens and a reaction must take place in the skin so that they are effective. If they are applied and then you are immediately exposed to the sun or start sweating, this reaction has not had time to take place and you are therefore unprotected. Sunscreen needs to be reapplied every 2 hours if you are participating in water sports or sweating. We recommend Elta MD or CeraVe sunscreens for daily use; however there are many options and it is most important for you to find one that you will use on a consistent basis.    If you have sensitive skin, you may do best with a sunscreen that contains only physical blockers in the active ingredient section. The only physical blockers available in the [place] currently are titanium dioxide or zinc oxide. These are typically thicker and harder to apply, however they afford very good protection. Neutrogena Sensitive Skin, Blue Lizard  Sensitive Skin (pink top) or Neutrogena Pure and Free are popular ones.     Aside from sunscreen, clothes with UV protection (UPF), wide brimmed hats, and sunglasses are other means of sun protection that we recommend.      Based on a recent study (6/2021) and out of an abundance of caution, we are recommending that you AVOID the following sunscreens as they may contain the carcinogen, benzene:    Spray and gel sunscreens  Any [employer] or [employer] brands as well as Max Block and TopCare brands   Neutrogena Ultra Sheer Dry-touch Water Resistant Sunscreen LOTION SPF 70   Neutrogena Sheer Zinc Dry-touch Face Sunscreen LOTION SPF 50   5.   Aveeno Baby Continuous Protection Sensitive Skin Sunscreen LOTION - Broad Spectrum SPF 50    Please note that Benzene is not an ingredient or the degradation product of any ingredient in any sunscreen. This study suggested that the findings are a result of contamination in the manufacturing process. At this point, we don't know how effectively Benzene gets through the skin, if it gets absorbed systemically, and what effects it may have.     We do know that ultraviolet radiation is a well-established carcinogen. Please use daily sun protection/avoidance and use of at least SPF 30, broad-spectrum sunscreen not listed above.                       [facility]  [facility] [address]  [facility] [address]  Dept: [phone]  Dept Fax: [phone]

## 2022-09-16 ENCOUNTER — TELEPHONE (OUTPATIENT)
Dept: HEMATOLOGY/ONCOLOGY | Facility: CLINIC | Age: 77
End: 2022-09-16
Payer: MEDICARE

## 2022-09-16 NOTE — TELEPHONE ENCOUNTER
RE: Reschedule Appointment Call     Attempted to call patient regarding need to reschedule their appointment with Dr. Haney. However, no answer. Left voice message with clinic number for return call.  Staff message sent to contact patient for rescheduling.     Future Appointments   Date Time Provider Department Center   12/12/2022 10:00 AM [facility] MRI1 500 LB LIMIT [facility]   12/15/2022  2:20 PM Derick Haney MD [facility] [place] HEM ONC [facility]       Call time: 1 minutes

## 2022-11-02 ENCOUNTER — TELEPHONE (OUTPATIENT)
Dept: OTOLARYNGOLOGY | Facility: CLINIC | Age: 77
End: 2022-11-02
Payer: MEDICARE

## 2022-11-17 ENCOUNTER — TELEPHONE (OUTPATIENT)
Dept: OTOLARYNGOLOGY | Facility: CLINIC | Age: 77
End: 2022-11-17
Payer: MEDICARE

## 2022-11-17 NOTE — TELEPHONE ENCOUNTER
Called pt to inform that Dr. Gimenez is out on medical leave for 6 weeks and that appointment would need to be rescheduled. No answer, left detailed voicemail with new apt date/time.

## 2022-11-18 ENCOUNTER — OFFICE VISIT (OUTPATIENT)
Dept: DERMATOLOGY | Facility: CLINIC | Age: 77
End: 2022-11-18
Payer: MEDICARE

## 2022-11-18 DIAGNOSIS — L57.0 AK (ACTINIC KERATOSIS): ICD-10-CM

## 2022-11-18 DIAGNOSIS — Z12.83 SKIN CANCER SCREENING: ICD-10-CM

## 2022-11-18 DIAGNOSIS — L82.1 SK (SEBORRHEIC KERATOSIS): ICD-10-CM

## 2022-11-18 DIAGNOSIS — Z85.828 HISTORY OF NONMELANOMA SKIN CANCER: ICD-10-CM

## 2022-11-18 DIAGNOSIS — D48.5 NEOPLASM OF UNCERTAIN BEHAVIOR OF SKIN: Primary | ICD-10-CM

## 2022-11-18 DIAGNOSIS — D18.01 CHERRY ANGIOMA: ICD-10-CM

## 2022-11-18 PROCEDURE — 1101F PR PT FALLS ASSESS DOC 0-1 FALLS W/OUT INJ PAST YR: ICD-10-PCS | Mod: CPTII,S$GLB,, | Performed by: DERMATOLOGY

## 2022-11-18 PROCEDURE — 1159F MED LIST DOCD IN RCRD: CPT | Mod: CPTII,S$GLB,, | Performed by: DERMATOLOGY

## 2022-11-18 PROCEDURE — 11102 TANGNTL BX SKIN SINGLE LES: CPT | Mod: S$GLB,,, | Performed by: DERMATOLOGY

## 2022-11-18 PROCEDURE — 99999 PR PBB SHADOW E&M-EST. PATIENT-LVL III: CPT | Mod: PBBFAC,,, | Performed by: DERMATOLOGY

## 2022-11-18 PROCEDURE — 1160F RVW MEDS BY RX/DR IN RCRD: CPT | Mod: CPTII,S$GLB,, | Performed by: DERMATOLOGY

## 2022-11-18 PROCEDURE — 17000 PR DESTRUCTION(LASER SURGERY,CRYOSURGERY,CHEMOSURGERY),PREMALIGNANT LESIONS,FIRST LESION: ICD-10-PCS | Mod: 59,S$GLB,, | Performed by: DERMATOLOGY

## 2022-11-18 PROCEDURE — 88305 TISSUE EXAM BY PATHOLOGIST: CPT | Performed by: DERMATOLOGY

## 2022-11-18 PROCEDURE — 88305 TISSUE EXAM BY PATHOLOGIST: ICD-10-PCS | Mod: 26,,, | Performed by: DERMATOLOGY

## 2022-11-18 PROCEDURE — 17000 DESTRUCT PREMALG LESION: CPT | Mod: 59,S$GLB,, | Performed by: DERMATOLOGY

## 2022-11-18 PROCEDURE — 17003 DESTRUCT PREMALG LES 2-14: CPT | Mod: 59,S$GLB,, | Performed by: DERMATOLOGY

## 2022-11-18 PROCEDURE — 88305 TISSUE EXAM BY PATHOLOGIST: CPT | Mod: 26,,, | Performed by: DERMATOLOGY

## 2022-11-18 PROCEDURE — 1160F PR REVIEW ALL MEDS BY PRESCRIBER/CLIN PHARMACIST DOCUMENTED: ICD-10-PCS | Mod: CPTII,S$GLB,, | Performed by: DERMATOLOGY

## 2022-11-18 PROCEDURE — 3288F PR FALLS RISK ASSESSMENT DOCUMENTED: ICD-10-PCS | Mod: CPTII,S$GLB,, | Performed by: DERMATOLOGY

## 2022-11-18 PROCEDURE — 99999 PR PBB SHADOW E&M-EST. PATIENT-LVL III: ICD-10-PCS | Mod: PBBFAC,,, | Performed by: DERMATOLOGY

## 2022-11-18 PROCEDURE — 1126F AMNT PAIN NOTED NONE PRSNT: CPT | Mod: CPTII,S$GLB,, | Performed by: DERMATOLOGY

## 2022-11-18 PROCEDURE — 1126F PR PAIN SEVERITY QUANTIFIED, NO PAIN PRESENT: ICD-10-PCS | Mod: CPTII,S$GLB,, | Performed by: DERMATOLOGY

## 2022-11-18 PROCEDURE — 17003 DESTRUCTION, PREMALIGNANT LESIONS; SECOND THROUGH 14 LESIONS: ICD-10-PCS | Mod: 59,S$GLB,, | Performed by: DERMATOLOGY

## 2022-11-18 PROCEDURE — 1101F PT FALLS ASSESS-DOCD LE1/YR: CPT | Mod: CPTII,S$GLB,, | Performed by: DERMATOLOGY

## 2022-11-18 PROCEDURE — 1159F PR MEDICATION LIST DOCUMENTED IN MEDICAL RECORD: ICD-10-PCS | Mod: CPTII,S$GLB,, | Performed by: DERMATOLOGY

## 2022-11-18 PROCEDURE — 99213 PR OFFICE/OUTPT VISIT, EST, LEVL III, 20-29 MIN: ICD-10-PCS | Mod: 25,S$GLB,, | Performed by: DERMATOLOGY

## 2022-11-18 PROCEDURE — 99213 OFFICE O/P EST LOW 20 MIN: CPT | Mod: 25,S$GLB,, | Performed by: DERMATOLOGY

## 2022-11-18 PROCEDURE — 3288F FALL RISK ASSESSMENT DOCD: CPT | Mod: CPTII,S$GLB,, | Performed by: DERMATOLOGY

## 2022-11-18 PROCEDURE — 11102 PR TANGENTIAL BIOPSY, SKIN, SINGLE LESION: ICD-10-PCS | Mod: S$GLB,,, | Performed by: DERMATOLOGY

## 2022-11-18 NOTE — PROGRESS NOTES
Subjective:       Patient ID:  Jamison Mayes is a 77 y.o. male who presents for   Chief Complaint   Patient presents with    Skin Check     ubse     Patient with new complaint of lesion(s)  Location: L ear, R temple, R lower jaw  Duration: long time  Symptoms: scaly  Relieving factors/Previous treatments: none      History of Present Illness: The patient presents for follow up of skin check.    The patient was last seen on: 10/29/21 for cryosurgery to actinic keratoses which have resolved.       Has a spot on L ear that has been treated with cryotherapy in past. Also reports scaly spots of R cheek.    Patient has a history of non-melanoma skin cancer and is here today for routine skin check.   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     Has previously used cryo, efudex, and PDT for AKs.    Review of Systems   Constitutional:  Negative for fever, chills, weight loss, weight gain, fatigue, night sweats and malaise.   Skin:  Positive for activity-related sunscreen use. Negative for daily sunscreen use and recent sunburn.   Hematologic/Lymphatic: Bruises/bleeds easily.      Objective:    Physical Exam   Constitutional: He appears well-developed and well-nourished. No distress.   Neurological: He is alert and oriented to person, place, and time. He is not disoriented.   Psychiatric: He has a normal mood and affect.   Skin:   Areas Examined (abnormalities noted in diagram):   Scalp / Hair Palpated and Inspected  Head / Face Inspection Performed  Neck Inspection Performed  Chest / Axilla Inspection Performed  Abdomen Inspection Performed  Back Inspection Performed  RUE Inspected  LUE Inspection Performed                     Diagram Legend     Erythematous scaling macule/papule c/w actinic keratosis       Vascular papule c/w angioma      Pigmented verrucoid papule/plaque c/w seborrheic keratosis      Yellow umbilicated papule c/w sebaceous hyperplasia      Irregularly shaped tan macule c/w  lentigo     1-2 mm smooth white papules consistent with Milia      Movable subcutaneous cyst with punctum c/w epidermal inclusion cyst      Subcutaneous movable cyst c/w pilar cyst      Firm pink to brown papule c/w dermatofibroma      Pedunculated fleshy papule(s) c/w skin tag(s)      Evenly pigmented macule c/w junctional nevus     Mildly variegated pigmented, slightly irregular-bordered macule c/w mildly atypical nevus      Flesh colored to evenly pigmented papule c/w intradermal nevus       Pink pearly papule/plaque c/w basal cell carcinoma      Erythematous hyperkeratotic cursted plaque c/w SCC      Surgical scar with no sign of skin cancer recurrence      Open and closed comedones      Inflammatory papules and pustules      Verrucoid papule consistent consistent with wart     Erythematous eczematous patches and plaques     Dystrophic onycholytic nail with subungual debris c/w onychomycosis     Umbilicated papule    Erythematous-base heme-crusted tan verrucoid plaque consistent with inflamed seborrheic keratosis     Erythematous Silvery Scaling Plaque c/w Psoriasis     See annotation      Assessment / Plan:    Neoplasm of uncertain behavior of skin  Shave biopsy procedure note:    Shave biopsy performed after verbal consent including risk of infection, scar, recurrence, need for additional treatment of site. Area prepped with alcohol, anesthetized with approximately 1.0cc of 1% lidocaine with epinephrine. Lesional tissue shaved with razor blade. Hemostasis achieved with application of aluminum chloride followed by hyfrecation. No complications. Dressing applied. Wound care explained.    -     Specimen to Pathology, Dermatology  Pathology Orders:       Normal Orders This Visit    Specimen to Pathology, Dermatology     Comments:    Number of Specimens:->1  ------------------------->-------------------------  Spec 1 Procedure:->Biopsy  Spec 1 Clinical Impression:->cutaneous horn, r/o underlying  SCC  Spec 1  Source:->L helix, inferior to scar    Questions:    Procedure Type: Dermatology and skin neoplasms    Number of Specimens: 1    ------------------------: -------------------------    Spec 1 Procedure: Biopsy    Spec 1 Clinical Impression: cutaneous horn, r/o underlying SCC    Spec 1 Source: L helix, inferior to scar    Release to patient:           AK (actinic keratosis)  Cryosurgery Procedure Note    Verbal consent from the patient is obtained including, but not limited to, risk of hypopigmentation/hyperpigmentation, scar, recurrence of lesion. The patient is aware of the precancerous quality and need for treatment of these lesions. Liquid nitrogen cryosurgery is applied to the 13 actinic keratoses, as detailed in the physical exam, to produce a freeze injury. The patient is aware that blisters may form and is instructed on wound care with gentle cleansing and use of vaseline ointment to keep moist until healed. The patient is supplied a handout on cryosurgery and is instructed to call if lesions do not completely resolve.    SK (seborrheic keratosis)  These are benign inherited growths without a malignant potential. Reassurance given to patient. No treatment is necessary.   Treatment of benign, asymptomatic lesions may be considered cosmetic.  Warned about risk of hypo- or hyperpigmentation with treatment and risk of recurrence.    Cherry angioma  This is a benign vascular lesion. Reassurance given. No treatment required. Treatment of benign, asymptomatic lesions may be considered cosmetic.    Skin cancer screening  History of nonmelanoma skin cancer  Upper body skin examination performed today including at least 6 points as noted in physical examination. Suspicious lesions noted above.  Patient instructed in importance of daily broad spectrum sunscreen use with spf at least 30. Sun avoidance and topical protection/protective clothing discussed.    Follow up in about 4 months (around 3/18/2023) for skin check or  sooner pending biopsy results.

## 2022-11-19 ENCOUNTER — PATIENT MESSAGE (OUTPATIENT)
Dept: OTOLARYNGOLOGY | Facility: CLINIC | Age: 77
End: 2022-11-19
Payer: MEDICARE

## 2022-11-21 NOTE — PATIENT INSTRUCTIONS
Sun Protection      The [facility] would like to remind you of the importance of sun protection all year round and particularly during the summer when the suns rays are the strongest. It has been proven that both acute and chronic sun exposure damages our cells and leads to skin cancer. Beyond skin cancer, the sun causes 90% of the symptoms of premature skin aging, including wrinkles, lentigines (brown spots), and thin, easily bruised skin. Proper sun protection can help prevent these unwanted conditions.    Many patients report that they dont go in the sun. It has been shown that the average person receives 18 hours of incidental sun exposure per week during activities such as walking through parking lots, driving, or sitting next to windows. This accumulates to several bad sunburns per year!    In choosing sunscreen, you want one that protects against both UVA and UVB rays (broad spectrum). It is recommended that you use one of SPF 30 or higher. It is important to apply the sunscreen about 20 minutes prior to sun exposure. Most sunscreens are chemical sunscreens and a reaction must take place in the skin so that they are effective. If they are applied and then you are immediately exposed to the sun or start sweating, this reaction has not had time to take place and you are therefore unprotected. Sunscreen needs to be reapplied every 2 hours if you are participating in water sports or sweating. We recommend Elta MD or CeraVe sunscreens for daily use; however there are many options and it is most important for you to find one that you will use on a consistent basis.    If you have sensitive skin, you may do best with a sunscreen that contains only physical blockers in the active ingredient section. The only physical blockers available in the [place] currently are titanium dioxide or zinc oxide. These are typically thicker and harder to apply, however they afford very good protection.  Neutrogena Sensitive Skin, Blue Lizard Sensitive Skin (pink top) or Neutrogena Pure and Free are popular ones.     Aside from sunscreen, clothes with UV protection (UPF), wide brimmed hats, and sunglasses are other means of sun protection that we recommend.      Based on a recent study (6/2021) and out of an abundance of caution, we are recommending that you AVOID the following sunscreens as they may contain the carcinogen, benzene:    Spray and gel sunscreens  Any [employer] or [employer] brands as well as Max Block and TopCare brands   Neutrogena Ultra Sheer Dry-touch Water Resistant Sunscreen LOTION SPF 70   Neutrogena Sheer Zinc Dry-touch Face Sunscreen LOTION SPF 50   5.   Aveeno Baby Continuous Protection Sensitive Skin Sunscreen LOTION - Broad Spectrum SPF 50    Please note that Benzene is not an ingredient or the degradation product of any ingredient in any sunscreen. This study suggested that the findings are a result of contamination in the manufacturing process. At this point, we don't know how effectively Benzene gets through the skin, if it gets absorbed systemically, and what effects it may have.     We do know that ultraviolet radiation is a well-established carcinogen. Please use daily sun protection/avoidance and use of at least SPF 30, broad-spectrum sunscreen not listed above.                       [facility] - DERMATOLOGY [address]TH [address]  [address]  [place] [address]  Dept: [phone]  Dept Fax: [phone]                                                                              CRYOSURGERY      Your doctor has used a method called cryosurgery to treat your skin condition. Cryosurgery refers to the use of very cold substances to treat a variety of skin conditions such as warts, pre-skin cancers, molluscum contagiosum, sun spots, and several benign growths. The substance we use in cryosurgery is liquid nitrogen and is so cold (-195 degrees Celsius) that is burns  when administered.     Following treatment in the office, the skin may immediately burn and become red. You may find the area around the lesion is affected as well. It is sometimes necessary to treat not only the lesion, but a small area of the surrounding normal skin to achieve a good response.     A blister, and even a blood filled blister, may form after treatment.   This is a normal response. If the blister is painful, it is acceptable to sterilize a needle and with rubbing alcohol and gently pop the blister. It is important that you gently wash the area with soap and warm water as the blister fluid may contain wart virus if a wart was treated. Do no remove the roof of the blister.     The area treated can take anywhere from 1-3 weeks to heal. Healing time depends on the kind skin lesion treated, the location, and how aggressively the lesion was treated. It is recommended that the areas treated are covered with Vaseline or bacitracin ointment and a band-aid. If a band-aid is not practical, just ointment applied several times per day will do. Keeping these areas moist will speed the healing time.    Treatment with liquid nitrogen can leave a scar. In dark skin, it may be a light or dark scar, in light skin it may be a white or pink scar. These will generally fade with time, but may never go away completely.     If you have any concerns after your treatment, please feel free to call the office.       [address] [place] [address]/ [phone] FAX/ [email or web address]  Shave Biopsy Wound Care    Your doctor has performed a shave biopsy today.  A band aid and vaseline ointment has been placed over the site.  This should remain in place for NO LONGER THAN 48 hours.  It is fine to remove the bandaid after 24 hours, if the area is no longer bleeding. It is recommended that you keep the area dry (do not wet)) for the first 24 hours.  After 24 hours, wash the area with warm soap and water and  apply Vaseline jelly.  Many patients prefer to use Neosporin or Bacitracin ointment.  This is acceptable; however, know that you can develop an allergy to this medication even if you have used it safely for years.  It is important to keep the area moist.  Letting it dry out and get air slows healing time, and will worsen the scar.        If you notice increasing redness, tenderness, pain, or yellow drainage at the biopsy site, please notify your doctor.  These are signs of an infection.    If your biopsy site is bleeding, apply firm pressure for 15 minutes straight.  Repeat for another 15 minutes, if it is still bleeding.   If the surgical site continues to bleed, then please contact your doctor.      For [facility] users:   You will receive your biopsy results in [facility] as soon as they are available. Please be assured that your physician/provider will review your results and will then determine what further treatment, evaluation, or planning is required. You should be contacted by your physician's/provider's office within 5 business days of receiving your results; If not, please reach out to directly. This is one more way [facility] is putting you first.     [facility] [place] [address]/ [phone] FAX/ [email or web address]

## 2022-12-01 LAB
FINAL PATHOLOGIC DIAGNOSIS: NORMAL
GROSS: NORMAL
Lab: NORMAL
MICROSCOPIC EXAM: NORMAL

## 2022-12-04 ENCOUNTER — PATIENT MESSAGE (OUTPATIENT)
Dept: DERMATOLOGY | Facility: CLINIC | Age: 77
End: 2022-12-04
Payer: MEDICARE

## 2022-12-04 DIAGNOSIS — D04.22 SQUAMOUS CELL CARCINOMA IN SITU (SCCIS) OF SKIN OF HELIX OF LEFT EAR: Primary | ICD-10-CM

## 2022-12-04 RX ORDER — FLUOROURACIL 50 MG/G
CREAM TOPICAL
Qty: 40 G | Refills: 1 | Status: SHIPPED | OUTPATIENT
Start: 2022-12-04

## 2022-12-12 ENCOUNTER — PATIENT MESSAGE (OUTPATIENT)
Dept: DERMATOLOGY | Facility: CLINIC | Age: 77
End: 2022-12-12
Payer: MEDICARE

## 2022-12-12 ENCOUNTER — HOSPITAL ENCOUNTER (OUTPATIENT)
Dept: RADIOLOGY | Facility: HOSPITAL | Age: 77
Discharge: HOME | End: 2022-12-12
Attending: INTERNAL MEDICINE
Payer: MEDICARE

## 2022-12-12 DIAGNOSIS — G40.219 PARTIAL EPILEPSY WITH IMPAIRMENT OF CONSCIOUSNESS, INTRACTABLE: ICD-10-CM

## 2022-12-12 DIAGNOSIS — E11.8 DM (DIABETES MELLITUS) WITH COMPLICATIONS: ICD-10-CM

## 2022-12-12 DIAGNOSIS — C22.0 HEPATOCELLULAR CARCINOMA: ICD-10-CM

## 2022-12-12 DIAGNOSIS — H91.93 DECREASED HEARING OF BOTH EARS: ICD-10-CM

## 2022-12-12 DIAGNOSIS — G40.909 SEIZURE DISORDER: ICD-10-CM

## 2022-12-12 DIAGNOSIS — Z96.22 PATENT PRESSURE EQUALIZATION (PE) TUBE ON LEFT SIDE: ICD-10-CM

## 2022-12-12 DIAGNOSIS — I15.2 HYPERTENSION ASSOCIATED WITH DIABETES: ICD-10-CM

## 2022-12-12 DIAGNOSIS — R15.2 FECAL URGENCY: ICD-10-CM

## 2022-12-12 DIAGNOSIS — E78.5 HYPERLIPIDEMIA ASSOCIATED WITH TYPE 2 DIABETES MELLITUS: ICD-10-CM

## 2022-12-12 DIAGNOSIS — E11.9 CONTROLLED TYPE 2 DIABETES MELLITUS, WITHOUT LONG-TERM CURRENT USE OF INSULIN: ICD-10-CM

## 2022-12-12 DIAGNOSIS — E11.69 HYPERLIPIDEMIA ASSOCIATED WITH TYPE 2 DIABETES MELLITUS: ICD-10-CM

## 2022-12-12 DIAGNOSIS — R42 POSITIONAL LIGHTHEADEDNESS: ICD-10-CM

## 2022-12-12 DIAGNOSIS — L08.9 SKIN INFECTION: Primary | ICD-10-CM

## 2022-12-12 DIAGNOSIS — R19.7 FREQUENT LOOSE STOOLS: ICD-10-CM

## 2022-12-12 DIAGNOSIS — E11.59 HYPERTENSION ASSOCIATED WITH DIABETES: ICD-10-CM

## 2022-12-12 DIAGNOSIS — K86.2 PANCREATIC CYST: ICD-10-CM

## 2022-12-12 DIAGNOSIS — E11.9 DM TYPE 2 WITHOUT RETINOPATHY: Primary | ICD-10-CM

## 2022-12-12 LAB
CREAT SERPL-MCNC: 1 MG/DL (ref 0.5–1.4)
SAMPLE: NORMAL

## 2022-12-12 PROCEDURE — 74183 MRI ABD W/O CNTR FLWD CNTR: CPT | Mod: TC

## 2022-12-12 PROCEDURE — 25500020 PHARM REV CODE 255: Performed by: INTERNAL MEDICINE

## 2022-12-12 PROCEDURE — A9585 GADOBUTROL INJECTION: HCPCS | Performed by: INTERNAL MEDICINE

## 2022-12-12 PROCEDURE — 74183 MRI ABDOMEN W WO CONTRAST: ICD-10-PCS | Mod: 26,HCNC,, | Performed by: RADIOLOGY

## 2022-12-12 PROCEDURE — 74183 MRI ABD W/O CNTR FLWD CNTR: CPT | Mod: 26,HCNC,, | Performed by: RADIOLOGY

## 2022-12-12 RX ORDER — GADOBUTROL 604.72 MG/ML
10 INJECTION INTRAVENOUS
Status: COMPLETED | OUTPATIENT
Start: 2022-12-12 | End: 2022-12-12

## 2022-12-12 RX ADMIN — GADOBUTROL 10 ML: 604.72 INJECTION INTRAVENOUS at 10:12

## 2022-12-14 RX ORDER — MUPIROCIN 20 MG/G
OINTMENT TOPICAL 2 TIMES DAILY
Qty: 22 G | Refills: 1 | Status: SHIPPED | OUTPATIENT
Start: 2022-12-14

## 2022-12-14 RX ORDER — DOXYCYCLINE 100 MG/1
100 CAPSULE ORAL 2 TIMES DAILY
Qty: 20 CAPSULE | Refills: 0 | Status: SHIPPED | OUTPATIENT
Start: 2022-12-14 | End: 2023-01-17 | Stop reason: ALTCHOICE

## 2022-12-14 NOTE — PROGRESS NOTES
Subjective:       Patient ID: Jamison Mayes is a 77 y.o. male.    Chief Complaint: HCC    HPI    He presented to the hospital on 03/01/2018 with sudden onset of cold sweats, nausea and abdominal discomfort and was noted to have an 8.7 cm mass in the lateral segment of the left hepatic lobe that has ruptured to the liver capsule.  He was monitored and underwent a left hepatic lobectomy with cholecystectomy on 03/05/2018.      Final pathology revealed a T1b NX 7.5 cm well-differentiated hepatocellular carcinoma without vascular invasion or perineural invasion.  The tumor appeared to be confined to the liver. Hepatitis serology negative.    A CT chest with contrast on 03/03/2018 revealed a 0.3 cm left upper lobe pulmonary nodule.    He lives in [facility].  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  - today, he is doing well. He denies shortness of breath, chest pain, nausea, vomiting, diarrhea, constipation.          Review of Systems   Constitutional:  Negative for fatigue, fever and unexpected weight change.   HENT:  Negative for sore throat.    Eyes:  Negative for visual disturbance.   Respiratory:  Negative for shortness of breath.    Cardiovascular:  Negative for chest pain.   Gastrointestinal:  Negative for abdominal pain.   Genitourinary:  Negative for dysuria.   Musculoskeletal:  Negative for back pain.   Skin:  Negative for rash.   Neurological:  Negative for headaches.   Hematological:  Negative for adenopathy.   Psychiatric/Behavioral:  The patient is not nervous/anxious.        Objective:      Vitals:    12/15/22 1417   BP: (!) 169/72   BP Location: Left arm   Patient Position: Sitting   BP Method: Medium (Automatic)   Pulse: 83   Resp: 20   Temp: 97.8 °F (36.6 °C)   TempSrc: Oral   SpO2: 97%   Weight: 81.6 kg (179 lb 14.3  "oz)   Height: 5' 6" (1.676 m)       BMI: Body mass index is 29.04 kg/m².    Physical Exam  Vitals and nursing note reviewed.   Constitutional:       Appearance: He is well-developed.   HENT:      Head: Normocephalic and atraumatic.   Eyes:      Pupils: Pupils are equal, round, and reactive to light.   Cardiovascular:      Rate and Rhythm: Normal rate and regular rhythm.   Pulmonary:      Effort: Pulmonary effort is normal.      Breath sounds: Normal breath sounds.   Abdominal:      General: Bowel sounds are normal.      Palpations: Abdomen is soft.   Musculoskeletal:         General: Normal range of motion.      Cervical back: Normal range of motion and neck supple.   Skin:     General: Skin is warm and dry.   Neurological:      Mental Status: He is alert and oriented to person, place, and time.   Psychiatric:         Behavior: Behavior normal.         Thought Content: Thought content normal.         Judgment: Judgment normal.      Labs have been reviewed.    Lab Results   Component Value Date    WBC 9.85 12/12/2022    HGB 12.3 (L) 12/12/2022    HCT 37.0 (L) 12/12/2022    MCV 92 12/12/2022     12/12/2022         Imaging:  MRI abdomen (12/12/22): I have personally reviewed the images  MRI abdomen with and without contrast dated 12/14/2021     FINDINGS:  Pulmonary Bases: No pleural effusion.     Liver: Prior postsurgical change of left hepatectomy.  Similar heterogeneous T2 area along the margin of the anterior right lobe measuring 2.5 x 1 6 cm, previously 2.9 x 2.0 cm (series 5, image 11).  No abnormal intrahepatic diffusion restriction.  No suspicious enhancing lesion or washout.  Right portal vein and main portal vein are patent.     Bile Ducts: Some focally dilated ducts near the hepatectomy margin, similar.  No extrahepatic biliary dilatation.     Gallbladder: Absent     Pancreas: Cystic lesion at the pancreatic tail with smooth enhancing septation measuring 1.5 cm, previously 1.1 cm (series 5, image 17).  " No main pancreatic ductal dilatation.     Spleen: normal.     Proximal Gastrointestinal tract: Small hiatal hernia.  Normal upper GI tract caliber scattered colonic diverticulosis.     Mesentery: No discrete mesenteric mass.     Adrenal Glands: normal.     Kidneys: Enhance symmetrically without hydronephrosis     Aorta and Abdominal Vasculature: Incidental left-sided IVC anatomic variant.  Aorta is patent.     Lymph nodes: None pathologically enlarged     Body wall: Prior treatment change of the anterior abdominal wall.  No acute abnormality.     Other: No upper abdominal ascites.  No suspicious marrow enhancing lesion.     Impression:     Post treatment change of prior left hepatectomy.  No evidence for HCC.  Stable heterogeneous T2 structure along the anterior right lobe margin, again possible chronic postoperative collection.     Pancreatic cystic lesion at the tail with enhancing smooth septation and measures slightly larger in the interval.  Differential to include component of IPMN or other cystic neoplasm.  No main pancreatic ductal dilatation.  Further correlation advised.     Additional findings as above.    Assessment:       1. Hepatocellular carcinoma    2. Pancreatic cyst    3. Left upper lobe pulmonary nodule        Plan:     Hepatocellular carcinoma  - he had previously seen Dr. Haney.  - he had a T1b NX hepatocellular carcinoma that was resected March 2018  - Labs have been reviewed.  - MRI abdomen (12/12/22) revealed no evidence of hepatocellular carcinoma recurrence. See below regarding pancreatic cyst.  - I have messaged surgery for their opinion regarding the pancreatic cyst. Follow-up surveillance will depend on how to proceed with pancreatic cyst evaluation.  - I will contact patient after I hear back from surgery regarding the pancreatic cyst. Follow-up plan will be made at that time.    Pancreatic cyst  - MRI abdomen (12/12/22) revealed an increase in size of cystic lesion from 1.1 cm to 1.5  cm  - I have messaged surgery for their opinion regarding the pancreatic cyst. Follow-up surveillance will depend on how to proceed with pancreatic cyst evaluation.    - I will contact patient after I hear back from surgery regarding the pancreatic cyst. Follow-up plan will be made at that time.    Humza Simmons M.D.  Hematology/Oncology  [facility] - [address], [address]  [place] [address]  Phone: [phone]  Fax: [phone]

## 2022-12-15 ENCOUNTER — OFFICE VISIT (OUTPATIENT)
Dept: HEMATOLOGY/ONCOLOGY | Facility: CLINIC | Age: 77
End: 2022-12-15
Payer: MEDICARE

## 2022-12-15 VITALS
OXYGEN SATURATION: 97 % | HEIGHT: 66 IN | SYSTOLIC BLOOD PRESSURE: 169 MMHG | DIASTOLIC BLOOD PRESSURE: 72 MMHG | RESPIRATION RATE: 20 BRPM | WEIGHT: 179.88 LBS | TEMPERATURE: 98 F | BODY MASS INDEX: 28.91 KG/M2 | HEART RATE: 83 BPM

## 2022-12-15 DIAGNOSIS — R91.1 LEFT UPPER LOBE PULMONARY NODULE: ICD-10-CM

## 2022-12-15 DIAGNOSIS — C22.0 HEPATOCELLULAR CARCINOMA: Primary | ICD-10-CM

## 2022-12-15 DIAGNOSIS — K86.2 PANCREATIC CYST: ICD-10-CM

## 2022-12-15 PROCEDURE — 1101F PR PT FALLS ASSESS DOC 0-1 FALLS W/OUT INJ PAST YR: ICD-10-PCS | Mod: HCNC,CPTII,S$GLB, | Performed by: INTERNAL MEDICINE

## 2022-12-15 PROCEDURE — 99999 PR PBB SHADOW E&M-EST. PATIENT-LVL III: ICD-10-PCS | Mod: PBBFAC,HCNC,, | Performed by: INTERNAL MEDICINE

## 2022-12-15 PROCEDURE — 99499 RISK ADDL DX/OHS AUDIT: ICD-10-PCS | Mod: HCNC,S$GLB,, | Performed by: INTERNAL MEDICINE

## 2022-12-15 PROCEDURE — 1160F RVW MEDS BY RX/DR IN RCRD: CPT | Mod: HCNC,CPTII,S$GLB, | Performed by: INTERNAL MEDICINE

## 2022-12-15 PROCEDURE — 3288F FALL RISK ASSESSMENT DOCD: CPT | Mod: HCNC,CPTII,S$GLB, | Performed by: INTERNAL MEDICINE

## 2022-12-15 PROCEDURE — 3078F DIAST BP <80 MM HG: CPT | Mod: HCNC,CPTII,S$GLB, | Performed by: INTERNAL MEDICINE

## 2022-12-15 PROCEDURE — 1101F PT FALLS ASSESS-DOCD LE1/YR: CPT | Mod: HCNC,CPTII,S$GLB, | Performed by: INTERNAL MEDICINE

## 2022-12-15 PROCEDURE — 3288F PR FALLS RISK ASSESSMENT DOCUMENTED: ICD-10-PCS | Mod: HCNC,CPTII,S$GLB, | Performed by: INTERNAL MEDICINE

## 2022-12-15 PROCEDURE — 99214 PR OFFICE/OUTPT VISIT, EST, LEVL IV, 30-39 MIN: ICD-10-PCS | Mod: HCNC,S$GLB,, | Performed by: INTERNAL MEDICINE

## 2022-12-15 PROCEDURE — 1160F PR REVIEW ALL MEDS BY PRESCRIBER/CLIN PHARMACIST DOCUMENTED: ICD-10-PCS | Mod: HCNC,CPTII,S$GLB, | Performed by: INTERNAL MEDICINE

## 2022-12-15 PROCEDURE — 99999 PR PBB SHADOW E&M-EST. PATIENT-LVL III: CPT | Mod: PBBFAC,HCNC,, | Performed by: INTERNAL MEDICINE

## 2022-12-15 PROCEDURE — 1159F MED LIST DOCD IN RCRD: CPT | Mod: HCNC,CPTII,S$GLB, | Performed by: INTERNAL MEDICINE

## 2022-12-15 PROCEDURE — 3077F PR MOST RECENT SYSTOLIC BLOOD PRESSURE >= 140 MM HG: ICD-10-PCS | Mod: HCNC,CPTII,S$GLB, | Performed by: INTERNAL MEDICINE

## 2022-12-15 PROCEDURE — 3077F SYST BP >= 140 MM HG: CPT | Mod: HCNC,CPTII,S$GLB, | Performed by: INTERNAL MEDICINE

## 2022-12-15 PROCEDURE — 1159F PR MEDICATION LIST DOCUMENTED IN MEDICAL RECORD: ICD-10-PCS | Mod: HCNC,CPTII,S$GLB, | Performed by: INTERNAL MEDICINE

## 2022-12-15 PROCEDURE — 1126F AMNT PAIN NOTED NONE PRSNT: CPT | Mod: HCNC,CPTII,S$GLB, | Performed by: INTERNAL MEDICINE

## 2022-12-15 PROCEDURE — 99499 UNLISTED E&M SERVICE: CPT | Mod: HCNC,S$GLB,, | Performed by: INTERNAL MEDICINE

## 2022-12-15 PROCEDURE — 3078F PR MOST RECENT DIASTOLIC BLOOD PRESSURE < 80 MM HG: ICD-10-PCS | Mod: HCNC,CPTII,S$GLB, | Performed by: INTERNAL MEDICINE

## 2022-12-15 PROCEDURE — 99214 OFFICE O/P EST MOD 30 MIN: CPT | Mod: HCNC,S$GLB,, | Performed by: INTERNAL MEDICINE

## 2022-12-15 PROCEDURE — 1126F PR PAIN SEVERITY QUANTIFIED, NO PAIN PRESENT: ICD-10-PCS | Mod: HCNC,CPTII,S$GLB, | Performed by: INTERNAL MEDICINE

## 2022-12-15 NOTE — Clinical Note
Good afternoon, Can you review this chart and give me your thoughts? He has a history of hepatocellular carcinoma s/p resection in 2018. Had a mri abdomen recently. He has this cystic lesion in his pancreas that has gotten somewhat larger (measuring 1.5 cm). Not sure what it is (maybe IPMN?) can you give me your thoughts? Does he need to be plugged in with you?  Thanks!

## 2022-12-16 ENCOUNTER — TELEPHONE (OUTPATIENT)
Dept: HEMATOLOGY/ONCOLOGY | Facility: CLINIC | Age: 77
End: 2022-12-16
Payer: MEDICARE

## 2022-12-16 DIAGNOSIS — K86.2 PANCREATIC CYST: Primary | ICD-10-CM

## 2022-12-16 NOTE — TELEPHONE ENCOUNTER
I called and left a voicemail. We will refer to pancreatic cyst program for evaluation of his pancreatic cyst.    Humza Simmons M.D.  Hematology/Oncology  [facility] - [address], [address]  [place] [address]  Phone: [phone]  Fax: [phone]

## 2022-12-22 ENCOUNTER — OFFICE VISIT (OUTPATIENT)
Dept: GASTROENTEROLOGY | Facility: CLINIC | Age: 77
End: 2022-12-22
Payer: MEDICARE

## 2022-12-22 VITALS
DIASTOLIC BLOOD PRESSURE: 65 MMHG | WEIGHT: 179.13 LBS | OXYGEN SATURATION: 99 % | HEIGHT: 66 IN | HEART RATE: 76 BPM | SYSTOLIC BLOOD PRESSURE: 151 MMHG | BODY MASS INDEX: 28.79 KG/M2

## 2022-12-22 DIAGNOSIS — K86.2 PANCREAS CYST: Primary | ICD-10-CM

## 2022-12-22 PROCEDURE — 1126F AMNT PAIN NOTED NONE PRSNT: CPT | Mod: HCNC,CPTII,S$GLB,

## 2022-12-22 PROCEDURE — 1159F PR MEDICATION LIST DOCUMENTED IN MEDICAL RECORD: ICD-10-PCS | Mod: HCNC,CPTII,S$GLB,

## 2022-12-22 PROCEDURE — 3077F SYST BP >= 140 MM HG: CPT | Mod: HCNC,CPTII,S$GLB,

## 2022-12-22 PROCEDURE — 99999 PR PBB SHADOW E&M-EST. PATIENT-LVL IV: ICD-10-PCS | Mod: PBBFAC,HCNC,,

## 2022-12-22 PROCEDURE — 3288F FALL RISK ASSESSMENT DOCD: CPT | Mod: HCNC,CPTII,S$GLB,

## 2022-12-22 PROCEDURE — 99999 PR PBB SHADOW E&M-EST. PATIENT-LVL IV: CPT | Mod: PBBFAC,HCNC,,

## 2022-12-22 PROCEDURE — 1159F MED LIST DOCD IN RCRD: CPT | Mod: HCNC,CPTII,S$GLB,

## 2022-12-22 PROCEDURE — 1101F PR PT FALLS ASSESS DOC 0-1 FALLS W/OUT INJ PAST YR: ICD-10-PCS | Mod: HCNC,CPTII,S$GLB,

## 2022-12-22 PROCEDURE — 3078F DIAST BP <80 MM HG: CPT | Mod: HCNC,CPTII,S$GLB,

## 2022-12-22 PROCEDURE — 3288F PR FALLS RISK ASSESSMENT DOCUMENTED: ICD-10-PCS | Mod: HCNC,CPTII,S$GLB,

## 2022-12-22 PROCEDURE — 1101F PT FALLS ASSESS-DOCD LE1/YR: CPT | Mod: HCNC,CPTII,S$GLB,

## 2022-12-22 PROCEDURE — 1126F PR PAIN SEVERITY QUANTIFIED, NO PAIN PRESENT: ICD-10-PCS | Mod: HCNC,CPTII,S$GLB,

## 2022-12-22 PROCEDURE — 99204 PR OFFICE/OUTPT VISIT, NEW, LEVL IV, 45-59 MIN: ICD-10-PCS | Mod: HCNC,S$GLB,,

## 2022-12-22 PROCEDURE — 3078F PR MOST RECENT DIASTOLIC BLOOD PRESSURE < 80 MM HG: ICD-10-PCS | Mod: HCNC,CPTII,S$GLB,

## 2022-12-22 PROCEDURE — 99204 OFFICE O/P NEW MOD 45 MIN: CPT | Mod: HCNC,S$GLB,,

## 2022-12-22 PROCEDURE — 3077F PR MOST RECENT SYSTOLIC BLOOD PRESSURE >= 140 MM HG: ICD-10-PCS | Mod: HCNC,CPTII,S$GLB,

## 2022-12-22 NOTE — PATIENT INSTRUCTIONS
Our  will call you to set up your Endoscopy.  No eating/drinking after midnight the night before your procedure.  You will need someone to drive you home after your procedure as you will be receiving anesthesia  Please contact our office for any questions/concerns regarding your upcoming procedure.       Pancreatic Cysts Are Surprisingly Common and Usually Harmless  The pancreas ([id number]) is an organ that sits behind the stomach. It releases different chemicals into the body that help digest food so you can get the energy and nutrients you need to survive. For example, the pancreas makes insulin, which is a chemical that turns the sugar from an apple or a bowl of ice cream into energy that your body can use.  A pancreatic cyst is a small sac of fluid that grows on or inside of the pancreas. A single cyst may appear or several may grow together. The chance of having pancreatic cysts increases with age. In fact, up to 25% of Americans over age 70 have them.    Pancreatic cysts are usually harmless, but they may cause symptoms like abdominal pain in some patients. Very rarely, pancreatic cysts can become cancerous over time. Most people, however, feel fine and do not even know they have them. Many people only find out when they go in for a scan of their abdomen - such as an MRI - for a different reason, such as a stomach ache.  As more and more people get MRIs and other scans done, more people are being told they have pancreatic cysts. Luckily, the chance of these cysts being cancerous is very small. Out of every 100,000 people who find out they have pancreatic cysts on an MRI, only 25 of them will have cancerous cysts.    Most People With Pancreatic Cysts Do Not Need Surgery  When you first find out you have pancreatic cysts, it is normal to want to get them removed just to be safe. However, it is important to realize that surgery of the pancreas - like any major surgery - comes with risks. Around 30%  of patients who get part of their pancreas removed will have serious side effects, including:  Bleeding, leaking, or an infection inside of the body  Injury to other organs, such as the spleen, stomach, colon, or small intestine  Diabetes  Serious weight loss due to low appetite and slower digestion    For patients with cysts that are more likely to be cancerous, the risks of surgery may be outweighed by the benefit of treating symptoms or preventing cancer. However, most patients have lower-risk cysts.    Talk With Your Doctor to Understand Your Situation and Create a Plan  When you first learn you have one or more pancreatic cysts, you may be surprised, confused, and uncertain. Discussing your concerns and asking the right questions of your doctor can help you understand your unique situation and find a plan that works best for you.  What are the risk factors for pancreatic cancer? Is my risk higher than normal?  Am I able to safely get an MRI scan? If not, are there other options available?  Given my risk level, how often should I get an MRI scan or other tests?  If I ever need surgery, where is the closest medical center that performs many pancreatic operations and has the best results?

## 2022-12-22 NOTE — PROGRESS NOTES
[facility]    Reason for Visit:  The encounter diagnosis was Pancreas cyst.    PCP:   Yossi Peck   [address] / [place] [address]      Initial HPI   This is a 77 y.o. male presenting for pancreatic cyst. MRI 12/12/22 exhibited 1.5cm pancreatic cyst without PD dilation, previously measuring 1.1cm 12 months prior.     Tobacco use- n/a  ETOH intake- n/a  NSAID use- as needed  Blood thinners-  ASA 81mg    ROS:  Review of Systems   Constitutional:  Negative for chills, diaphoresis, fever and weight loss.   Eyes:  Negative for discharge and redness.   Respiratory:  Negative for shortness of breath.    Cardiovascular:  Negative for chest pain and palpitations.   Gastrointestinal:  Negative for abdominal pain, blood in stool, diarrhea, nausea and vomiting.   Skin:  Negative for rash.   Neurological:  Negative for tingling and loss of consciousness.      Medical History:  has a past medical history of AK (actinic keratosis) (PDT scalp 2/2015 and 3/2015), Arthritis, Diabetes mellitus type II, Fever blister, Hepatocellular carcinoma (03/05/2018), Hypertension, Joint pain, SCC (squamous cell carcinoma) (3/2013), SCC (squamous cell carcinoma) (excised ), Seizures, Squamous Cell Carcinoma (3/2013), and Squamous Cell Carcinoma (3/2013).    Surgical History:  has a past surgical history that includes Eye surgery; skin carcinoma removal; Liver resection (Left, 03/05/2018); Cholecystectomy (03/05/2018); and Retinal detachment surgery.    Family History: family history includes Diabetes in his father; Hypertension in his mother; Vision loss in his sister..       Review of patient's allergies indicates:  No Known Allergies    Current Outpatient Medications on File Prior to Visit   Medication Sig Dispense Refill    ACCU-CHEK ROSE PLUS METER [facility] USE AS DIRECTED 1 each 0    aspirin (ECOTRIN) 81 MG EC tablet Take 81 mg by mouth. 1 Tablet, Delayed Release (E.C.) Oral Every day       blood sugar diagnostic Strp To check BG 2 times daily, to use with accu check guide 100 each 11    calcium carbonate (OS-RUSLAN) 500 mg calcium (1,250 mg) chewable tablet Take 1 tablet by mouth once daily.      cyanocobalamin (VITAMIN B-12) 250 MCG tablet Take 1 tablet (250 mcg total) by mouth once daily.      doxycycline (MONODOX) 100 MG capsule Take 1 capsule (100 mg total) by mouth 2 (two) times daily. Take with food and water. Remain upright x 1 hr after taking. 20 capsule 0    fluorouraciL (EFUDEX) 5 % cream AAA on L ear BID x 4-6 weeks. Stop if blistered, oozing, or bleeding. Use daily sun protection. 40 g 1    fluticasone (FLONASE) 50 mcg/actuation nasal spray 2 sprays (100 mcg total) by Each Nare route once daily. 1 Bottle 12    FLUZONE HIGHDOSE QUAD 21-22  mcg/0.7 mL Syrg       GADAVIST 10 mmol/10 mL (1 mmol/mL) Soln injection       glimepiride (AMARYL) 4 MG tablet Take 1 tablet (4 mg total) by mouth before breakfast. 90 tablet 3    imiquimod (ALDARA) 5 % cream AAA 5 nights/week x 4 - 6 weeks. Wash off in am. Stop if blistering, bleeding, oozing, etc. Do not use >1 packet per night. 24 packet 1    irbesartan (AVAPRO) 300 MG tablet Take 1 tablet (300 mg total) by mouth every evening. 90 tablet 3    ketoconazole (NIZORAL) 2 % cream AAA BID 60 g 3    ketoconazole (NIZORAL) 2 % shampoo Wash hair with medicated shampoo at least 2x/week - let sit on scalp at least 5 minutes prior to rinsing 120 mL 5    lancets 28 gauge Misc 1 lancet by Misc.(Non-Drug; Combo Route) route 2 (two) times daily. 100 each 11    levocetirizine (XYZAL) 5 MG tablet Take 1 tablet (5 mg total) by mouth every evening. 30 tablet 11    lovastatin (MEVACOR) 20 MG tablet Take 1 tablet (20 mg total) by mouth every evening. 90 tablet 3    metFORMIN (GLUCOPHAGE-XR) 500 MG ER 24hr tablet Take 1 tablet (500 mg total) by mouth 2 (two) times daily with meals. 180 tablet 3    MULTIVITAMIN WITH MINERALS (ONE-A-DAY 50 PLUS) Tab Take by mouth. 1 Tablet  "Oral Every morning      mupirocin (BACTROBAN) 2 % ointment Apply topically 2 (two) times daily. 22 g 1     No current facility-administered medications on file prior to visit.         Objective Findings:    Vital Signs:  BP (!) 151/65   Pulse 76   Ht 5' 6" (1.676 m)   Wt 81.2 kg (179 lb 1.6 oz)   SpO2 99%   BMI 28.91 kg/m²   Body mass index is 28.91 kg/m².    Physical Exam:  Physical Exam  Constitutional:       General: He is not in acute distress.     Appearance: He is not toxic-appearing or diaphoretic.   Eyes:      General: No scleral icterus.  Cardiovascular:      Rate and Rhythm: Normal rate and regular rhythm.   Pulmonary:      Effort: Pulmonary effort is normal. No respiratory distress.   Abdominal:      General: Abdomen is flat. Bowel sounds are normal. There is no distension.      Palpations: Abdomen is soft. There is no mass.      Tenderness: There is no abdominal tenderness. There is no guarding or rebound.   Neurological:      Mental Status: He is oriented to person, place, and time.           Labs:  Lab Results   Component Value Date    WBC 9.85 12/12/2022    HGB 12.3 (L) 12/12/2022    HCT 37.0 (L) 12/12/2022     12/12/2022    CHOL 117 (L) 12/21/2021    TRIG 106 12/21/2021    HDL 43 12/21/2021    ALKPHOS 61 12/12/2022    LIPASE 35 03/01/2018    ALT 13 12/12/2022    AST 24 12/12/2022     12/12/2022    K 5.1 12/12/2022     12/12/2022    CREATININE 1.0 12/12/2022    BUN 17 12/12/2022    CO2 26 12/12/2022    TSH 2.970 12/21/2021    PSA 1.4 12/30/2020    INR 0.9 03/08/2018    HGBA1C 7.8 (H) 06/28/2022       Imaging reviewed:   12/12/22 MRI Abdomen W WO Contrast  FINDINGS:  Pulmonary Bases: No pleural effusion.     Liver: Prior postsurgical change of left hepatectomy.  Similar heterogeneous T2 area along the margin of the anterior right lobe measuring 2.5 x 1 6 cm, previously 2.9 x 2.0 cm (series 5, image 11).  No abnormal intrahepatic diffusion restriction.  No suspicious enhancing " lesion or washout.  Right portal vein and main portal vein are patent.     Bile Ducts: Some focally dilated ducts near the hepatectomy margin, similar.  No extrahepatic biliary dilatation.     Gallbladder: Absent     Pancreas: Cystic lesion at the pancreatic tail with smooth enhancing septation measuring 1.5 cm, previously 1.1 cm (series 5, image 17).  No main pancreatic ductal dilatation.     Spleen: normal.     Proximal Gastrointestinal tract: Small hiatal hernia.  Normal upper GI tract caliber scattered colonic diverticulosis.     Mesentery: No discrete mesenteric mass.     Adrenal Glands: normal.     Kidneys: Enhance symmetrically without hydronephrosis     Aorta and Abdominal Vasculature: Incidental left-sided IVC anatomic variant.  Aorta is patent.     Lymph nodes: None pathologically enlarged     Body wall: Prior treatment change of the anterior abdominal wall.  No acute abnormality.     Other: No upper abdominal ascites.  No suspicious marrow enhancing lesion.     Impression:     Post treatment change of prior left hepatectomy.  No evidence for HCC.  Stable heterogeneous T2 structure along the anterior right lobe margin, again possible chronic postoperative collection.     Pancreatic cystic lesion at the tail with enhancing smooth septation and measures slightly larger in the interval.  Differential to include component of IPMN or other cystic neoplasm.  No main pancreatic ductal dilatation.  Further correlation advised.     12/14/21 MRI Abdomen W WO Contrast  FINDINGS:  Status post left hepatectomy.     There is a stable postsurgical fluid collection with a small likely a seroma along the serosal surface of the anterior right lobe liver, 2.8 x 2 cm.     The right lobe liver demonstrates normal homogeneous signal intensity and postcontrast enhancement.  Negative for liver mass.     Spleen, adrenal glands, and kidneys are normal.  There is a stable 1.1 cm nonenhancing pancreatic tail cyst.  Again, there is a  left-sided IVC, anatomic variant.     Normal caliber aorta.  There is no retroperitoneal or intra-abdominal lymphadenopathy.     Cholecystectomy.  There is no biliary ductal dilatation.  Normal pancreatic duct.     The visualized portion of the GI tract is unremarkable.  There is mild levoscoliosis lumbar spine.  Multilevel spondylosis.     Impression:     1.  Stable MR abdomen.     2.  Status post left hepatectomy with history of HCC.  Negative for liver mass.  Stable small postsurgical fluid collection serosal surface right lobe liver.  No adenopathy.     3.  Stable pancreatic tail cyst, 1.1 cm.     4.  Cholecystectomy.        Endoscopy reviewed:  N/A    Assessment:  1. Pancreas cyst             Recommendations:  EUS +/- FNA of pancreatic cyst within 8 weeks. Follow up pending EUS findings          Thank you so much for allowing me to participate in the care of Jamison Crowe, MSN, FNP-C  Advanced Endoscopy Nurse Practitioner  [facility]             Ruptured membranes

## 2022-12-27 ENCOUNTER — PATIENT OUTREACH (OUTPATIENT)
Dept: ADMINISTRATIVE | Facility: HOSPITAL | Age: 77
End: 2022-12-27
Payer: MEDICARE

## 2022-12-27 ENCOUNTER — PATIENT MESSAGE (OUTPATIENT)
Dept: ADMINISTRATIVE | Facility: HOSPITAL | Age: 77
End: 2022-12-27
Payer: MEDICARE

## 2022-12-30 ENCOUNTER — TELEPHONE (OUTPATIENT)
Dept: HEMATOLOGY/ONCOLOGY | Facility: CLINIC | Age: 77
End: 2022-12-30
Payer: MEDICARE

## 2022-12-30 ENCOUNTER — PATIENT MESSAGE (OUTPATIENT)
Dept: DERMATOLOGY | Facility: CLINIC | Age: 77
End: 2022-12-30
Payer: MEDICARE

## 2022-12-30 NOTE — TELEPHONE ENCOUNTER
I called and left a voicemail. He is being followed in advanced endoscopy clinic regarding the pancreatic cyst. I will set up a virtual appt in 3 months to review results of upcoming EUS and discuss when to repeat mri for surveillance of his hepatocellular carcinoma.    Humza Simmons M.D.  Hematology/Oncology  [facility] [address], [address]  [place] [address]  Phone: [phone]  Fax: [phone]

## 2023-01-03 ENCOUNTER — PATIENT MESSAGE (OUTPATIENT)
Dept: GASTROENTEROLOGY | Facility: CLINIC | Age: 78
End: 2023-01-03
Payer: MEDICARE

## 2023-01-03 ENCOUNTER — TELEPHONE (OUTPATIENT)
Dept: HEMATOLOGY/ONCOLOGY | Facility: CLINIC | Age: 78
End: 2023-01-03
Payer: MEDICARE

## 2023-01-03 ENCOUNTER — PATIENT MESSAGE (OUTPATIENT)
Dept: DERMATOLOGY | Facility: CLINIC | Age: 78
End: 2023-01-03
Payer: MEDICARE

## 2023-01-06 ENCOUNTER — PATIENT MESSAGE (OUTPATIENT)
Dept: ENDOSCOPY | Facility: HOSPITAL | Age: 78
End: 2023-01-06
Payer: MEDICARE

## 2023-01-06 ENCOUNTER — TELEPHONE (OUTPATIENT)
Dept: ENDOSCOPY | Facility: HOSPITAL | Age: 78
End: 2023-01-06
Payer: MEDICARE

## 2023-01-06 ENCOUNTER — OFFICE VISIT (OUTPATIENT)
Dept: DERMATOLOGY | Facility: CLINIC | Age: 78
End: 2023-01-06
Payer: MEDICARE

## 2023-01-06 DIAGNOSIS — L08.9 SKIN INFECTION: Primary | ICD-10-CM

## 2023-01-06 PROCEDURE — 99999 PR PBB SHADOW E&M-EST. PATIENT-LVL III: ICD-10-PCS | Mod: PBBFAC,HCNC,, | Performed by: DERMATOLOGY

## 2023-01-06 PROCEDURE — 1159F PR MEDICATION LIST DOCUMENTED IN MEDICAL RECORD: ICD-10-PCS | Mod: HCNC,CPTII,S$GLB, | Performed by: DERMATOLOGY

## 2023-01-06 PROCEDURE — 1160F PR REVIEW ALL MEDS BY PRESCRIBER/CLIN PHARMACIST DOCUMENTED: ICD-10-PCS | Mod: HCNC,CPTII,S$GLB, | Performed by: DERMATOLOGY

## 2023-01-06 PROCEDURE — 1126F AMNT PAIN NOTED NONE PRSNT: CPT | Mod: HCNC,CPTII,S$GLB, | Performed by: DERMATOLOGY

## 2023-01-06 PROCEDURE — 1101F PR PT FALLS ASSESS DOC 0-1 FALLS W/OUT INJ PAST YR: ICD-10-PCS | Mod: HCNC,CPTII,S$GLB, | Performed by: DERMATOLOGY

## 2023-01-06 PROCEDURE — 1159F MED LIST DOCD IN RCRD: CPT | Mod: HCNC,CPTII,S$GLB, | Performed by: DERMATOLOGY

## 2023-01-06 PROCEDURE — 87186 SC STD MICRODIL/AGAR DIL: CPT | Mod: HCNC | Performed by: DERMATOLOGY

## 2023-01-06 PROCEDURE — 87077 CULTURE AEROBIC IDENTIFY: CPT | Mod: HCNC | Performed by: DERMATOLOGY

## 2023-01-06 PROCEDURE — 1101F PT FALLS ASSESS-DOCD LE1/YR: CPT | Mod: HCNC,CPTII,S$GLB, | Performed by: DERMATOLOGY

## 2023-01-06 PROCEDURE — 1160F RVW MEDS BY RX/DR IN RCRD: CPT | Mod: HCNC,CPTII,S$GLB, | Performed by: DERMATOLOGY

## 2023-01-06 PROCEDURE — 1126F PR PAIN SEVERITY QUANTIFIED, NO PAIN PRESENT: ICD-10-PCS | Mod: HCNC,CPTII,S$GLB, | Performed by: DERMATOLOGY

## 2023-01-06 PROCEDURE — 3288F PR FALLS RISK ASSESSMENT DOCUMENTED: ICD-10-PCS | Mod: HCNC,CPTII,S$GLB, | Performed by: DERMATOLOGY

## 2023-01-06 PROCEDURE — 99212 OFFICE O/P EST SF 10 MIN: CPT | Mod: HCNC,S$GLB,, | Performed by: DERMATOLOGY

## 2023-01-06 PROCEDURE — 87070 CULTURE OTHR SPECIMN AEROBIC: CPT | Mod: HCNC | Performed by: DERMATOLOGY

## 2023-01-06 PROCEDURE — 99999 PR PBB SHADOW E&M-EST. PATIENT-LVL III: CPT | Mod: PBBFAC,HCNC,, | Performed by: DERMATOLOGY

## 2023-01-06 PROCEDURE — 99212 PR OFFICE/OUTPT VISIT, EST, LEVL II, 10-19 MIN: ICD-10-PCS | Mod: HCNC,S$GLB,, | Performed by: DERMATOLOGY

## 2023-01-06 PROCEDURE — 3288F FALL RISK ASSESSMENT DOCD: CPT | Mod: HCNC,CPTII,S$GLB, | Performed by: DERMATOLOGY

## 2023-01-06 RX ORDER — SILVER SULFADIAZINE 10 G/1000G
CREAM TOPICAL 2 TIMES DAILY
Qty: 25 G | Refills: 2 | Status: SHIPPED | OUTPATIENT
Start: 2023-01-06

## 2023-01-06 NOTE — TELEPHONE ENCOUNTER
Telephoned pt to schedule EUS.  Spoke with pt's wife, Tricia, and procedure scheduled for 1/11/23 at 9:30am at [facility].  Reviewed medical history and medications.  Pt has h/o seizures, however Tricia states he has not had a seizure in >10 years, and not longer takes seizures medication.  Instructed on procedure and preparation.  Tricia verbalized understanding.  Copy of instructions sent via patient portal.

## 2023-01-06 NOTE — PROGRESS NOTES
Subjective:       Patient ID:  Jamison Mayes is a 77 y.o. male who presents for   Chief Complaint   Patient presents with    Lesion     L ear     Lesion    Pt here today for a non healing bx site on his L ear. Area is red, swollen, and tender . Pt was last seen on 11/18/2022 which is the date of the bx . Pt had sent a message through the portal about his ear and was prescribed Doxy as well as mupirocin with no improvement. He is currently using Aquaphor.     Review of Systems   Constitutional:  Negative for fever, chills, weight loss, weight gain, fatigue, night sweats and malaise.   Skin:  Positive for activity-related sunscreen use. Negative for daily sunscreen use and recent sunburn.   Hematologic/Lymphatic: Bruises/bleeds easily.      Objective:    Physical Exam   Skin:   Areas Examined (abnormalities noted in diagram):   Head / Face Inspection Performed            Diagram Legend     Erythematous scaling macule/papule c/w actinic keratosis       Vascular papule c/w angioma      Pigmented verrucoid papule/plaque c/w seborrheic keratosis      Yellow umbilicated papule c/w sebaceous hyperplasia      Irregularly shaped tan macule c/w lentigo     1-2 mm smooth white papules consistent with Milia      Movable subcutaneous cyst with punctum c/w epidermal inclusion cyst      Subcutaneous movable cyst c/w pilar cyst      Firm pink to brown papule c/w dermatofibroma      Pedunculated fleshy papule(s) c/w skin tag(s)      Evenly pigmented macule c/w junctional nevus     Mildly variegated pigmented, slightly irregular-bordered macule c/w mildly atypical nevus      Flesh colored to evenly pigmented papule c/w intradermal nevus       Pink pearly papule/plaque c/w basal cell carcinoma      Erythematous hyperkeratotic cursted plaque c/w SCC      Surgical scar with no sign of skin cancer recurrence      Open and closed comedones      Inflammatory papules and pustules      Verrucoid papule consistent consistent with wart      Erythematous eczematous patches and plaques     Dystrophic onycholytic nail with subungual debris c/w onychomycosis     Umbilicated papule    Erythematous-base heme-crusted tan verrucoid plaque consistent with inflamed seborrheic keratosis     Erythematous Silvery Scaling Plaque c/w Psoriasis     See annotation      Assessment / Plan:        Skin infection  -     Aerobic culture  -     silver sulfADIAZINE 1% (SILVADENE) 1 % cream; Apply topically 2 (two) times daily.  Dispense: 25 g; Refill: 2  -     ciprofloxacin HCl (CIPRO) 500 MG tablet; Take 1 tablet (500 mg total) by mouth 2 (two) times daily. for 14 days  Dispense: 28 tablet; Refill: 0    Purulent discharge was cultured at the appt.  As of Sunday, cx was showing pseudomonas, no sensitivities yet.  I called Mr. Mayes and his wife and let them know I'm sending cipro for him to take. They will pick it up today and start it. I also let them know I'd message Dr. Mason and let him know about this infection to make sure it's still OK for him to proceed with his endoscopy on Wed.       Once infection healed, pt will need to use Efudex to treat the bx-proven SCCIS at that site    RTC 3-4 weeks to re-check ear

## 2023-01-08 ENCOUNTER — PATIENT MESSAGE (OUTPATIENT)
Dept: INTERNAL MEDICINE | Facility: CLINIC | Age: 78
End: 2023-01-08
Payer: MEDICARE

## 2023-01-08 ENCOUNTER — PATIENT MESSAGE (OUTPATIENT)
Dept: ENDOSCOPY | Facility: HOSPITAL | Age: 78
End: 2023-01-08
Payer: MEDICARE

## 2023-01-08 RX ORDER — CIPROFLOXACIN 500 MG/1
500 TABLET ORAL 2 TIMES DAILY
Qty: 28 TABLET | Refills: 0 | Status: SHIPPED | OUTPATIENT
Start: 2023-01-08 | End: 2023-01-22

## 2023-01-08 NOTE — PATIENT INSTRUCTIONS

## 2023-01-09 ENCOUNTER — TELEPHONE (OUTPATIENT)
Dept: DERMATOLOGY | Facility: CLINIC | Age: 78
End: 2023-01-09
Payer: MEDICARE

## 2023-01-09 ENCOUNTER — PATIENT MESSAGE (OUTPATIENT)
Dept: INTERNAL MEDICINE | Facility: CLINIC | Age: 78
End: 2023-01-09
Payer: MEDICARE

## 2023-01-09 LAB — BACTERIA SPEC AEROBE CULT: ABNORMAL

## 2023-01-10 ENCOUNTER — LAB VISIT (OUTPATIENT)
Dept: LAB | Facility: HOSPITAL | Age: 78
End: 2023-01-10
Attending: INTERNAL MEDICINE
Payer: MEDICARE

## 2023-01-10 DIAGNOSIS — Z00.00 ANNUAL PHYSICAL EXAM: ICD-10-CM

## 2023-01-10 DIAGNOSIS — I15.2 HYPERTENSION ASSOCIATED WITH DIABETES: ICD-10-CM

## 2023-01-10 DIAGNOSIS — E11.8 DM (DIABETES MELLITUS) WITH COMPLICATIONS: ICD-10-CM

## 2023-01-10 DIAGNOSIS — E11.59 HYPERTENSION ASSOCIATED WITH DIABETES: ICD-10-CM

## 2023-01-10 DIAGNOSIS — E11.69 HYPERLIPIDEMIA ASSOCIATED WITH TYPE 2 DIABETES MELLITUS: ICD-10-CM

## 2023-01-10 DIAGNOSIS — E78.5 HYPERLIPIDEMIA ASSOCIATED WITH TYPE 2 DIABETES MELLITUS: ICD-10-CM

## 2023-01-10 DIAGNOSIS — Z12.5 ENCOUNTER FOR SCREENING FOR MALIGNANT NEOPLASM OF PROSTATE: ICD-10-CM

## 2023-01-10 LAB
ALBUMIN SERPL BCP-MCNC: 4.6 G/DL (ref 3.5–5.2)
ALP SERPL-CCNC: 75 U/L (ref 38–126)
ALT SERPL W/O P-5'-P-CCNC: 16 U/L (ref 10–44)
ANION GAP SERPL CALC-SCNC: 7 MMOL/L (ref 8–16)
AST SERPL-CCNC: 32 U/L (ref 15–46)
BASOPHILS # BLD AUTO: 0.05 K/UL (ref 0–0.2)
BASOPHILS NFR BLD: 0.7 % (ref 0–1.9)
BILIRUB SERPL-MCNC: 0.8 MG/DL (ref 0.1–1)
CALCIUM SERPL-MCNC: 9.8 MG/DL (ref 8.7–10.5)
CHLORIDE SERPL-SCNC: 107 MMOL/L (ref 95–110)
CHOLEST SERPL-MCNC: 123 MG/DL (ref 120–199)
CHOLEST/HDLC SERPL: 3.2 {RATIO} (ref 2–5)
CO2 SERPL-SCNC: 28 MMOL/L (ref 23–29)
COMPLEXED PSA SERPL-MCNC: 1.7 NG/ML (ref 0–4)
CREAT SERPL-MCNC: 1.16 MG/DL (ref 0.5–1.4)
DIFFERENTIAL METHOD: ABNORMAL
EOSINOPHIL # BLD AUTO: 0.4 K/UL (ref 0–0.5)
EOSINOPHIL NFR BLD: 4.6 % (ref 0–8)
ERYTHROCYTE [DISTWIDTH] IN BLOOD BY AUTOMATED COUNT: 12.6 % (ref 11.5–14.5)
EST. GFR  (NO RACE VARIABLE): >60 ML/MIN/1.73 M^2
ESTIMATED AVG GLUCOSE: 157 MG/DL (ref 68–131)
GLUCOSE SERPL-MCNC: 146 MG/DL (ref 70–110)
HBA1C MFR BLD: 7.1 % (ref 4–5.6)
HCT VFR BLD AUTO: 37.9 % (ref 40–54)
HDLC SERPL-MCNC: 39 MG/DL (ref 40–75)
HDLC SERPL: 31.7 % (ref 20–50)
HGB BLD-MCNC: 12.6 G/DL (ref 14–18)
IMM GRANULOCYTES # BLD AUTO: 0.02 K/UL (ref 0–0.04)
IMM GRANULOCYTES NFR BLD AUTO: 0.3 % (ref 0–0.5)
LDLC SERPL CALC-MCNC: 64 MG/DL (ref 63–159)
LYMPHOCYTES # BLD AUTO: 2.8 K/UL (ref 1–4.8)
LYMPHOCYTES NFR BLD: 36.4 % (ref 18–48)
MCH RBC QN AUTO: 30.1 PG (ref 27–31)
MCHC RBC AUTO-ENTMCNC: 33.2 G/DL (ref 32–36)
MCV RBC AUTO: 91 FL (ref 82–98)
MONOCYTES # BLD AUTO: 0.7 K/UL (ref 0.3–1)
MONOCYTES NFR BLD: 8.9 % (ref 4–15)
NEUTROPHILS # BLD AUTO: 3.7 K/UL (ref 1.8–7.7)
NEUTROPHILS NFR BLD: 49.1 % (ref 38–73)
NONHDLC SERPL-MCNC: 84 MG/DL
NRBC BLD-RTO: 0 /100 WBC
PLATELET # BLD AUTO: 219 K/UL (ref 150–450)
PMV BLD AUTO: 10.7 FL (ref 9.2–12.9)
POTASSIUM SERPL-SCNC: 4.8 MMOL/L (ref 3.5–5.1)
PROT SERPL-MCNC: 7.2 G/DL (ref 6–8.4)
RBC # BLD AUTO: 4.18 M/UL (ref 4.6–6.2)
SODIUM SERPL-SCNC: 142 MMOL/L (ref 136–145)
T4 FREE SERPL-MCNC: 1.14 NG/DL (ref 0.71–1.51)
TRIGL SERPL-MCNC: 100 MG/DL (ref 30–150)
TSH SERPL DL<=0.005 MIU/L-ACNC: 5.04 UIU/ML (ref 0.4–4)
UUN UR-MCNC: 26 MG/DL (ref 2–20)
WBC # BLD AUTO: 7.56 K/UL (ref 3.9–12.7)

## 2023-01-10 PROCEDURE — 83036 HEMOGLOBIN GLYCOSYLATED A1C: CPT | Mod: HCNC | Performed by: INTERNAL MEDICINE

## 2023-01-10 PROCEDURE — 84439 ASSAY OF FREE THYROXINE: CPT | Mod: HCNC | Performed by: INTERNAL MEDICINE

## 2023-01-10 PROCEDURE — 36415 COLL VENOUS BLD VENIPUNCTURE: CPT | Mod: HCNC,PO | Performed by: INTERNAL MEDICINE

## 2023-01-10 PROCEDURE — 85025 COMPLETE CBC W/AUTO DIFF WBC: CPT | Mod: HCNC,PO | Performed by: INTERNAL MEDICINE

## 2023-01-10 PROCEDURE — 84153 ASSAY OF PSA TOTAL: CPT | Mod: HCNC | Performed by: INTERNAL MEDICINE

## 2023-01-10 PROCEDURE — 80053 COMPREHEN METABOLIC PANEL: CPT | Mod: HCNC,PO | Performed by: INTERNAL MEDICINE

## 2023-01-10 PROCEDURE — 84443 ASSAY THYROID STIM HORMONE: CPT | Mod: HCNC,PO | Performed by: INTERNAL MEDICINE

## 2023-01-10 PROCEDURE — 83036 HEMOGLOBIN GLYCOSYLATED A1C: CPT | Mod: HCNC,PO | Performed by: INTERNAL MEDICINE

## 2023-01-10 PROCEDURE — 80061 LIPID PANEL: CPT | Mod: HCNC | Performed by: INTERNAL MEDICINE

## 2023-01-11 ENCOUNTER — HOSPITAL ENCOUNTER (OUTPATIENT)
Facility: HOSPITAL | Age: 78
Discharge: HOME | End: 2023-01-11
Attending: INTERNAL MEDICINE | Admitting: INTERNAL MEDICINE
Payer: MEDICARE

## 2023-01-11 ENCOUNTER — ANESTHESIA (OUTPATIENT)
Dept: ENDOSCOPY | Facility: HOSPITAL | Age: 78
End: 2023-01-11
Payer: MEDICARE

## 2023-01-11 ENCOUNTER — ANESTHESIA EVENT (OUTPATIENT)
Dept: ENDOSCOPY | Facility: HOSPITAL | Age: 78
End: 2023-01-11
Payer: MEDICARE

## 2023-01-11 DIAGNOSIS — K86.2 PANCREAS CYST: ICD-10-CM

## 2023-01-11 LAB
GLUCOSE SERPL-MCNC: 158 MG/DL (ref 70–110)
POCT GLUCOSE: 158 MG/DL (ref 70–110)

## 2023-01-11 PROCEDURE — 63600175 PHARM REV CODE 636 W HCPCS: Mod: HCNC | Performed by: NURSE ANESTHETIST, CERTIFIED REGISTERED

## 2023-01-11 PROCEDURE — 43259 PR ENDOSCOPIC ULTRASOUND EXAM: ICD-10-PCS | Mod: HCNC,,, | Performed by: INTERNAL MEDICINE

## 2023-01-11 PROCEDURE — 37000008 HC ANESTHESIA 1ST 15 MINUTES: Mod: HCNC | Performed by: INTERNAL MEDICINE

## 2023-01-11 PROCEDURE — D9220A PRA ANESTHESIA: ICD-10-PCS | Mod: HCNC,CRNA,, | Performed by: NURSE ANESTHETIST, CERTIFIED REGISTERED

## 2023-01-11 PROCEDURE — D9220A PRA ANESTHESIA: Mod: HCNC,ANES,, | Performed by: ANESTHESIOLOGY

## 2023-01-11 PROCEDURE — D9220A PRA ANESTHESIA: ICD-10-PCS | Mod: HCNC,ANES,, | Performed by: ANESTHESIOLOGY

## 2023-01-11 PROCEDURE — 82962 GLUCOSE BLOOD TEST: CPT | Mod: HCNC | Performed by: INTERNAL MEDICINE

## 2023-01-11 PROCEDURE — 25000003 PHARM REV CODE 250: Mod: HCNC | Performed by: INTERNAL MEDICINE

## 2023-01-11 PROCEDURE — 43259 EGD US EXAM DUODENUM/JEJUNUM: CPT | Mod: HCNC,,, | Performed by: INTERNAL MEDICINE

## 2023-01-11 PROCEDURE — 25000003 PHARM REV CODE 250: Mod: HCNC | Performed by: NURSE ANESTHETIST, CERTIFIED REGISTERED

## 2023-01-11 PROCEDURE — 43259 EGD US EXAM DUODENUM/JEJUNUM: CPT | Mod: HCNC | Performed by: INTERNAL MEDICINE

## 2023-01-11 PROCEDURE — 37000009 HC ANESTHESIA EA ADD 15 MINS: Mod: HCNC | Performed by: INTERNAL MEDICINE

## 2023-01-11 PROCEDURE — D9220A PRA ANESTHESIA: Mod: HCNC,CRNA,, | Performed by: NURSE ANESTHETIST, CERTIFIED REGISTERED

## 2023-01-11 RX ORDER — SODIUM CHLORIDE 9 MG/ML
INJECTION, SOLUTION INTRAVENOUS CONTINUOUS
Status: DISCONTINUED | OUTPATIENT
Start: 2023-01-11 | End: 2023-01-11 | Stop reason: HOSPADM

## 2023-01-11 RX ORDER — LIDOCAINE HCL/PF 100 MG/5ML
SYRINGE (ML) INTRAVENOUS
Status: DISCONTINUED | OUTPATIENT
Start: 2023-01-11 | End: 2023-01-11

## 2023-01-11 RX ORDER — PROPOFOL 10 MG/ML
INJECTION, EMULSION INTRAVENOUS CONTINUOUS PRN
Status: DISCONTINUED | OUTPATIENT
Start: 2023-01-11 | End: 2023-01-11

## 2023-01-11 RX ORDER — DEXMEDETOMIDINE HYDROCHLORIDE 100 UG/ML
INJECTION, SOLUTION INTRAVENOUS
Status: DISCONTINUED | OUTPATIENT
Start: 2023-01-11 | End: 2023-01-11

## 2023-01-11 RX ORDER — PROPOFOL 10 MG/ML
INJECTION, EMULSION INTRAVENOUS
Status: DISCONTINUED | OUTPATIENT
Start: 2023-01-11 | End: 2023-01-11

## 2023-01-11 RX ORDER — SODIUM CHLORIDE 0.9 % (FLUSH) 0.9 %
10 SYRINGE (ML) INJECTION
Status: DISCONTINUED | OUTPATIENT
Start: 2023-01-11 | End: 2023-01-11 | Stop reason: HOSPADM

## 2023-01-11 RX ADMIN — SODIUM CHLORIDE: 0.9 INJECTION, SOLUTION INTRAVENOUS at 09:01

## 2023-01-11 RX ADMIN — GLYCOPYRROLATE 0.2 MG: 0.2 INJECTION, SOLUTION INTRAMUSCULAR; INTRAVITREAL at 09:01

## 2023-01-11 RX ADMIN — DEXMEDETOMIDINE HCL 4 MCG: 100 INJECTION INTRAVENOUS at 09:01

## 2023-01-11 RX ADMIN — TOPICAL ANESTHETIC 1 EACH: 200 SPRAY DENTAL; PERIODONTAL at 09:01

## 2023-01-11 RX ADMIN — PROPOFOL 125 MCG/KG/MIN: 10 INJECTION, EMULSION INTRAVENOUS at 09:01

## 2023-01-11 RX ADMIN — LIDOCAINE HYDROCHLORIDE 100 MG: 20 INJECTION, SOLUTION INTRAVENOUS at 09:01

## 2023-01-11 RX ADMIN — PROPOFOL 90 MG: 10 INJECTION, EMULSION INTRAVENOUS at 09:01

## 2023-01-11 NOTE — ANESTHESIA PREPROCEDURE EVALUATION
01/11/2023  Jamison Mayes is a 77 y.o., male.      Pre-op Assessment    I have reviewed the Patient Summary Reports.     I have reviewed the Nursing Notes.    I have reviewed the Medications.     Review of Systems  Anesthesia Hx:  Denies Family Hx of Anesthesia complications.   Denies Personal Hx of Anesthesia complications.        Patient Active Problem List   Diagnosis    Seizure disorder    DM (diabetes mellitus) with complications    Localization-related (focal) (partial) epilepsy and epileptic syndromes with complex partial seizures, with intractable epilepsy    Fecal urgency    Hepatocellular carcinoma    Frequent loose stools    Pancreatic cyst    Positional lightheadedness    Controlled type 2 diabetes mellitus, without long-term current use of insulin    Hypertension associated with diabetes    Hyperlipidemia associated with type 2 diabetes mellitus    Patent pressure equalization (PE) tube on left side    Decreased hearing of both ears    DM type 2 without retinopathy       Past Medical History:   Diagnosis Date    AK (actinic keratosis) PDT scalp 2/2015 and 3/2015    s/p efudex on scalp and forehead, PDT scalp    Arthritis     Diabetes mellitus type II     Fever blister     Hepatocellular carcinoma 03/05/2018    s/p left hepatectomy of segments 2,3,4a/b    Hypertension     Joint pain     SCC (squamous cell carcinoma) 3/2013    L scalp vertex    SCC (squamous cell carcinoma) excised     left helix, in-situ    Seizures     Squamous Cell Carcinoma 3/2013    occipital scalp    Squamous Cell Carcinoma 3/2013    l vertex scalp       ECHO: No results found for this or any previous visit.      Body mass index is 29.62 kg/m².    Tobacco Use: Medium Risk    Smoking Tobacco Use: Former    Smokeless Tobacco Use: Never    Passive Exposure: Not on file       Social History      Substance and Sexual Activity   Drug Use No        Alcohol Use: Not on file       Review of patient's allergies indicates:   Allergen Reactions    Codeine          Airway:  No value filed.     Physical Exam    Airway:  Mouth Opening: Normal  TM Distance: Normal          Anesthesia Plan  Type of Anesthesia, risks & benefits discussed:    Anesthesia Type: Gen Natural Airway  Intra-op Monitoring Plan: Standard ASA Monitors  Post Op Pain Control Plan: multimodal analgesia  Induction:  IV  Informed Consent: Informed consent signed with the Patient and all parties understand the risks and agree with anesthesia plan.  All questions answered.   ASA Score: 3  Day of Surgery Review of History & Physical: H&P Update referred to the surgeon/provider.    Ready For Surgery From Anesthesia Perspective.     .

## 2023-01-11 NOTE — H&P
Short Stay Endoscopy History and Physical    PCP - Yossi Peck DO  Referring Physician - Tami Crowe NP  [address] [place] [address]    Procedure - eus  ASA - per anesthesia  Mallampati - per anesthesia  History of Anesthesia problems - no  Family history Anesthesia problems -  no   Plan of anesthesia - General    HPI:  This is a 77 y.o. male here for evaluation of:panc cyst    Reflux - no  Dysphagia - no  Abdominal pain - no  Diarrhea - no    ROS:  Constitutional: No fevers, chills, No weight loss  CV: No chest pain  Pulm: No cough, No shortness of breath  Ophtho: No vision changes  GI: see HPI  Derm: No rash    Medical History:  has a past medical history of AK (actinic keratosis) (PDT scalp 2/2015 and 3/2015), Arthritis, Diabetes mellitus type II, Fever blister, Hepatocellular carcinoma (03/05/2018), Hypertension, Joint pain, SCC (squamous cell carcinoma) (3/2013), SCC (squamous cell carcinoma) (excised ), Seizures, Squamous Cell Carcinoma (3/2013), and Squamous Cell Carcinoma (3/2013).    Surgical History:  has a past surgical history that includes Eye surgery; skin carcinoma removal; Liver resection (Left, 03/05/2018); Cholecystectomy (03/05/2018); and Retinal detachment surgery.    Family History: family history includes Diabetes in his father; Hypertension in his mother; Vision loss in his sister..    Social History:  reports that he quit smoking about 56 years ago. His smoking use included cigarettes. He has never used smokeless tobacco. He reports that he does not drink alcohol and does not use drugs.    Review of patient's allergies indicates:   Allergen Reactions    Codeine        Medications:   Medications Prior to Admission   Medication Sig Dispense Refill Last Dose    ACCU-CHEK ROSE PLUS METER Misc USE AS DIRECTED 1 each 0 Past Week    aspirin (ECOTRIN) 81 MG EC tablet Take 81 mg by mouth. 1 Tablet, Delayed Release (E.C.) Oral Every day   1/10/2023    blood  sugar diagnostic Strp To check BG 2 times daily, to use with accu check guide 100 each 11 1/10/2023    calcium carbonate (OS-RUSLAN) 500 mg calcium (1,250 mg) chewable tablet Take 1 tablet by mouth once daily.   1/10/2023    ciprofloxacin HCl (CIPRO) 500 MG tablet Take 1 tablet (500 mg total) by mouth 2 (two) times daily. for 14 days 28 tablet 0 1/10/2023    cyanocobalamin (VITAMIN B-12) 250 MCG tablet Take 1 tablet (250 mcg total) by mouth once daily.   1/10/2023    fluorouraciL (EFUDEX) 5 % cream AAA on L ear BID x 4-6 weeks. Stop if blistered, oozing, or bleeding. Use daily sun protection. 40 g 1 1/10/2023    fluticasone (FLONASE) 50 mcg/actuation nasal spray 2 sprays (100 mcg total) by Each Nare route once daily. 1 Bottle 12 Past Week    glimepiride (AMARYL) 4 MG tablet Take 1 tablet (4 mg total) by mouth before breakfast. 90 tablet 3 1/10/2023    imiquimod (ALDARA) 5 % cream AAA 5 nights/week x 4 - 6 weeks. Wash off in am. Stop if blistering, bleeding, oozing, etc. Do not use >1 packet per night. 24 packet 1 Past Week    irbesartan (AVAPRO) 300 MG tablet Take 1 tablet (300 mg total) by mouth every evening. 90 tablet 3 1/11/2023    ketoconazole (NIZORAL) 2 % cream AAA BID 60 g 3 1/10/2023    ketoconazole (NIZORAL) 2 % shampoo Wash hair with medicated shampoo at least 2x/week - let sit on scalp at least 5 minutes prior to rinsing 120 mL 5 Past Week    lancets 28 gauge Misc 1 lancet by Misc.(Non-Drug; Combo Route) route 2 (two) times daily. 100 each 11 Past Week    levocetirizine (XYZAL) 5 MG tablet Take 1 tablet (5 mg total) by mouth every evening. 30 tablet 11 Past Week    lovastatin (MEVACOR) 20 MG tablet Take 1 tablet (20 mg total) by mouth every evening. 90 tablet 3 1/10/2023    metFORMIN (GLUCOPHAGE-XR) 500 MG ER 24hr tablet Take 1 tablet (500 mg total) by mouth 2 (two) times daily with meals. 180 tablet 3 1/10/2023    MULTIVITAMIN WITH MINERALS (ONE-A-DAY 50 PLUS) Tab Take by mouth. 1 Tablet Oral Every morning    1/10/2023    mupirocin (BACTROBAN) 2 % ointment Apply topically 2 (two) times daily. 22 g 1 Past Week    silver sulfADIAZINE 1% (SILVADENE) 1 % cream Apply topically 2 (two) times daily. 25 g 2 Past Week    doxycycline (MONODOX) 100 MG capsule Take 1 capsule (100 mg total) by mouth 2 (two) times daily. Take with food and water. Remain upright x 1 hr after taking. 20 capsule 0 Unknown    FLUZONE HIGHDOSE QUAD 21-22  mcg/0.7 mL Syrg    Unknown    GADAVIST 10 mmol/10 mL (1 mmol/mL) Soln injection    Unknown       Physical Exam:    Vital Signs:   Vitals:    01/11/23 0852   BP: (!) 168/72   Pulse: 85   Resp: 18   Temp: 97.9 °F (36.6 °C)       General Appearance: Well appearing in no acute distress    Labs:  Lab Results   Component Value Date    WBC 7.56 01/10/2023    HGB 12.6 (L) 01/10/2023    HCT 37.9 (L) 01/10/2023     01/10/2023    CHOL 123 01/10/2023    TRIG 100 01/10/2023    HDL 39 (L) 01/10/2023    ALT 16 01/10/2023    AST 32 01/10/2023     01/10/2023    K 4.8 01/10/2023     01/10/2023    CREATININE 1.16 01/10/2023    BUN 26 (H) 01/10/2023    CO2 28 01/10/2023    TSH 5.040 (H) 01/10/2023    PSA 1.7 01/10/2023    INR 0.9 03/08/2018    HGBA1C 7.1 (H) 01/10/2023       I have explained the risks and benefits of this endoscopic procedure to the patient including but not limited to bleeding, inflammation, infection, perforation, and death.      Moise Mason MD

## 2023-01-11 NOTE — PROVATION PATIENT INSTRUCTIONS
Discharge Summary/Instructions after an Endoscopic Procedure  Patient Name: Jamison Mayes  Patient MRN: [id number]  Patient YOB: 1945 Wednesday, January 11, 2023  Moise Mason MD  Dear patient,  As a result of recent federal legislation (The Federal Cures Act), you may   receive lab or pathology results from your procedure in your [facility]   account before your physician is able to contact you. Your physician or   their representative will relay the results to you with their   recommendations at their soonest availability.  Thank you,  Your health is very important to us during the Covid Crisis. Following your   procedure today, you will receive a daily text for 2 weeks asking about   signs or symptoms of Covid 19.  Please respond to this text when you   receive it so we can follow up and keep you as safe as possible.   RESTRICTIONS:  During your procedure today, you received medications for sedation.  These   medications may affect your judgment, balance and coordination.  Therefore,   for 24 hours, you have the following restrictions:   - DO NOT drive a car, operate machinery, make legal/financial decisions,   sign important papers or drink alcohol.    ACTIVITY:  Today: no heavy lifting, straining or running due to procedural   sedation/anesthesia.  The following day: return to full activity including work.  DIET:  Eat and drink normally unless instructed otherwise.     TREATMENT FOR COMMON SIDE EFFECTS:  - Mild abdominal pain, nausea, belching, bloating or excessive gas:  rest,   eat lightly and use a heating pad.  - Sore Throat: treat with throat lozenges and/or gargle with warm salt   water.  - Because air was used during the procedure, expelling large amounts of air   from your rectum or belching is normal.  - If a bowel prep was taken, you may not have a bowel movement for 1-3 days.    This is normal.  SYMPTOMS TO WATCH FOR AND REPORT TO YOUR PHYSICIAN:  1. Abdominal pain or bloating, other than  gas cramps.  2. Chest pain.  3. Back pain.  4. Signs of infection such as: chills or fever occurring within 24 hours   after the procedure.  5. Rectal bleeding, which would show as bright red, maroon, or black stools.   (A tablespoon of blood from the rectum is not serious, especially if   hemorrhoids are present.)  6. Vomiting.  7. Weakness or dizziness.  GO DIRECTLY TO THE NEAREST EMERGENCY ROOM IF YOU HAVE ANY OF THE FOLLOWING:      Difficulty breathing              Chills and/or fever over 101 F   Persistent vomiting and/or vomiting blood   Severe abdominal pain   Severe chest pain   Black, tarry stools   Bleeding- more than one tablespoon   Any other symptom or condition that you feel may need urgent attention  Your doctor recommends these additional instructions:  If any biopsies were taken, your doctors clinic will contact you in 1 to 2   weeks with any results.  - Discharge patient to home.   - Resume previous diet.   - Continue present medications.   - Return to pancreas cyst clinic in 1 year to discuss surveillance, consider   MRI .  For questions, problems or results please call your physician - Moise Mason MD.  EMERGENCY PHONE NUMBER: [phone],  LAB RESULTS: [phone]  IF A COMPLICATION OR EMERGENCY SITUATION ARISES AND YOU ARE UNABLE TO REACH   YOUR PHYSICIAN - GO DIRECTLY TO THE EMERGENCY ROOM.  Moise Mason MD  1/11/2023 9:37:19 AM  This report has been verified and signed electronically.  Dear patient,  As a result of recent federal legislation (The Federal Cures Act), you may   receive lab or pathology results from your procedure in your [facility]   account before your physician is able to contact you. Your physician or   their representative will relay the results to you with their   recommendations at their soonest availability.  Thank you,  PROVATION

## 2023-01-11 NOTE — ANESTHESIA POSTPROCEDURE EVALUATION
Anesthesia Post Evaluation    Patient: Jamison Mayes    Procedure(s) Performed: Procedure(s) (LRB):  ULTRASOUND, UPPER GI TRACT, ENDOSCOPIC (N/A)    Final Anesthesia Type: general      Patient location during evaluation: GI PACU  Patient participation: No - Unable to Participate, Sedation  Level of consciousness: awake and responds to stimulation  Post-procedure vital signs: reviewed and stable  Pain management: adequate  Airway patency: patent    PONV status at discharge: No PONV  Anesthetic complications: no      Cardiovascular status: hemodynamically stable  Respiratory status: unassisted, spontaneous ventilation and room air  Hydration status: euvolemic            Vitals Value Taken Time   /56 01/11/23 0938   Temp 36.6 °C (97.9 °F) 01/11/23 0938   Pulse 83 01/11/23 0938   Resp 16 01/11/23 0938   SpO2 98 % 01/11/23 0938         No case tracking events are documented in the log.      Pain/Esmer Score: Esmer Score: 9 (1/11/2023  9:38 AM)

## 2023-01-11 NOTE — TRANSFER OF CARE
"Anesthesia Transfer of Care Note    Patient: Jamison Mayes    Procedure(s) Performed: Procedure(s) (LRB):  ULTRASOUND, UPPER GI TRACT, ENDOSCOPIC (N/A)    Patient location: GI    Anesthesia Type: general    Transport from OR: Transported from OR on room air with adequate spontaneous ventilation    Post pain: adequate analgesia    Post assessment: no apparent anesthetic complications and tolerated procedure well    Post vital signs: stable    Level of consciousness: awake and responds to stimulation    Nausea/Vomiting: no nausea/vomiting    Complications: none    Transfer of care protocol was followed      Last vitals:   Visit Vitals  BP (!) 168/72 (Patient Position: Lying)   Pulse 85   Temp 36.6 °C (97.9 °F) (Temporal)   Resp 18   Ht 5' 5" (1.651 m)   Wt 80.7 kg (178 lb)   SpO2 99%   BMI 29.62 kg/m²     "

## 2023-01-12 VITALS
TEMPERATURE: 98 F | BODY MASS INDEX: 29.66 KG/M2 | OXYGEN SATURATION: 99 % | WEIGHT: 178 LBS | SYSTOLIC BLOOD PRESSURE: 124 MMHG | RESPIRATION RATE: 13 BRPM | HEART RATE: 79 BPM | HEIGHT: 65 IN | DIASTOLIC BLOOD PRESSURE: 58 MMHG

## 2023-01-17 ENCOUNTER — OFFICE VISIT (OUTPATIENT)
Dept: INTERNAL MEDICINE | Facility: CLINIC | Age: 78
End: 2023-01-17
Payer: MEDICARE

## 2023-01-17 VITALS
HEIGHT: 65 IN | TEMPERATURE: 97 F | SYSTOLIC BLOOD PRESSURE: 138 MMHG | RESPIRATION RATE: 18 BRPM | HEART RATE: 65 BPM | WEIGHT: 182.19 LBS | OXYGEN SATURATION: 99 % | DIASTOLIC BLOOD PRESSURE: 70 MMHG | BODY MASS INDEX: 30.35 KG/M2

## 2023-01-17 DIAGNOSIS — I15.2 HYPERTENSION ASSOCIATED WITH DIABETES: ICD-10-CM

## 2023-01-17 DIAGNOSIS — Z00.00 ANNUAL PHYSICAL EXAM: Primary | ICD-10-CM

## 2023-01-17 DIAGNOSIS — E11.59 HYPERTENSION ASSOCIATED WITH DIABETES: ICD-10-CM

## 2023-01-17 DIAGNOSIS — E78.5 HYPERLIPIDEMIA ASSOCIATED WITH TYPE 2 DIABETES MELLITUS: ICD-10-CM

## 2023-01-17 DIAGNOSIS — G40.909 SEIZURE DISORDER: ICD-10-CM

## 2023-01-17 DIAGNOSIS — E11.65 CONTROLLED TYPE 2 DIABETES MELLITUS WITH HYPERGLYCEMIA, WITHOUT LONG-TERM CURRENT USE OF INSULIN: ICD-10-CM

## 2023-01-17 DIAGNOSIS — C22.0 HEPATOCELLULAR CARCINOMA: ICD-10-CM

## 2023-01-17 DIAGNOSIS — E11.69 HYPERLIPIDEMIA ASSOCIATED WITH TYPE 2 DIABETES MELLITUS: ICD-10-CM

## 2023-01-17 PROCEDURE — 90677 PCV20 VACCINE IM: CPT | Mod: HCNC,S$GLB,, | Performed by: INTERNAL MEDICINE

## 2023-01-17 PROCEDURE — 1101F PR PT FALLS ASSESS DOC 0-1 FALLS W/OUT INJ PAST YR: ICD-10-PCS | Mod: HCNC,CPTII,S$GLB, | Performed by: INTERNAL MEDICINE

## 2023-01-17 PROCEDURE — 99499 UNLISTED E&M SERVICE: CPT | Mod: HCNC,S$GLB,, | Performed by: INTERNAL MEDICINE

## 2023-01-17 PROCEDURE — 3078F DIAST BP <80 MM HG: CPT | Mod: HCNC,CPTII,S$GLB, | Performed by: INTERNAL MEDICINE

## 2023-01-17 PROCEDURE — G0009 PNEUMOCOCCAL CONJUGATE VACCINE 20-VALENT: ICD-10-PCS | Mod: HCNC,S$GLB,, | Performed by: INTERNAL MEDICINE

## 2023-01-17 PROCEDURE — 1126F AMNT PAIN NOTED NONE PRSNT: CPT | Mod: HCNC,CPTII,S$GLB, | Performed by: INTERNAL MEDICINE

## 2023-01-17 PROCEDURE — 3288F PR FALLS RISK ASSESSMENT DOCUMENTED: ICD-10-PCS | Mod: HCNC,CPTII,S$GLB, | Performed by: INTERNAL MEDICINE

## 2023-01-17 PROCEDURE — 99499 RISK ADDL DX/OHS AUDIT: ICD-10-PCS | Mod: HCNC,S$GLB,, | Performed by: INTERNAL MEDICINE

## 2023-01-17 PROCEDURE — 3078F PR MOST RECENT DIASTOLIC BLOOD PRESSURE < 80 MM HG: ICD-10-PCS | Mod: HCNC,CPTII,S$GLB, | Performed by: INTERNAL MEDICINE

## 2023-01-17 PROCEDURE — G0009 ADMIN PNEUMOCOCCAL VACCINE: HCPCS | Mod: HCNC,S$GLB,, | Performed by: INTERNAL MEDICINE

## 2023-01-17 PROCEDURE — 99999 PR PBB SHADOW E&M-EST. PATIENT-LVL III: CPT | Mod: PBBFAC,HCNC,, | Performed by: INTERNAL MEDICINE

## 2023-01-17 PROCEDURE — 99397 PER PM REEVAL EST PAT 65+ YR: CPT | Mod: HCNC,S$GLB,, | Performed by: INTERNAL MEDICINE

## 2023-01-17 PROCEDURE — 99397 PR PREVENTIVE VISIT,EST,65 & OVER: ICD-10-PCS | Mod: HCNC,S$GLB,, | Performed by: INTERNAL MEDICINE

## 2023-01-17 PROCEDURE — 3075F PR MOST RECENT SYSTOLIC BLOOD PRESS GE 130-139MM HG: ICD-10-PCS | Mod: HCNC,CPTII,S$GLB, | Performed by: INTERNAL MEDICINE

## 2023-01-17 PROCEDURE — 3075F SYST BP GE 130 - 139MM HG: CPT | Mod: HCNC,CPTII,S$GLB, | Performed by: INTERNAL MEDICINE

## 2023-01-17 PROCEDURE — 1126F PR PAIN SEVERITY QUANTIFIED, NO PAIN PRESENT: ICD-10-PCS | Mod: HCNC,CPTII,S$GLB, | Performed by: INTERNAL MEDICINE

## 2023-01-17 PROCEDURE — 90677 PNEUMOCOCCAL CONJUGATE VACCINE 20-VALENT: ICD-10-PCS | Mod: HCNC,S$GLB,, | Performed by: INTERNAL MEDICINE

## 2023-01-17 PROCEDURE — 1101F PT FALLS ASSESS-DOCD LE1/YR: CPT | Mod: HCNC,CPTII,S$GLB, | Performed by: INTERNAL MEDICINE

## 2023-01-17 PROCEDURE — 3288F FALL RISK ASSESSMENT DOCD: CPT | Mod: HCNC,CPTII,S$GLB, | Performed by: INTERNAL MEDICINE

## 2023-01-17 PROCEDURE — 99999 PR PBB SHADOW E&M-EST. PATIENT-LVL III: ICD-10-PCS | Mod: PBBFAC,HCNC,, | Performed by: INTERNAL MEDICINE

## 2023-01-17 NOTE — PROGRESS NOTES
Subjective:       Patient ID: Jamison Mayes is a 77 y.o. male.    Chief Complaint: No chief complaint on file.    HPI  77 y.o. Male here for annual exam.      Vaccines: Influenza (2022); Tetanus (2018); PNA (current); Shingrix (will consider)  Eye exam: 2/21  Colonoscopy: 2011- pt declining repeat  DEXA: 11/18     Exercise: no  Diet: regular     Past Medical History:  PDT scalp 2/2015 and 3/2015: AK (actinic keratosis)      Comment:  s/p efudex on scalp and forehead, PDT scalp  No date: Arthritis  No date: Diabetes mellitus type II  No date: Fever blister  03/05/2018: Hepatocellular carcinoma      Comment:  s/p left hepatectomy of segments 2,3,4a/b  No date: Hypertension  No date: Joint pain  3/2013: SCC (squamous cell carcinoma)      Comment:  L scalp vertex  excised : SCC (squamous cell carcinoma)      Comment:  left helix, in-situ  No date: Seizures  3/2013: Squamous Cell Carcinoma      Comment:  occipital scalp  3/2013: Squamous Cell Carcinoma      Comment:  l vertex scalp  No date: Pancreatic cyst  Past Surgical History:  03/05/2018: CHOLECYSTECTOMY      Comment:  open at time of hepatic resection  3/5/2018: CHOLECYSTECTOMY; N/A      Comment:  Performed by Brown Cortez MD at [facility] OR [address] [place] [address]  3/5/2018: EVACUATION-HEMATOMA; N/A      Comment:  Performed by Brown Cortez MD at [facility] OR [address] [place] [address]  No date: EYE SURGERY  3/5/2018: HEPATECTOMY; Left      Comment:  Performed by Brown Cortez MD at [facility] OR [address] [place] [address]  03/05/2018: LIVER RESECTION; Left      Comment:  segments 2,3, 4a/b  No date: skin carcinoma removal  3/5/2018: ULTRASOUND; N/A      Comment:  Performed by Brown Cortez MD at [facility] OR [address] [place] [address]  Social History    Socioeconomic History      Marital status:       Spouse name: Not on file      Number of children: Not on file      Years of education: Not on file      Highest education level: Not on file     Social Needs      Financial resource strain: Not on file      Food insecurity - worry: Not on file      Food insecurity - inability: Not on file      Transportation needs - medical: Not on file      Transportation needs - non-medical: Not on file    Occupational History      Occupation: REtired     Tobacco Use      Smoking status: Former Smoker        Years: 5.00        Quit date: 1966        Years since quittin.4      Smokeless tobacco: Never Used    Substance and Sexual Activity      Alcohol use: No      Drug use: No      Sexual activity: Not on file     Review of patient's allergies indicates:  No Known Allergies  Review of Systems   Constitutional:  Negative for activity change, appetite change, chills, diaphoresis, fatigue, fever and unexpected weight change.   HENT:  Negative for nasal congestion, mouth sores, postnasal drip, rhinorrhea, sinus pressure/congestion, sneezing, sore throat, trouble swallowing and voice change.    Eyes:  Negative for discharge, itching and visual disturbance.   Respiratory:  Negative for cough, chest tightness, shortness of breath and wheezing.    Cardiovascular:  Negative for chest pain, palpitations and leg swelling.   Gastrointestinal:  Negative for abdominal pain, blood in stool, constipation, diarrhea, nausea and vomiting.   Endocrine: Negative for cold intolerance and heat intolerance.   Genitourinary:  Negative for difficulty urinating, dysuria, flank pain, hematuria and urgency.   Musculoskeletal:  Negative for arthralgias, back pain, myalgias and neck pain.   Integumentary:  Negative for rash and wound.   Allergic/Immunologic: Negative for environmental allergies and food allergies.   Neurological:  Negative for dizziness, tremors, seizures, syncope, weakness and headaches.   Hematological:  Negative for adenopathy. Does not bruise/bleed easily.   Psychiatric/Behavioral:  Negative for confusion, sleep disturbance and suicidal ideas. The patient is not  nervous/anxious.        Objective:      Physical Exam  Constitutional:       General: He is not in acute distress.     Appearance: Normal appearance. He is well-developed. He is not ill-appearing, toxic-appearing or diaphoretic.   HENT:      Head: Normocephalic and atraumatic.      Right Ear: External ear normal.      Left Ear: External ear normal.      Nose: Nose normal.      Mouth/Throat:      Pharynx: No oropharyngeal exudate.   Eyes:      General: No scleral icterus.        Right eye: No discharge.         Left eye: No discharge.      Extraocular Movements: Extraocular movements intact.      Conjunctiva/sclera: Conjunctivae normal.      Pupils: Pupils are equal, round, and reactive to light.   Neck:      Thyroid: No thyromegaly.      Vascular: No JVD.   Cardiovascular:      Rate and Rhythm: Normal rate and regular rhythm.      Pulses: Normal pulses.      Heart sounds: Normal heart sounds. No murmur heard.  Pulmonary:      Effort: Pulmonary effort is normal. No respiratory distress.      Breath sounds: Normal breath sounds. No wheezing or rales.   Abdominal:      General: Bowel sounds are normal. There is no distension.      Palpations: Abdomen is soft.      Tenderness: There is no abdominal tenderness. There is no right CVA tenderness, left CVA tenderness, guarding or rebound.   Musculoskeletal:      Cervical back: Normal range of motion and neck supple. No rigidity.      Right lower leg: No edema.      Left lower leg: No edema.   Lymphadenopathy:      Cervical: No cervical adenopathy.   Skin:     General: Skin is warm and dry.      Capillary Refill: Capillary refill takes less than 2 seconds.      Coloration: Skin is not pale.      Findings: No rash.   Neurological:      General: No focal deficit present.      Mental Status: He is alert and oriented to person, place, and time. Mental status is at baseline.      Cranial Nerves: No cranial nerve deficit.      Sensory: No sensory deficit.      Motor: No weakness.       Coordination: Coordination normal.      Gait: Gait normal.      Deep Tendon Reflexes: Reflexes normal.   Psychiatric:         Mood and Affect: Mood normal.         Behavior: Behavior normal.         Thought Content: Thought content normal.         Judgment: Judgment normal.       Assessment:       Problem List Items Addressed This Visit          Neuro    Seizure disorder       Cardiac/Vascular    Hypertension associated with diabetes    Hyperlipidemia associated with type 2 diabetes mellitus       Oncology    Hepatocellular carcinoma       Endocrine    Controlled type 2 diabetes mellitus, without long-term current use of insulin     Other Visit Diagnoses       Annual physical exam    -  Primary    Relevant Orders    (In Office Administered) Pneumococcal Conjugate Vaccine (20 Valent) (IM)            Plan:    Blood work reviewed with pt       T2DM- last A1C of 7.1(1/23)<--6.7(12/21)<--6.4(12/20)<--6.0(6/20)<--8.1(11/19)<--7.1(11/18)<--5.2(8/18)      -continue Metformin  mg BID WM and Amaryl 4 mg qam      HTN- stable on Avapro 300 mg daily       Seizure d/o- stable of meds      -Pt had seen Neurology       HCC- stable, s/p left hepatic lobectomy with cholecystectomy on 03/05/2018       No recurrence, followed by Oncology        Pancreatic cyst- followed by GI     HLD- stable on Lovastatin 20 mg daily      F/u in 6 months

## 2023-01-18 ENCOUNTER — PATIENT MESSAGE (OUTPATIENT)
Dept: INTERNAL MEDICINE | Facility: CLINIC | Age: 78
End: 2023-01-18
Payer: MEDICARE

## 2023-01-21 ENCOUNTER — PATIENT MESSAGE (OUTPATIENT)
Dept: INTERNAL MEDICINE | Facility: CLINIC | Age: 78
End: 2023-01-21
Payer: MEDICARE

## 2023-01-27 ENCOUNTER — TELEPHONE (OUTPATIENT)
Dept: OTOLARYNGOLOGY | Facility: CLINIC | Age: 78
End: 2023-01-27
Payer: MEDICARE

## 2023-01-27 NOTE — TELEPHONE ENCOUNTER
Returned call to patient to inform that appt was moved down to later in the day from original time due to provider being in surgery. Pt requested audiogram due to hearing getting worse. Scheduled audiogram before appt. Was thanked for call   ----- Message from Becky Gold sent at 1/27/2023  2:31 PM CST -----  Type:  Patient Returning Call    Who Called:Pt   Who Left Message for Patient:Unsure  Does the patient know what this is regarding?:unsure  Would the patient rather a call back or a response via [facility]? Yes   Best Call Back Number:[phone]  Additional Information: returning call to office

## 2023-02-01 ENCOUNTER — CLINICAL SUPPORT (OUTPATIENT)
Dept: OTOLARYNGOLOGY | Facility: CLINIC | Age: 78
End: 2023-02-01
Payer: MEDICARE

## 2023-02-01 ENCOUNTER — OFFICE VISIT (OUTPATIENT)
Dept: OTOLARYNGOLOGY | Facility: CLINIC | Age: 78
End: 2023-02-01
Payer: MEDICARE

## 2023-02-01 VITALS
WEIGHT: 180.25 LBS | SYSTOLIC BLOOD PRESSURE: 147 MMHG | BODY MASS INDEX: 30.03 KG/M2 | HEART RATE: 68 BPM | DIASTOLIC BLOOD PRESSURE: 67 MMHG | HEIGHT: 65 IN | TEMPERATURE: 98 F

## 2023-02-01 DIAGNOSIS — H72.92 TYMPANIC MEMBRANE PERFORATION, LEFT: Primary | ICD-10-CM

## 2023-02-01 DIAGNOSIS — H69.93 ETD (EUSTACHIAN TUBE DYSFUNCTION), BILATERAL: ICD-10-CM

## 2023-02-01 DIAGNOSIS — H93.13 TINNITUS OF BOTH EARS: ICD-10-CM

## 2023-02-01 DIAGNOSIS — H90.3 ASYMMETRIC SNHL (SENSORINEURAL HEARING LOSS): ICD-10-CM

## 2023-02-01 DIAGNOSIS — H90.A21 SENSORINEURAL HEARING LOSS (SNHL) OF RIGHT EAR WITH RESTRICTED HEARING OF LEFT EAR: Primary | ICD-10-CM

## 2023-02-01 PROCEDURE — 1159F MED LIST DOCD IN RCRD: CPT | Mod: HCNC,CPTII,S$GLB, | Performed by: OTOLARYNGOLOGY

## 2023-02-01 PROCEDURE — 1126F AMNT PAIN NOTED NONE PRSNT: CPT | Mod: HCNC,CPTII,S$GLB, | Performed by: OTOLARYNGOLOGY

## 2023-02-01 PROCEDURE — 3077F SYST BP >= 140 MM HG: CPT | Mod: HCNC,CPTII,S$GLB, | Performed by: OTOLARYNGOLOGY

## 2023-02-01 PROCEDURE — 1160F RVW MEDS BY RX/DR IN RCRD: CPT | Mod: HCNC,CPTII,S$GLB, | Performed by: OTOLARYNGOLOGY

## 2023-02-01 PROCEDURE — 99214 OFFICE O/P EST MOD 30 MIN: CPT | Mod: HCNC,S$GLB,, | Performed by: OTOLARYNGOLOGY

## 2023-02-01 PROCEDURE — 99214 PR OFFICE/OUTPT VISIT, EST, LEVL IV, 30-39 MIN: ICD-10-PCS | Mod: HCNC,S$GLB,, | Performed by: OTOLARYNGOLOGY

## 2023-02-01 PROCEDURE — 92557 COMPREHENSIVE HEARING TEST: CPT | Mod: HCNC,S$GLB,, | Performed by: PHYSICIAN ASSISTANT

## 2023-02-01 PROCEDURE — 1160F PR REVIEW ALL MEDS BY PRESCRIBER/CLIN PHARMACIST DOCUMENTED: ICD-10-PCS | Mod: HCNC,CPTII,S$GLB, | Performed by: OTOLARYNGOLOGY

## 2023-02-01 PROCEDURE — 1159F PR MEDICATION LIST DOCUMENTED IN MEDICAL RECORD: ICD-10-PCS | Mod: HCNC,CPTII,S$GLB, | Performed by: OTOLARYNGOLOGY

## 2023-02-01 PROCEDURE — 99999 PR PBB SHADOW E&M-EST. PATIENT-LVL IV: CPT | Mod: PBBFAC,HCNC,, | Performed by: OTOLARYNGOLOGY

## 2023-02-01 PROCEDURE — 3078F DIAST BP <80 MM HG: CPT | Mod: HCNC,CPTII,S$GLB, | Performed by: OTOLARYNGOLOGY

## 2023-02-01 PROCEDURE — 1101F PR PT FALLS ASSESS DOC 0-1 FALLS W/OUT INJ PAST YR: ICD-10-PCS | Mod: HCNC,CPTII,S$GLB, | Performed by: OTOLARYNGOLOGY

## 2023-02-01 PROCEDURE — 1101F PT FALLS ASSESS-DOCD LE1/YR: CPT | Mod: HCNC,CPTII,S$GLB, | Performed by: OTOLARYNGOLOGY

## 2023-02-01 PROCEDURE — 1126F PR PAIN SEVERITY QUANTIFIED, NO PAIN PRESENT: ICD-10-PCS | Mod: HCNC,CPTII,S$GLB, | Performed by: OTOLARYNGOLOGY

## 2023-02-01 PROCEDURE — 3078F PR MOST RECENT DIASTOLIC BLOOD PRESSURE < 80 MM HG: ICD-10-PCS | Mod: HCNC,CPTII,S$GLB, | Performed by: OTOLARYNGOLOGY

## 2023-02-01 PROCEDURE — 99999 PR PBB SHADOW E&M-EST. PATIENT-LVL IV: ICD-10-PCS | Mod: PBBFAC,HCNC,, | Performed by: OTOLARYNGOLOGY

## 2023-02-01 PROCEDURE — 92557 PR COMPREHENSIVE HEARING TEST: ICD-10-PCS | Mod: HCNC,S$GLB,, | Performed by: PHYSICIAN ASSISTANT

## 2023-02-01 PROCEDURE — 92567 PR TYMPA2METRY: ICD-10-PCS | Mod: HCNC,S$GLB,, | Performed by: PHYSICIAN ASSISTANT

## 2023-02-01 PROCEDURE — 92567 TYMPANOMETRY: CPT | Mod: HCNC,S$GLB,, | Performed by: PHYSICIAN ASSISTANT

## 2023-02-01 PROCEDURE — 3288F PR FALLS RISK ASSESSMENT DOCUMENTED: ICD-10-PCS | Mod: HCNC,CPTII,S$GLB, | Performed by: OTOLARYNGOLOGY

## 2023-02-01 PROCEDURE — 3288F FALL RISK ASSESSMENT DOCD: CPT | Mod: HCNC,CPTII,S$GLB, | Performed by: OTOLARYNGOLOGY

## 2023-02-01 PROCEDURE — 3077F PR MOST RECENT SYSTOLIC BLOOD PRESSURE >= 140 MM HG: ICD-10-PCS | Mod: HCNC,CPTII,S$GLB, | Performed by: OTOLARYNGOLOGY

## 2023-02-01 NOTE — PROGRESS NOTES
Subjective:    Here to followup after placement of ear tubes    Patient ID: Jamison Mayes is a 77 y.o. male.    Chief Complaint: Popping left ear     Jamison Mayes is a 77 y.o. male here for popping sensation in left ear.   He notes aural fullness.  Symptoms started after upper respiratory infection. He went to urgent care and was treated.   He has not had any drainage from either ear.   He feels his hearing is stable but does not that he is having a harder time discriminating certain words. He wears  bilateral hearing aids.     Interval HPI 2/1/2023:  Follow up visit. Last seen on 7/19/2022 for ETD, SNHL, and left TM perforation. He reports of decreased hearing in his left ear and tinnitus is getting worse. He had his hearing aids last adjusted a year ago. Denies ear pain and drainage.     Review of Systems   Constitutional: Negative for fever, activity change, appetite change and irritability.   HENT: Negative for congestion, ear discharge and rhinorrhea.    Respiratory: Negative for cough.      Objective:     Physical Exam   Constitutional: He appears well-developed and well-nourished.   HENT:   Right Ear: External ear, pinna and canal normal. No drainage. TM within normal limits; no perforation, effusion or masses. Normal mobility.   Left Ear: External ear, pinna and canal normal. No drainage. TM perforated.   Nose: Nose normal. No rhinorrhea, nasal discharge or congestion.   Lymphadenopathy:     He has no cervical adenopathy.   Neurological: He is alert.            Diagnostic studies:     Audiogram interpreted personally by me and discussed in detail with the patient today. Tympanometry revealed a Type A tympanogram for the right ear and could not be completed for the left ear because a seal could not be obtained. Audiogram results revealed a moderate to severe sensorineural hearing loss bilaterally.  Speech reception thresholds were obtained at 45dB for the right ear and 60dB for the left ear.  Speech  discrimination scores were 48% for the right ear and 36% for the left ear.                                Assessment:     1. Tympanic membrane perforation, left    2. ETD (Eustachian tube dysfunction), bilateral    3. Tinnitus of both ears    4. Asymmetric SNHL (sensorineural hearing loss)        Keep left ear dry.   Bilateral SNHL/Tinnitus: continue binaural hearing aids.   Follow up in 4-6 months for recheck with tympanometry.     Abbie King MD

## 2023-02-02 NOTE — PROGRESS NOTES
Jamison Mayes, a 77 y.o. male, was seen today in the clinic for an audiologic evaluation.  Patients main complaint was reduced hearing in his left ear along with tinnitus becoming more bothersome.       Audiogram results revealed a moderate to severe sensorineural hearing loss in the right ear and a moderate to profound sensorineural hearing loss in the left ear.  Speech reception thresholds were noted at 45 dB in the right ear and 60 dB in the left ear.  Speech discrimination scores were 48% in the right ear and 36% in the left ear.  Tympanometry revealed Type A in the right ear with some negative pressure noted (-130daPa) and a hermetic seal could not be obtained in the left ear.     Recommendations:  Otologic evaluation  Annual audiogram  Hearing protection when in noise  Continued use of amplification.

## 2023-02-08 DIAGNOSIS — E78.5 HYPERLIPIDEMIA ASSOCIATED WITH TYPE 2 DIABETES MELLITUS: Primary | ICD-10-CM

## 2023-02-08 DIAGNOSIS — I10 ESSENTIAL HYPERTENSION: ICD-10-CM

## 2023-02-08 DIAGNOSIS — E11.69 HYPERLIPIDEMIA ASSOCIATED WITH TYPE 2 DIABETES MELLITUS: Primary | ICD-10-CM

## 2023-02-08 DIAGNOSIS — E11.8 DM (DIABETES MELLITUS) WITH COMPLICATIONS: ICD-10-CM

## 2023-02-09 DIAGNOSIS — Z00.00 ENCOUNTER FOR MEDICARE ANNUAL WELLNESS EXAM: ICD-10-CM

## 2023-03-02 ENCOUNTER — TELEPHONE (OUTPATIENT)
Dept: OTOLARYNGOLOGY | Facility: CLINIC | Age: 78
End: 2023-03-02
Payer: MEDICARE

## 2023-03-02 NOTE — TELEPHONE ENCOUNTER
----- Message from Varsha Aguiar LPN sent at 3/1/2023  4:39 PM CST -----  Diana Leiva Staff  Caller: Kingsley (Today,  3:34 PM)  .Type:  Needs Medical Advice     Who Called: Pt   Would the patient rather a call back or a response via [facility]? call   Best Call Back Number: [phone]   Additional Information:   Pt stated he needed a copy of his last audiogram.   Pt has requested the copy be sent to his e-mail ; [email or web address]

## 2023-03-09 LAB
LEFT EYE DM RETINOPATHY: NEGATIVE
RIGHT EYE DM RETINOPATHY: NEGATIVE

## 2023-03-27 NOTE — PROGRESS NOTES
Subjective:       Patient ID: Jamison Mayes is a 78 y.o. male.    Chief Complaint: HCC    HPI    He presented to the hospital on 03/01/2018 with sudden onset of cold sweats, nausea and abdominal discomfort and was noted to have an 8.7 cm mass in the lateral segment of the left hepatic lobe that has ruptured to the liver capsule.  He was monitored and underwent a left hepatic lobectomy with cholecystectomy on 03/05/2018.      Final pathology revealed a T1b NX 7.5 cm well-differentiated hepatocellular carcinoma without vascular invasion or perineural invasion.  The tumor appeared to be confined to the liver. Hepatitis serology negative.    A CT chest with contrast on 03/03/2018 revealed a 0.3 cm left upper lobe pulmonary nodule.    He lives in [facility].  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      Interval history:  - he presents for a follow-up appointment for his hepatocellular carcinoma and pancreatic cyst.   - he underwent endoscopic ultrasound on 1/11/23.    - today, he is doing well. He denies shortness of breath, chest pain, nausea, vomiting, diarrhea, constipation.    Review of Systems   Constitutional:  Negative for fatigue, fever and unexpected weight change.   HENT:  Negative for sore throat.    Eyes:  Negative for visual disturbance.   Respiratory:  Negative for shortness of breath.    Cardiovascular:  Negative for chest pain.   Gastrointestinal:  Negative for abdominal pain.   Genitourinary:  Negative for dysuria.   Musculoskeletal:  Negative for back pain.   Skin:  Negative for rash.   Neurological:  Negative for headaches.   Hematological:  Negative for adenopathy.   Psychiatric/Behavioral:  The patient is not nervous/anxious.        Objective:      Vitals:    03/28/23 0936   BP: (!) 147/70   BP Location: Right arm   Patient Position: Sitting   BP Method: Medium (Automatic)   Pulse: 87   SpO2: 98%   Weight: 81.6  kg (179 lb 14.3 oz)     BMI: Body mass index is 29.94 kg/m².    ECOG 1  Physical Exam  Vitals and nursing note reviewed.   Constitutional:       Appearance: He is well-developed.   HENT:      Head: Normocephalic and atraumatic.   Eyes:      Pupils: Pupils are equal, round, and reactive to light.   Cardiovascular:      Rate and Rhythm: Normal rate and regular rhythm.   Pulmonary:      Effort: Pulmonary effort is normal.      Breath sounds: Normal breath sounds.   Abdominal:      General: Bowel sounds are normal.      Palpations: Abdomen is soft.   Musculoskeletal:         General: Normal range of motion.      Cervical back: Normal range of motion and neck supple.   Skin:     General: Skin is warm and dry.   Neurological:      Mental Status: He is alert and oriented to person, place, and time.   Psychiatric:         Behavior: Behavior normal.         Thought Content: Thought content normal.         Judgment: Judgment normal.      Labs have been reviewed.    Lab Results   Component Value Date    WBC 7.56 01/10/2023    HGB 12.6 (L) 01/10/2023    HCT 37.9 (L) 01/10/2023    MCV 91 01/10/2023     01/10/2023       Diagnostics:  Upper endoscopic ultrasound (1/11/23):  - Normal esophagus.                          - Normal stomach.                          - Normal examined duodenum.                          - A cystic lesion was seen in the pancreatic tail.                          - No specimens collected.     Imaging:  MRI abdomen (12/12/22): I have personally reviewed the images  MRI abdomen with and without contrast dated 12/14/2021     FINDINGS:  Pulmonary Bases: No pleural effusion.     Liver: Prior postsurgical change of left hepatectomy.  Similar heterogeneous T2 area along the margin of the anterior right lobe measuring 2.5 x 1 6 cm, previously 2.9 x 2.0 cm (series 5, image 11).  No abnormal intrahepatic diffusion restriction.  No suspicious enhancing lesion or washout.  Right portal vein and main portal vein  are patent.     Bile Ducts: Some focally dilated ducts near the hepatectomy margin, similar.  No extrahepatic biliary dilatation.     Gallbladder: Absent     Pancreas: Cystic lesion at the pancreatic tail with smooth enhancing septation measuring 1.5 cm, previously 1.1 cm (series 5, image 17).  No main pancreatic ductal dilatation.     Spleen: normal.     Proximal Gastrointestinal tract: Small hiatal hernia.  Normal upper GI tract caliber scattered colonic diverticulosis.     Mesentery: No discrete mesenteric mass.     Adrenal Glands: normal.     Kidneys: Enhance symmetrically without hydronephrosis     Aorta and Abdominal Vasculature: Incidental left-sided IVC anatomic variant.  Aorta is patent.     Lymph nodes: None pathologically enlarged     Body wall: Prior treatment change of the anterior abdominal wall.  No acute abnormality.     Other: No upper abdominal ascites.  No suspicious marrow enhancing lesion.     Impression:     Post treatment change of prior left hepatectomy.  No evidence for HCC.  Stable heterogeneous T2 structure along the anterior right lobe margin, again possible chronic postoperative collection.     Pancreatic cystic lesion at the tail with enhancing smooth septation and measures slightly larger in the interval.  Differential to include component of IPMN or other cystic neoplasm.  No main pancreatic ductal dilatation.  Further correlation advised.     Additional findings as above.    Assessment:       1. Hepatocellular carcinoma    2. Pancreatic cyst    3. Left upper lobe pulmonary nodule        Plan:     Hepatocellular carcinoma  - he had previously seen Dr. Haney.  - he had a T1b NX hepatocellular carcinoma that was resected March 2018  - Labs have been reviewed.  - MRI abdomen (12/12/22) revealed no evidence of hepatocellular carcinoma recurrence. See below regarding pancreatic cyst.  - return to clinic in December 2023 with repeat MRI.    Pancreatic cyst  - MRI abdomen (12/12/22) revealed  an increase in size of cystic lesion from 1.1 cm to 1.5 cm  - upper endoscopic ultrasound (1/11/23) revealed the cystic lesion in the pancreatic tail. Recommendation is to return to pancreas cyst clinic in January 2024 to discuss surveillance. They also recommended consideration of MRI.  - return to clinic in December 2023 with repeat MRI.    - return to clinic in December 2023 with repeat MRI.    Humza Simmons M.D.  Hematology/Oncology  [facility] - [address], [address]  [place] [address]  Phone: [phone]  Fax: [phone]

## 2023-03-28 ENCOUNTER — OFFICE VISIT (OUTPATIENT)
Dept: HEMATOLOGY/ONCOLOGY | Facility: CLINIC | Age: 78
End: 2023-03-28
Payer: MEDICARE

## 2023-03-28 VITALS
DIASTOLIC BLOOD PRESSURE: 70 MMHG | OXYGEN SATURATION: 98 % | HEART RATE: 87 BPM | BODY MASS INDEX: 29.94 KG/M2 | WEIGHT: 179.88 LBS | SYSTOLIC BLOOD PRESSURE: 147 MMHG

## 2023-03-28 DIAGNOSIS — C22.0 HEPATOCELLULAR CARCINOMA: Primary | ICD-10-CM

## 2023-03-28 DIAGNOSIS — K86.2 PANCREATIC CYST: ICD-10-CM

## 2023-03-28 DIAGNOSIS — R91.1 LEFT UPPER LOBE PULMONARY NODULE: ICD-10-CM

## 2023-03-28 PROCEDURE — 99214 PR OFFICE/OUTPT VISIT, EST, LEVL IV, 30-39 MIN: ICD-10-PCS | Mod: HCNC,S$GLB,, | Performed by: INTERNAL MEDICINE

## 2023-03-28 PROCEDURE — 3078F DIAST BP <80 MM HG: CPT | Mod: HCNC,CPTII,S$GLB, | Performed by: INTERNAL MEDICINE

## 2023-03-28 PROCEDURE — 3077F PR MOST RECENT SYSTOLIC BLOOD PRESSURE >= 140 MM HG: ICD-10-PCS | Mod: HCNC,CPTII,S$GLB, | Performed by: INTERNAL MEDICINE

## 2023-03-28 PROCEDURE — 1159F MED LIST DOCD IN RCRD: CPT | Mod: HCNC,CPTII,S$GLB, | Performed by: INTERNAL MEDICINE

## 2023-03-28 PROCEDURE — 3077F SYST BP >= 140 MM HG: CPT | Mod: HCNC,CPTII,S$GLB, | Performed by: INTERNAL MEDICINE

## 2023-03-28 PROCEDURE — 3078F PR MOST RECENT DIASTOLIC BLOOD PRESSURE < 80 MM HG: ICD-10-PCS | Mod: HCNC,CPTII,S$GLB, | Performed by: INTERNAL MEDICINE

## 2023-03-28 PROCEDURE — 1126F PR PAIN SEVERITY QUANTIFIED, NO PAIN PRESENT: ICD-10-PCS | Mod: HCNC,CPTII,S$GLB, | Performed by: INTERNAL MEDICINE

## 2023-03-28 PROCEDURE — 1101F PT FALLS ASSESS-DOCD LE1/YR: CPT | Mod: HCNC,CPTII,S$GLB, | Performed by: INTERNAL MEDICINE

## 2023-03-28 PROCEDURE — 1159F PR MEDICATION LIST DOCUMENTED IN MEDICAL RECORD: ICD-10-PCS | Mod: HCNC,CPTII,S$GLB, | Performed by: INTERNAL MEDICINE

## 2023-03-28 PROCEDURE — 3288F FALL RISK ASSESSMENT DOCD: CPT | Mod: HCNC,CPTII,S$GLB, | Performed by: INTERNAL MEDICINE

## 2023-03-28 PROCEDURE — 1126F AMNT PAIN NOTED NONE PRSNT: CPT | Mod: HCNC,CPTII,S$GLB, | Performed by: INTERNAL MEDICINE

## 2023-03-28 PROCEDURE — 99214 OFFICE O/P EST MOD 30 MIN: CPT | Mod: HCNC,S$GLB,, | Performed by: INTERNAL MEDICINE

## 2023-03-28 PROCEDURE — 99999 PR PBB SHADOW E&M-EST. PATIENT-LVL IV: ICD-10-PCS | Mod: PBBFAC,HCNC,, | Performed by: INTERNAL MEDICINE

## 2023-03-28 PROCEDURE — 99499 UNLISTED E&M SERVICE: CPT | Mod: HCNC,S$GLB,, | Performed by: INTERNAL MEDICINE

## 2023-03-28 PROCEDURE — 1101F PR PT FALLS ASSESS DOC 0-1 FALLS W/OUT INJ PAST YR: ICD-10-PCS | Mod: HCNC,CPTII,S$GLB, | Performed by: INTERNAL MEDICINE

## 2023-03-28 PROCEDURE — 1160F PR REVIEW ALL MEDS BY PRESCRIBER/CLIN PHARMACIST DOCUMENTED: ICD-10-PCS | Mod: HCNC,CPTII,S$GLB, | Performed by: INTERNAL MEDICINE

## 2023-03-28 PROCEDURE — 3288F PR FALLS RISK ASSESSMENT DOCUMENTED: ICD-10-PCS | Mod: HCNC,CPTII,S$GLB, | Performed by: INTERNAL MEDICINE

## 2023-03-28 PROCEDURE — 99499 RISK ADDL DX/OHS AUDIT: ICD-10-PCS | Mod: HCNC,S$GLB,, | Performed by: INTERNAL MEDICINE

## 2023-03-28 PROCEDURE — 1160F RVW MEDS BY RX/DR IN RCRD: CPT | Mod: HCNC,CPTII,S$GLB, | Performed by: INTERNAL MEDICINE

## 2023-03-28 PROCEDURE — 99999 PR PBB SHADOW E&M-EST. PATIENT-LVL IV: CPT | Mod: PBBFAC,HCNC,, | Performed by: INTERNAL MEDICINE

## 2023-03-31 ENCOUNTER — OFFICE VISIT (OUTPATIENT)
Dept: DERMATOLOGY | Facility: CLINIC | Age: 78
End: 2023-03-31
Payer: MEDICARE

## 2023-03-31 DIAGNOSIS — D22.9 MULTIPLE BENIGN NEVI: ICD-10-CM

## 2023-03-31 DIAGNOSIS — D18.01 CHERRY ANGIOMA: ICD-10-CM

## 2023-03-31 DIAGNOSIS — Z12.83 SKIN CANCER SCREENING: ICD-10-CM

## 2023-03-31 DIAGNOSIS — Z85.828 HISTORY OF NONMELANOMA SKIN CANCER: ICD-10-CM

## 2023-03-31 DIAGNOSIS — L82.1 SK (SEBORRHEIC KERATOSIS): ICD-10-CM

## 2023-03-31 DIAGNOSIS — L57.0 AK (ACTINIC KERATOSIS): Primary | ICD-10-CM

## 2023-03-31 PROCEDURE — 1126F PR PAIN SEVERITY QUANTIFIED, NO PAIN PRESENT: ICD-10-PCS | Mod: HCNC,CPTII,S$GLB, | Performed by: DERMATOLOGY

## 2023-03-31 PROCEDURE — 1126F AMNT PAIN NOTED NONE PRSNT: CPT | Mod: HCNC,CPTII,S$GLB, | Performed by: DERMATOLOGY

## 2023-03-31 PROCEDURE — 17000 PR DESTRUCTION(LASER SURGERY,CRYOSURGERY,CHEMOSURGERY),PREMALIGNANT LESIONS,FIRST LESION: ICD-10-PCS | Mod: HCNC,S$GLB,, | Performed by: DERMATOLOGY

## 2023-03-31 PROCEDURE — 1160F RVW MEDS BY RX/DR IN RCRD: CPT | Mod: HCNC,CPTII,S$GLB, | Performed by: DERMATOLOGY

## 2023-03-31 PROCEDURE — 1101F PT FALLS ASSESS-DOCD LE1/YR: CPT | Mod: HCNC,CPTII,S$GLB, | Performed by: DERMATOLOGY

## 2023-03-31 PROCEDURE — 1159F MED LIST DOCD IN RCRD: CPT | Mod: HCNC,CPTII,S$GLB, | Performed by: DERMATOLOGY

## 2023-03-31 PROCEDURE — 99999 PR PBB SHADOW E&M-EST. PATIENT-LVL III: ICD-10-PCS | Mod: PBBFAC,HCNC,, | Performed by: DERMATOLOGY

## 2023-03-31 PROCEDURE — 1159F PR MEDICATION LIST DOCUMENTED IN MEDICAL RECORD: ICD-10-PCS | Mod: HCNC,CPTII,S$GLB, | Performed by: DERMATOLOGY

## 2023-03-31 PROCEDURE — 17003 DESTRUCTION, PREMALIGNANT LESIONS; SECOND THROUGH 14 LESIONS: ICD-10-PCS | Mod: HCNC,S$GLB,, | Performed by: DERMATOLOGY

## 2023-03-31 PROCEDURE — 17000 DESTRUCT PREMALG LESION: CPT | Mod: HCNC,S$GLB,, | Performed by: DERMATOLOGY

## 2023-03-31 PROCEDURE — 1160F PR REVIEW ALL MEDS BY PRESCRIBER/CLIN PHARMACIST DOCUMENTED: ICD-10-PCS | Mod: HCNC,CPTII,S$GLB, | Performed by: DERMATOLOGY

## 2023-03-31 PROCEDURE — 99213 PR OFFICE/OUTPT VISIT, EST, LEVL III, 20-29 MIN: ICD-10-PCS | Mod: 25,HCNC,S$GLB, | Performed by: DERMATOLOGY

## 2023-03-31 PROCEDURE — 99999 PR PBB SHADOW E&M-EST. PATIENT-LVL III: CPT | Mod: PBBFAC,HCNC,, | Performed by: DERMATOLOGY

## 2023-03-31 PROCEDURE — 3288F FALL RISK ASSESSMENT DOCD: CPT | Mod: HCNC,CPTII,S$GLB, | Performed by: DERMATOLOGY

## 2023-03-31 PROCEDURE — 99213 OFFICE O/P EST LOW 20 MIN: CPT | Mod: 25,HCNC,S$GLB, | Performed by: DERMATOLOGY

## 2023-03-31 PROCEDURE — 17003 DESTRUCT PREMALG LES 2-14: CPT | Mod: HCNC,S$GLB,, | Performed by: DERMATOLOGY

## 2023-03-31 PROCEDURE — 3288F PR FALLS RISK ASSESSMENT DOCUMENTED: ICD-10-PCS | Mod: HCNC,CPTII,S$GLB, | Performed by: DERMATOLOGY

## 2023-03-31 PROCEDURE — 1101F PR PT FALLS ASSESS DOC 0-1 FALLS W/OUT INJ PAST YR: ICD-10-PCS | Mod: HCNC,CPTII,S$GLB, | Performed by: DERMATOLOGY

## 2023-03-31 NOTE — PATIENT INSTRUCTIONS
CRYOSURGERY      Your doctor has used a method called cryosurgery to treat your skin condition. Cryosurgery refers to the use of very cold substances to treat a variety of skin conditions such as warts, pre-skin cancers, molluscum contagiosum, sun spots, and several benign growths. The substance we use in cryosurgery is liquid nitrogen and is so cold (-195 degrees Celsius) that is burns when administered.     Following treatment in the office, the skin may immediately burn and become red. You may find the area around the lesion is affected as well. It is sometimes necessary to treat not only the lesion, but a small area of the surrounding normal skin to achieve a good response.     A blister, and even a blood filled blister, may form after treatment.   This is a normal response. If the blister is painful, it is acceptable to sterilize a needle and with rubbing alcohol and gently pop the blister. It is important that you gently wash the area with soap and warm water as the blister fluid may contain wart virus if a wart was treated. Do no remove the roof of the blister.     The area treated can take anywhere from 1-3 weeks to heal. Healing time depends on the kind skin lesion treated, the location, and how aggressively the lesion was treated. It is recommended that the areas treated are covered with Vaseline or bacitracin ointment and a band-aid. If a band-aid is not practical, just ointment applied several times per day will do. Keeping these areas moist will speed the healing time.    Treatment with liquid nitrogen can leave a scar. In dark skin, it may be a light or dark scar, in light skin it may be a white or pink scar. These will generally fade with time, but may never go away completely.     If you have any concerns after your treatment, please feel free to call the office.       [address] [place] [address]/ [phone] FAX/ [email or web address] Sun Protection      The [facility] would like to remind you of the importance of sun protection all year round and particularly during the summer when the suns rays are the strongest. It has been proven that both acute and chronic sun exposure damages our cells and leads to skin cancer. Beyond skin cancer, the sun causes 90% of the symptoms of premature skin aging, including wrinkles, lentigines (brown spots), and thin, easily bruised skin. Proper sun protection can help prevent these unwanted conditions.    Many patients report that they dont go in the sun. It has been shown that the average person receives 18 hours of incidental sun exposure per week during activities such as walking through parking lots, driving, or sitting next to windows. This accumulates to several bad sunburns per year!    In choosing sunscreen, you want one that protects against both UVA and UVB rays (broad spectrum). It is recommended that you use one of SPF 30 or higher. It is important to apply the sunscreen about 20 minutes prior to sun exposure. Most sunscreens are chemical sunscreens and a reaction must take place in the skin so that they are effective. If they are applied and then you are immediately exposed to the sun or start sweating, this reaction has not had time to take place and you are therefore unprotected. Sunscreen needs to be reapplied every 2 hours if you are participating in water sports or sweating. We recommend Elta MD or CeraVe sunscreens for daily use; however there are many options and it is most important for you to find one that you will use on a consistent basis.    If you have sensitive skin, you may do best with a sunscreen that contains only physical blockers in the active ingredient section. The only physical blockers available in the [place] currently are titanium dioxide or zinc oxide. These are typically thicker and harder to apply, however they afford very good protection. Neutrogena Sensitive Skin, Blue Lizard  Sensitive Skin (pink top) or Neutrogena Pure and Free are popular ones.     Aside from sunscreen, clothes with UV protection (UPF), wide brimmed hats, and sunglasses are other means of sun protection that we recommend.      Based on a recent study (6/2021) and out of an abundance of caution, we are recommending that you AVOID the following sunscreens as they may contain the carcinogen, benzene:    Spray and gel sunscreens  Any [facility] or [employer] brands as well as Max Block and TopCare brands   Neutrogena Ultra Sheer Dry-touch Water Resistant Sunscreen LOTION SPF 70   Neutrogena Sheer Zinc Dry-touch Face Sunscreen LOTION SPF 50   5.   Aveeno Baby Continuous Protection Sensitive Skin Sunscreen LOTION - Broad Spectrum SPF 50    Please note that Benzene is not an ingredient or the degradation product of any ingredient in any sunscreen. This study suggested that the findings are a result of contamination in the manufacturing process. At this point, we don't know how effectively Benzene gets through the skin, if it gets absorbed systemically, and what effects it may have.     We do know that ultraviolet radiation is a well-established carcinogen. Please use daily sun protection/avoidance and use of at least SPF 30, broad-spectrum sunscreen not listed above.                       [facility]  [facility] [address] [address]  [facility] [address]  Dept: [phone]  Dept Fax: [phone]

## 2023-03-31 NOTE — PROGRESS NOTES
Subjective:      Patient ID:  Jamison Mayes is a 78 y.o. male who presents for   Chief Complaint   Patient presents with    Follow-up     L-ear bx      Follow-up - Follow-up  Diagnosis: Skin Infection.  Symptom course: resolved  Currently using: ciprofloxacin HCl (CIPRO) 500 MG tablet, finished course.  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 Was treated as above with cipro for cx-proven pseudomonas with improvement. Pt subsequently used the Efudex x 3-4 wks with a good response.    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     Has previously used cryo, efudex, and PDT for AKs.    Review of Systems   Constitutional:  Negative for fever and chills.   HENT:  Negative for sore throat.    Respiratory:  Negative for cough.    Skin:  Positive for activity-related sunscreen use and wears hat (Activities). Negative for daily sunscreen use and recent sunburn.   Hematologic/Lymphatic: Bruises/bleeds easily.     Objective:   Physical Exam   Constitutional: He appears well-developed and well-nourished. No distress.   Neurological: He is alert and oriented to person, place, and time. He is not disoriented.   Psychiatric: He has a normal mood and affect.   Skin:   Areas Examined (abnormalities noted in diagram):   Scalp / Hair Palpated and Inspected  Head / Face Inspection Performed  Neck Inspection Performed  Chest / Axilla Inspection Performed  Abdomen Inspection Performed  Back Inspection Performed  RUE Inspected  LUE Inspection Performed               Diagram Legend      Erythematous scaling macule/papule c/w actinic keratosis       Vascular papule c/w angioma      Pigmented verrucoid papule/plaque c/w seborrheic keratosis      Yellow umbilicated papule c/w sebaceous hyperplasia      Irregularly shaped tan macule c/w lentigo     1-2 mm smooth white papules consistent with Milia      Movable subcutaneous cyst with punctum c/w epidermal inclusion cyst      Subcutaneous movable cyst c/w pilar cyst      Firm pink to brown papule c/w dermatofibroma      Pedunculated fleshy papule(s) c/w skin tag(s)      Evenly pigmented macule c/w junctional nevus     Mildly variegated pigmented, slightly irregular-bordered macule c/w mildly atypical nevus      Flesh colored to evenly pigmented papule c/w intradermal nevus       Pink pearly papule/plaque c/w basal cell carcinoma      Erythematous hyperkeratotic cursted plaque c/w SCC      Surgical scar with no sign of skin cancer recurrence      Open and closed comedones      Inflammatory papules and pustules      Verrucoid papule consistent consistent with wart     Erythematous eczematous patches and plaques     Dystrophic onycholytic nail with subungual debris c/w onychomycosis     Umbilicated papule    Erythematous-base heme-crusted tan verrucoid plaque consistent with inflamed seborrheic keratosis     Erythematous Silvery Scaling Plaque c/w Psoriasis     See annotation      Assessment / Plan:        AK (actinic keratosis)  Cryosurgery Procedure Note    Verbal consent from the patient is obtained including, but not limited to, risk of hypopigmentation/hyperpigmentation, scar, recurrence of lesion. The patient is aware of the precancerous quality and need for treatment of these lesions. Liquid nitrogen cryosurgery is applied to the 11 actinic keratoses, as detailed in the physical exam, to produce a freeze injury. The patient is aware that blisters may form and is instructed on wound care with gentle cleansing and use of vaseline ointment to keep moist  until healed. The patient is supplied a handout on cryosurgery and is instructed to call if lesions do not completely resolve.    SK (seborrheic keratosis)  These are benign inherited growths without a malignant potential. Reassurance given to patient. No treatment is necessary.   Treatment of benign, asymptomatic lesions may be considered cosmetic.  Warned about risk of hypo- or hyperpigmentation with treatment and risk of recurrence.    Cherry angioma  This is a benign vascular lesion. Reassurance given. No treatment required. Treatment of benign, asymptomatic lesions may be considered cosmetic.    Skin cancer screening  History of nonmelanoma skin cancer  Upper body skin examination performed today including at least 6 points as noted in physical examination. No lesions suspicious for malignancy noted.  Patient instructed in importance of daily broad spectrum sunscreen use with spf at least 30. Sun avoidance and topical protection/protective clothing discussed.    Multiple benign nevi  Benign-appearing nevi present on exam today. Reassurance provided. Periodically examine moles and return to clinic if any moles change or become symptomatic (bleeding, itching, pain, etc).    Follow up in about 4 months (around 7/31/2023) for skin check or sooner for any concerns.

## 2023-06-01 NOTE — PROGRESS NOTES
Subjective:    Here to followup after placement of ear tubes    Patient ID: Jamison Mayes is a 78 y.o. male.    Chief Complaint: Popping left ear     Jamison Mayes is a 78 y.o. male here for popping sensation in left ear.   He notes aural fullness.  Symptoms started after upper respiratory infection. He went to urgent care and was treated.   He has not had any drainage from either ear.   He feels his hearing is stable but does not that he is having a harder time discriminating certain words. He wears  bilateral hearing aids.     Interval HPI 6/2/2023:  Follow up visit. Last seen on 2/1/2023 for ETD, SNHL, and left TM perforation. He reports of decreased hearing in his left ear and tinnitus is getting worse. He had his hearing aids last adjusted a year ago. Denies ear pain and drainage. He has been dizziness episodes which last approximately 5 seconds at a time upon lying down in bed or getting up out of bed.  The dizziness does not seem to occur at side of the scenario.     Review of Systems   Constitutional: Negative for fever, activity change, appetite change and irritability.   HENT: Negative for congestion, ear discharge and rhinorrhea.    Respiratory: Negative for cough.      Objective:     Physical Exam   Constitutional: He appears well-developed and well-nourished.   HENT:   Right Ear: External ear, pinna and canal normal. No drainage. TM within normal limits; no perforation, effusion or masses. Normal mobility.   Left Ear: External ear, pinna and canal normal. No drainage. TM perforated.   Dixx Hallpike + to the right  Nose: Nose normal. No rhinorrhea, nasal discharge or congestion.   Lymphadenopathy:     He has no cervical adenopathy.   Neurological: He is alert.            Diagnostic studies:     Audiogram interpreted personally by me and discussed in detail with the patient today. Audiogram results revealed a moderate to severe sensorineural hearing loss bilaterally.  Speech reception thresholds  were noted at 50 dB in the right ear and 60 dB in the left ear.  Speech discrimination scores were 44% in the right ear and 32% in the left ear.  Tympanometry revealed Type A in the right ear and a hermetic seal could not be obtained for the left ear.                            Assessment:     1. Tympanic membrane perforation, left    2. Tinnitus of both ears    3. Sensorineural hearing loss (SNHL) of right ear with restricted hearing of left ear    4. BPPV (benign paroxysmal positional vertigo), right          Keep left ear dry.   Bilateral SNHL/Tinnitus: continue binaural hearing aids.   We had a long discussion regarding the relevant anatomy and pathology relevant to BPPV.  We discussed that in the ear there are three semicircular canals that detect rotational movement.  BPPV occurs as a result of otoconia, tiny crystals of calcium carbonate that are a normal part of the inner ears anatomy, detaching from their normal anatomic position and collecting in one of the semicircular canals.  When the head moves, the otoconia shift. This stimulates the cupula to send false signals to the brain, producing vertigo and triggering nystagmus (involuntary eye movements).  I have performed CRM of the right ear and gave the patient necessary post-procedure instructions at that time.    Follow up in 6 months for recheck with tympanometry.     Abbie King MD

## 2023-06-02 ENCOUNTER — OFFICE VISIT (OUTPATIENT)
Dept: OTOLARYNGOLOGY | Facility: CLINIC | Age: 78
End: 2023-06-02
Payer: MEDICARE

## 2023-06-02 ENCOUNTER — CLINICAL SUPPORT (OUTPATIENT)
Dept: OTOLARYNGOLOGY | Facility: CLINIC | Age: 78
End: 2023-06-02
Payer: MEDICARE

## 2023-06-02 VITALS
BODY MASS INDEX: 30.21 KG/M2 | WEIGHT: 181.56 LBS | DIASTOLIC BLOOD PRESSURE: 68 MMHG | SYSTOLIC BLOOD PRESSURE: 142 MMHG | HEART RATE: 63 BPM

## 2023-06-02 DIAGNOSIS — H90.A21 SENSORINEURAL HEARING LOSS (SNHL) OF RIGHT EAR WITH RESTRICTED HEARING OF LEFT EAR: Chronic | ICD-10-CM

## 2023-06-02 DIAGNOSIS — H93.13 TINNITUS OF BOTH EARS: Chronic | ICD-10-CM

## 2023-06-02 DIAGNOSIS — H90.3 SENSORINEURAL HEARING LOSS, BILATERAL: Primary | ICD-10-CM

## 2023-06-02 DIAGNOSIS — H72.92 TYMPANIC MEMBRANE PERFORATION, LEFT: Primary | ICD-10-CM

## 2023-06-02 DIAGNOSIS — H81.11 BPPV (BENIGN PAROXYSMAL POSITIONAL VERTIGO), RIGHT: ICD-10-CM

## 2023-06-02 PROCEDURE — 99999 PR PBB SHADOW E&M-EST. PATIENT-LVL IV: ICD-10-PCS | Mod: PBBFAC,,, | Performed by: OTOLARYNGOLOGY

## 2023-06-02 PROCEDURE — 3288F FALL RISK ASSESSMENT DOCD: CPT | Mod: CPTII,S$GLB,, | Performed by: OTOLARYNGOLOGY

## 2023-06-02 PROCEDURE — 99214 OFFICE O/P EST MOD 30 MIN: CPT | Mod: 25,S$GLB,, | Performed by: OTOLARYNGOLOGY

## 2023-06-02 PROCEDURE — 95992 CANALITH REPOSITIONING PROC: CPT | Mod: S$GLB,,, | Performed by: OTOLARYNGOLOGY

## 2023-06-02 PROCEDURE — 92557 COMPREHENSIVE HEARING TEST: CPT | Mod: S$GLB,,, | Performed by: PHYSICIAN ASSISTANT

## 2023-06-02 PROCEDURE — 99999 PR PBB SHADOW E&M-EST. PATIENT-LVL IV: CPT | Mod: PBBFAC,,, | Performed by: OTOLARYNGOLOGY

## 2023-06-02 PROCEDURE — 92557 PR COMPREHENSIVE HEARING TEST: ICD-10-PCS | Mod: S$GLB,,, | Performed by: PHYSICIAN ASSISTANT

## 2023-06-02 PROCEDURE — 1126F PR PAIN SEVERITY QUANTIFIED, NO PAIN PRESENT: ICD-10-PCS | Mod: CPTII,S$GLB,, | Performed by: OTOLARYNGOLOGY

## 2023-06-02 PROCEDURE — 1101F PT FALLS ASSESS-DOCD LE1/YR: CPT | Mod: CPTII,S$GLB,, | Performed by: OTOLARYNGOLOGY

## 2023-06-02 PROCEDURE — 95992 PR CANALITH REPOSITIONING PROCEDURE, PER DAY: ICD-10-PCS | Mod: S$GLB,,, | Performed by: OTOLARYNGOLOGY

## 2023-06-02 PROCEDURE — 1101F PR PT FALLS ASSESS DOC 0-1 FALLS W/OUT INJ PAST YR: ICD-10-PCS | Mod: CPTII,S$GLB,, | Performed by: OTOLARYNGOLOGY

## 2023-06-02 PROCEDURE — 99214 PR OFFICE/OUTPT VISIT, EST, LEVL IV, 30-39 MIN: ICD-10-PCS | Mod: 25,S$GLB,, | Performed by: OTOLARYNGOLOGY

## 2023-06-02 PROCEDURE — 1126F AMNT PAIN NOTED NONE PRSNT: CPT | Mod: CPTII,S$GLB,, | Performed by: OTOLARYNGOLOGY

## 2023-06-02 PROCEDURE — 92567 PR TYMPA2METRY: ICD-10-PCS | Mod: S$GLB,,, | Performed by: PHYSICIAN ASSISTANT

## 2023-06-02 PROCEDURE — 1160F PR REVIEW ALL MEDS BY PRESCRIBER/CLIN PHARMACIST DOCUMENTED: ICD-10-PCS | Mod: CPTII,S$GLB,, | Performed by: OTOLARYNGOLOGY

## 2023-06-02 PROCEDURE — 1159F MED LIST DOCD IN RCRD: CPT | Mod: CPTII,S$GLB,, | Performed by: OTOLARYNGOLOGY

## 2023-06-02 PROCEDURE — 3078F DIAST BP <80 MM HG: CPT | Mod: CPTII,S$GLB,, | Performed by: OTOLARYNGOLOGY

## 2023-06-02 PROCEDURE — 3077F SYST BP >= 140 MM HG: CPT | Mod: CPTII,S$GLB,, | Performed by: OTOLARYNGOLOGY

## 2023-06-02 PROCEDURE — 3078F PR MOST RECENT DIASTOLIC BLOOD PRESSURE < 80 MM HG: ICD-10-PCS | Mod: CPTII,S$GLB,, | Performed by: OTOLARYNGOLOGY

## 2023-06-02 PROCEDURE — 3077F PR MOST RECENT SYSTOLIC BLOOD PRESSURE >= 140 MM HG: ICD-10-PCS | Mod: CPTII,S$GLB,, | Performed by: OTOLARYNGOLOGY

## 2023-06-02 PROCEDURE — 92567 TYMPANOMETRY: CPT | Mod: S$GLB,,, | Performed by: PHYSICIAN ASSISTANT

## 2023-06-02 PROCEDURE — 1160F RVW MEDS BY RX/DR IN RCRD: CPT | Mod: CPTII,S$GLB,, | Performed by: OTOLARYNGOLOGY

## 2023-06-02 PROCEDURE — 3288F PR FALLS RISK ASSESSMENT DOCUMENTED: ICD-10-PCS | Mod: CPTII,S$GLB,, | Performed by: OTOLARYNGOLOGY

## 2023-06-02 PROCEDURE — 1159F PR MEDICATION LIST DOCUMENTED IN MEDICAL RECORD: ICD-10-PCS | Mod: CPTII,S$GLB,, | Performed by: OTOLARYNGOLOGY

## 2023-06-02 NOTE — PROGRESS NOTES
Jamison Mayes, a 78 y.o. male, was seen today in the clinic for an audiologic evaluation.  Patients main complaint was hearing loss.  Mr. Mayes feels his hearing has decreased in the recent past.      Audiogram results revealed a moderate to severe sensorineural hearing loss bilaterally.  Speech reception thresholds were noted at 50 dB in the right ear and 60 dB in the left ear.  Speech discrimination scores were 44% in the right ear and 32% in the left ear.  Tympanometry revealed Type A in the right ear and a hermetic seal could not be obtained for the left ear.       Recommendations:  Otologic evaluation  Annual audiogram  Hearing protection when in noise  Continued and consistent use of amplification

## 2023-06-02 NOTE — PROCEDURES
Procedures      Jamison Mayes was seen 06/02/2023 for Epley maneuver for BPPV in the right ear.      A 5-position Epley maneuver was performed for the right.  Patient tolerated the maneuver well and was asymptomatic upon discharge.  No nystagmus seen on post-procedure examination.  Post-Epley home instructions were reviewed and given to the patient.  Understanding was voiced.

## 2023-06-02 NOTE — PATIENT INSTRUCTIONS
"  Patient Instruction After Office Treatments (Epley or Semont maneuvers)    After the maneuver is performed wait 10 minutes before going home to avoid quick spins. It takes time for the debris to settle in the correct location (think of a snow globe). Avoid driving yourself home.        Sleep semi-recumbent for the next few nights.  Sleep at a 45 degree angle (eg. Chair).  Stay vertical during the day.  Do not got to the dentist or hairdresser.       No exercise for a week.    For one week, avoid any head movements that could provoke your dizziness.  Use a couple of pillows when you sleep.  Avoid sleeping on the "bad" side.  Avoid quick head movements. Act as if you're wearing an  invisible neck brace.    Wait at least 7 days before doing the Lee-Daroff exercise or home epley maneuver unless otherwise instructed by your physician. Complete the exercises 3 times before bed that way if you are dizzy symptoms can resolve while sleeping. Repeat every night for a week.    At one week post-treatment, put yourself in the position that usually makes you dizzy under conditions in which you can't fall or hurt yourself. Let your doctor know how you did.                                    HOME TREATMENT OF BPPV:    The Lee-Daroff Exercises are a method of treating BPPV, usually used when the office treatment fails. They succeed in 95% of cases but are more arduous than the office treatments. These exercises are performed in three sets per day for two weeks. In each set, one performs the maneuver as shown five times.     1 repetition = maneuver done to each side in turn (takes 2 minutes)     Suggested Schedule for Lee-Daroff exercises   Time Exercise Duration   Morning 5 repetitions 10 minutes   Noon 5 repetitions 10 minutes   Evening 5 repetitions 10 minutes     Start sitting upright (position 1). Then move into the side-lying position (position 2), with the head angled upward about alf. An easy way to remember " this is to imagine someone standing about 6 feet in front of you, and just keep looking at their head at all times. Stay in the side-lying position for 30 seconds, or until the dizziness subsides if this is longer, then go back to the sitting position (position 3). Stay there for 30 seconds, and then go to the opposite side (position 4) and follow the same routine..    These exercises should be performed for two weeks, three times per day, or for three weeks, twice per day. This adds up to 52 sets in total. In most persons, complete relief from symptoms is obtained after 30 sets, or about 10 days. In approximately 30 percent of patients, BPPV will recur within one year. If BPPV recurs, you may wish to add one 10-minute exercise to your daily routine (Hannah et al, 1999). The Lee-Daroff exercises as well as the Semont and Epley maneuvers are compared in an article by Jesus (1994), listed in the reference section.

## 2023-07-14 ENCOUNTER — OFFICE VISIT (OUTPATIENT)
Dept: DERMATOLOGY | Facility: CLINIC | Age: 78
End: 2023-07-14
Payer: MEDICARE

## 2023-07-14 DIAGNOSIS — L82.1 SK (SEBORRHEIC KERATOSIS): ICD-10-CM

## 2023-07-14 DIAGNOSIS — L57.0 AK (ACTINIC KERATOSIS): Primary | ICD-10-CM

## 2023-07-14 DIAGNOSIS — Z12.83 SKIN CANCER SCREENING: ICD-10-CM

## 2023-07-14 DIAGNOSIS — Z85.828 HISTORY OF NONMELANOMA SKIN CANCER: ICD-10-CM

## 2023-07-14 DIAGNOSIS — D18.01 CHERRY ANGIOMA: ICD-10-CM

## 2023-07-14 PROCEDURE — 1159F MED LIST DOCD IN RCRD: CPT | Mod: HCNC,CPTII,S$GLB, | Performed by: DERMATOLOGY

## 2023-07-14 PROCEDURE — 99999 PR PBB SHADOW E&M-EST. PATIENT-LVL III: CPT | Mod: PBBFAC,HCNC,, | Performed by: DERMATOLOGY

## 2023-07-14 PROCEDURE — 99213 OFFICE O/P EST LOW 20 MIN: CPT | Mod: 25,HCNC,S$GLB, | Performed by: DERMATOLOGY

## 2023-07-14 PROCEDURE — 1101F PT FALLS ASSESS-DOCD LE1/YR: CPT | Mod: HCNC,CPTII,S$GLB, | Performed by: DERMATOLOGY

## 2023-07-14 PROCEDURE — 1159F PR MEDICATION LIST DOCUMENTED IN MEDICAL RECORD: ICD-10-PCS | Mod: HCNC,CPTII,S$GLB, | Performed by: DERMATOLOGY

## 2023-07-14 PROCEDURE — 99213 PR OFFICE/OUTPT VISIT, EST, LEVL III, 20-29 MIN: ICD-10-PCS | Mod: 25,HCNC,S$GLB, | Performed by: DERMATOLOGY

## 2023-07-14 PROCEDURE — 17000 PR DESTRUCTION(LASER SURGERY,CRYOSURGERY,CHEMOSURGERY),PREMALIGNANT LESIONS,FIRST LESION: ICD-10-PCS | Mod: HCNC,S$GLB,, | Performed by: DERMATOLOGY

## 2023-07-14 PROCEDURE — 1160F RVW MEDS BY RX/DR IN RCRD: CPT | Mod: HCNC,CPTII,S$GLB, | Performed by: DERMATOLOGY

## 2023-07-14 PROCEDURE — 1126F PR PAIN SEVERITY QUANTIFIED, NO PAIN PRESENT: ICD-10-PCS | Mod: HCNC,CPTII,S$GLB, | Performed by: DERMATOLOGY

## 2023-07-14 PROCEDURE — 1160F PR REVIEW ALL MEDS BY PRESCRIBER/CLIN PHARMACIST DOCUMENTED: ICD-10-PCS | Mod: HCNC,CPTII,S$GLB, | Performed by: DERMATOLOGY

## 2023-07-14 PROCEDURE — 1101F PR PT FALLS ASSESS DOC 0-1 FALLS W/OUT INJ PAST YR: ICD-10-PCS | Mod: HCNC,CPTII,S$GLB, | Performed by: DERMATOLOGY

## 2023-07-14 PROCEDURE — 17003 DESTRUCT PREMALG LES 2-14: CPT | Mod: HCNC,S$GLB,, | Performed by: DERMATOLOGY

## 2023-07-14 PROCEDURE — 17000 DESTRUCT PREMALG LESION: CPT | Mod: HCNC,S$GLB,, | Performed by: DERMATOLOGY

## 2023-07-14 PROCEDURE — 1126F AMNT PAIN NOTED NONE PRSNT: CPT | Mod: HCNC,CPTII,S$GLB, | Performed by: DERMATOLOGY

## 2023-07-14 PROCEDURE — 99999 PR PBB SHADOW E&M-EST. PATIENT-LVL III: ICD-10-PCS | Mod: PBBFAC,HCNC,, | Performed by: DERMATOLOGY

## 2023-07-14 PROCEDURE — 3288F PR FALLS RISK ASSESSMENT DOCUMENTED: ICD-10-PCS | Mod: HCNC,CPTII,S$GLB, | Performed by: DERMATOLOGY

## 2023-07-14 PROCEDURE — 17003 DESTRUCTION, PREMALIGNANT LESIONS; SECOND THROUGH 14 LESIONS: ICD-10-PCS | Mod: HCNC,S$GLB,, | Performed by: DERMATOLOGY

## 2023-07-14 PROCEDURE — 3288F FALL RISK ASSESSMENT DOCD: CPT | Mod: HCNC,CPTII,S$GLB, | Performed by: DERMATOLOGY

## 2023-07-14 NOTE — PROGRESS NOTES
Subjective:      Patient ID:  Jamison Mayes is a 78 y.o. male who presents for   Chief Complaint   Patient presents with    Skin Check     History of Present Illness: The patient presents for follow up of skin check.    The patient was last seen on: 3/31/2023 for cryosurgery to actinic keratoses which have resolved.   This is a high risk patient here to check for the development of new lesions.  Hx SCC.    Other skin complaints: none    Patient has a history of non-melanoma skin cancer and is here today for routine skin check.   Has previously used cryo, Efudex, and PDT for AKs.    Review of Systems   Constitutional:  Negative for fever and chills.   Skin:  Negative for itching and rash.   Hematologic/Lymphatic: Bruises/bleeds easily (aspirin).     Objective:   Physical Exam   Constitutional: He appears well-developed and well-nourished. No distress.   Neurological: He is alert and oriented to person, place, and time. He is not disoriented.   Psychiatric: He has a normal mood and affect.   Skin:   Areas Examined (abnormalities noted in diagram):   Scalp / Hair Palpated and Inspected  Head / Face Inspection Performed  Neck Inspection Performed  Chest / Axilla Inspection Performed  Abdomen Inspection Performed  Back Inspection Performed  RUE Inspected  LUE Inspection Performed               Diagram Legend     Erythematous scaling macule/papule c/w actinic keratosis       Vascular papule c/w angioma      Pigmented verrucoid papule/plaque c/w seborrheic keratosis      Yellow umbilicated papule c/w sebaceous hyperplasia      Irregularly shaped tan macule c/w lentigo     1-2 mm smooth white papules consistent with Milia      Movable subcutaneous cyst with punctum c/w epidermal inclusion cyst      Subcutaneous movable cyst c/w pilar cyst      Firm pink to brown papule c/w dermatofibroma      Pedunculated fleshy papule(s) c/w skin tag(s)      Evenly pigmented macule c/w junctional nevus     Mildly variegated pigmented,  slightly irregular-bordered macule c/w mildly atypical nevus      Flesh colored to evenly pigmented papule c/w intradermal nevus       Pink pearly papule/plaque c/w basal cell carcinoma      Erythematous hyperkeratotic cursted plaque c/w SCC      Surgical scar with no sign of skin cancer recurrence      Open and closed comedones      Inflammatory papules and pustules      Verrucoid papule consistent consistent with wart     Erythematous eczematous patches and plaques     Dystrophic onycholytic nail with subungual debris c/w onychomycosis     Umbilicated papule    Erythematous-base heme-crusted tan verrucoid plaque consistent with inflamed seborrheic keratosis     Erythematous Silvery Scaling Plaque c/w Psoriasis     See annotation      Assessment / Plan:        AK (actinic keratosis)  Cryosurgery Procedure Note    Verbal consent from the patient is obtained including, but not limited to, risk of hypopigmentation/hyperpigmentation, scar, recurrence of lesion. The patient is aware of the precancerous quality and need for treatment of these lesions. Liquid nitrogen cryosurgery is applied to the 13 actinic keratoses, as detailed in the physical exam, to produce a freeze injury. The patient is aware that blisters may form and is instructed on wound care with gentle cleansing and use of vaseline ointment to keep moist until healed. The patient is supplied a handout on cryosurgery and is instructed to call if lesions do not completely resolve.    SK (seborrheic keratosis)  These are benign inherited growths without a malignant potential. Reassurance given to patient. No treatment is necessary.   Treatment of benign, asymptomatic lesions may be considered cosmetic.  Warned about risk of hypo- or hyperpigmentation with treatment and risk of recurrence.    Cherry angioma  This is a benign vascular lesion. Reassurance given. No treatment required. Treatment of benign, asymptomatic lesions may be considered cosmetic.    Skin  cancer screening  History of nonmelanoma skin cancer  Upper body skin examination performed today including at least 6 points as noted in physical examination. No lesions suspicious for malignancy noted.  Patient instructed in importance of daily broad spectrum sunscreen use with spf at least 30. Sun avoidance and topical protection/protective clothing discussed.    Follow up in about 6 months (around 1/14/2024) for skin check or sooner for any concerns.

## 2023-07-16 NOTE — PATIENT INSTRUCTIONS
Sun Protection      The [facility] would like to remind you of the importance of sun protection all year round and particularly during the summer when the suns rays are the strongest. It has been proven that both acute and chronic sun exposure damages our cells and leads to skin cancer. Beyond skin cancer, the sun causes 90% of the symptoms of premature skin aging, including wrinkles, lentigines (brown spots), and thin, easily bruised skin. Proper sun protection can help prevent these unwanted conditions.    Many patients report that they dont go in the sun. It has been shown that the average person receives 18 hours of incidental sun exposure per week during activities such as walking through parking lots, driving, or sitting next to windows. This accumulates to several bad sunburns per year!    In choosing sunscreen, you want one that protects against both UVA and UVB rays (broad spectrum). It is recommended that you use one of SPF 30 or higher. It is important to apply the sunscreen about 20 minutes prior to sun exposure. Most sunscreens are chemical sunscreens and a reaction must take place in the skin so that they are effective. If they are applied and then you are immediately exposed to the sun or start sweating, this reaction has not had time to take place and you are therefore unprotected. Sunscreen needs to be reapplied every 2 hours if you are participating in water sports or sweating. We recommend Elta MD or CeraVe sunscreens for daily use; however there are many options and it is most important for you to find one that you will use on a consistent basis.    If you have sensitive skin, you may do best with a sunscreen that contains only physical blockers in the active ingredient section. The only physical blockers available in the [place] currently are titanium dioxide or zinc oxide. These are typically thicker and harder to apply, however they afford very good protection.  Neutrogena Sensitive Skin, Blue Lizard Sensitive Skin (pink top) or Neutrogena Pure and Free are popular ones.     Aside from sunscreen, clothes with UV protection (UPF), wide brimmed hats, and sunglasses are other means of sun protection that we recommend.      Based on a recent study (6/2021) and out of an abundance of caution, we are recommending that you AVOID the following sunscreens as they may contain the carcinogen, benzene:    Spray and gel sunscreens  Any [facility] or [facility] brands as well as Max Block and TopCare brands   Neutrogena Ultra Sheer Dry-touch Water Resistant Sunscreen LOTION SPF 70   Neutrogena Sheer Zinc Dry-touch Face Sunscreen LOTION SPF 50   5.   Aveeno Baby Continuous Protection Sensitive Skin Sunscreen LOTION - Broad Spectrum SPF 50    Please note that Benzene is not an ingredient or the degradation product of any ingredient in any sunscreen. This study suggested that the findings are a result of contamination in the manufacturing process. At this point, we don't know how effectively Benzene gets through the skin, if it gets absorbed systemically, and what effects it may have.     We do know that ultraviolet radiation is a well-established carcinogen. Please use daily sun protection/avoidance and use of at least SPF 30, broad-spectrum sunscreen not listed above.                       [facility] - DERMATOLOGY [address]TH [address]  [address]  [place] [address]  Dept: [phone]  Dept Fax: [phone]                                                                              CRYOSURGERY      Your doctor has used a method called cryosurgery to treat your skin condition. Cryosurgery refers to the use of very cold substances to treat a variety of skin conditions such as warts, pre-skin cancers, molluscum contagiosum, sun spots, and several benign growths. The substance we use in cryosurgery is liquid nitrogen and is so cold (-195 degrees Celsius) that is burns  when administered.     Following treatment in the office, the skin may immediately burn and become red. You may find the area around the lesion is affected as well. It is sometimes necessary to treat not only the lesion, but a small area of the surrounding normal skin to achieve a good response.     A blister, and even a blood filled blister, may form after treatment.   This is a normal response. If the blister is painful, it is acceptable to sterilize a needle and with rubbing alcohol and gently pop the blister. It is important that you gently wash the area with soap and warm water as the blister fluid may contain wart virus if a wart was treated. Do no remove the roof of the blister.     The area treated can take anywhere from 1-3 weeks to heal. Healing time depends on the kind skin lesion treated, the location, and how aggressively the lesion was treated. It is recommended that the areas treated are covered with Vaseline or bacitracin ointment and a band-aid. If a band-aid is not practical, just ointment applied several times per day will do. Keeping these areas moist will speed the healing time.    Treatment with liquid nitrogen can leave a scar. In dark skin, it may be a light or dark scar, in light skin it may be a white or pink scar. These will generally fade with time, but may never go away completely.     If you have any concerns after your treatment, please feel free to call the office.       [address] [facility] [place] [address]/ [phone] FAX/ [email or web address]

## 2023-07-18 ENCOUNTER — OFFICE VISIT (OUTPATIENT)
Dept: INTERNAL MEDICINE | Facility: CLINIC | Age: 78
End: 2023-07-18
Payer: MEDICARE

## 2023-07-18 VITALS
HEIGHT: 65 IN | WEIGHT: 182 LBS | HEART RATE: 60 BPM | OXYGEN SATURATION: 100 % | TEMPERATURE: 97 F | SYSTOLIC BLOOD PRESSURE: 132 MMHG | BODY MASS INDEX: 30.32 KG/M2 | RESPIRATION RATE: 20 BRPM | DIASTOLIC BLOOD PRESSURE: 64 MMHG

## 2023-07-18 DIAGNOSIS — C22.0 HEPATOCELLULAR CARCINOMA: ICD-10-CM

## 2023-07-18 DIAGNOSIS — E11.8 DM (DIABETES MELLITUS) WITH COMPLICATIONS: ICD-10-CM

## 2023-07-18 DIAGNOSIS — R91.1 LUNG NODULE: Primary | ICD-10-CM

## 2023-07-18 DIAGNOSIS — E11.59 HYPERTENSION ASSOCIATED WITH DIABETES: ICD-10-CM

## 2023-07-18 DIAGNOSIS — E78.5 HYPERLIPIDEMIA ASSOCIATED WITH TYPE 2 DIABETES MELLITUS: ICD-10-CM

## 2023-07-18 DIAGNOSIS — I15.2 HYPERTENSION ASSOCIATED WITH DIABETES: ICD-10-CM

## 2023-07-18 DIAGNOSIS — E11.69 HYPERLIPIDEMIA ASSOCIATED WITH TYPE 2 DIABETES MELLITUS: ICD-10-CM

## 2023-07-18 DIAGNOSIS — E11.65 CONTROLLED TYPE 2 DIABETES MELLITUS WITH HYPERGLYCEMIA, WITHOUT LONG-TERM CURRENT USE OF INSULIN: ICD-10-CM

## 2023-07-18 DIAGNOSIS — I10 ESSENTIAL HYPERTENSION: ICD-10-CM

## 2023-07-18 DIAGNOSIS — G40.909 SEIZURE DISORDER: ICD-10-CM

## 2023-07-18 DIAGNOSIS — K86.2 PANCREATIC CYST: ICD-10-CM

## 2023-07-18 PROCEDURE — 1159F MED LIST DOCD IN RCRD: CPT | Mod: HCNC,CPTII,S$GLB, | Performed by: INTERNAL MEDICINE

## 2023-07-18 PROCEDURE — 99999 PR PBB SHADOW E&M-EST. PATIENT-LVL V: CPT | Mod: PBBFAC,HCNC,, | Performed by: INTERNAL MEDICINE

## 2023-07-18 PROCEDURE — 3288F PR FALLS RISK ASSESSMENT DOCUMENTED: ICD-10-PCS | Mod: HCNC,CPTII,S$GLB, | Performed by: INTERNAL MEDICINE

## 2023-07-18 PROCEDURE — 3288F FALL RISK ASSESSMENT DOCD: CPT | Mod: HCNC,CPTII,S$GLB, | Performed by: INTERNAL MEDICINE

## 2023-07-18 PROCEDURE — 1126F PR PAIN SEVERITY QUANTIFIED, NO PAIN PRESENT: ICD-10-PCS | Mod: HCNC,CPTII,S$GLB, | Performed by: INTERNAL MEDICINE

## 2023-07-18 PROCEDURE — 3078F PR MOST RECENT DIASTOLIC BLOOD PRESSURE < 80 MM HG: ICD-10-PCS | Mod: HCNC,CPTII,S$GLB, | Performed by: INTERNAL MEDICINE

## 2023-07-18 PROCEDURE — 1101F PT FALLS ASSESS-DOCD LE1/YR: CPT | Mod: HCNC,CPTII,S$GLB, | Performed by: INTERNAL MEDICINE

## 2023-07-18 PROCEDURE — 1101F PR PT FALLS ASSESS DOC 0-1 FALLS W/OUT INJ PAST YR: ICD-10-PCS | Mod: HCNC,CPTII,S$GLB, | Performed by: INTERNAL MEDICINE

## 2023-07-18 PROCEDURE — 3078F DIAST BP <80 MM HG: CPT | Mod: HCNC,CPTII,S$GLB, | Performed by: INTERNAL MEDICINE

## 2023-07-18 PROCEDURE — 1159F PR MEDICATION LIST DOCUMENTED IN MEDICAL RECORD: ICD-10-PCS | Mod: HCNC,CPTII,S$GLB, | Performed by: INTERNAL MEDICINE

## 2023-07-18 PROCEDURE — 3075F PR MOST RECENT SYSTOLIC BLOOD PRESS GE 130-139MM HG: ICD-10-PCS | Mod: HCNC,CPTII,S$GLB, | Performed by: INTERNAL MEDICINE

## 2023-07-18 PROCEDURE — 1126F AMNT PAIN NOTED NONE PRSNT: CPT | Mod: HCNC,CPTII,S$GLB, | Performed by: INTERNAL MEDICINE

## 2023-07-18 PROCEDURE — 99214 PR OFFICE/OUTPT VISIT, EST, LEVL IV, 30-39 MIN: ICD-10-PCS | Mod: HCNC,S$GLB,, | Performed by: INTERNAL MEDICINE

## 2023-07-18 PROCEDURE — 99999 PR PBB SHADOW E&M-EST. PATIENT-LVL V: ICD-10-PCS | Mod: PBBFAC,HCNC,, | Performed by: INTERNAL MEDICINE

## 2023-07-18 PROCEDURE — 99214 OFFICE O/P EST MOD 30 MIN: CPT | Mod: HCNC,S$GLB,, | Performed by: INTERNAL MEDICINE

## 2023-07-18 PROCEDURE — 3075F SYST BP GE 130 - 139MM HG: CPT | Mod: HCNC,CPTII,S$GLB, | Performed by: INTERNAL MEDICINE

## 2023-07-18 RX ORDER — METFORMIN HYDROCHLORIDE 500 MG/1
500 TABLET, EXTENDED RELEASE ORAL 2 TIMES DAILY WITH MEALS
Qty: 180 TABLET | Refills: 3 | Status: SHIPPED | OUTPATIENT
Start: 2023-07-18 | End: 2024-01-18 | Stop reason: SDUPTHER

## 2023-07-18 RX ORDER — LOVASTATIN 20 MG/1
20 TABLET ORAL NIGHTLY
Qty: 90 TABLET | Refills: 3 | Status: SHIPPED | OUTPATIENT
Start: 2023-07-18 | End: 2024-01-18 | Stop reason: SDUPTHER

## 2023-07-18 RX ORDER — GLIMEPIRIDE 4 MG/1
4 TABLET ORAL
Qty: 90 TABLET | Refills: 1 | Status: SHIPPED | OUTPATIENT
Start: 2023-07-18 | End: 2024-01-18 | Stop reason: SDUPTHER

## 2023-07-18 RX ORDER — IRBESARTAN 300 MG/1
300 TABLET ORAL NIGHTLY
Qty: 90 TABLET | Refills: 3 | Status: SHIPPED | OUTPATIENT
Start: 2023-07-18 | End: 2024-01-18 | Stop reason: SDUPTHER

## 2023-07-18 NOTE — PROGRESS NOTES
Subjective     Patient ID: Jamison Mayes is a 78 y.o. male.    Chief Complaint: Follow-up    HPI  Pt with T2DM, HTN, Seizure d/o, HCC, HLD is here for 6 month f/u. No acute complaints today.   Review of Systems   Constitutional:  Negative for activity change, appetite change, chills, diaphoresis, fatigue, fever and unexpected weight change.   HENT:  Negative for postnasal drip, rhinorrhea, sinus pressure/congestion, sneezing, sore throat, trouble swallowing and voice change.    Respiratory:  Negative for cough, shortness of breath and wheezing.    Cardiovascular:  Negative for chest pain, palpitations and leg swelling.   Gastrointestinal:  Negative for abdominal pain, blood in stool, constipation, diarrhea, nausea and vomiting.   Genitourinary:  Negative for dysuria.   Musculoskeletal:  Negative for arthralgias and myalgias.   Integumentary:  Negative for rash and wound.   Allergic/Immunologic: Negative for environmental allergies and food allergies.   Hematological:  Negative for adenopathy. Does not bruise/bleed easily.        Objective     Physical Exam  Constitutional:       General: He is not in acute distress.     Appearance: Normal appearance. He is well-developed. He is not diaphoretic.   HENT:      Head: Normocephalic and atraumatic.      Right Ear: External ear normal.      Left Ear: External ear normal.      Nose: Nose normal.      Mouth/Throat:      Pharynx: No oropharyngeal exudate.   Eyes:      General: No scleral icterus.        Right eye: No discharge.         Left eye: No discharge.      Conjunctiva/sclera: Conjunctivae normal.      Pupils: Pupils are equal, round, and reactive to light.   Neck:      Vascular: No JVD.   Cardiovascular:      Rate and Rhythm: Normal rate and regular rhythm.      Pulses: Normal pulses.      Heart sounds: Normal heart sounds. No murmur heard.  Pulmonary:      Effort: Pulmonary effort is normal. No respiratory distress.      Breath sounds: Normal breath sounds. No  wheezing or rales.   Abdominal:      General: Bowel sounds are normal.      Tenderness: There is no abdominal tenderness. There is no guarding or rebound.   Musculoskeletal:      Cervical back: Normal range of motion and neck supple.      Right lower leg: No edema.      Left lower leg: No edema.   Lymphadenopathy:      Cervical: No cervical adenopathy.   Skin:     General: Skin is warm and dry.      Capillary Refill: Capillary refill takes less than 2 seconds.      Coloration: Skin is not pale.      Findings: No rash.   Neurological:      Mental Status: He is alert and oriented to person, place, and time. Mental status is at baseline.      Cranial Nerves: No cranial nerve deficit.   Psychiatric:         Mood and Affect: Mood normal.         Behavior: Behavior normal.          Assessment and Plan     1. Lung nodule  -     CT Chest Without Contrast; Future; Expected date: 07/18/2023    2. Seizure disorder    3. Hepatocellular carcinoma  Overview:  s/p left hepatectomy of segments 2,3,4a/b      4. Hyperlipidemia associated with type 2 diabetes mellitus    5. Hypertension associated with diabetes    6. Controlled type 2 diabetes mellitus with hyperglycemia, without long-term current use of insulin    7. Pancreatic cyst    8. DM (diabetes mellitus) with complications          T2DM- last A1C of 7.1(1/23)<--6.7(12/21)<--6.4(12/20)<--6.0(6/20)<--8.1(11/19)<--7.1(11/18)<--5.2(8/18)      -continue Metformin  mg BID WM and Amaryl 4 mg qam      HTN- stable on Avapro 300 mg daily       Seizure d/o- stable of meds      -Pt had seen Neurology       HCC- stable, s/p left hepatic lobectomy with cholecystectomy on 03/05/2018       No recurrence, followed by Oncology        Pancreatic cyst- followed by GI      HLD- stable on Lovastatin 20 mg daily      Lung nodule- repeat Chest CT     F/u in 6 months for annual exam

## 2023-07-21 ENCOUNTER — HOSPITAL ENCOUNTER (OUTPATIENT)
Dept: RADIOLOGY | Facility: HOSPITAL | Age: 78
Discharge: HOME | End: 2023-07-21
Attending: INTERNAL MEDICINE
Payer: MEDICARE

## 2023-07-21 DIAGNOSIS — R91.1 LUNG NODULE: ICD-10-CM

## 2023-07-21 PROCEDURE — 71250 CT THORAX DX C-: CPT | Mod: 26,HCNC,, | Performed by: RADIOLOGY

## 2023-07-21 PROCEDURE — 71250 CT CHEST WITHOUT CONTRAST: ICD-10-PCS | Mod: 26,HCNC,, | Performed by: RADIOLOGY

## 2023-07-21 PROCEDURE — 71250 CT THORAX DX C-: CPT | Mod: TC,HCNC,PO

## 2023-08-08 ENCOUNTER — PATIENT OUTREACH (OUTPATIENT)
Dept: ADMINISTRATIVE | Facility: HOSPITAL | Age: 78
End: 2023-08-08
Payer: MEDICARE

## 2023-08-08 NOTE — LETTER
AUTHORIZATION FOR RELEASE OF   CONFIDENTIAL INFORMATION    Dear Dr. Hdez,    We are seeing Jamison Mayes, date of birth 1945, in the clinic at [facility]. Yossi Peck DO is the patient's PCP. Jamison Mayes has an outstanding lab/procedure at the time we reviewed his chart. In order to help keep his health information updated, he has authorized us to request the following medical record(s):        (  )  MAMMOGRAM                                      (  )  COLONOSCOPY      (  )  PAP SMEAR                                          (  )  OUTSIDE LAB RESULTS     (  )  DEXA SCAN                                          ( x )  EYE EXAM            (  )  FOOT EXAM                                          (  )  ENTIRE RECORD     (  )  OUTSIDE IMMUNIZATIONS                 (  )  _______________         Please fax records to Yossi Peck DO, [phone]     If you have any questions, please contact SHANTANU Acevedo at [phone].           Patient Name: Jamison Mayes  : 1945  Patient Phone #: [phone]

## 2023-08-14 ENCOUNTER — PATIENT MESSAGE (OUTPATIENT)
Dept: INTERNAL MEDICINE | Facility: CLINIC | Age: 78
End: 2023-08-14
Payer: MEDICARE

## 2023-08-14 ENCOUNTER — PATIENT OUTREACH (OUTPATIENT)
Dept: ADMINISTRATIVE | Facility: HOSPITAL | Age: 78
End: 2023-08-14
Payer: MEDICARE

## 2023-08-14 NOTE — LETTER
AUTHORIZATION FOR RELEASE OF   CONFIDENTIAL INFORMATION    Dear Dr. Hdez,    We are seeing Jamison Mayes, date of birth 1945, in the clinic at [facility]. Yossi Peck DO is the patient's PCP. Jamison Mayes has an outstanding lab/procedure at the time we reviewed his chart. In order to help keep his health information updated, he has authorized us to request the following medical record(s):        (  )  MAMMOGRAM                                      (  )  COLONOSCOPY      (  )  PAP SMEAR                                          (  )  OUTSIDE LAB RESULTS     (  )  DEXA SCAN                                          ( x )  EYE EXAM            (  )  FOOT EXAM                                          (  )  ENTIRE RECORD     (  )  OUTSIDE IMMUNIZATIONS                 (  )  _______________         Please fax records to Yossi Peck DO, [phone]     If you have any questions, please contact SHANTANU Acevedo at [phone].           Patient Name: Jamison Mayes  : 1945  Patient Phone #: [phone]

## 2023-08-24 ENCOUNTER — PATIENT OUTREACH (OUTPATIENT)
Dept: ADMINISTRATIVE | Facility: HOSPITAL | Age: 78
End: 2023-08-24
Payer: MEDICARE

## 2023-09-18 ENCOUNTER — TELEPHONE (OUTPATIENT)
Dept: OTOLARYNGOLOGY | Facility: CLINIC | Age: 78
End: 2023-09-18
Payer: MEDICARE

## 2023-11-16 ENCOUNTER — TELEPHONE (OUTPATIENT)
Dept: FAMILY MEDICINE | Facility: CLINIC | Age: 78
End: 2023-11-16
Payer: MEDICARE

## 2023-11-16 NOTE — TELEPHONE ENCOUNTER
Spoke to pt's wife I did offer new pt slot for Dr. Cooper. Wife declined stating pt would prefer a male physician. Wife confirmed appt for June. Pt is added to wait list.     ----- Message from Sirisha Smith sent at 11/16/2023  1:32 PM CST -----  Type:   Appointment Request      Name of Caller:pt wife  When is the first available appointment?n/a  Symptoms:[facility] care [facility]/ checkup  Best Call Back Number:[phone]  Additional Information:

## 2023-12-05 ENCOUNTER — HOSPITAL ENCOUNTER (OUTPATIENT)
Dept: RADIOLOGY | Facility: HOSPITAL | Age: 78
Discharge: HOME | End: 2023-12-05
Attending: INTERNAL MEDICINE
Payer: MEDICARE

## 2023-12-05 DIAGNOSIS — C22.0 HEPATOCELLULAR CARCINOMA: ICD-10-CM

## 2023-12-05 PROCEDURE — A9585 GADOBUTROL INJECTION: HCPCS | Mod: HCNC | Performed by: INTERNAL MEDICINE

## 2023-12-05 PROCEDURE — 74183 MRI ABDOMEN W WO CONTRAST: ICD-10-PCS | Mod: 26,HCNC,, | Performed by: RADIOLOGY

## 2023-12-05 PROCEDURE — 74183 MRI ABD W/O CNTR FLWD CNTR: CPT | Mod: TC,HCNC

## 2023-12-05 PROCEDURE — 74183 MRI ABD W/O CNTR FLWD CNTR: CPT | Mod: 26,HCNC,, | Performed by: RADIOLOGY

## 2023-12-05 PROCEDURE — 25500020 PHARM REV CODE 255: Mod: HCNC | Performed by: INTERNAL MEDICINE

## 2023-12-05 RX ORDER — GADOBUTROL 604.72 MG/ML
7.5 INJECTION INTRAVENOUS
Status: COMPLETED | OUTPATIENT
Start: 2023-12-05 | End: 2023-12-05

## 2023-12-05 RX ADMIN — GADOBUTROL 7.5 ML: 604.72 INJECTION INTRAVENOUS at 09:12

## 2023-12-06 NOTE — PROGRESS NOTES
Subjective:       Patient ID: Jamison Mayes is a 78 y.o. male.    Chief Complaint: HCC    HPI    He presented to the hospital on 03/01/2018 with sudden onset of cold sweats, nausea and abdominal discomfort and was noted to have an 8.7 cm mass in the lateral segment of the left hepatic lobe that has ruptured to the liver capsule.  He was monitored and underwent a left hepatic lobectomy with cholecystectomy on 03/05/2018.      Final pathology revealed a T1b NX 7.5 cm well-differentiated hepatocellular carcinoma without vascular invasion or perineural invasion.  The tumor appeared to be confined to the liver. Hepatitis serology negative.    A CT chest with contrast on 03/03/2018 revealed a 0.3 cm left upper lobe pulmonary nodule.    He lives in [facility].  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  - he underwent endoscopic ultrasound on 1/11/23.      Interval history:  - he presents for a follow-up appointment for his hepatocellular carcinoma and pancreatic cyst.   - he underwent MRI on 12/5/23.  - today, he is doing well. He denies shortness of breath, chest pain, nausea, vomiting, diarrhea, constipation.    Review of Systems   Constitutional:  Negative for fatigue, fever and unexpected weight change.   HENT:  Negative for sore throat.    Eyes:  Negative for visual disturbance.   Respiratory:  Negative for shortness of breath.    Cardiovascular:  Negative for chest pain.   Gastrointestinal:  Negative for abdominal pain.   Genitourinary:  Negative for dysuria.   Musculoskeletal:  Negative for back pain.   Skin:  Negative for rash.   Neurological:  Negative for headaches.   Hematological:  Negative for adenopathy.   Psychiatric/Behavioral:  The patient is not nervous/anxious.          Objective:      Vitals:    12/07/23 0954   BP: (!) 194/75   BP Location: Right arm   Patient Position: Sitting   BP Method: Large (Automatic)   Pulse:  90   Resp: 16   SpO2: 97%   Weight: 81.1 kg (178 lb 12.7 oz)     BMI: Body mass index is 29.75 kg/m².    ECOG 1  Physical Exam  Vitals and nursing note reviewed.   Constitutional:       Appearance: He is well-developed.   HENT:      Head: Normocephalic and atraumatic.   Eyes:      Pupils: Pupils are equal, round, and reactive to light.   Cardiovascular:      Rate and Rhythm: Normal rate and regular rhythm.   Pulmonary:      Effort: Pulmonary effort is normal.      Breath sounds: Normal breath sounds.   Abdominal:      General: Bowel sounds are normal.      Palpations: Abdomen is soft.   Musculoskeletal:         General: Normal range of motion.      Cervical back: Normal range of motion and neck supple.   Skin:     General: Skin is warm and dry.   Neurological:      Mental Status: He is alert and oriented to person, place, and time.   Psychiatric:         Behavior: Behavior normal.         Thought Content: Thought content normal.         Judgment: Judgment normal.        Labs have been reviewed.    Lab Results   Component Value Date    WBC 9.13 12/05/2023    HGB 12.5 (L) 12/05/2023    HCT 36.4 (L) 12/05/2023    MCV 89 12/05/2023     12/05/2023       Diagnostics:  Upper endoscopic ultrasound (1/11/23):  - Normal esophagus.                          - Normal stomach.                          - Normal examined duodenum.                          - A cystic lesion was seen in the pancreatic tail.                          - No specimens collected.     Imaging:      MRI abdomen (12/5/23): I have personally reviewed the images  Lung bases are clear. Heart size is normal.     Exam quality is limited by motion.     Liver is normal in size and contour.  No fat or iron deposition in the liver or spleen.  Postoperative changes of prior left hepatectomy.  Similar heterogeneous T2 signal along the margin of the anterior right lobe measuring roughly 2.6 x 1.8 cm (series 10, image 9; previously 2.6 x 1.6).  This may represent a  chronic postoperative collection given similar appearance of this finding when compared with multiple prior studies.  No abnormal intrahepatic restricted diffusion or enhancing lesion.  Minimal prominent bile ducts along the hepatectomy margin, similar to prior.  No new intrahepatic biliary duct dilatation.     The gallbladder is surgically absent. The common bile duct is normal in caliber.     Similar appearance of 1.5 cm T2 hyperintense lesion in the pancreatic tail (series 4, image 23).  This lesion demonstrates smooth enhancing thin internal septation as seen previously (series 1401, image 64).  No pancreatic duct dilatation.  The pancreas otherwise demonstrates normal intrinsic T1 signal.     Spleen is normal in size and signal.     Adrenal glands are normal in appearance.     Kidneys are symmetric and enhance normally without focal suspicious lesion.  No hydronephrosis.     Small hiatal hernia.  The visualized bowel is unremarkable.     Portal, splenic, and superior mesenteric veins are patent.     Abdominal aorta is normal in caliber.  Incidental left-sided IVC.     No enhancing bone lesions.     Impression:     Postoperative changes of prior left hepatectomy.  No new hepatic lesion or detrimental change when compared with 12/12/2022.     Similar appearance of small pancreatic tail cystic lesion with enhancing smooth internal septation when compared with 12/12/2022.  However, this lesion has progressively increased in size when compared with more remote prior studies.  No pancreatic duct dilatation.  Differential again includes IPMN or other cystic neoplasm.  Suggest continued attention on abdominal MRI follow-up in 6-12 months.     Incidental findings discussed in the body of the report.    MRI abdomen (12/12/22): I have personally reviewed the images  MRI abdomen with and without contrast dated 12/14/2021     FINDINGS:  Pulmonary Bases: No pleural effusion.     Liver: Prior postsurgical change of left  hepatectomy.  Similar heterogeneous T2 area along the margin of the anterior right lobe measuring 2.5 x 1 6 cm, previously 2.9 x 2.0 cm (series 5, image 11).  No abnormal intrahepatic diffusion restriction.  No suspicious enhancing lesion or washout.  Right portal vein and main portal vein are patent.     Bile Ducts: Some focally dilated ducts near the hepatectomy margin, similar.  No extrahepatic biliary dilatation.     Gallbladder: Absent     Pancreas: Cystic lesion at the pancreatic tail with smooth enhancing septation measuring 1.5 cm, previously 1.1 cm (series 5, image 17).  No main pancreatic ductal dilatation.     Spleen: normal.     Proximal Gastrointestinal tract: Small hiatal hernia.  Normal upper GI tract caliber scattered colonic diverticulosis.     Mesentery: No discrete mesenteric mass.     Adrenal Glands: normal.     Kidneys: Enhance symmetrically without hydronephrosis     Aorta and Abdominal Vasculature: Incidental left-sided IVC anatomic variant.  Aorta is patent.     Lymph nodes: None pathologically enlarged     Body wall: Prior treatment change of the anterior abdominal wall.  No acute abnormality.     Other: No upper abdominal ascites.  No suspicious marrow enhancing lesion.     Impression:     Post treatment change of prior left hepatectomy.  No evidence for HCC.  Stable heterogeneous T2 structure along the anterior right lobe margin, again possible chronic postoperative collection.     Pancreatic cystic lesion at the tail with enhancing smooth septation and measures slightly larger in the interval.  Differential to include component of IPMN or other cystic neoplasm.  No main pancreatic ductal dilatation.  Further correlation advised.     Additional findings as above.    Assessment:       1. Hepatocellular carcinoma    2. Pancreatic cyst        Plan:     Hepatocellular carcinoma  - he had previously seen Dr. Haney.  - he had a T1b NX hepatocellular carcinoma that was resected March 2018  - Labs  "have been reviewed.  - MRI abdomen (12/12/22) revealed no evidence of hepatocellular carcinoma recurrence. See below regarding pancreatic cyst.  MRI abdomen (12/5/23):  "Postoperative changes of prior left hepatectomy.  No new hepatic lesion or detrimental change when compared with 12/12/2022.  Similar appearance of small pancreatic tail cystic lesion with enhancing smooth internal septation when compared with 12/12/2022.  However, this lesion has progressively increased in size when compared with more remote prior studies.  No pancreatic duct dilatation.  Differential again includes IPMN or other cystic neoplasm.  Suggest continued attention on abdominal MRI follow-up in 6-12 months."  - return to clinic in December 2024 with repeat MRI.    Pancreatic cyst  - MRI abdomen (12/12/22) revealed an increase in size of cystic lesion from 1.1 cm to 1.5 cm  - upper endoscopic ultrasound (1/11/23) revealed the cystic lesion in the pancreatic tail. Recommendation was to return to pancreas cyst clinic in January 2024 to discuss surveillance.   MRI abdomen (12/5/23):  "Similar appearance of small pancreatic tail cystic lesion with enhancing smooth internal septation when compared with 12/12/2022.  However, this lesion has progressively increased in size when compared with more remote prior studies.  No pancreatic duct dilatation.  Differential again includes IPMN or other cystic neoplasm.  Suggest continued attention on abdominal MRI follow-up in 6-12 months."  - I offered to refer to pancreatic cyst clinic. He is comfortable following with MRI scans.  - return to clinic in December 2024 with repeat MRI.    - return to clinic in December 2024 with repeat MRI.    Humza Simmons M.D.  Hematology/Oncology  [facility] - [address], [address]  [place] [address]  Phone: [phone]  Fax: [phone]      "

## 2023-12-07 ENCOUNTER — OFFICE VISIT (OUTPATIENT)
Dept: HEMATOLOGY/ONCOLOGY | Facility: CLINIC | Age: 78
End: 2023-12-07
Payer: MEDICARE

## 2023-12-07 VITALS
OXYGEN SATURATION: 97 % | BODY MASS INDEX: 29.75 KG/M2 | DIASTOLIC BLOOD PRESSURE: 75 MMHG | WEIGHT: 178.81 LBS | SYSTOLIC BLOOD PRESSURE: 194 MMHG | RESPIRATION RATE: 16 BRPM | HEART RATE: 90 BPM

## 2023-12-07 DIAGNOSIS — K86.2 PANCREATIC CYST: ICD-10-CM

## 2023-12-07 DIAGNOSIS — C22.0 HEPATOCELLULAR CARCINOMA: Primary | ICD-10-CM

## 2023-12-07 PROCEDURE — 99214 PR OFFICE/OUTPT VISIT, EST, LEVL IV, 30-39 MIN: ICD-10-PCS | Mod: HCNC,S$GLB,, | Performed by: INTERNAL MEDICINE

## 2023-12-07 PROCEDURE — 1159F MED LIST DOCD IN RCRD: CPT | Mod: HCNC,CPTII,S$GLB, | Performed by: INTERNAL MEDICINE

## 2023-12-07 PROCEDURE — 1101F PR PT FALLS ASSESS DOC 0-1 FALLS W/OUT INJ PAST YR: ICD-10-PCS | Mod: HCNC,CPTII,S$GLB, | Performed by: INTERNAL MEDICINE

## 2023-12-07 PROCEDURE — 3078F PR MOST RECENT DIASTOLIC BLOOD PRESSURE < 80 MM HG: ICD-10-PCS | Mod: HCNC,CPTII,S$GLB, | Performed by: INTERNAL MEDICINE

## 2023-12-07 PROCEDURE — 1160F PR REVIEW ALL MEDS BY PRESCRIBER/CLIN PHARMACIST DOCUMENTED: ICD-10-PCS | Mod: HCNC,CPTII,S$GLB, | Performed by: INTERNAL MEDICINE

## 2023-12-07 PROCEDURE — 1126F PR PAIN SEVERITY QUANTIFIED, NO PAIN PRESENT: ICD-10-PCS | Mod: HCNC,CPTII,S$GLB, | Performed by: INTERNAL MEDICINE

## 2023-12-07 PROCEDURE — 3288F FALL RISK ASSESSMENT DOCD: CPT | Mod: HCNC,CPTII,S$GLB, | Performed by: INTERNAL MEDICINE

## 2023-12-07 PROCEDURE — 99999 PR PBB SHADOW E&M-EST. PATIENT-LVL IV: CPT | Mod: PBBFAC,HCNC,, | Performed by: INTERNAL MEDICINE

## 2023-12-07 PROCEDURE — 1126F AMNT PAIN NOTED NONE PRSNT: CPT | Mod: HCNC,CPTII,S$GLB, | Performed by: INTERNAL MEDICINE

## 2023-12-07 PROCEDURE — 1101F PT FALLS ASSESS-DOCD LE1/YR: CPT | Mod: HCNC,CPTII,S$GLB, | Performed by: INTERNAL MEDICINE

## 2023-12-07 PROCEDURE — 3077F PR MOST RECENT SYSTOLIC BLOOD PRESSURE >= 140 MM HG: ICD-10-PCS | Mod: HCNC,CPTII,S$GLB, | Performed by: INTERNAL MEDICINE

## 2023-12-07 PROCEDURE — 99999 PR PBB SHADOW E&M-EST. PATIENT-LVL IV: ICD-10-PCS | Mod: PBBFAC,HCNC,, | Performed by: INTERNAL MEDICINE

## 2023-12-07 PROCEDURE — 3077F SYST BP >= 140 MM HG: CPT | Mod: HCNC,CPTII,S$GLB, | Performed by: INTERNAL MEDICINE

## 2023-12-07 PROCEDURE — 1159F PR MEDICATION LIST DOCUMENTED IN MEDICAL RECORD: ICD-10-PCS | Mod: HCNC,CPTII,S$GLB, | Performed by: INTERNAL MEDICINE

## 2023-12-07 PROCEDURE — 1160F RVW MEDS BY RX/DR IN RCRD: CPT | Mod: HCNC,CPTII,S$GLB, | Performed by: INTERNAL MEDICINE

## 2023-12-07 PROCEDURE — 99214 OFFICE O/P EST MOD 30 MIN: CPT | Mod: HCNC,S$GLB,, | Performed by: INTERNAL MEDICINE

## 2023-12-07 PROCEDURE — 3288F PR FALLS RISK ASSESSMENT DOCUMENTED: ICD-10-PCS | Mod: HCNC,CPTII,S$GLB, | Performed by: INTERNAL MEDICINE

## 2023-12-07 PROCEDURE — 3078F DIAST BP <80 MM HG: CPT | Mod: HCNC,CPTII,S$GLB, | Performed by: INTERNAL MEDICINE

## 2023-12-20 ENCOUNTER — PATIENT MESSAGE (OUTPATIENT)
Dept: DERMATOLOGY | Facility: CLINIC | Age: 78
End: 2023-12-20
Payer: MEDICARE

## 2024-01-05 ENCOUNTER — OFFICE VISIT (OUTPATIENT)
Dept: DERMATOLOGY | Facility: CLINIC | Age: 79
End: 2024-01-05
Payer: MEDICARE

## 2024-01-05 DIAGNOSIS — L57.0 AK (ACTINIC KERATOSIS): Primary | ICD-10-CM

## 2024-01-05 DIAGNOSIS — L82.1 SK (SEBORRHEIC KERATOSIS): ICD-10-CM

## 2024-01-05 DIAGNOSIS — L21.9 SEBORRHEIC DERMATITIS: ICD-10-CM

## 2024-01-05 DIAGNOSIS — D48.5 NEOPLASM OF UNCERTAIN BEHAVIOR OF SKIN: ICD-10-CM

## 2024-01-05 PROCEDURE — 99213 OFFICE O/P EST LOW 20 MIN: CPT | Mod: 25,HCNC,S$GLB, | Performed by: DERMATOLOGY

## 2024-01-05 PROCEDURE — 99999 PR PBB SHADOW E&M-EST. PATIENT-LVL III: CPT | Mod: PBBFAC,HCNC,, | Performed by: DERMATOLOGY

## 2024-01-05 PROCEDURE — 1101F PT FALLS ASSESS-DOCD LE1/YR: CPT | Mod: HCNC,CPTII,S$GLB, | Performed by: DERMATOLOGY

## 2024-01-05 PROCEDURE — 11102 TANGNTL BX SKIN SINGLE LES: CPT | Mod: HCNC,XS,S$GLB, | Performed by: DERMATOLOGY

## 2024-01-05 PROCEDURE — 3288F FALL RISK ASSESSMENT DOCD: CPT | Mod: HCNC,CPTII,S$GLB, | Performed by: DERMATOLOGY

## 2024-01-05 PROCEDURE — 1160F RVW MEDS BY RX/DR IN RCRD: CPT | Mod: HCNC,CPTII,S$GLB, | Performed by: DERMATOLOGY

## 2024-01-05 PROCEDURE — 88305 TISSUE EXAM BY PATHOLOGIST: CPT | Mod: HCNC | Performed by: PATHOLOGY

## 2024-01-05 PROCEDURE — 88305 TISSUE EXAM BY PATHOLOGIST: CPT | Mod: 26,HCNC,, | Performed by: PATHOLOGY

## 2024-01-05 PROCEDURE — 1159F MED LIST DOCD IN RCRD: CPT | Mod: HCNC,CPTII,S$GLB, | Performed by: DERMATOLOGY

## 2024-01-05 PROCEDURE — 17004 DESTROY PREMAL LESIONS 15/>: CPT | Mod: HCNC,S$GLB,, | Performed by: DERMATOLOGY

## 2024-01-05 NOTE — PROGRESS NOTES
Subjective:      Patient ID:  Jamison Mayes is a 78 y.o. male who presents for   Chief Complaint   Patient presents with    Actinic Keratosis     Scalp    History of Present Illness: The patient presents for UBSE    The patient was last seen on: 07/14/2023 for cryosurgery to actinic keratoses which have resolved.     Other skin complaints: growth on scalp     Actinic Keratosis - Initial  Affected locations: scalp  Signs / symptoms: rough  Severity: mild to moderate  Timing: constant      Hx SCC.     Other skin complaints: none     Patient has a history of non-melanoma skin cancer and is here today for routine skin check.   Has previously used cryo, Efudex, and PDT for AKs.       Review of Systems   Constitutional:  Negative for fever and chills.   Respiratory:  Negative for cough and shortness of breath.    Gastrointestinal:  Negative for nausea and vomiting.   Musculoskeletal:  Negative for joint swelling and arthralgias.   Skin:  Negative for daily sunscreen use, activity-related sunscreen use, recent sunburn and wears hat.   All other systems reviewed and are negative.  Hematologic/Lymphatic: Bruises/bleeds easily.       Objective:   Physical Exam   Constitutional: He appears well-developed and well-nourished.   Neurological: He is alert and oriented to person, place, and time.   Psychiatric: He has a normal mood and affect.   Skin:   Areas Examined (abnormalities noted in diagram):   Scalp / Hair Palpated and Inspected  Head / Face Inspection Performed  Neck Inspection Performed  Chest / Axilla Inspection Performed  Abdomen Inspection Performed  Back Inspection Performed  RUE Inspected  LUE Inspection Performed                       Diagram Legend     Erythematous scaling macule/papule c/w actinic keratosis       Vascular papule c/w angioma      Pigmented verrucoid papule/plaque c/w seborrheic keratosis      Yellow umbilicated papule c/w sebaceous hyperplasia      Irregularly shaped tan macule c/w lentigo      1-2 mm smooth white papules consistent with Milia      Movable subcutaneous cyst with punctum c/w epidermal inclusion cyst      Subcutaneous movable cyst c/w pilar cyst      Firm pink to brown papule c/w dermatofibroma      Pedunculated fleshy papule(s) c/w skin tag(s)      Evenly pigmented macule c/w junctional nevus     Mildly variegated pigmented, slightly irregular-bordered macule c/w mildly atypical nevus      Flesh colored to evenly pigmented papule c/w intradermal nevus       Pink pearly papule/plaque c/w basal cell carcinoma      Erythematous hyperkeratotic cursted plaque c/w SCC      Surgical scar with no sign of skin cancer recurrence      Open and closed comedones      Inflammatory papules and pustules      Verrucoid papule consistent consistent with wart     Erythematous eczematous patches and plaques     Dystrophic onycholytic nail with subungual debris c/w onychomycosis     Umbilicated papule    Erythematous-base heme-crusted tan verrucoid plaque consistent with inflamed seborrheic keratosis     Erythematous Silvery Scaling Plaque c/w Psoriasis     See annotation      Assessment / Plan:    Neoplasm of uncertain behavior of skin  Shave biopsy procedure note:    Shave biopsy performed after verbal consent including risk of infection, scar, recurrence, need for additional treatment of site. Area prepped with alcohol, anesthetized with approximately 1.0cc of 1% lidocaine with epinephrine. Lesional tissue shaved with razor blade. Hemostasis achieved with application of aluminum chloride followed by hyfrecation. No complications. Dressing applied. Wound care explained.    -     Specimen to Pathology, Dermatology    Pathology Orders:       Normal Orders This Visit    Specimen to Pathology, Dermatology     Comments:    Number of Specimens:->1  ------------------------->-------------------------  Spec 1 Procedure:->Biopsy  Spec 1 Clinical Impression:->r/o HAK vs SCC vs other  Spec 1 Source:->L parietal scalp     Questions:    Procedure Type: Dermatology and skin neoplasms    Number of Specimens: 1    ------------------------: -------------------------    Spec 1 Procedure: Biopsy    Spec 1 Clinical Impression: r/o HAK vs SCC vs other    Spec 1 Source: L parietal scalp    Release to patient:           AK (actinic keratosis)  Cryosurgery Procedure Note    Verbal consent from the patient is obtained including, but not limited to, risk of hypopigmentation/hyperpigmentation, scar, recurrence of lesion. The patient is aware of the precancerous quality and need for treatment of these lesions. Liquid nitrogen cryosurgery is applied to the 24 actinic keratoses, as detailed in the physical exam, to produce a freeze injury. The patient is aware that blisters may form and is instructed on wound care with gentle cleansing and use of vaseline ointment to keep moist until healed. The patient is supplied a handout on cryosurgery and is instructed to call if lesions do not completely resolve.    Seborrheic dermatitis  Pt has keto shampoo at home.  Will notify me if needs more/not helping.    SK (seborrheic keratosis)  These are benign inherited growths without a malignant potential. Reassurance given to patient. No treatment is necessary.   Treatment of benign, asymptomatic lesions may be considered cosmetic.  Warned about risk of hypo- or hyperpigmentation with treatment and risk of recurrence.      Follow up in about 3 months (around 4/5/2024) for skin check or sooner for any concerns.

## 2024-01-05 NOTE — PATIENT INSTRUCTIONS
Sun Protection      The [facility] Department of Dermatology would like to remind you of the importance of sun protection all year round and particularly during the summer when the suns rays are the strongest. It has been proven that both acute and chronic sun exposure damages our cells and leads to skin cancer. Beyond skin cancer, the sun causes 90% of the symptoms of premature skin aging, including wrinkles, lentigines (brown spots), and thin, easily bruised skin. Proper sun protection can help prevent these unwanted conditions.    Many patients report that they dont go in the sun. It has been shown that the average person receives 18 hours of incidental sun exposure per week during activities such as walking through parking lots, driving, or sitting next to windows. This accumulates to several bad sunburns per year!    In choosing sunscreen, you want one that protects against both UVA and UVB rays (broad spectrum). It is recommended that you use one of SPF 30 or higher. It is important to apply the sunscreen about 20 minutes prior to sun exposure. Most sunscreens are chemical sunscreens and a reaction must take place in the skin so that they are effective. If they are applied and then you are immediately exposed to the sun or start sweating, this reaction has not had time to take place and you are therefore unprotected. Sunscreen needs to be reapplied every 2 hours if you are participating in water sports or sweating. We recommend Elta MD or CeraVe sunscreens for daily use; however there are many options and it is most important for you to find one that you will use on a consistent basis.    If you have sensitive skin, you may do best with a sunscreen that contains only physical blockers in the active ingredient section. The only physical blockers available in the [place] currently are titanium dioxide or zinc oxide. These are typically thicker and harder to apply, however they afford very good protection.  Neutrogena Sensitive Skin, Blue Lizard Sensitive Skin (pink top) or Neutrogena Pure and Free are popular ones.     Aside from sunscreen, clothes with UV protection (UPF), wide brimmed hats, and sunglasses are other means of sun protection that we recommend.      Based on a recent study (6/2021) and out of an abundance of caution, we are recommending that you AVOID the following sunscreens as they may contain the carcinogen, benzene:    Spray and gel sunscreens  Any [facility] or [facility] brands as well as Max Block and TopCare brands   Neutrogena Ultra Sheer Dry-touch Water Resistant Sunscreen LOTION SPF 70   Neutrogena Sheer Zinc Dry-touch Face Sunscreen LOTION SPF 50   5.   Aveeno Baby Continuous Protection Sensitive Skin Sunscreen LOTION - Broad Spectrum SPF 50    Please note that Benzene is not an ingredient or the degradation product of any ingredient in any sunscreen. This study suggested that the findings are a result of contamination in the manufacturing process. At this point, we don't know how effectively Benzene gets through the skin, if it gets absorbed systemically, and what effects it may have.     We do know that ultraviolet radiation is a well-established carcinogen. Please use daily sun protection/avoidance and use of at least SPF 30, broad-spectrum sunscreen not listed above.                       [facility] - DERMATOLOGY [address]  [address]  [place] [address]  Dept: [phone]  Dept Fax: [phone]                                                                                CRYOSURGERY      Your doctor has used a method called cryosurgery to treat your skin condition. Cryosurgery refers to the use of very cold substances to treat a variety of skin conditions such as warts, pre-skin cancers, molluscum contagiosum, sun spots, and several benign growths. The substance we use in cryosurgery is liquid nitrogen and is so cold (-195 degrees Celsius) that is burns  when administered.     Following treatment in the office, the skin may immediately burn and become red. You may find the area around the lesion is affected as well. It is sometimes necessary to treat not only the lesion, but a small area of the surrounding normal skin to achieve a good response.     A blister, and even a blood filled blister, may form after treatment.   This is a normal response. If the blister is painful, it is acceptable to sterilize a needle and with rubbing alcohol and gently pop the blister. It is important that you gently wash the area with soap and warm water as the blister fluid may contain wart virus if a wart was treated. Do no remove the roof of the blister.     The area treated can take anywhere from 1-3 weeks to heal. Healing time depends on the kind skin lesion treated, the location, and how aggressively the lesion was treated. It is recommended that the areas treated are covered with Vaseline or bacitracin ointment and a band-aid. If a band-aid is not practical, just ointment applied several times per day will do. Keeping these areas moist will speed the healing time.    Treatment with liquid nitrogen can leave a scar. In dark skin, it may be a light or dark scar, in light skin it may be a white or pink scar. These will generally fade with time, but may never go away completely.     If you have any concerns after your treatment, please feel free to call the office.       [address] [place] [address]/ [phone] FAX/ [email or web address]      Shave Biopsy Wound Care    Your doctor has performed a shave biopsy today.  A band aid and vaseline ointment has been placed over the site.  This should remain in place for NO LONGER THAN 48 hours.  It is fine to remove the bandaid after 24 hours, if the area is no longer bleeding. It is recommended that you keep the area dry (do not wet)) for the first 24 hours.  After 24 hours, wash the area with warm soap and water  and apply Vaseline jelly.  Many patients prefer to use Neosporin or Bacitracin ointment.  This is acceptable; however, know that you can develop an allergy to this medication even if you have used it safely for years.  It is important to keep the area moist.  Letting it dry out and get air slows healing time, and will worsen the scar.        If you notice increasing redness, tenderness, pain, or yellow drainage at the biopsy site, please notify your doctor.  These are signs of an infection.    If your biopsy site is bleeding, apply firm pressure for 15 minutes straight.  Repeat for another 15 minutes, if it is still bleeding.   If the surgical site continues to bleed, then please contact your doctor.      For [facility] users:   You will receive your biopsy results in [facility] as soon as they are available. Please be assured that your physician/provider will review your results and will then determine what further treatment, evaluation, or planning is required. You should be contacted by your physician's/provider's office within 5 business days of receiving your results; If not, please reach out to directly. This is one more way [facility] is putting you first.     [address] [facility] [place] [address]/ [phone] FAX/ [email or web address]

## 2024-01-09 LAB
FINAL PATHOLOGIC DIAGNOSIS: NORMAL
GROSS: NORMAL
Lab: NORMAL
MICROSCOPIC EXAM: NORMAL

## 2024-01-18 ENCOUNTER — OFFICE VISIT (OUTPATIENT)
Dept: INTERNAL MEDICINE | Facility: CLINIC | Age: 79
End: 2024-01-18
Payer: MEDICARE

## 2024-01-18 VITALS
HEART RATE: 76 BPM | RESPIRATION RATE: 19 BRPM | TEMPERATURE: 98 F | HEIGHT: 66 IN | BODY MASS INDEX: 28.88 KG/M2 | DIASTOLIC BLOOD PRESSURE: 80 MMHG | SYSTOLIC BLOOD PRESSURE: 148 MMHG | WEIGHT: 179.69 LBS

## 2024-01-18 DIAGNOSIS — Z12.5 ENCOUNTER FOR SCREENING FOR MALIGNANT NEOPLASM OF PROSTATE: ICD-10-CM

## 2024-01-18 DIAGNOSIS — R39.11 URINARY HESITANCY: ICD-10-CM

## 2024-01-18 DIAGNOSIS — E11.8 DM (DIABETES MELLITUS) WITH COMPLICATIONS: ICD-10-CM

## 2024-01-18 DIAGNOSIS — I10 ESSENTIAL HYPERTENSION: ICD-10-CM

## 2024-01-18 DIAGNOSIS — L57.0 AK (ACTINIC KERATOSIS): ICD-10-CM

## 2024-01-18 DIAGNOSIS — E78.5 HYPERLIPIDEMIA ASSOCIATED WITH TYPE 2 DIABETES MELLITUS: ICD-10-CM

## 2024-01-18 DIAGNOSIS — E11.59 HYPERTENSION ASSOCIATED WITH DIABETES: Primary | ICD-10-CM

## 2024-01-18 DIAGNOSIS — I15.2 HYPERTENSION ASSOCIATED WITH DIABETES: Primary | ICD-10-CM

## 2024-01-18 DIAGNOSIS — E11.69 HYPERLIPIDEMIA ASSOCIATED WITH TYPE 2 DIABETES MELLITUS: ICD-10-CM

## 2024-01-18 DIAGNOSIS — C22.0 HEPATOCELLULAR CARCINOMA: ICD-10-CM

## 2024-01-18 PROCEDURE — 99214 OFFICE O/P EST MOD 30 MIN: CPT | Mod: HCNC,S$GLB,, | Performed by: NURSE PRACTITIONER

## 2024-01-18 PROCEDURE — 1126F AMNT PAIN NOTED NONE PRSNT: CPT | Mod: HCNC,CPTII,S$GLB, | Performed by: NURSE PRACTITIONER

## 2024-01-18 PROCEDURE — 3288F FALL RISK ASSESSMENT DOCD: CPT | Mod: HCNC,CPTII,S$GLB, | Performed by: NURSE PRACTITIONER

## 2024-01-18 PROCEDURE — 3077F SYST BP >= 140 MM HG: CPT | Mod: HCNC,CPTII,S$GLB, | Performed by: NURSE PRACTITIONER

## 2024-01-18 PROCEDURE — 1160F RVW MEDS BY RX/DR IN RCRD: CPT | Mod: HCNC,CPTII,S$GLB, | Performed by: NURSE PRACTITIONER

## 2024-01-18 PROCEDURE — 1159F MED LIST DOCD IN RCRD: CPT | Mod: HCNC,CPTII,S$GLB, | Performed by: NURSE PRACTITIONER

## 2024-01-18 PROCEDURE — 99999 PR PBB SHADOW E&M-EST. PATIENT-LVL V: CPT | Mod: PBBFAC,HCNC,, | Performed by: NURSE PRACTITIONER

## 2024-01-18 PROCEDURE — 3079F DIAST BP 80-89 MM HG: CPT | Mod: HCNC,CPTII,S$GLB, | Performed by: NURSE PRACTITIONER

## 2024-01-18 PROCEDURE — 1101F PT FALLS ASSESS-DOCD LE1/YR: CPT | Mod: HCNC,CPTII,S$GLB, | Performed by: NURSE PRACTITIONER

## 2024-01-18 RX ORDER — GLIMEPIRIDE 4 MG/1
4 TABLET ORAL
Qty: 90 TABLET | Refills: 3 | Status: SHIPPED | OUTPATIENT
Start: 2024-01-18

## 2024-01-18 RX ORDER — IRBESARTAN 300 MG/1
300 TABLET ORAL NIGHTLY
Qty: 90 TABLET | Refills: 3 | Status: SHIPPED | OUTPATIENT
Start: 2024-01-18 | End: 2024-06-11

## 2024-01-18 RX ORDER — LOVASTATIN 20 MG/1
20 TABLET ORAL NIGHTLY
Qty: 90 TABLET | Refills: 3 | Status: SHIPPED | OUTPATIENT
Start: 2024-01-18

## 2024-01-18 RX ORDER — TAMSULOSIN HYDROCHLORIDE 0.4 MG/1
0.4 CAPSULE ORAL DAILY
Qty: 30 CAPSULE | Refills: 11 | Status: SHIPPED | OUTPATIENT
Start: 2024-01-18 | End: 2024-01-25 | Stop reason: ALTCHOICE

## 2024-01-18 RX ORDER — METFORMIN HYDROCHLORIDE 500 MG/1
500 TABLET, EXTENDED RELEASE ORAL 2 TIMES DAILY WITH MEALS
Qty: 180 TABLET | Refills: 3 | Status: SHIPPED | OUTPATIENT
Start: 2024-01-18 | End: 2024-01-23

## 2024-01-18 NOTE — ASSESSMENT & PLAN NOTE
Continue with lovastatin at current dose.    Lipid panel reordered.    We will make changes as warranted.

## 2024-01-18 NOTE — ASSESSMENT & PLAN NOTE
Mildly elevated even after recheck.    Patient and wife recommended to start monitoring blood pressures on a consistent basis and to report readings that are consistently greater than 140/90.    We will add tamsulosin to regimen which may cause some decrease in blood pressure.    Cautioned about this possibility.

## 2024-01-18 NOTE — ASSESSMENT & PLAN NOTE
I suspect some BPH based on patient's age and description of hesitancy.    We will start on tamsulosin 0.4 mg daily and we will make referral to Urology for further evaluation.  Again reiterated that a decrease in blood pressure may occur and advised to make slow position changes especially when waking to urinate at 3:00 a.m. in the morning

## 2024-01-18 NOTE — PROGRESS NOTES
Subjective     Patient ID: Jamison Mayes is a 78 y.o. male.    Chief Complaint: Follow-up    Pt with T2DM, HTN, Seizure d/o, HCC, HLD is here for 6 month f/u accompanied by his wife. Pt has been doing well at home and reports his AM sugars have been consistently in the 120s-130s. He denies any lows. He does not monitor her BP at home. He continues to follow up with Hepatology for his HCC. He would like to discuss his urinary hesitancy that has been ongoing. He statse his initial void of the morning requires him to stand for a long time before a stream is initiated. He denies any dribbling or interrupted stream. When completed he feels he empties his bladder completely. He reports nocturia x1. He continues to work at the [employer] 2 days weekly, 8 hrs each day without any difficulty. [place] is [place] even with hearing aids.     Follow-up      Review of Systems   Genitourinary:  Positive for difficulty urinating.          Objective     Physical Exam  Vitals reviewed.   Constitutional:       Appearance: Normal appearance.   HENT:      Head: Normocephalic and atraumatic.      Nose: Nose normal.      Mouth/Throat:      Mouth: Mucous membranes are moist.   Eyes:      Pupils: Pupils are equal, round, and reactive to light.   Cardiovascular:      Rate and Rhythm: Normal rate and regular rhythm.      Heart sounds: Normal heart sounds.   Pulmonary:      Effort: Pulmonary effort is normal.      Breath sounds: Normal breath sounds.   Abdominal:      General: Abdomen is flat. Bowel sounds are normal.      Palpations: Abdomen is soft.   Musculoskeletal:         General: Normal range of motion.      Cervical back: Normal range of motion.   Skin:     General: Skin is warm and dry.      Comments: AK scattered on scalp   Neurological:      General: No focal deficit present.      Mental Status: He is alert and oriented to person, place, and time.   Psychiatric:         Mood and Affect: Mood normal.         Behavior: Behavior normal.         Assessment and Plan   1. Hypertension associated with diabetes  Assessment & Plan:  Mildly elevated even after recheck.    Patient and wife recommended to start monitoring blood pressures on a consistent basis and to report readings that are consistently greater than 140/90.    We will add tamsulosin to regimen which may cause some decrease in blood pressure.    Cautioned about this possibility.    Orders:  -     Lipid Panel; Future; Expected date: 01/18/2024  -     Hemoglobin A1C; Future; Expected date: 01/18/2024  -     Comprehensive Metabolic Panel; Future; Expected date: 01/18/2024  -     CBC Auto Differential; Future; Expected date: 01/18/2024  -     TSH; Future; Expected date: 01/18/2024  -     Microalbumin/creatinine urine ratio  -     irbesartan (AVAPRO) 300 MG tablet; Take 1 tablet (300 mg total) by mouth every evening.  Dispense: 90 tablet; Refill: 3    2. Hyperlipidemia associated with type 2 diabetes mellitus  Assessment & Plan:  Continue with lovastatin at current dose.    Lipid panel reordered.    We will make changes as warranted.      3. DM (diabetes mellitus) with complications  Assessment & Plan:  Last hemoglobin A1c 6.9%.    Patient recommended to repeat hemoglobin A1c.  We will make changes as warranted.  He does have room to increase metformin to 1000 mg b.i.d..    Order for microalbumin placed.    Patient will be contacted with results and revised care plan as warranted.  Continue monitoring blood sugars at least once daily    Orders:  -     glimepiride (AMARYL) 4 MG tablet; Take 1 tablet (4 mg total) by mouth before breakfast.  Dispense: 90 tablet; Refill: 3  -     metFORMIN (GLUCOPHAGE-XR) 500 MG ER 24hr tablet; Take 1 tablet (500 mg total) by mouth 2 (two) times daily with meals.  Dispense: 180 tablet; Refill: 3    4. Urinary hesitancy  Assessment & Plan:  I suspect some BPH based on patient's age and description of hesitancy.    We will start on tamsulosin 0.4 mg daily and we will make  referral to Urology for further evaluation.  Again reiterated that a decrease in blood pressure may occur and advised to make slow position changes especially when waking to urinate at 3:00 a.m. in the morning    Orders:  -     Ambulatory referral/consult to Urology; Future; Expected date: 01/25/2024    5. Essential hypertension  Assessment & Plan:  Mildly elevated even after recheck.    Patient and wife recommended to start monitoring blood pressures on a consistent basis and to report readings that are consistently greater than 140/90.    We will add tamsulosin to regimen which may cause some decrease in blood pressure.    Cautioned about this possibility.    Orders:  -     lovastatin (MEVACOR) 20 MG tablet; Take 1 tablet (20 mg total) by mouth every evening.  Dispense: 90 tablet; Refill: 3    6. Encounter for screening for malignant neoplasm of prostate  -     PSA, SCREENING; Future; Expected date: 01/18/2024    7. AK (actinic keratosis)  Assessment & Plan:  Stay.    Continue to follow up with Dermatology as previously scheduled.      8. Hepatocellular carcinoma  Overview:  s/p left hepatectomy of segments 2,3,4a/b      Other orders  -     tamsulosin (FLOMAX) 0.4 mg Cap; Take 1 capsule (0.4 mg total) by mouth once daily.  Dispense: 30 capsule; Refill: 11      RTC in 6 months for follow up.

## 2024-01-18 NOTE — ASSESSMENT & PLAN NOTE
Last hemoglobin A1c 6.9%.    Patient recommended to repeat hemoglobin A1c.  We will make changes as warranted.  He does have room to increase metformin to 1000 mg b.i.d..    Order for microalbumin placed.    Patient will be contacted with results and revised care plan as warranted.  Continue monitoring blood sugars at least once daily

## 2024-01-23 ENCOUNTER — TELEPHONE (OUTPATIENT)
Dept: DERMATOLOGY | Facility: CLINIC | Age: 79
End: 2024-01-23
Payer: MEDICARE

## 2024-01-23 ENCOUNTER — TELEPHONE (OUTPATIENT)
Dept: INTERNAL MEDICINE | Facility: CLINIC | Age: 79
End: 2024-01-23
Payer: MEDICARE

## 2024-01-23 ENCOUNTER — LAB VISIT (OUTPATIENT)
Dept: LAB | Facility: HOSPITAL | Age: 79
End: 2024-01-23
Attending: NURSE PRACTITIONER
Payer: MEDICARE

## 2024-01-23 DIAGNOSIS — Z12.5 ENCOUNTER FOR SCREENING FOR MALIGNANT NEOPLASM OF PROSTATE: ICD-10-CM

## 2024-01-23 DIAGNOSIS — I15.2 HYPERTENSION ASSOCIATED WITH DIABETES: ICD-10-CM

## 2024-01-23 DIAGNOSIS — E11.8 DM (DIABETES MELLITUS) WITH COMPLICATIONS: Primary | ICD-10-CM

## 2024-01-23 DIAGNOSIS — E11.59 HYPERTENSION ASSOCIATED WITH DIABETES: ICD-10-CM

## 2024-01-23 LAB
ALBUMIN SERPL BCP-MCNC: 4.3 G/DL (ref 3.5–5.2)
ALP SERPL-CCNC: 70 U/L (ref 38–126)
ALT SERPL W/O P-5'-P-CCNC: 16 U/L (ref 10–44)
ANION GAP SERPL CALC-SCNC: 9 MMOL/L (ref 8–16)
AST SERPL-CCNC: 34 U/L (ref 15–46)
BASOPHILS # BLD AUTO: 0.05 K/UL (ref 0–0.2)
BASOPHILS NFR BLD: 0.7 % (ref 0–1.9)
BILIRUB SERPL-MCNC: 0.7 MG/DL (ref 0.1–1)
CALCIUM SERPL-MCNC: 9.9 MG/DL (ref 8.7–10.5)
CHLORIDE SERPL-SCNC: 103 MMOL/L (ref 95–110)
CHOLEST SERPL-MCNC: 120 MG/DL (ref 120–199)
CHOLEST/HDLC SERPL: 3 {RATIO} (ref 2–5)
CO2 SERPL-SCNC: 28 MMOL/L (ref 23–29)
COMPLEXED PSA SERPL-MCNC: 1.6 NG/ML (ref 0–4)
CREAT SERPL-MCNC: 1.1 MG/DL (ref 0.5–1.4)
DIFFERENTIAL METHOD BLD: ABNORMAL
EOSINOPHIL # BLD AUTO: 0.3 K/UL (ref 0–0.5)
EOSINOPHIL NFR BLD: 3.7 % (ref 0–8)
ERYTHROCYTE [DISTWIDTH] IN BLOOD BY AUTOMATED COUNT: 13 % (ref 11.5–14.5)
EST. GFR  (NO RACE VARIABLE): >60 ML/MIN/1.73 M^2
ESTIMATED AVG GLUCOSE: 151 MG/DL (ref 68–131)
GLUCOSE SERPL-MCNC: 116 MG/DL (ref 70–110)
HBA1C MFR BLD: 6.9 % (ref 4–5.6)
HCT VFR BLD AUTO: 38.8 % (ref 40–54)
HDLC SERPL-MCNC: 40 MG/DL (ref 40–75)
HDLC SERPL: 33.3 % (ref 20–50)
HGB BLD-MCNC: 12.8 G/DL (ref 14–18)
IMM GRANULOCYTES # BLD AUTO: 0.02 K/UL (ref 0–0.04)
IMM GRANULOCYTES NFR BLD AUTO: 0.3 % (ref 0–0.5)
LDLC SERPL CALC-MCNC: 61.6 MG/DL (ref 63–159)
LYMPHOCYTES # BLD AUTO: 2.3 K/UL (ref 1–4.8)
LYMPHOCYTES NFR BLD: 30.4 % (ref 18–48)
MCH RBC QN AUTO: 30 PG (ref 27–31)
MCHC RBC AUTO-ENTMCNC: 33 G/DL (ref 32–36)
MCV RBC AUTO: 91 FL (ref 82–98)
MONOCYTES # BLD AUTO: 0.8 K/UL (ref 0.3–1)
MONOCYTES NFR BLD: 10 % (ref 4–15)
NEUTROPHILS # BLD AUTO: 4.2 K/UL (ref 1.8–7.7)
NEUTROPHILS NFR BLD: 54.9 % (ref 38–73)
NONHDLC SERPL-MCNC: 80 MG/DL
NRBC BLD-RTO: 0 /100 WBC
PLATELET # BLD AUTO: 206 K/UL (ref 150–450)
PMV BLD AUTO: 10.3 FL (ref 9.2–12.9)
POTASSIUM SERPL-SCNC: 4.7 MMOL/L (ref 3.5–5.1)
PROT SERPL-MCNC: 7.3 G/DL (ref 6–8.4)
RBC # BLD AUTO: 4.26 M/UL (ref 4.6–6.2)
SODIUM SERPL-SCNC: 140 MMOL/L (ref 136–145)
T4 FREE SERPL-MCNC: 1.01 NG/DL (ref 0.71–1.51)
TRIGL SERPL-MCNC: 92 MG/DL (ref 30–150)
TSH SERPL DL<=0.005 MIU/L-ACNC: 4.3 UIU/ML (ref 0.4–4)
UUN UR-MCNC: 19 MG/DL (ref 2–20)
WBC # BLD AUTO: 7.62 K/UL (ref 3.9–12.7)

## 2024-01-23 PROCEDURE — 84153 ASSAY OF PSA TOTAL: CPT | Mod: HCNC | Performed by: NURSE PRACTITIONER

## 2024-01-23 PROCEDURE — 80053 COMPREHEN METABOLIC PANEL: CPT | Mod: HCNC,PN | Performed by: NURSE PRACTITIONER

## 2024-01-23 PROCEDURE — 83036 HEMOGLOBIN GLYCOSYLATED A1C: CPT | Mod: HCNC | Performed by: NURSE PRACTITIONER

## 2024-01-23 PROCEDURE — 36415 COLL VENOUS BLD VENIPUNCTURE: CPT | Mod: HCNC,PN | Performed by: NURSE PRACTITIONER

## 2024-01-23 PROCEDURE — 84439 ASSAY OF FREE THYROXINE: CPT | Mod: HCNC | Performed by: NURSE PRACTITIONER

## 2024-01-23 PROCEDURE — 84443 ASSAY THYROID STIM HORMONE: CPT | Mod: HCNC,PN | Performed by: NURSE PRACTITIONER

## 2024-01-23 PROCEDURE — 80061 LIPID PANEL: CPT | Mod: HCNC | Performed by: NURSE PRACTITIONER

## 2024-01-23 PROCEDURE — 85025 COMPLETE CBC W/AUTO DIFF WBC: CPT | Mod: HCNC,PN | Performed by: NURSE PRACTITIONER

## 2024-01-23 RX ORDER — METFORMIN HYDROCHLORIDE 500 MG/1
1000 TABLET, EXTENDED RELEASE ORAL
Qty: 180 TABLET | Refills: 3 | Status: CANCELLED | OUTPATIENT
Start: 2024-01-23 | End: 2025-01-22

## 2024-01-24 ENCOUNTER — PROCEDURE VISIT (OUTPATIENT)
Dept: DERMATOLOGY | Facility: CLINIC | Age: 79
End: 2024-01-24
Payer: MEDICARE

## 2024-01-24 VITALS
DIASTOLIC BLOOD PRESSURE: 77 MMHG | WEIGHT: 179 LBS | SYSTOLIC BLOOD PRESSURE: 158 MMHG | HEIGHT: 66 IN | HEART RATE: 76 BPM | BODY MASS INDEX: 28.77 KG/M2

## 2024-01-24 DIAGNOSIS — C44.42 SQUAMOUS CELL CARCINOMA, SCALP/NECK: Primary | ICD-10-CM

## 2024-01-24 PROCEDURE — 99499 UNLISTED E&M SERVICE: CPT | Mod: HCNC,S$GLB,, | Performed by: DERMATOLOGY

## 2024-01-24 PROCEDURE — 13121 CMPLX RPR S/A/L 2.6-7.5 CM: CPT | Mod: HCNC,S$GLB,, | Performed by: DERMATOLOGY

## 2024-01-24 PROCEDURE — 17311 MOHS 1 STAGE H/N/HF/G: CPT | Mod: HCNC,51,S$GLB, | Performed by: DERMATOLOGY

## 2024-01-24 NOTE — PROGRESS NOTES
PROCEDURE: Mohs' Micrographic Surgery    INDICATION: Biopsy-proven skin cancer of cosmetically and functionally important areas, including head, neck, genital, hand, foot, or areas known for having difficulty in healing, such as the lower anterior legs. Tumors with aggressive clinical behavior (rapidly growing, greater than 1 cm in diameter). Tumor with ill-defined borders.    REFERRING PROVIDER: Cristin Joseph M.D.    CASE NUMBER:     ANESTHETIC: 6 cc 0.5% Lidocaine with Epi 1:200,000 mixed 1:1 with 0.5% Bupivacaine    SURGICAL PREP: Hibiclens    SURGEON: Antonette Peterson MD    ASSISTANTS: Miley Alvarez PA-C and Christa Acreo MA    PREOPERATIVE DIAGNOSIS: squamous cell carcinoma- invasive, well differentiated    POSTOPERATIVE DIAGNOSIS: squamous cell carcinoma    PATHOLOGIC DIAGNOSIS: squamous cell carcinoma- invasive, well differentiated    HISTOLOGY OF SPECIMENS IN FIRST STAGE:   Tumor Type: No tumor seen. Actinic keratosis: Keratinocyte atypia along the basal layer.    STAGES OF MOHS' SURGERY PERFORMED: 1    TUMOR-FREE PLANE ACHIEVED: Yes. No invasive SCC.    HEMOSTASIS: electrocoagulation     SPECIMENS: 2     LOCATION: left parietal scalp. Location verified with Dr. Joseph's clinical photograph. Patient also verified location by looking at photo taken prior to procedure.     INITIAL LESION SIZE: 1.0 x 1.1 cm    FINAL DEFECT SIZE: 1.4 x 1.7 cm    WOUND REPAIR/DISPOSITION: The patient tolerated Mohs' Micrographic Surgery for a squamous cell carcinoma very well. When the tumor was completely removed, a repair of the surgical defect was undertaken.    PROCEDURE: Complex Linear Repair    INDICATION: Status post Mohs' Micrographic Surgery for squamous cell carcinoma.    CASE NUMBER:     SURGEON: Antonette Peterson MD    ASSISTANTS: Miley Alvarez PA-C and Natalie Arriaga Surg Vannesa    ANESTHETIC: 3 cc 1% Lidocaine with Epinephrine 1:100,000    SURGICAL PREP: Hibiclens, prepped by Natalie Arriaga Surg Tech    LOCATION:  "left parietal scalp    DEFECT SIZE: 1.4 x 1.7 cm    WOUND REPAIR/DISPOSITION:  After the patient's carcinoma had been completely removed with Mohs' Micrographic Surgery, a repair of the surgical defect was undertaken. The patient was returned to the operating suite where the area of left parietal scalp was prepped, draped, and anesthetized in the usual sterile fashion. The wound was widely undermined in all directions. The wound was undermined to a distance at least the maximum width of the defect as measured perpendicular to the closure line along at least one entire edge of the defect, in this case 2 cm. Then, electrocoagulation and 3-0 vicryl suture ties were used to obtain meticulous hemostasis. 3-0 Vicryl buried vertical mattress sutures were placed into the subcutaneous and dermal plane to close the wound and cordell the cutaneous wound edge. Bilateral dog ears were identified and were removed by a standard Burow's triangle technique. The cutaneous wound edges were closed using interrupted 3-0 Prolene suture.    The patient tolerated the procedure well.    The area was cleaned and dressed appropriately and the patient was given wound care instructions, as well as appointment for follow-up evaluation and suture removal in 14 days.    LENGTH OF REPAIR: 3.6 cm    Vitals:    01/24/24 0743 01/24/24 0947   BP: (!) 150/78 (!) 158/77   BP Location: Right arm Right arm   Patient Position: Sitting Sitting   BP Method: Small (Automatic) Medium (Automatic)   Pulse: 69 76   Weight: 81.2 kg (179 lb)    Height: 5' 6" (1.676 m)          "

## 2024-01-25 ENCOUNTER — OFFICE VISIT (OUTPATIENT)
Dept: UROLOGY | Facility: CLINIC | Age: 79
End: 2024-01-25
Payer: MEDICARE

## 2024-01-25 VITALS
SYSTOLIC BLOOD PRESSURE: 145 MMHG | WEIGHT: 176.25 LBS | DIASTOLIC BLOOD PRESSURE: 77 MMHG | HEIGHT: 66 IN | BODY MASS INDEX: 28.33 KG/M2 | HEART RATE: 73 BPM

## 2024-01-25 DIAGNOSIS — R39.11 URINARY HESITANCY: ICD-10-CM

## 2024-01-25 PROCEDURE — 3288F FALL RISK ASSESSMENT DOCD: CPT | Mod: HCNC,CPTII,S$GLB, | Performed by: UROLOGY

## 2024-01-25 PROCEDURE — 99204 OFFICE O/P NEW MOD 45 MIN: CPT | Mod: HCNC,S$GLB,, | Performed by: UROLOGY

## 2024-01-25 PROCEDURE — 99999 PR PBB SHADOW E&M-EST. PATIENT-LVL IV: CPT | Mod: PBBFAC,HCNC,, | Performed by: UROLOGY

## 2024-01-25 PROCEDURE — 1159F MED LIST DOCD IN RCRD: CPT | Mod: HCNC,CPTII,S$GLB, | Performed by: UROLOGY

## 2024-01-25 PROCEDURE — 1160F RVW MEDS BY RX/DR IN RCRD: CPT | Mod: HCNC,CPTII,S$GLB, | Performed by: UROLOGY

## 2024-01-25 PROCEDURE — 3078F DIAST BP <80 MM HG: CPT | Mod: HCNC,CPTII,S$GLB, | Performed by: UROLOGY

## 2024-01-25 PROCEDURE — 3077F SYST BP >= 140 MM HG: CPT | Mod: HCNC,CPTII,S$GLB, | Performed by: UROLOGY

## 2024-01-25 PROCEDURE — 1126F AMNT PAIN NOTED NONE PRSNT: CPT | Mod: HCNC,CPTII,S$GLB, | Performed by: UROLOGY

## 2024-01-25 PROCEDURE — 1101F PT FALLS ASSESS-DOCD LE1/YR: CPT | Mod: HCNC,CPTII,S$GLB, | Performed by: UROLOGY

## 2024-01-25 RX ORDER — TAMSULOSIN HYDROCHLORIDE 0.4 MG/1
0.4 CAPSULE ORAL DAILY
Qty: 90 CAPSULE | Refills: 3 | Status: SHIPPED | OUTPATIENT
Start: 2024-01-25 | End: 2025-01-24

## 2024-01-25 NOTE — PROGRESS NOTES
Subjective:      Patient ID: Jamison Mayes is a 78 y.o. male.    Chief Complaint: BPH  Patient is a 78 y.o. male who is new to our clinic and referred by Mary Obrien NP for evaluation of urinary hesitancy.   Patient complains of lower urinary tract symptoms. He reports nocturia one time a night, straining, and weak stream. He denies frequency, intermittency, and urgency. Patient states symptoms are of mild severity. Onset of symptoms was several years ago and was gradual in onset. His AUA Symptom Score is, 6/35. He has no personal history and no family history of prostate cancer. He reports a history of no complicating symptoms. He denies flank pain, gross hematuria, kidney stones, and recurrent UTI. He was a previous smoker, quit 40 years ago.    Lab Results   Component Value Date    PSA 1.6 01/23/2024    PSA 1.7 01/10/2023    PSA 1.4 12/30/2020    PSA 1.5 11/14/2019    PSA 3.0 05/01/2018    PSA 1.2 04/20/2017    PSA 1.1 04/20/2016    PSA 0.97 04/15/2015    PSA 1.02 10/29/2012    PSA 1.02 11/01/2011    PSA 0.92 10/04/2010    PSA 0.91 06/18/2009    PSA 0.85 08/14/2008    PSA 0.8 02/13/2007    PSA 0.9 03/06/2006    PSA 0.7 12/03/2004    PSADIAG 1.6 12/21/2021       IPSS Questionnaire (AUA-SS):  Over the past month    1)  Incomplete Emptying - How often have you had a sensation of not emptying your bladder completely after you finish urinating?  0 - Not at all   2)  Frequency - How often have you had to urinate again less than two hours after you finished urinating? 0 - Not at all   3)  Intermittency - How often have you found you stopped and started again several times when you urinated?  1 - Less than 1 time in 5   4) Urgency - How difficult have you found it to postpone urination?  0 - Not at all   5) Weak Stream - How often have you had a weak urinary stream?  4 - More than half the time   6) Straining  - How often have you had to push or strain to begin urination?  0 - Not at all   7) Nocturia - How many  times did you most typically get up to urinate from the time you went to bed until the time you got up in the morning?  1 - 1 time   Total score:  0-7 mild, 8-19 moderate, 20-35 severe 6   Quality of Life:  4 - Mostly Dissatisfied      PMH/PSH/Medications/Allergies/Social history reviewed and as in chart.    Review of Systems   Constitutional:  Negative for activity change, chills and fever.   Respiratory:  Negative for shortness of breath.    Cardiovascular:  Negative for chest pain and palpitations.   Gastrointestinal:  Negative for abdominal pain and constipation.   Genitourinary:  Positive for difficulty urinating (weak stream). Negative for dysuria, flank pain, frequency, hematuria and urgency.   Neurological:  Negative for dizziness and light-headedness.     All other systems reviewed and negative except pertinent positives noted in HPI.      Objective:     Physical Exam  HENT:      Head: Normocephalic.   Pulmonary:      Effort: Pulmonary effort is normal.   Musculoskeletal:         General: Normal range of motion.      Cervical back: Normal range of motion.   Skin:     General: Skin is warm and dry.   Neurological:      Mental Status: He is alert and oriented to person, place, and time.       Assessment:     Problem Noted   Urinary Hesitancy 1/18/2024    Started on tamsulosin         Plan:    Avoid bladder irritants including but not limited to caffeine, alcohol, smoking, spicy foods, acidic foods, tomato-based products, citrus, artificial sweeteners, chocolate, coffee or tea.  2.  Start taking tamsulosin daily, discussed side effects of medication, patient verbalizes understanding.  3. Discussed future prostate work-up based on efficacy of medication trial.  3. Follow-up 6 weeks    JADE Broussard    I spent a total of 30 minutes on the day of the visit.This includes face to face time and non-face to face time preparing to see the patient (eg, review of tests), obtaining and/or reviewing separately  obtained history, documenting clinical information in the electronic or other health record, independently interpreting results and communicating results to the patient/family/caregiver, or care coordinator.

## 2024-02-08 ENCOUNTER — OFFICE VISIT (OUTPATIENT)
Dept: DERMATOLOGY | Facility: CLINIC | Age: 79
End: 2024-02-08
Payer: MEDICARE

## 2024-02-08 DIAGNOSIS — Z09 POSTOP CHECK: Primary | ICD-10-CM

## 2024-02-08 PROCEDURE — 1159F MED LIST DOCD IN RCRD: CPT | Mod: HCNC,CPTII,S$GLB, | Performed by: DERMATOLOGY

## 2024-02-08 PROCEDURE — 1126F AMNT PAIN NOTED NONE PRSNT: CPT | Mod: HCNC,CPTII,S$GLB, | Performed by: DERMATOLOGY

## 2024-02-08 PROCEDURE — 3288F FALL RISK ASSESSMENT DOCD: CPT | Mod: HCNC,CPTII,S$GLB, | Performed by: DERMATOLOGY

## 2024-02-08 PROCEDURE — 1101F PT FALLS ASSESS-DOCD LE1/YR: CPT | Mod: HCNC,CPTII,S$GLB, | Performed by: DERMATOLOGY

## 2024-02-08 PROCEDURE — 99999 PR PBB SHADOW E&M-EST. PATIENT-LVL III: CPT | Mod: PBBFAC,HCNC,, | Performed by: DERMATOLOGY

## 2024-02-08 PROCEDURE — 99024 POSTOP FOLLOW-UP VISIT: CPT | Mod: HCNC,S$GLB,, | Performed by: DERMATOLOGY

## 2024-02-08 NOTE — PROGRESS NOTES
79 y.o. male patient is here for suture removal following Mohs' surgery.    Patient reports no problems.    WOUND PE:  The left parietal scalp sutures intact. Wound healing well. Good skin edges. No signs or symptoms of infection.      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    PLAN:  Sutures removed today by  Yeny Andrea MA . Steri-strips applied.  Continue wound care.  Keep moist with Aquaphor.  Call if any issues arise.     RTC:  In 3-6 months with Cristin Joseph M.D. for skin check or sooner if new concern arises.

## 2024-03-05 ENCOUNTER — PATIENT MESSAGE (OUTPATIENT)
Dept: UROLOGY | Facility: CLINIC | Age: 79
End: 2024-03-05
Payer: MEDICARE

## 2024-03-28 LAB
LEFT EYE DM RETINOPATHY: NEGATIVE
RIGHT EYE DM RETINOPATHY: NEGATIVE

## 2024-04-01 ENCOUNTER — PATIENT OUTREACH (OUTPATIENT)
Dept: ADMINISTRATIVE | Facility: HOSPITAL | Age: 79
End: 2024-04-01
Payer: MEDICARE

## 2024-04-03 ENCOUNTER — OFFICE VISIT (OUTPATIENT)
Dept: DERMATOLOGY | Facility: CLINIC | Age: 79
End: 2024-04-03
Payer: MEDICARE

## 2024-04-03 DIAGNOSIS — L57.0 AK (ACTINIC KERATOSIS): Primary | ICD-10-CM

## 2024-04-03 DIAGNOSIS — L21.9 ACUTE SEBORRHEIC DERMATITIS: ICD-10-CM

## 2024-04-03 DIAGNOSIS — D22.9 MULTIPLE BENIGN NEVI: ICD-10-CM

## 2024-04-03 DIAGNOSIS — Z12.83 SKIN CANCER SCREENING: ICD-10-CM

## 2024-04-03 DIAGNOSIS — L82.1 SK (SEBORRHEIC KERATOSIS): ICD-10-CM

## 2024-04-03 PROCEDURE — 99213 OFFICE O/P EST LOW 20 MIN: CPT | Mod: 25,HCNC,S$GLB, | Performed by: DERMATOLOGY

## 2024-04-03 PROCEDURE — 17000 DESTRUCT PREMALG LESION: CPT | Mod: HCNC,S$GLB,, | Performed by: DERMATOLOGY

## 2024-04-03 PROCEDURE — 3288F FALL RISK ASSESSMENT DOCD: CPT | Mod: HCNC,CPTII,S$GLB, | Performed by: DERMATOLOGY

## 2024-04-03 PROCEDURE — 99999 PR PBB SHADOW E&M-EST. PATIENT-LVL III: CPT | Mod: PBBFAC,HCNC,, | Performed by: DERMATOLOGY

## 2024-04-03 PROCEDURE — 17003 DESTRUCT PREMALG LES 2-14: CPT | Mod: HCNC,S$GLB,, | Performed by: DERMATOLOGY

## 2024-04-03 PROCEDURE — 1101F PT FALLS ASSESS-DOCD LE1/YR: CPT | Mod: HCNC,CPTII,S$GLB, | Performed by: DERMATOLOGY

## 2024-04-03 PROCEDURE — 1160F RVW MEDS BY RX/DR IN RCRD: CPT | Mod: HCNC,CPTII,S$GLB, | Performed by: DERMATOLOGY

## 2024-04-03 PROCEDURE — 1159F MED LIST DOCD IN RCRD: CPT | Mod: HCNC,CPTII,S$GLB, | Performed by: DERMATOLOGY

## 2024-04-03 PROCEDURE — 1126F AMNT PAIN NOTED NONE PRSNT: CPT | Mod: HCNC,CPTII,S$GLB, | Performed by: DERMATOLOGY

## 2024-04-03 RX ORDER — METFORMIN HYDROCHLORIDE 500 MG/1
500 TABLET, EXTENDED RELEASE ORAL 2 TIMES DAILY
COMMUNITY
Start: 2024-02-25

## 2024-04-03 RX ORDER — KETOCONAZOLE 20 MG/ML
SHAMPOO, SUSPENSION TOPICAL
Qty: 120 ML | Refills: 5 | Status: SHIPPED | OUTPATIENT
Start: 2024-04-03

## 2024-04-03 NOTE — PATIENT INSTRUCTIONS
Refilled per protocol.    Sun Protection      The [facility] would like to remind you of the importance of sun protection all year round and particularly during the summer when the suns rays are the strongest. It has been proven that both acute and chronic sun exposure damages our cells and leads to skin cancer. Beyond skin cancer, the sun causes 90% of the symptoms of premature skin aging, including wrinkles, lentigines (brown spots), and thin, easily bruised skin. Proper sun protection can help prevent these unwanted conditions.    Many patients report that they dont go in the sun. It has been shown that the average person receives 18 hours of incidental sun exposure per week during activities such as walking through parking lots, driving, or sitting next to windows. This accumulates to several bad sunburns per year!    In choosing sunscreen, you want one that protects against both UVA and UVB rays (broad spectrum). It is recommended that you use one of SPF 30 or higher. It is important to apply the sunscreen about 20 minutes prior to sun exposure. Most sunscreens are chemical sunscreens and a reaction must take place in the skin so that they are effective. If they are applied and then you are immediately exposed to the sun or start sweating, this reaction has not had time to take place and you are therefore unprotected. Sunscreen needs to be reapplied every 2 hours if you are participating in water sports or sweating. We recommend Elta MD or CeraVe sunscreens for daily use; however there are many options and it is most important for you to find one that you will use on a consistent basis.    If you have sensitive skin, you may do best with a sunscreen that contains only physical blockers in the active ingredient section. The only physical blockers available in the [place] currently are titanium dioxide or zinc oxide. These are typically thicker and harder to apply, however they afford very good protection.  Neutrogena Sensitive Skin, Blue Lizard Sensitive Skin (pink top) or Neutrogena Pure and Free are popular ones.     Aside from sunscreen, clothes with UV protection (UPF), wide brimmed hats, and sunglasses are other means of sun protection that we recommend.      Based on a recent study (6/2021) and out of an abundance of caution, we are recommending that you AVOID the following sunscreens as they may contain the carcinogen, benzene:    Spray and gel sunscreens  Any [employer] or [employer] brands as well as Max Block and TopCare brands   Neutrogena Ultra Sheer Dry-touch Water Resistant Sunscreen LOTION SPF 70   Neutrogena Sheer Zinc Dry-touch Face Sunscreen LOTION SPF 50   5.   Aveeno Baby Continuous Protection Sensitive Skin Sunscreen LOTION - Broad Spectrum SPF 50    Please note that Benzene is not an ingredient or the degradation product of any ingredient in any sunscreen. This study suggested that the findings are a result of contamination in the manufacturing process. At this point, we don't know how effectively Benzene gets through the skin, if it gets absorbed systemically, and what effects it may have.     We do know that ultraviolet radiation is a well-established carcinogen. Please use daily sun protection/avoidance and use of at least SPF 30, broad-spectrum sunscreen not listed above.                       [facility] - DERMATOLOGY [address]  [address]  [place] [address]  Dept: [phone]  Dept Fax: [phone]                                                                              CRYOSURGERY      Your doctor has used a method called cryosurgery to treat your skin condition. Cryosurgery refers to the use of very cold substances to treat a variety of skin conditions such as warts, pre-skin cancers, molluscum contagiosum, sun spots, and several benign growths. The substance we use in cryosurgery is liquid nitrogen and is so cold (-195 degrees Celsius) that is burns  when administered.     Following treatment in the office, the skin may immediately burn and become red. You may find the area around the lesion is affected as well. It is sometimes necessary to treat not only the lesion, but a small area of the surrounding normal skin to achieve a good response.     A blister, and even a blood filled blister, may form after treatment.   This is a normal response. If the blister is painful, it is acceptable to sterilize a needle and with rubbing alcohol and gently pop the blister. It is important that you gently wash the area with soap and warm water as the blister fluid may contain wart virus if a wart was treated. Do no remove the roof of the blister.     The area treated can take anywhere from 1-3 weeks to heal. Healing time depends on the kind skin lesion treated, the location, and how aggressively the lesion was treated. It is recommended that the areas treated are covered with Vaseline or bacitracin ointment and a band-aid. If a band-aid is not practical, just ointment applied several times per day will do. Keeping these areas moist will speed the healing time.    Treatment with liquid nitrogen can leave a scar. In dark skin, it may be a light or dark scar, in light skin it may be a white or pink scar. These will generally fade with time, but may never go away completely.     If you have any concerns after your treatment, please feel free to call the office.       [address] [place] [address]/ [phone] FAX/ [email or web address]

## 2024-04-03 NOTE — PROGRESS NOTES
Subjective:      Patient ID:  Jamison Mayes is a 79 y.o. male who presents for   Chief Complaint   Patient presents with    Skin Check     ubse     History of Present Illness: The patient presents for skin check.    The patient was last seen on: 01/05/24 for Bx on L parietal scalp -SCC excised by Dr YADAV on 01/24/24 and  cryosurgery to actinic keratoses which have resolved.    This is a high risk patient here to check for the development of new lesions.       Other skin complaints: none      Has hx of SCC.  Has previously used cryo, Efudex, and PDT for AKs.    Review of Systems   Constitutional:  Negative for fever and chills.   Respiratory:  Negative for cough and shortness of breath.    Gastrointestinal:  Negative for nausea and vomiting.   Musculoskeletal:  Negative for joint swelling and arthralgias.   Skin:  Positive for activity-related sunscreen use and wears hat. Negative for daily sunscreen use and recent sunburn.   All other systems reviewed and are negative.  Hematologic/Lymphatic: Bruises/bleeds easily.       Objective:   Physical Exam   Constitutional: He appears well-developed and well-nourished.   Neurological: He is alert and oriented to person, place, and time.   Psychiatric: He has a normal mood and affect.   Skin:   Areas Examined (abnormalities noted in diagram):   Scalp / Hair Palpated and Inspected  Head / Face Inspection Performed  Neck Inspection Performed  Chest / Axilla Inspection Performed  Abdomen Inspection Performed  Back Inspection Performed  RUE Inspected  LUE Inspection Performed                 Diagram Legend     Erythematous scaling macule/papule c/w actinic keratosis       Vascular papule c/w angioma      Pigmented verrucoid papule/plaque c/w seborrheic keratosis      Yellow umbilicated papule c/w sebaceous hyperplasia      Irregularly shaped tan macule c/w lentigo     1-2 mm smooth white papules consistent with Milia      Movable subcutaneous cyst with punctum c/w epidermal  inclusion cyst      Subcutaneous movable cyst c/w pilar cyst      Firm pink to brown papule c/w dermatofibroma      Pedunculated fleshy papule(s) c/w skin tag(s)      Evenly pigmented macule c/w junctional nevus     Mildly variegated pigmented, slightly irregular-bordered macule c/w mildly atypical nevus      Flesh colored to evenly pigmented papule c/w intradermal nevus       Pink pearly papule/plaque c/w basal cell carcinoma      Erythematous hyperkeratotic cursted plaque c/w SCC      Surgical scar with no sign of skin cancer recurrence      Open and closed comedones      Inflammatory papules and pustules      Verrucoid papule consistent consistent with wart     Erythematous eczematous patches and plaques     Dystrophic onycholytic nail with subungual debris c/w onychomycosis     Umbilicated papule    Erythematous-base heme-crusted tan verrucoid plaque consistent with inflamed seborrheic keratosis     Erythematous Silvery Scaling Plaque c/w Psoriasis     See annotation      Assessment / Plan:        AK (actinic keratosis)  Cryosurgery Procedure Note    Verbal consent from the patient is obtained including, but not limited to, risk of hypopigmentation/hyperpigmentation, scar, recurrence of lesion. The patient is aware of the precancerous quality and need for treatment of these lesions. Liquid nitrogen cryosurgery is applied to the 10 actinic keratoses, as detailed in the physical exam, to produce a freeze injury. The patient is aware that blisters may form and is instructed on wound care with gentle cleansing and use of vaseline ointment to keep moist until healed. The patient is supplied a handout on cryosurgery and is instructed to call if lesions do not completely resolve.    Acute seborrheic dermatitis  -     ketoconazole (NIZORAL) 2 % shampoo; Wash hair with medicated shampoo at least 2x/week - let sit on scalp at least 5 minutes prior to rinsing  Dispense: 120 mL; Refill: 5    SK (seborrheic keratosis)  These  are benign inherited growths without a malignant potential. Reassurance given to patient. No treatment is necessary.   Treatment of benign, asymptomatic lesions may be considered cosmetic.  Warned about risk of hypo- or hyperpigmentation with treatment and risk of recurrence.    Multiple benign nevi  Benign-appearing nevi present on exam today. Reassurance provided. Periodically examine moles and return to clinic if any moles change or become symptomatic (bleeding, itching, pain, etc).    Skin cancer screening  Upper body skin examination performed today including at least 6 points as noted in physical examination. No lesions suspicious for malignancy noted.  Patient instructed in importance of daily broad spectrum sunscreen use with spf at least 30. Sun avoidance and topical protection/protective clothing discussed.    Consider Efudex on scalp next visit after using keto shampoo to clear up the seb derm.    Follow up in about 4 months (around 8/3/2024) for skin check or sooner for any concerns.

## 2024-05-14 ENCOUNTER — PATIENT MESSAGE (OUTPATIENT)
Dept: OTOLARYNGOLOGY | Facility: CLINIC | Age: 79
End: 2024-05-14
Payer: MEDICARE

## 2024-06-10 ENCOUNTER — TELEPHONE (OUTPATIENT)
Dept: FAMILY MEDICINE | Facility: CLINIC | Age: 79
End: 2024-06-10
Payer: MEDICARE

## 2024-06-10 NOTE — TELEPHONE ENCOUNTER
----- Message from Yaa Campuzano sent at 6/10/2024  9:06 AM CDT -----  Type:  Needs Medical Advice    Who Called: spouse  Would the patient rather a call back or a response via [facility]? call  Best Call Back Number: [phone]   Additional Information:   Spouse requesting a darion regarding her spouse appt on tomorrow

## 2024-06-11 ENCOUNTER — OFFICE VISIT (OUTPATIENT)
Dept: FAMILY MEDICINE | Facility: CLINIC | Age: 79
End: 2024-06-11
Payer: MEDICARE

## 2024-06-11 VITALS
BODY MASS INDEX: 28.84 KG/M2 | OXYGEN SATURATION: 97 % | WEIGHT: 179.44 LBS | TEMPERATURE: 98 F | DIASTOLIC BLOOD PRESSURE: 68 MMHG | HEIGHT: 66 IN | SYSTOLIC BLOOD PRESSURE: 146 MMHG | HEART RATE: 85 BPM

## 2024-06-11 DIAGNOSIS — C22.0 HEPATOCELLULAR CARCINOMA: ICD-10-CM

## 2024-06-11 DIAGNOSIS — H91.93 DECREASED HEARING OF BOTH EARS: ICD-10-CM

## 2024-06-11 DIAGNOSIS — E78.5 HYPERLIPIDEMIA ASSOCIATED WITH TYPE 2 DIABETES MELLITUS: ICD-10-CM

## 2024-06-11 DIAGNOSIS — E11.8 DM (DIABETES MELLITUS) WITH COMPLICATIONS: Primary | ICD-10-CM

## 2024-06-11 DIAGNOSIS — E11.69 HYPERLIPIDEMIA ASSOCIATED WITH TYPE 2 DIABETES MELLITUS: ICD-10-CM

## 2024-06-11 DIAGNOSIS — G40.909 SEIZURE DISORDER: ICD-10-CM

## 2024-06-11 DIAGNOSIS — I10 ESSENTIAL HYPERTENSION: ICD-10-CM

## 2024-06-11 PROCEDURE — 99204 OFFICE O/P NEW MOD 45 MIN: CPT | Mod: S$GLB,,, | Performed by: FAMILY MEDICINE

## 2024-06-11 PROCEDURE — 3288F FALL RISK ASSESSMENT DOCD: CPT | Mod: CPTII,S$GLB,, | Performed by: FAMILY MEDICINE

## 2024-06-11 PROCEDURE — 1125F AMNT PAIN NOTED PAIN PRSNT: CPT | Mod: CPTII,S$GLB,, | Performed by: FAMILY MEDICINE

## 2024-06-11 PROCEDURE — 1159F MED LIST DOCD IN RCRD: CPT | Mod: CPTII,S$GLB,, | Performed by: FAMILY MEDICINE

## 2024-06-11 PROCEDURE — 1160F RVW MEDS BY RX/DR IN RCRD: CPT | Mod: CPTII,S$GLB,, | Performed by: FAMILY MEDICINE

## 2024-06-11 PROCEDURE — 3077F SYST BP >= 140 MM HG: CPT | Mod: CPTII,S$GLB,, | Performed by: FAMILY MEDICINE

## 2024-06-11 PROCEDURE — 2023F DILAT RTA XM W/O RTNOPTHY: CPT | Mod: CPTII,S$GLB,, | Performed by: FAMILY MEDICINE

## 2024-06-11 PROCEDURE — 1101F PT FALLS ASSESS-DOCD LE1/YR: CPT | Mod: CPTII,S$GLB,, | Performed by: FAMILY MEDICINE

## 2024-06-11 PROCEDURE — 3078F DIAST BP <80 MM HG: CPT | Mod: CPTII,S$GLB,, | Performed by: FAMILY MEDICINE

## 2024-06-11 RX ORDER — AMOXICILLIN 500 MG
1 CAPSULE ORAL DAILY
COMMUNITY

## 2024-06-11 RX ORDER — IRBESARTAN AND HYDROCHLOROTHIAZIDE 300; 12.5 MG/1; MG/1
1 TABLET, FILM COATED ORAL DAILY
Qty: 90 TABLET | Refills: 3 | Status: SHIPPED | OUTPATIENT
Start: 2024-06-11 | End: 2025-06-11

## 2024-06-11 NOTE — PROGRESS NOTES
" Patient ID: Jamison Mayes is a 79 y.o. male.    Chief Complaint: Establish Care, Diabetes, and Foot Pain    HPI      Jamison Mayes is a 79 y.o. male here to establish care.  Patient is in reasonably good physical shape.  Only complaint is diabetes mellitus and foot pain.  Points to the plantar surface of his foot near the calcaneus.  Wants to find a new physician because any warmth.  Follow-up for history of having hypertension diabetes mellitus hyperlipidemia associated with diabetes decreased hearing.  Patient also seen disorder but has not had seizures in years.  Current medications agree with him without any problems.    Vitals:    06/11/24 0908   BP: (!) 146/68   BP Location: Left arm   Patient Position: Sitting   Pulse: 85   Temp: 98 °F (36.7 °C)   TempSrc: Oral   SpO2: 97%   Weight: 81.4 kg (179 lb 7.3 oz)   Height: 5' 6" (1.676 m)            Review of Symptoms      Physical Exam    Constitutional:  Oriented to person, place, and time.appears well-developed and well-nourished.  No distress.      HENT  Head: Normocephalic and atraumatic  Right Ear: External ear normal.   Left Ear: External ear normal.   Nose: External nose normal.   Mouth:  Moist mucus membranes.    Eyes:  Conjunctivae are normal. Right eye exhibits no discharge.  Left eye exhibits no discharge. No scleral icterus.  No periorbital edema    Cardiovascular:  Regular rate and rhythm with normal S1 and S2     Pulmonary/Chest:   Clear to auscultation bilaterally without wheezes, rhonchi or rales      Musculoskeletal:  No edema. No obvious deformity No wasting       Neurological:  Alert and oriented to person, place, and time.   Coordination normal.     Skin:   Skin is warm and dry.  No diaphoresis.   No rash noted.     Psychiatric: Normal mood and affect. Behavior is normal.  Judgment and thought content normal.     Complete Blood Count  Lab Results   Component Value Date    RBC 4.26 (L) 01/23/2024    HGB 12.8 (L) 01/23/2024    HCT 38.8 (L) " 01/23/2024    MCV 91 01/23/2024    MCH 30.0 01/23/2024    MCHC 33.0 01/23/2024    RDW 13.0 01/23/2024     01/23/2024    MPV 10.3 01/23/2024    GRAN 4.2 01/23/2024    GRAN 54.9 01/23/2024    LYMPH 2.3 01/23/2024    LYMPH 30.4 01/23/2024    MONO 0.8 01/23/2024    MONO 10.0 01/23/2024    EOS 0.3 01/23/2024    BASO 0.05 01/23/2024    EOSINOPHIL 3.7 01/23/2024    BASOPHIL 0.7 01/23/2024    DIFFMETHOD Automated 01/23/2024       Comprehensive Metabolic Panel  Lab Results   Component Value Date     (H) 01/23/2024    BUN 19 01/23/2024    CREATININE 1.10 01/23/2024     01/23/2024    K 4.7 01/23/2024     01/23/2024    PROT 7.3 01/23/2024    ALBUMIN 4.3 01/23/2024    BILITOT 0.7 01/23/2024    AST 34 01/23/2024    ALKPHOS 70 01/23/2024    CO2 28 01/23/2024    ALT 16 01/23/2024    ANIONGAP 9 01/23/2024       TSH  Lab Results   Component Value Date    TSH 4.300 (H) 01/23/2024       Assessment / Plan:      ICD-10-CM ICD-9-CM   1. DM (diabetes mellitus) with complications  E11.8 250.90   2. Hepatocellular carcinoma  C22.0 155.0   3. Essential hypertension  I10 401.9   4. Hyperlipidemia associated with type 2 diabetes mellitus  E11.69 250.80    E78.5 272.4   5. Decreased hearing of both ears  H91.93 389.9   6. Seizure disorder  G40.909 345.90     DM (diabetes mellitus) with complications    Hepatocellular carcinoma    Essential hypertension    Hyperlipidemia associated with type 2 diabetes mellitus    Decreased hearing of both ears    Seizure disorder    Diabetes mellitus-continue current medication check hemoglobin A1c and lab work     Hypertension-continue current medications taking blood pressure at home come new clinic in about two weeks to check blood pressure.    Seizure disorder-no medication needed  Hepatocellular carcinoma in the distant past-no treatment needed

## 2024-06-18 ENCOUNTER — LAB VISIT (OUTPATIENT)
Dept: LAB | Facility: HOSPITAL | Age: 79
End: 2024-06-18
Attending: FAMILY MEDICINE
Payer: MEDICARE

## 2024-06-18 DIAGNOSIS — I10 ESSENTIAL HYPERTENSION: ICD-10-CM

## 2024-06-18 DIAGNOSIS — E11.69 HYPERLIPIDEMIA ASSOCIATED WITH TYPE 2 DIABETES MELLITUS: ICD-10-CM

## 2024-06-18 DIAGNOSIS — E78.5 HYPERLIPIDEMIA ASSOCIATED WITH TYPE 2 DIABETES MELLITUS: ICD-10-CM

## 2024-06-18 DIAGNOSIS — E11.8 DM (DIABETES MELLITUS) WITH COMPLICATIONS: ICD-10-CM

## 2024-06-18 LAB
ALBUMIN SERPL BCP-MCNC: 4.3 G/DL (ref 3.5–5.2)
ALP SERPL-CCNC: 66 U/L (ref 38–126)
ALT SERPL W/O P-5'-P-CCNC: 14 U/L (ref 10–44)
ANION GAP SERPL CALC-SCNC: 10 MMOL/L (ref 8–16)
AST SERPL-CCNC: 33 U/L (ref 15–46)
BASOPHILS # BLD AUTO: 0.06 K/UL (ref 0–0.2)
BASOPHILS NFR BLD: 0.8 % (ref 0–1.9)
BILIRUB SERPL-MCNC: 0.5 MG/DL (ref 0.1–1)
CALCIUM SERPL-MCNC: 10 MG/DL (ref 8.7–10.5)
CHLORIDE SERPL-SCNC: 106 MMOL/L (ref 95–110)
CHOLEST SERPL-MCNC: 113 MG/DL (ref 120–199)
CHOLEST/HDLC SERPL: 3 {RATIO} (ref 2–5)
CO2 SERPL-SCNC: 26 MMOL/L (ref 23–29)
CREAT SERPL-MCNC: 1.03 MG/DL (ref 0.5–1.4)
DIFFERENTIAL METHOD BLD: ABNORMAL
EOSINOPHIL # BLD AUTO: 0.4 K/UL (ref 0–0.5)
EOSINOPHIL NFR BLD: 4.8 % (ref 0–8)
ERYTHROCYTE [DISTWIDTH] IN BLOOD BY AUTOMATED COUNT: 12.4 % (ref 11.5–14.5)
EST. GFR  (NO RACE VARIABLE): >60 ML/MIN/1.73 M^2
ESTIMATED AVG GLUCOSE: 137 MG/DL (ref 68–131)
GLUCOSE SERPL-MCNC: 112 MG/DL (ref 70–110)
HBA1C MFR BLD: 6.4 % (ref 4–5.6)
HCT VFR BLD AUTO: 38 % (ref 40–54)
HDLC SERPL-MCNC: 38 MG/DL (ref 40–75)
HDLC SERPL: 33.6 % (ref 20–50)
HGB BLD-MCNC: 13.4 G/DL (ref 14–18)
IMM GRANULOCYTES # BLD AUTO: 0.02 K/UL (ref 0–0.04)
IMM GRANULOCYTES NFR BLD AUTO: 0.3 % (ref 0–0.5)
LDLC SERPL CALC-MCNC: 56 MG/DL (ref 63–159)
LYMPHOCYTES # BLD AUTO: 3.1 K/UL (ref 1–4.8)
LYMPHOCYTES NFR BLD: 38.3 % (ref 18–48)
MCH RBC QN AUTO: 31.5 PG (ref 27–31)
MCHC RBC AUTO-ENTMCNC: 35.3 G/DL (ref 32–36)
MCV RBC AUTO: 89 FL (ref 82–98)
MONOCYTES # BLD AUTO: 0.7 K/UL (ref 0.3–1)
MONOCYTES NFR BLD: 8.8 % (ref 4–15)
NEUTROPHILS # BLD AUTO: 3.8 K/UL (ref 1.8–7.7)
NEUTROPHILS NFR BLD: 47 % (ref 38–73)
NONHDLC SERPL-MCNC: 75 MG/DL
NRBC BLD-RTO: 0 /100 WBC
PLATELET # BLD AUTO: 222 K/UL (ref 150–450)
PMV BLD AUTO: 11.4 FL (ref 9.2–12.9)
POTASSIUM SERPL-SCNC: 4.5 MMOL/L (ref 3.5–5.1)
PROT SERPL-MCNC: 7.3 G/DL (ref 6–8.4)
RBC # BLD AUTO: 4.25 M/UL (ref 4.6–6.2)
SODIUM SERPL-SCNC: 142 MMOL/L (ref 136–145)
T4 FREE SERPL-MCNC: 1 NG/DL (ref 0.71–1.51)
TRIGL SERPL-MCNC: 95 MG/DL (ref 30–150)
TSH SERPL DL<=0.005 MIU/L-ACNC: 4.87 UIU/ML (ref 0.4–4)
UUN UR-MCNC: 21 MG/DL (ref 2–20)
WBC # BLD AUTO: 8 K/UL (ref 3.9–12.7)

## 2024-06-18 PROCEDURE — 84443 ASSAY THYROID STIM HORMONE: CPT | Mod: HCNC,PN | Performed by: FAMILY MEDICINE

## 2024-06-18 PROCEDURE — 80061 LIPID PANEL: CPT | Mod: HCNC | Performed by: FAMILY MEDICINE

## 2024-06-18 PROCEDURE — 80053 COMPREHEN METABOLIC PANEL: CPT | Mod: HCNC,PN | Performed by: FAMILY MEDICINE

## 2024-06-18 PROCEDURE — 84439 ASSAY OF FREE THYROXINE: CPT | Mod: HCNC | Performed by: FAMILY MEDICINE

## 2024-06-18 PROCEDURE — 85025 COMPLETE CBC W/AUTO DIFF WBC: CPT | Mod: HCNC,PN | Performed by: FAMILY MEDICINE

## 2024-06-18 PROCEDURE — 83036 HEMOGLOBIN GLYCOSYLATED A1C: CPT | Mod: HCNC | Performed by: FAMILY MEDICINE

## 2024-06-25 ENCOUNTER — CLINICAL SUPPORT (OUTPATIENT)
Dept: FAMILY MEDICINE | Facility: CLINIC | Age: 79
End: 2024-06-25
Payer: MEDICARE

## 2024-06-25 ENCOUNTER — TELEPHONE (OUTPATIENT)
Dept: FAMILY MEDICINE | Facility: CLINIC | Age: 79
End: 2024-06-25

## 2024-06-25 VITALS — DIASTOLIC BLOOD PRESSURE: 60 MMHG | OXYGEN SATURATION: 96 % | HEART RATE: 81 BPM | SYSTOLIC BLOOD PRESSURE: 130 MMHG

## 2024-06-25 DIAGNOSIS — Z01.30 BP CHECK: Primary | ICD-10-CM

## 2024-06-25 RX ORDER — HYDROCHLOROTHIAZIDE 12.5 MG/1
12.5 TABLET ORAL DAILY
Qty: 90 TABLET | Refills: 3 | Status: SHIPPED | OUTPATIENT
Start: 2024-06-25 | End: 2025-06-25

## 2024-06-25 RX ORDER — IRBESARTAN 300 MG/1
300 TABLET ORAL DAILY
Qty: 90 TABLET | Refills: 3 | Status: SHIPPED | OUTPATIENT
Start: 2024-06-25 | End: 2025-06-25

## 2024-06-25 NOTE — TELEPHONE ENCOUNTER
Pt came in for BP check    BP: 130/60 P: 81 O2: 96%    Pt is scheduled to follow up with you in December.     He'd like to know if Irbesartan /12.5 mg can be . It cost him $35 for a 90 day supply. He said it's okay, if not.     *Staff to send portal message to pt regarding medication separation.

## 2024-06-25 NOTE — PROGRESS NOTES
Jamison Mayes 79 y.o. male is here today for Blood Pressure check.   History of HTN yes.    Review of patient's allergies indicates:  No Known Allergies  Creatinine   Date Value Ref Range Status   06/18/2024 1.03 0.50 - 1.40 mg/dL Final     Sodium   Date Value Ref Range Status   06/18/2024 142 136 - 145 mmol/L Final     Potassium   Date Value Ref Range Status   06/18/2024 4.5 3.5 - 5.1 mmol/L Final   ]  Patient verifies taking blood pressure medications on a regular basis at the same time of the day.     Current Outpatient Medications:     ACCU-CHEK ROSE PLUS METER [facility], USE AS DIRECTED, Disp: 1 each, Rfl: 0    aspirin (ECOTRIN) 81 MG EC tablet, Take 81 mg by mouth. 1 Tablet, Delayed Release (E.C.) Oral Every day, Disp: , Rfl:     blood sugar diagnostic Strp, To check BG 2 times daily, to use with accu check guide, Disp: 100 each, Rfl: 11    calcium carbonate (OS-RUSLAN) 500 mg calcium (1,250 mg) chewable tablet, Take 1 tablet by mouth once daily., Disp: , Rfl:     cyanocobalamin (VITAMIN B-12) 250 MCG tablet, Take 1 tablet (250 mcg total) by mouth once daily., Disp: , Rfl:     fluorouraciL (EFUDEX) 5 % cream, AAA on L ear BID x 4-6 weeks. Stop if blistered, oozing, or bleeding. Use daily sun protection., Disp: 40 g, Rfl: 1    fluticasone (FLONASE) 50 mcg/actuation nasal spray, 2 sprays (100 mcg total) by Each Nare route once daily., Disp: 1 Bottle, Rfl: 12    glimepiride (AMARYL) 4 MG tablet, Take 1 tablet (4 mg total) by mouth before breakfast., Disp: 90 tablet, Rfl: 3    glucosamine HCl (GLUCOSAMINE, BULK, MISC), 1,200 mg by Misc.(Non-Drug; Combo Route) route Daily., Disp: , Rfl:     imiquimod (ALDARA) 5 % cream, AAA 5 nights/week x 4 - 6 weeks. Wash off in am. Stop if blistering, bleeding, oozing, etc. Do not use >1 packet per night., Disp: 24 packet, Rfl: 1    irbesartan-hydrochlorothiazide (AVALIDE) 300-12.5 mg per tablet, Take 1 tablet by mouth once daily., Disp: 90 tablet, Rfl: 3    ketoconazole (NIZORAL) 2  % cream, AAA BID, Disp: 60 g, Rfl: 3    ketoconazole (NIZORAL) 2 % shampoo, Wash hair with medicated shampoo at least 2x/week - let sit on scalp at least 5 minutes prior to rinsing, Disp: 120 mL, Rfl: 5    lancets 28 gauge Misc, 1 lancet by Misc.(Non-Drug; Combo Route) route 2 (two) times daily., Disp: 100 each, Rfl: 11    lovastatin (MEVACOR) 20 MG tablet, Take 1 tablet (20 mg total) by mouth every evening., Disp: 90 tablet, Rfl: 3    metFORMIN (GLUCOPHAGE-XR) 500 MG ER 24hr tablet, Take 500 mg by mouth 2 (two) times daily., Disp: , Rfl:     MULTIVITAMIN WITH MINERALS (ONE-A-DAY 50 PLUS) Tab, Take by mouth. 1 Tablet Oral Every morning, Disp: , Rfl:     mupirocin (BACTROBAN) 2 % ointment, Apply topically 2 (two) times daily., Disp: 22 g, Rfl: 1    omega-3 fatty acids/fish oil (FISH OIL-OMEGA-3 FATTY ACIDS) 300-1,000 mg capsule, Take 1 capsule by mouth once daily., Disp: , Rfl:     silver sulfADIAZINE 1% (SILVADENE) 1 % cream, Apply topically 2 (two) times daily., Disp: 25 g, Rfl: 2    tamsulosin (FLOMAX) 0.4 mg Cap, Take 1 capsule (0.4 mg total) by mouth once daily., Disp: 90 capsule, Rfl: 3  Does patient have record of home blood pressure readings no.    Last dose of blood pressure medication was taken at 6:45 am.  Patient is asymptomatic.       BP: 130/60 , Pulse: 81 .    Dr. Watt notified.

## 2024-06-27 ENCOUNTER — TELEPHONE (OUTPATIENT)
Dept: FAMILY MEDICINE | Facility: CLINIC | Age: 79
End: 2024-06-27
Payer: MEDICARE

## 2024-06-27 NOTE — TELEPHONE ENCOUNTER
----- Message from Todd Montoya MD sent at 6/26/2024 11:41 PM CDT -----    Overall your lab work is very good-you do not need to make any changes in regards your medications lifestyle.     Your TSH is slightly elevated but not concerning as your free T4 is normal.  Repeat lab work in six months to a year.

## 2024-06-27 NOTE — TELEPHONE ENCOUNTER
Spoke with pts wife, Tricia, regarding lab results.    Pts wife verbally understood Dr. Meyers note below

## 2024-08-07 ENCOUNTER — OFFICE VISIT (OUTPATIENT)
Dept: DERMATOLOGY | Facility: CLINIC | Age: 79
End: 2024-08-07
Payer: MEDICARE

## 2024-08-07 DIAGNOSIS — Z85.828 HISTORY OF NONMELANOMA SKIN CANCER: ICD-10-CM

## 2024-08-07 DIAGNOSIS — L81.4 LENTIGINES: ICD-10-CM

## 2024-08-07 DIAGNOSIS — L82.1 SK (SEBORRHEIC KERATOSIS): ICD-10-CM

## 2024-08-07 DIAGNOSIS — Z12.83 SKIN CANCER SCREENING: ICD-10-CM

## 2024-08-07 DIAGNOSIS — L57.0 AK (ACTINIC KERATOSIS): Primary | ICD-10-CM

## 2024-08-07 DIAGNOSIS — L21.9 ACUTE SEBORRHEIC DERMATITIS: ICD-10-CM

## 2024-08-07 PROCEDURE — 17003 DESTRUCT PREMALG LES 2-14: CPT | Mod: HCNC,S$GLB,, | Performed by: DERMATOLOGY

## 2024-08-07 PROCEDURE — 99213 OFFICE O/P EST LOW 20 MIN: CPT | Mod: 25,HCNC,S$GLB, | Performed by: DERMATOLOGY

## 2024-08-07 PROCEDURE — 17000 DESTRUCT PREMALG LESION: CPT | Mod: HCNC,S$GLB,, | Performed by: DERMATOLOGY

## 2024-08-07 PROCEDURE — 99999 PR PBB SHADOW E&M-EST. PATIENT-LVL III: CPT | Mod: PBBFAC,HCNC,, | Performed by: DERMATOLOGY

## 2024-08-07 PROCEDURE — 3288F FALL RISK ASSESSMENT DOCD: CPT | Mod: HCNC,CPTII,S$GLB, | Performed by: DERMATOLOGY

## 2024-08-07 PROCEDURE — 1160F RVW MEDS BY RX/DR IN RCRD: CPT | Mod: HCNC,CPTII,S$GLB, | Performed by: DERMATOLOGY

## 2024-08-07 PROCEDURE — 1101F PT FALLS ASSESS-DOCD LE1/YR: CPT | Mod: HCNC,CPTII,S$GLB, | Performed by: DERMATOLOGY

## 2024-08-07 PROCEDURE — 1159F MED LIST DOCD IN RCRD: CPT | Mod: HCNC,CPTII,S$GLB, | Performed by: DERMATOLOGY

## 2024-08-07 PROCEDURE — 1126F AMNT PAIN NOTED NONE PRSNT: CPT | Mod: HCNC,CPTII,S$GLB, | Performed by: DERMATOLOGY

## 2024-08-07 RX ORDER — KETOCONAZOLE 20 MG/G
CREAM TOPICAL
Qty: 60 G | Refills: 3 | Status: SHIPPED | OUTPATIENT
Start: 2024-08-07

## 2024-08-07 NOTE — PROGRESS NOTES
Subjective:      Patient ID:  Jamison Mayes is a 79 y.o. male who presents for   Chief Complaint   Patient presents with    Skin Check     UBSE      Patient here for Upper Body Skin Exam    Last seen by dermatologist: 04/03/2024 for UBSE and cryosurgery to actinic keratosis which have resolved.     yes - personal history of atypical moles removed  none - personal history of MM   none - family history of MM  yes - childhood blistering sunburns  none - tanning bed use  Yes  - personal history of NMSC    Patient with new area of concern:   Location: bilateral forearms and scalp   Previous treatments: none     Has hx of SCC.  Has previously used cryo, Efudex, and PDT for AKs.        Review of Systems   Skin:  Positive for daily sunscreen use, activity-related sunscreen use and wears hat. Negative for recent sunburn.   Hematologic/Lymphatic: Bruises/bleeds easily.       Objective:   Physical Exam   Constitutional: He appears well-developed and well-nourished.   Neurological: He is alert and oriented to person, place, and time.   Psychiatric: He has a normal mood and affect.   Skin:   Areas Examined (abnormalities noted in diagram):   Scalp / Hair Palpated and Inspected  Head / Face Inspection Performed  Neck Inspection Performed  Chest / Axilla Inspection Performed  Abdomen Inspection Performed  Back Inspection Performed  RUE Inspected  LUE Inspection Performed                 Diagram Legend     Erythematous scaling macule/papule c/w actinic keratosis       Vascular papule c/w angioma      Pigmented verrucoid papule/plaque c/w seborrheic keratosis      Yellow umbilicated papule c/w sebaceous hyperplasia      Irregularly shaped tan macule c/w lentigo     1-2 mm smooth white papules consistent with Milia      Movable subcutaneous cyst with punctum c/w epidermal inclusion cyst      Subcutaneous movable cyst c/w pilar cyst      Firm pink to brown papule c/w dermatofibroma      Pedunculated fleshy papule(s) c/w skin  tag(s)      Evenly pigmented macule c/w junctional nevus     Mildly variegated pigmented, slightly irregular-bordered macule c/w mildly atypical nevus      Flesh colored to evenly pigmented papule c/w intradermal nevus       Pink pearly papule/plaque c/w basal cell carcinoma      Erythematous hyperkeratotic cursted plaque c/w SCC      Surgical scar with no sign of skin cancer recurrence      Open and closed comedones      Inflammatory papules and pustules      Verrucoid papule consistent consistent with wart     Erythematous eczematous patches and plaques     Dystrophic onycholytic nail with subungual debris c/w onychomycosis     Umbilicated papule    Erythematous-base heme-crusted tan verrucoid plaque consistent with inflamed seborrheic keratosis     Erythematous Silvery Scaling Plaque c/w Psoriasis     See annotation      Assessment / Plan:        AK (actinic keratosis)  Cryosurgery Procedure Note    Verbal consent from the patient is obtained including, but not limited to, risk of hypopigmentation/hyperpigmentation, scar, recurrence of lesion. The patient is aware of the precancerous quality and need for treatment of these lesions. Liquid nitrogen cryosurgery is applied to the 11 actinic keratoses, as detailed in the physical exam, to produce a freeze injury. The patient is aware that blisters may form and is instructed on wound care with gentle cleansing and use of vaseline ointment to keep moist until healed. The patient is supplied a handout on cryosurgery and is instructed to call if lesions do not completely resolve.    SK (seborrheic keratosis)  These are benign inherited growths without a malignant potential. Reassurance given to patient. No treatment is necessary.   Treatment of benign, asymptomatic lesions may be considered cosmetic.  Warned about risk of hypo- or hyperpigmentation with treatment and risk of recurrence.    Lentigines  These are benign sun spots which should be monitored for changes.  Patient instructed in importance of daily broad spectrum sunscreen use with spf at least 30. Sun avoidance and topical protection/protective clothing discussed.    Skin cancer screening  History of nonmelanoma skin cancer  Upper body skin examination performed today including at least 6 points as noted in physical examination. No lesions suspicious for malignancy noted.  Patient instructed in importance of daily broad spectrum sunscreen use with spf at least 30. Sun avoidance and topical protection/protective clothing discussed.    Acute seborrheic dermatitis  -     ketoconazole (NIZORAL) 2 % cream; AAA BID  Dispense: 60 g; Refill: 3    Follow up in about 6 months (around 2/7/2025) for skin check or sooner for any concerns.

## 2024-08-07 NOTE — PATIENT INSTRUCTIONS
Sun Protection      The [facility] would like to remind you of the importance of sun protection all year round and particularly during the summer when the suns rays are the strongest. It has been proven that both acute and chronic sun exposure damages our cells and leads to skin cancer. Beyond skin cancer, the sun causes 90% of the symptoms of premature skin aging, including wrinkles, lentigines (brown spots), and thin, easily bruised skin. Proper sun protection can help prevent these unwanted conditions.    Many patients report that they dont go in the sun. It has been shown that the average person receives 18 hours of incidental sun exposure per week during activities such as walking through parking lots, driving, or sitting next to windows. This accumulates to several bad sunburns per year!    In choosing sunscreen, you want one that protects against both UVA and UVB rays (broad spectrum). It is recommended that you use one of SPF 30 or higher. It is important to apply the sunscreen about 20 minutes prior to sun exposure. Most sunscreens are chemical sunscreens and a reaction must take place in the skin so that they are effective. If they are applied and then you are immediately exposed to the sun or start sweating, this reaction has not had time to take place and you are therefore unprotected. Sunscreen needs to be reapplied every 2 hours if you are participating in water sports or sweating. We recommend Elta MD or CeraVe sunscreens for daily use; however there are many options and it is most important for you to find one that you will use on a consistent basis.    If you have sensitive skin, you may do best with a sunscreen that contains only physical blockers in the active ingredient section. The only physical blockers available in the [place] currently are titanium dioxide or zinc oxide. These are typically thicker and harder to apply, however they afford very good protection.  Neutrogena Sensitive Skin, Blue Lizard Sensitive Skin (pink top) or Neutrogena Pure and Free are popular ones.     Aside from sunscreen, clothes with UV protection (UPF), wide brimmed hats, and sunglasses are other means of sun protection that we recommend.      Based on a recent study (6/2021) and out of an abundance of caution, we are recommending that you AVOID the following sunscreens as they may contain the carcinogen, benzene:    Spray and gel sunscreens  Any [facility] or [facility] brands as well as Max Block and TopCare brands   Neutrogena Ultra Sheer Dry-touch Water Resistant Sunscreen LOTION SPF 70   Neutrogena Sheer Zinc Dry-touch Face Sunscreen LOTION SPF 50   5.   Aveeno Baby Continuous Protection Sensitive Skin Sunscreen LOTION - Broad Spectrum SPF 50    Please note that Benzene is not an ingredient or the degradation product of any ingredient in any sunscreen. This study suggested that the findings are a result of contamination in the manufacturing process. At this point, we don't know how effectively Benzene gets through the skin, if it gets absorbed systemically, and what effects it may have.     We do know that ultraviolet radiation is a well-established carcinogen. Please use daily sun protection/avoidance and use of at least SPF 30, broad-spectrum sunscreen not listed above.                       [facility] - DERMATOLOGY [address]  [address]  [place] [address]  Dept: [phone]  Dept Fax: [phone]                                                                              CRYOSURGERY      Your doctor has used a method called cryosurgery to treat your skin condition. Cryosurgery refers to the use of very cold substances to treat a variety of skin conditions such as warts, pre-skin cancers, molluscum contagiosum, sun spots, and several benign growths. The substance we use in cryosurgery is liquid nitrogen and is so cold (-195 degrees Celsius) that is burns  when administered.     Following treatment in the office, the skin may immediately burn and become red. You may find the area around the lesion is affected as well. It is sometimes necessary to treat not only the lesion, but a small area of the surrounding normal skin to achieve a good response.     A blister, and even a blood filled blister, may form after treatment.   This is a normal response. If the blister is painful, it is acceptable to sterilize a needle and with rubbing alcohol and gently pop the blister. It is important that you gently wash the area with soap and warm water as the blister fluid may contain wart virus if a wart was treated. Do no remove the roof of the blister.     The area treated can take anywhere from 1-3 weeks to heal. Healing time depends on the kind skin lesion treated, the location, and how aggressively the lesion was treated. It is recommended that the areas treated are covered with Vaseline or bacitracin ointment and a band-aid. If a band-aid is not practical, just ointment applied several times per day will do. Keeping these areas moist will speed the healing time.    Treatment with liquid nitrogen can leave a scar. In dark skin, it may be a light or dark scar, in light skin it may be a white or pink scar. These will generally fade with time, but may never go away completely.     If you have any concerns after your treatment, please feel free to call the office.       [address] [place] [address]/ [phone] FAX/ [email or web address]

## 2024-08-21 NOTE — PROGRESS NOTES
Subjective:      Patient ID: Jamison Mayes is a 79 y.o. male.    Chief Complaint: BPH  Patient is a 79 y.o. male who is new to our clinic and referred by Mary Obrien NP for evaluation of urinary hesitancy.   Patient complains of lower urinary tract symptoms. He reports nocturia one time a night, straining, and weak stream. He denies frequency, intermittency, and urgency. Patient states symptoms are of mild severity. Onset of symptoms was several years ago and was gradual in onset. His AUA Symptom Score is, 6/35. He has no personal history and no family history of prostate cancer. He reports a history of no complicating symptoms. He denies flank pain, gross hematuria, kidney stones, and recurrent UTI. He was a previous smoker, quit 40 years ago.  8/22/24: Here for f/u after starting tamsulosin 6 weeks ago. Today patient reports that LUTS are much better. AUA SS 1/35 today. Reports nocturia x1. Reports that he is satisfied with his symptoms. Denies side effects from the medication.    Lab Results   Component Value Date    PSA 1.6 01/23/2024    PSA 1.7 01/10/2023    PSA 1.4 12/30/2020    PSA 1.5 11/14/2019    PSA 3.0 05/01/2018    PSA 1.2 04/20/2017    PSA 1.1 04/20/2016    PSA 0.97 04/15/2015    PSA 1.02 10/29/2012    PSA 1.02 11/01/2011    PSA 0.92 10/04/2010    PSA 0.91 06/18/2009    PSA 0.85 08/14/2008    PSA 0.8 02/13/2007    PSA 0.9 03/06/2006    PSA 0.7 12/03/2004    PSADIAG 1.6 12/21/2021          PMH/PSH/Medications/Allergies/Social history reviewed and as in chart.    Review of Systems   Constitutional:  Negative for activity change, chills and fever.   Respiratory:  Negative for shortness of breath.    Cardiovascular:  Negative for chest pain and palpitations.   Gastrointestinal:  Negative for abdominal pain and constipation.   Genitourinary:  Negative for difficulty urinating, dysuria, flank pain, frequency, hematuria and urgency.   Neurological:  Negative for dizziness and light-headedness.     All  other systems reviewed and negative except pertinent positives noted in HPI.      Objective:     Physical Exam  HENT:      Head: Normocephalic.   Pulmonary:      Effort: Pulmonary effort is normal.   Musculoskeletal:         General: Normal range of motion.      Cervical back: Normal range of motion.   Skin:     General: Skin is warm and dry.   Neurological:      Mental Status: He is alert and oriented to person, place, and time.     Assessment:     Problem Noted   Urinary Hesitancy 1/18/2024    Started on tamsulosin         Plan:    Avoid bladder irritants including but not limited to caffeine, alcohol, smoking, spicy foods, acidic foods, tomato-based products, citrus, artificial sweeteners, chocolate, coffee or tea.  2.  Continue taking tamsulosin daily.  3. Follow-up 1 year or sooner if needed.    JADE Broussard    I spent a total of 20 minutes on the day of the visit.This includes face to face time and non-face to face time preparing to see the patient (eg, review of tests), obtaining and/or reviewing separately obtained history, documenting clinical information in the electronic or other health record, independently interpreting results and communicating results to the patient/family/caregiver, or care coordinator.

## 2024-08-22 ENCOUNTER — OFFICE VISIT (OUTPATIENT)
Dept: UROLOGY | Facility: CLINIC | Age: 79
End: 2024-08-22
Payer: MEDICARE

## 2024-08-22 VITALS
WEIGHT: 177.38 LBS | SYSTOLIC BLOOD PRESSURE: 153 MMHG | DIASTOLIC BLOOD PRESSURE: 71 MMHG | HEIGHT: 66 IN | BODY MASS INDEX: 28.51 KG/M2 | HEART RATE: 62 BPM

## 2024-08-22 DIAGNOSIS — N40.1 BENIGN PROSTATIC HYPERPLASIA WITH URINARY HESITANCY: Primary | ICD-10-CM

## 2024-08-22 DIAGNOSIS — R39.11 BENIGN PROSTATIC HYPERPLASIA WITH URINARY HESITANCY: Primary | ICD-10-CM

## 2024-08-22 PROCEDURE — 1126F AMNT PAIN NOTED NONE PRSNT: CPT | Mod: HCNC,CPTII,S$GLB,

## 2024-08-22 PROCEDURE — 1159F MED LIST DOCD IN RCRD: CPT | Mod: HCNC,CPTII,S$GLB,

## 2024-08-22 PROCEDURE — 99999 PR PBB SHADOW E&M-EST. PATIENT-LVL IV: CPT | Mod: PBBFAC,HCNC,,

## 2024-08-22 PROCEDURE — 1160F RVW MEDS BY RX/DR IN RCRD: CPT | Mod: HCNC,CPTII,S$GLB,

## 2024-08-22 PROCEDURE — 1101F PT FALLS ASSESS-DOCD LE1/YR: CPT | Mod: HCNC,CPTII,S$GLB,

## 2024-08-22 PROCEDURE — 3078F DIAST BP <80 MM HG: CPT | Mod: HCNC,CPTII,S$GLB,

## 2024-08-22 PROCEDURE — 99212 OFFICE O/P EST SF 10 MIN: CPT | Mod: HCNC,S$GLB,,

## 2024-08-22 PROCEDURE — 3288F FALL RISK ASSESSMENT DOCD: CPT | Mod: HCNC,CPTII,S$GLB,

## 2024-08-22 PROCEDURE — 3077F SYST BP >= 140 MM HG: CPT | Mod: HCNC,CPTII,S$GLB,

## 2024-09-18 ENCOUNTER — OFFICE VISIT (OUTPATIENT)
Dept: OTOLARYNGOLOGY | Facility: CLINIC | Age: 79
End: 2024-09-18
Payer: MEDICARE

## 2024-09-18 ENCOUNTER — CLINICAL SUPPORT (OUTPATIENT)
Dept: OTOLARYNGOLOGY | Facility: CLINIC | Age: 79
End: 2024-09-18
Payer: MEDICARE

## 2024-09-18 VITALS
BODY MASS INDEX: 28.73 KG/M2 | DIASTOLIC BLOOD PRESSURE: 85 MMHG | HEART RATE: 58 BPM | SYSTOLIC BLOOD PRESSURE: 125 MMHG | WEIGHT: 178 LBS

## 2024-09-18 DIAGNOSIS — H72.92 TYMPANIC MEMBRANE PERFORATION, LEFT: Chronic | ICD-10-CM

## 2024-09-18 DIAGNOSIS — H69.92 EUSTACHIAN TUBE DYSFUNCTION, LEFT: ICD-10-CM

## 2024-09-18 DIAGNOSIS — H93.13 TINNITUS OF BOTH EARS: Chronic | ICD-10-CM

## 2024-09-18 DIAGNOSIS — H93.293 IMPAIRED AUDITORY DISCRIMINATION, BILATERAL: ICD-10-CM

## 2024-09-18 DIAGNOSIS — H90.A21 SENSORINEURAL HEARING LOSS (SNHL) OF RIGHT EAR WITH RESTRICTED HEARING OF LEFT EAR: Primary | ICD-10-CM

## 2024-09-18 DIAGNOSIS — H90.3 SENSORINEURAL HEARING LOSS (SNHL), BILATERAL: Primary | Chronic | ICD-10-CM

## 2024-09-18 PROCEDURE — 92557 COMPREHENSIVE HEARING TEST: CPT | Mod: HCNC,S$GLB,, | Performed by: PHYSICIAN ASSISTANT

## 2024-09-18 PROCEDURE — 3074F SYST BP LT 130 MM HG: CPT | Mod: HCNC,CPTII,S$GLB, | Performed by: OTOLARYNGOLOGY

## 2024-09-18 PROCEDURE — 3288F FALL RISK ASSESSMENT DOCD: CPT | Mod: HCNC,CPTII,S$GLB, | Performed by: OTOLARYNGOLOGY

## 2024-09-18 PROCEDURE — 1160F RVW MEDS BY RX/DR IN RCRD: CPT | Mod: HCNC,CPTII,S$GLB, | Performed by: OTOLARYNGOLOGY

## 2024-09-18 PROCEDURE — 99214 OFFICE O/P EST MOD 30 MIN: CPT | Mod: HCNC,S$GLB,, | Performed by: OTOLARYNGOLOGY

## 2024-09-18 PROCEDURE — 99999 PR PBB SHADOW E&M-EST. PATIENT-LVL IV: CPT | Mod: PBBFAC,HCNC,, | Performed by: OTOLARYNGOLOGY

## 2024-09-18 PROCEDURE — 1159F MED LIST DOCD IN RCRD: CPT | Mod: HCNC,CPTII,S$GLB, | Performed by: OTOLARYNGOLOGY

## 2024-09-18 PROCEDURE — 3079F DIAST BP 80-89 MM HG: CPT | Mod: HCNC,CPTII,S$GLB, | Performed by: OTOLARYNGOLOGY

## 2024-09-18 PROCEDURE — 92567 TYMPANOMETRY: CPT | Mod: HCNC,S$GLB,, | Performed by: PHYSICIAN ASSISTANT

## 2024-09-18 PROCEDURE — 1101F PT FALLS ASSESS-DOCD LE1/YR: CPT | Mod: HCNC,CPTII,S$GLB, | Performed by: OTOLARYNGOLOGY

## 2024-09-18 PROCEDURE — 1126F AMNT PAIN NOTED NONE PRSNT: CPT | Mod: HCNC,CPTII,S$GLB, | Performed by: OTOLARYNGOLOGY

## 2024-09-18 NOTE — PROGRESS NOTES
Jamison Mayes, a 79 y.o. male, was seen today in the clinic for an audiologic evaluation.  Patients main complaint was bilateral hearing loss that has been progressing over time.  Mr. Mayes reports that his hearing seems to fluctuate and he can understand speech better some days as compared to others.  He is becoming more and more frustrated with not being able to understand family, friends and co-workers while wearing his hearing aids.    Audiogram results revealed a bilateral moderate to severe sensorineural hearing loss from 250-8k Hz.  Speech reception thresholds were noted at 50 dB in the right ear and 50 dB in the left ear.  Speech discrimination scores were 60% in the right ear and 56% in the left ear.  Tympanometry revealed Type A in the right ear and a hermetic seal could not be obtained for the left ear consistent with TM perforation. The results of the audiogram were reviewed with the patient and the benefits of amplification were discussed.  Cochlear implant technology was also discussed with the patient as a means of communication when the patient feels hearing aids are no longer beneficial.    Recommendations:  Otologic evaluation  Annual audiogram  Hearing protection when in noise  Continued and consistent use of amplification   Routine visits with his hearing aid specialist to ensure proper functioning/programming of his hearing aids  Consult with Audiology to discuss possible evaluation for CI candidacy given the patient's daily struggles with communication

## 2024-09-18 NOTE — PROGRESS NOTES
Subjective:    Here to followup after placement of ear tubes    Patient ID: Jamison Mayes is a 79 y.o. male.    Chief Complaint: Popping left ear     Jamison Mayes is a 79 y.o. male here for popping sensation in left ear.   He notes aural fullness.  Symptoms started after upper respiratory infection. He went to urgent care and was treated.   He has not had any drainage from either ear.   He feels his hearing is stable but does not that he is having a harder time discriminating certain words. He wears  bilateral hearing aids.     Interval HPI 9/18/2024:  Follow up visit. Last seen on 2/1/2023 for ETD, SNHL, and left TM perforation. He reports of decreased hearing bilaterally and feels he is having harder time communicating at work. He does wear hearing aids but does not feel he is getting benefit.  . Denies ear pain and drainage. No dizziness.     Review of Systems   Constitutional: Negative for fever, activity change, appetite change and irritability.   HENT: Negative for congestion, ear discharge and rhinorrhea.    Respiratory: Negative for cough.      Objective:     Physical Exam   Constitutional: He appears well-developed and well-nourished.   HENT:   Right Ear: External ear, pinna and canal normal. No drainage. TM within normal limits; no perforation, effusion or masses. Normal mobility.   Left Ear: External ear, pinna and canal normal. No drainage. TM perforation- dry, no otorrhea.   Nose: Nose normal. No rhinorrhea, nasal discharge or congestion.   Lymphadenopathy:     He has no cervical adenopathy.   Neurological: He is alert.            Diagnostic studies:     Audiogram interpreted personally by me and discussed in detail with the patient today. Audiogram results revealed a moderate to severe sensorineural hearing loss bilaterally.  Speech reception thresholds were noted at 50 dB in the right ear and 60 dB in the left ear.  Speech discrimination scores were 60% in the right ear and 56% in the left ear.   Tympanometry revealed Type A in the right ear and a hermetic seal could not be obtained for the left ear.                            Assessment:     1. Sensorineural hearing loss (SNHL), bilateral    2. Tinnitus of both ears    3. Tympanic membrane perforation, left            Keep left ear dry.   Bilateral SNHL/Tinnitus: continue binaural hearing aids. He is having more difficulty hearing in general but worse in background noise. Will discuss with audiology evaluation for CI candidacy.   Recommend hearing protection in noise.     Follow up in 1 year.     Abbie King MD

## 2024-09-24 ENCOUNTER — TELEPHONE (OUTPATIENT)
Dept: OTOLARYNGOLOGY | Facility: CLINIC | Age: 79
End: 2024-09-24
Payer: MEDICARE

## 2024-09-24 NOTE — TELEPHONE ENCOUNTER
I called and spoke with Ms. Mayes as it was difficult for Mr. Mayes to understand conversation over the telephone.  I informed her that I would be happy to assist Mr. Mayes in scheduling an appointment and evaluation to determine cochlear implant candidacy if and when he is ready.  I also informed him of a Meet and Greet with  a representative from the cochlear implant company as well as a possible cochlear implant recipient where he could ask questions regarding expectations and surgery concerns. Ms. Mayes was appreciative for the information and thanked me for the call.

## 2024-09-25 ENCOUNTER — PATIENT OUTREACH (OUTPATIENT)
Dept: ADMINISTRATIVE | Facility: HOSPITAL | Age: 79
End: 2024-09-25
Payer: MEDICARE

## 2024-09-25 DIAGNOSIS — E11.9 DM TYPE 2 WITHOUT RETINOPATHY: Primary | ICD-10-CM

## 2024-11-14 ENCOUNTER — HOSPITAL ENCOUNTER (EMERGENCY)
Facility: HOSPITAL | Age: 79
Discharge: HOME | End: 2024-11-14
Attending: STUDENT IN AN ORGANIZED HEALTH CARE EDUCATION/TRAINING PROGRAM
Payer: MEDICARE

## 2024-11-14 VITALS
HEART RATE: 77 BPM | DIASTOLIC BLOOD PRESSURE: 72 MMHG | RESPIRATION RATE: 16 BRPM | SYSTOLIC BLOOD PRESSURE: 164 MMHG | OXYGEN SATURATION: 97 % | WEIGHT: 179.81 LBS | BODY MASS INDEX: 27.25 KG/M2 | HEIGHT: 68 IN | TEMPERATURE: 98 F

## 2024-11-14 DIAGNOSIS — R53.1 GENERALIZED WEAKNESS: ICD-10-CM

## 2024-11-14 DIAGNOSIS — K57.32 DIVERTICULITIS OF LARGE INTESTINE WITHOUT PERFORATION OR ABSCESS WITHOUT BLEEDING: Primary | ICD-10-CM

## 2024-11-14 LAB
ALBUMIN SERPL BCP-MCNC: 4.4 G/DL (ref 3.5–5.2)
ALP SERPL-CCNC: 63 U/L (ref 38–126)
ALT SERPL W/O P-5'-P-CCNC: 20 U/L (ref 10–44)
ANION GAP SERPL CALC-SCNC: 8 MMOL/L (ref 8–16)
AST SERPL-CCNC: 34 U/L (ref 15–46)
BACTERIA #/AREA URNS AUTO: NORMAL /HPF
BASOPHILS # BLD AUTO: 0.05 K/UL (ref 0–0.2)
BASOPHILS NFR BLD: 0.6 % (ref 0–1.9)
BILIRUB SERPL-MCNC: 0.6 MG/DL (ref 0.1–1)
BILIRUB UR QL STRIP: NEGATIVE
CALCIUM SERPL-MCNC: 9.6 MG/DL (ref 8.7–10.5)
CHLORIDE SERPL-SCNC: 102 MMOL/L (ref 95–110)
CLARITY UR REFRACT.AUTO: CLEAR
CO2 SERPL-SCNC: 28 MMOL/L (ref 23–29)
COLOR UR AUTO: YELLOW
CREAT SERPL-MCNC: 1.03 MG/DL (ref 0.5–1.4)
DIFFERENTIAL METHOD BLD: ABNORMAL
EOSINOPHIL # BLD AUTO: 0.2 K/UL (ref 0–0.5)
EOSINOPHIL NFR BLD: 2.9 % (ref 0–8)
ERYTHROCYTE [DISTWIDTH] IN BLOOD BY AUTOMATED COUNT: 12.1 % (ref 11.5–14.5)
EST. GFR  (NO RACE VARIABLE): >60 ML/MIN/1.73 M^2
GLUCOSE SERPL-MCNC: 296 MG/DL (ref 70–110)
GLUCOSE UR QL STRIP: ABNORMAL
HCT VFR BLD AUTO: 34.7 % (ref 40–54)
HGB BLD-MCNC: 12.1 G/DL (ref 14–18)
HGB UR QL STRIP: NEGATIVE
IMM GRANULOCYTES # BLD AUTO: 0.03 K/UL (ref 0–0.04)
IMM GRANULOCYTES NFR BLD AUTO: 0.4 % (ref 0–0.5)
KETONES UR QL STRIP: NEGATIVE
LEUKOCYTE ESTERASE UR QL STRIP: NEGATIVE
LIPASE SERPL-CCNC: 42 U/L (ref 23–300)
LYMPHOCYTES # BLD AUTO: 1.6 K/UL (ref 1–4.8)
LYMPHOCYTES NFR BLD: 19.8 % (ref 18–48)
MCH RBC QN AUTO: 31.3 PG (ref 27–31)
MCHC RBC AUTO-ENTMCNC: 34.9 G/DL (ref 32–36)
MCV RBC AUTO: 90 FL (ref 82–98)
MICROSCOPIC COMMENT: NORMAL
MONOCYTES # BLD AUTO: 0.5 K/UL (ref 0.3–1)
MONOCYTES NFR BLD: 5.9 % (ref 4–15)
NEUTROPHILS # BLD AUTO: 5.9 K/UL (ref 1.8–7.7)
NEUTROPHILS NFR BLD: 70.4 % (ref 38–73)
NITRITE UR QL STRIP: NEGATIVE
NRBC BLD-RTO: 0 /100 WBC
PH UR STRIP: 6 [PH] (ref 5–8)
PLATELET # BLD AUTO: 182 K/UL (ref 150–450)
PMV BLD AUTO: 11.2 FL (ref 9.2–12.9)
POTASSIUM SERPL-SCNC: 4.2 MMOL/L (ref 3.5–5.1)
PROT SERPL-MCNC: 7.3 G/DL (ref 6–8.4)
PROT UR QL STRIP: NEGATIVE
RBC # BLD AUTO: 3.87 M/UL (ref 4.6–6.2)
SODIUM SERPL-SCNC: 138 MMOL/L (ref 136–145)
SP GR UR STRIP: <=1.005 (ref 1–1.03)
URN SPEC COLLECT METH UR: ABNORMAL
UROBILINOGEN UR STRIP-ACNC: NEGATIVE EU/DL
UUN UR-MCNC: 22 MG/DL (ref 2–20)
WBC # BLD AUTO: 8.3 K/UL (ref 3.9–12.7)
YEAST UR QL AUTO: NORMAL

## 2024-11-14 PROCEDURE — 80053 COMPREHEN METABOLIC PANEL: CPT | Mod: ER | Performed by: STUDENT IN AN ORGANIZED HEALTH CARE EDUCATION/TRAINING PROGRAM

## 2024-11-14 PROCEDURE — 93005 ELECTROCARDIOGRAM TRACING: CPT | Mod: ER

## 2024-11-14 PROCEDURE — 25000003 PHARM REV CODE 250: Mod: ER | Performed by: STUDENT IN AN ORGANIZED HEALTH CARE EDUCATION/TRAINING PROGRAM

## 2024-11-14 PROCEDURE — 25500020 PHARM REV CODE 255: Mod: ER | Performed by: STUDENT IN AN ORGANIZED HEALTH CARE EDUCATION/TRAINING PROGRAM

## 2024-11-14 PROCEDURE — 83690 ASSAY OF LIPASE: CPT | Mod: ER | Performed by: STUDENT IN AN ORGANIZED HEALTH CARE EDUCATION/TRAINING PROGRAM

## 2024-11-14 PROCEDURE — 99285 EMERGENCY DEPT VISIT HI MDM: CPT | Mod: 25,ER

## 2024-11-14 PROCEDURE — 85025 COMPLETE CBC W/AUTO DIFF WBC: CPT | Mod: ER | Performed by: STUDENT IN AN ORGANIZED HEALTH CARE EDUCATION/TRAINING PROGRAM

## 2024-11-14 PROCEDURE — 81000 URINALYSIS NONAUTO W/SCOPE: CPT | Mod: ER | Performed by: STUDENT IN AN ORGANIZED HEALTH CARE EDUCATION/TRAINING PROGRAM

## 2024-11-14 PROCEDURE — 93010 ELECTROCARDIOGRAM REPORT: CPT | Mod: ,,, | Performed by: STUDENT IN AN ORGANIZED HEALTH CARE EDUCATION/TRAINING PROGRAM

## 2024-11-14 RX ORDER — AMOXICILLIN AND CLAVULANATE POTASSIUM 875; 125 MG/1; MG/1
1 TABLET, FILM COATED ORAL 2 TIMES DAILY
Qty: 14 TABLET | Refills: 0 | Status: SHIPPED | OUTPATIENT
Start: 2024-11-14

## 2024-11-14 RX ORDER — AMOXICILLIN AND CLAVULANATE POTASSIUM 875; 125 MG/1; MG/1
1 TABLET, FILM COATED ORAL
Status: COMPLETED | OUTPATIENT
Start: 2024-11-14 | End: 2024-11-14

## 2024-11-14 RX ADMIN — AMOXICILLIN AND CLAVULANATE POTASSIUM 1 TABLET: 875; 125 TABLET, FILM COATED ORAL at 11:11

## 2024-11-14 RX ADMIN — IOHEXOL 100 ML: 350 INJECTION, SOLUTION INTRAVENOUS at 10:11

## 2024-11-14 NOTE — ED TRIAGE NOTES
"Pt to ED after several minute episode of fatigue, neasue and diaphoresis while having a BM today. 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 This morning he had already eaten. Pt states that it only lasted a few minutes and he now feels back to normal, but is worried that "Something is not right."  "

## 2024-11-14 NOTE — ED PROVIDER NOTES
NAME:  Jamison Mayes  CSN:     [id number]  MRN:    [id number]  ADMIT DATE: 11/14/2024        eMERGENCY dEPARTMENT eNCOUnter    CHIEF COMPLAINT    Chief Complaint   Patient presents with    Fatigue     PT reports he went to use the bathroom this morning and started to feel weak, diaphoretic and nausea. Pt reports this also happened 2 weeks ago       HPI    Jamison Mayes is a 79 y.o. male with a past medical history of  has a past medical history of AK (actinic keratosis) (PDT scalp 2/2015 and 3/2015), AK (actinic keratosis) (01/18/2024), Arthritis, Diabetes mellitus type II, Fever blister, Hepatocellular carcinoma (03/05/2018), Hypertension, Joint pain, Pancreatic cyst, SCC (squamous cell carcinoma) (03/2013), SCC (squamous cell carcinoma) (excised ), Seizures, Squamous Cell Carcinoma (03/2013), Squamous Cell Carcinoma (03/2013), and Squamous cell carcinoma of skin (01/24/2024).     he presents to the ED due to episode today on the toilet where he felt some sharp abdominal pain while having a bowel movement followed by some generalized weakness and diaphoresis.  Notes that bowel movement was formed and normal for him.  No melena or hematochezia.  Now feels back to baseline, however, states this feels very similar to when he had hemoperitoneum from bleeding liver mass in 2018.  Though notes that it was less severe than that.  States he had a similar episode about a week ago.  He does get screening MRIs and was due for 1 in December of 2023 but has not had it.  No other focal signs or symptoms currently.  Wife states when he had similar symptoms a week ago she thought perhaps it was because he had not yet eaten, however, after experiencing the same today they felt that he needed to be assessed to make sure it was not something more serious.  Wife notes his abdomen has looked a little bit more distended to her recently.  HPI       PAST MEDICAL HISTORY  Past Medical History:   Diagnosis Date    AK (actinic  keratosis) PDT scalp 2015 and 3/2015    s/p efudex on scalp and forehead, PDT scalp    AK (actinic keratosis) 2024    Arthritis     Diabetes mellitus type II     Fever blister     Hepatocellular carcinoma 2018    s/p left hepatectomy of segments 2,3,4a/b    Hypertension     Joint pain     Pancreatic cyst     SCC (squamous cell carcinoma) 2013    L scalp vertex    SCC (squamous cell carcinoma) excised     left helix, in-situ    Seizures     Squamous Cell Carcinoma 2013    occipital scalp    Squamous Cell Carcinoma 2013    l vertex scalp    Squamous cell carcinoma of skin 2024    l parietal scalp       SURGICAL HISTORY    Past Surgical History:   Procedure Laterality Date    CHOLECYSTECTOMY  2018    open at time of hepatic resection    ENDOSCOPIC ULTRASOUND OF UPPER GASTROINTESTINAL TRACT N/A 2023    Procedure: ULTRASOUND, UPPER GI TRACT, ENDOSCOPIC;  Surgeon: Moise Mason MD;  Location: [facility];  Service: Endoscopy;  Laterality: N/A;    EYE SURGERY      LIVER RESECTION Left 2018    segments 2,3, 4a/b    RETINAL DETACHMENT SURGERY      age approx 30-39 yo    skin carcinoma removal         FAMILY HISTORY    Family History   Problem Relation Name Age of Onset    Diabetes Father      Hypertension Mother      Vision loss Sister      Melanoma Neg Hx      Heart attack Neg Hx      Heart disease Neg Hx         SOCIAL HISTORY    Social History     Socioeconomic History    Marital status:    Occupational History    Occupation: REtired    Tobacco Use    Smoking status: Former     Current packs/day: 0.00     Types: Cigarettes     Start date: 1961     Quit date: 1966     Years since quittin.4     Passive exposure: Past    Smokeless tobacco: Never   Substance and Sexual Activity    Alcohol use: No    Drug use: No    Sexual activity: Yes     Partners: Female     Social Drivers of Health     Financial Resource Strain: Low Risk  (10/22/2020)    Overall  Financial Resource Strain (CARDIA)     Difficulty of Paying Living Expenses: Not hard at all   Food Insecurity: No Food Insecurity (8/5/2024)    Hunger Vital Sign     Worried About Running Out of Food in the Last Year: Never true     Ran Out of Food in the Last Year: Never true   Transportation Needs: No Transportation Needs (10/22/2020)    PRAPARE - Transportation     Lack of Transportation (Medical): No     Lack of Transportation (Non-Medical): No   Physical Activity: Inactive (8/5/2024)    Exercise Vital Sign     Days of Exercise per Week: 0 days     Minutes of Exercise per Session: 0 min   Stress: No Stress Concern Present (8/5/2024)    [employer] - Occupational Stress Questionnaire     Feeling of Stress : Not at all   Housing Stability: Unknown (8/5/2024)    Housing Stability Vital Sign     Unable to Pay for Housing in the Last Year: No       MEDICATIONS  Current Outpatient Medications   Medication Instructions    ACCU-CHEK ROSE PLUS METER Misc USE AS DIRECTED    amoxicillin-clavulanate 875-125mg (AUGMENTIN) 875-125 mg per tablet 1 tablet, Oral, 2 times daily    aspirin (ECOTRIN) 81 mg    blood sugar diagnostic Strp To check BG 2 times daily, to use with accu check guide    calcium carbonate (OS-RUSLAN) 500 mg calcium (1,250 mg) chewable tablet 1 tablet, Oral, Daily    cyanocobalamin (VITAMIN B-12) 250 mcg, Oral, Daily    fluorouraciL (EFUDEX) 5 % cream AAA on L ear BID x 4-6 weeks. Stop if blistered, oozing, or bleeding. Use daily sun protection.    fluticasone propionate (FLONASE) 100 mcg, Each Nostril, Daily    glimepiride (AMARYL) 4 mg, Oral, Before breakfast    glucosamine HCl (GLUCOSAMINE, BULK, MISC) 1,200 mg, Misc.(Non-Drug; Combo Route), Daily    hydroCHLOROthiazide (HYDRODIURIL) 12.5 mg, Oral, Daily    imiquimod (ALDARA) 5 % cream AAA 5 nights/week x 4 - 6 weeks. Wash off in am. Stop if blistering, bleeding, oozing, etc. Do not use >1 packet per night.    irbesartan  "(AVAPRO) 300 mg, Oral, Daily    ketoconazole (NIZORAL) 2 % cream AAA BID    ketoconazole (NIZORAL) 2 % shampoo Wash hair with medicated shampoo at least 2x/week - let sit on scalp at least 5 minutes prior to rinsing    lancets 28 gauge Misc 1 lancet , Misc.(Non-Drug; Combo Route), 2 times daily    lovastatin (MEVACOR) 20 mg, Oral, Nightly    metFORMIN (GLUCOPHAGE-XR) 500 mg, Oral, 2 times daily    MULTIVITAMIN WITH MINERALS (ONE-A-DAY 50 PLUS) Tab Take by mouth. 1 Tablet Oral Every morning    mupirocin (BACTROBAN) 2 % ointment Topical (Top), 2 times daily    omega-3 fatty acids/fish oil (FISH OIL-OMEGA-3 FATTY ACIDS) 300-1,000 mg capsule 1 capsule, Oral, Daily    silver sulfADIAZINE 1% (SILVADENE) 1 % cream Topical (Top), 2 times daily    tamsulosin (FLOMAX) 0.4 mg, Oral, Daily       ALLERGIES    Review of patient's allergies indicates:   Allergen Reactions    Codeine Nausea Only         REVIEW OF SYSTEMS   Review of Systems       PHYSICAL EXAM    Reviewed Triage Note    VITAL SIGNS:   ED Triage Vitals   Encounter Vitals Group      BP       Systolic BP Percentile       Diastolic BP Percentile       Pulse       Resp       Temp       Temp src       SpO2       Weight       Height       Head Circumference       Peak Flow       Pain Score       Pain Loc       Pain Education       Exclude from Growth Chart        Patient Vitals for the past 24 hrs:   BP Temp Temp src Pulse Resp SpO2 Height Weight   11/14/24 1003 (!) 146/62 -- -- 73 14 97 % -- --   11/14/24 0938 (!) 150/76 -- -- 77 -- 98 % -- --   11/14/24 0912 (!) 196/77 98.1 °F (36.7 °C) Oral 80 20 99 % 5' 8" (1.727 m) 81.6 kg (179 lb 12.8 oz)       Physical Exam    Nursing note and vitals reviewed.  Constitutional: He appears well-developed and well-nourished.   HENT:   Head: Normocephalic and atraumatic.   Eyes: EOM are normal. Pupils are equal, round, and reactive to light.   Neck: Neck supple.   Normal range of motion.  Cardiovascular:  Normal rate, regular rhythm " and normal heart sounds.           Pulmonary/Chest: Breath sounds normal. No respiratory distress.   Abdominal: Abdomen is soft. He exhibits no distension. There is no abdominal tenderness. There is no rebound and no guarding.   Musculoskeletal:         General: Normal range of motion.      Cervical back: Normal range of motion and neck supple.     Neurological: He is alert and oriented to person, place, and time.   Skin: Skin is warm and dry.   Psychiatric: He has a normal mood and affect.          EKG     Interpreted by EM physician if performed:   Sinus rhythm with PVC present.  First-degree AV block with a VT interval of 218.  QTC of 477.  Left axis deviation noted.  When compared to most recent EKG from 2018, PVC is new and sinus tachycardia has resolved.                LABS  Pertinent labs reviewed. (See chart for details)   Labs Reviewed   CBC W/ AUTO DIFFERENTIAL - Abnormal       Result Value    WBC 8.30      RBC 3.87 (*)     Hemoglobin 12.1 (*)     Hematocrit 34.7 (*)     MCV 90      MCH 31.3 (*)     MCHC 34.9      RDW 12.1      Platelets 182      MPV 11.2      Immature Granulocytes 0.4      Gran # (ANC) 5.9      Immature Grans (Abs) 0.03      Lymph # 1.6      Mono # 0.5      Eos # 0.2      Baso # 0.05      nRBC 0      Gran % 70.4      Lymph % 19.8      Mono % 5.9      Eosinophil % 2.9      Basophil % 0.6      Differential Method Automated     COMPREHENSIVE METABOLIC PANEL - Abnormal    Sodium 138      Potassium 4.2      Chloride 102      CO2 28      Glucose 296 (*)     BUN 22 (*)     Creatinine 1.03      Calcium 9.6      Total Protein 7.3      Albumin 4.4      Total Bilirubin 0.6      Alkaline Phosphatase 63      AST 34      ALT 20      Anion Gap 8      eGFR >60.0     LIPASE   LIPASE    Lipase Result 42     URINALYSIS, REFLEX TO URINE CULTURE         RADIOLOGY          Imaging Results              CT Abdomen Pelvis With IV Contrast NO Oral Contrast (Final result)  Result time 11/14/24 10:47:34      Final  result by Moise Danielson MD (11/14/24 10:47:34)                   Impression:      Acute left colonic diverticulitis in the left lower quadrant.  No free air or abscess formation.  Patient is status post left hepatectomy.  Stable 1.5 cm cystic pancreatic lesion.    All CT scans at this facility are performed  using dose modulation techniques as appropriate to performed exam including the following:  automated exposure control; adjustment of mA and/or kV according to the patients size (this includes techniques or standardized protocols for targeted exams where dose is matched to indication/reason for exam: i.e. extremities or head);  iterative reconstruction technique.      Electronically signed by: Moise Danielson  Date:    11/14/2024  Time:    10:47               Narrative:    EXAMINATION:  CT ABDOMEN PELVIS WITH IV CONTRAST    CLINICAL HISTORY:  Abdominal pain, acute, nonlocalized;    FINDINGS:  Lung bases are clear.    The left lobe of the liver has been resected.  The right lobe appears normal.    The spleen appears normal. The gallbladder has been removed.    There is a stable 1.5 cm cystic lesion in the tail the pancreas compared to MRI 12/05/2023.    Kidneys are unremarkable. The adrenal glands are normal.    Acute diverticulitis involving the distal left colon in the left lower quadrant.  There is extensive diverticular disease involving the left colon and sigmoid.  No evidence of bowel obstruction.  The appendix appears normal.  No free air or abscess formation.    The urinary bladder is unremarkable.  The prostate is mildly enlarged.    No significant osseous abnormality is identified.  Pronounced disc space narrowing at the L2-3 level.  Grade 1 spondylolisthesis L5-S1.                                        PROCEDURES    Procedures      ED COURSE & MEDICAL DECISION MAKING    Pertinent Labs & Imaging studies reviewed. (See chart for details and specific orders.)          Summary of review of records:   Last  seen by ENT in September of this year for ongoing sensorineural hearing loss, tinnitus and previous perforation of the left TM.    Seen by Urology in August of 2024 for BPH with urinary hesitancy with lower urinary tract symptoms.  Symptoms have improved with tamsulosin. PSAs have always been within range.    Last primary care visit in June of 2024.  Hemoglobin A1c well controlled at 6.4.  Management hypertension also achieved with irbesartan HCTZ,.    Medical Decision Making  Problems Addressed:  Diverticulitis of large intestine without perforation or abscess without bleeding: acute illness or injury     Details: Treated with Augmentin, GI referral and strict return precautions provided for fever, abdominal pain, vomiting, or blood per rectum.    Amount and/or Complexity of Data Reviewed  Independent Historian: spouse  External Data Reviewed: labs, radiology, ECG and notes.  Labs: ordered. Decision-making details documented in ED Course.  Radiology: ordered and independent interpretation performed. Decision-making details documented in ED Course.  ECG/medicine tests: ordered and independent interpretation performed. Decision-making details documented in ED Course.    Risk  Prescription drug management.  Decision regarding hospitalization.  Risk Details: While hospitalization was a consideration, patient was pain-free, tolerating p.o. without any secondary signs of systemic infection.  He would like to trial outpatient management with strict return precautions provided.  Gastroenterology referral placed.      Jamison Mayes is a 79 y.o. male who presents for 2 episodes in the last week where he became diaphoretic and felt weak while having bowel movements.  States that the only other time this happened to him he had a bleeding mass from his liver.  Asymptomatic currently.  Denies any evidence of GI bleeding.    Differential includes but is not limited to vasovagal response, intra-abdominal mass, acute blood loss  anemia amongst others.            Medications   iohexoL (OMNIPAQUE 350) injection 100 mL (100 mLs Intravenous Given 11/14/24 1034)   amoxicillin-clavulanate 875-125mg per tablet 1 tablet (1 tablet Oral Given 11/14/24 1108)       ED Course as of 11/14/24 1121   u Nov 14, 2024   0930 MRI Abdomen W WO Contrast (12/5/23 1140)  Postoperative changes of prior left hepatectomy.  No new hepatic lesion or detrimental change when compared with 12/12/2022.     Similar appearance of small pancreatic tail cystic lesion with enhancing smooth internal septation when compared with 12/12/2022.  However, this lesion has progressively increased in size when compared with more remote prior studies.  No pancreatic duct dilatation.  Differential again includes IPMN or other cystic neoplasm.  Suggest continued attention on abdominal MRI follow-up in 6-12 months.   [HL]   0931 CT Abdomen Pelvis  Without Contrast (3/1/18 1929)  Postoperative changes of prior left hepatectomy.  No new hepatic lesion or detrimental change when compared with 12/12/2022.     Similar appearance of small pancreatic tail cystic lesion with enhancing smooth internal septation when compared with 12/12/2022.  However, this lesion has progressively increased in size when compared with more remote prior studies.  No pancreatic duct dilatation.  Differential again includes IPMN or other cystic neoplasm.  Suggest continued attention on abdominal MRI follow-up in 6-12 months.   [HL]   0950 Hemoglobin(!): 12.1  stable [HL]   1002 Glucose(!): 296  Mild hyperglycemia noted normal bicarb, no AG.  [HL]   1003 Lipase Result: 42  within normal limits  [HL]   1055 CT Abdomen Pelvis With IV Contrast NO Oral Contrast  The left lobe of the liver has been resected.  The right lobe appears normal.     The spleen appears normal. The gallbladder has been removed.     There is a stable 1.5 cm cystic lesion in the tail the pancreas compared to MRI 12/05/2023.     Kidneys are  unremarkable. The adrenal glands are normal.     Acute diverticulitis involving the distal left colon in the left lower quadrant.  There is extensive diverticular disease involving the left colon and sigmoid.  No evidence of bowel obstruction.  The appendix appears normal.  No free air or abscess formation.     The urinary bladder is unremarkable.  The prostate is mildly enlarged.     No significant osseous abnormality is identified.  Pronounced disc space narrowing at the L2-3 level.  Grade 1 spondylolisthesis L5-S1.     Impression:     Acute left colonic diverticulitis in the left lower quadrant.  No free air or abscess formation.  Patient is status post left hepatectomy.  Stable 1.5 cm cystic pancreatic lesion.   [HL]      ED Course User Index  [HL] Zenobia Smith DO             FINAL IMPRESSION    Final diagnoses:  [R53.1] Generalized weakness  [K57.32] Diverticulitis of large intestine without perforation or abscess without bleeding (Primary)       DISPOSITION  Patient discharge in stable condition        ED Prescriptions       Medication Sig Dispense Start Date End Date Auth. Provider    amoxicillin-clavulanate 875-125mg (AUGMENTIN) 875-125 mg per tablet Take 1 tablet by mouth 2 (two) times daily. 14 tablet 11/14/2024 -- Zenobia Smith DO          Follow-up Information       Follow up With Specialties Details Why Contact Info    Todd Montoya MD Family Medicine Schedule an appointment as soon as possible for a visit   [address] [place] [id number] [phone]      [facility] - Emergency Dept Emergency Medicine  As needed, If symptoms worsen [address] [facility]  Emergency Department  [facility] [id number] [phone]              DISCLAIMER: This note was prepared with [email or web address] voice recognition transcription software. Garbled syntax, mangled pronouns, and other bizarre constructions may be attributed to that software system.             Zenobia Smith DO  11/14/24 1122

## 2024-11-14 NOTE — ED NOTES
Patient has appointment on 12/9/2021    Kimmy Lara CMA on 12/8/2021 at 12:10 PM       Psych: No acute distress is noted. Pt is calm and cooperative, good eye contact.    HEENT: Denies HEENT complaint or injury. No HA, no dizziness, no blurred vision. Patient felt lightheaded this morning.     LOC: The patient is awake, alert and aware of environment with an appropriate affect.    APPEARANCE: Patient is clean and well groomed. Steady gait noted    SKIN: The skin is warm, dry and intact. Patient has normal skin turgor and moist mucus membranes, no rashes or lesions. No Breakdown noted.    MUSCULOSKELETAL:  Normal range of motion noted. Moves all extremities well, No swelling, deformity or tenderness noted. FROM    RESPIRATORY: Airway is open and patent, respirations are spontaneous; patient has a normal effort and rate. Pink nailbeds. Clear BBS noted. Denies SOB    CARDIAC: Skin warm and dry. Denies CP, no diaphoresis noted    GI/ : Soft and non tender to palpation, no distention noted. Denies N/V/D at this time. Patient states he got up this morning and had a bowel movement and felt weak, diaphoretic. C/o mild abd discomfort. Denies straining. Normal BM noted per patient. No bleed, no diarrhea. Currently pain free upon arrival    PULSES: 2+  And symmetrical in all extremities, strong and bounding    NEUROLOGIC:   Follows commands without difficulty. Speech is clear. No neuro deficits observed.

## 2024-11-14 NOTE — DISCHARGE INSTRUCTIONS
Thank you for coming in to see us today! It was nice to meet you, and I hope you feel better soon. Please feel free to return to the ER at any time should your symptoms get worse, or if you have different emergent concerns.    Our goal in the emergency department is to always give you outstanding care and exceptional service. You may receive a survey by mail or e-mail in the next week regarding your experience in our ED. We would greatly appreciate your completing and returning the survey. Your feedback provides us with a way to recognize our staff who give very good care and it helps us learn how to improve when your experience was below our aspiration of excellence.       Sincerely,    Zenobia Smith, DO  Emergency Medicine Physician

## 2024-11-15 ENCOUNTER — CLINICAL SUPPORT (OUTPATIENT)
Dept: AUDIOLOGY | Facility: CLINIC | Age: 79
End: 2024-11-15
Payer: MEDICARE

## 2024-11-15 ENCOUNTER — OFFICE VISIT (OUTPATIENT)
Dept: OTOLARYNGOLOGY | Facility: CLINIC | Age: 79
End: 2024-11-15
Payer: MEDICARE

## 2024-11-15 DIAGNOSIS — H90.3 SENSORINEURAL HEARING LOSS (SNHL) OF BOTH EARS: ICD-10-CM

## 2024-11-15 DIAGNOSIS — H93.293 IMPAIRED AUDITORY DISCRIMINATION, BILATERAL: Primary | ICD-10-CM

## 2024-11-15 DIAGNOSIS — H90.3 SENSORINEURAL HEARING LOSS (SNHL), BILATERAL: Primary | ICD-10-CM

## 2024-11-15 PROCEDURE — 1101F PT FALLS ASSESS-DOCD LE1/YR: CPT | Mod: CPTII,S$GLB,, | Performed by: OTOLARYNGOLOGY

## 2024-11-15 PROCEDURE — 99214 OFFICE O/P EST MOD 30 MIN: CPT | Mod: S$GLB,,, | Performed by: OTOLARYNGOLOGY

## 2024-11-15 PROCEDURE — 3288F FALL RISK ASSESSMENT DOCD: CPT | Mod: CPTII,S$GLB,, | Performed by: OTOLARYNGOLOGY

## 2024-11-15 PROCEDURE — 99999 PR PBB SHADOW E&M-EST. PATIENT-LVL III: CPT | Mod: PBBFAC,,, | Performed by: OTOLARYNGOLOGY

## 2024-11-15 PROCEDURE — 1159F MED LIST DOCD IN RCRD: CPT | Mod: CPTII,S$GLB,, | Performed by: OTOLARYNGOLOGY

## 2024-11-15 PROCEDURE — 1126F AMNT PAIN NOTED NONE PRSNT: CPT | Mod: CPTII,S$GLB,, | Performed by: OTOLARYNGOLOGY

## 2024-11-15 NOTE — PROGRESS NOTES
Cochlear Implant Evaluation     Mr. Jamison Mayes was seen today for a cochlear implant evaluation, and he was accompanied to this appointment by his wife and daughter. He has a history of occupational/recreational noise exposure and mentioned that he's not worn hearing protection in these situations. Mr. Mayes still works two days a week and reported that he cannot hear when people are talking to him. Mr. Mayes wears binaural amplification, but he does not feel that he receives any benefit from them.      Pure tone threshold testing revealed a moderately-severe sloping to severe sensorineural hearing loss in both ears. A speech reception threshold was noted at 50 dB HL in the right and left ears. Speech discrimination scores were 60% in the right ear and 56% in the left ear.     Aided testing was completed in the best aided condition with his hearing aids on. Aided responses were obtained at 45-70 dB HL in sound field. An aided speech reception threshold was obtained at 45 dB HL in the right and left ears. Speech discrimination scores were 64% in the right ear and 60% in the left ear. [employer] sentences were presented in the best aided condition at 60 dB SPL. These sentences were presented in quiet, and Mr. Mayes scored 14% bilaterally, 14% in the right ear and 10% in the left ear (the ear to be IMPLANTED). Mr. Mayes scored 8% on CNC words in quiet.      Based on the results of this comprehensive auditory evaluation, Jamison Mayes was considered a good candidate for cochlear implantation in his LEFT ear from an audiological standpoint pending medical clearance. Mr. Mayes has a moderately-severe to severe sensorineural hearing loss in both ears with limited benefit from amplification. Limited benefit from amplification is defined by the test scores of <= 60% correct in the best-aided listening condition on tape recorded tests of open-set sentence cognition. The cochlear implant is the only accepted medical  procedure for the treatment of his degree of hearing loss. Mr. Mayes meets all current standards for cochlear implantation according to the Medicare guidelines. The cochlear implant is not provided primarily for the convenience of the patient, physician, or healthcare provider, but rather to improve hearing and communicative functioning. The cochlear implant is the most appropriate device that can be safely provided to the patient. It will be furnished by qualified personnel at [facility]. The benefits and limitations of cochlear implantation were discussed with Mr. Mayes including the range of possible outcomes. Mr. Mayes communicates orally. The rehabilitation requirements and the immunization recommendations were reviewed today. Mr. Mayes stated he understood the requirements for successful post op rehabilitation including a formal auditory rehabilitation program if necessary. Mr. Mayes has the support of his family for this decision. Mr. Mayes stated that he would like to proceed with cochlear implantation in the LEFT ear pending review of imaging studies and medical clearance for the procedure.      Recommend:  1.  Otologic evaluation  2.  Cochlear implantation in the LEFT EAR with [employer] device   2.  Follow up programming  3.  Annual evaluation

## 2024-11-17 ENCOUNTER — PATIENT MESSAGE (OUTPATIENT)
Dept: OTOLARYNGOLOGY | Facility: CLINIC | Age: 79
End: 2024-11-17
Payer: MEDICARE

## 2024-11-17 LAB
OHS QRS DURATION: 132 MS
OHS QTC CALCULATION: 477 MS

## 2024-11-17 RX ORDER — PREDNISONE 10 MG/1
50 TABLET ORAL DAILY
Qty: 25 TABLET | Refills: 0 | Status: SHIPPED | OUTPATIENT
Start: 2024-11-17 | End: 2024-11-22

## 2024-11-17 NOTE — PROGRESS NOTES
Subjective     Patient ID: Jamison Mayes is a 79 y.o. male.    Chief Complaint: Consult (CI eval )    HPI    Jamison Mayes is a 79 y.o. male presents for evaluation for cochlear implant. He has a long history of occupational/recreational noise expsosure and has developed progressive hearing loss over many years. He uses hearing aids, but reports he still cannot understand what people are saying. This creates a big problem for him at work.  He reports a hx of a PE tube and a perforation in his left ear.      Review of Systems   Constitutional:  Negative for chills and fever.   HENT:  Negative for sore throat and trouble swallowing.    Respiratory:  Negative for apnea and chest tightness.    Cardiovascular:  Negative for chest pain.          Objective     Physical Exam  Vitals and nursing note reviewed.   Constitutional:       Appearance: Normal appearance.   HENT:      Head: Normocephalic and atraumatic.      Right Ear: Tympanic membrane, ear canal and external ear normal. There is no impacted cerumen.      Left Ear: Ear canal and external ear normal. There is no impacted cerumen. Tympanic membrane is perforated.      Ears:     Neurological:      Mental Status: He is alert.       Data Reviewed:        Pure tone threshold testing revealed a moderately-severe sloping to severe sensorineural hearing loss in both ears. A speech reception threshold was noted at 50 dB HL in the right and left ears. Speech discrimination scores were 60% in the right ear and 56% in the left ear.      Aided testing was completed in the best aided condition with his hearing aids on. Aided responses were obtained at 45-70 dB HL in sound field. An aided speech reception threshold was obtained at 45 dB HL in the right and left ears. Speech discrimination scores were 64% in the right ear and 60% in the left ear. [employer] sentences were presented in the best aided condition at 60 dB SPL. These sentences were presented in quiet, and Mr. Mayes  scored 14% bilaterally, 14% in the right ear and 10% in the left ear (the ear to be IMPLANTED). Mr. Mayes scored 8% on CNC words in quiet.        MRI IAC 2021 patent cochlea, no opacification of mastoid    Assessment and Plan     1. Sensorineural hearing loss (SNHL), bilateral        Discussed cochlear implantation  with patient and family.  Discussed possibility of facial nerve paralysis, hearing loss, balance loss, device failure, device infection, and wound issues.  Risks, complications, alternatives and other potential problems discussed and patient expressed understanding.    Ok for LEFT CI with tympanoplasty  PCV 20 complete  CT ordered  Rx for prednisone and supplement NAC for HP       No follow-ups on file.

## 2024-11-18 ENCOUNTER — TELEPHONE (OUTPATIENT)
Dept: OTOLARYNGOLOGY | Facility: CLINIC | Age: 79
End: 2024-11-18
Payer: MEDICARE

## 2024-11-18 DIAGNOSIS — H90.3 SENSORINEURAL HEARING LOSS (SNHL), BILATERAL: Primary | ICD-10-CM

## 2024-11-18 DIAGNOSIS — H81.11 BPPV (BENIGN PAROXYSMAL POSITIONAL VERTIGO), RIGHT: ICD-10-CM

## 2024-11-18 DIAGNOSIS — H93.13 TINNITUS OF BOTH EARS: ICD-10-CM

## 2024-11-18 DIAGNOSIS — H93.293 IMPAIRED AUDITORY DISCRIMINATION, BILATERAL: ICD-10-CM

## 2024-11-18 DIAGNOSIS — H90.A21 SENSORINEURAL HEARING LOSS (SNHL) OF RIGHT EAR WITH RESTRICTED HEARING OF LEFT EAR: ICD-10-CM

## 2024-11-18 DIAGNOSIS — H69.92 EUSTACHIAN TUBE DYSFUNCTION, LEFT: ICD-10-CM

## 2024-11-18 DIAGNOSIS — H72.92 TYMPANIC MEMBRANE PERFORATION, LEFT: ICD-10-CM

## 2024-12-03 ENCOUNTER — HOSPITAL ENCOUNTER (OUTPATIENT)
Dept: RADIOLOGY | Facility: HOSPITAL | Age: 79
Discharge: HOME | End: 2024-12-03
Attending: INTERNAL MEDICINE
Payer: MEDICARE

## 2024-12-03 DIAGNOSIS — K86.2 PANCREATIC CYST: ICD-10-CM

## 2024-12-03 DIAGNOSIS — C22.0 HEPATOCELLULAR CARCINOMA: ICD-10-CM

## 2024-12-03 PROCEDURE — 25500020 PHARM REV CODE 255: Mod: HCNC | Performed by: INTERNAL MEDICINE

## 2024-12-03 PROCEDURE — A9585 GADOBUTROL INJECTION: HCPCS | Mod: HCNC | Performed by: INTERNAL MEDICINE

## 2024-12-03 PROCEDURE — 74183 MRI ABD W/O CNTR FLWD CNTR: CPT | Mod: 26,HCNC,, | Performed by: RADIOLOGY

## 2024-12-03 PROCEDURE — 74183 MRI ABD W/O CNTR FLWD CNTR: CPT | Mod: TC,HCNC

## 2024-12-03 RX ORDER — GADOBUTROL 604.72 MG/ML
9 INJECTION INTRAVENOUS
Status: COMPLETED | OUTPATIENT
Start: 2024-12-03 | End: 2024-12-03

## 2024-12-03 RX ADMIN — GADOBUTROL 9 ML: 604.72 INJECTION INTRAVENOUS at 08:12

## 2024-12-04 NOTE — PROGRESS NOTES
Subjective:       Patient ID: Jamison Mayes is a 79 y.o. male.    Chief Complaint: hepatocellular carcinoma    HPI    He presented to the hospital on 03/01/2018 with sudden onset of cold sweats, nausea and abdominal discomfort and was noted to have an 8.7 cm mass in the lateral segment of the left hepatic lobe that has ruptured to the liver capsule.  He was monitored and underwent a left hepatic lobectomy with cholecystectomy on 03/05/2018.      Final pathology revealed a T1b NX 7.5 cm well-differentiated hepatocellular carcinoma without vascular invasion or perineural invasion.  The tumor appeared to be confined to the liver. Hepatitis serology negative.    A CT chest with contrast on 03/03/2018 revealed a 0.3 cm left upper lobe pulmonary nodule.    He lives in [facility].  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  - he underwent endoscopic ultrasound on 1/11/23.  - he underwent MRI on 12/5/23.    Interval history:  - he presents for a follow-up appointment for his hepatocellular carcinoma and pancreatic cyst.   - he underwent MRI on 12/3/24  - he presented to the emergency room on 11/14/24 for diverticulitis. Today, those symptoms have improved.  - he is scheduled for tympanoplasty on 2/3/25.  - today, he is doing well. He denies shortness of breath, chest pain, nausea, vomiting, diarrhea, constipation.    Review of Systems   Constitutional:  Negative for fatigue, fever and unexpected weight change.   HENT:  Negative for sore throat.    Eyes:  Negative for visual disturbance.   Respiratory:  Negative for shortness of breath.    Cardiovascular:  Negative for chest pain.   Gastrointestinal:  Negative for abdominal pain.   Genitourinary:  Negative for dysuria.   Musculoskeletal:  Negative for back pain.   Skin:  Negative for rash.   Neurological:  Negative for headaches.   Hematological:  Negative for adenopathy.    Psychiatric/Behavioral:  The patient is not nervous/anxious.          Objective:      Vitals:    12/05/24 0951   BP: (!) 149/67   BP Location: Right arm   Patient Position: Sitting   Pulse: 84   Resp: 18   SpO2: 99%   Weight: 81.1 kg (178 lb 12.7 oz)       BMI: Body mass index is 27.19 kg/m².    ECOG 1  Physical Exam  Vitals and nursing note reviewed.   Constitutional:       Appearance: He is well-developed.   HENT:      Head: Normocephalic and atraumatic.   Eyes:      Pupils: Pupils are equal, round, and reactive to light.   Cardiovascular:      Rate and Rhythm: Normal rate and regular rhythm.   Pulmonary:      Effort: Pulmonary effort is normal.      Breath sounds: Normal breath sounds.   Abdominal:      General: Bowel sounds are normal.      Palpations: Abdomen is soft.   Musculoskeletal:         General: Normal range of motion.      Cervical back: Normal range of motion and neck supple.   Skin:     General: Skin is warm and dry.   Neurological:      Mental Status: He is alert and oriented to person, place, and time.   Psychiatric:         Behavior: Behavior normal.         Thought Content: Thought content normal.         Judgment: Judgment normal.        Labs have been reviewed.    Lab Results   Component Value Date    WBC 8.10 12/03/2024    HGB 11.6 (L) 12/03/2024    HCT 33.9 (L) 12/03/2024    MCV 88 12/03/2024     12/03/2024       Diagnostics:  Upper endoscopic ultrasound (1/11/23):  - Normal esophagus.                          - Normal stomach.                          - Normal examined duodenum.                          - A cystic lesion was seen in the pancreatic tail.                          - No specimens collected.     Imaging:    MRI abdomen (12/3/24): I have personally reviewed the images  1. Status post left hepatectomy for HCC resection.  No new or recurrent hepatic lesions.  2. Pancreatic cystic lesions, similar when compared to MRI dated 12/05/2023 but slightly increased in size when  compared to MRI dated 12/12/2022.  Continued follow-up recommended.       MRI abdomen (12/5/23): I have personally reviewed the images  Lung bases are clear. Heart size is normal.     Exam quality is limited by motion.     Liver is normal in size and contour.  No fat or iron deposition in the liver or spleen.  Postoperative changes of prior left hepatectomy.  Similar heterogeneous T2 signal along the margin of the anterior right lobe measuring roughly 2.6 x 1.8 cm (series 10, image 9; previously 2.6 x 1.6).  This may represent a chronic postoperative collection given similar appearance of this finding when compared with multiple prior studies.  No abnormal intrahepatic restricted diffusion or enhancing lesion.  Minimal prominent bile ducts along the hepatectomy margin, similar to prior.  No new intrahepatic biliary duct dilatation.     The gallbladder is surgically absent. The common bile duct is normal in caliber.     Similar appearance of 1.5 cm T2 hyperintense lesion in the pancreatic tail (series 4, image 23).  This lesion demonstrates smooth enhancing thin internal septation as seen previously (series 1401, image 64).  No pancreatic duct dilatation.  The pancreas otherwise demonstrates normal intrinsic T1 signal.     Spleen is normal in size and signal.     Adrenal glands are normal in appearance.     Kidneys are symmetric and enhance normally without focal suspicious lesion.  No hydronephrosis.     Small hiatal hernia.  The visualized bowel is unremarkable.     Portal, splenic, and superior mesenteric veins are patent.     Abdominal aorta is normal in caliber.  Incidental left-sided IVC.     No enhancing bone lesions.     Impression:     Postoperative changes of prior left hepatectomy.  No new hepatic lesion or detrimental change when compared with 12/12/2022.     Similar appearance of small pancreatic tail cystic lesion with enhancing smooth internal septation when compared with 12/12/2022.  However, this  lesion has progressively increased in size when compared with more remote prior studies.  No pancreatic duct dilatation.  Differential again includes IPMN or other cystic neoplasm.  Suggest continued attention on abdominal MRI follow-up in 6-12 months.     Incidental findings discussed in the body of the report.    MRI abdomen (12/12/22): I have personally reviewed the images  MRI abdomen with and without contrast dated 12/14/2021     FINDINGS:  Pulmonary Bases: No pleural effusion.     Liver: Prior postsurgical change of left hepatectomy.  Similar heterogeneous T2 area along the margin of the anterior right lobe measuring 2.5 x 1 6 cm, previously 2.9 x 2.0 cm (series 5, image 11).  No abnormal intrahepatic diffusion restriction.  No suspicious enhancing lesion or washout.  Right portal vein and main portal vein are patent.     Bile Ducts: Some focally dilated ducts near the hepatectomy margin, similar.  No extrahepatic biliary dilatation.     Gallbladder: Absent     Pancreas: Cystic lesion at the pancreatic tail with smooth enhancing septation measuring 1.5 cm, previously 1.1 cm (series 5, image 17).  No main pancreatic ductal dilatation.     Spleen: normal.     Proximal Gastrointestinal tract: Small hiatal hernia.  Normal upper GI tract caliber scattered colonic diverticulosis.     Mesentery: No discrete mesenteric mass.     Adrenal Glands: normal.     Kidneys: Enhance symmetrically without hydronephrosis     Aorta and Abdominal Vasculature: Incidental left-sided IVC anatomic variant.  Aorta is patent.     Lymph nodes: None pathologically enlarged     Body wall: Prior treatment change of the anterior abdominal wall.  No acute abnormality.     Other: No upper abdominal ascites.  No suspicious marrow enhancing lesion.     Impression:     Post treatment change of prior left hepatectomy.  No evidence for HCC.  Stable heterogeneous T2 structure along the anterior right lobe margin, again possible chronic postoperative  "collection.     Pancreatic cystic lesion at the tail with enhancing smooth septation and measures slightly larger in the interval.  Differential to include component of IPMN or other cystic neoplasm.  No main pancreatic ductal dilatation.  Further correlation advised.     Additional findings as above.    Assessment:       1. Hepatocellular carcinoma    2. Pancreatic cyst        Plan:     Hepatocellular carcinoma  - he had previously seen Dr. Haney.  - he had a T1b NX hepatocellular carcinoma that was resected March 2018  - Labs have been reviewed.  - MRI abdomen (12/12/22) revealed no evidence of hepatocellular carcinoma recurrence. See below regarding pancreatic cyst.  MRI abdomen (12/5/23):  "Postoperative changes of prior left hepatectomy.  No new hepatic lesion or detrimental change when compared with 12/12/2022.  Similar appearance of small pancreatic tail cystic lesion with enhancing smooth internal septation when compared with 12/12/2022.  However, this lesion has progressively increased in size when compared with more remote prior studies.  No pancreatic duct dilatation.  Differential again includes IPMN or other cystic neoplasm.  Suggest continued attention on abdominal MRI follow-up in 6-12 months."  - MRI abdomen (12/3/24):  1. Status post left hepatectomy for HCC resection.  No new or recurrent hepatic lesions.  2. Pancreatic cystic lesions, similar when compared to MRI dated 12/05/2023 but slightly increased in size when compared to MRI dated 12/12/2022.  Continued follow-up recommended.  3. Additional stable findings as detailed above.  - return to clinic in December 2025 with repeat MRI.    Pancreatic cyst  - MRI abdomen (12/12/22) revealed an increase in size of cystic lesion from 1.1 cm to 1.5 cm  - upper endoscopic ultrasound (1/11/23) revealed the cystic lesion in the pancreatic tail. Recommendation was to return to pancreas cyst clinic in January 2024 to discuss surveillance.   MRI abdomen " "(12/5/23):  "Similar appearance of small pancreatic tail cystic lesion with enhancing smooth internal septation when compared with 12/12/2022.  However, this lesion has progressively increased in size when compared with more remote prior studies.  No pancreatic duct dilatation.  Differential again includes IPMN or other cystic neoplasm.  Suggest continued attention on abdominal MRI follow-up in 6-12 months."  - I offered to refer to pancreatic cyst clinic. He is comfortable following with MRI scans.  - return to clinic in December 2025 with repeat MRI.    - return to clinic in December 2025 with repeat MRI.    Humza Simmons M.D.  Hematology/Oncology  [facility] - [address], [address]  [place] [address]  Phone: [phone]  Fax: [phone]      "

## 2024-12-05 ENCOUNTER — OFFICE VISIT (OUTPATIENT)
Dept: HEMATOLOGY/ONCOLOGY | Facility: CLINIC | Age: 79
End: 2024-12-05
Payer: MEDICARE

## 2024-12-05 VITALS
RESPIRATION RATE: 18 BRPM | DIASTOLIC BLOOD PRESSURE: 67 MMHG | BODY MASS INDEX: 27.19 KG/M2 | OXYGEN SATURATION: 99 % | SYSTOLIC BLOOD PRESSURE: 149 MMHG | HEART RATE: 84 BPM | WEIGHT: 178.81 LBS

## 2024-12-05 DIAGNOSIS — C22.0 HEPATOCELLULAR CARCINOMA: Primary | ICD-10-CM

## 2024-12-05 DIAGNOSIS — R97.8 OTHER ABNORMAL TUMOR MARKERS: ICD-10-CM

## 2024-12-05 DIAGNOSIS — K86.2 PANCREATIC CYST: ICD-10-CM

## 2024-12-05 PROCEDURE — 1160F RVW MEDS BY RX/DR IN RCRD: CPT | Mod: HCNC,CPTII,S$GLB, | Performed by: INTERNAL MEDICINE

## 2024-12-05 PROCEDURE — 1101F PT FALLS ASSESS-DOCD LE1/YR: CPT | Mod: HCNC,CPTII,S$GLB, | Performed by: INTERNAL MEDICINE

## 2024-12-05 PROCEDURE — 3288F FALL RISK ASSESSMENT DOCD: CPT | Mod: HCNC,CPTII,S$GLB, | Performed by: INTERNAL MEDICINE

## 2024-12-05 PROCEDURE — 1126F AMNT PAIN NOTED NONE PRSNT: CPT | Mod: HCNC,CPTII,S$GLB, | Performed by: INTERNAL MEDICINE

## 2024-12-05 PROCEDURE — 99999 PR PBB SHADOW E&M-EST. PATIENT-LVL IV: CPT | Mod: PBBFAC,HCNC,, | Performed by: INTERNAL MEDICINE

## 2024-12-05 PROCEDURE — 99214 OFFICE O/P EST MOD 30 MIN: CPT | Mod: HCNC,S$GLB,, | Performed by: INTERNAL MEDICINE

## 2024-12-05 PROCEDURE — 3077F SYST BP >= 140 MM HG: CPT | Mod: HCNC,CPTII,S$GLB, | Performed by: INTERNAL MEDICINE

## 2024-12-05 PROCEDURE — 1159F MED LIST DOCD IN RCRD: CPT | Mod: HCNC,CPTII,S$GLB, | Performed by: INTERNAL MEDICINE

## 2024-12-05 PROCEDURE — 3078F DIAST BP <80 MM HG: CPT | Mod: HCNC,CPTII,S$GLB, | Performed by: INTERNAL MEDICINE

## 2024-12-17 ENCOUNTER — OFFICE VISIT (OUTPATIENT)
Dept: FAMILY MEDICINE | Facility: CLINIC | Age: 79
End: 2024-12-17
Payer: MEDICARE

## 2024-12-17 VITALS
DIASTOLIC BLOOD PRESSURE: 80 MMHG | SYSTOLIC BLOOD PRESSURE: 122 MMHG | WEIGHT: 177.5 LBS | HEART RATE: 98 BPM | HEIGHT: 68 IN | OXYGEN SATURATION: 97 % | BODY MASS INDEX: 26.9 KG/M2 | TEMPERATURE: 98 F

## 2024-12-17 DIAGNOSIS — Z23 FLU VACCINE NEED: ICD-10-CM

## 2024-12-17 DIAGNOSIS — I10 ESSENTIAL HYPERTENSION: Primary | ICD-10-CM

## 2024-12-17 DIAGNOSIS — E11.8 DM (DIABETES MELLITUS) WITH COMPLICATIONS: ICD-10-CM

## 2024-12-17 PROCEDURE — 3074F SYST BP LT 130 MM HG: CPT | Mod: CPTII,S$GLB,, | Performed by: FAMILY MEDICINE

## 2024-12-17 PROCEDURE — 1126F AMNT PAIN NOTED NONE PRSNT: CPT | Mod: CPTII,S$GLB,, | Performed by: FAMILY MEDICINE

## 2024-12-17 PROCEDURE — G0008 ADMIN INFLUENZA VIRUS VAC: HCPCS | Mod: S$GLB,,, | Performed by: FAMILY MEDICINE

## 2024-12-17 PROCEDURE — 1101F PT FALLS ASSESS-DOCD LE1/YR: CPT | Mod: CPTII,S$GLB,, | Performed by: FAMILY MEDICINE

## 2024-12-17 PROCEDURE — 99214 OFFICE O/P EST MOD 30 MIN: CPT | Mod: S$GLB,,, | Performed by: FAMILY MEDICINE

## 2024-12-17 PROCEDURE — 1160F RVW MEDS BY RX/DR IN RCRD: CPT | Mod: CPTII,S$GLB,, | Performed by: FAMILY MEDICINE

## 2024-12-17 PROCEDURE — 1159F MED LIST DOCD IN RCRD: CPT | Mod: CPTII,S$GLB,, | Performed by: FAMILY MEDICINE

## 2024-12-17 PROCEDURE — 3288F FALL RISK ASSESSMENT DOCD: CPT | Mod: CPTII,S$GLB,, | Performed by: FAMILY MEDICINE

## 2024-12-17 PROCEDURE — 3079F DIAST BP 80-89 MM HG: CPT | Mod: CPTII,S$GLB,, | Performed by: FAMILY MEDICINE

## 2024-12-17 PROCEDURE — 90653 IIV ADJUVANT VACCINE IM: CPT | Mod: S$GLB,,, | Performed by: FAMILY MEDICINE

## 2024-12-31 ENCOUNTER — LAB VISIT (OUTPATIENT)
Dept: LAB | Facility: HOSPITAL | Age: 79
End: 2024-12-31
Attending: FAMILY MEDICINE
Payer: MEDICARE

## 2024-12-31 DIAGNOSIS — E11.8 DM (DIABETES MELLITUS) WITH COMPLICATIONS: ICD-10-CM

## 2024-12-31 DIAGNOSIS — I10 ESSENTIAL HYPERTENSION: ICD-10-CM

## 2024-12-31 LAB
CHOLEST SERPL-MCNC: 134 MG/DL (ref 120–199)
CHOLEST/HDLC SERPL: 3.5 {RATIO} (ref 2–5)
ESTIMATED AVG GLUCOSE: 160 MG/DL (ref 68–131)
HBA1C MFR BLD: 7.2 % (ref 4–5.6)
HDLC SERPL-MCNC: 38 MG/DL (ref 40–75)
HDLC SERPL: 28.4 % (ref 20–50)
LDLC SERPL CALC-MCNC: 72.2 MG/DL (ref 63–159)
NONHDLC SERPL-MCNC: 96 MG/DL
TRIGL SERPL-MCNC: 119 MG/DL (ref 30–150)

## 2024-12-31 PROCEDURE — 83036 HEMOGLOBIN GLYCOSYLATED A1C: CPT | Mod: HCNC | Performed by: FAMILY MEDICINE

## 2024-12-31 PROCEDURE — 80061 LIPID PANEL: CPT | Mod: HCNC | Performed by: FAMILY MEDICINE

## 2024-12-31 PROCEDURE — 36415 COLL VENOUS BLD VENIPUNCTURE: CPT | Mod: HCNC,PN | Performed by: FAMILY MEDICINE

## 2025-01-02 ENCOUNTER — OFFICE VISIT (OUTPATIENT)
Dept: SURGERY | Facility: CLINIC | Age: 80
End: 2025-01-02
Payer: MEDICARE

## 2025-01-02 VITALS
BODY MASS INDEX: 27.23 KG/M2 | HEIGHT: 68 IN | WEIGHT: 179.69 LBS | DIASTOLIC BLOOD PRESSURE: 74 MMHG | HEART RATE: 80 BPM | SYSTOLIC BLOOD PRESSURE: 125 MMHG

## 2025-01-02 DIAGNOSIS — K57.32 DIVERTICULITIS OF LARGE INTESTINE WITHOUT PERFORATION OR ABSCESS WITHOUT BLEEDING: ICD-10-CM

## 2025-01-02 DIAGNOSIS — K44.9 HIATAL HERNIA: Primary | ICD-10-CM

## 2025-01-02 PROCEDURE — 3078F DIAST BP <80 MM HG: CPT | Mod: HCNC,CPTII,S$GLB, | Performed by: COLON & RECTAL SURGERY

## 2025-01-02 PROCEDURE — 1101F PT FALLS ASSESS-DOCD LE1/YR: CPT | Mod: HCNC,CPTII,S$GLB, | Performed by: COLON & RECTAL SURGERY

## 2025-01-02 PROCEDURE — 1159F MED LIST DOCD IN RCRD: CPT | Mod: HCNC,CPTII,S$GLB, | Performed by: COLON & RECTAL SURGERY

## 2025-01-02 PROCEDURE — 3074F SYST BP LT 130 MM HG: CPT | Mod: HCNC,CPTII,S$GLB, | Performed by: COLON & RECTAL SURGERY

## 2025-01-02 PROCEDURE — 3288F FALL RISK ASSESSMENT DOCD: CPT | Mod: HCNC,CPTII,S$GLB, | Performed by: COLON & RECTAL SURGERY

## 2025-01-02 PROCEDURE — 99204 OFFICE O/P NEW MOD 45 MIN: CPT | Mod: HCNC,S$GLB,, | Performed by: COLON & RECTAL SURGERY

## 2025-01-02 PROCEDURE — 1160F RVW MEDS BY RX/DR IN RCRD: CPT | Mod: HCNC,CPTII,S$GLB, | Performed by: COLON & RECTAL SURGERY

## 2025-01-02 PROCEDURE — 1126F AMNT PAIN NOTED NONE PRSNT: CPT | Mod: HCNC,CPTII,S$GLB, | Performed by: COLON & RECTAL SURGERY

## 2025-01-02 PROCEDURE — 99999 PR PBB SHADOW E&M-EST. PATIENT-LVL IV: CPT | Mod: PBBFAC,HCNC,, | Performed by: COLON & RECTAL SURGERY

## 2025-01-02 RX ORDER — PANTOPRAZOLE SODIUM 40 MG/1
40 TABLET, DELAYED RELEASE ORAL DAILY
Qty: 90 TABLET | Refills: 3 | Status: SHIPPED | OUTPATIENT
Start: 2025-01-02 | End: 2026-01-02

## 2025-01-02 NOTE — PROGRESS NOTES
" Patient ID: Jamison Mayes is a 79 y.o. male.    Chief Complaint: Follow-up    HPI    Jamison Mayes is a 79 y.o. male     History of Present Illness    CHIEF COMPLAINT:  Patient presents today for follow up.    RECENT MEDICAL HISTORY:  He experienced severe sweating episodes starting three weeks ago. After the second episode a week later, he went to the emergency room where CT and labs led to a diagnosis of diverticulitis. He denies any current sweating or related problems.    ENT:  He reports progressive hearing loss that is worsening daily, with significant difficulty understanding phone conversations. He has a hole in one ear which is the more severely affected side and is scheduled for cochlear implant placement in February.    DIABETES:  He has had diabetes for approximately 12 years, which was discovered during cancer treatment. He checks blood sugar every morning. Hemoglobin A1C was 6.4 six months ago.    GERD:  He experiences occasional reflux about once monthly and takes Tums for relief.    SOCIAL HISTORY:  He works as a .         Vitals:    12/17/24 1056   BP: 122/80   BP Location: Left arm   Patient Position: Sitting   Pulse: 98   Temp: 98 °F (36.7 °C)   TempSrc: Oral   SpO2: 97%   Weight: 80.5 kg (177 lb 7.5 oz)   Height: 5' 8" (1.727 m)            Review of Symptoms      Physical Exam    Constitutional:  Oriented to person, place, and time.appears well-developed and well-nourished.  No distress.      HENT  Head: Normocephalic and atraumatic  Right Ear: External ear normal.   Left Ear: External ear normal.   Nose: External nose normal.   Mouth:  Moist mucus membranes.    Eyes:  Conjunctivae are normal. Right eye exhibits no discharge.  Left eye exhibits no discharge. No scleral icterus.  No periorbital edema    Cardiovascular:  Regular rate and rhythm with normal S1 and S2     Pulmonary/Chest:   Clear to auscultation bilaterally without wheezes, rhonchi or rales      Musculoskeletal:  No " "edema. No obvious deformity No wasting       Neurological:  Alert and oriented to person, place, and time.   Coordination normal.     Skin:   Skin is warm and dry.  No diaphoresis.   No rash noted.     Psychiatric: Normal mood and affect. Behavior is normal.  Judgment and thought content normal.     Physical Exam              Complete Blood Count  Lab Results   Component Value Date    RBC 3.85 (L) 12/03/2024    HGB 11.6 (L) 12/03/2024    HCT 33.9 (L) 12/03/2024    MCV 88 12/03/2024    MCH 30.1 12/03/2024    MCHC 34.2 12/03/2024    RDW 12.6 12/03/2024     12/03/2024    MPV 10.4 12/03/2024    GRAN 4.4 12/03/2024    GRAN 54.5 12/03/2024    LYMPH 2.5 12/03/2024    LYMPH 31.0 12/03/2024    MONO 0.8 12/03/2024    MONO 10.2 12/03/2024    EOS 0.3 12/03/2024    BASO 0.04 12/03/2024    EOSINOPHIL 3.7 12/03/2024    BASOPHIL 0.5 12/03/2024    DIFFMETHOD Automated 12/03/2024       Comprehensive Metabolic Panel  Lab Results   Component Value Date     (H) 12/03/2024    BUN 24 (H) 12/03/2024    CREATININE 1.3 12/03/2024     12/03/2024    K 4.5 12/03/2024     12/03/2024    PROT 7.1 12/03/2024    ALBUMIN 4.1 12/03/2024    BILITOT 0.7 12/03/2024    AST 28 12/03/2024    ALKPHOS 58 12/03/2024    CO2 20 (L) 12/03/2024    ALT 13 12/03/2024    ANIONGAP 15 12/03/2024       TSH  No results found for: "TSH"    Assessment / Plan:      ICD-10-CM ICD-9-CM   1. Essential hypertension  I10 401.9   2. Flu vaccine need  Z23 V04.81   3. DM (diabetes mellitus) with complications  E11.8 250.90     Essential hypertension  -     Hemoglobin A1C; Future; Expected date: 12/17/2024  -     Lipid Panel; Future; Expected date: 12/17/2024  -     Hemoglobin A1C; Future; Expected date: 12/17/2024  -     Lipid Panel; Future; Expected date: 12/17/2024  -     Microalbumin/Creatinine Ratio, Urine; Future; Expected date: 12/17/2024    Flu vaccine need  -     influenza (adjuvanted) (Fluad) 45 mcg/0.5 mL IM vaccine (> or = 66 yo) 0.5 mL    DM " (diabetes mellitus) with complications  -     Hemoglobin A1C; Future; Expected date: 12/17/2024  -     Lipid Panel; Future; Expected date: 12/17/2024  -     Hemoglobin A1C; Future; Expected date: 12/17/2024  -     Lipid Panel; Future; Expected date: 12/17/2024  -     Microalbumin/Creatinine Ratio, Urine; Future; Expected date: 12/17/2024        Assessment & Plan    - Evaluated recent diverticulitis episode; noted successful antibiotic treatment and resolution of symptoms  - Assessed diabetes management; last HbA1c 6.4% 6 months ago, indicating good control  - Considered reports of blood sugar fluctuations; recommended HbA1c test to reassess current glycemic control  - Reviewed recent negative cancer workup, including CT and bloodwork  - Noted upcoming cochlear implant surgery scheduled for February  - Recommend RSV vaccination prior to surgery to prevent potential complications    TYPE 2 DIABETES MELLITUS:  - Hemoglobin A1C test ordered.  - Repeat hemoglobin A1C ordered for 6 months from now.    GASTROESOPHAGEAL REFLUX DISEASE:  - Continue Tums as needed for occasional reflux symptoms.    CARDIOVASCULAR HEALTH:  - Lipid profile ordered.  - Repeat lipid profile ordered for 6 months from now.    GENERAL HEALTH AND PREVENTION:  - Explained importance of adequate hydration for urine color and overall health.  - Patient to increase daily fluid intake to achieve clearer urine.  - Discussed RSV vaccination, its benefits for older adults, and potential duration of protection.  - Recommend getting RSV shot at the pharmacy after the holidays.    FOLLOW-UP:  - Follow up in 1 year.  - Contact the office if any issues arise before next scheduled appointment.         This note was generated with the assistance of ambient listening technology. Verbal consent was obtained by the patient and accompanying visitor(s) for the recording of patient appointment to facilitate this note. I attest to having reviewed and edited the generated  note for accuracy, though some syntax or spelling errors may persist. Please contact the author of this note for any clarification.

## 2025-01-02 NOTE — PROGRESS NOTES
CRS Office Visit History and Physical    Referring Md:   Zenobia Smith, Do  [address] [address]    SUBJECTIVE:     Chief Complaint: diverticulitis    History of Present Illness:  The patient is a new patient to this practice.   Course is as follows:  Patient is a 79 y.o. male presents with diverticulitis    History of left hepatectomy from ruptured left hepatocellular carcinoma in 2018.     He developed acute onset left-sided abdominal pain in November 2024 prompted a visit to the ER that resulted in his CT scan demonstrating sigmoid colon diverticulitis.  He was treated outpatient with antibiotics with subsequent improvement.  No prior episodes.  No family history of diverticular disease.  Only past abdominal surgery is his exploratory laparotomy for emergent left hemicolectomy.  No family history of colorectal cancer.  No recent weight loss.  He has 2-3 bowel movements per day.  No bleeding.  He does have fecal urgency.    Last Colonoscopy: 2011 per report.      Review of patient's allergies indicates:   Allergen Reactions    Codeine Nausea Only       Past Medical History:   Diagnosis Date    AK (actinic keratosis) PDT scalp 2/2015 and 3/2015    s/p efudex on scalp and forehead, PDT scalp    AK (actinic keratosis) 01/18/2024    Arthritis     Diabetes mellitus type II     Fever blister     Hepatocellular carcinoma 03/05/2018    s/p left hepatectomy of segments 2,3,4a/b    Hypertension     Joint pain     Pancreatic cyst     SCC (squamous cell carcinoma) 03/2013    L scalp vertex    SCC (squamous cell carcinoma) excised     left helix, in-situ    Seizures     Squamous Cell Carcinoma 03/2013    occipital scalp    Squamous Cell Carcinoma 03/2013    l vertex scalp    Squamous cell carcinoma of skin 01/24/2024    l parietal scalp     Past Surgical History:   Procedure Laterality Date    CHOLECYSTECTOMY  03/05/2018    open at time of hepatic resection    ENDOSCOPIC ULTRASOUND OF UPPER  "GASTROINTESTINAL TRACT N/A 2023    Procedure: ULTRASOUND, UPPER GI TRACT, ENDOSCOPIC;  Surgeon: Moise Mason MD;  Location: [facility] ENDO;  Service: Endoscopy;  Laterality: N/A;    EYE SURGERY      LIVER RESECTION Left 2018    segments 2,3, 4a/b    RETINAL DETACHMENT SURGERY      age approx 30-41 yo    skin carcinoma removal       Family History   Problem Relation Name Age of Onset    Diabetes Father      Hypertension Mother      Vision loss Sister      Melanoma Neg Hx      Heart attack Neg Hx      Heart disease Neg Hx       Social History     Tobacco Use    Smoking status: Former     Current packs/day: 0.00     Types: Cigarettes     Start date: 1961     Quit date: 1966     Years since quittin.6     Passive exposure: Past    Smokeless tobacco: Never   Substance Use Topics    Alcohol use: No    Drug use: No        Review of Systems:  Review of Systems   Constitutional:  Negative for chills, diaphoresis, fever, malaise/fatigue and weight loss.   HENT:  Positive for hearing loss. Negative for congestion.    Respiratory:  Negative for shortness of breath.    Cardiovascular:  Negative for chest pain and leg swelling.   Gastrointestinal:  Negative for abdominal pain, blood in stool, constipation, nausea and vomiting.   Genitourinary:  Negative for dysuria.   Musculoskeletal:  Negative for back pain and myalgias.   Skin:  Negative for rash.   Neurological:  Negative for dizziness and weakness.   Endo/Heme/Allergies:  Does not bruise/bleed easily.   Psychiatric/Behavioral:  Negative for depression.        OBJECTIVE:     Vital Signs (Most Recent)  /74 (BP Location: Left arm, Patient Position: Sitting)   Pulse 80   Ht 5' 8" (1.727 m)   Wt 81.5 kg (179 lb 10.8 oz)   BMI 27.32 kg/m²     Physical Exam:  General: White male in no distress   Neuro: alert and oriented x 4.  Moves all extremities.     HEENT: no icterus.  Trachea midline  Respiratory: respirations are even and unlabored  Cardiac: " regular rate  Abdomen:  Upper midline incision well healed.  Nontender.  Nondistended.  Extremities: Warm dry and intact  Skin: no rashes  Anorectal:  Deferred    Labs: H&H 12 and 34. Alb 4.1  Creatinine 1.3.      Imaging:  Personally reviewed with sigmoid diverticulosis with surrounding inflammation consistent with diverticulitis  CT abd pelvis 11/14/2024: Acute left colonic diverticulitis in the left lower quadrant. No free air or abscess formation. Patient is status post left hepatectomy. Stable 1.5 cm cystic pancreatic lesion.       ASSESSMENT/PLAN:     Jamison was seen today for diverticulitis.    Diagnoses and all orders for this visit:    Hiatal hernia  -     pantoprazole (PROTONIX) 40 MG tablet; Take 1 tablet (40 mg total) by mouth once daily.    Diverticulitis of large intestine without perforation or abscess without bleeding  -     Ambulatory referral/consult to Gastroenterology        79 y.o. male with 1st episode of sigmoid colon diverticulitis.  No family history.  No prior episodes.  Responded well to conservative measures.  Recommended proceeding with colonoscopy to rule out any underlying malignancy.    We discussed recurrence of diverticulitis with one episode having a 20% recurrence rate, 2 episodes having 30-40% recurrence rate, and 3+ episodes having 50-60% recurrence rate.  We discussed that the first episode of diverticulitis is usually the most complicated and that subsequent episodes are similar to this.  We discussed that the risk of recurrence increases with family history of diverticulitis, smoking, a diet containing significant red meat, and obesity.    We discussed the role of elective colectomy to reduce the rate to subsequent diverticulitis to 3-5%.  However, he responded very well to conservative measures and likely would not benefit from colectomy at this time.    He wishes to have his colonoscopy performed at [place].   Message sent to the schedulers.        LESLIE Corlye MD,  ANGEL, JOSUERS  Staff Surgeon  Colon & Rectal Surgery

## 2025-01-17 ENCOUNTER — TELEPHONE (OUTPATIENT)
Dept: ENDOSCOPY | Facility: HOSPITAL | Age: 80
End: 2025-01-17
Payer: MEDICARE

## 2025-01-17 NOTE — TELEPHONE ENCOUNTER
"From: LESLIE Corley MD   Sent: 2025   3:48 PM CST   To: [facility]     Procedure: Colonoscopy     Diagnosis: Abnormal finding on GI tract imaging     Procedure Timin-12 weeks     *If within 4 weeks selected, please portillo as high priority*     *If greater than 12 weeks, please select "5-12 weeks" and delay sending until 3 months prior to requested date*     Location: Any Site.  Prefers [place].  Ok with Dr. Stanley     Additional Scheduling Information: No scheduling concerns     Prep Specifications:Standard prep     Is the patient taking a GLP-1 Agonist:no     Have you attached a patient to this message: yes     "

## 2025-01-17 NOTE — TELEPHONE ENCOUNTER
Received below order.  Telephoned pt to schedule colonoscopy. Spoke with pt's wife, Tricia.  Tricia states pt is having a cochlear inplant placed in February and they follow up for that surgery on 2/11/25.  She requests we follow back up with her after that date to schedule.  Will follow-up as requested.

## 2025-01-29 ENCOUNTER — PATIENT OUTREACH (OUTPATIENT)
Dept: ADMINISTRATIVE | Facility: HOSPITAL | Age: 80
End: 2025-01-29
Payer: MEDICARE

## 2025-01-29 ENCOUNTER — PATIENT MESSAGE (OUTPATIENT)
Dept: OTOLARYNGOLOGY | Facility: CLINIC | Age: 80
End: 2025-01-29
Payer: MEDICARE

## 2025-01-29 ENCOUNTER — TELEPHONE (OUTPATIENT)
Dept: OTOLARYNGOLOGY | Facility: CLINIC | Age: 80
End: 2025-01-29
Payer: MEDICARE

## 2025-01-29 NOTE — TELEPHONE ENCOUNTER
L/m for pt to report to [address] 2[address] floor surgery for 5am nothing to ear or drink after 12midnight on Sunday, you can have clear liquids up to 2 hours before you leave home

## 2025-01-30 ENCOUNTER — PATIENT MESSAGE (OUTPATIENT)
Dept: OTOLARYNGOLOGY | Facility: CLINIC | Age: 80
End: 2025-01-30
Payer: MEDICARE

## 2025-01-30 DIAGNOSIS — Z00.00 ENCOUNTER FOR MEDICARE ANNUAL WELLNESS EXAM: ICD-10-CM

## 2025-02-02 ENCOUNTER — ANESTHESIA EVENT (OUTPATIENT)
Dept: SURGERY | Facility: HOSPITAL | Age: 80
End: 2025-02-02
Payer: MEDICARE

## 2025-02-02 NOTE — ANESTHESIA PREPROCEDURE EVALUATION
[facility]  Anesthesia Pre-Operative Evaluation         Patient Name: Jamison Mayes  YOB: 1945  MRN: [id number]    SUBJECTIVE:     Pre-operative evaluation for Procedure(s) (LRB):  TYMPANOPLASTY (Left)  INSERTION, PROSTHESIS, COCHLEAR (Left)     02/02/2025    Jamison Mayes is a 79 y.o. male w/ a significant PMHx of  HTN, DM (A1c 7.2) and seizure disorder (last seizure ~40 years ago), hepatocellular carcinoma (s/p resection 2018) now with L tympanic membrane perforation.    Patient now presents for the above procedure(s).    Echo 3/2/18  CONCLUSIONS     1 - Eccentric hypertrophy.     2 - Normal left ventricular systolic function (EF 55-60%).     3 - Mild left atrial enlargement.     4 - Indeterminate LV diastolic function.     5 - Normal right ventricular systolic function .     6 - The estimated PA systolic pressure is 38 mmHg.     7 - Mild mitral regurgitation.     8 - Mild tricuspid regurgitation.     9 - Large, round echodense mass in the liver.       LDA: None documented.    Prev airway: None documented.    Drips: None documented.    Patient Active Problem List   Diagnosis    Seizure disorder    DM (diabetes mellitus) with complications    Localization-related (focal) (partial) epilepsy and epileptic syndromes with complex partial seizures, with intractable epilepsy    Encounter for screening for malignant neoplasm of prostate    Fecal urgency    Hepatocellular carcinoma    Frequent loose stools    Pancreatic cyst    Positional lightheadedness    Controlled type 2 diabetes mellitus, without long-term current use of insulin    Essential hypertension    Hyperlipidemia associated with type 2 diabetes mellitus    Patent pressure equalization (PE) tube on left side    Decreased hearing of both ears    DM type 2 without retinopathy    Benign prostatic hyperplasia with urinary hesitancy    AK (actinic keratosis)       Review of patient's allergies indicates:   Allergen Reactions     Codeine Nausea Only       Current Outpatient Medications:  No current facility-administered medications for this encounter.    Current Outpatient Medications:     ACCU-CHEK ROSE PLUS METER Misc, USE AS DIRECTED, Disp: 1 each, Rfl: 0    amoxicillin-clavulanate 875-125mg (AUGMENTIN) 875-125 mg per tablet, Take 1 tablet by mouth 2 (two) times daily., Disp: 14 tablet, Rfl: 0    aspirin (ECOTRIN) 81 MG EC tablet, Take 81 mg by mouth. 1 Tablet, Delayed Release (E.C.) Oral Every day, Disp: , Rfl:     blood sugar diagnostic Strp, To check BG 2 times daily, to use with accu check guide, Disp: 100 each, Rfl: 11    calcium carbonate (OS-RUSLAN) 500 mg calcium (1,250 mg) chewable tablet, Take 1 tablet by mouth once daily., Disp: , Rfl:     cyanocobalamin (VITAMIN B-12) 250 MCG tablet, Take 1 tablet (250 mcg total) by mouth once daily., Disp: , Rfl:     fluorouraciL (EFUDEX) 5 % cream, AAA on L ear BID x 4-6 weeks. Stop if blistered, oozing, or bleeding. Use daily sun protection., Disp: 40 g, Rfl: 1    glimepiride (AMARYL) 4 MG tablet, Take 1 tablet (4 mg total) by mouth before breakfast., Disp: 90 tablet, Rfl: 3    glucosamine HCl (GLUCOSAMINE, BULK, MISC), 1,200 mg by Misc.(Non-Drug; Combo Route) route Daily., Disp: , Rfl:     hydroCHLOROthiazide (HYDRODIURIL) 12.5 MG Tab, Take 1 tablet (12.5 mg total) by mouth once daily., Disp: 90 tablet, Rfl: 3    irbesartan (AVAPRO) 300 MG tablet, Take 1 tablet (300 mg total) by mouth once daily., Disp: 90 tablet, Rfl: 3    ketoconazole (NIZORAL) 2 % cream, AAA BID, Disp: 60 g, Rfl: 3    ketoconazole (NIZORAL) 2 % shampoo, Wash hair with medicated shampoo at least 2x/week - let sit on scalp at least 5 minutes prior to rinsing, Disp: 120 mL, Rfl: 5    lancets 28 gauge Misc, 1 lancet by Misc.(Non-Drug; Combo Route) route 2 (two) times daily., Disp: 100 each, Rfl: 11    lovastatin (MEVACOR) 20 MG tablet, Take 1 tablet (20 mg total) by mouth every evening., Disp: 90 tablet, Rfl: 3    metFORMIN  (GLUCOPHAGE-XR) 500 MG ER 24hr tablet, Take 500 mg by mouth 2 (two) times daily., Disp: , Rfl:     MULTIVITAMIN WITH MINERALS (ONE-A-DAY 50 PLUS) Tab, Take by mouth. 1 Tablet Oral Every morning, Disp: , Rfl:     mupirocin (BACTROBAN) 2 % ointment, Apply topically 2 (two) times daily. (Patient not taking: Reported on 2025), Disp: 22 g, Rfl: 1    omega-3 fatty acids/fish oil (FISH OIL-OMEGA-3 FATTY ACIDS) 300-1,000 mg capsule, Take 1 capsule by mouth once daily., Disp: , Rfl:     pantoprazole (PROTONIX) 40 MG tablet, Take 1 tablet (40 mg total) by mouth once daily., Disp: 90 tablet, Rfl: 3    tamsulosin (FLOMAX) 0.4 mg Cap, Take 1 capsule (0.4 mg total) by mouth once daily., Disp: 90 capsule, Rfl: 3    Past Surgical History:   Procedure Laterality Date    CHOLECYSTECTOMY  2018    open at time of hepatic resection    ENDOSCOPIC ULTRASOUND OF UPPER GASTROINTESTINAL TRACT N/A 2023    Procedure: ULTRASOUND, UPPER GI TRACT, ENDOSCOPIC;  Surgeon: Moise Mason MD;  Location: [facility];  Service: Endoscopy;  Laterality: N/A;    EYE SURGERY      LIVER RESECTION Left 2018    segments 2,3, 4a/b    RETINAL DETACHMENT SURGERY      age approx 30-39 yo    skin carcinoma removal         Social History     Socioeconomic History    Marital status:    Occupational History    Occupation: REtired    Tobacco Use    Smoking status: Former     Current packs/day: 0.00     Types: Cigarettes     Start date: 1961     Quit date: 1966     Years since quittin.7     Passive exposure: Past    Smokeless tobacco: Never   Substance and Sexual Activity    Alcohol use: No    Drug use: No    Sexual activity: Yes     Partners: Female     Social Drivers of Health     Financial Resource Strain: Low Risk  (10/22/2020)    Overall Financial Resource Strain (CARDIA)     Difficulty of Paying Living Expenses: Not hard at all   Food Insecurity: No Food Insecurity (2024)    Hunger Vital Sign     Worried About Running Out  of Food in the Last Year: Never true     Ran Out of Food in the Last Year: Never true   Transportation Needs: No Transportation Needs (10/22/2020)    PRAPARE - Transportation     Lack of Transportation (Medical): No     Lack of Transportation (Non-Medical): No   Physical Activity: Inactive (8/5/2024)    Exercise Vital Sign     Days of Exercise per Week: 0 days     Minutes of Exercise per Session: 0 min   Stress: No Stress Concern Present (8/5/2024)    [employer] - Occupational Stress Questionnaire     Feeling of Stress : Not at all   Housing Stability: Unknown (8/5/2024)    Housing Stability Vital Sign     Unable to Pay for Housing in the Last Year: No       OBJECTIVE:     Vital Signs Range (Last 24H):         Significant Labs:  Lab Results   Component Value Date    WBC 8.10 12/03/2024    HGB 11.6 (L) 12/03/2024    HCT 33.9 (L) 12/03/2024     12/03/2024    CHOL 134 12/31/2024    TRIG 119 12/31/2024    HDL 38 (L) 12/31/2024    ALT 13 12/03/2024    AST 28 12/03/2024     12/03/2024    K 4.5 12/03/2024     12/03/2024    CREATININE 1.3 12/03/2024    BUN 24 (H) 12/03/2024    CO2 20 (L) 12/03/2024    TSH 4.870 (H) 06/18/2024    PSA 1.6 01/23/2024    INR 0.9 03/08/2018    HGBA1C 7.2 (H) 12/31/2024       Diagnostic Studies: No relevant studies.    EKG:   Results for orders placed or performed during the hospital encounter of 11/14/24   EKG 12-lead    Collection Time: 11/14/24  9:25 AM   Result Value Ref Range    QRS Duration 132 ms    OHS QTC Calculation 477 ms    Narrative    Test Reason : R53.1,    Vent. Rate :  92 BPM     Atrial Rate :  92 BPM     P-R Int : 218 ms          QRS Dur : 132 ms      QT Int : 386 ms       P-R-T Axes :  72 -41 106 degrees    QTcB Int : 477 ms    Sinus rhythm with sinus arrhythmia with 1st degree A-V block with  occasional Premature ventricular complexes  Left axis deviation  LVH with QRS widening and repolarization abnormality  Abnormal ECG  When  compared with ECG of 06-Mar-2018 10:39,  Premature ventricular complexes are now Present  WV interval has increased  Confirmed by Vazquez Huffman ([id number]) on 11/17/2024 5:34:24 PM    Referred By: REGINAERRAL SELF           Confirmed By: Vazquez Goodwin       2D ECHO:  TTE:  No results found for this or any previous visit.    BLAINE:  No results found for this or any previous visit.    ASSESSMENT/PLAN:           Pre-op Assessment    I have reviewed the Patient Summary Reports.     I have reviewed the Nursing Notes.    I have reviewed the Medications.     Review of Systems  Anesthesia Hx:  No problems with previous Anesthesia   History of prior surgery of interest to airway management or planning:          Denies Family Hx of Anesthesia complications.    Denies Personal Hx of Anesthesia complications.                    Hematology/Oncology:                        --  Cancer in past history:              surgery       Cardiovascular:     Hypertension    Denies CABG/stent.                                       Renal/:   Denies Chronic Renal Disease.                Hepatic/GI:      Denies GERD.                Neurological:       Seizures                                Endocrine:  Diabetes, well controlled         Denies Obesity / BMI > 30  Psych:  Denies Psychiatric History.                  Physical Exam  General: Well nourished, Cooperative, Alert and Oriented    Airway:  Mallampati: II   Mouth Opening: Normal  TM Distance: Normal  Neck ROM: Normal ROM    Dental:  Intact    Chest/Lungs:  Normal Respiratory Rate, Clear to auscultation    Heart:  Rate: Normal  Rhythm: Regular Rhythm        Anesthesia Plan  Type of Anesthesia, risks & benefits discussed:    Anesthesia Type: Gen ETT  Intra-op Monitoring Plan: Standard ASA Monitors  Post Op Pain Control Plan: multimodal analgesia and IV/PO Opioids PRN  Induction:  IV  Airway Plan: Direct, Post-Induction  Informed Consent: Informed consent signed with the Patient and  all parties understand the risks and agree with anesthesia plan.  All questions answered.   ASA Score: 3  Day of Surgery Review of History & Physical: H&P Update referred to the surgeon/provider.    Ready For Surgery From Anesthesia Perspective.     .

## 2025-02-03 ENCOUNTER — ANESTHESIA (OUTPATIENT)
Dept: SURGERY | Facility: HOSPITAL | Age: 80
End: 2025-02-03
Payer: MEDICARE

## 2025-02-03 ENCOUNTER — HOSPITAL ENCOUNTER (OUTPATIENT)
Facility: HOSPITAL | Age: 80
Discharge: HOME | End: 2025-02-03
Attending: OTOLARYNGOLOGY | Admitting: OTOLARYNGOLOGY
Payer: MEDICARE

## 2025-02-03 VITALS
RESPIRATION RATE: 18 BRPM | HEART RATE: 89 BPM | TEMPERATURE: 98 F | OXYGEN SATURATION: 95 % | DIASTOLIC BLOOD PRESSURE: 62 MMHG | SYSTOLIC BLOOD PRESSURE: 149 MMHG

## 2025-02-03 DIAGNOSIS — H90.3 SENSORINEURAL HEARING LOSS (SNHL), BILATERAL: Primary | ICD-10-CM

## 2025-02-03 DIAGNOSIS — H72.90 PERFORATED TYMPANIC MEMBRANE: ICD-10-CM

## 2025-02-03 LAB — POCT GLUCOSE: 234 MG/DL (ref 70–110)

## 2025-02-03 PROCEDURE — 25000003 PHARM REV CODE 250: Mod: HCNC | Performed by: OTOLARYNGOLOGY

## 2025-02-03 PROCEDURE — 69930 IMPLANT COCHLEAR DEVICE: CPT | Mod: HCNC,LT,, | Performed by: OTOLARYNGOLOGY

## 2025-02-03 PROCEDURE — 69631 REPAIR EARDRUM STRUCTURES: CPT | Mod: HCNC,51,LT, | Performed by: OTOLARYNGOLOGY

## 2025-02-03 PROCEDURE — 63600175 PHARM REV CODE 636 W HCPCS: Mod: TB,HCNC,JZ

## 2025-02-03 PROCEDURE — 63600175 PHARM REV CODE 636 W HCPCS: Mod: HCNC | Performed by: OTOLARYNGOLOGY

## 2025-02-03 PROCEDURE — 71000016 HC POSTOP RECOV ADDL HR: Mod: HCNC | Performed by: OTOLARYNGOLOGY

## 2025-02-03 PROCEDURE — 37000008 HC ANESTHESIA 1ST 15 MINUTES: Mod: HCNC | Performed by: OTOLARYNGOLOGY

## 2025-02-03 PROCEDURE — 36000710: Mod: HCNC | Performed by: OTOLARYNGOLOGY

## 2025-02-03 PROCEDURE — 25000003 PHARM REV CODE 250: Mod: HCNC | Performed by: NURSE ANESTHETIST, CERTIFIED REGISTERED

## 2025-02-03 PROCEDURE — D9220A PRA ANESTHESIA: Mod: HCNC,,, | Performed by: ANESTHESIOLOGY

## 2025-02-03 PROCEDURE — 36000711: Mod: HCNC | Performed by: OTOLARYNGOLOGY

## 2025-02-03 PROCEDURE — 25000003 PHARM REV CODE 250: Mod: HCNC

## 2025-02-03 PROCEDURE — L8614 COCHLEAR DEVICE: HCPCS | Mod: HCNC | Performed by: OTOLARYNGOLOGY

## 2025-02-03 PROCEDURE — 63600175 PHARM REV CODE 636 W HCPCS: Mod: HCNC

## 2025-02-03 PROCEDURE — 82962 GLUCOSE BLOOD TEST: CPT | Mod: HCNC | Performed by: OTOLARYNGOLOGY

## 2025-02-03 PROCEDURE — 71000044 HC DOSC ROUTINE RECOVERY FIRST HOUR: Mod: HCNC | Performed by: OTOLARYNGOLOGY

## 2025-02-03 PROCEDURE — 37000009 HC ANESTHESIA EA ADD 15 MINS: Mod: HCNC | Performed by: OTOLARYNGOLOGY

## 2025-02-03 PROCEDURE — 27201423 OPTIME MED/SURG SUP & DEVICES STERILE SUPPLY: Mod: HCNC | Performed by: OTOLARYNGOLOGY

## 2025-02-03 PROCEDURE — 92653 AEP NEURODIAGNOSTIC I&R: CPT | Mod: 26,HCNC,, | Performed by: AUDIOLOGIST

## 2025-02-03 PROCEDURE — 71000015 HC POSTOP RECOV 1ST HR: Mod: HCNC | Performed by: OTOLARYNGOLOGY

## 2025-02-03 DEVICE — IMPLANT NUCLEUS PROF + CI623: Type: IMPLANTABLE DEVICE | Site: EAR | Status: FUNCTIONAL

## 2025-02-03 RX ORDER — GLUCAGON 1 MG
1 KIT INJECTION
Status: DISCONTINUED | OUTPATIENT
Start: 2025-02-03 | End: 2025-02-03 | Stop reason: HOSPADM

## 2025-02-03 RX ORDER — LIDOCAINE HYDROCHLORIDE 10 MG/ML
1 INJECTION, SOLUTION EPIDURAL; INFILTRATION; INTRACAUDAL; PERINEURAL ONCE AS NEEDED
Status: DISCONTINUED | OUTPATIENT
Start: 2025-02-03 | End: 2025-02-03 | Stop reason: HOSPADM

## 2025-02-03 RX ORDER — CIPROFLOXACIN AND DEXAMETHASONE 3; 1 MG/ML; MG/ML
4 SUSPENSION/ DROPS AURICULAR (OTIC) 2 TIMES DAILY
Qty: 7.5 ML | Refills: 0 | Status: SHIPPED | OUTPATIENT
Start: 2025-02-03 | End: 2025-02-03 | Stop reason: HOSPADM

## 2025-02-03 RX ORDER — HYDROMORPHONE HYDROCHLORIDE 1 MG/ML
0.2 INJECTION, SOLUTION INTRAMUSCULAR; INTRAVENOUS; SUBCUTANEOUS EVERY 5 MIN PRN
Status: DISCONTINUED | OUTPATIENT
Start: 2025-02-03 | End: 2025-02-03 | Stop reason: HOSPADM

## 2025-02-03 RX ORDER — SODIUM CHLORIDE 0.9 % (FLUSH) 0.9 %
10 SYRINGE (ML) INJECTION
Status: DISCONTINUED | OUTPATIENT
Start: 2025-02-03 | End: 2025-02-03 | Stop reason: HOSPADM

## 2025-02-03 RX ORDER — CEFAZOLIN SODIUM 1 G/3ML
INJECTION, POWDER, FOR SOLUTION INTRAMUSCULAR; INTRAVENOUS
Status: DISCONTINUED | OUTPATIENT
Start: 2025-02-03 | End: 2025-02-03

## 2025-02-03 RX ORDER — OFLOXACIN 3 MG/ML
4 SOLUTION AURICULAR (OTIC) 2 TIMES DAILY
Qty: 10 ML | Refills: 0 | Status: SHIPPED | OUTPATIENT
Start: 2025-02-03 | End: 2025-02-22

## 2025-02-03 RX ORDER — EPHEDRINE SULFATE 50 MG/ML
INJECTION, SOLUTION INTRAVENOUS
Status: DISCONTINUED | OUTPATIENT
Start: 2025-02-03 | End: 2025-02-03

## 2025-02-03 RX ORDER — ACETAMINOPHEN 10 MG/ML
INJECTION, SOLUTION INTRAVENOUS
Status: DISCONTINUED | OUTPATIENT
Start: 2025-02-03 | End: 2025-02-03

## 2025-02-03 RX ORDER — LIDOCAINE HYDROCHLORIDE AND EPINEPHRINE 10; 10 UG/ML; MG/ML
INJECTION, SOLUTION INFILTRATION; PERINEURAL
Status: DISCONTINUED | OUTPATIENT
Start: 2025-02-03 | End: 2025-02-03 | Stop reason: HOSPADM

## 2025-02-03 RX ORDER — OFLOXACIN 3 MG/ML
SOLUTION AURICULAR (OTIC)
Status: DISCONTINUED | OUTPATIENT
Start: 2025-02-03 | End: 2025-02-03 | Stop reason: HOSPADM

## 2025-02-03 RX ORDER — LIDOCAINE HYDROCHLORIDE 20 MG/ML
INJECTION, SOLUTION EPIDURAL; INFILTRATION; INTRACAUDAL; PERINEURAL
Status: DISCONTINUED | OUTPATIENT
Start: 2025-02-03 | End: 2025-02-03

## 2025-02-03 RX ORDER — DEXAMETHASONE SODIUM PHOSPHATE 4 MG/ML
INJECTION, SOLUTION INTRA-ARTICULAR; INTRALESIONAL; INTRAMUSCULAR; INTRAVENOUS; SOFT TISSUE
Status: DISCONTINUED | OUTPATIENT
Start: 2025-02-03 | End: 2025-02-03

## 2025-02-03 RX ORDER — CEPHALEXIN 500 MG/1
500 CAPSULE ORAL EVERY 8 HOURS
Qty: 21 CAPSULE | Refills: 0 | Status: SHIPPED | OUTPATIENT
Start: 2025-02-03 | End: 2025-02-10

## 2025-02-03 RX ORDER — ONDANSETRON 4 MG/1
4 TABLET, ORALLY DISINTEGRATING ORAL EVERY 6 HOURS PRN
Qty: 28 TABLET | Refills: 0 | Status: SHIPPED | OUTPATIENT
Start: 2025-02-03 | End: 2025-02-10

## 2025-02-03 RX ORDER — SUCCINYLCHOLINE CHLORIDE 20 MG/ML
INJECTION INTRAMUSCULAR; INTRAVENOUS
Status: DISCONTINUED | OUTPATIENT
Start: 2025-02-03 | End: 2025-02-03

## 2025-02-03 RX ORDER — HYDROCODONE BITARTRATE AND ACETAMINOPHEN 5; 325 MG/1; MG/1
1 TABLET ORAL EVERY 6 HOURS PRN
Qty: 20 TABLET | Refills: 0 | Status: SHIPPED | OUTPATIENT
Start: 2025-02-03

## 2025-02-03 RX ORDER — HALOPERIDOL 5 MG/ML
0.5 INJECTION INTRAMUSCULAR EVERY 10 MIN PRN
Status: DISCONTINUED | OUTPATIENT
Start: 2025-02-03 | End: 2025-02-03 | Stop reason: HOSPADM

## 2025-02-03 RX ORDER — PROPOFOL 10 MG/ML
VIAL (ML) INTRAVENOUS
Status: DISCONTINUED | OUTPATIENT
Start: 2025-02-03 | End: 2025-02-03

## 2025-02-03 RX ORDER — PHENYLEPHRINE HCL IN 0.9% NACL 1 MG/10 ML
SYRINGE (ML) INTRAVENOUS
Status: DISCONTINUED | OUTPATIENT
Start: 2025-02-03 | End: 2025-02-03

## 2025-02-03 RX ORDER — FENTANYL CITRATE 50 UG/ML
INJECTION, SOLUTION INTRAMUSCULAR; INTRAVENOUS
Status: DISCONTINUED | OUTPATIENT
Start: 2025-02-03 | End: 2025-02-03

## 2025-02-03 RX ORDER — SODIUM CHLORIDE 9 MG/ML
INJECTION, SOLUTION INTRAVENOUS CONTINUOUS
Status: DISCONTINUED | OUTPATIENT
Start: 2025-02-03 | End: 2025-02-03 | Stop reason: HOSPADM

## 2025-02-03 RX ORDER — DEXAMETHASONE SODIUM PHOSPHATE 4 MG/ML
INJECTION, SOLUTION INTRA-ARTICULAR; INTRALESIONAL; INTRAMUSCULAR; INTRAVENOUS; SOFT TISSUE
Status: DISCONTINUED | OUTPATIENT
Start: 2025-02-03 | End: 2025-02-03 | Stop reason: HOSPADM

## 2025-02-03 RX ADMIN — FENTANYL CITRATE 100 MCG: 50 INJECTION, SOLUTION INTRAMUSCULAR; INTRAVENOUS at 12:02

## 2025-02-03 RX ADMIN — FENTANYL CITRATE 25 MCG: 50 INJECTION, SOLUTION INTRAMUSCULAR; INTRAVENOUS at 04:02

## 2025-02-03 RX ADMIN — CEFAZOLIN 2 G: 330 INJECTION, POWDER, FOR SOLUTION INTRAMUSCULAR; INTRAVENOUS at 01:02

## 2025-02-03 RX ADMIN — SODIUM CHLORIDE 0.1 MCG/KG/MIN: 9 INJECTION, SOLUTION INTRAVENOUS at 12:02

## 2025-02-03 RX ADMIN — DEXAMETHASONE SODIUM PHOSPHATE 4 MG: 4 INJECTION INTRA-ARTICULAR; INTRALESIONAL; INTRAMUSCULAR; INTRAVENOUS; SOFT TISSUE at 12:02

## 2025-02-03 RX ADMIN — Medication 100 MCG: at 01:02

## 2025-02-03 RX ADMIN — ACETAMINOPHEN 1000 MG: 10 INJECTION, SOLUTION INTRAVENOUS at 02:02

## 2025-02-03 RX ADMIN — EPHEDRINE SULFATE 10 MG: 50 INJECTION INTRAVENOUS at 01:02

## 2025-02-03 RX ADMIN — PHENYLEPHRINE HYDROCHLORIDE 0.5 MCG/KG/MIN: 10 INJECTION INTRAVENOUS at 01:02

## 2025-02-03 RX ADMIN — HYDROMORPHONE HYDROCHLORIDE 0.2 MG: 1 INJECTION, SOLUTION INTRAMUSCULAR; INTRAVENOUS; SUBCUTANEOUS at 05:02

## 2025-02-03 RX ADMIN — EPHEDRINE SULFATE 5 MG: 50 INJECTION INTRAVENOUS at 01:02

## 2025-02-03 RX ADMIN — SODIUM CHLORIDE: 0.9 INJECTION, SOLUTION INTRAVENOUS at 12:02

## 2025-02-03 RX ADMIN — PROPOFOL 180 MG: 10 INJECTION, EMULSION INTRAVENOUS at 12:02

## 2025-02-03 RX ADMIN — LIDOCAINE HYDROCHLORIDE 100 MG: 20 INJECTION, SOLUTION EPIDURAL; INFILTRATION; INTRACAUDAL; PERINEURAL at 12:02

## 2025-02-03 RX ADMIN — SUCCINYLCHOLINE 120 MG: 20 INJECTION, SOLUTION INTRAMUSCULAR; INTRAVENOUS at 12:02

## 2025-02-03 RX ADMIN — Medication 200 MCG: at 01:02

## 2025-02-03 RX ADMIN — SODIUM CHLORIDE, SODIUM GLUCONATE, SODIUM ACETATE, POTASSIUM CHLORIDE, MAGNESIUM CHLORIDE, SODIUM PHOSPHATE, DIBASIC, AND POTASSIUM PHOSPHATE: .53; .5; .37; .037; .03; .012; .00082 INJECTION, SOLUTION INTRAVENOUS at 12:02

## 2025-02-03 NOTE — BRIEF OP NOTE
[facility] - [facility] (2nd Fl)  Brief Operative Note    SUMMARY     Surgery Date: 2/3/2025     Surgeons and Role:     * Joseph Fagan MD - Primary     * Linsey Aguiar MD - Resident - Assisting        Pre-op Diagnosis:  Sensorineural hearing loss (SNHL), bilateral [H90.3]  Tinnitus of both ears [H93.13]  Tympanic membrane perforation, left [H72.92]  Sensorineural hearing loss (SNHL) of right ear with restricted hearing of left ear [H90.A21]  Eustachian tube dysfunction, left [H69.92]  Impaired auditory discrimination, bilateral [H93.293]  BPPV (benign paroxysmal positional vertigo), right [H81.11]    Post-op Diagnosis:  Post-Op Diagnosis Codes:     * Sensorineural hearing loss (SNHL), bilateral [H90.3]     * Tinnitus of both ears [H93.13]     * Tympanic membrane perforation, left [H72.92]     * Sensorineural hearing loss (SNHL) of right ear with restricted hearing of left ear [H90.A21]     * Eustachian tube dysfunction, left [H69.92]     * Impaired auditory discrimination, bilateral [H93.293]     * BPPV (benign paroxysmal positional vertigo), right [H81.11]    Procedure(s) (LRB):  TYMPANOPLASTY (Left)  INSERTION, PROSTHESIS, COCHLEAR (Left)    Anesthesia: General    Implants:  Implant Name Type Inv. Item Serial No.  Lot No. LRB No. Used Action   IMPLANT NUCLEUS PROF +  - [id number]  IMPLANT NUCLEUS PROF +  [id number] [employer]CHLE[employer]  Left 1 Implanted       Operative Findings: left tympanoplasty and cochlear implant    Estimated Blood Loss: * No values recorded between 2/3/2025  1:12 PM and 2/3/2025  4:00 PM *    Estimated Blood Loss has been documented.         Specimens:   Specimen (24h ago, onward)      None            [id number]

## 2025-02-03 NOTE — ANESTHESIA POSTPROCEDURE EVALUATION
Anesthesia Post Evaluation    Patient: Jamison Mayes    Procedure(s) Performed: Procedure(s) (LRB):  TYMPANOPLASTY (Left)  INSERTION, PROSTHESIS, COCHLEAR (Left)    Final Anesthesia Type: general      Patient location during evaluation: PACU  Patient participation: Yes- Able to Participate  Level of consciousness: awake and alert  Post-procedure vital signs: reviewed and stable  Pain management: adequate  Airway patency: patent    PONV status at discharge: No PONV  Anesthetic complications: no      Cardiovascular status: blood pressure returned to baseline and hemodynamically stable  Respiratory status: spontaneous ventilation  Hydration status: euvolemic  Follow-up not needed.              Vitals Value Taken Time   /60 02/03/25 1615   Temp 36.6 °C (97.8 °F) 02/03/25 1615   Pulse 83 02/03/25 1734   Resp 12 02/03/25 1734   SpO2 91 % 02/03/25 1734   Vitals shown include unfiled device data.      No case tracking events are documented in the log.      Pain/Esmer Score: Pain Rating Prior to Med Admin: 6 (2/3/2025  5:03 PM)  Esmer Score: 9 (2/3/2025  4:45 PM)

## 2025-02-03 NOTE — OP NOTE
Trevor Rodriguez - Surgery ([facility])  Surgery Department  Operative Note    SUMMARY     Date of Procedure: 2/3/2025     Procedure: Procedure(s) (LRB):  TYMPANOPLASTY (Left)  INSERTION, PROSTHESIS, COCHLEAR (Left)     Surgeons and Role:     * Joseph Fagan MD - Primary     * Linsey Aguiar MD - Resident - Assisting        Pre-Operative Diagnosis: Sensorineural hearing loss (SNHL), bilateral [H90.3]  Tinnitus of both ears [H93.13]  Tympanic membrane perforation, left [H72.92]  Sensorineural hearing loss (SNHL) of right ear with restricted hearing of left ear [H90.A21]  Eustachian tube dysfunction, left [H69.92]  Impaired auditory discrimination, bilateral [H93.293]  BPPV (benign paroxysmal positional vertigo), right [H81.11]    Post-Operative Diagnosis: Post-Op Diagnosis Codes:     * Sensorineural hearing loss (SNHL), bilateral [H90.3]     * Tinnitus of both ears [H93.13]     * Tympanic membrane perforation, left [H72.92]     * Sensorineural hearing loss (SNHL) of right ear with restricted hearing of left ear [H90.A21]     * Eustachian tube dysfunction, left [H69.92]     * Impaired auditory discrimination, bilateral [H93.293]     * BPPV (benign paroxysmal positional vertigo), right [H81.11]    Anesthesia: General    Operative Findings (including complications, if any):    25% anterior dry perforation, repaired with underlay fascia graft with malleus disseciton  Standard cochlear implant with round window insertion, full insertion.       Description of Technical Procedures:     The patient was taken to the operating room, placed under general anesthetic and intubated without difficulty. The NIM facial nerve monitoring electrodes were positioned and monitoring was performed throughout the procedure. There was no abnormal activity during this case.   We then infiltrated thepostauricular area with 1:100,000 of epinephrine.      We prepped and draped the ear in a sterile fashion.  The operating microscope was brought into the  field in the ear canal was examined.  There was a 25% anterior perforation.  A posterior strip incision was made proximally 5 mm from the annulus and elevated.  A postauricular incision was made 1cm behind the crease.  Soft tissue flaps were raised anterior and posteriorly above the temporalis fascia and mastoid periosteum.  A C-shaped periosteal flap was incised and elevated to expose the mastoid cortex and held in position with self retaining retractors.      A Tight subperiosteal pocket was created to fit the implant posterior-superior to the mastoid cortex using a freer elevator and was sized with the silicone implant dummy.    Proceeding back under the microscope our vascular strip incision was identified and the vascular strip was flipped out from the ear canal.  Then superior and inferior tympanomeatal incisions were made and a tympanomeatal flap was elevated down to the annulus.  The annulus was incised on its medial aspect and elevated from its annular groove inferiorly.  Superiorly the chorda tympani was followed over the incus to the malleus.  Perforation was centered just anterior to the umbo so the drum was dissected off of the malleus umbo and handle up to the lateral process.  This gave us access to the perforation.  A temporalis fascia graft was harvested using scissors.  This was set aside to dry and the block.     We then  proceeded with mastoidectomy.  A #4 cutting bur was used to drill down the mastoid cortex.  We went all the way to the mastoid antrum.  The mastoid was well aerated.  The facial recess was drilled using progressively smaller cm burrs until the tympanotomy was complete.  The facial nerve was identified and skeletonized.      The round window niche was identified and drilled to obtain full view of the round window.  The round window was opened sharply and the fran tympani was identified.     The device as then opened and placed into its periosteal pocket.  The electrode was  inserted using an insertion forceps.  Full insertion was achieved into the fran tympani.  The cochleostomy was packed with muscle and the remaining portion of the electrode was laid gently in the mastoid.     Proceeding back to the tympanoplasty portion of the procedure.  The fascia graft was cut to size and then placed in underlay fashion between the malleus and the remaining native drum.  This was then supported by Gelfoam in the middle ear medially.  The flaps were laid back down to determine full coverage of the perforation which was indeed well covered.  Then Gelfoam was placed in the lateral surface of the reconstructed tympanic membrane up to the edge of our tympanomeatal flap.    The periosteum of the mastoid was closed with sutures, and then working back through the meatus the ear canal was filled with Gelfoam up to the opening.  Then  the dermis was closed in a subcuticular manner.  Steri-strips were applied to the skin.    Asher dressing was applied. The patient was awakened from anesthesia and taken to the recovery room in stable condition.         Estimated Blood Loss (EBL): 30ml    Implants:   Implant Name Type Inv. Item Serial No.  Lot No. LRB No. Used Action   IMPLANT NUCLEUS PROF +  - [id number]  IMPLANT NUCLEUS PROF +  [id number] COCHLEAR COLTON  Left 1 Implanted       Specimens:   Specimen (24h ago, onward)      None                    Condition: Good    Disposition: PACU - hemodynamically stable.    Attestation: Op Note Attestation: I was physically present and scrubbed for the entire procedure.

## 2025-02-03 NOTE — TRANSFER OF CARE
Anesthesia Transfer of Care Note    Patient: Jamison Mayes    Procedure(s) Performed: Procedure(s) (LRB):  TYMPANOPLASTY (Left)  INSERTION, PROSTHESIS, COCHLEAR (Left)    Patient location: [facility]    Anesthesia Type: general    Transport from OR: Transported from OR on 6-10 L/min O2 by face mask with adequate spontaneous ventilation    Post pain: adequate analgesia    Post assessment: no apparent anesthetic complications and tolerated procedure well    Post vital signs: stable    Level of consciousness: awake and alert    Nausea/Vomiting: no nausea/vomiting    Complications: none    Transfer of care protocol was followed      Last vitals: Visit Vitals  BP (!) 150/65 (BP Location: Right arm, Patient Position: Lying)   Pulse 80   Temp 36.4 °C (97.6 °F) (Temporal)   Resp 18   SpO2 100%

## 2025-02-03 NOTE — ANESTHESIA PROCEDURE NOTES
Intubation    Date/Time: 2/3/2025 12:50 PM    Performed by: Mame Frazier MD  Authorized by: Franki Chung MD    Intubation:     Induction:  Intravenous    Intubated:  Postinduction    Mask Ventilation:  Easy mask    Attempts:  1    Attempted By:  Student    Method of Intubation:  Video laryngoscopy    Blade:  Bonilla 3    Laryngeal View Grade: Grade I - full view of cords      Difficult Airway Encountered?: No      Complications:  None    Airway Device:  Oral endotracheal tube    Airway Device Size:  7.5    Style/Cuff Inflation:  Cuffed    Inflation Amount (mL):  8    Tube secured:  24    Secured at:  The lips    Placement Verified By:  Capnometry    Complicating Factors:  None    Findings Post-Intubation:  BS equal bilateral and atraumatic/condition of teeth unchanged

## 2025-02-03 NOTE — PROCEDURES
2/3/2025    NEURAL RESPONSE TELEMETRY:    Impedance testing was completed.  Placement check of the internal device was within normal range.  Neural response telemetry revealed good responses across the electrode array.  There was no response on electrode 1 at this time at the limits of electrical stimulation.    Recommend:  Follow up programming of the external sound processor in one month.

## 2025-02-03 NOTE — H&P
2/3/2025: Presents today for scheduled surgery.    The patient has been examined and the H&P has been reviewed. I concur with the findings as noted and no significant changes have occurred since H&P was written.  Surgery risks, benefits and alternative options discussed and understood by patient/family.    Proceed to OR for surgery TYMPANOPLASTY (Left), INSERTION, PROSTHESIS, COCHLEAR (Left)         Linsey Aguiar MD  Otorhinolaryngology-Head & Neck Surgery    Please page ENT resident on call with any questions or concerns.     Subjective  Patient ID: Jamison Mayes is a 79 y.o. male.     Chief Complaint: Consult (CI eval )     HPI     Jamison Mayes is a 79 y.o. male presents for evaluation for cochlear implant. He has a long history of occupational/recreational noise expsosure and has developed progressive hearing loss over many years. He uses hearing aids, but reports he still cannot understand what people are saying. This creates a big problem for him at work.  He reports a hx of a PE tube and a perforation in his left ear.       Review of Systems   Constitutional:  Negative for chills and fever.   HENT:  Negative for sore throat and trouble swallowing.    Respiratory:  Negative for apnea and chest tightness.    Cardiovascular:  Negative for chest pain.                  Objective  Physical Exam  Vitals and nursing note reviewed.   Constitutional:       Appearance: Normal appearance.   HENT:      Head: Normocephalic and atraumatic.      Right Ear: Tympanic membrane, ear canal and external ear normal. There is no impacted cerumen.      Left Ear: Ear canal and external ear normal. There is no impacted cerumen. Tympanic membrane is perforated.      Ears:     Neurological:      Mental Status: He is alert.         Data Reviewed:         Pure tone threshold testing revealed a moderately-severe sloping to severe sensorineural hearing loss in both ears. A speech reception threshold was noted at 50 dB HL in the  right and left ears. Speech discrimination scores were 60% in the right ear and 56% in the left ear.      Aided testing was completed in the best aided condition with his hearing aids on. Aided responses were obtained at 45-70 dB HL in sound field. An aided speech reception threshold was obtained at 45 dB HL in the right and left ears. Speech discrimination scores were 64% in the right ear and 60% in the left ear. [employer] sentences were presented in the best aided condition at 60 dB SPL. These sentences were presented in quiet, and Mr. Mayes scored 14% bilaterally, 14% in the right ear and 10% in the left ear (the ear to be IMPLANTED). Mr. Mayes scored 8% on CNC words in quiet.            MRI IAC 2021 patent cochlea, no opacification of mastoid        Assessment and Plan  1. Sensorineural hearing loss (SNHL), bilateral           Discussed cochlear implantation  with patient and family.  Discussed possibility of facial nerve paralysis, hearing loss, balance loss, device failure, device infection, and wound issues.  Risks, complications, alternatives and other potential problems discussed and patient expressed understanding.     Ok for LEFT CI with tympanoplasty  PCV 20 complete  CT ordered  Rx for prednisone and supplement NAC for HP           No follow-ups on file.

## 2025-02-03 NOTE — DISCHARGE INSTRUCTIONS
Tympanoplasty, Mastoidectomy, Cochlear Implant Post-operative Instructions    Precautions   Do not blow your nose until your physician has indicated that your ear is healed.  Any accumulated secretions in the nose may be drawn back into the throat and expectorated if desired.  This particularly important if you develop a cold.   Do not pop your ears by holding your nose and blowing air through the eustachian tube into the ear.  If it is necessary to sneeze, do so with your mouth open.  Do not allow water to enter the ear until advised by your physician that he ear is healed.  Until such time, when showering or washing your hair, cotton may be placed in the outer ear opening and covered in Vaseline.  If an incision was made in the skin behind your ear, water should be kept away from this area for 1 week.  Do not take an unnecessary chance of catching a cold.  Avoid undue exposure or fatigue.  Should you catch a cold, treat it in your usual way, reporting to your physician if you develop ear symptoms  You may anticipate a certain amount of pulsation, popping, clicking, and other sounds in the ear, and a feeling of fullness in the ear.  Occasional sharp shooting pains are not unusual.  Sometimes it may feel as if there is liquid in the ear.  Do not plan to drive a car home from the hospital.  Air travel is ok 2 days after surgery.  When changing altitude, you should remain awake and chew gum to stimulate swallowing.      Dizziness  Minor dizziness may be present on head motion and not concern you unless it increases      Hearing  Hearing may be worse temporarily after surgery due to swelling and packing in the ear canal.  An improvement may be noted after 6-8 weeks, while maximum improvement may take up to 4-6 months.      Discharge  A bloody or Watery discharge may occur during the healing period.  The outer ear cotton may be changed if necessary   A purulent discharge at any time is an indication to call to make an  appointment    Pain  Mild, intermittent ear pain is not unusual during the first 2 weeks.  Pain above or in front of the ear is common when chewing.  If you have persistent unrelenting pain please let your physician know      Ear Drops  If you were given ear drops please start 1 week after surgery.  Place a few drops in the ear twice a day to loosen the packing which will run out of the ear as a liquid.  Tip the head to the side, place two drops in the ear, and allow them to remain for 5 minutes.  The tip the head to the opposite direction to allow the drops to run out.  Continue using until advised otherwise by your physician.         Dressing  If there is a velcro dressing over the ear after surgery this can be removed in 24hrs after the procedure.  Undo velcro strap and remove gauze.  There should be a shiny gauze over incision which can also be removed.  Leave the steri-strips (tape) in place and keep incision dry for first week.  There will be a cotton ball at the opening of the ear canal which can be changed out as needed.  Bloody drainage is normal the first week. You may feel the implant under skin, please do not try to push on it or rub it, leave it alone.  Keep the incision dry for the first week.

## 2025-02-04 NOTE — PLAN OF CARE
Discharge instructions given and explained to patient and family with verbalized understanding. Patients V/S stable, denies N/V, tolerating PO fluids, rates pain level as tolerable, IV DC'd cath intact and No bleeding present. Pt left floor via W/C, stable for transport, responsible person available for transportation home.  
Private car

## 2025-02-07 DIAGNOSIS — I10 ESSENTIAL HYPERTENSION: ICD-10-CM

## 2025-02-07 DIAGNOSIS — E11.8 DM (DIABETES MELLITUS) WITH COMPLICATIONS: ICD-10-CM

## 2025-02-07 RX ORDER — GLIMEPIRIDE 4 MG/1
4 TABLET ORAL
Qty: 90 TABLET | Refills: 1 | Status: SHIPPED | OUTPATIENT
Start: 2025-02-07 | End: 2025-02-14 | Stop reason: SDUPTHER

## 2025-02-07 RX ORDER — LOVASTATIN 20 MG/1
20 TABLET ORAL NIGHTLY
Qty: 90 TABLET | Refills: 3 | Status: SHIPPED | OUTPATIENT
Start: 2025-02-07 | End: 2025-02-14 | Stop reason: SDUPTHER

## 2025-02-08 NOTE — TELEPHONE ENCOUNTER
Refill Routing Note   Medication(s) are not appropriate for processing by [facility] for the following reason(s):        Required labs abnormal: Amaryl  No active prescription written by provider    [facility] action(s):  Defer        Medication Therapy Plan: Last ordered: 1 year ago (1/18/2024) by Mani Obrien NP      Appointments  past 12m or future 3m with PCP    Date Provider   Last Visit   12/17/2024 Todd Montoya MD   Next Visit   Visit date not found Todd Montoya MD   ED visits in past 90 days: 1        Note composed:9:24 PM 02/07/2025

## 2025-02-08 NOTE — TELEPHONE ENCOUNTER
No care due was identified.  Health [facility] Embedded Care Due Messages. Reference number: [id number].   2/07/2025 8:24:41 PM CST

## 2025-02-11 ENCOUNTER — PATIENT MESSAGE (OUTPATIENT)
Dept: OTOLARYNGOLOGY | Facility: CLINIC | Age: 80
End: 2025-02-11

## 2025-02-11 ENCOUNTER — OFFICE VISIT (OUTPATIENT)
Dept: OTOLARYNGOLOGY | Facility: CLINIC | Age: 80
End: 2025-02-11
Payer: MEDICARE

## 2025-02-11 DIAGNOSIS — Z09 POSTOPERATIVE EXAMINATION: Primary | ICD-10-CM

## 2025-02-11 PROCEDURE — 3288F FALL RISK ASSESSMENT DOCD: CPT | Mod: HCNC,CPTII,S$GLB, | Performed by: OTOLARYNGOLOGY

## 2025-02-11 PROCEDURE — 1126F AMNT PAIN NOTED NONE PRSNT: CPT | Mod: HCNC,CPTII,S$GLB, | Performed by: OTOLARYNGOLOGY

## 2025-02-11 PROCEDURE — 1101F PT FALLS ASSESS-DOCD LE1/YR: CPT | Mod: HCNC,CPTII,S$GLB, | Performed by: OTOLARYNGOLOGY

## 2025-02-11 PROCEDURE — 99999 PR PBB SHADOW E&M-EST. PATIENT-LVL III: CPT | Mod: PBBFAC,HCNC,, | Performed by: OTOLARYNGOLOGY

## 2025-02-11 PROCEDURE — 99024 POSTOP FOLLOW-UP VISIT: CPT | Mod: HCNC,S$GLB,, | Performed by: OTOLARYNGOLOGY

## 2025-02-11 PROCEDURE — 1159F MED LIST DOCD IN RCRD: CPT | Mod: HCNC,CPTII,S$GLB, | Performed by: OTOLARYNGOLOGY

## 2025-02-11 NOTE — PROGRESS NOTES
Chief complaint postop left cochlear implant and tympanoplasty     HPI:     Patient reports doing well has some mild dizziness.  Otherwise no issues.      Exam:     Steri strips removed. No evidence of hematoma or seroma. No evidence of infection. Packing is dry and intact. Start ototopical drops and re check in three weeks.

## 2025-02-12 ENCOUNTER — OFFICE VISIT (OUTPATIENT)
Dept: DERMATOLOGY | Facility: CLINIC | Age: 80
End: 2025-02-12
Payer: MEDICARE

## 2025-02-12 DIAGNOSIS — L57.0 AK (ACTINIC KERATOSIS): Primary | ICD-10-CM

## 2025-02-12 DIAGNOSIS — L82.1 SK (SEBORRHEIC KERATOSIS): ICD-10-CM

## 2025-02-12 DIAGNOSIS — Z12.83 SKIN CANCER SCREENING: ICD-10-CM

## 2025-02-12 DIAGNOSIS — L81.4 LENTIGINES: ICD-10-CM

## 2025-02-12 DIAGNOSIS — L21.9 ACUTE SEBORRHEIC DERMATITIS: ICD-10-CM

## 2025-02-12 DIAGNOSIS — D22.9 MULTIPLE BENIGN NEVI: ICD-10-CM

## 2025-02-12 NOTE — PROGRESS NOTES
Subjective:      Patient ID:  Jamison Mayes is a 80 y.o. male who presents for   Chief Complaint   Patient presents with    Skin Check     HPI  Pt here today for an UBSE.   Pt c/o scaly lesions on face x a few months. No bleeding, pain or prev tx.     Pt has cochlear implant in left ear 1/2025 (avoid hyfrecator).    Has hx of SCC.  Has previously used cryo, Efudex, and PDT for AKs.    Review of Systems   Constitutional:  Negative for fever and chills.   Skin:  Negative for itching and rash.       Objective:   Physical Exam   Constitutional: He appears well-developed and well-nourished.   Neurological: He is alert and oriented to person, place, and time.   Psychiatric: He has a normal mood and affect.   Skin:   Areas Examined (abnormalities noted in diagram):   Scalp / Hair Palpated and Inspected  Head / Face Inspection Performed  Neck Inspection Performed  Chest / Axilla Inspection Performed  Abdomen Inspection Performed  Back Inspection Performed  RUE Inspected  LUE Inspection Performed                 Diagram Legend     Erythematous scaling macule/papule c/w actinic keratosis       Vascular papule c/w angioma      Pigmented verrucoid papule/plaque c/w seborrheic keratosis      Yellow umbilicated papule c/w sebaceous hyperplasia      Irregularly shaped tan macule c/w lentigo     1-2 mm smooth white papules consistent with Milia      Movable subcutaneous cyst with punctum c/w epidermal inclusion cyst      Subcutaneous movable cyst c/w pilar cyst      Firm pink to brown papule c/w dermatofibroma      Pedunculated fleshy papule(s) c/w skin tag(s)      Evenly pigmented macule c/w junctional nevus     Mildly variegated pigmented, slightly irregular-bordered macule c/w mildly atypical nevus      Flesh colored to evenly pigmented papule c/w intradermal nevus       Pink pearly papule/plaque c/w basal cell carcinoma      Erythematous hyperkeratotic cursted plaque c/w SCC      Surgical scar with no sign of skin cancer  recurrence      Open and closed comedones      Inflammatory papules and pustules      Verrucoid papule consistent consistent with wart     Erythematous eczematous patches and plaques     Dystrophic onycholytic nail with subungual debris c/w onychomycosis     Umbilicated papule    Erythematous-base heme-crusted tan verrucoid plaque consistent with inflamed seborrheic keratosis     Erythematous Silvery Scaling Plaque c/w Psoriasis     See annotation      Assessment / Plan:        AK (actinic keratosis)  Cryosurgery Procedure Note    Verbal consent from the patient is obtained including, but not limited to, risk of hypopigmentation/hyperpigmentation, scar, recurrence of lesion. The patient is aware of the precancerous quality and need for treatment of these lesions. Liquid nitrogen cryosurgery is applied to the 18 actinic keratoses, as detailed in the physical exam, to produce a freeze injury. The patient is aware that blisters may form and is instructed on wound care with gentle cleansing and use of vaseline ointment to keep moist until healed. The patient is supplied a handout on cryosurgery and is instructed to call if lesions do not completely resolve.    SK (seborrheic keratosis)  These are benign inherited growths without a malignant potential. Reassurance given to patient. No treatment is necessary.   Treatment of benign, asymptomatic lesions may be considered cosmetic.  Warned about risk of hypo- or hyperpigmentation with treatment and risk of recurrence.    Lentigines  These are benign sun spots which should be monitored for changes. Patient instructed in importance of daily broad spectrum sunscreen use with spf at least 30. Sun avoidance and topical protection/protective clothing discussed.    Multiple benign nevi  Benign-appearing nevi present on exam today. Reassurance provided. Periodically examine moles and return to clinic if any moles change or become symptomatic (bleeding, itching, pain, etc).    Skin  cancer screening  Upper body skin examination performed today including at least 6 points as noted in physical examination. No lesions suspicious for malignancy noted.  Patient instructed in importance of daily broad spectrum sunscreen use with spf at least 30. Sun avoidance and topical protection/protective clothing discussed.    Follow up in about 4 months (around 6/12/2025) for skin check or sooner for any concerns.

## 2025-02-14 ENCOUNTER — TELEPHONE (OUTPATIENT)
Dept: UROLOGY | Facility: CLINIC | Age: 80
End: 2025-02-14
Payer: MEDICARE

## 2025-02-14 ENCOUNTER — PATIENT MESSAGE (OUTPATIENT)
Dept: UROLOGY | Facility: CLINIC | Age: 80
End: 2025-02-14
Payer: MEDICARE

## 2025-02-14 DIAGNOSIS — I10 ESSENTIAL HYPERTENSION: ICD-10-CM

## 2025-02-14 DIAGNOSIS — E11.8 DM (DIABETES MELLITUS) WITH COMPLICATIONS: ICD-10-CM

## 2025-02-14 DIAGNOSIS — L21.9 ACUTE SEBORRHEIC DERMATITIS: ICD-10-CM

## 2025-02-14 RX ORDER — IRBESARTAN 300 MG/1
300 TABLET ORAL DAILY
Qty: 90 TABLET | Refills: 3 | Status: SHIPPED | OUTPATIENT
Start: 2025-02-14 | End: 2026-02-14

## 2025-02-14 RX ORDER — LOVASTATIN 20 MG/1
20 TABLET ORAL NIGHTLY
Qty: 90 TABLET | Refills: 3 | Status: SHIPPED | OUTPATIENT
Start: 2025-02-14

## 2025-02-14 RX ORDER — HYDROCHLOROTHIAZIDE 12.5 MG/1
12.5 TABLET ORAL DAILY
Qty: 90 TABLET | Refills: 3 | Status: SHIPPED | OUTPATIENT
Start: 2025-02-14 | End: 2026-02-14

## 2025-02-14 RX ORDER — KETOCONAZOLE 20 MG/G
CREAM TOPICAL
Qty: 60 G | Refills: 3 | Status: CANCELLED | OUTPATIENT
Start: 2025-02-14

## 2025-02-14 RX ORDER — GLIMEPIRIDE 4 MG/1
4 TABLET ORAL
Qty: 90 TABLET | Refills: 1 | Status: SHIPPED | OUTPATIENT
Start: 2025-02-14

## 2025-02-14 RX ORDER — TAMSULOSIN HYDROCHLORIDE 0.4 MG/1
0.4 CAPSULE ORAL DAILY
Qty: 90 CAPSULE | Refills: 3 | Status: SHIPPED | OUTPATIENT
Start: 2025-02-14 | End: 2026-02-14

## 2025-02-14 NOTE — TELEPHONE ENCOUNTER
No care due was identified.  Health [facility] Embedded Care Due Messages. Reference number: [id number].   2/14/2025 12:17:49 PM CST

## 2025-02-16 RX ORDER — KETOCONAZOLE 20 MG/G
CREAM TOPICAL
Qty: 60 G | Refills: 3 | Status: SHIPPED | OUTPATIENT
Start: 2025-02-16

## 2025-02-16 NOTE — PATIENT INSTRUCTIONS
CRYOSURGERY      Your doctor has used a method called cryosurgery to treat your skin condition. Cryosurgery refers to the use of very cold substances to treat a variety of skin conditions such as warts, pre-skin cancers, molluscum contagiosum, sun spots, and several benign growths. The substance we use in cryosurgery is liquid nitrogen and is so cold (-195 degrees Celsius) that is burns when administered.     Following treatment in the office, the skin may immediately burn and become red. You may find the area around the lesion is affected as well. It is sometimes necessary to treat not only the lesion, but a small area of the surrounding normal skin to achieve a good response.     A blister, and even a blood filled blister, may form after treatment.   This is a normal response. If the blister is painful, it is acceptable to sterilize a needle and with rubbing alcohol and gently pop the blister. It is important that you gently wash the area with soap and warm water as the blister fluid may contain wart virus if a wart was treated. Do no remove the roof of the blister.     The area treated can take anywhere from 1-3 weeks to heal. Healing time depends on the kind skin lesion treated, the location, and how aggressively the lesion was treated. It is recommended that the areas treated are covered with Vaseline or bacitracin ointment and a band-aid. If a band-aid is not practical, just ointment applied several times per day will do. Keeping these areas moist will speed the healing time.    Treatment with liquid nitrogen can leave a scar. In dark skin, it may be a light or dark scar, in light skin it may be a white or pink scar. These will generally fade with time, but may never go away completely.     If you have any concerns after your treatment, please feel free to call the office.       [address] [place] [address]/ [phone] FAX/ [email or web address] Sun Protection      The [facility] would like to remind you of the importance of sun protection all year round and particularly during the summer when the suns rays are the strongest. It has been proven that both acute and chronic sun exposure damages our cells and leads to skin cancer. Beyond skin cancer, the sun causes 90% of the symptoms of premature skin aging, including wrinkles, lentigines (brown spots), and thin, easily bruised skin. Proper sun protection can help prevent these unwanted conditions.    Many patients report that they dont go in the sun. It has been shown that the average person receives 18 hours of incidental sun exposure per week during activities such as walking through parking lots, driving, or sitting next to windows. This accumulates to several bad sunburns per year!    In choosing sunscreen, you want one that protects against both UVA and UVB rays (broad spectrum). It is recommended that you use one of SPF 30 or higher. It is important to apply the sunscreen about 20 minutes prior to sun exposure. Most sunscreens are chemical sunscreens and a reaction must take place in the skin so that they are effective. If they are applied and then you are immediately exposed to the sun or start sweating, this reaction has not had time to take place and you are therefore unprotected. Sunscreen needs to be reapplied every 2 hours if you are participating in water sports or sweating. We recommend Elta MD or CeraVe sunscreens for daily use; however there are many options and it is most important for you to find one that you will use on a consistent basis.    If you have sensitive skin, you may do best with a sunscreen that contains only physical blockers in the active ingredient section. The only physical blockers available in the [place] currently are titanium dioxide or zinc oxide. These are typically thicker and harder to apply, however they afford very good protection. Neutrogena Sensitive Skin, Blue Lizard  Sensitive Skin (pink top) or Neutrogena Pure and Free are popular ones.     Aside from sunscreen, clothes with UV protection (UPF), wide brimmed hats, and sunglasses are other means of sun protection that we recommend.      Based on a recent study (6/2021) and out of an abundance of caution, we are recommending that you AVOID the following sunscreens as they may contain the carcinogen, benzene:    Spray and gel sunscreens  Any [employer] or [employer] brands as well as Max Block and TopCare brands   Neutrogena Ultra Sheer Dry-touch Water Resistant Sunscreen LOTION SPF 70   Neutrogena Sheer Zinc Dry-touch Face Sunscreen LOTION SPF 50   5.   Aveeno Baby Continuous Protection Sensitive Skin Sunscreen LOTION - Broad Spectrum SPF 50    Please note that Benzene is not an ingredient or the degradation product of any ingredient in any sunscreen. This study suggested that the findings are a result of contamination in the manufacturing process. At this point, we don't know how effectively Benzene gets through the skin, if it gets absorbed systemically, and what effects it may have.     We do know that ultraviolet radiation is a well-established carcinogen. Please use daily sun protection/avoidance and use of at least SPF 30, broad-spectrum sunscreen not listed above.                       [facility]  [facility] - DERMATOLOGY [address] [place] [address]  [facility] [address]  Dept: [phone]  Dept Fax: [phone]

## 2025-02-17 RX ORDER — KETOCONAZOLE 20 MG/ML
SHAMPOO, SUSPENSION TOPICAL
Qty: 120 ML | Refills: 5 | Status: SHIPPED | OUTPATIENT
Start: 2025-02-17

## 2025-02-17 NOTE — DISCHARGE SUMMARY
Trevor Rodriguez - Surgery ([facility])  Brief Operative Note    Surgery Date: 2/3/2025     Surgeons and Role:     * Joseph Fagan MD - Primary     * Linsey Aguiar MD - Resident - Assisting        Pre-op Diagnosis:  Sensorineural hearing loss (SNHL), bilateral [H90.3]  Tinnitus of both ears [H93.13]  Tympanic membrane perforation, left [H72.92]  Sensorineural hearing loss (SNHL) of right ear with restricted hearing of left ear [H90.A21]  Eustachian tube dysfunction, left [H69.92]  Impaired auditory discrimination, bilateral [H93.293]  BPPV (benign paroxysmal positional vertigo), right [H81.11]    Post-op Diagnosis:  Post-Op Diagnosis Codes:     * Sensorineural hearing loss (SNHL), bilateral [H90.3]     * Tinnitus of both ears [H93.13]     * Tympanic membrane perforation, left [H72.92]     * Sensorineural hearing loss (SNHL) of right ear with restricted hearing of left ear [H90.A21]     * Eustachian tube dysfunction, left [H69.92]     * Impaired auditory discrimination, bilateral [H93.293]     * BPPV (benign paroxysmal positional vertigo), right [H81.11]    Procedure(s) (LRB):  TYMPANOPLASTY (Left)  INSERTION, PROSTHESIS, COCHLEAR (Left)    Anesthesia: General    Operative Findings: see op note    Estimated Blood Loss: see op note    Specimens:   Specimen (24h ago, onward)      None              Discharge Note    OUTCOME: Patient tolerated treatment/procedure well without complication and is now ready for discharge.    DISPOSITION: Home or Self Care    FINAL DIAGNOSIS:  hearing loss bilateral    FOLLOWUP: In clinic    DISCHARGE INSTRUCTIONS:  No discharge procedures on file.     Clinical Reference Documents Added to Patient Instructions         Document    COCHLEAR IMPLANTS (ENGLISH)    EARDRUM REPAIR (ENGLISH)

## 2025-02-18 ENCOUNTER — PATIENT MESSAGE (OUTPATIENT)
Dept: DERMATOLOGY | Facility: CLINIC | Age: 80
End: 2025-02-18
Payer: MEDICARE

## 2025-02-18 DIAGNOSIS — L21.9 ACUTE SEBORRHEIC DERMATITIS: Primary | ICD-10-CM

## 2025-02-18 RX ORDER — METFORMIN HYDROCHLORIDE 500 MG/1
500 TABLET, EXTENDED RELEASE ORAL 2 TIMES DAILY
Qty: 180 TABLET | Refills: 3 | Status: SHIPPED | OUTPATIENT
Start: 2025-02-18

## 2025-02-18 NOTE — TELEPHONE ENCOUNTER
No care due was identified.  [facility] Embedded Care Due Messages. Reference number: [id number].   2/18/2025 9:34:51 AM CST

## 2025-02-19 ENCOUNTER — TELEPHONE (OUTPATIENT)
Dept: FAMILY MEDICINE | Facility: CLINIC | Age: 80
End: 2025-02-19
Payer: MEDICARE

## 2025-02-20 RX ORDER — CICLOPIROX OLAMINE 7.7 MG/G
CREAM TOPICAL 2 TIMES DAILY
Qty: 30 G | Refills: 3 | Status: SHIPPED | OUTPATIENT
Start: 2025-02-20

## 2025-02-28 ENCOUNTER — CLINICAL SUPPORT (OUTPATIENT)
Dept: AUDIOLOGY | Facility: CLINIC | Age: 80
End: 2025-02-28
Payer: MEDICARE

## 2025-02-28 ENCOUNTER — OFFICE VISIT (OUTPATIENT)
Dept: OTOLARYNGOLOGY | Facility: CLINIC | Age: 80
End: 2025-02-28
Payer: MEDICARE

## 2025-02-28 DIAGNOSIS — Z09 POSTOPERATIVE EXAMINATION: Primary | ICD-10-CM

## 2025-02-28 DIAGNOSIS — H90.3 SENSORINEURAL HEARING LOSS (SNHL) OF BOTH EARS: ICD-10-CM

## 2025-02-28 DIAGNOSIS — H93.293 IMPAIRED AUDITORY DISCRIMINATION, BILATERAL: Primary | ICD-10-CM

## 2025-02-28 PROCEDURE — 1101F PT FALLS ASSESS-DOCD LE1/YR: CPT | Mod: HCNC,CPTII,S$GLB, | Performed by: OTOLARYNGOLOGY

## 2025-02-28 PROCEDURE — 1126F AMNT PAIN NOTED NONE PRSNT: CPT | Mod: HCNC,CPTII,S$GLB, | Performed by: OTOLARYNGOLOGY

## 2025-02-28 PROCEDURE — 99024 POSTOP FOLLOW-UP VISIT: CPT | Mod: HCNC,S$GLB,, | Performed by: OTOLARYNGOLOGY

## 2025-02-28 PROCEDURE — 99999 PR PBB SHADOW E&M-EST. PATIENT-LVL III: CPT | Mod: PBBFAC,HCNC,, | Performed by: OTOLARYNGOLOGY

## 2025-02-28 PROCEDURE — 3288F FALL RISK ASSESSMENT DOCD: CPT | Mod: HCNC,CPTII,S$GLB, | Performed by: OTOLARYNGOLOGY

## 2025-02-28 PROCEDURE — 1159F MED LIST DOCD IN RCRD: CPT | Mod: HCNC,CPTII,S$GLB, | Performed by: OTOLARYNGOLOGY

## 2025-02-28 NOTE — PROGRESS NOTES
"Cochlear Implant Programming Session     LEFT EAR:  Dr. Fagan  : Cochlear [place]  Internal Device/Serial Number:  (SN:[id number])  Processor: N8 (current processor [id number]) (back up processor [id number])  DOS: 2/3/2025  DIS:2/28/2025  Fitting Date: 2/28/2025  Processor Color: Silver  Magnet Strength: N8: 3(I)  Coil Length: 6 inch  Battery Type:  Rechargeable  Warranty: 5 year     Mr. Jamison Mayes was seen today for a cochlear implant activation of his left cochlear implant system. Mr. Mayes reported that he has been doing well since surgery but did have some episodes of dizziness. His surgical site appeared to be healing well.      Impedance testing was completed and were within normal limits. A MAP was created based on normative values and increased live until patient reported loud but comfortable volume. Mr. Mayes reported that volume was loud but comfortable. He mentioned hearing "pulsing" when someone else or himself were talking, but Mr. Mayes was unable to distinguish words. Progressive MAPs were created and added as 3 programs. It was explained to Mr. Mayes that he would need to change to a new program each week.      Mr. Mayes was issued a remote control and counseled on how to use it to increase volume as well as how to change to the different programs that were made for him. He practiced removing and attaching the rechargeable batteries from the  and the processor. He was instructed to alternate batteries daily.      Recommend:  Consistent use of sound processor (at least 10 hours per day)  Follow up programming in one month as scheduled or sooner if necessary.  Audiometric testing to monitor progress with the cochlear implant.  "

## 2025-03-04 NOTE — PROGRESS NOTES
Chief complaint postop left cochlear implant and tympanoplasty     HPI:     Presents for 1 mo f/u.  Doing well no acute issues.      Exam:     Post auricular incision healing well.    Packing vacuumed out of ear with microscope.    Graft intact and healing well.    Patient tolerated well.    A/P    Ok for activation  F/u in 2-3 mo

## 2025-03-10 ENCOUNTER — PATIENT MESSAGE (OUTPATIENT)
Dept: AUDIOLOGY | Facility: CLINIC | Age: 80
End: 2025-03-10
Payer: MEDICARE

## 2025-03-11 ENCOUNTER — TELEPHONE (OUTPATIENT)
Dept: SPEECH THERAPY | Facility: HOSPITAL | Age: 80
End: 2025-03-11
Payer: MEDICARE

## 2025-03-11 ENCOUNTER — PATIENT MESSAGE (OUTPATIENT)
Dept: AUDIOLOGY | Facility: CLINIC | Age: 80
End: 2025-03-11
Payer: MEDICARE

## 2025-03-18 ENCOUNTER — CLINICAL SUPPORT (OUTPATIENT)
Dept: SPEECH THERAPY | Facility: HOSPITAL | Age: 80
End: 2025-03-18
Attending: OTOLARYNGOLOGY
Payer: MEDICARE

## 2025-03-18 DIAGNOSIS — H90.3 SENSORINEURAL HEARING LOSS (SNHL) OF BOTH EARS: ICD-10-CM

## 2025-03-18 DIAGNOSIS — Z96.21 COCHLEAR IMPLANT IN PLACE: ICD-10-CM

## 2025-03-18 DIAGNOSIS — H93.293 ABNORMAL AUDITORY PERCEPTION, BILATERAL: Primary | ICD-10-CM

## 2025-03-18 PROCEDURE — 92522 EVALUATE SPEECH PRODUCTION: CPT | Mod: GN,HCNC | Performed by: SPEECH-LANGUAGE PATHOLOGIST

## 2025-03-18 NOTE — Clinical Note
Darcie Norris -- Mr. Mayes is delightful -- and amazing as he continues to work and volunteer.  Thanks for the referral. I am hopeful that he will progress well.  I'll let you know if he reports something that might be useful to you; please let me know anything that might help us in therapy.  Thanks. Dinorah

## 2025-03-19 NOTE — PLAN OF CARE
IMPRESSIONS:  This 81 yo man appears to present with   1.  Abnormal auditory perception.  2.  Cochlear implant in place on L.  3.  Bilateral SNHL; hearing aid in place on R.      RECOMMENDATIONS/PLAN OF CARE:  It is felt that Mr. Mayes would benefit from  ST on a once weekly basis with a home program to address aural rehab for 8-12 weeks.  (This element of the POC expires 6/13/25.)  Continued f/u with Dr. Cortez as directed.      Long-term goals:  Mr. Mayes will have improved auditory perception via his CI.    Short-term objectives:  Mr. Mayes will perform the following with % consistency/accuracy:  1.  Discriminate among Ling sounds with 80% accuracy or better over two consecutive sessions in fields of   A.  2   B.  4   C.  6  2.  Discriminate among 3 stimuli differing in duration, stress and/or intonation with 80% accuracy or better over two consecutive sessions.   A.  Continuous vs discrete vs intermittent -- met at [facility]   B.  Word vs phrase vs sentence -- met at [facility]   C.  Sentences   D.  Words varying in number of syllables (1-3)   E.  Songs/Rhymes  3. Discriminate between words differing in vowels and consonants with 80% accuracy or better over two consecutive sessions.   A.  Field of 2    1.  Monosyllabic words (multiple pairings)    2.  Multi-syllabic words     A.  3-syllable     B.  Spondees     C.  Trochees   B.  Field of 4     1.  Monosyllables    2.  3-syllable    3.  Spondees    4.  Trochees   C.  Field of 8    1.  Monosyllables    2.  3-syllable    3.  Spondees    4.  Trochees  4.  Discriminate among 4 stimuli differing in duration, stress and/or intonation with 80% accuracy or better over two consecutive sessions.   A.  Phrases and sentences   B.  Words varying in number of syllables (single, spondee, trochee, 3-syllable)  5.  Identify key words in sentence context with 80% accuracy or better over two consecutive sessions.   A.  One word at end of sentence   B.  One word in middle of  sentence   C.  Two key words in a sentence  6.  Differentiate stimuli with similar duration, but differing in stress and intonation with 80% accuracy or better over two consecutive sessions.   A.  Field of 2    1.  Sentences    2.  Words   B.  Field of 3 sentences  7.  Discriminate among minimal pair words differing in vowel sound only with 80% accuracy or better over two consecutive sessions.   A.  Field of 2 (multiple pairings)   B.  Field of 4  8.  Demonstrate comprehension of practiced sentences with 80% accuracy or better over two consecutive sessions.   A.  Appropriate response   B.  Repeats accurately  9.  Identify among sentences differing only in during of key words with 80% accuracy or better over two consecutive sessions.   A.  One word at end of sentence   B.  One word in middle of sentence   C.  Two key words in a sentence  10.  Demonstrate consonant perception in words with 80% accuracy or better over two consecutive sessions.   A.  Field of 2   B.  Field of 4   C.  Field of 6  11.  [place] with picture context with 80% accuracy or better over two consecutive sessions.   A.  Appropriate response   B.  Repeats accurately  12.  [place] about a familiar topic with 80% accuracy or better over two consecutive sessions.   A.  Appropriate response to requestions   B.  Converses  13.  Connected discourse tracking with 80% accuracy or better over two consecutive sessions.   A.  Appropriate response to requestions   B.  Converses

## 2025-03-19 NOTE — PROGRESS NOTES
REFERRING PHYSICIAN:  Joseph Fagan M.D., Neuro-otologist  LENGTH OF VISIT:  1 hour    REASON FOR REFERRAL:  Jamison Mayes, age 80 years, was referred by Dr. Joseph Fagan, neuro-otologist, and Ms. Darcie Cortez, audiologist, for auditory perceptual assessment s/p cochlear implantation and activation.  He was accompanied by his wife.      Mr. Woods has a history of bilateral moderately-severe sloping to severe SNHL as well as a long history of working in noisy settings without hearing protection.  He was determined to be a candidate for cochlear implantation and had surgery 2/3/25 for placement of a L-sided CI.  It was activated on 2/28/25.  He continues to wear a hearing aid in his R ear and reported today that his R ear is now almost as bad as the L was at the time of surgery.  There has not been a new hearing acuity assessment since implantation, but Dr. Cortez did perform impedence testing and create a MAP, progressive MAPs, and 3 programs.  He stated today that he has progressed through these as instructed.    MEDICAL HISTORY:  Past Medical History:   Diagnosis Date    AK (actinic keratosis) PDT scalp 2/2015 and 3/2015    s/p efudex on scalp and forehead, PDT scalp    AK (actinic keratosis) 01/18/2024    Arthritis     Diabetes mellitus type II     Fever blister     Hepatocellular carcinoma 03/05/2018    s/p left hepatectomy of segments 2,3,4a/b    Hypertension     Joint pain     Pancreatic cyst     SCC (squamous cell carcinoma) 03/2013    L scalp vertex    SCC (squamous cell carcinoma) excised     left helix, in-situ    Seizures     Squamous Cell Carcinoma 03/2013    occipital scalp    Squamous Cell Carcinoma 03/2013    l vertex scalp    Squamous cell carcinoma of skin 01/24/2024    l parietal scalp         SURGICAL HISTORY:  Past Surgical History:   Procedure Laterality Date    CHOLECYSTECTOMY  03/05/2018    open at time of hepatic resection    ENDOSCOPIC ULTRASOUND OF UPPER GASTROINTESTINAL TRACT  "N/A 1/11/2023    Procedure: ULTRASOUND, UPPER GI TRACT, ENDOSCOPIC;  Surgeon: Moise Mason MD;  Location: [facility] ENDO;  Service: Endoscopy;  Laterality: N/A;    EYE SURGERY      IMPLANTATION OF COCHLEAR PROSTHESIS Left 2/3/2025    Procedure: INSERTION, PROSTHESIS, COCHLEAR;  Surgeon: Joseph Fagan MD;  Location: [address];  Service: ENT;  Laterality: Left;    LIVER RESECTION Left 03/05/2018    segments 2,3, 4a/b    RETINAL DETACHMENT SURGERY      age approx 30-39 yo    skin carcinoma removal      TYMPANOPLASTY Left 2/3/2025    Procedure: TYMPANOPLASTY;  Surgeon: Joseph Fagan MD;  Location: [facility] OR [address] [place] [address];  Service: ENT;  Laterality: Left;         FAMILY HISTORY:  Family History   Problem Relation Name Age of Onset    Diabetes Father      Hypertension Mother      Vision loss Sister      Melanoma Neg Hx      Heart attack Neg Hx      Heart disease Neg Hx         SOCIAL HISTORY:  Mr. Mayes is  and lives in [facility].  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  He and his wife also volunteer at [employer] two days per week for about 2-3 hours each.    Tobacco use:  Former smoker, per EMR; quit in 1966 after about 5 years.  ETOH use:  No per EMR.    BEHAVIOR:  Mr. Mayes was a very pleasant man who was seen with his wife.  He had normal affect and social interaction.  He was fully cooperative for the assessment.  Results of today's assessment are considered indicative of his current levels of functioning.    HEARING:  Subjectively, within normal limits.    ASSESSMENT:  Oral Peripheral:    Subjectively, within normal limits for speech and swallowing purposes.    Speech:  100% intelligible with no distortions.    Cognitive/Memory:  Subjectively, within normal limits    Attention:  Subjectively, appropriate in quiet setting office.    Language: Subjectively, " "within normal limits    Swallowing:  n/a    Voice/Resonance:  Subjectively, within normal limits    Auditory Perception:  Listening tasks performed with CI only.   Informally, Mr. Mayes reported that he hears and recognizes a ringing phone, when a sheet of plywood or metal piece is dropped at work.  He also reported that he hears things, but doesn't know what the source is.  In the office today, he did not appear to notice the loud second hand on the wall clock or people talking right outside our door (including a crying baby).    The screening tools from [employer] were utilized to help determine  Mr. Mayes's ability to discern patterns and formants.  He was able to  Discern patterns (1-syllable vs spondee vs 3- to 5-syllable utterances) with 90% consistency and correctly recognized one word.  Discern suprasegmental and length patterns among sentence stimuli with 70% consistency.  Discriminate vowels in minimal pairs with 100% accuracy.    Portions of the Speech Perception Instructional Curriculum and Evaluation was used to determine Mr. Mayes's level of auditory perceptual skills.  Review of the rating form revealed that he was able to   Detect  Any speech -- which he described as sounding like a pinball machine and typically found it unintelligible  His name -- though not necessarily as such (similar to any other speech)  Some Ling sounds:  /a/, /u/, /i/, /m/, and "sh."  He did not perceive /s/ in isolation on this task (but did on other tasks)  Onset and termination of   Continuous vowels; noted onset, but not termination of a continuous fricative ("sh")  Repetitive CV syllables  Continuous speech  In addition, he was able to demonstrate perception of  Suprasegmentals  Discrimination of continuous vs discrete vs intermittent --100%  1-syll word vs 2- to 3-word phrase vs sentence -- 100%  Vowels and consonants  Ling sounds, field of two --50%      COUNSELING:  Reviewed general course of therapy and " goal to help him improve his perception with the CI alone, but with continued use of the CI and HA simultaneously in his daily routine.  He was amenable to this POC.  Mrs. Mayes advised that he does not use computers much and they only have a pad at home, but she thought she could assist with the set up of something like BURLESQUICEOUS nicolette for him to use.    IMPRESSIONS:  This 79 yo man appears to present with   1.  Abnormal auditory perception.  2.  Cochlear implant in place on L.  3.  Bilateral SNHL; hearing aid in place on R.      RECOMMENDATIONS/PLAN OF CARE:  It is felt that Mr. Mayes would benefit from  ST on a once weekly basis with a home program to address aural rehab for 8-12 weeks.  (This element of the POC expires 6/13/25.)  Continued f/u with Dr. Cortez as directed.      Long-term goals:  Mr. Mayes will have improved auditory perception via his CI.    Short-term objectives:  Mr. Mayes will perform the following with % consistency/accuracy:  1.  Discriminate among [place] sounds with 80% accuracy or better over two consecutive sessions in fields of   A.  2   B.  4   C.  6  2.  Discriminate among 3 stimuli differing in duration, stress and/or intonation with 80% accuracy or better over two consecutive sessions.   A.  Continuous vs discrete vs intermittent -- met at [facility]   B.  Word vs phrase vs sentence -- met at [facility]   C.  Sentences   D.  Words varying in number of syllables (1-3)   E.  Songs/Rhymes  3. Discriminate between words differing in vowels and consonants with 80% accuracy or better over two consecutive sessions.   A.  Field of 2    1.  Monosyllabic words (multiple pairings)    2.  Multi-syllabic words     A.  3-syllable     B.  Spondees     C.  Trochees   B.  Field of 4     1.  Monosyllables    2.  3-syllable    3.  Spondees    4.  Trochees   C.  Field of 8    1.  Monosyllables    2.  3-syllable    3.  Spondees    4.  Trochees  4.  Discriminate among 4 stimuli differing in duration, stress  and/or intonation with 80% accuracy or better over two consecutive sessions.   A.  Phrases and sentences   B.  Words varying in number of syllables (single, spondee, trochee, 3-syllable)  5.  Identify key words in sentence context with 80% accuracy or better over two consecutive sessions.   A.  One word at end of sentence   B.  One word in middle of sentence   C.  Two key words in a sentence  6.  Differentiate stimuli with similar duration, but differing in stress and intonation with 80% accuracy or better over two consecutive sessions.   A.  Field of 2    1.  Sentences    2.  Words   B.  Field of 3 sentences  7.  Discriminate among minimal pair words differing in vowel sound only with 80% accuracy or better over two consecutive sessions.   A.  Field of 2 (multiple pairings)   B.  Field of 4  8.  Demonstrate comprehension of practiced sentences with 80% accuracy or better over two consecutive sessions.   A.  Appropriate response   B.  Repeats accurately  9.  Identify among sentences differing only in during of key words with 80% accuracy or better over two consecutive sessions.   A.  One word at end of sentence   B.  One word in middle of sentence   C.  Two key words in a sentence  10.  Demonstrate consonant perception in words with 80% accuracy or better over two consecutive sessions.   A.  Field of 2   B.  Field of 4   C.  Field of 6  11.  [place] with picture context with 80% accuracy or better over two consecutive sessions.   A.  Appropriate response   B.  Repeats accurately  12.  [place] about a familiar topic with 80% accuracy or better over two consecutive sessions.   A.  Appropriate response to requestions   B.  Converses  13.  Connected discourse tracking with 80% accuracy or better over two consecutive sessions.   A.  Appropriate response to requestions   B.  Converses

## 2025-03-25 ENCOUNTER — CLINICAL SUPPORT (OUTPATIENT)
Dept: SPEECH THERAPY | Facility: HOSPITAL | Age: 80
End: 2025-03-25
Payer: MEDICARE

## 2025-03-25 DIAGNOSIS — H93.293 ABNORMAL AUDITORY PERCEPTION, BILATERAL: Primary | ICD-10-CM

## 2025-03-25 DIAGNOSIS — H90.3 SENSORINEURAL HEARING LOSS (SNHL) OF BOTH EARS: ICD-10-CM

## 2025-03-25 DIAGNOSIS — Z96.21 COCHLEAR IMPLANT IN PLACE: ICD-10-CM

## 2025-03-25 PROCEDURE — 92507 TX SP LANG VOICE COMM INDIV: CPT | Mod: GN,HCNC | Performed by: SPEECH-LANGUAGE PATHOLOGIST

## 2025-03-25 NOTE — PROGRESS NOTES
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 Neuro-otologist  LENGTH OF SESSION:  50 minutes    REASON FOR VISIT:  Aural rehab    INTERVAL HISTORY:  3/25/25:    Mr. Mayes reported engaging in a variety of activities while attending to the sounds associated with them.  He reported perceiving music, birds chirping, wave noise (recorded), rain on a metal roof (which he recognized), and things being dropped at work.  He has also watched DIY videos on topics of interest to him and attempted to listen with just his CI (no CC).      INTERVENTION:  Short-term objectives:  Mr. Mayes will perform the following with % consistency/accuracy:  1.  Discriminate among Ling sounds with 80% accuracy or better over two consecutive sessions in fields of              A.  2 -- 75%              B.  4 -- 58%.  Having consistent difficulty on both tasks with lowest frequency phonemes (/i/ and /m/).              C.  6  2.  Discriminate among 3 stimuli differing in duration, stress and/or intonation with 80% accuracy or better over two consecutive sessions.              A.  Continuous vs discrete vs intermittent -- met at [facility]              B.  Word vs phrase vs sentence -- met at [facility]              C.  Sentences -- 78%; continuing.  Benefited from slightly slowed rate and, with that, not only had increased success recognizing patterns, but also reported understanding 1-3 words in most utterances.  Included in home program.              D.  Words varying in number of syllables (1-3) -- met at [facility]             3. Discriminate between words differing in vowels and consonants with 80% accuracy or better over two consecutive sessions.              A.  Field of 2                          1.  Monosyllabic words (multiple pairings)     A.  Diphthongs vs Group 3:  97%; goal met     B.  Diphthongs vs Group 1:  91%; goal met     C.   Diphthongs vs Group 2:     D.  Group 1 vs Group 3:     E.  Group 2 vs Group 3:     F.  Group 1 vs Group 2:                          2.  Multi-syllabic words                                      A.  3-syllable -- 89%; goal met.                                      B.  Spondees                                      C.  Trochees              B.  Field of 4                           1.  Monosyllables                          2.  3-syllable -- 70%; continuing; included in home program.                          3.  Spondees                          4.  Trochees    Did not address any goals beyond this point today.                C.  Field of 8                          1.  Monosyllables                          2.  3-syllable                          3.  Spondees                          4.  Trochees  4.  Discriminate among 4 stimuli differing in duration, stress and/or intonation with 80% accuracy or better over two consecutive sessions.              A.  Phrases and sentences              B.  Words varying in number of syllables (single, spondee, trochee, 3-syllable)  5.  Identify key words in sentence context with 80% accuracy or better over two consecutive sessions.              A.  One word at end of sentence              B.  One word in middle of sentence              C.  Two key words in a sentence  6.  Differentiate stimuli with similar duration, but differing in stress and intonation with 80% accuracy or better over two consecutive sessions.              A.  Field of 2                          1.  Sentences                          2.  Words              B.  Field of 3 sentences  7.  Discriminate among minimal pair words differing in vowel sound only with 80% accuracy or better over two consecutive sessions.              A.  Field of 2 (multiple pairings)              B.  Field of 4  8.  Demonstrate comprehension of practiced sentences with 80% accuracy or better over two consecutive sessions.              A.   Appropriate response              B.  Repeats accurately  9.  Identify among sentences differing only in during of key words with 80% accuracy or better over two consecutive sessions.              A.  One word at end of sentence              B.  One word in middle of sentence              C.  Two key words in a sentence  10.  Demonstrate consonant perception in words with 80% accuracy or better over two consecutive sessions.              A.  Field of 2              B.  Field of 4              C.  Field of 6  11.  [place] with picture context with 80% accuracy or better over two consecutive sessions.              A.  Appropriate response              B.  Repeats accurately  12.  [place] about a familiar topic with 80% accuracy or better over two consecutive sessions.              A.  Appropriate response to requestions              B.  Converses  13.  Connected discourse tracking with 80% accuracy or better over two consecutive sessions.              A.  Appropriate response to requestions              B.  Converses     COUNSELING:  Reviewed which tasks to practice at home and how Mrs. Mayes could provided auditory stimuli while sitting on Mr. Mayes's left so that he gets the best sound input and while not speech reading.  Instructed daily practice.     IMPRESSIONS:  This 79 yo man appears to present with   1.  Abnormal auditory perception.  2.  Cochlear implant in place on L.  3.  Bilateral SNHL; hearing aid in place on R.       RECOMMENDATIONS/PLAN OF CARE:  It is felt that Mr. Mayes would benefit from  ST on a once weekly basis with a home program to address aural rehab for 7-11 weeks.  (This element of the POC expires 6/13/25.)  Continued f/u with Dr. Cortez as directed.        Long-term goals:  Mr. Mayes will have improved auditory perception via his CI.

## 2025-03-28 ENCOUNTER — CLINICAL SUPPORT (OUTPATIENT)
Dept: AUDIOLOGY | Facility: CLINIC | Age: 80
End: 2025-03-28
Payer: MEDICARE

## 2025-03-28 DIAGNOSIS — H93.293 IMPAIRED AUDITORY DISCRIMINATION, BILATERAL: Primary | ICD-10-CM

## 2025-03-28 DIAGNOSIS — H90.3 SENSORINEURAL HEARING LOSS (SNHL) OF BOTH EARS: ICD-10-CM

## 2025-03-28 NOTE — PROGRESS NOTES
Cochlear Implant Programming Session     LEFT EAR:  Dr. Fagan  : [employer]  Internal Device/Serial Number:  (SN:[id number])  Processor: N8 (current processor [id number]) (back up processor [id number])  DOS: 2/3/2025  DIS:2/28/2025  Fitting Date: 2/28/2025  Processor Color: Silver  Magnet Strength: N8: 3(I)  Coil Length: 6 inch  Battery Type:  Rechargeable  Warranty: 5 year    Mr. Jamsion Mayes was seen today for a follow up cochlear implant programming session. He has been seeing Dinroah Welch, a speech pathologist, for aural rehabilitation. Mr. Mayes reported that these sessions have been very helpful. He can hear environmental noises with just his sound processor on. He has been practicing singular words at home with his wife, and his wife mentioned that he has been able to repeat most words back consistently but does miss a few at a time. Mr. Mayes has progressed to the last of the progressive MAPs and finds the current sound to be comfortable.     Impedance measurements were completed and within normal limits. Progressive MAPs were removed, the final MAP was made program one. A second program was made that was increased 5 clinical units for the C and T levels. Program three was made for his original MAP with SCAN + FF. Mr. Mayes was told to use this program for more complex situations with more background noise. No other adjustments were made at this time.     Recommendations:  1) Daily use of processor    2) Return on 4/24 for further programming changes as well as moncada testing

## 2025-04-03 ENCOUNTER — CLINICAL SUPPORT (OUTPATIENT)
Dept: SPEECH THERAPY | Facility: HOSPITAL | Age: 80
End: 2025-04-03
Payer: MEDICARE

## 2025-04-03 DIAGNOSIS — H93.293 ABNORMAL AUDITORY PERCEPTION, BILATERAL: Primary | ICD-10-CM

## 2025-04-03 DIAGNOSIS — H90.3 SENSORINEURAL HEARING LOSS (SNHL) OF BOTH EARS: ICD-10-CM

## 2025-04-03 DIAGNOSIS — Z96.21 COCHLEAR IMPLANT IN PLACE: ICD-10-CM

## 2025-04-03 PROCEDURE — 92507 TX SP LANG VOICE COMM INDIV: CPT | Mod: GN,HCNC | Performed by: SPEECH-LANGUAGE PATHOLOGIST

## 2025-04-03 NOTE — PROGRESS NOTES
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 Neuro-otologist  LENGTH OF SESSION:  50 minutes    REASON FOR VISIT:  Aural rehab    INTERVAL HISTORY:  4/3/25:  Notes improving clarity with CI alone in the form of speech beginning to sound like syllables.  Recognizing more environmental sounds.    3/25/25:    Mr. Mayes reported engaging in a variety of activities while attending to the sounds associated with them.  He reported perceiving music, birds chirping, wave noise (recorded), rain on a metal roof (which he recognized), and things being dropped at work.  He has also watched DIY videos on topics of interest to him and attempted to listen with just his CI (no CC).      INTERVENTION:  Reviewed contents of CI backpack.  Instructed him to review [employer] mini-laila and TV streamer for potential use at home.    Short-term objectives:  Mr. Mayes will perform the following with % consistency/accuracy:  1.  Discriminate among Ling sounds with 80% accuracy or better over two consecutive sessions in fields of              A.  2 -- 92%; last visit: 75%              B.  4 -- 75%; last visit:  58%.  Continuing.              C.  6  2.  Discriminate among 3 stimuli differing in duration, stress and/or intonation with 80% accuracy or better over two consecutive sessions.              A.  Continuous vs discrete vs intermittent -- met at [facility]              B.  Word vs phrase vs sentence -- met at [facility]              C.  Sentences -- 100%; last visit:   78%; Goal met.              D.  Words varying in number of syllables (1-3) -- met at [facility]             3. Discriminate between words differing in vowels and consonants with 80% accuracy or better over two consecutive sessions.              A.  Field of 2                          1.  Monosyllabic words (multiple pairings)     A.  Diphthongs vs Group 3:  97%; goal met     B.   Diphthongs vs Group 1:  91%; goal met     C.  Diphthongs vs Group 2:  72%; continuing in home program.     D.  Group 1 vs Group 3:     E.  Group 2 vs Group 3:     F.  Group 1 vs Group 2:                          2.  Multi-syllabic words                                      A.  3-syllable -- 89%; goal met.                                      B.  Spondees -- 90%; goal met.                                      C.  Trochees              B.  Field of 4                           1.  Monosyllables                          2.  3-syllable -- 75%; last visit 70%; continuing; included in home program.                          3.  Spondees -- 100% -- goal met.                          4.  Trochees                C.  Field of 8                          1.  Monosyllables                          2.  3-syllable                          3.  Spondees -- assigned for home program                          4.  Trochees    Did not address any goals beyond this point today.    4.  Discriminate among 4 stimuli differing in duration, stress and/or intonation with 80% accuracy or better over two consecutive sessions.              A.  Phrases and sentences              B.  Words varying in number of syllables (single, spondee, trochee, 3-syllable)  5.  Identify key words in sentence context with 80% accuracy or better over two consecutive sessions.              A.  One word at end of sentence              B.  One word in middle of sentence              C.  Two key words in a sentence  6.  Differentiate stimuli with similar duration, but differing in stress and intonation with 80% accuracy or better over two consecutive sessions.              A.  Field of 2                          1.  Sentences                          2.  Words              B.  Field of 3 sentences  7.  Discriminate among minimal pair words differing in vowel sound only with 80% accuracy or better over two consecutive sessions.              A.  Field of 2 (multiple  pairings)              B.  Field of 4  8.  Demonstrate comprehension of practiced sentences with 80% accuracy or better over two consecutive sessions.              A.  Appropriate response              B.  Repeats accurately  9.  Identify among sentences differing only in during of key words with 80% accuracy or better over two consecutive sessions.              A.  One word at end of sentence              B.  One word in middle of sentence              C.  Two key words in a sentence  10.  Demonstrate consonant perception in words with 80% accuracy or better over two consecutive sessions.              A.  Field of 2              B.  Field of 4              C.  Field of 6  11.  [place] with picture context with 80% accuracy or better over two consecutive sessions.              A.  Appropriate response              B.  Repeats accurately  12.  [place] about a familiar topic with 80% accuracy or better over two consecutive sessions.              A.  Appropriate response to requestions              B.  Converses  13.  Connected discourse tracking with 80% accuracy or better over two consecutive sessions.              A.  Appropriate response to requestions              B.  Converses         IMPRESSIONS:  This 79 yo man appears to present with   1.  Abnormal auditory perception.  2.  Cochlear implant in place on L.  3.  Bilateral SNHL; hearing aid in place on R.       RECOMMENDATIONS/PLAN OF CARE:  It is felt that Mr. Mayes would benefit from  ST on a once weekly basis with a home program to address aural rehab for 6-10 weeks.  (This element of the POC expires 6/13/25.)  Continued f/u with Dr. Cortez as directed.        Long-term goals:  Mr. Mayes will have improved auditory perception via his CI.

## 2025-04-10 ENCOUNTER — CLINICAL SUPPORT (OUTPATIENT)
Dept: SPEECH THERAPY | Facility: HOSPITAL | Age: 80
End: 2025-04-10
Payer: MEDICARE

## 2025-04-10 DIAGNOSIS — H93.293 ABNORMAL AUDITORY PERCEPTION, BILATERAL: Primary | ICD-10-CM

## 2025-04-10 DIAGNOSIS — Z96.21 COCHLEAR IMPLANT IN PLACE: ICD-10-CM

## 2025-04-10 DIAGNOSIS — H90.3 SENSORINEURAL HEARING LOSS (SNHL) OF BOTH EARS: ICD-10-CM

## 2025-04-10 PROCEDURE — 92507 TX SP LANG VOICE COMM INDIV: CPT | Mod: GN,HCNC | Performed by: SPEECH-LANGUAGE PATHOLOGIST

## 2025-04-10 NOTE — PROGRESS NOTES
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 Neuro-otologist  LENGTH OF SESSION:  50 minutes    REASON FOR VISIT:  Aural rehab    INTERVAL HISTORY:  4/10/25:  Continues to hear and recognize a variety of sounds.  Often wearing just CI in an effort to "exercise" his comprehension of what he hears.  Finds he can tell when others are talking (e.g., in a conversation in which he is not involved), but can only make out the odd word.  But also has had instances of hearing and understanding speech directed at him from a distance (e.g., "Mr. Swift, go get [horse's name].")    4/3/25:  Notes improving clarity with CI alone in the form of speech beginning to sound like syllables.  Recognizing more environmental sounds.    3/25/25:    Mr. Mayes reported engaging in a variety of activities while attending to the sounds associated with them.  He reported perceiving music, birds chirping, wave noise (recorded), rain on a metal roof (which he recognized), and things being dropped at work.  He has also watched DIY videos on topics of interest to him and attempted to listen with just his CI (no CC).      INTERVENTION:  Reviewed contents of CI backpack.  Instructed him to review lapel mini-laila and TV streamer for potential use at home.    Short-term objectives:  Mr. Mayes will perform the following with % consistency/accuracy:  1.  Discriminate among Ling sounds with 80% accuracy or better over two consecutive sessions in fields of              A.  2 -- 92%; last visit: 75%              B.  4 -- 83%;  last visit:  75%; 58%.  Continuing.              C.  6  2.  Discriminate among 3 stimuli differing in duration, stress and/or intonation with 80% accuracy or better over two consecutive sessions.              A.  Continuous vs discrete vs intermittent -- met at [facility]              B.  Word vs phrase vs sentence -- met at " eval              C.  Sentences -- 100%; last visit:   78%; Goal met.              D.  Words varying in number of syllables (1-3) -- met at eval             3. Discriminate between words differing in vowels and consonants with 80% accuracy or better over two consecutive sessions.              A.  Field of 2                          1.  Monosyllabic words (multiple pairings)     A.  Diphthongs vs Group 3:  97%; goal met     B.  Diphthongs vs Group 1:  91%; goal met     C.  Diphthongs vs Group 2:  96%; last visit:  72%; goal met.     D.  Group 1 vs Group 3:     E.  Group 2 vs Group 3:     F.  Group 1 vs Group 2:                          2.  Multi-syllabic words                                      A.  3-syllable -- 89%; goal met.                                      B.  Spondees -- 90%; goal met.                                      C.  Trochees -- 90%; goal met.              B.  Field of 4                           1.  Monosyllables                          2.  3-syllable -- 90%; last visit 75%;  70%; goal met.                          3.  Spondees -- 100% -- goal met.                          4.  Trochees -- assigned for home program.                C.  Field of 8                          1.  Monosyllables                          2.  3-syllable -- 80%; continuing in home program                          3.  Spondees -- 60%; continuing in  home program                          4.  Trochees      4.  Discriminate among 4 stimuli differing in duration, stress and/or intonation with 80% accuracy or better over two consecutive sessions.              A.  Phrases and sentences -- 100% with rare repetition needed; goal met.              B.  Words varying in number of syllables (single, spondee, trochee, 3-syllable) -- 100% for 1-syllable, spondee, and trochee words, but mixed success with 3-syllable words; continuing in home program.     Did not address any goals beyond this point today.    5.  Identify key words in  sentence context with 80% accuracy or better over two consecutive sessions.              A.  One word at end of sentence              B.  One word in middle of sentence              C.  Two key words in a sentence  6.  Differentiate stimuli with similar duration, but differing in stress and intonation with 80% accuracy or better over two consecutive sessions.              A.  Field of 2                          1.  Sentences                          2.  Words              B.  Field of 3 sentences  7.  Discriminate among minimal pair words differing in vowel sound only with 80% accuracy or better over two consecutive sessions.              A.  Field of 2 (multiple pairings)              B.  Field of 4  8.  Demonstrate comprehension of practiced sentences with 80% accuracy or better over two consecutive sessions.              A.  Appropriate response              B.  Repeats accurately  9.  Identify among sentences differing only in during of key words with 80% accuracy or better over two consecutive sessions.              A.  One word at end of sentence              B.  One word in middle of sentence              C.  Two key words in a sentence  10.  Demonstrate consonant perception in words with 80% accuracy or better over two consecutive sessions.              A.  Field of 2              B.  Field of 4              C.  Field of 6  11.  [place] with picture context with 80% accuracy or better over two consecutive sessions.              A.  Appropriate response              B.  Repeats accurately  12.  [place] about a familiar topic with 80% accuracy or better over two consecutive sessions.              A.  Appropriate response to requestions              B.  Converses  13.  Connected discourse tracking with 80% accuracy or better over two consecutive sessions.              A.  Appropriate response to requestions              B.  Converses         IMPRESSIONS:  This 81 yo man appears to present with   1.  Abnormal  auditory perception.  2.  Cochlear implant in place on L.  3.  Bilateral SNHL; hearing aid in place on R.       RECOMMENDATIONS/PLAN OF CARE:  It is felt that Mr. Mayes would benefit from  ST on a once weekly basis with a home program to address aural rehab for 5-9 weeks.  (This element of the POC expires 6/13/25.)  Continued f/u with Dr. Cortez as directed.        Long-term goals:  Mr. Mayes will have improved auditory perception via his CI.

## 2025-04-10 NOTE — PATIENT INSTRUCTIONS
3-syllable words:  Show in sets of 8 and ask Mr. Mayes to point to the one you say.    Spondees (equal stress):   Show in sets of 8 and ask Mr. Mayes to point to the one you say.    Trochees (1 stressed, 1 unstressed syllable):  Show in sets of 4 and ask Mr. Mayes to point to the one you say.    Then, show 1 each of   1-syllable  Spondee  Trochee  3-syllable  and ask Mr. Mayes to point to the one you say.

## 2025-04-17 ENCOUNTER — CLINICAL SUPPORT (OUTPATIENT)
Dept: SPEECH THERAPY | Facility: HOSPITAL | Age: 80
End: 2025-04-17
Payer: MEDICARE

## 2025-04-17 DIAGNOSIS — H93.293 ABNORMAL AUDITORY PERCEPTION, BILATERAL: Primary | ICD-10-CM

## 2025-04-17 DIAGNOSIS — H90.3 SENSORINEURAL HEARING LOSS (SNHL) OF BOTH EARS: ICD-10-CM

## 2025-04-17 DIAGNOSIS — Z96.21 COCHLEAR IMPLANT IN PLACE: ICD-10-CM

## 2025-04-17 PROCEDURE — 92507 TX SP LANG VOICE COMM INDIV: CPT | Mod: GN,HCNC | Performed by: SPEECH-LANGUAGE PATHOLOGIST

## 2025-04-17 NOTE — PROGRESS NOTES
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 Neuro-otologist  LENGTH OF SESSION:  50 minutes    REASON FOR VISIT:  Aural rehab    INTERVAL HISTORY:  4/17/25:  Daughter helped him do home program last night.  Finds that she speaks with a too-quiet volume typically for him.   Still primarily picking up a word here/there, but not getting larger chunks of sentences yet.    4/10/25:  Continues to hear and recognize a variety of sounds.  Often wearing just CI in an effort to "exercise" his comprehension of what he hears.  Finds he can tell when others are talking (e.g., in a conversation in which he is not involved), but can only make out the odd word.  But also has had instances of hearing and understanding speech directed at him from a distance (e.g., "Mr. Swift, go get [horse's name].")    4/3/25:  Notes improving clarity with CI alone in the form of speech beginning to sound like syllables.  Recognizing more environmental sounds.    3/25/25:    Mr. Mayes reported engaging in a variety of activities while attending to the sounds associated with them.  He reported perceiving music, birds chirping, wave noise (recorded), rain on a metal roof (which he recognized), and things being dropped at work.  He has also watched DIY videos on topics of interest to him and attempted to listen with just his CI (no CC).      INTERVENTION:  Short-term objectives:  Mr. Mayes will perform the following with % consistency/accuracy:  1.  Discriminate among Ling sounds with 80% accuracy or better over two consecutive sessions in fields of              A.  2 -- 92%; last visit: 75%              B.  4 -- 75%;  last visit:  83%; 75%; 58%.  Continuing.              C.  6  2.  Discriminate among 3 stimuli differing in duration, stress and/or intonation with 80% accuracy or better over two consecutive sessions.              A.  " Continuous vs discrete vs intermittent -- met at [facility]              B.  Word vs phrase vs sentence -- met at [facility]              C.  Sentences -- 100%; last visit:   78%; Goal met.              D.  Words varying in number of syllables (1-3) -- met at [facility]             3. Discriminate between words differing in vowels and consonants with 80% accuracy or better over two consecutive sessions.              A.  Field of 2                          1.  Monosyllabic words (multiple pairings)     A.  Diphthongs vs Group 3:  97%; goal met     B.  Diphthongs vs Group 1:  91%; goal met     C.  Diphthongs vs Group 2:  96%; last visit:  72%; goal met.     D.  Group 1 vs Group 3:     E.  Group 2 vs Group 3:     F.  Group 1 vs Group 2:                          2.  Multi-syllabic words                                      A.  3-syllable -- 89%; goal met.                                      B.  Spondees -- 90%; goal met.                                      C.  Trochees -- 90%; goal met.              B.  Field of 4                           1.  Monosyllables                          2.  3-syllable -- 90%; last visit 75%;  70%; goal met.                          3.  Spondees -- 100% -- goal met.                          4.  Trochees -- 80%; continuing in  home program.                C.  Field of 8                          1.  Monosyllables                          2.  3-syllable -- 70%; last visit:  80%; continuing in home program                          3.  Spondees -- 100%; last visit:  60%; goal met.                          4.  Trochees      4.  Discriminate among 4 stimuli differing in duration, stress and/or intonation with 80% accuracy or better over two consecutive sessions.              A.  Phrases and sentences -- 100% with rare repetition needed; goal met.              B.  Words varying in number of syllables (single, spondee, trochee, 3-syllable) -- 100% for 1-syllable, spondee, and trochee words, and 90% with 3-syllable  words; goal met.     5.  Identify key words in sentence context with 80% accuracy or better over two consecutive sessions.              A.  One word at end of sentence --80%; continuing in home program.              B.  One word in middle of sentence              C.  Two key words in a sentence    Did not address any goals beyond this point today.    6.  Differentiate stimuli with similar duration, but differing in stress and intonation with 80% accuracy or better over two consecutive sessions.              A.  Field of 2                          1.  Sentences                          2.  Words              B.  Field of 3 sentences  7.  Discriminate among minimal pair words differing in vowel sound only with 80% accuracy or better over two consecutive sessions.              A.  Field of 2 (multiple pairings)              B.  Field of 4  8.  Demonstrate comprehension of practiced sentences with 80% accuracy or better over two consecutive sessions.              A.  Appropriate response              B.  Repeats accurately  9.  Identify among sentences differing only in during of key words with 80% accuracy or better over two consecutive sessions.              A.  One word at end of sentence              B.  One word in middle of sentence              C.  Two key words in a sentence  10.  Demonstrate consonant perception in words with 80% accuracy or better over two consecutive sessions.              A.  Field of 2              B.  Field of 4              C.  Field of 6  11.  [place] with picture context with 80% accuracy or better over two consecutive sessions.              A.  Appropriate response              B.  Repeats accurately  12.  [place] about a familiar topic with 80% accuracy or better over two consecutive sessions.              A.  Appropriate response to requestions              B.  Converses  13.  Connected discourse tracking with 80% accuracy or better over two consecutive sessions.              A.   Appropriate response to requestions              TAIWO Banuelos         IMPRESSIONS:  This 81 yo man appears to present with   1.  Abnormal auditory perception.  2.  Cochlear implant in place on L.  3.  Bilateral SNHL; hearing aid in place on R.       RECOMMENDATIONS/PLAN OF CARE:  It is felt that Mr. Mayes would benefit from  ST on a once weekly basis with a home program to address aural rehab for 4-8 weeks.  (This element of the POC expires 6/13/25.)  Continued f/u with Dr. Cortez as directed.        Long-term goals:  Mr. Mayes will have improved auditory perception via his CI.

## 2025-04-17 NOTE — PATIENT INSTRUCTIONS
"Ling sounds (cards with just sounds):  Set out any 4 and ask Mr. Mayes to show the one you say.    3-syllable words:  Show in sets of 8 and ask Mr. Mayes to point to the one you say.    Trochees (1 stressed, 1 unstressed syllable):  Continue to show in sets of 4 and ask Mr. Mayes to point to the one you say.    Key word at end of sentence:  Use a sentence that is the same every time except for the last word.  Tell Mr. Mayes what the category is.  See if he can tell what the last word is.  For example:  Grocery store items:  "I went to the store to buy ______."  Zoo animals:  "We went to the zoo and saw a ______."  Cars:  "He just bought a new _______."  The category can be anything that has a lot of possibilities.    Both of you can read a short article from "[employer]."  Then, sit side by side and try to have a short conversation about it.  Aim for 3-4 exchanges (more if you can).           "

## 2025-04-24 ENCOUNTER — CLINICAL SUPPORT (OUTPATIENT)
Dept: AUDIOLOGY | Facility: CLINIC | Age: 80
End: 2025-04-24
Payer: MEDICARE

## 2025-04-24 ENCOUNTER — CLINICAL SUPPORT (OUTPATIENT)
Dept: SPEECH THERAPY | Facility: HOSPITAL | Age: 80
End: 2025-04-24
Payer: MEDICARE

## 2025-04-24 DIAGNOSIS — H90.3 SENSORINEURAL HEARING LOSS (SNHL) OF BOTH EARS: ICD-10-CM

## 2025-04-24 DIAGNOSIS — H93.293 ABNORMAL AUDITORY PERCEPTION, BILATERAL: Primary | ICD-10-CM

## 2025-04-24 DIAGNOSIS — H93.293 IMPAIRED AUDITORY DISCRIMINATION, BILATERAL: Primary | ICD-10-CM

## 2025-04-24 DIAGNOSIS — Z96.21 COCHLEAR IMPLANT IN PLACE: ICD-10-CM

## 2025-04-24 PROCEDURE — 92507 TX SP LANG VOICE COMM INDIV: CPT | Mod: GN,HCNC | Performed by: SPEECH-LANGUAGE PATHOLOGIST

## 2025-04-24 NOTE — PATIENT INSTRUCTIONS
"Ling sounds (cards with just sounds):  You can stop doing these.    Trochees (1 stressed, 1 unstressed syllable):  Continue to show in sets of 8 and ask Mr. Mayes to point to the one you say.    Continue key word at end of sentence:  Use a sentence that is the same every time except for the last word.  Tell Mr. Mayes what the category is.  See if he can tell what the last word is.  For example:  Grocery store items:  "I went to the store to buy ______."  Zoo animals:  "We went to the zoo and saw a ______."  Cars:  "He just bought a new _______."  The category can be anything that has a lot of possibilities.    Add task for key word in the middle.  For example, "I bought some ____ at the store."    Both of you can read a short article from "[employer]" or another source  Then, sit side by side and try to have a short conversation about it.  Aim for 3-4 exchanges (more if you can).         "

## 2025-04-24 NOTE — PROGRESS NOTES
"Cochlear Implant Programming Session     LEFT EAR:  Dr. Fagan  : Cochlear VersionOnes  Internal Device/Serial Number:  (SN:[id number])  Processor: N8 (current processor [id number]) (back up processor [id number])  DOS: 2/3/2025  DIS:2/28/2025  Fitting Date: 2/28/2025  Processor Color: Silver  Magnet Strength: N8: 3(I)  Coil Length: 6 inch  Battery Type:  Rechargeable  Warranty: 5 year    Mr. Jamison Mayes was seen today for follow up programming. He reported that he continues to see SHABNAM Camacho for aural rehabilitation and has noticed improvement in his ability to understand conversations in quiet. Mr. Mayes mentioned that he has no difficulty hearing sounds, but that speech is "muffled". He was able to hear part of the pastors sermon this past Sunday.     Impedance measurements were completed and within normal limits. C and T levels were increased 5 units, and Mr. Mayes reported that it was less muffled than before. T levels were increased 3 units based on thresholds obtained with the cochlear implant only in the [place]cada. A second program was made with C levels increased 5 units in order to open up the dynamic range. Mr. Mayes was told to change to this program once he had gotten to 10 in volume with his current program. He expressed understanding.     Aided thresholds were obtained between 30-35 dB HL, and aided speech reception threshold was 40 dB HL. Word recognition testing was attempted, but Mr. Mayes was struggling to be able to repeat back any of the words. Testing was deferred at this time.     Recommendations:  1) Daily use of hearing aid nad sound processor  2) Continue aural rehabilitation with SHABNAM Camacho  3) Follow up programming on 6/27      "

## 2025-04-24 NOTE — PROGRESS NOTES
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 Neuro-otologist  LENGTH OF SESSION:  50 minutes    REASON FOR VISIT:  Aural rehab    INTERVAL HISTORY:  4/24/25:  In certain situations, hearing more words in conversation and even following conversation, but reported that it sounds "muffled."  [place] part of his 's sermon for the first time this past Sunday.   Frequently using CI only purposely in order to facilitate progress.    4/17/25:  Daughter helped him do home program last night.  Finds that she speaks with a too-quiet volume typically for him.   Still primarily picking up a word here/there, but not getting larger chunks of sentences yet.    4/10/25:  Continues to hear and recognize a variety of sounds.  Often wearing just CI in an effort to "exercise" his comprehension of what he hears.  Finds he can tell when others are talking (e.g., in a conversation in which he is not involved), but can only make out the odd word.  But also has had instances of hearing and understanding speech directed at him from a distance (e.g., "Mr. Swift, go get [horse's name].")    4/3/25:  Notes improving clarity with CI alone in the form of speech beginning to sound like syllables.  Recognizing more environmental sounds.    3/25/25:    Mr. Mayes reported engaging in a variety of activities while attending to the sounds associated with them.  He reported perceiving music, birds chirping, wave noise (recorded), rain on a metal roof (which he recognized), and things being dropped at work.  He has also watched DIY videos on topics of interest to him and attempted to listen with just his CI (no CC).      INTERVENTION:  Short-term objectives:  Mr. Mayes will perform the following with % consistency/accuracy:  1.  Discriminate among Ling sounds with 80% accuracy or better over two consecutive sessions in fields of         "      A.  2 -- 92%; last visit: 75%              B.  4 -- 66%;  last visit: 75% 83%; 75%; 58%.  Today, confusing /m/ for /s/.  Suspending for now.              C.  6 -- deferred  2.  Discriminate among 3 stimuli differing in duration, stress and/or intonation with 80% accuracy or better over two consecutive sessions.              A.  Continuous vs discrete vs intermittent -- met at [facility]              B.  Word vs phrase vs sentence -- met at [facility]              C.  Sentences -- 100%; last visit:   78%; Goal met.              D.  Words varying in number of syllables (1-3) -- met at [facility]             3. Discriminate between words differing in vowels and consonants with 80% accuracy or better over two consecutive sessions.              A.  Field of 2                          1.  Monosyllabic words (multiple pairings)     A.  Diphthongs vs Group 3:  97%; goal met     B.  Diphthongs vs Group 1:  91%; goal met     C.  Diphthongs vs Group 2:  96%; last visit:  72%; goal met.     D.  Group 1 vs Group 3: 100%; goal met.     E.  Group 2 vs Group 3:  100%; goal met.     F.  Group 1 vs Group 2:  deferred                          2.  Multi-syllabic words                                      A.  3-syllable -- 89%; goal met.                                      B.  Spondees -- 90%; goal met.                                      C.  Trochees -- 90%; goal met.              B.  Field of 4                           1.  Monosyllables                          2.  3-syllable -- 90%; last visit 75%;  70%; goal met.                          3.  Spondees -- 100% -- goal met.                          4.  Trochees -- 90%; last visit 80%; goal met.                C.  Field of 8                          1.  Monosyllables                          2.  3-syllable --  80%; last visit:  70%; 80%; goal met.                          3.  Spondees -- 100%; last visit:  60%; goal met.                          4.  Trochees -- 80% with multiple repetitions;  continuing in home program      4.  Discriminate among 4 stimuli differing in duration, stress and/or intonation with 80% accuracy or better over two consecutive sessions.              A.  Phrases and sentences -- 100% with rare repetition needed; goal met.              B.  Words varying in number of syllables (single, spondee, trochee, 3-syllable) -- 100% for 1-syllable, spondee, and trochee words, and 90% with 3-syllable words; goal met.     5.  Identify key words in sentence context with 80% accuracy or better over two consecutive sessions.              A.  One word at end of sentence --50%; last visit:  80%; continuing in home program.              B.  One word in middle of sentence -- 90%; continuing in home program.              C.  Two key words in a sentence      6.  Differentiate stimuli with similar duration, but differing in stress and intonation with 80% accuracy or better over two consecutive sessions.              A.  Field of 2                          1.  Sentences                          2.  Words              B.  Field of 3 sentences   These were met via other tasks above.  7.  Discriminate among minimal pair words differing in vowel sound only with 80% accuracy or better over two consecutive sessions.              A.  Field of 2 (multiple pairings) -- goal met; see above.              B.  Field of 4:  80%; continuing in home program.    8.  Demonstrate comprehension of practiced sentences with 80% accuracy or better over two consecutive sessions.              A.  Appropriate response              B.  Repeats accurately    9.  Identify among sentences differing only in during of key words with 80% accuracy or better over two consecutive sessions.              A.  One word at end of sentence              B.  One word in middle of sentence              C.  Two key words in a sentence   See # 5 above.    10.  Demonstrate consonant perception in words with 80% accuracy or better over two consecutive  sessions.              A.  Field of 2              B.  Field of 4              C.  Field of 6    11.  [place] with picture context with 80% accuracy or better over two consecutive sessions.              A.  Appropriate response              B.  Repeats accurately    12.  [place] about a familiar topic with 80% accuracy or better over two consecutive sessions.              A.  Appropriate response to questions -- Read same article and responded appropriately to questions with need for repetition of question 40% of time.  Continuing in home program.              B.  Converses    13.  Connected discourse tracking with 80% accuracy or better over two consecutive sessions.              A.  Appropriate response to requestions              B.  Converses         IMPRESSIONS:  This 81 yo man appears to present with   1.  Abnormal auditory perception.  2.  Cochlear implant in place on L.  3.  Bilateral SNHL; hearing aid in place on R.       RECOMMENDATIONS/PLAN OF CARE:  It is felt that Mr. Mayes would benefit from  ST on a once weekly basis with a home program to address aural rehab for 4-8 weeks.  (This element of the POC expires 6/13/25.)  Continued f/u with Dr. Cortez as directed.        Long-term goals:  Mr. Mayes will have improved auditory perception via his CI.

## 2025-04-29 ENCOUNTER — CLINICAL SUPPORT (OUTPATIENT)
Dept: SPEECH THERAPY | Facility: HOSPITAL | Age: 80
End: 2025-04-29
Payer: MEDICARE

## 2025-04-29 DIAGNOSIS — H90.3 SENSORINEURAL HEARING LOSS (SNHL) OF BOTH EARS: ICD-10-CM

## 2025-04-29 DIAGNOSIS — Z96.21 COCHLEAR IMPLANT IN PLACE: ICD-10-CM

## 2025-04-29 DIAGNOSIS — H93.293 ABNORMAL AUDITORY PERCEPTION, BILATERAL: Primary | ICD-10-CM

## 2025-04-29 PROCEDURE — 92507 TX SP LANG VOICE COMM INDIV: CPT | Mod: GN,HCNC | Performed by: SPEECH-LANGUAGE PATHOLOGIST

## 2025-04-29 NOTE — PROGRESS NOTES
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 Neuro-otologist  LENGTH OF SESSION:  50 minutes    REASON FOR VISIT:  Aural rehab    INTERVAL HISTORY:  4/24/25:  In certain situations, hearing more words in conversation and even following conversation, but reported that it sounds "muffled."  [place] part of his 's sermon for the first time this past Sunday.   Frequently using CI only purposely in order to facilitate progress.    4/17/25:  Daughter helped him do home program last night.  Finds that she speaks with a too-quiet volume typically for him.   Still primarily picking up a word here/there, but not getting larger chunks of sentences yet.    4/10/25:  Continues to hear and recognize a variety of sounds.  Often wearing just CI in an effort to "exercise" his comprehension of what he hears.  Finds he can tell when others are talking (e.g., in a conversation in which he is not involved), but can only make out the odd word.  But also has had instances of hearing and understanding speech directed at him from a distance (e.g., "Mr. Swift, go get [horse's name].")    4/3/25:  Notes improving clarity with CI alone in the form of speech beginning to sound like syllables.  Recognizing more environmental sounds.    3/25/25:    Mr. Mayes reported engaging in a variety of activities while attending to the sounds associated with them.  He reported perceiving music, birds chirping, wave noise (recorded), rain on a metal roof (which he recognized), and things being dropped at work.  He has also watched DIY videos on topics of interest to him and attempted to listen with just his CI (no CC).      INTERVENTION:  Short-term objectives:  Mr. Mayes will perform the following with % consistency/accuracy:  1.  Discriminate among Ling sounds with 80% accuracy or better over two consecutive sessions in fields of         "      A.  2 -- 92%; last visit: 75%              B.  4 -- 66%;  last visit: 75% 83%; 75%; 58%.  Today, confusing /m/ for /s/.  Suspending for now.              C.  6 -- deferred  2.  Discriminate among 3 stimuli differing in duration, stress and/or intonation with 80% accuracy or better over two consecutive sessions.              A.  Continuous vs discrete vs intermittent -- met at [facility]              B.  Word vs phrase vs sentence -- met at [facility]              C.  Sentences -- 100%; last visit:   78%; Goal met.              D.  Words varying in number of syllables (1-3) -- met at [facility]             3. Discriminate between words differing in vowels and consonants with 80% accuracy or better over two consecutive sessions.              A.  Field of 2                          1.  Monosyllabic words (multiple pairings)     A.  Diphthongs vs Group 3:  97%; goal met     B.  Diphthongs vs Group 1:  91%; goal met     C.  Diphthongs vs Group 2:  96%; last visit:  72%; goal met.     D.  Group 1 vs Group 3: 100%; goal met.     E.  Group 2 vs Group 3:  100%; goal met.     F.  Group 1 vs Group 2:  deferred                          2.  Multi-syllabic words                                      A.  3-syllable -- 89%; goal met.                                      B.  Spondees -- 90%; goal met.                                      C.  Trochees -- 90%; goal met.              B.  Field of 4                           1.  Monosyllables                          2.  3-syllable -- 90%; last visit 75%;  70%; goal met.                          3.  Spondees -- 100% -- goal met.                          4.  Trochees -- 90%; last visit 80%; goal met.                C.  Field of 8                          1.  Monosyllables                          2.  3-syllable --  80%; last visit:  70%; 80%; goal met.                          3.  Spondees -- 100%; last visit:  60%; goal met.                          4.  Trochees -- 80% with multiple repetitions;  continuing in home program      4.  Discriminate among 4 stimuli differing in duration, stress and/or intonation with 80% accuracy or better over two consecutive sessions.              A.  Phrases and sentences -- 100% with rare repetition needed; goal met.              B.  Words varying in number of syllables (single, spondee, trochee, 3-syllable) -- 100% for 1-syllable, spondee, and trochee words, and 90% with 3-syllable words; goal met.     5.  Identify key words in sentence context with 80% accuracy or better over two consecutive sessions.              A.  One word at end of sentence --50%;  last visit:  50%; 80%; continuing in home program.              B.  One word in middle of sentence -- 50%; last visit 90%; continuing in home program.              C.  Two key words in a sentence      6.  Differentiate stimuli with similar duration, but differing in stress and intonation with 80% accuracy or better over two consecutive sessions.              A.  Field of 2                          1.  Sentences                          2.  Words              B.  Field of 3 sentences   These were met via other tasks above.  7.  Discriminate among minimal pair words differing in vowel sound only with 80% accuracy or better over two consecutive sessions.              A.  Field of 2 (multiple pairings) -- goal met; see above.              B.  Field of 4:  90%; last visit 80%; goal met.    8.  Demonstrate comprehension of practiced sentences with 80% accuracy or better over two consecutive sessions.              A.  Appropriate response              B.  Repeats accurately    9.  Identify among sentences differing only in during of key words with 80% accuracy or better over two consecutive sessions.              A.  One word at end of sentence              B.  One word in middle of sentence              C.  Two key words in a sentence   See # 5 above.    10.  Demonstrate consonant perception in words with 80% accuracy or better  over two consecutive sessions.              A.  Field of 2    1. /p/ - /t/:  80% (10% words)    2.  /f/ - /h/:  60% (10 Words)              B.  Field of 4              C.  Field of 6    11.  [place] with picture context with 80% accuracy or better over two consecutive sessions.              A.  Appropriate response              B.  Repeats accurately    12.  [place] about a familiar topic with 80% accuracy or better over two consecutive sessions.              A.  Appropriate response to questions -- Read same article and responded appropriately to questions 60% of the time (need for one repetition)  Also discussed topic of choice with 70% accuracy in response to questions.  Continuing in home program.              B.  Converses    13.  Connected discourse tracking with 80% accuracy or better over two consecutive sessions.              A.  Appropriate response to requestions              B.  Converses         IMPRESSIONS:  This 81 yo man appears to present with   1.  Abnormal auditory perception.  2.  Cochlear implant in place on L.  3.  Bilateral SNHL; hearing aid in place on R.       RECOMMENDATIONS/PLAN OF CARE:  It is felt that Mr. Mayes would benefit from  ST on a once weekly basis with a home program to address aural rehab for 3-7 weeks.  (This element of the POC expires 6/13/25.)  Continued f/u with Dr. Cortez as directed.        Long-term goals:  Mr. Mayes will have improved auditory perception via his CI.

## 2025-04-29 NOTE — PATIENT INSTRUCTIONS
"Trochees (1 stressed, 1 unstressed syllable):  Continue to show in sets of 8 and ask Mr. Mayes to point to the one you say.    Continue key word at end of sentence:  Use a sentence that is the same every time except for the last word.  Tell Mr. Mayes what the category is.  See if he can tell what the last word is.  For example:  Grocery store items:  "I went to the store to buy ______."  Zoo animals:  "We went to the zoo and saw a ______."  Cars:  "He just bought a new _______."  The category can be anything that has a lot of possibilities.    Add task for key word in the middle.  For example, "I bought some ____ at the store."    Both of you can read a short article from "[employer]" or another source  Then, sit side by side and try to have a short conversation about it.  Aim for 3-4 exchanges (more if you can).     Minimal pairs with consonants (beginning sounds):  Pick a word from one of the pairs in the word list and ask Mr. Mayes to tell if it starts with one of the sounds (e.g., /p/ vs /t/, or /f/ vs /h/).  If he is correct, see if he can tell what the word was.      "

## 2025-05-08 ENCOUNTER — PATIENT MESSAGE (OUTPATIENT)
Dept: ENDOSCOPY | Facility: HOSPITAL | Age: 80
End: 2025-05-08
Payer: MEDICARE

## 2025-05-08 ENCOUNTER — TELEPHONE (OUTPATIENT)
Dept: ENDOSCOPY | Facility: HOSPITAL | Age: 80
End: 2025-05-08
Payer: MEDICARE

## 2025-05-08 NOTE — TELEPHONE ENCOUNTER
Telephoned pt and pt's wife to schedule Colonoscopy with no answer.  Left voicemail message with direct contact number for call to be returned. Portal message also sent.

## 2025-05-08 NOTE — TELEPHONE ENCOUNTER
"----- Message -----   From: LESLIE Corley MD   Sent: 2025   3:48 PM CST   To: [facility]     Procedure: Colonoscopy     Diagnosis: Abnormal finding on GI tract imaging     Procedure Timin-12 weeks     *If within 4 weeks selected, please portillo as high priority*     *If greater than 12 weeks, please select "5-12 weeks" and delay sending until 3 months prior to requested date*     Location: Any Site.  Prefers [place].  Ok with Dr. Stanley     Additional Scheduling Information: No scheduling concerns     Prep Specifications:Standard prep     Is the patient taking a GLP-1 Agonist:no     Have you attached a patient to this message: yes       Thank you!   "

## 2025-05-15 ENCOUNTER — CLINICAL SUPPORT (OUTPATIENT)
Dept: SPEECH THERAPY | Facility: HOSPITAL | Age: 80
End: 2025-05-15
Payer: MEDICARE

## 2025-05-15 DIAGNOSIS — Z96.21 COCHLEAR IMPLANT IN PLACE: ICD-10-CM

## 2025-05-15 DIAGNOSIS — H90.3 SENSORINEURAL HEARING LOSS (SNHL) OF BOTH EARS: ICD-10-CM

## 2025-05-15 DIAGNOSIS — H93.293 ABNORMAL AUDITORY PERCEPTION, BILATERAL: Primary | ICD-10-CM

## 2025-05-15 PROCEDURE — 92507 TX SP LANG VOICE COMM INDIV: CPT | Mod: GN,HCNC | Performed by: SPEECH-LANGUAGE PATHOLOGIST

## 2025-05-15 NOTE — PROGRESS NOTES
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 Neuro-otologist  LENGTH OF SESSION:  50 minutes    REASON FOR VISIT:  Aural rehab    INTERVAL HISTORY:  5/15/25:  Able to use speaker phone on work phone and communicate adequately with native English speakers, but cannot succeed with those with accents.    Feels he is doing well with home practice and improving re: comprehension of embedded key words.  Found discriminating between minimal pairs (consonants) at word level challenging, but had better success in sentences (though were not using minimal pair sentences).      4/24/25:  In certain situations, hearing more words in conversation and even following conversation, but reported that it sounds "muffled."  [place] part of his 's sermon for the first time this past Sunday.   Frequently using CI only purposely in order to facilitate progress.    4/17/25:  Daughter helped him do home program last night.  Finds that she speaks with a too-quiet volume typically for him.   Still primarily picking up a word here/there, but not getting larger chunks of sentences yet.    4/10/25:  Continues to hear and recognize a variety of sounds.  Often wearing just CI in an effort to "exercise" his comprehension of what he hears.  Finds he can tell when others are talking (e.g., in a conversation in which he is not involved), but can only make out the odd word.  But also has had instances of hearing and understanding speech directed at him from a distance (e.g., "Mr. Swift, go get [horse's name].")    4/3/25:  Notes improving clarity with CI alone in the form of speech beginning to sound like syllables.  Recognizing more environmental sounds.    3/25/25:    Mr. Mayes reported engaging in a variety of activities while attending to the sounds associated with them.  He reported perceiving music, birds chirping, wave noise " (recorded), rain on a metal roof (which he recognized), and things being dropped at work.  He has also watched DIY videos on topics of interest to him and attempted to listen with just his CI (no CC).      INTERVENTION:  Short-term objectives:  Mr. Mayes will perform the following with % consistency/accuracy:  1.  Discriminate among [place] sounds with 80% accuracy or better over two consecutive sessions in fields of              A.  2 -- 92%; last visit: 75%              B.  4 -- 66%;  last visit: 75% 83%; 75%; 58%.  Today, confusing /m/ for /s/.  Suspending for now.              C.  6 -- deferred  2.  Discriminate among 3 stimuli differing in duration, stress and/or intonation with 80% accuracy or better over two consecutive sessions.              A.  Continuous vs discrete vs intermittent -- met at [facility]              B.  Word vs phrase vs sentence -- met at [facility]              C.  Sentences -- 100%; last visit:   78%; Goal met.              D.  Words varying in number of syllables (1-3) -- met at [facility]             3. Discriminate between words differing in vowels and consonants with 80% accuracy or better over two consecutive sessions.              A.  Field of 2                          1.  Monosyllabic words (multiple pairings)     A.  Diphthongs vs Group 3:  97%; goal met     B.  Diphthongs vs Group 1:  91%; goal met     C.  Diphthongs vs Group 2:  96%; last visit:  72%; goal met.     D.  Group 1 vs Group 3: 100%; goal met.     E.  Group 2 vs Group 3:  100%; goal met.     F.  Group 1 vs Group 2:  deferred                          2.  Multi-syllabic words                                      A.  3-syllable -- 89%; goal met.                                      B.  Spondees -- 90%; goal met.                                      C.  Trochees -- 90%; goal met.              B.  Field of 4                           1.  Monosyllables                          2.  3-syllable -- 90%; last visit 75%;  70%; goal met.                           3.  Spondees -- 100% -- goal met.                          4.  Trochees -- 90%; last visit 80%; goal met.                C.  Field of 8                          1.  Monosyllables                          2.  3-syllable --  80%; last visit:  70%; 80%; goal met.                          3.  Spondees -- 100%; last visit:  60%; goal met.                          4.  Trochees -- 70%; last visit:  80% with multiple repetitions; continuing in home program.  Mrs. Mayes reported that she is using a louder voice.  Asked that, rather than that, Mr. Mayes should try increasing the volume on his processor.  He increased from level 5 to 6.      4.  Discriminate among 4 stimuli differing in duration, stress and/or intonation with 80% accuracy or better over two consecutive sessions.              A.  Phrases and sentences -- 100% with rare repetition needed; goal met.              B.  Words varying in number of syllables (single, spondee, trochee, 3-syllable) -- 100% for 1-syllable, spondee, and trochee words, and 90% with 3-syllable words; goal met.     5.  Identify key words in sentence context with 80% accuracy or better over two consecutive sessions.              A.  One word at end of sentence -- 80%;  last visit:  50%; 50%; 80%; continuing in home program.              B.  One word in middle of sentence -- 100%; last visit 50%; 90%; continuing in home program.              C.  Two key words in a sentence      6.  Differentiate stimuli with similar duration, but differing in stress and intonation with 80% accuracy or better over two consecutive sessions.              A.  Field of 2                          1.  Sentences                          2.  Words              B.  Field of 3 sentences   These were met via other tasks above.  7.  Discriminate among minimal pair words differing in vowel sound only with 80% accuracy or better over two consecutive sessions.              A.  Field of 2 (multiple  pairings) -- goal met; see above.              B.  Field of 4:  90%; last visit 80%; goal met.    8.  Demonstrate comprehension of practiced sentences with 80% accuracy or better over two consecutive sessions.              A.  Appropriate response              B.  Repeats accurately    9.  Identify among sentences differing only in during of key words with 80% accuracy or better over two consecutive sessions.              A.  One word at end of sentence              B.  One word in middle of sentence              C.  Two key words in a sentence   See # 5 above.    10.  Demonstrate consonant perception in words with 80% accuracy or better over two consecutive sessions.              A.  Field of 2 (converted to minimal pair phrases/sentences)    1. /p/ - /t/:  70% (10 trials); continuing.    2.  /f/ - /h/:  50% (10 trials); continuing              B.  Field of 4              C.  Field of 6    11.  [place] with picture context with 80% accuracy or better over two consecutive sessions.              A.  Appropriate response              B.  Repeats accurately    12.  [place] about a familiar topic with 80% accuracy or better over two consecutive sessions.              A.  Appropriate response to questions -- Deferred today.  Last visit, read same article and responded appropriately to questions 60% of the time (need for one repetition)  Also discussed topic of choice with 70% accuracy in response to questions.  Continuing in home program.              B.  Converses    Comprehended 10 of 11 questions and responded appropriately.  Did not comprehend a comment.    13.  Connected discourse tracking with 80% accuracy or better over two consecutive sessions.              A.  Appropriate response to requestions              B.  Converses         IMPRESSIONS:  This 81 yo man appears to present with   1.  Abnormal auditory perception.  2.  Cochlear implant in place on L.  3.  Bilateral SNHL; hearing aid in place on R.        RECOMMENDATIONS/PLAN OF CARE:  It is felt that Mr. Mayes would benefit from  ST on a once weekly basis with a home program to address aural rehab for 2-6weeks.  (This element of the POC expires 6/13/25.)  Continued f/u with Dr. Cortez as directed.        Long-term goals:  Mr. Mayes will have improved auditory perception via his CI.

## 2025-05-22 ENCOUNTER — CLINICAL SUPPORT (OUTPATIENT)
Dept: SPEECH THERAPY | Facility: HOSPITAL | Age: 80
End: 2025-05-22
Payer: MEDICARE

## 2025-05-22 DIAGNOSIS — H90.3 SENSORINEURAL HEARING LOSS (SNHL) OF BOTH EARS: ICD-10-CM

## 2025-05-22 DIAGNOSIS — Z96.21 COCHLEAR IMPLANT IN PLACE: ICD-10-CM

## 2025-05-22 DIAGNOSIS — H93.293 ABNORMAL AUDITORY PERCEPTION, BILATERAL: Primary | ICD-10-CM

## 2025-05-22 PROCEDURE — 92507 TX SP LANG VOICE COMM INDIV: CPT | Mod: GN,HCNC | Performed by: SPEECH-LANGUAGE PATHOLOGIST

## 2025-05-22 NOTE — PATIENT INSTRUCTIONS
"Trochees (1 stressed, 1 unstressed syllable):  Continue to show in sets of 8 and ask Mr. Mayes to point to the one you say.    Two key words in one sentence:  Use a sentence that is the same every time except for the where the manuel words go.  Tell Mr. Mayes what the category is.  See if he can tell what the last word is.  For example:  Grocery store items:  "I bought ___ and ___ at the store."  The category can be anything that has a lot of possibilities.    Both of you can read a short article from "[employer]" or another source  Then, sit side by side and try to have a short conversation about it.  Aim for 3-4 exchanges (more if you can).     Minimal pairs with consonants (beginning sounds):  Pick a word from one of the pairs in the word list and ask Mr. Mayes to tell if it starts with one of the sounds (e.g., /p/ vs /t/, or /f/ vs /h/).  If he is correct, see if he can tell what the word was.    Practice getting Mr. Mayes's attention in quiet from 3 feet, then 6 feet, then 9 feet (if you can).  It that goes OK, try adding some background noise (TV, radio, washing machine, etc) and start at 3 feet again.    "

## 2025-05-22 NOTE — PROGRESS NOTES
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 Neuro-otologist  LENGTH OF SESSION:  50 minutes    REASON FOR VISIT:  Aural rehab    INTERVAL HISTORY:  5/22/25:  Had increased volume from 5 to 6 last week.  Getting a "barrier"  -- "like the radio is not quite on the station" -- and it is challenging.    Also, tried putting  of work phone on processor laila, and could detect but not understand speech, so continuing to use speaker on ofc phone.  Finding /p/-/t/ minimal pair sentences challenging, but /f/-/h/ are less so.  Working to engage in conversation more at home and at work.    5/15/25:  Able to use speaker phone on work phone and communicate adequately with native English speakers, but cannot succeed with those with accents.    Feels he is doing well with home practice and improving re: comprehension of embedded key words.  Found discriminating between minimal pairs (consonants) at word level challenging, but had better success in sentences (though were not using minimal pair sentences).      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   Frequently using CI only purposely in order to facilitate progress.    4/17/25:  Daughter helped him do home program last night.  Finds that she speaks with a too-quiet volume typically for him.   Still primarily picking up a word here/there, but not getting larger chunks of sentences yet.    4/10/25:  Continues to hear and recognize a variety of sounds.  Often wearing just CI in an effort to "exercise" his comprehension of what he hears.  Finds he can tell when others are talking (e.g., in a conversation in which he is not involved), but can only make out the odd word.  But also has had instances of " "hearing and understanding speech directed at him from a distance (e.g., "Mr. Swift, go get [horse's name].")    4/3/25:  Notes improving clarity with CI alone in the form of speech beginning to sound like syllables.  Recognizing more environmental sounds.    3/25/25:    Mr. Mayes reported engaging in a variety of activities while attending to the sounds associated with them.  He reported perceiving music, birds chirping, wave noise (recorded), rain on a metal roof (which he recognized), and things being dropped at work.  He has also watched DIY videos on topics of interest to him and attempted to listen with just his CI (no CC).      INTERVENTION:  Short-term objectives:  Mr. Mayes will perform the following with % consistency/accuracy:  1.  Discriminate among Ling sounds with 80% accuracy or better over two consecutive sessions in fields of              A.  2 -- 92%; last visit: 75%              B.  4 -- 66%;  last visit: 75% 83%; 75%; 58%.  Today, confusing /m/ for /s/.  Suspending for now.              C.  6 -- deferred  2.  Discriminate among 3 stimuli differing in duration, stress and/or intonation with 80% accuracy or better over two consecutive sessions.              A.  Continuous vs discrete vs intermittent -- met at [facility]              B.  Word vs phrase vs sentence -- met at [facility]              C.  Sentences -- 100%; last visit:   78%; Goal met.              D.  Words varying in number of syllables (1-3) -- met at [facility]             3. Discriminate between words differing in vowels and consonants with 80% accuracy or better over two consecutive sessions.              A.  Field of 2                          1.  Monosyllabic words (multiple pairings)     A.  Diphthongs vs Group 3:  97%; goal met     B.  Diphthongs vs Group 1:  91%; goal met     C.  Diphthongs vs Group 2:  96%; last visit:  72%; goal met.     D.  Group 1 vs Group 3: 100%; goal met.     E.  Group 2 vs Group 3:  100%; goal met.     F.  " Group 1 vs Group 2:  deferred                          2.  Multi-syllabic words                                      A.  3-syllable -- 89%; goal met.                                      B.  Spondees -- 90%; goal met.                                      C.  Trochees -- 90%; goal met.              B.  Field of 4                           1.  Monosyllables                          2.  3-syllable -- 90%; last visit 75%;  70%; goal met.                          3.  Spondees -- 100% -- goal met.                          4.  Trochees -- 90%; last visit 80%; goal met.                C.  Field of 8                          1.  Monosyllables                          2.  3-syllable --  80%; last visit:  70%; 80%; goal met.                          3.  Spondees -- 100%; last visit:  60%; goal met.                          4.  Trochees -- 70%; last visit:  70%; 80% with multiple repetitions; continuing in home program.        4.  Discriminate among 4 stimuli differing in duration, stress and/or intonation with 80% accuracy or better over two consecutive sessions.              A.  Phrases and sentences -- 100% with rare repetition needed; goal met.              B.  Words varying in number of syllables (single, spondee, trochee, 3-syllable) -- 100% for 1-syllable, spondee, and trochee words, and 90% with 3-syllable words; goal met.     5.  Identify key words in sentence context with 80% accuracy or better over two consecutive sessions.              A.  One word at end of sentence -- 90%;  last visit:  80%; 50%; 50%; 80%; goal met.              B.  One word in middle of sentence -- 90%; last visit 100%; 50%; 90%; goal met..              C.  Two key words in a sentence -- 90% with reps; continuing in home program.      6.  Differentiate stimuli with similar duration, but differing in stress and intonation with 80% accuracy or better over two consecutive sessions.              A.  Field of 2                          1.  Sentences                           2.  Words              B.  Field of 3 sentences   These were met via other tasks above.    7.  Discriminate among minimal pair words differing in vowel sound only with 80% accuracy or better over two consecutive sessions.              A.  Field of 2 (multiple pairings) -- goal met; see above.              B.  Field of 4:  90%; last visit 80%; goal met.    8.  Demonstrate comprehension of practiced sentences with 80% accuracy or better over two consecutive sessions.              A.  Appropriate response              B.  Repeats accurately    9.  Identify among sentences differing only in during of key words with 80% accuracy or better over two consecutive sessions.              A.  One word at end of sentence              B.  One word in middle of sentence              C.  Two key words in a sentence   See # 5 above.    10.  Demonstrate consonant perception in words with 80% accuracy or better over two consecutive sessions.              A.  Field of 2 (converted to minimal pair phrases/sentences)    1. /p/ - /t/:   60% (10 trials); last visit:  70%; continuing.    2.  /f/ - /h/:   80% (10 trials); last visit:  50%; continuing    3.  /d/ - /r/:  80% (10 trials); continuing.    4.  /b/ - /m/:  80% (10 trials); continuing.    5.  /p/ - /k/:  80%  (10 trials); continuing.              B.  Field of 4              C.  Field of 6    11.  [place] with picture context with 80% accuracy or better over two consecutive sessions.              A.  Appropriate response              B.  Repeats accurately    12.  [place] about a familiar topic with 80% accuracy or better over two consecutive sessions.              A.  Appropriate response to questions -- Discussed topic  with 90% accuracy in response to questions.  Continuing in home program.              B.  Converses    Comprehended 10 of 11 questions and responded appropriately.  Did not comprehend a comment.    13.  Connected discourse tracking with 80%  accuracy or better over two consecutive sessions.              A.  Appropriate response to requestions              B.  Converses         IMPRESSIONS:  This 79 yo man appears to present with   1.  Abnormal auditory perception.  2.  Cochlear implant in place on L.  3.  Bilateral SNHL; hearing aid in place on R.       RECOMMENDATIONS/PLAN OF CARE:  It is felt that Mr. Mayes would benefit from  ST on a once weekly basis with a home program to address aural rehab for 1-5 weeks.  (This element of the POC expires 6/13/25.)  Continued f/u with Dr. Cortez as directed.        Long-term goals:  Mr. Mayes will have improved auditory perception via his CI.

## 2025-05-29 ENCOUNTER — CLINICAL SUPPORT (OUTPATIENT)
Dept: SPEECH THERAPY | Facility: HOSPITAL | Age: 80
End: 2025-05-29
Payer: MEDICARE

## 2025-05-29 DIAGNOSIS — H90.3 SENSORINEURAL HEARING LOSS (SNHL) OF BOTH EARS: ICD-10-CM

## 2025-05-29 DIAGNOSIS — H93.293 ABNORMAL AUDITORY PERCEPTION, BILATERAL: Primary | ICD-10-CM

## 2025-05-29 DIAGNOSIS — Z96.21 COCHLEAR IMPLANT IN PLACE: ICD-10-CM

## 2025-05-29 PROCEDURE — 92507 TX SP LANG VOICE COMM INDIV: CPT | Mod: GN,HCNC | Performed by: SPEECH-LANGUAGE PATHOLOGIST

## 2025-05-29 NOTE — PATIENT INSTRUCTIONS
"Two key words in one sentence:  Use a sentence that is the same every time except for the where the manuel words go.  Tell Mr. Mayes what the category is.  See if he can tell what the last word is.  For example:  Grocery store items:  "I bought ___ and ___ at the store."  The category can be anything that has a lot of possibilities.    Both of you can read a short article from "[employer]" or another source  Then, sit side by side and try to have a short conversation about it.  Aim for 3-4 exchanges (more if you can).     Minimal pairs with consonants (beginning sounds):  Make a sentence or phrase with one of the words from a set of pairs and try to determine what the beginning sound was.    Distance:  At 6 feet say all or some of the phrases.  Then at 9 feet, repeat the same set in the same order.  "

## 2025-05-29 NOTE — PROGRESS NOTES
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 Neuro-otologist  LENGTH OF SESSION:  55 minutes    REASON FOR VISIT:  Aural rehab    INTERVAL HISTORY:  5/29/25:  In performing the distance task provided last visit, he found he could understand clearly at 6 feet, but at 9 feet, while volume was adequate, clarity was reduced to "[place] speech."    He also noticed with his cell phone that if he held it to his R ear and the HA was out, he could actually understand well.  Has not tried this with the work phone yet.    5/22/25:  Had increased volume from 5 to 6 last week.  Getting a "barrier"  -- "like the radio is not quite on the station" -- and it is challenging.    Also, tried putting  of work phone on processor laila, and could detect but not understand speech, so continuing to use speaker on ofc phone.  Finding /p/-/t/ minimal pair sentences challenging, but /f/-/h/ are less so.  Working to engage in conversation more at home and at work.    5/15/25:  Able to use speaker phone on work phone and communicate adequately with native English speakers, but cannot succeed with those with accents.    Feels he is doing well with home practice and improving re: comprehension of embedded key words.  Found discriminating between minimal pairs (consonants) at word level challenging, but had better success in sentences (though were not using minimal pair sentences).      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   Frequently using CI only purposely in order to facilitate progress.    4/17/25:  Daughter helped him do home program last night.  Finds that she speaks with a too-quiet volume typically for him.   Still primarily picking up a word " "here/there, but not getting larger chunks of sentences yet.    4/10/25:  Continues to hear and recognize a variety of sounds.  Often wearing just CI in an effort to "exercise" his comprehension of what he hears.  Finds he can tell when others are talking (e.g., in a conversation in which he is not involved), but can only make out the odd word.  But also has had instances of hearing and understanding speech directed at him from a distance (e.g., "Mr. Swift, go get [horse's name].")    4/3/25:  Notes improving clarity with CI alone in the form of speech beginning to sound like syllables.  Recognizing more environmental sounds.    3/25/25:    Mr. Mayes reported engaging in a variety of activities while attending to the sounds associated with them.  He reported perceiving music, birds chirping, wave noise (recorded), rain on a metal roof (which he recognized), and things being dropped at work.  He has also watched DIY videos on topics of interest to him and attempted to listen with just his CI (no CC).      INTERVENTION:  Short-term objectives:  Mr. Mayes will perform the following with % consistency/accuracy:  1.  Discriminate among Ling sounds with 80% accuracy or better over two consecutive sessions in fields of              A.  2 -- 92%; last visit: 75%              B.  4 -- 66%;  last visit: 75% 83%; 75%; 58%.  Today, confusing /m/ for /s/.  Suspending for now.              C.  6 -- deferred  2.  Discriminate among 3 stimuli differing in duration, stress and/or intonation with 80% accuracy or better over two consecutive sessions.              A.  Continuous vs discrete vs intermittent -- met at [facility]              B.  Word vs phrase vs sentence -- met at [facility]              C.  Sentences -- 100%; last visit:   78%; Goal met.              D.  Words varying in number of syllables (1-3) -- met at [facility]             3. Discriminate between words differing in vowels and consonants with 80% accuracy or better over " two consecutive sessions.              A.  Field of 2                          1.  Monosyllabic words (multiple pairings)     A.  Diphthongs vs Group 3:  97%; goal met     B.  Diphthongs vs Group 1:  91%; goal met     C.  Diphthongs vs Group 2:  96%; last visit:  72%; goal met.     D.  Group 1 vs Group 3: 100%; goal met.     E.  Group 2 vs Group 3:  100%; goal met.     F.  Group 1 vs Group 2:  deferred                          2.  Multi-syllabic words                                      A.  3-syllable -- 89%; goal met.                                      B.  Spondees -- 90%; goal met.                                      C.  Trochees -- 90%; goal met.              B.  Field of 4                           1.  Monosyllables                          2.  3-syllable -- 90%; last visit 75%;  70%; goal met.                          3.  Spondees -- 100% -- goal met.                          4.  Trochees -- 90%; last visit 80%; goal met.                C.  Field of 8                          1.  Monosyllables                          2.  3-syllable --  80%; last visit:  70%; 80%; goal met.                          3.  Spondees -- 100%; last visit:  60%; goal met.                          4.  Trochees -- 90%; last visit:  70%; 70%; 80% with multiple repetitions; goal met.        4.  Discriminate among 4 stimuli differing in duration, stress and/or intonation with 80% accuracy or better over two consecutive sessions.              A.  Phrases and sentences -- 100% with rare repetition needed; goal met.              B.  Words varying in number of syllables (single, spondee, trochee, 3-syllable) -- 100% for 1-syllable, spondee, and trochee words, and 90% with 3-syllable words; goal met.     5.  Identify key words in sentence context with 80% accuracy or better over two consecutive sessions.              A.  One word at end of sentence -- 90%;  last visit:  80%; 50%; 50%; 80%; goal met.              B.  One word in middle of  "sentence -- 90%; last visit 100%; 50%; 90%; goal met..              C.  Two key words in a sentence -- 80% with reps; last visit:  90% with reps; goal met.      6.  Differentiate stimuli with similar duration, but differing in stress and intonation with 80% accuracy or better over two consecutive sessions.              A.  Field of 2                          1.  Sentences                          2.  Words              B.  Field of 3 sentences   These were met via other tasks above.    7.  Discriminate among minimal pair words differing in vowel sound only with 80% accuracy or better over two consecutive sessions.              A.  Field of 2 (multiple pairings) -- goal met; see above.              B.  Field of 4:  90%; last visit 80%; goal met.    8.  Demonstrate comprehension of practiced sentences with 80% accuracy or better over two consecutive sessions.              A.  Appropriate response              B.  Repeats accurately    9.  Identify among sentences differing only in during of key words with 80% accuracy or better over two consecutive sessions.              A.  One word at end of sentence              B.  One word in middle of sentence              C.  Two key words in a sentence   See # 5 above.    10.  Demonstrate consonant perception in words with 80% accuracy or better over two consecutive sessions.              A.  Field of 2 (converted to minimal pair phrases/sentences)    1. /p/ - /t/:    90% (10 trials); last visit:  60%; 70%; continuing.    2.  /f/ - /h/:    100% (10 trials); last visit:  80%; 50%; goal met.    3.  /d/ - /r/:   90%: (10 trials); last visit:  80%; goal met.    4.  /b/ - /m/:   70% (10 trials); last visit:  80%; continuing.    5.  /p/ - /k/:   90% (10 trials); last visit:  80%; goal met.    6.  /t/ - /k/:  50% (10 trials)    7.  /t/ - "ch":  70% (10 trials)              B.  Field of 4              C.  Field of 6    11.  [place] with picture context with 80% accuracy or better " over two consecutive sessions.              A.  Appropriate response              B.  Repeats accurately    12.  [place] about a familiar topic with 80% accuracy or better over two consecutive sessions.              A.  Appropriate response to questions -- Discussed topic  with 85%  accuracy in response to questions; 90% last visit.  Was not able to track a comment.  Continuing in home program.              B.  Converses    Comprehended 11 of 13  questions and responded appropriately.  Did not comprehend a comment.    13.  Connected discourse tracking with 80% accuracy or better over two consecutive sessions.              A.  Appropriate response to requestions              B.  Converses         IMPRESSIONS:  This 81 yo man appears to present with   1.  Abnormal auditory perception.  2.  Cochlear implant in place on L.  3.  Bilateral SNHL; hearing aid in place on R.       RECOMMENDATIONS/PLAN OF CARE:  It is felt that Mr. Mayes would benefit from  ST on a once weekly basis with a home program to address aural rehab for 1-4 weeks.  (This element of the POC expires 6/13/25.)  Continued f/u with Dr. Cortez as directed.        Long-term goals:  Mr. Mayes will have improved auditory perception via his CI.

## 2025-06-03 ENCOUNTER — CLINICAL SUPPORT (OUTPATIENT)
Dept: SPEECH THERAPY | Facility: HOSPITAL | Age: 80
End: 2025-06-03
Payer: MEDICARE

## 2025-06-03 DIAGNOSIS — H93.293 ABNORMAL AUDITORY PERCEPTION, BILATERAL: Primary | ICD-10-CM

## 2025-06-03 DIAGNOSIS — Z96.21 COCHLEAR IMPLANT IN PLACE: ICD-10-CM

## 2025-06-03 DIAGNOSIS — H90.3 SENSORINEURAL HEARING LOSS (SNHL) OF BOTH EARS: ICD-10-CM

## 2025-06-03 PROCEDURE — 92507 TX SP LANG VOICE COMM INDIV: CPT | Mod: GN | Performed by: SPEECH-LANGUAGE PATHOLOGIST

## 2025-06-09 ENCOUNTER — OFFICE VISIT (OUTPATIENT)
Dept: DERMATOLOGY | Facility: CLINIC | Age: 80
End: 2025-06-09
Payer: MEDICARE

## 2025-06-09 DIAGNOSIS — Z12.83 SKIN CANCER SCREENING: ICD-10-CM

## 2025-06-09 DIAGNOSIS — D22.9 MULTIPLE BENIGN NEVI: ICD-10-CM

## 2025-06-09 DIAGNOSIS — L81.4 LENTIGINES: ICD-10-CM

## 2025-06-09 DIAGNOSIS — Z85.828 HISTORY OF NONMELANOMA SKIN CANCER: ICD-10-CM

## 2025-06-09 DIAGNOSIS — L82.1 SK (SEBORRHEIC KERATOSIS): ICD-10-CM

## 2025-06-09 DIAGNOSIS — D48.5 NEOPLASM OF UNCERTAIN BEHAVIOR OF SKIN: Primary | ICD-10-CM

## 2025-06-09 DIAGNOSIS — L57.0 AK (ACTINIC KERATOSIS): ICD-10-CM

## 2025-06-09 PROCEDURE — 99999 PR PBB SHADOW E&M-EST. PATIENT-LVL IV: CPT | Mod: PBBFAC,,, | Performed by: DERMATOLOGY

## 2025-06-09 PROCEDURE — 1159F MED LIST DOCD IN RCRD: CPT | Mod: CPTII,S$GLB,, | Performed by: DERMATOLOGY

## 2025-06-09 PROCEDURE — 1160F RVW MEDS BY RX/DR IN RCRD: CPT | Mod: CPTII,S$GLB,, | Performed by: DERMATOLOGY

## 2025-06-09 PROCEDURE — 11102 TANGNTL BX SKIN SINGLE LES: CPT | Mod: S$GLB,,, | Performed by: DERMATOLOGY

## 2025-06-09 PROCEDURE — 11103 TANGNTL BX SKIN EA SEP/ADDL: CPT | Mod: S$GLB,,, | Performed by: DERMATOLOGY

## 2025-06-09 PROCEDURE — 99213 OFFICE O/P EST LOW 20 MIN: CPT | Mod: 25,S$GLB,, | Performed by: DERMATOLOGY

## 2025-06-09 PROCEDURE — 88305 TISSUE EXAM BY PATHOLOGIST: CPT | Mod: TC,91 | Performed by: DERMATOLOGY

## 2025-06-09 PROCEDURE — 1101F PT FALLS ASSESS-DOCD LE1/YR: CPT | Mod: CPTII,S$GLB,, | Performed by: DERMATOLOGY

## 2025-06-09 PROCEDURE — 1126F AMNT PAIN NOTED NONE PRSNT: CPT | Mod: CPTII,S$GLB,, | Performed by: DERMATOLOGY

## 2025-06-09 PROCEDURE — 3288F FALL RISK ASSESSMENT DOCD: CPT | Mod: CPTII,S$GLB,, | Performed by: DERMATOLOGY

## 2025-06-09 PROCEDURE — 17000 DESTRUCT PREMALG LESION: CPT | Mod: XS,S$GLB,, | Performed by: DERMATOLOGY

## 2025-06-09 PROCEDURE — 17003 DESTRUCT PREMALG LES 2-14: CPT | Mod: S$GLB,,, | Performed by: DERMATOLOGY

## 2025-06-09 NOTE — PATIENT INSTRUCTIONS
Shave Biopsy Wound Care    Your doctor has performed a shave biopsy today.  A band aid and vaseline ointment has been placed over the site.  This should remain in place for NO LONGER THAN 48 hours.  It is fine to remove the bandaid after 24 hours, if the area is no longer bleeding. It is recommended that you keep the area dry (do not wet)) for the first 24 hours.  After 24 hours, wash the area with warm soap and water and apply Vaseline jelly.  Many patients prefer to use Neosporin or Bacitracin ointment.  This is acceptable; however, know that you can develop an allergy to this medication even if you have used it safely for years.  It is important to keep the area moist.  Letting it dry out and get air slows healing time, and will worsen the scar.        If you notice increasing redness, tenderness, pain, or yellow drainage at the biopsy site, please notify your doctor.  These are signs of an infection.    If your biopsy site is bleeding, apply firm pressure for 15 minutes straight.  Repeat for another 15 minutes, if it is still bleeding.   If the surgical site continues to bleed, then please contact your doctor.      For [facility] users:   You will receive your biopsy results in [facility] as soon as they are available. Please be assured that your physician/provider will review your results and will then determine what further treatment, evaluation, or planning is required. You should be contacted by your physician's/provider's office within 5 business days of receiving your results; If not, please reach out to directly. This is one more way OUTSIDE [facility].     [facility] [place] [address]/ [phone] FAX/ [email or web address]     CRYOSURGERY      Your doctor has used a method called cryosurgery to treat your skin condition. Cryosurgery refers to the use of very cold substances to treat a variety of skin conditions such as warts, pre-skin cancers, molluscum contagiosum,  sun spots, and several benign growths. The substance we use in cryosurgery is liquid nitrogen and is so cold (-195 degrees Celsius) that is burns when administered.     Following treatment in the office, the skin may immediately burn and become red. You may find the area around the lesion is affected as well. It is sometimes necessary to treat not only the lesion, but a small area of the surrounding normal skin to achieve a good response.     A blister, and even a blood filled blister, may form after treatment.   This is a normal response. If the blister is painful, it is acceptable to sterilize a needle and with rubbing alcohol and gently pop the blister. It is important that you gently wash the area with soap and warm water as the blister fluid may contain wart virus if a wart was treated. Do no remove the roof of the blister.     The area treated can take anywhere from 1-3 weeks to heal. Healing time depends on the kind skin lesion treated, the location, and how aggressively the lesion was treated. It is recommended that the areas treated are covered with Vaseline or bacitracin ointment and a band-aid. If a band-aid is not practical, just ointment applied several times per day will do. Keeping these areas moist will speed the healing time.    Treatment with liquid nitrogen can leave a scar. In dark skin, it may be a light or dark scar, in light skin it may be a white or pink scar. These will generally fade with time, but may never go away completely.     If you have any concerns after your treatment, please feel free to call the office.       [address] [place] [address]/ [phone] FAX/ [email or web address]     Sun Protection      The [facility] would like to remind you of the importance of sun protection all year round and particularly during the summer when the suns rays are the strongest. It has been proven that both acute and chronic sun exposure damages  our cells and leads to skin cancer. Beyond skin cancer, the sun causes 90% of the symptoms of premature skin aging, including wrinkles, lentigines (brown spots), and thin, easily bruised skin. Proper sun protection can help prevent these unwanted conditions.    Many patients report that they dont go in the sun. It has been shown that the average person receives 18 hours of incidental sun exposure per week during activities such as walking through parking lots, driving, or sitting next to windows. This accumulates to several bad sunburns per year!    In choosing sunscreen, you want one that protects against both UVA and UVB rays (broad spectrum). It is recommended that you use one of SPF 30 or higher. It is important to apply the sunscreen about 20 minutes prior to sun exposure. Most sunscreens are chemical sunscreens and a reaction must take place in the skin so that they are effective. If they are applied and then you are immediately exposed to the sun or start sweating, this reaction has not had time to take place and you are therefore unprotected. Sunscreen needs to be reapplied every 2 hours if you are participating in water sports or sweating. We recommend Elta MD or CeraVe sunscreens for daily use; however there are many options and it is most important for you to find one that you will use on a consistent basis.    If you have sensitive skin, you may do best with a sunscreen that contains only physical blockers in the active ingredient section. The only physical blockers available in the [place] currently are titanium dioxide or zinc oxide. These are typically thicker and harder to apply, however they afford very good protection. Neutrogena Sensitive Skin, Blue Lizard Sensitive Skin (pink top) or Neutrogena Pure and Free are popular ones.     Aside from sunscreen, clothes with UV protection (UPF), wide brimmed hats, and sunglasses are other means of sun protection that we recommend.      Based on a recent  study (6/2021) and out of an abundance of caution, we are recommending that you AVOID the following sunscreens as they may contain the carcinogen, benzene:    Spray and gel sunscreens  Any [employer] or [employer] brands as well as Max Block and TopCare brands   Neutrogena Ultra Sheer Dry-touch Water Resistant Sunscreen LOTION SPF 70   Neutrogena Sheer Zinc Dry-touch Face Sunscreen LOTION SPF 50   5.   Aveeno Baby Continuous Protection Sensitive Skin Sunscreen LOTION - Broad Spectrum SPF 50    Please note that Benzene is not an ingredient or the degradation product of any ingredient in any sunscreen. This study suggested that the findings are a result of contamination in the manufacturing process. At this point, we don't know how effectively Benzene gets through the skin, if it gets absorbed systemically, and what effects it may have.     We do know that ultraviolet radiation is a well-established carcinogen. Please use daily sun protection/avoidance and use of at least SPF 30, broad-spectrum sunscreen not listed above.                       [facility] [address]  [address]  [place] [address]  Dept: [phone]  Dept Fax: [phone]

## 2025-06-09 NOTE — PROGRESS NOTES
Subjective:      Patient ID:  Jamison Mayes is a 80 y.o. male who presents for   Chief Complaint   Patient presents with    Skin Check     HPI    Pt here today for an UBSE. Pt denies any new, dry, scaly or bleeding lesions.     Pt has cochlear implant in left ear 1/2025 (avoid hyfrecator).     Has hx of SCC.  Has previously used cryo, Efudex, and PDT for AKs.    Review of Systems   Constitutional:  Negative for fever and chills.   Skin:  Negative for itching and rash.       Objective:   Physical Exam   Constitutional: He appears well-developed and well-nourished.   Neurological: He is alert and oriented to person, place, and time.   Psychiatric: He has a normal mood and affect.   Skin:   Areas Examined (abnormalities noted in diagram):   Scalp / Hair Palpated and Inspected  Head / Face Inspection Performed  Neck Inspection Performed  Chest / Axilla Inspection Performed  Abdomen Inspection Performed  Back Inspection Performed  RUE Inspected  LUE Inspection Performed                       Diagram Legend     Erythematous scaling macule/papule c/w actinic keratosis       Vascular papule c/w angioma      Pigmented verrucoid papule/plaque c/w seborrheic keratosis      Yellow umbilicated papule c/w sebaceous hyperplasia      Irregularly shaped tan macule c/w lentigo     1-2 mm smooth white papules consistent with Milia      Movable subcutaneous cyst with punctum c/w epidermal inclusion cyst      Subcutaneous movable cyst c/w pilar cyst      Firm pink to brown papule c/w dermatofibroma      Pedunculated fleshy papule(s) c/w skin tag(s)      Evenly pigmented macule c/w junctional nevus     Mildly variegated pigmented, slightly irregular-bordered macule c/w mildly atypical nevus      Flesh colored to evenly pigmented papule c/w intradermal nevus       Pink pearly papule/plaque c/w basal cell carcinoma      Erythematous hyperkeratotic cursted plaque c/w SCC      Surgical scar with no sign of skin cancer recurrence       Open and closed comedones      Inflammatory papules and pustules      Verrucoid papule consistent consistent with wart     Erythematous eczematous patches and plaques     Dystrophic onycholytic nail with subungual debris c/w onychomycosis     Umbilicated papule    Erythematous-base heme-crusted tan verrucoid plaque consistent with inflamed seborrheic keratosis     Erythematous Silvery Scaling Plaque c/w Psoriasis     See annotation      Assessment / Plan:    Neoplasm of uncertain behavior of skin  Shave biopsy procedure note:    Shave biopsy performed after verbal consent including risk of infection, scar, recurrence, need for additional treatment of site. Area prepped with alcohol, anesthetized with approximately 1.0cc of 1% lidocaine with epinephrine. Lesional tissue shaved with razor blade. Hemostasis achieved with application of aluminum chloride followed by [facility]. No complications. Dressing applied. Wound care explained.    -     Specimen to Pathology, Dermatology    Pathology Orders:       Normal Orders This Visit    Specimen to Pathology, Dermatology     Questions:    Procedure Type: Dermatology and skin neoplasms    Number of Specimens: 2    ------------------------: -------------------------    Spec 1 Procedure: Shave Biopsy    Spec 1 Clinical Impression: r/o BCC vs other    Spec 1 Source: L nasal tip    ------------------------: -------------------------    Spec 2 Procedure: Shave Biopsy    Spec 2 Clinical Impression: r/o HAK vs SCC vs other    Spec 2 Source: L top of scalp    Clinical Information: see EPIC    Clinical History: see EPIC    Specimen Source: Skin    Release to patient: Immediate    Send normal result to authorizing provider's In Basket if patient is active on MyChart: Yes          AK (actinic keratosis)  Cryosurgery Procedure Note    Verbal consent from the patient is obtained including, but not limited to, risk of hypopigmentation/hyperpigmentation, scar, recurrence of lesion. The  patient is aware of the precancerous quality and need for treatment of these lesions. Liquid nitrogen cryosurgery is applied to the 11 actinic keratoses, as detailed in the physical exam, to produce a freeze injury. The patient is aware that blisters may form and is instructed on wound care with gentle cleansing and use of vaseline ointment to keep moist until healed. The patient is supplied a handout on cryosurgery and is instructed to call if lesions do not completely resolve.    SK (seborrheic keratosis)  These are benign inherited growths without a malignant potential. Reassurance given to patient. No treatment is necessary.   Treatment of benign, asymptomatic lesions may be considered cosmetic.  Warned about risk of hypo- or hyperpigmentation with treatment and risk of recurrence.    Lentigines  These are benign sun spots which should be monitored for changes. Patient instructed in importance of daily broad spectrum sunscreen use with spf at least 30. Sun avoidance and topical protection/protective clothing discussed.    Multiple benign nevi  Benign-appearing nevi present on exam today. Reassurance provided. Periodically examine moles and return to clinic if any moles change or become symptomatic (bleeding, itching, pain, etc).    Skin cancer screening  History of nonmelanoma skin cancer  Upper body skin examination performed today including at least 6 points as noted in physical examination. Suspicious lesions noted above.  Patient instructed in importance of daily broad spectrum sunscreen use with spf at least 30. Sun avoidance and topical protection/protective clothing discussed.    Follow up in about 4 months (around 10/9/2025) for skin check or sooner pending biopsy results.

## 2025-06-11 ENCOUNTER — RESULTS FOLLOW-UP (OUTPATIENT)
Dept: DERMATOLOGY | Facility: CLINIC | Age: 80
End: 2025-06-11

## 2025-06-11 ENCOUNTER — TELEPHONE (OUTPATIENT)
Dept: ENDOSCOPY | Facility: HOSPITAL | Age: 80
End: 2025-06-11
Payer: MEDICARE

## 2025-06-11 ENCOUNTER — PATIENT MESSAGE (OUTPATIENT)
Dept: DERMATOLOGY | Facility: CLINIC | Age: 80
End: 2025-06-11
Payer: MEDICARE

## 2025-06-11 VITALS — HEIGHT: 68 IN | BODY MASS INDEX: 27.28 KG/M2 | WEIGHT: 180 LBS

## 2025-06-11 DIAGNOSIS — D09.9 SQUAMOUS CELL CARCINOMA IN SITU (SCCIS): Primary | ICD-10-CM

## 2025-06-11 DIAGNOSIS — D04.22 SQUAMOUS CELL CARCINOMA IN SITU (SCCIS) OF SKIN OF HELIX OF LEFT EAR: ICD-10-CM

## 2025-06-11 DIAGNOSIS — Z12.11 COLON CANCER SCREENING: ICD-10-CM

## 2025-06-11 DIAGNOSIS — R93.3 ABNORMAL FINDING ON GI TRACT IMAGING: Primary | ICD-10-CM

## 2025-06-11 LAB
ESTROGEN SERPL-MCNC: NORMAL PG/ML
INSULIN SERPL-ACNC: NORMAL U[IU]/ML
LAB AP CLINICAL INFORMATION: NORMAL
LAB AP GROSS DESCRIPTION: NORMAL
LAB AP REPORT FOOTNOTES: NORMAL
T3RU NFR SERPL: NORMAL %

## 2025-06-11 RX ORDER — FLUOROURACIL 50 MG/G
CREAM TOPICAL
Qty: 40 G | Refills: 1 | Status: SHIPPED | OUTPATIENT
Start: 2025-06-11

## 2025-06-11 RX ORDER — SODIUM, POTASSIUM,MAG SULFATES 17.5-3.13G
1 SOLUTION, RECONSTITUTED, ORAL ORAL DAILY
Qty: 1 KIT | Refills: 0 | Status: SHIPPED | OUTPATIENT
Start: 2025-06-11

## 2025-06-11 NOTE — TELEPHONE ENCOUNTER
"----- Message -----   From: LESLIE Corley MD   Sent: 2025   3:48 PM CST   To: [facility]     Procedure: Colonoscopy     Diagnosis: Abnormal finding on GI tract imaging     Procedure Timin-12 weeks     *If within 4 weeks selected, please portillo as high priority*     *If greater than 12 weeks, please select "5-12 weeks" and delay sending until 3 months prior to requested date*     Location: Any Site.  Prefers [place].  Ok with Dr. Stanley     Additional Scheduling Information: No scheduling concerns     Prep Specifications:Standard prep     Is the patient taking a GLP-1 Agonist:no     Have you attached a patient to this message: yes       Thank you!   "

## 2025-06-11 NOTE — TELEPHONE ENCOUNTER
"Patient is scheduled for a Colonoscopy on 25 with Dr. PEDRO Stanley  Referral for procedure from [facility]        ----- Message -----   From: LESLIE Corley MD   Sent: 2025   3:48 PM CST   To: [facility]     Procedure: Colonoscopy     Diagnosis: Abnormal finding on GI tract imaging     Procedure Timin-12 weeks     *If within 4 weeks selected, please portillo as high priority*     *If greater than 12 weeks, please select "5-12 weeks" and delay sending until 3 months prior to requested date*     Location: Any Site.  Prefers [place].  Ok with Dr. Stanley     Additional Scheduling Information: No scheduling concerns     Prep Specifications:Standard prep     Is the patient taking a GLP-1 Agonist:no     Have you attached a patient to this message: yes       Thank you!         "

## 2025-06-11 NOTE — TELEPHONE ENCOUNTER
Telephoned pt and pt's wife, Tricia, to schedule Colonoscopy with no answer.  Left voicemail message for pt to return call.  As multiple attempts have been made to contact pt, letter sent via portal as final attempt to schedule.

## 2025-06-12 ENCOUNTER — CLINICAL SUPPORT (OUTPATIENT)
Dept: SPEECH THERAPY | Facility: HOSPITAL | Age: 80
End: 2025-06-12
Payer: MEDICARE

## 2025-06-12 DIAGNOSIS — H90.3 SENSORINEURAL HEARING LOSS (SNHL) OF BOTH EARS: ICD-10-CM

## 2025-06-12 DIAGNOSIS — H93.293 ABNORMAL AUDITORY PERCEPTION, BILATERAL: Primary | ICD-10-CM

## 2025-06-12 DIAGNOSIS — Z96.21 COCHLEAR IMPLANT IN PLACE: ICD-10-CM

## 2025-06-12 PROCEDURE — 92507 TX SP LANG VOICE COMM INDIV: CPT | Mod: GN | Performed by: SPEECH-LANGUAGE PATHOLOGIST

## 2025-06-12 NOTE — PROGRESS NOTES
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 Neuro-otologist  LENGTH OF SESSION:  55 minutes    REASON FOR VISIT:  Aural rehab    INTERVAL HISTORY:  6/12/25:  Still without hearing aid.  Has had poor success attempting to f/u with [employer] and will try another company this week.  Requested guidance re: which level of device to purchase.     Understood about 80% of utterances at 12 feet in distance home task.  Estimated that in real life, Mrs. Mayes has about 50-50 chance of gaining his attention at that distance.     at work asked Mr. Mayes if he would be offended if he provided a tag to wear stating "hearing impaired."  Mr. 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    Noted that he has good days and bad days re: hearing.  Definitely notes a fatigue factor negatively affecting success.  Most challenging situations in which to hear area [place] and at the farm.  He's also concerned about wearing the processor and sweating at the farm.    6/3/25:  His HA (R-sided) is broken and out for assessment/repair.  He reported that on waking and donning his CI, he feels like he is having a hard time adjusting w/o the HA.   Performed the distance tasks at home as prescribed and found the auditory training task was helpful with more than 50% success at 9 feet and 40-50% success at 12 feet.      5/29/25:  In performing the distance task provided last visit, he found he could understand clearly at 6 feet, but at 9 feet, while volume was adequate, clarity was reduced to "Axel Duck speech."    He also noticed with his cell phone that if he held it to his R ear and the HA was out, he could actually understand well.  Has not tried this with the work phone yet.    5/22/25:  Had increased volume from 5 to 6 last week.  " "Getting a "barrier"  -- "like the radio is not quite on the station" -- and it is challenging.    Also, tried putting  of work phone on processor laila, and could detect but not understand speech, so continuing to use speaker on ofc phone.  Finding /p/-/t/ minimal pair sentences challenging, but /f/-/h/ are less so.  Working to engage in conversation more at home and at work.    5/15/25:  Able to use speaker phone on work phone and communicate adequately with native English speakers, but cannot succeed with those with accents.    Feels he is doing well with home practice and improving re: comprehension of embedded key words.  Found discriminating between minimal pairs (consonants) at word level challenging, but had better success in sentences (though were not using minimal pair sentences).      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   Frequently using CI only purposely in order to facilitate progress.    4/17/25:  Daughter helped him do home program last night.  Finds that she speaks with a too-quiet volume typically for him.   Still primarily picking up a word here/there, but not getting larger chunks of sentences yet.    4/10/25:  Continues to hear and recognize a variety of sounds.  Often wearing just CI in an effort to "exercise" his comprehension of what he hears.  Finds he can tell when others are talking (e.g., in a conversation in which he is not involved), but can only make out the odd word.  But also has had instances of hearing and understanding speech directed at him from a distance (e.g., "Mr. Swift, go get [horse's name].")    4/3/25:  Notes improving clarity with CI alone in the form of speech beginning to sound like syllables.  Recognizing more environmental sounds.    3/25/25:    Mr. Mayes reported engaging in a variety " of activities while attending to the sounds associated with them.  He reported perceiving music, birds chirping, wave noise (recorded), rain on a metal roof (which he recognized), and things being dropped at work.  He has also watched DIY videos on topics of interest to him and attempted to listen with just his CI (no CC).      INTERVENTION:  Short-term objectives:  Mr. Mayes will perform the following with % consistency/accuracy:  1.  Discriminate among Ling sounds with 80% accuracy or better over two consecutive sessions in fields of              A.  2 -- 92%; last visit: 75%              B.  4 -- 66%;  last visit: 75% 83%; 75%; 58%.  Today, confusing /m/ for /s/.  Suspending for now.              C.  6 -- deferred  2.  Discriminate among 3 stimuli differing in duration, stress and/or intonation with 80% accuracy or better over two consecutive sessions.              A.  Continuous vs discrete vs intermittent -- met at [facility]              B.  Word vs phrase vs sentence -- met at [facility]              C.  Sentences -- 100%; last visit:   78%; Goal met.              D.  Words varying in number of syllables (1-3) -- met at [facility]             3. Discriminate between words differing in vowels and consonants with 80% accuracy or better over two consecutive sessions.              A.  Field of 2                          1.  Monosyllabic words (multiple pairings)     A.  Diphthongs vs Group 3:  97%; goal met     B.  Diphthongs vs Group 1:  91%; goal met     C.  Diphthongs vs Group 2:  96%; last visit:  72%; goal met.     D.  Group 1 vs Group 3: 100%; goal met.     E.  Group 2 vs Group 3:  100%; goal met.     F.  Group 1 vs Group 2:  deferred                          2.  Multi-syllabic words                                      A.  3-syllable -- 89%; goal met.                                      B.  Spondees -- 90%; goal met.                                      C.  Trochees -- 90%; goal met.              B.  Field of 4                            1.  Monosyllables                          2.  3-syllable -- 90%; last visit 75%;  70%; goal met.                          3.  Spondees -- 100% -- goal met.                          4.  Trochees -- 90%; last visit 80%; goal met.                C.  Field of 8                          1.  Monosyllables                          2.  3-syllable --  80%; last visit:  70%; 80%; goal met.                          3.  Spondees -- 100%; last visit:  60%; goal met.                          4.  Trochees -- 90%; last visit:  70%; 70%; 80% with multiple repetitions; goal met.        4.  Discriminate among 4 stimuli differing in duration, stress and/or intonation with 80% accuracy or better over two consecutive sessions.              A.  Phrases and sentences -- 100% with rare repetition needed; goal met.              B.  Words varying in number of syllables (single, spondee, trochee, 3-syllable) -- 100% for 1-syllable, spondee, and trochee words, and 90% with 3-syllable words; goal met.     5.  Identify key words in sentence context with 80% accuracy or better over two consecutive sessions.              A.  One word at end of sentence -- 90%;  last visit:  80%; 50%; 50%; 80%; goal met.              B.  One word in middle of sentence -- 90%; last visit 100%; 50%; 90%; goal met..              C.  Two key words in a sentence -- 80% with reps; last visit:  90% with reps; goal met.      6.  Differentiate stimuli with similar duration, but differing in stress and intonation with 80% accuracy or better over two consecutive sessions.              A.  Field of 2                          1.  Sentences                          2.  Words              B.  Field of 3 sentences   These were met via other tasks above.    7.  Discriminate among minimal pair words differing in vowel sound only with 80% accuracy or better over two consecutive sessions.              A.  Field of 2 (multiple pairings) -- goal met; see above.      "         B.  Field of 4:  90%; last visit 80%; goal met.    8.  Demonstrate comprehension of practiced sentences with 80% accuracy or better over two consecutive sessions.              A.  Appropriate response              B.  Repeats accurately    9.  Identify among sentences differing only in during of key words with 80% accuracy or better over two consecutive sessions.              A.  One word at end of sentence              B.  One word in middle of sentence              C.  Two key words in a sentence   See # 5 above.    10.  Demonstrate consonant perception in words with 80% accuracy or better over two consecutive sessions.              A.  Field of 2 (converted to minimal pair phrases/sentences)    1. /p/ - /t/:    60% (10 trials); last visit:  40%; 90%;  60%; 70%; continuing.    2.  /f/ - /h/:    100% (10 trials); last visit:  80%; 50%; goal met.    3.  /d/ - /r/:   90%: (10 trials); last visit:  80%; goal met.    4.  /b/ - /m/:   100% (10 trials); last visit:  70%;  80%; goal met.    5.  /p/ - /k/:   90% (10 trials); last visit:  80%; goal met.    6.  /t/ - /k/:   60% (10 trials); last visit:  60%; 50%; continuing    7.  /t/ - "ch":   50% (10 trials); last visit:  80%; 70%; continuing.    8.  /w/ - /r/:   70% (10 trials); last visit:  50%; continuing.    9.  /d/ - /n/:  100%;  (10 trials) last visit: 90% goal met.              B.  Field of 4    1.  Words; non-cognates:   80% (10 trials); last visit:  80%; goal met for set using f, h, p, k, r, d              C.  Field of 6    11.  Rhea with picture context with 80% accuracy or better over two consecutive sessions.              A.  Appropriate response              B.  Repeats accurately    12.  Rhea about a familiar topic with 80% accuracy or better over two consecutive sessions.              A.  Appropriate response to questions -- Discussed topic  with  80%  accuracy in response to questions; 60%; 85%; 90% last visit.  Continuing in home " "program.              B.  Converses    Comprehended 8 of 10  questions and responded appropriately.      13.  Connected discourse tracking with 80% accuracy or better over two consecutive sessions.              A.  Appropriate response to requestions              B.  Conversakiko     COUNSELING:  Recommended that he discuss type of HA to buy with his audiologist.  Also recommended that he ask her about wearing the processor when sweating.   Reviewed specs for the mini laila and it appears to stream up to 82 feet with clear line of sight.  As he already sits near the front of [facility] and near the center, asked if he thought his  would wear the mini laila.  He thought he would and will try this week.  Also recommend it with the "caller" at the farm (woman telling staff how to walk the horses) if Audiology clears his to use the processor in a sweaty setting.    IMPRESSIONS:  This 79 yo man appears to present with   1.  Abnormal auditory perception.  2.  Cochlear implant in place on L.  3.  Bilateral SNHL; hearing aid in place on R.       RECOMMENDATIONS/PLAN OF CARE:  It is felt that Mr. Mayes would benefit from  ST on a once weekly basis with a home program to address aural rehab for 1-2 weeks.  (This element of the POC expires 6/30/25.)  Continued f/u with Dr. Cortez as directed.        Long-term goals:  Mr. Mayes will have improved auditory perception via his CI.                         "

## 2025-06-12 NOTE — PATIENT INSTRUCTIONS
"Both of you can read a short article from "[employer]" or another source  Then, sit side by side and try to have a short conversation about it.  Aim for 3-4 exchanges (more if you can).     Minimal pairs with consonants (beginning sounds):    Pairs:  Warm up for the challenging pairs by letting Mr. Mayes just listen as you say and point to the words.  Then, make a sentence or phrase with one of the words from a set of pairs and try to determine what the beginning sound was.  Set of four:  Just say a word and let Mr. Mayes point to the one he heard.    Distance:  In real situations, at up to 12 feet, try to get Mr. Myaes's attention.  Once you have it, say something and see if it is understood.  Keep it short.  "

## 2025-06-13 ENCOUNTER — PATIENT MESSAGE (OUTPATIENT)
Dept: DERMATOLOGY | Facility: CLINIC | Age: 80
End: 2025-06-13
Payer: MEDICARE

## 2025-06-13 ENCOUNTER — PATIENT MESSAGE (OUTPATIENT)
Dept: AUDIOLOGY | Facility: CLINIC | Age: 80
End: 2025-06-13
Payer: MEDICARE

## 2025-06-17 ENCOUNTER — CLINICAL SUPPORT (OUTPATIENT)
Dept: SPEECH THERAPY | Facility: HOSPITAL | Age: 80
End: 2025-06-17
Payer: MEDICARE

## 2025-06-17 ENCOUNTER — LAB VISIT (OUTPATIENT)
Dept: LAB | Facility: HOSPITAL | Age: 80
End: 2025-06-17
Attending: FAMILY MEDICINE
Payer: MEDICARE

## 2025-06-17 DIAGNOSIS — H90.3 SENSORINEURAL HEARING LOSS (SNHL) OF BOTH EARS: ICD-10-CM

## 2025-06-17 DIAGNOSIS — I10 ESSENTIAL HYPERTENSION: ICD-10-CM

## 2025-06-17 DIAGNOSIS — H93.293 ABNORMAL AUDITORY PERCEPTION, BILATERAL: Primary | ICD-10-CM

## 2025-06-17 DIAGNOSIS — E11.8 DM (DIABETES MELLITUS) WITH COMPLICATIONS: ICD-10-CM

## 2025-06-17 DIAGNOSIS — Z96.21 COCHLEAR IMPLANT IN PLACE: ICD-10-CM

## 2025-06-17 LAB
ALBUMIN/CREAT UR: 15.1 UG/MG
CHOLEST SERPL-MCNC: 135 MG/DL (ref 120–199)
CHOLEST/HDLC SERPL: 3.9 {RATIO} (ref 2–5)
CREAT UR-MCNC: 73 MG/DL (ref 23–375)
EAG (OHS): 183 MG/DL (ref 68–131)
HBA1C MFR BLD: 8 % (ref 4–5.6)
HDLC SERPL-MCNC: 35 MG/DL (ref 40–75)
HDLC SERPL: 25.9 % (ref 20–50)
LDLC SERPL CALC-MCNC: 70.4 MG/DL (ref 63–159)
MICROALBUMIN UR-MCNC: 11 UG/ML (ref ?–5000)
NONHDLC SERPL-MCNC: 100 MG/DL
TRIGL SERPL-MCNC: 148 MG/DL (ref 30–150)

## 2025-06-17 PROCEDURE — 80061 LIPID PANEL: CPT | Mod: HCNC,PN

## 2025-06-17 PROCEDURE — 83036 HEMOGLOBIN GLYCOSYLATED A1C: CPT | Mod: HCNC,PN

## 2025-06-17 PROCEDURE — 36415 COLL VENOUS BLD VENIPUNCTURE: CPT | Mod: HCNC,PN

## 2025-06-17 PROCEDURE — 92507 TX SP LANG VOICE COMM INDIV: CPT | Mod: GN,HCNC | Performed by: SPEECH-LANGUAGE PATHOLOGIST

## 2025-06-17 PROCEDURE — 82043 UR ALBUMIN QUANTITATIVE: CPT | Mod: HCNC,PN

## 2025-06-17 NOTE — Clinical Note
"Darcie Norris -- I think Mr. Mayes has an appt with you in about 10 days.  He has been without his HA, but will hopefully at least order a new one this afternoon.  The past two weeks he has complained that he has good and bad days with the CI alone -- and today was a "bad" he reported on arrival.  He said things sounded "real distant."   He does great with speech reading paired with listening, but gets about 75% of conversation without speech reading.    I'm hoping this (and anything in the notes) is useful when you see him if it gives you information that is helpful for any adjustments that may be made.  Please let me know anything I might be able to tell you. Dinorah"

## 2025-06-17 NOTE — PROGRESS NOTES
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 Neuro-otologist  LENGTH OF SESSION:  50 minutes    REASON FOR VISIT:  Aural rehab    INTERVAL HISTORY:  6/17/25:  Still w/o hearing air; has appt this afternoon.  Feels today is a "bad" day in terms of his perception via CI alone.  Feels things sound "real distant."   Forgot to try mini laila with .    6/12/25:  Still without hearing aid.  Has had poor success attempting to f/u with [employer] and will try another company this week.  Requested guidance re: which level of device to purchase.     Understood about 80% of utterances at 12 feet in distance home task.  Estimated that in real life, Mrs. Mayes has about 50-50 chance of gaining his attention at that distance.     at work asked Mr. Mayes if he would be offended if he provided a tag to wear stating "hearing impaired."  Mr. 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    Noted that he has good days and bad days re: hearing.  Definitely notes a fatigue factor negatively affecting success.  Most challenging situations in which to hear area [place] and [place] the farm.  He's also concerned about wearing the processor and sweating [place] the farm.    6/3/25:  His HA (R-sided) is broken and out for assessment/repair.  He reported that on waking and donning his CI, he feels like he is having a hard time adjusting w/o the HA.   Performed the distance tasks at home as prescribed and found the auditory training task was helpful with more than 50% success at 9 feet and 40-50% success at 12 feet.      5/29/25:  In performing the distance task provided last visit, he found he could understand clearly at 6 feet, but at 9 feet, while volume was adequate, clarity was reduced to "Axel Duck speech."    He also noticed with his " "cell phone that if he held it to his R ear and the HA was out, he could actually understand well.  Has not tried this with the work phone yet.    5/22/25:  Had increased volume from 5 to 6 last week.  Getting a "barrier"  -- "like the radio is not quite on the station" -- and it is challenging.    Also, tried putting  of work phone on processor laila, and could detect but not understand speech, so continuing to use speaker on ofc phone.  Finding /p/-/t/ minimal pair sentences challenging, but /f/-/h/ are less so.  Working to engage in conversation more at home and at work.    5/15/25:  Able to use speaker phone on work phone and communicate adequately with native English speakers, but cannot succeed with those with accents.    Feels he is doing well with home practice and improving re: comprehension of embedded key words.  Found discriminating between minimal pairs (consonants) at word level challenging, but had better success in sentences (though were not using minimal pair sentences).      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   Frequently using CI only purposely in order to facilitate progress.    4/17/25:  Daughter helped him do home program last night.  Finds that she speaks with a too-quiet volume typically for him.   Still primarily picking up a word here/there, but not getting larger chunks of sentences yet.    4/10/25:  Continues to hear and recognize a variety of sounds.  Often wearing just CI in an effort to "exercise" his comprehension of what he hears.  Finds he can tell when others are talking (e.g., in a conversation in which he is not involved), but can only make out the odd word.  But also has had instances of hearing and understanding speech directed at him from a distance (e.g., "Mr. Swift, go get [horse's " "name].")    4/3/25:  Notes improving clarity with CI alone in the form of speech beginning to sound like syllables.  Recognizing more environmental sounds.    3/25/25:    Mr. Mayes reported engaging in a variety of activities while attending to the sounds associated with them.  He reported perceiving music, birds chirping, wave noise (recorded), rain on a metal roof (which he recognized), and things being dropped at work.  He has also watched DIY videos on topics of interest to him and attempted to listen with just his CI (no CC).      INTERVENTION:  Short-term objectives:  Mr. Mayes will perform the following with % consistency/accuracy:  1.  Discriminate among [place] sounds with 80% accuracy or better over two consecutive sessions in fields of              A.  2 -- 92%; last visit: 75%              B.  4 -- 66%;  last visit: 75% 83%; 75%; 58%.  Today, confusing /m/ for /s/.  Suspending for now.              C.  6 -- deferred  2.  Discriminate among 3 stimuli differing in duration, stress and/or intonation with 80% accuracy or better over two consecutive sessions.              A.  Continuous vs discrete vs intermittent -- met at [facility]              B.  Word vs phrase vs sentence -- met at [facility]              C.  Sentences -- 100%; last visit:   78%; Goal met.              D.  Words varying in number of syllables (1-3) -- met at [facility]             3. Discriminate between words differing in vowels and consonants with 80% accuracy or better over two consecutive sessions.              A.  Field of 2                          1.  Monosyllabic words (multiple pairings)     A.  Diphthongs vs Group 3:  97%; goal met     B.  Diphthongs vs Group 1:  91%; goal met     C.  Diphthongs vs Group 2:  96%; last visit:  72%; goal met.     D.  Group 1 vs Group 3: 100%; goal met.     E.  Group 2 vs Group 3:  100%; goal met.     F.  Group 1 vs Group 2:  deferred                          2.  Multi-syllabic words                         "              A.  3-syllable -- 89%; goal met.                                      B.  Spondees -- 90%; goal met.                                      C.  Trochees -- 90%; goal met.              B.  Field of 4                           1.  Monosyllables                          2.  3-syllable -- 90%; last visit 75%;  70%; goal met.                          3.  Spondees -- 100% -- goal met.                          4.  Trochees -- 90%; last visit 80%; goal met.                C.  Field of 8                          1.  Monosyllables                          2.  3-syllable --  80%; last visit:  70%; 80%; goal met.                          3.  Spondees -- 100%; last visit:  60%; goal met.                          4.  Trochees -- 90%; last visit:  70%; 70%; 80% with multiple repetitions; goal met.        4.  Discriminate among 4 stimuli differing in duration, stress and/or intonation with 80% accuracy or better over two consecutive sessions.              A.  Phrases and sentences -- 100% with rare repetition needed; goal met.              B.  Words varying in number of syllables (single, spondee, trochee, 3-syllable) -- 100% for 1-syllable, spondee, and trochee words, and 90% with 3-syllable words; goal met.     5.  Identify key words in sentence context with 80% accuracy or better over two consecutive sessions.              A.  One word at end of sentence -- 90%;  last visit:  80%; 50%; 50%; 80%; goal met.              B.  One word in middle of sentence -- 90%; last visit 100%; 50%; 90%; goal met..              C.  Two key words in a sentence -- 80% with reps; last visit:  90% with reps; goal met.      6.  Differentiate stimuli with similar duration, but differing in stress and intonation with 80% accuracy or better over two consecutive sessions.              A.  Field of 2                          1.  Sentences                          2.  Words              B.  Field of 3 sentences   These were met via other tasks  "above.    7.  Discriminate among minimal pair words differing in vowel sound only with 80% accuracy or better over two consecutive sessions.              A.  Field of 2 (multiple pairings) -- goal met; see above.              B.  Field of 4:  90%; last visit 80%; goal met.    8.  Demonstrate comprehension of practiced sentences with 80% accuracy or better over two consecutive sessions.              A.  Appropriate response              B.  Repeats accurately    9.  Identify among sentences differing only in during of key words with 80% accuracy or better over two consecutive sessions.              A.  One word at end of sentence              B.  One word in middle of sentence              C.  Two key words in a sentence   See # 5 above.    10.  Demonstrate consonant perception in words with 80% accuracy or better over two consecutive sessions.              A.  Field of 2 (converted to minimal pair phrases/sentences)    1. /p/ - /t/:    80% (10 trials); last visit:  60%; 40%; 90%;  60%; 70%; continuing.    2.  /f/ - /h/:    100% (10 trials); last visit:  80%; 50%; goal met.    3.  /d/ - /r/:   90%: (10 trials); last visit:  80%; goal met.    4.  /b/ - /m/:   100% (10 trials); last visit:  70%;  80%; goal met.    5.  /p/ - /k/:   90% (10 trials); last visit:  80%; goal met.    6.  /t/ - /k/:    40% (10 trials); last visit:  60%; 60%; 50%; continuing    7.  /t/ - "ch":   70% (10 trials); last visit:  50%; 80%; 70%; continuing.    8.  /w/ - /r/:   70% (10 trials); last visit:  70% ; 50%; continuing.    9.  /d/ - /n/:  100%;  (10 trials) last visit: 90% goal met.    10:  /f/ - /v/:  66% (3 trials); continuing.    11.  /f/ - voiceless "th":  100% (1 trial); goal met.              B.  Field of 4    1.  Words; non-cognates:   80% (10 trials); last visit:  80%; goal met for set using f, h, p, k, r, d              C.  Field of 6    11.  [place] with picture context with 80% accuracy or better over two consecutive sessions.   " "           A.  Appropriate response              B.  Repeats accurately    12.  [place] about a familiar topic with 80% accuracy or better over two consecutive sessions.              A.  Appropriate response to questions -- Discussed topic  with 75%   accuracy in response to questions; 80%; 60%; 85%; 90% last visit.  Continuing in home program.              B.  Converses    Comprehended 5 of 8  questions and responded appropriately.  Reported things he did not understand sounded "like Chinese" or "mumbled."    13.  Connected discourse tracking with 80% accuracy or better over two consecutive sessions.              A.  Appropriate response to requestions              B.  Converses     COUNSELING:  Try mini laila with .  Reviewed CI re: tolerance of sweat and [employer] website indicated it should stand up to sweat per design.  Recommended trial with it and mini laila with caller at the farm.   Asked Mrs. Mayes to try gaining Mr. Mayes's attention when he is focused on something else from 6', 9', and 12' as available.    IMPRESSIONS:  This 81 yo man appears to present with   1.  Abnormal auditory perception.  2.  Cochlear implant in place on L.  3.  Bilateral SNHL; hearing aid in place on R.       RECOMMENDATIONS/PLAN OF CARE:  It is felt that Mr. Mayes would benefit from  ST on a once weekly basis with a home program to address aural rehab for 1-2 weeks.  (This element of the POC expires 6/30/25.)  Continued f/u with Dr. Cortez as directed.        Long-term goals:  Mr. Mayes will have improved auditory perception via his CI.                           "

## 2025-06-22 ENCOUNTER — RESULTS FOLLOW-UP (OUTPATIENT)
Dept: FAMILY MEDICINE | Facility: CLINIC | Age: 80
End: 2025-06-22

## 2025-06-22 DIAGNOSIS — E11.8 DM (DIABETES MELLITUS) WITH COMPLICATIONS: Primary | ICD-10-CM

## 2025-06-24 ENCOUNTER — PROCEDURE VISIT (OUTPATIENT)
Dept: DERMATOLOGY | Facility: CLINIC | Age: 80
End: 2025-06-24
Payer: MEDICARE

## 2025-06-24 VITALS
SYSTOLIC BLOOD PRESSURE: 168 MMHG | BODY MASS INDEX: 27.26 KG/M2 | DIASTOLIC BLOOD PRESSURE: 77 MMHG | WEIGHT: 179.88 LBS | HEIGHT: 68 IN | HEART RATE: 68 BPM

## 2025-06-24 DIAGNOSIS — C44.311 BASAL CELL CARCINOMA OF LEFT NASAL TIP: Primary | ICD-10-CM

## 2025-06-24 PROCEDURE — 99499 UNLISTED E&M SERVICE: CPT | Mod: HCNC,S$GLB,, | Performed by: DERMATOLOGY

## 2025-06-24 PROCEDURE — 17311 MOHS 1 STAGE H/N/HF/G: CPT | Mod: HCNC,51,S$GLB, | Performed by: DERMATOLOGY

## 2025-06-24 PROCEDURE — 14060 TIS TRNFR E/N/E/L 10 SQ CM/<: CPT | Mod: HCNC,S$GLB,, | Performed by: DERMATOLOGY

## 2025-06-24 PROCEDURE — 17312 MOHS ADDL STAGE: CPT | Mod: HCNC,S$GLB,, | Performed by: DERMATOLOGY

## 2025-06-24 RX ORDER — HYDROCODONE BITARTRATE AND ACETAMINOPHEN 5; 325 MG/1; MG/1
1 TABLET ORAL EVERY 6 HOURS PRN
Qty: 10 TABLET | Refills: 0 | Status: SHIPPED | OUTPATIENT
Start: 2025-06-24

## 2025-06-24 RX ORDER — CEPHALEXIN 500 MG/1
500 CAPSULE ORAL 3 TIMES DAILY
Qty: 21 CAPSULE | Refills: 0 | Status: SHIPPED | OUTPATIENT
Start: 2025-06-24 | End: 2025-07-01

## 2025-06-24 NOTE — PROGRESS NOTES
PROCEDURE: Mohs' Micrographic Surgery    INDICATION: Location in mask areas of face including central face, nose, eyelids, eyebrows, lips, chin, preauricular, temple, and ear. Biopsy-proven skin cancer of cosmetically and functionally important areas, including head, neck, genital, hand, foot, or areas known for having difficulty in healing, such as the lower anterior legs. Tumor with ill-defined borders.    REFERRING PROVIDER: Cristin Joseph M.D.    CASE NUMBER:     ANESTHETIC: 3 cc 0.5% Lidocaine with Epi 1:200,000 mixed 1:1 with 0.5% Bupivacaine    SURGICAL PREP: Hibiclens    SURGEON: Antonette Peterson MD    ASSISTANTS: Miley Alvarez PA-C and Christa Arceo MA    PREOPERATIVE DIAGNOSIS: basal cell carcinoma- superficial, nodular    POSTOPERATIVE DIAGNOSIS: basal cell carcinoma- nodular, infiltrating    PATHOLOGIC DIAGNOSIS: basal cell carcinoma- superficial, nodular, infiltrating    HISTOLOGY OF SPECIMENS IN FIRST STAGE:   Debulking tumor confirms nodular and infiltrating basal cell carcinoma.  Tumor Type: Tumor seen. Nodular basal cell carcinoma: Nodular tumor in dermis composed of basaloid cells exhibiting peripheral palisading and retraction artifact.  Infiltrative basal cell carcinoma: Basaloid tumor in dermis characterized by thin elongated strands of basaloid cells infiltrating between dermal collagen bundles.   Depth of Invasion: epidermis and dermis  Perineural Invasion: No    HISTOLOGY OF SPECIMENS IN SUBSEQUENT STAGES:  Tumor Type: No tumor seen.    STAGES OF MOHS' SURGERY PERFORMED: 2    TUMOR-FREE PLANE ACHIEVED: Yes    HEMOSTASIS: electrocoagulation     SPECIMENS: 4 (2 in stage A and 2 in stage B)    LOCATION: left nasal tip. Location verified with Dr. Joseph's clinical photograph. Patient also verified location with hand held mirror.    INITIAL LESION SIZE: 0.4 x 0.7 cm    FINAL DEFECT SIZE: 1.1 x 1.2 cm    WOUND REPAIR/DISPOSITION: The patient tolerated Mohs' Micrographic Surgery for a basal cell  carcinoma very well. When the tumor was completely removed, a repair of the surgical defect was undertaken.        PROCEDURE: Burow's Advancement Flap (Adjacent Tissue Transfer)    INDICATION: Status post Mohs' Micrographic Surgery for basal cell carcinoma.    CASE NUMBER:     ANESTHETIC: 2 cc 1% Lidocaine with Epinephrine 1:100,000    SURGICAL PREP: Hibiclens, prepped by Christa Arceo MA    SURGEON: Antonette Peterson MD    ASSISTANTS: Miley Alvarez PA-C and Christa Arceo MA    LOCATION: left nasal tip    DEFECT SIZE: 1.1 x 1.2 cm    WOUND REPAIR/DISPOSITION:  After the patient's carcinoma had been completely removed with Mohs' Micrographic Surgery, a repair of the surgical defect was undertaken. The patient was returned to the operating suite where the area of left nasal tip was prepped, draped, and anesthetized in the usual sterile fashion. A Burow's advancement flap was designed in the inferior surrounding tissue of the left nasal tip. A Burow's triangle was drawn in superiorly to help with tissue movement. Then with a #15 blade the flap was incised and the Burow's triangle was excised, the area was widely undermined in the submuscular tissue plane. Then, electrocoagulation was used to obtain meticulous hemostasis. A 5-0 Vicryl pexing suture was performed by attaching the underside of the most medial aspect of the flap to the left nasal tip. The flap was then advanced in by a complex closure. Then, 5-0 Vicryl buried vertical mattress sutures were placed into the subcutaneous and dermal plane to close the wound and cordell the cutaneous wound edge. The flap was then trimmed to fit the defect. The cutaneous wound edges were closed using interrupted 5-0 Prolene sutures.    The patient tolerated the procedure well.    The area was cleaned and dressed appropriately and the patient was given wound care instructions, as well as an appointment for follow-up evaluation and suture removal in 7 days. Patient was placed  "on Norco 5-325 mg prn postop pain and Keflex 500 mg TID x 7 days.    SIZE OF FLAP: 8 cm squared    Vitals:    06/24/25 0723 06/24/25 1016   BP: (!) 143/61 (!) 168/77   BP Location: Left arm Left arm   Patient Position: Sitting Sitting   Pulse: 68 68   Weight: 81.6 kg (179 lb 14.3 oz)    Height: 5' 8" (1.727 m)        "

## 2025-06-25 ENCOUNTER — TELEPHONE (OUTPATIENT)
Dept: FAMILY MEDICINE | Facility: CLINIC | Age: 80
End: 2025-06-25
Payer: MEDICARE

## 2025-06-25 NOTE — TELEPHONE ENCOUNTER
Pt states he does not want to take the new medication right now.     Pt states he will cut back on the sweets and watch his diet for now. Pt states he will consider the Jardiance after doing labs in 3 mths

## 2025-06-25 NOTE — TELEPHONE ENCOUNTER
----- Message from Todd Montoya MD sent at 6/22/2025  7:17 PM CDT -----  Lab work shows a hemoglobin A1c of 8.0.  This is to high to be considered controlled.  I would like to add a new medication to your regimen-also make sure you cut down on carbohydrates and sugar    The medication in his called Jardiance-25 milligrams a day.  This will make you urinate a little bit more-sometimes it causes yeast infections at the tip of the penis.  Recheck your lab work in three-month.  Staff please schedule  ----- Message -----  From: Lab, Background User  Sent: 6/17/2025  11:53 AM CDT  To: Todd Montoya MD

## 2025-06-26 ENCOUNTER — CLINICAL SUPPORT (OUTPATIENT)
Dept: AUDIOLOGY | Facility: CLINIC | Age: 80
End: 2025-06-26
Payer: MEDICARE

## 2025-06-26 ENCOUNTER — CLINICAL SUPPORT (OUTPATIENT)
Dept: SPEECH THERAPY | Facility: HOSPITAL | Age: 80
End: 2025-06-26
Payer: MEDICARE

## 2025-06-26 DIAGNOSIS — Z96.21 COCHLEAR IMPLANT IN PLACE: ICD-10-CM

## 2025-06-26 DIAGNOSIS — H90.3 SENSORINEURAL HEARING LOSS (SNHL) OF BOTH EARS: ICD-10-CM

## 2025-06-26 DIAGNOSIS — H93.293 ABNORMAL AUDITORY PERCEPTION, BILATERAL: Primary | ICD-10-CM

## 2025-06-26 DIAGNOSIS — H93.293 IMPAIRED AUDITORY DISCRIMINATION, BILATERAL: Primary | ICD-10-CM

## 2025-06-26 PROCEDURE — 92507 TX SP LANG VOICE COMM INDIV: CPT | Mod: GN,HCNC | Performed by: SPEECH-LANGUAGE PATHOLOGIST

## 2025-06-26 NOTE — PROGRESS NOTES
"Cochlear Implant Programming Session     LEFT EAR:  Dr. Fagan  : Cochlear PROTEGOs  Internal Device/Serial Number:  (SN:[id number])  Processor: N8 (current processor [id number]) (back up processor [id number])  DOS: 2/3/2025  DIS:2/28/2025  Fitting Date: 2/28/2025  Processor Color: Silver  Magnet Strength: N8: 3(I)  Coil Length: 6 inch  Battery Type:  Rechargeable  Warranty: 5 year    Mr. Jamison Mayes was seen today for a follow up programming session, and he was accompanied to this appointment by his wife. Mr. Mayes mentioned that there has been an echo quality to sound on his processor side that has made it more difficult to understand conversations. He also reported a "cartoon like quality" to the sound as well. He continues to progress in aural rehab.     Impedance measurements were completed and within normal limits. Pulse width was increased, and C and T levels were tilted to allow for less high frequency stimulation. T levels were decreased 5 clinical units to allow for a larger dynamic range, and Mr. Mayes reported that the echo was almost gone. He mentioned that voices were much clearer than before. A second program was added that had the C levels increased 5 more clinical units. Mr. Mayes was told to change to this program in about a week or so. He was in agreement with this plan. No other adjustments were made.      Mr. Mayes had an appointment with his hearing care professional to purchase a new hearing aid for his right ear after this appointment.     Recommendations:  1) Daily use of sound processor and amplification    2) Return on 8/26 for moncada testing and further programming changes as needed  "

## 2025-07-01 ENCOUNTER — OFFICE VISIT (OUTPATIENT)
Dept: DERMATOLOGY | Facility: CLINIC | Age: 80
End: 2025-07-01
Payer: MEDICARE

## 2025-07-01 DIAGNOSIS — Z09 POSTOP CHECK: Primary | ICD-10-CM

## 2025-07-01 PROCEDURE — 99024 POSTOP FOLLOW-UP VISIT: CPT | Mod: HCNC,S$GLB,, | Performed by: DERMATOLOGY

## 2025-07-01 PROCEDURE — 99999 PR PBB SHADOW E&M-EST. PATIENT-LVL III: CPT | Mod: PBBFAC,HCNC,, | Performed by: DERMATOLOGY

## 2025-07-01 PROCEDURE — 1160F RVW MEDS BY RX/DR IN RCRD: CPT | Mod: CPTII,HCNC,S$GLB, | Performed by: DERMATOLOGY

## 2025-07-01 PROCEDURE — 1101F PT FALLS ASSESS-DOCD LE1/YR: CPT | Mod: CPTII,HCNC,S$GLB, | Performed by: DERMATOLOGY

## 2025-07-01 PROCEDURE — 3288F FALL RISK ASSESSMENT DOCD: CPT | Mod: CPTII,HCNC,S$GLB, | Performed by: DERMATOLOGY

## 2025-07-01 PROCEDURE — 1159F MED LIST DOCD IN RCRD: CPT | Mod: CPTII,HCNC,S$GLB, | Performed by: DERMATOLOGY

## 2025-07-01 NOTE — PROGRESS NOTES
80 y.o. male patient is here for suture removal following Mohs' surgery.    Patient reports no problems.    WOUND PE:  The L nasal tip sutures intact. Wound healing well. Good skin edges. No signs or symptoms of infection. Flap is pink and viable.    IMPRESSION:  Healing operative site from Mohs' surgery, BCC L nasal tip s/p Mohs with Burow's Advancement Flap, postop day #7.     PLAN:  Sutures removed today by Christa Arceo MA. Steri-strips applied.  Continue wound care.  Keep moist with Aquaphor.  Call if any issues arise    RTC:  In 3-6 months with Cristin Joseph M.D. for skin check or sooner if new concern arises.

## 2025-07-03 ENCOUNTER — CLINICAL SUPPORT (OUTPATIENT)
Dept: SPEECH THERAPY | Facility: HOSPITAL | Age: 80
End: 2025-07-03
Payer: MEDICARE

## 2025-07-03 DIAGNOSIS — Z96.21 COCHLEAR IMPLANT IN PLACE: ICD-10-CM

## 2025-07-03 DIAGNOSIS — H90.3 SENSORINEURAL HEARING LOSS (SNHL) OF BOTH EARS: ICD-10-CM

## 2025-07-03 DIAGNOSIS — H93.293 ABNORMAL AUDITORY PERCEPTION, BILATERAL: Primary | ICD-10-CM

## 2025-07-03 PROCEDURE — 92507 TX SP LANG VOICE COMM INDIV: CPT | Mod: GN,HCNC | Performed by: SPEECH-LANGUAGE PATHOLOGIST

## 2025-07-03 NOTE — PLAN OF CARE
IMPRESSIONS:  This 79 yo man appears to present with   1.  Abnormal auditory perception.  Improved.  2.  Cochlear implant in place on L.  3.  Bilateral SNHL; hearing aid in place on R.       RECOMMENDATIONS/PLAN OF CARE:  It is felt that Mr. Mayes would benefit from  Discharge from  due to progress.  Continued f/u with Dr. Cortez as directed.        Long-term goals:  Mr. Mayes will have improved auditory perception via his CI.   Goal met.

## 2025-07-03 NOTE — PROGRESS NOTES
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 Neuro-otologist  LENGTH OF SESSION:  50 minutes    REASON FOR VISIT:  Aural rehab    INTERVAL HISTORY:  7/3/25:  Following adjustments made by Mr. Mayes's audiologist, Darcie Cortez, 6/26/25, he reported that the echo is reduced, but still present and remains the key thing that bothers him, but he also reported improvements related to CI adjustment and obtaining new HA, including clearer perception of speech and improved understanding if not looking at the speaker.  He also noted that some voices were perceived differently (and it was an improvement) -- e.g., changed from "squeaky" to more normal sounding.   He also reported that he used the mini laila in two settings:  During the sermon, his  wore the laila.  He reported that for the first time in 3 years, he heard the entire sermon.  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  He understood her well until the volunteer handling the horse immediately behind him engaged in normal conversation with the rider of the horse that that volunteer was leading.  She was also masked by environmental noises like passing trains or planes.    6/26/25:  Gets new HA this afternoon.  In the meantime, re-engineered the one he used to use in the L ear (where CI is now), and has gotten some benefit from it in the R ear.  Reported he can use the phone with it at work.   Reports "echo," "double voice," "Primo Mouse" quality sound via CI that seems to muffle sound. He feels he could perceive sound better if it was out of the way.  To see his audiologist, Darcie Cortez later today.   Has not tried mini laila with  or at farm yet.   Had MOHS surgery on nose earlier this week for basal cell carcinoma.    6/17/25:  Still w/o hearing air; has appt this afternoon.  Feels today is a "bad" day in " "terms of his perception via CI alone.  Feels things sound "real distant."   Forgot to try mini laila with .    6/12/25:  Still without hearing aid.  Has had poor success attempting to f/u with [employer] and will try another company this week.  Requested guidance re: which level of device to purchase.     Understood about 80% of utterances at 12 feet in distance home task.  Estimated that in real life, Mrs. Mayes has about 50-50 chance of gaining his attention at that distance.     at work asked Mr. Mayes if he would be offended if he provided a tag to wear stating "hearing impaired."  Mr. 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    Noted that he has good days and bad days re: hearing.  Definitely notes a fatigue factor negatively affecting success.  Most challenging situations in which to hear area [place] and at the farm.  He's also concerned about wearing the processor and sweating at the farm.    6/3/25:  His HA (R-sided) is broken and out for assessment/repair.  He reported that on waking and donning his CI, he feels like he is having a hard time adjusting w/o the HA.   Performed the distance tasks at home as prescribed and found the auditory training task was helpful with more than 50% success at 9 feet and 40-50% success at 12 feet.      5/29/25:  In performing the distance task provided last visit, he found he could understand clearly at 6 feet, but at 9 feet, while volume was adequate, clarity was reduced to "Axel Duck speech."    He also noticed with his cell phone that if he held it to his R ear and the HA was out, he could actually understand well.  Has not tried this with the work phone yet.    5/22/25:  Had increased volume from 5 to 6 last week.  Getting a "barrier"  -- "like the radio is not quite on the station" -- and it is challenging.    Also, tried putting  of work phone on " "processor laila, and could detect but not understand speech, so continuing to use speaker on ofc phone.  Finding /p/-/t/ minimal pair sentences challenging, but /f/-/h/ are less so.  Working to engage in conversation more at home and at work.    5/15/25:  Able to use speaker phone on work phone and communicate adequately with native English speakers, but cannot succeed with those with accents.    Feels he is doing well with home practice and improving re: comprehension of embedded key words.  Found discriminating between minimal pairs (consonants) at word level challenging, but had better success in sentences (though were not using minimal pair sentences).      4/24/25:  In certain situations, hearing more words in conversation and even following conversation, but reported that it sounds "muffled."  [place] part of his 's sermon for the first time this past Sunday.   Frequently using CI only purposely in order to facilitate progress.    4/17/25:  Daughter helped him do home program last night.  Finds that she speaks with a too-quiet volume typically for him.   Still primarily picking up a word here/there, but not getting larger chunks of sentences yet.    4/10/25:  Continues to hear and recognize a variety of sounds.  Often wearing just CI in an effort to "exercise" his comprehension of what he hears.  Finds he can tell when others are talking (e.g., in a conversation in which he is not involved), but can only make out the odd word.  But also has had instances of hearing and understanding speech directed at him from a distance (e.g., "Mr. Swift, go get [horse's name].")    4/3/25:  Notes improving clarity with CI alone in the form of speech beginning to sound like syllables.  Recognizing more environmental sounds.    3/25/25:    Mr. Mayes reported engaging in a variety of activities while attending to the sounds associated with them.  He reported perceiving music, birds chirping, wave noise (recorded), rain on " a metal roof (which he recognized), and things being dropped at work.  He has also watched DIY videos on topics of interest to him and attempted to listen with just his CI (no CC).      INTERVENTION:  Recommended trial use of noise filter (if available) with the mini laila at [facility].      With just the CI in place, he boosted its volume to 8 (using the second program provided by Ms. Cortez) and reported speech was clearer.  At the end of the visit he replaced his HA and needed to reduce the CI volume back to 5 as together it was too loud.    Short-term objectives:  Mr. Mayes will perform the following with % consistency/accuracy:  1.  Discriminate among [place] sounds with 80% accuracy or better over two consecutive sessions in fields of              A.  2 -- 92%; last visit: 75%              B.  4 -- 66%;  last visit: 75% 83%; 75%; 58%.  Today, confusing /m/ for /s/.  Suspending for now.              C.  6 -- deferred  2.  Discriminate among 3 stimuli differing in duration, stress and/or intonation with 80% accuracy or better over two consecutive sessions.              A.  Continuous vs discrete vs intermittent -- met at [facility]              B.  Word vs phrase vs sentence -- met at [facility]              C.  Sentences -- 100%; last visit:   78%; Goal met.              D.  Words varying in number of syllables (1-3) -- met at [facility]             3. Discriminate between words differing in vowels and consonants with 80% accuracy or better over two consecutive sessions.              A.  Field of 2                          1.  Monosyllabic words (multiple pairings)     A.  Diphthongs vs Group 3:  97%; goal met     B.  Diphthongs vs Group 1:  91%; goal met     C.  Diphthongs vs Group 2:  96%; last visit:  72%; goal met.     D.  Group 1 vs Group 3: 100%; goal met.     E.  Group 2 vs Group 3:  100%; goal met.     F.  Group 1 vs Group 2:  deferred                          2.  Multi-syllabic words                                       A.  3-syllable -- 89%; goal met.                                      B.  Spondees -- 90%; goal met.                                      C.  Trochees -- 90%; goal met.              B.  Field of 4                           1.  Monosyllables                          2.  3-syllable -- 90%; last visit 75%;  70%; goal met.                          3.  Spondees -- 100% -- goal met.                          4.  Trochees -- 90%; last visit 80%; goal met.                C.  Field of 8                          1.  Monosyllables                          2.  3-syllable --  80%; last visit:  70%; 80%; goal met.                          3.  Spondees -- 100%; last visit:  60%; goal met.                          4.  Trochees -- 90%; last visit:  70%; 70%; 80% with multiple repetitions; goal met.        4.  Discriminate among 4 stimuli differing in duration, stress and/or intonation with 80% accuracy or better over two consecutive sessions.              A.  Phrases and sentences -- 100% with rare repetition needed; goal met.              B.  Words varying in number of syllables (single, spondee, trochee, 3-syllable) -- 100% for 1-syllable, spondee, and trochee words, and 90% with 3-syllable words; goal met.     5.  Identify key words in sentence context with 80% accuracy or better over two consecutive sessions.              A.  One word at end of sentence -- 90%;  last visit:  80%; 50%; 50%; 80%; goal met.              B.  One word in middle of sentence -- 90%; last visit 100%; 50%; 90%; goal met..              C.  Two key words in a sentence -- 80% with reps; last visit:  90% with reps; goal met.      6.  Differentiate stimuli with similar duration, but differing in stress and intonation with 80% accuracy or better over two consecutive sessions.              A.  Field of 2                          1.  Sentences                          2.  Words              B.  Field of 3 sentences   These were met via other tasks  "above.    7.  Discriminate among minimal pair words differing in vowel sound only with 80% accuracy or better over two consecutive sessions.              A.  Field of 2 (multiple pairings) -- goal met; see above.              B.  Field of 4:  90%; last visit 80%; goal met.    8.  Demonstrate comprehension of practiced sentences with 80% accuracy or better over two consecutive sessions.              A.  Appropriate response              B.  Repeats accurately    9.  Identify among sentences differing only in during of key words with 80% accuracy or better over two consecutive sessions.              A.  One word at end of sentence              B.  One word in middle of sentence              C.  Two key words in a sentence   See # 5 above.    10.  Demonstrate consonant perception in words with 80% accuracy or better over two consecutive sessions.              A.  Field of 2 (converted to minimal pair phrases/sentences)    1. /p/ - /t/:     90% (10 trials); last visit:  80%; 60%; 40%; 90%;  60%; 70%; goal met.  Appears to benefit from warming up with others before attempting this pairing.    2.  /f/ - /h/:    100% (10 trials); last visit:  80%; 50%; goal met.    3.  /d/ - /r/:   90%: (10 trials); last visit:  80%; goal met.    4.  /b/ - /m/:   100% (10 trials); last visit:  70%;  80%; goal met.    5.  /p/ - /k/:   90% (10 trials); last visit:  80%; goal met.    6.  /t/ - /k/:     60% (10 trials); last visit:  80%; 40%; 60%; 60%; 50%;     7.  /t/ - "ch":    80% (10 trials); last visit:  60%; 70% 50%; 80%; 70%;     8.  /w/ - /r/:    50% (10 trials); last visit:  50%; 70%; 70% ; 50%;     9.  /d/ - /n/:  100%;  (10 trials) last visit: 90% goal met.    10:  /f/ - /v/:   100% (3 trials); last visit:  66%; 66%;     11.  /f/ - voiceless "th":  100% (1 trial); goal met.              B.  Field of 4    1.  Words; non-cognates:   80% (10 trials); last visit:  80%; goal met for set using f, h, p, k, r, d              C.  Field of " 6    11.  [place] with picture context with 80% accuracy or better over two consecutive sessions.              A.  Appropriate response              B.  Repeats accurately    12.  [place] about a familiar topic with 80% accuracy or better over two consecutive sessions.              A.  Appropriate response to questions -- Discussed topic  with  70% accuracy in response to questions; 40%; 75%;  80%; 60%; 85%; 90% last visit.  Continuing in home program.  Doing well with informal speech reading, especially with HA in place, but still having some difficulty without it and without HA at conversational level.  Recommended continued practice in conversation with just CI and no speech reading at home.              B.  Converses    Comprehended 7 of 17 questions and responded appropriately.      13.  Connected discourse tracking with 80% accuracy or better over two consecutive sessions.              A.  Appropriate response to requestions              B.  Converses     COUNSELING:  As Mr. Mayes  is having little difficulty functionally at the conversational level given recent updates in programs and after obtaining new HA and with use  of appropriate strategies (e.g., use of mini laila), it if felt that working on discrimination of minimal pairs is of limited value at this point.  He can and should continue to engage in practice at the conversational level with just his CI and no speech reading, but otherwise to use his CI and HA (plus mini laila in appropriate settings) and to continue to f/u with Ms. Cortez and with his CID vendor for continued adjustments with both as needed.  He is welcome to return to therapy if an issue is identified that may not respond to such adjustments, but might to therapy.  He verbalized understanding.      IMPRESSIONS:  This 81 yo man appears to present with   1.  Abnormal auditory perception.  Improved.  2.  Cochlear implant in place on L.  3.  Bilateral SNHL; hearing aid in place on R.        RECOMMENDATIONS/PLAN OF CARE:  It is felt that Mr. Mayes would benefit from  Discharge from  due to progress.  Continued f/u with Dr. Cortez as directed.        Long-term goals:  Mr. Mayes will have improved auditory perception via his CI.   Goal met.

## 2025-07-17 ENCOUNTER — PATIENT MESSAGE (OUTPATIENT)
Dept: FAMILY MEDICINE | Facility: CLINIC | Age: 80
End: 2025-07-17
Payer: MEDICARE

## 2025-07-17 DIAGNOSIS — E11.8 DM (DIABETES MELLITUS) WITH COMPLICATIONS: ICD-10-CM

## 2025-07-17 RX ORDER — GLIMEPIRIDE 4 MG/1
8 TABLET ORAL
Qty: 180 TABLET | Refills: 1 | Status: SHIPPED | OUTPATIENT
Start: 2025-07-17

## 2025-08-06 ENCOUNTER — TELEPHONE (OUTPATIENT)
Dept: ENDOSCOPY | Facility: HOSPITAL | Age: 80
End: 2025-08-06
Payer: MEDICARE

## 2025-08-06 NOTE — TELEPHONE ENCOUNTER
Spoke to patients wife, who confirmed procedure on 8/8 with an 0845 arrival time. Instructed to hold glimepiride starting to day and hold metformin the day of the procedure. Denies further questions.

## 2025-08-08 ENCOUNTER — ANESTHESIA EVENT (OUTPATIENT)
Dept: ENDOSCOPY | Facility: HOSPITAL | Age: 80
End: 2025-08-08
Payer: MEDICARE

## 2025-08-08 ENCOUNTER — HOSPITAL ENCOUNTER (OUTPATIENT)
Facility: HOSPITAL | Age: 80
Discharge: HOME | End: 2025-08-08
Attending: INTERNAL MEDICINE | Admitting: INTERNAL MEDICINE
Payer: MEDICARE

## 2025-08-08 ENCOUNTER — ANESTHESIA (OUTPATIENT)
Dept: ENDOSCOPY | Facility: HOSPITAL | Age: 80
End: 2025-08-08
Payer: MEDICARE

## 2025-08-08 VITALS
SYSTOLIC BLOOD PRESSURE: 143 MMHG | HEIGHT: 67 IN | HEART RATE: 74 BPM | DIASTOLIC BLOOD PRESSURE: 69 MMHG | RESPIRATION RATE: 13 BRPM | WEIGHT: 178.5 LBS | BODY MASS INDEX: 28.02 KG/M2 | TEMPERATURE: 98 F | OXYGEN SATURATION: 99 %

## 2025-08-08 DIAGNOSIS — Z12.11 COLON CANCER SCREENING: ICD-10-CM

## 2025-08-08 DIAGNOSIS — R93.3 ABNORMAL FINDING ON GI TRACT IMAGING: ICD-10-CM

## 2025-08-08 LAB — POCT GLUCOSE: 251 MG/DL (ref 70–110)

## 2025-08-08 PROCEDURE — 45385 COLONOSCOPY W/LESION REMOVAL: CPT | Mod: PT,HCNC,, | Performed by: INTERNAL MEDICINE

## 2025-08-08 PROCEDURE — 37000008 HC ANESTHESIA 1ST 15 MINUTES: Mod: HCNC | Performed by: INTERNAL MEDICINE

## 2025-08-08 PROCEDURE — 45385 COLONOSCOPY W/LESION REMOVAL: CPT | Mod: PT,HCNC | Performed by: INTERNAL MEDICINE

## 2025-08-08 PROCEDURE — 25000003 PHARM REV CODE 250: Mod: HCNC | Performed by: INTERNAL MEDICINE

## 2025-08-08 PROCEDURE — 37000009 HC ANESTHESIA EA ADD 15 MINS: Mod: HCNC | Performed by: INTERNAL MEDICINE

## 2025-08-08 PROCEDURE — 63600175 PHARM REV CODE 636 W HCPCS: Mod: HCNC | Performed by: NURSE ANESTHETIST, CERTIFIED REGISTERED

## 2025-08-08 PROCEDURE — 88305 TISSUE EXAM BY PATHOLOGIST: CPT | Mod: TC,HCNC | Performed by: INTERNAL MEDICINE

## 2025-08-08 PROCEDURE — 27201089 HC SNARE, DISP (ANY): Mod: HCNC | Performed by: INTERNAL MEDICINE

## 2025-08-08 RX ORDER — PROPOFOL 10 MG/ML
VIAL (ML) INTRAVENOUS
Status: DISCONTINUED | OUTPATIENT
Start: 2025-08-08 | End: 2025-08-08

## 2025-08-08 RX ORDER — SODIUM CHLORIDE 9 MG/ML
INJECTION, SOLUTION INTRAVENOUS CONTINUOUS
Status: DISCONTINUED | OUTPATIENT
Start: 2025-08-08 | End: 2025-08-08 | Stop reason: HOSPADM

## 2025-08-08 RX ORDER — LIDOCAINE HYDROCHLORIDE 20 MG/ML
INJECTION INTRAVENOUS
Status: DISCONTINUED | OUTPATIENT
Start: 2025-08-08 | End: 2025-08-08

## 2025-08-08 RX ORDER — SODIUM CHLORIDE 0.9 % (FLUSH) 0.9 %
20 SYRINGE (ML) INJECTION ONCE
Status: DISCONTINUED | OUTPATIENT
Start: 2025-08-08 | End: 2025-08-08 | Stop reason: HOSPADM

## 2025-08-08 RX ORDER — PROPOFOL 10 MG/ML
VIAL (ML) INTRAVENOUS CONTINUOUS PRN
Status: DISCONTINUED | OUTPATIENT
Start: 2025-08-08 | End: 2025-08-08

## 2025-08-08 RX ADMIN — PROPOFOL 60 MG: 10 INJECTION, EMULSION INTRAVENOUS at 10:08

## 2025-08-08 RX ADMIN — LIDOCAINE HYDROCHLORIDE 100 MG: 20 INJECTION, SOLUTION INTRAVENOUS at 10:08

## 2025-08-08 RX ADMIN — PROPOFOL 150 MCG/KG/MIN: 10 INJECTION, EMULSION INTRAVENOUS at 10:08

## 2025-08-08 RX ADMIN — SODIUM CHLORIDE: 0.9 INJECTION, SOLUTION INTRAVENOUS at 10:08

## 2025-08-08 NOTE — ANESTHESIA PREPROCEDURE EVALUATION
08/08/2025  Jamison Mayes is a 80 y.o., male.      Pre-op Assessment     I have reviewed the Nursing Notes.    I have reviewed the Medications.     Review of Systems  Anesthesia Hx:  No problems with previous Anesthesia             Denies Family Hx of Anesthesia complications.     Social:  Non-Smoker, No Alcohol Use       Hematology/Oncology:  Hematology Normal   Oncology Normal                                   EENT/Dental:  EENT/Dental Normal           Cardiovascular:  Exercise tolerance: good   Hypertension                                    Hypertension         Pulmonary:  Pulmonary Normal                       Renal/:  Renal/ Normal                 Hepatic/GI:      Liver Disease,            Liver Disease        Musculoskeletal:  Musculoskeletal Normal                Neurological:       Seizures           Seizure Disorder                          Endocrine:  Diabetes    Diabetes                        Physical Exam  General: Well nourished    Airway:  Mallampati: II / II  Mouth Opening: Normal  TM Distance: Normal  Tongue: Normal  Neck ROM: Normal ROM    Dental:  Intact    Anesthesia Plan  Type of Anesthesia, risks & benefits discussed:    Anesthesia Type: Gen Natural Airway  Intra-op Monitoring Plan: Standard ASA Monitors  Post Op Pain Control Plan: multimodal analgesia  Induction:  IV  Airway Plan: Direct, Post-Induction  Informed Consent: Informed consent signed with the Patient and all parties understand the risks and agree with anesthesia plan.  All questions answered.   ASA Score: 3    Ready For Surgery From Anesthesia Perspective.   .

## 2025-08-08 NOTE — TRANSFER OF CARE
"Anesthesia Transfer of Care Note    Patient: Jamison Mayes    Procedure(s) Performed: Procedure(s) (LRB):  COLONOSCOPY (N/A)    Patient location: GI    Anesthesia Type: general    Transport from OR: Transported from OR on room air with adequate spontaneous ventilation    Post pain: adequate analgesia    Post assessment: no apparent anesthetic complications    Post vital signs: stable    Level of consciousness: awake, alert and oriented    Nausea/Vomiting: no nausea/vomiting    Complications: none    Transfer of care protocol was followed      Last vitals: Visit Vitals  BP (!) 119/59 (BP Location: Left arm, Patient Position: Lying)   Pulse 77   Temp 36.7 °C (98.1 °F) (Temporal)   Resp 12   Ht 5' 7" (1.702 m)   Wt 81 kg (178 lb 8 oz)   SpO2 98%   BMI 27.96 kg/m²     "

## 2025-08-08 NOTE — ANESTHESIA POSTPROCEDURE EVALUATION
Anesthesia Post Evaluation    Patient: Jamison Mayes    Procedure(s) Performed: Procedure(s) (LRB):  COLONOSCOPY (N/A)    Final Anesthesia Type: general      Patient location during evaluation: GI PACU  Patient participation: Yes- Able to Participate  Level of consciousness: awake and alert  Post-procedure vital signs: reviewed and stable  Pain management: adequate  Airway patency: patent    PONV status at discharge: No PONV  Anesthetic complications: no      Cardiovascular status: blood pressure returned to baseline and hemodynamically stable  Respiratory status: unassisted  Hydration status: euvolemic  Follow-up not needed.          Vitals Value Taken Time   /69 08/08/25 11:10   Temp 36.7 °C (98.1 °F) 08/08/25 10:37   Pulse 74 08/08/25 11:10   Resp 13 08/08/25 11:10   SpO2 99 % 08/08/25 11:10         Event Time   Out of Recovery 11:21:04         Pain/Esmer Score: Esmer Score: 10 (8/8/2025 11:10 AM)

## 2025-08-11 LAB
ESTROGEN SERPL-MCNC: NORMAL PG/ML
INSULIN SERPL-ACNC: NORMAL U[IU]/ML
LAB AP CLINICAL INFORMATION: NORMAL
LAB AP GROSS DESCRIPTION: NORMAL
LAB AP PERFORMING LOCATION(S): NORMAL
LAB AP REPORT FOOTNOTES: NORMAL

## 2025-08-26 ENCOUNTER — CLINICAL SUPPORT (OUTPATIENT)
Dept: AUDIOLOGY | Facility: CLINIC | Age: 80
End: 2025-08-26
Payer: MEDICARE

## 2025-08-26 DIAGNOSIS — H90.3 SENSORINEURAL HEARING LOSS (SNHL) OF BOTH EARS: ICD-10-CM

## 2025-08-26 DIAGNOSIS — H93.293 IMPAIRED AUDITORY DISCRIMINATION, BILATERAL: Primary | ICD-10-CM

## (undated) DEVICE — KIT EAR EAOFE OMC

## (undated) DEVICE — BLADE SURG CARBON STEEL SZ11

## (undated) DEVICE — SUT PROLENE 3-0 SH DA 36 BL

## (undated) DEVICE — DRAPE HALF SURGICAL 40X58IN

## (undated) DEVICE — PAD CURAD NONADH 3X4IN

## (undated) DEVICE — SYR 30CC LUER LOCK

## (undated) DEVICE — SUT 3/0 18IN COATED VICRYLP

## (undated) DEVICE — DRAPE OPMI STERILE

## (undated) DEVICE — POWDER ARISTA AH 3G

## (undated) DEVICE — BLADE SCALP OPHTL RND TIP

## (undated) DEVICE — TRAY SKIN SCRUB WET PREMIUM

## (undated) DEVICE — APPLICATOR CHLORAPREP ORN 26ML

## (undated) DEVICE — Device

## (undated) DEVICE — KIT DRESS GLASSCK EAR ADLT ST

## (undated) DEVICE — BURR PRECISION ROUND 6.0MM

## (undated) DEVICE — DRAPE ABDOMINAL TIBURON 14X11

## (undated) DEVICE — BURR PRECISION ROUND 4.0MM

## (undated) DEVICE — ELECTRODE MEGADYNE RETURN DUAL

## (undated) DEVICE — SPONGE SURGIFOAM GELATIN SZ50

## (undated) DEVICE — SYR 3CC LUER LOC

## (undated) DEVICE — DRESSING ADH ISLAND 3.6 X 14

## (undated) DEVICE — SUT 5/0 27IN PDS II VIO MO

## (undated) DEVICE — SUT SILK SH 2-0 30IN MP BLK

## (undated) DEVICE — KIT SAHARA DRAPE DRAW/LIFT

## (undated) DEVICE — SPONGE LAP 18X18 PREWASHED

## (undated) DEVICE — KIT SUCTION IRRIGATION

## (undated) DEVICE — SUT 1 48IN PDS II VIO MONO

## (undated) DEVICE — SUT 3-0 12-18IN SILK

## (undated) DEVICE — SUT CTD VICRYL 3-0 PS-2 UND

## (undated) DEVICE — SUCTION SURGICAL FRAZR

## (undated) DEVICE — GAUZE FLUFF XXLG 36X36 2 PLY

## (undated) DEVICE — BLADE SCALP BEAVER 2.5MM ANG

## (undated) DEVICE — SEE MEDLINE ITEM 156902

## (undated) DEVICE — BLADE OTOLOGY BEAVER 5X84MM

## (undated) DEVICE — SUT PROLENE 4-0 SH BLU 36IN

## (undated) DEVICE — TUBE FRAZIER 5MM 2FT SOFT TIP

## (undated) DEVICE — SUT SILK 3-0 SH DETACH 30IN

## (undated) DEVICE — SUT BONE WAX 2.5 GRMS 12/BX

## (undated) DEVICE — SOL IRR WATER STRL 3000 ML

## (undated) DEVICE — SUT SILK 2-0 STRANDS 30IN

## (undated) DEVICE — STAPLER SKIN PROXIMATE WIDE

## (undated) DEVICE — STAPLER ECHELON FLEX 45MM 34CM

## (undated) DEVICE — NDL 22GA X1 1/2 REG BEVEL

## (undated) DEVICE — CLIPPER BLADE MOD 4406 (CAREF)

## (undated) DEVICE — ELECTRODE NDL

## (undated) DEVICE — TOWEL OR DISP STRL BLUE 4/PK

## (undated) DEVICE — DRAPE STERI 32 X 50

## (undated) DEVICE — SEALER VES BPLR AQUAMANTYS 9.5

## (undated) DEVICE — DRAPE INCISE IOBAN 2 23X17IN

## (undated) DEVICE — SEE MEDLINE ITEM 152622

## (undated) DEVICE — FORCEP CURVED DISP

## (undated) DEVICE — CLIP LIGATION MEDIUM CLIPS 1

## (undated) DEVICE — TRAY FOLEY 16FR INFECTION CONT

## (undated) DEVICE — ELECTRODE REM PLYHSV RETURN 9

## (undated) DEVICE — NDL 18GA X1 1/2 REG BEVEL

## (undated) DEVICE — SEALER LIGASURE MARYLAND 23CM

## (undated) DEVICE — BLADE SCALP OPTHL NDL TIP 3MM

## (undated) DEVICE — SOL IRR NACL .9% 3000ML

## (undated) DEVICE — SLEEVE ECLIPSE GOWN STRL 23IN

## (undated) DEVICE — DRAPE SURGICAL STERI IRRG PCH

## (undated) DEVICE — SUT VICRYL+ 1 CT1 18IN

## (undated) DEVICE — DRAPE CRANIOTOMY T SURG STRL

## (undated) DEVICE — SET DECANTER MEDICHOICE

## (undated) DEVICE — RELOAD ECHELON ENDOPATH 45MM

## (undated) DEVICE — PAD CUSTOM COTTON 2 X 2

## (undated) DEVICE — COVERS PROBE NR-48 STERILE

## (undated) DEVICE — SPONGE GAUZE 16PLY 4X4

## (undated) DEVICE — SEE MEDLINE ITEM 146313

## (undated) DEVICE — FIBRILLAR ABS HEMOSTAT 4X4

## (undated) DEVICE — SEE MEDLINE ITEM 157144

## (undated) DEVICE — LOOP VESSEL BLUE MAXI

## (undated) DEVICE — BURR ROUND DIAMOND TAPR 1.0MM

## (undated) DEVICE — BURR ROUND DIAMOND TAPR 1.5MM

## (undated) DEVICE — CLOSURE SKIN STERI STRIP 1/2X4

## (undated) DEVICE — SUT PROLENE 6-0 C-1 30IN BL

## (undated) DEVICE — ADHESIVE DERMABOND ADVANCED

## (undated) DEVICE — CONTAINER SPECIMEN STRL 4OZ

## (undated) DEVICE — BLADE 4 INCH EDGE UN-INS

## (undated) DEVICE — CORD BIPOLAR 12 FOOT

## (undated) DEVICE — SUT PDS II 1 TP-1 VIL

## (undated) DEVICE — BLADE TONGUE DEPRESSOR STRL